# Patient Record
Sex: FEMALE | Race: WHITE | NOT HISPANIC OR LATINO | Employment: FULL TIME | URBAN - METROPOLITAN AREA
[De-identification: names, ages, dates, MRNs, and addresses within clinical notes are randomized per-mention and may not be internally consistent; named-entity substitution may affect disease eponyms.]

---

## 2017-01-31 DIAGNOSIS — Z30.41 SURVEILLANCE OF PREVIOUSLY PRESCRIBED CONTRACEPTIVE PILL: Primary | ICD-10-CM

## 2017-01-31 RX ORDER — NORGESTIMATE AND ETHINYL ESTRADIOL 0.25-0.035
1 KIT ORAL DAILY
Qty: 112 TABLET | Refills: 1 | Status: SHIPPED | OUTPATIENT
Start: 2017-01-31 | End: 2017-09-20

## 2017-03-19 ENCOUNTER — MYC MEDICAL ADVICE (OUTPATIENT)
Dept: FAMILY MEDICINE | Facility: CLINIC | Age: 37
End: 2017-03-19

## 2017-03-21 NOTE — TELEPHONE ENCOUNTER
I have not seen Danyelle since 9/2015. She will need follow up with me prior to her losing insurance. Please assist her in scheduling an appointment.  STEWART Arreguin, PA-C

## 2017-03-21 NOTE — TELEPHONE ENCOUNTER
[employer] message sent to patient. LMOM for patient as well.    Lucinda Kenyon,

## 2017-03-27 ENCOUNTER — OFFICE VISIT (OUTPATIENT)
Dept: FAMILY MEDICINE | Facility: CLINIC | Age: 37
End: 2017-03-27
Payer: COMMERCIAL

## 2017-03-27 VITALS
HEIGHT: 67 IN | DIASTOLIC BLOOD PRESSURE: 86 MMHG | WEIGHT: 236 LBS | BODY MASS INDEX: 37.04 KG/M2 | SYSTOLIC BLOOD PRESSURE: 128 MMHG | HEART RATE: 84 BPM | TEMPERATURE: 98.5 F

## 2017-03-27 DIAGNOSIS — F33.1 MAJOR DEPRESSIVE DISORDER, RECURRENT EPISODE, MODERATE (H): Primary | ICD-10-CM

## 2017-03-27 DIAGNOSIS — G47.09 OTHER INSOMNIA: ICD-10-CM

## 2017-03-27 DIAGNOSIS — R29.898 ANKLE WEAKNESS: ICD-10-CM

## 2017-03-27 PROCEDURE — 99214 OFFICE O/P EST MOD 30 MIN: CPT | Performed by: PHYSICIAN ASSISTANT

## 2017-03-27 RX ORDER — BUPROPION HYDROCHLORIDE 150 MG/1
150 TABLET ORAL EVERY MORNING
Qty: 90 TABLET | Refills: 3 | Status: SHIPPED | OUTPATIENT
Start: 2017-03-27 | End: 2018-03-21

## 2017-03-27 RX ORDER — ZOLPIDEM TARTRATE 5 MG/1
5 TABLET ORAL
Qty: 30 TABLET | Refills: 5 | Status: SHIPPED | OUTPATIENT
Start: 2017-03-27 | End: 2017-09-13

## 2017-03-27 RX ORDER — BUPROPION HYDROCHLORIDE 300 MG/1
300 TABLET ORAL EVERY MORNING
Qty: 90 TABLET | Refills: 3 | Status: SHIPPED | OUTPATIENT
Start: 2017-03-27 | End: 2018-03-21

## 2017-03-27 RX ORDER — ZOLPIDEM TARTRATE 10 MG/1
5 TABLET ORAL
Qty: 14 TABLET | Refills: 0 | Status: SHIPPED | OUTPATIENT
Start: 2017-03-27 | End: 2017-09-18

## 2017-03-27 ASSESSMENT — ANXIETY QUESTIONNAIRES
3. WORRYING TOO MUCH ABOUT DIFFERENT THINGS: NOT AT ALL
6. BECOMING EASILY ANNOYED OR IRRITABLE: SEVERAL DAYS
1. FEELING NERVOUS, ANXIOUS, OR ON EDGE: SEVERAL DAYS
7. FEELING AFRAID AS IF SOMETHING AWFUL MIGHT HAPPEN: NOT AT ALL
5. BEING SO RESTLESS THAT IT IS HARD TO SIT STILL: NOT AT ALL
GAD7 TOTAL SCORE: 3
2. NOT BEING ABLE TO STOP OR CONTROL WORRYING: NOT AT ALL

## 2017-03-27 ASSESSMENT — PATIENT HEALTH QUESTIONNAIRE - PHQ9: 5. POOR APPETITE OR OVEREATING: SEVERAL DAYS

## 2017-03-27 NOTE — PROGRESS NOTES
SUBJECTIVE:                                                    Danyelle Canales is a 36 year old female who presents to clinic today for the following health issues:    Depression and Anxiety Follow-Up    Status since last visit: No change    Other associated symptoms:None    Complicating factors:     Significant life event: Yes-  Started a new job.  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     Current substance abuse: None    Cymbalta has helped with neck and back pain. Is down to #20 per month. Has gained weight with the Cymbalta, but has worked so doesn't want to try this.  Is happy with this other than the weight gain.   Eats relatively helping, but needs to up regular exercise.   Continues on Wellbutrin, and rare use of Vistaril.  Sleep unchanged, still using Ambien.  Is now at [facility] for pain mgmt.  Is seeing [place] for therapy and a pain mgmt provider there. Had plateaued with [facility], so switched about 1 year ago.    Still planning on baby in the future.    PHQ-9 SCORE 7/21/2015 9/15/2015 9/16/2015   Total Score 4 - -   Total Score MyChart - 10 -   Total Score - - 19     NAJMA-7 SCORE 10/20/2014 11/23/2014 12/30/2014   Total Score 2 4 6        PHQ-9  English      PHQ-9   Any Language     GAD7       Amount of exercise or physical activity: 2 days/week for an average of 15-30 minutes    Problems taking medications regularly: No    Medication side effects: weight gain from the Cymbalta     Diet: regular (no restrictions)      -------------------------------------    Problem list and histories reviewed & adjusted, as indicated.  Additional history: as documented    Notes L ankle weakness. Is using a brace on occasion with beneit. Feels that this needs to be strengthened.    Reviewed and updated as needed this visit by clinical staff  Tobacco  Allergies  Med Hx  Surg Hx  Fam Hx  Soc Hx      Reviewed and  "updated as needed this visit by Provider         ROS:  Constitutional, HEENT, cardiovascular, pulmonary, gi and gu systems are negative, except as otherwise noted.    OBJECTIVE:                                                    /86  Pulse 84  Temp 98.5  F (36.9  C) (Oral)  Ht 5' 7\" (1.702 m)  Wt 236 lb (107 kg)  LMP 02/27/2017 (Approximate)  Breastfeeding? No  BMI 36.96 kg/m2  Body mass index is 36.96 kg/(m^2).  GENERAL: healthy, alert and no distress  NECK: no adenopathy, no asymmetry, masses, or scars and thyroid normal to palpation  RESP: lungs clear to auscultation - no rales, rhonchi or wheezes  CV: regular rate and rhythm, normal S1 S2, no S3 or S4, no murmur, click or rub, no peripheral edema and peripheral pulses strong  ABDOMEN: soft, nontender, no hepatosplenomegaly, no masses and bowel sounds normal  MS: no gross musculoskeletal defects noted, no edema. Bilateral ankles appear normal Brace to L ankle.  PSYCH: mentation appears normal, affect normal/bright    Diagnostic Test Results:  none      ASSESSMENT/PLAN:                                                    1. Major depressive disorder, recurrent episode, moderate (H)  Stable, well controlled  Continue current dose, med refilled  F/u in 6 mos.  - buPROPion (WELLBUTRIN XL) 150 MG 24 hr tablet; Take 1 tablet (150 mg) by mouth every morning Will take in addition to 300 mg XL tablet  Dispense: 90 tablet; Refill: 3    2. Other insomnia  Stable, well controlled.  - zolpidem (AMBIEN) 5 MG tablet; Take 1 tablet (5 mg) by mouth nightly as needed for sleep  Dispense: 30 tablet; Refill: 5  - zolpidem (AMBIEN) 10 MG tablet; Take 0.5 tablets (5 mg) by mouth nightly as needed for sleep  Dispense: 14 tablet; Refill: 0    3. Ankle weakness  Recommended conservative treatment with rest, ice, nsaids and ROM/stretching exercises. RTC if symptoms worsen or do not continue to improve as anticipated.  Pt understood and agreed with plan.    Smiley Kim, " STEWART, PA-C

## 2017-03-27 NOTE — NURSING NOTE
"Chief Complaint   Patient presents with     Chronic Disease Management       Initial /86  Pulse 84  Temp 98.5  F (36.9  C) (Oral)  Ht 5' 7\" (1.702 m)  Wt 236 lb (107 kg)  LMP 02/27/2017 (Approximate)  Breastfeeding? No  BMI 36.96 kg/m2 Estimated body mass index is 36.96 kg/(m^2) as calculated from the following:    Height as of this encounter: 5' 7\" (1.702 m).    Weight as of this encounter: 236 lb (107 kg).  Medication Reconciliation: complete   Maliha Recio CMA (AAMA)      "

## 2017-03-27 NOTE — MR AVS SNAPSHOT
After Visit Summary   3/27/2017    Danyelle Canales    MRN: [id number]           Patient Information     Date Of Birth          1980        Visit Information        Provider Department      3/27/2017 3:00 PM Smiley Kim PA-C [facility]        Today's Diagnoses     Ankle weakness    -  1    Major depressive disorder, recurrent episode, moderate (H)        Other insomnia          Care Instructions                  Ankle Sprain Rehabilitation Exercises   As soon as you can tolerate pressure on the ball of your foot, begin stretching your ankle using the towel stretch. When this stretch is too easy, try the standing calf stretch and soleus stretch.     Towel stretch: Sit on a hard surface with your injured leg stretched out in front of you. Loop a towel around the ball of your foot and pull the towel toward your body keeping your knee straight. Hold this position for 15 to 30 seconds then relax. Repeat 3 times.     Standing calf stretch: Facing a wall, put your hands against the wall at about eye level. Keep the injured leg back, the uninjured leg forward, and the heel of your injured leg on the floor. Turn your injured foot slightly inward (as if you were pigeon-toed) as you slowly lean into the wall until you feel a stretch in the back of your calf. Hold for 15 to 30 seconds. Repeat 3 times. Do this exercise several times each day.     Standing soleus stretch: Stand facing a wall with your hands at about chest level. With both knees slightly bent and the injured foot back, gently lean into the wall until you feel a stretch in your lower calf. Once again, angle the toes of your injured foot slightly inward and keep your heel down on the floor. Hold this for 15 to 30 seconds. Return to the starting position. Repeat 3 times.   You can do the next 5 exercises when your ankle swelling has stopped increasing.     Ankle range of motion: Sitting or lying down with your legs  straight and your knee toward the ceiling, move your ankle up and down, in and out, and in circles. Only move your ankle. Don't move your leg. Repeat 10 times in each direction. Push hard in all directions.     Resisted dorsiflexion: Sit with your injured leg out straight and your foot facing a doorway. Tie a loop in one end of the tubing. Put your foot through the loop so that the tubing goes around the arch of your foot. Tie a knot in the other end of the tubing and shut the knot in the door. Move backward until there is tension in the tubing. Keeping your knee straight, pull your foot toward your body, stretching the tubing. Slowly return to the starting position. Do 3 sets of 10.     Resisted plantar flexion: Sit with your leg outstretched and loop the middle section of the tubing around the ball of your foot. Hold the ends of the tubing in both hands. Gently press the ball of your foot down and point your toes, stretching the tubing. Return to the starting position. Do 3 sets of 10.     Resisted inversion: Sit with your legs out straight and cross your uninjured leg over your injured ankle. Wrap the tubing around the ball of your injured foot and then loop it around your uninjured foot so that the tubing is anchored there at one end. Hold the other end of the tubing in your hand. Turn your injured foot inward and upward. This will stretch the tubing. Return to the starting position. Do 3 sets of 10.     Resisted eversion: Sit with both legs stretched out in front of you, with your feet about a shoulder's width apart. Tie a loop in one end of the tubing. Put your injured foot through the loop so that the tubing goes around the arch of that foot and wraps around the outside of the uninjured foot. Hold onto the other end of the tubing with your hand to provide tension. Turn your injured foot up and out. Make sure you keep your uninjured foot still so that it will allow the tubing to stretch as you move your  injured foot. Return to the starting position. Do 3 sets of 10.   You may do the rest of the exercises when you can stand on your injured ankle without pain.     Heel raises: Balance yourself while standing behind a chair or counter. Raise your body up onto your toes and hold it for 5 seconds, then slowly lower yourself down. Repeat 10 times. Do 3 sets of 10.     Step-up: Stand with the foot of your injured leg on a support (like a block of wood) 3 to 5 inches high. Keep your other foot flat on the floor. Shift your weight onto the injured leg and straighten the knee as the uninjured leg comes off the floor. Lower your uninjured leg to the floor slowly. Do 3 sets of 10.     Static and dynamic balance exercises   A. Place a chair next to your non-injured leg and stand upright. (This will provide you with balance if needed.) Stand on your injured foot. Try to raise the arch of your foot while keeping your toes on the floor. Try to maintain this position and balance on your injured side for 30 seconds. This exercise can be made more difficult by doing it on a piece of foam or a pillow, or with your eyes closed.   B.  the same position as above. Keep your foot in this position and reach forward in front of you with your injured side's hand, allowing your knee to bend. Repeat this 10 times while maintaining the arch height. This exercise can be made more difficult by reaching farther in front of you. Do 2 sets.   C.  the same position as above. While maintaining your arch height, reach the injured side's hand across your body toward the chair. The farther you reach, the more challenging the exercise. Do 2 sets of 10.     Jump rope: Jump rope landing on both legs for 5 minutes, then on only the injured leg for 5 minutes.     Written by Aye Gross M.S., P.T., for [employer].   Published by [employer].   This content is reviewed periodically and is subject to change as  new health information becomes available. The information is intended to inform and educate and is not a replacement for medical evaluation, advice, diagnosis or treatment by a healthcare professional.   Sports Medicine Advisor 2003.1 Index  Sports Medicine Advisor 2003.1 Credits   Copyright   2003 [employer]. All rights reserved.   Page footer image                  [facility] [place]   Discharged by : Maliha ESCALERA CMA (AAMA)    Paper scripts provided to patient : Ambien 5 mg   If you have any questions regarding to your visit please contact your care team:   [facility] Hours Telephone Number   RODRIGUE [address] PA-C    7am-7pm Monday - Thursday   7am-5pm Fridays  [phone]   (Appointment scheduling available 24/7)   RN Line   [phone] option 2       What options do I have for visits at the clinic other than the traditional office visit?   To expand how we care for you, many of our providers are utilizing electronic visits (e-visits) and telephone visits, when medically appropriate, for interactions with their patients rather than a visit in the clinic. We also offer nurse visits for many medical concerns. Just like any other service, we will bill your insurance company for this type of visit based on time spent on the phone with your provider. Not all insurance companies cover these visits. Please check with your medical insurance if this type of visit is covered. You will be responsible for any charges that are not paid by your insurance.     E-visits via [employer]: generally incur a $35.00 fee.     Telephone visits:   Time spent on the phone: *charged based on time that is spent on the phone in increments of 10 minutes. Estimated cost:   5-10 mins $30.00   11-20 mins. $59.00   21-30 mins. $85.00   Use [employer] (secure email communication and access to your chart) to send your primary care provider a message or make an  appointment. Ask someone on your Team how to sign up for [employer].   For a Price Quote for your services, please call our Consumer Price Line at [phone].   As always, Thank you for trusting us with your health care needs!                [facility]:    Scheduling Appointments  [facility]  Call: [phone]    [place] [facility]  Call: [phone]    [facility]  Call: [phone]                    Follow-ups after your visit        Who to contact     If you have questions or need follow up information about today's clinic visit or your schedule please contact [facility] directly at [phone].  Normal or non-critical lab and imaging results will be communicated to you by [employer], letter or phone within 4 business days after the clinic has received the results. If you do not hear from us within 7 days, please contact the clinic through [employer] or phone. If you have a critical or abnormal lab result, we will notify you by phone as soon as possible.  Submit refill requests through [employer] or call your pharmacy and they will forward the refill request to us. Please allow 3 business days for your refill to be completed.          Additional Information About Your Visit        [employer] Information     [employer] gives you secure access to your electronic health record. If you see a primary care provider, you can also send messages to your care team and make appointments. If you have questions, please call your primary care clinic.  If you do not have a primary care provider, please call [phone] and they will assist you.        Care EveryWhere ID     This is your Care EveryWhere ID. This could be used by other organizations to access your [facility] medical records  [id number]        Your Vitals Were     Pulse Temperature Height Last Period Breastfeeding? BMI (Body Mass Index)    84 98.5  F (36.9  C) (Oral) 5'  "7\" (1.702 m) 02/27/2017 (Approximate) No 36.96 kg/m2       Blood Pressure from Last 3 Encounters:   03/27/17 128/86   11/23/16 (!) 139/92   10/15/16 130/76    Weight from Last 3 Encounters:   03/27/17 236 lb (107 kg)   11/23/16 231 lb (104.8 kg)   10/15/16 229 lb 3.2 oz (104 kg)              We Performed the Following     DEPRESSION ACTION PLAN (DAP) Order [[id number]]          Today's Medication Changes          These changes are accurate as of: 3/27/17  3:41 PM.  If you have any questions, ask your nurse or doctor.               These medicines have changed or have updated prescriptions.        Dose/Directions    * zolpidem 5 MG tablet   Commonly known as:  AMBIEN   This may have changed:  Another medication with the same name was added. Make sure you understand how and when to take each.   Used for:  Other insomnia   Changed by:  Smiley Kim PA-C        Dose:  5 mg   Take 1 tablet (5 mg) by mouth nightly as needed for sleep   Quantity:  30 tablet   Refills:  5       * zolpidem 10 MG tablet   Commonly known as:  AMBIEN   This may have changed:  You were already taking a medication with the same name, and this prescription was added. Make sure you understand how and when to take each.   Used for:  Other insomnia   Changed by:  Smiley Kim PA-C        Dose:  5 mg   Take 0.5 tablets (5 mg) by mouth nightly as needed for sleep   Quantity:  14 tablet   Refills:  0       * Notice:  This list has 2 medication(s) that are the same as other medications prescribed for you. Read the directions carefully, and ask your doctor or other care provider to review them with you.      Stop taking these medicines if you haven't already. Please contact your care team if you have questions.     Cervical Caps 22 MM Nancy   Stopped by:  Smiley Kim PA-C           methylPREDNISolone 4 MG tablet   Commonly known as:  MEDROL DOSEPAK   Stopped by:  Smiley Kim PA-C           ondansetron 8 MG tablet   Commonly " known as:  ZOFRAN   Stopped by:  Smiley Kim PA-C                Where to get your medicines      These medications were sent to [facility] [facility] [address] [facility] - [place], [place] - [address]  [address], [place] [address]     Phone:  [phone]     buPROPion 150 MG 24 hr tablet    buPROPion 300 MG 24 hr tablet         Some of these will need a paper prescription and others can be bought over the counter.  Ask your nurse if you have questions.     Bring a paper prescription for each of these medications     zolpidem 10 MG tablet    zolpidem 5 MG tablet                Primary Care Provider Office Phone # Fax #    Smiley Kim PA-C [phone]       [place] [id number] [facility] [address]        Thank you!     Thank you for choosing [facility]  for your care. Our goal is always to provide you with excellent care. Hearing back from our patients is one way we can continue to improve our services. Please take a few minutes to complete the written survey that you may receive in the mail after your visit with us. Thank you!             Your Updated Medication List - Protect others around you: Learn how to safely use, store and throw away your medicines at www.disposemymeds.org.          This list is accurate as of: 3/27/17  3:41 PM.  Always use your most recent med list.                   Brand Name Dispense Instructions for use    * buPROPion 150 MG 24 hr tablet    WELLBUTRIN XL    90 tablet    Take 1 tablet (150 mg) by mouth every morning Will take in addition to 300 mg XL tablet       * buPROPion 300 MG 24 hr tablet    WELLBUTRIN XL    90 tablet    Take 1 tablet (300 mg) by mouth every morning       DULoxetine 30 MG EC capsule    CYMBALTA    60 capsule    1 capsule daily x 1 week, then increase to 2 tablets daily       HYDROcodone-acetaminophen 5-325 MG per tablet    NORCO         hydrOXYzine 25 MG capsule    VISTARIL    120 capsule     Take 1-2 tablet at bedtime, may take one tablet in morning and one tablet in afternoon as well       norgestimate-ethinyl estradiol 0.25-35 MG-MCG per tablet    ORTHO-CYCLEN, SPRINTEC    112 tablet    Take 1 tablet by mouth daily Please do not substitute generic       * order for DME     1 Units    Apply 1 Units topically. TENS unit use as directed       * UNKNOWN TO PATIENT      Compound med. Prescribed by pain clinic.       * zolpidem 5 MG tablet    AMBIEN    30 tablet    Take 1 tablet (5 mg) by mouth nightly as needed for sleep       * zolpidem 10 MG tablet    AMBIEN    14 tablet    Take 0.5 tablets (5 mg) by mouth nightly as needed for sleep       * Notice:  This list has 6 medication(s) that are the same as other medications prescribed for you. Read the directions carefully, and ask your doctor or other care provider to review them with you.

## 2017-03-27 NOTE — PATIENT INSTRUCTIONS
Ankle Sprain Rehabilitation Exercises   As soon as you can tolerate pressure on the ball of your foot, begin stretching your ankle using the towel stretch. When this stretch is too easy, try the standing calf stretch and soleus stretch.     Towel stretch: Sit on a hard surface with your injured leg stretched out in front of you. Loop a towel around the ball of your foot and pull the towel toward your body keeping your knee straight. Hold this position for 15 to 30 seconds then relax. Repeat 3 times.     Standing calf stretch: Facing a wall, put your hands against the wall at about eye level. Keep the injured leg back, the uninjured leg forward, and the heel of your injured leg on the floor. Turn your injured foot slightly inward (as if you were pigeon-toed) as you slowly lean into the wall until you feel a stretch in the back of your calf. Hold for 15 to 30 seconds. Repeat 3 times. Do this exercise several times each day.     Standing soleus stretch: Stand facing a wall with your hands at about chest level. With both knees slightly bent and the injured foot back, gently lean into the wall until you feel a stretch in your lower calf. Once again, angle the toes of your injured foot slightly inward and keep your heel down on the floor. Hold this for 15 to 30 seconds. Return to the starting position. Repeat 3 times.   You can do the next 5 exercises when your ankle swelling has stopped increasing.     Ankle range of motion: Sitting or lying down with your legs straight and your knee toward the ceiling, move your ankle up and down, in and out, and in circles. Only move your ankle. Don't move your leg. Repeat 10 times in each direction. Push hard in all directions.     Resisted dorsiflexion: Sit with your injured leg out straight and your foot facing a doorway. Tie a loop in one end of the tubing. Put your foot through the loop so that the tubing goes around the arch of your foot. Tie a knot in the other end  of the tubing and shut the knot in the door. Move backward until there is tension in the tubing. Keeping your knee straight, pull your foot toward your body, stretching the tubing. Slowly return to the starting position. Do 3 sets of 10.     Resisted plantar flexion: Sit with your leg outstretched and loop the middle section of the tubing around the ball of your foot. Hold the ends of the tubing in both hands. Gently press the ball of your foot down and point your toes, stretching the tubing. Return to the starting position. Do 3 sets of 10.     Resisted inversion: Sit with your legs out straight and cross your uninjured leg over your injured ankle. Wrap the tubing around the ball of your injured foot and then loop it around your uninjured foot so that the tubing is anchored there at one end. Hold the other end of the tubing in your hand. Turn your injured foot inward and upward. This will stretch the tubing. Return to the starting position. Do 3 sets of 10.     Resisted eversion: Sit with both legs stretched out in front of you, with your feet about a shoulder's width apart. Tie a loop in one end of the tubing. Put your injured foot through the loop so that the tubing goes around the arch of that foot and wraps around the outside of the uninjured foot. Hold onto the other end of the tubing with your hand to provide tension. Turn your injured foot up and out. Make sure you keep your uninjured foot still so that it will allow the tubing to stretch as you move your injured foot. Return to the starting position. Do 3 sets of 10.   You may do the rest of the exercises when you can stand on your injured ankle without pain.     Heel raises: Balance yourself while standing behind a chair or counter. Raise your body up onto your toes and hold it for 5 seconds, then slowly lower yourself down. Repeat 10 times. Do 3 sets of 10.     Step-up: Stand with the foot of your injured leg on a support (like a block of wood) 3 to 5  inches high. Keep your other foot flat on the floor. Shift your weight onto the injured leg and straighten the knee as the uninjured leg comes off the floor. Lower your uninjured leg to the floor slowly. Do 3 sets of 10.     Static and dynamic balance exercises   A. Place a chair next to your non-injured leg and stand upright. (This will provide you with balance if needed.) Stand on your injured foot. Try to raise the arch of your foot while keeping your toes on the floor. Try to maintain this position and balance on your injured side for 30 seconds. This exercise can be made more difficult by doing it on a piece of foam or a pillow, or with your eyes closed.   B.  the same position as above. Keep your foot in this position and reach forward in front of you with your injured side's hand, allowing your knee to bend. Repeat this 10 times while maintaining the arch height. This exercise can be made more difficult by reaching farther in front of you. Do 2 sets.   C.  the same position as above. While maintaining your arch height, reach the injured side's hand across your body toward the chair. The farther you reach, the more challenging the exercise. Do 2 sets of 10.     Jump rope: Jump rope landing on both legs for 5 minutes, then on only the injured leg for 5 minutes.     Written by Aye Gross M.S., P.T., for [employer].   Published by [employer].   This content is reviewed periodically and is subject to change as new health information becomes available. The information is intended to inform and educate and is not a replacement for medical evaluation, advice, diagnosis or treatment by a healthcare professional.   Sports Medicine Advisor 2003.1 Index  Sports Medicine Advisor 2003.1 Credits   Copyright   2003 [employer]. All rights reserved.   Page footer image                  [facility]   Discharged by : Maliha ESCALERA CMA  (AAMA)    Paper scripts provided to patient : Ambien 5 mg   If you have any questions regarding to your visit please contact your care team:   [facility] Hours Telephone Number   RODRIGUE Bates Dr., PA-C    7am-7pm Monday - Thursday   7am-5pm Fridays  [phone]   (Appointment scheduling available 24/7)   RN Line   [phone] option 2       What options do I have for visits at the clinic other than the traditional office visit?   To expand how we care for you, many of our providers are utilizing electronic visits (e-visits) and telephone visits, when medically appropriate, for interactions with their patients rather than a visit in the clinic. We also offer nurse visits for many medical concerns. Just like any other service, we will bill your insurance company for this type of visit based on time spent on the phone with your provider. Not all insurance companies cover these visits. Please check with your medical insurance if this type of visit is covered. You will be responsible for any charges that are not paid by your insurance.     E-visits via [email or web address]: generally incur a $35.00 fee.     Telephone visits:   Time spent on the phone: *charged based on time that is spent on the phone in increments of 10 minutes. Estimated cost:   5-10 mins $30.00   11-20 mins. $59.00   21-30 mins. $85.00   Use [email or web address] (secure email communication and access to your chart) to send your primary care provider a message or make an appointment. Ask someone on your Team how to sign up for [employer].   For a Price Quote for your services, please call our Consumer Price Line at [phone].   As always, Thank you for trusting us with your health care needs!                [facility]:    Scheduling Appointments  [facility] [place]  Call: [phone]    [facility]  Call: [phone]    [facility]  Locations  Call: [phone]

## 2017-03-28 ASSESSMENT — ANXIETY QUESTIONNAIRES: GAD7 TOTAL SCORE: 3

## 2017-03-28 ASSESSMENT — PATIENT HEALTH QUESTIONNAIRE - PHQ9: SUM OF ALL RESPONSES TO PHQ QUESTIONS 1-9: 3

## 2017-09-13 ENCOUNTER — MYC REFILL (OUTPATIENT)
Dept: FAMILY MEDICINE | Facility: CLINIC | Age: 37
End: 2017-09-13

## 2017-09-13 DIAGNOSIS — G47.09 OTHER INSOMNIA: ICD-10-CM

## 2017-09-13 NOTE — TELEPHONE ENCOUNTER
Message from [employer]:  Original authorizing provider: RODRIGUE Garcia would like a refill of the following medications:  zolpidem (AMBIEN) 5 MG tablet [Smiley Kim PA-C]    Preferred pharmacy: [address]    Comment:

## 2017-09-13 NOTE — TELEPHONE ENCOUNTER
zolpidem (AMBIEN) 5 MG tablet         Last Written Prescription Date:  3/27/17  Last Fill Quantity: 30,   # refills: 5  Last Office Visit with [facility] or [facility] prescribing provider: 3/27/17  Future Office visit:       Routing refill request to provider for review/approval because:  Drug not on the [facility], [facility] or [facility] refill protocol or controlled substance

## 2017-09-14 DIAGNOSIS — G47.09 OTHER INSOMNIA: ICD-10-CM

## 2017-09-14 NOTE — TELEPHONE ENCOUNTER
zolpidem (AMBIEN) 5 MG tablet   5 mg, AT BEDTIME PRN 5 ordered  Edit     Summary: Take 1 tablet (5 mg) by mouth nightly as needed for sleep, Disp-30 tablet, R-5, Local Print   Dose, Route, Frequency: 5 mg, Oral, AT BEDTIME PRN  Start: 3/27/2017  Ord/Sold: 3/27/2017 (O)  Report  Taking:   Long-term:   Pharmacy: [address]  Med Dose History       Patient Sig: Take 1 tablet (5 mg) by mouth nightly as needed for sleep       Ordered on: 3/27/2017       Authorized by: RODDY SOTO       Dispense: 30 tablet          Last Office Visit with [facility], [facility] or  [facility] prescribing provider: 3-  Future Office visit:       Routing refill request to provider for review/approval because:  Drug not on the [facility], [facility] or [facility] refill protocol or controlled substance

## 2017-09-15 RX ORDER — ZOLPIDEM TARTRATE 5 MG/1
5 TABLET ORAL
Qty: 30 TABLET | Refills: 5 | Status: SHIPPED | OUTPATIENT
Start: 2017-09-15 | End: 2018-03-06

## 2017-09-15 NOTE — TELEPHONE ENCOUNTER
Script printed, please call or fax.  Pt is due for 6 month follow up depression/anxiety.  STEWART Arreguin, PA-C

## 2017-09-18 ENCOUNTER — MYC MEDICAL ADVICE (OUTPATIENT)
Dept: FAMILY MEDICINE | Facility: CLINIC | Age: 37
End: 2017-09-18

## 2017-09-18 RX ORDER — ZOLPIDEM TARTRATE 5 MG/1
TABLET ORAL
Qty: 30 TABLET
Start: 2017-09-18

## 2017-09-18 ASSESSMENT — PATIENT HEALTH QUESTIONNAIRE - PHQ9
10. IF YOU CHECKED OFF ANY PROBLEMS, HOW DIFFICULT HAVE THESE PROBLEMS MADE IT FOR YOU TO DO YOUR WORK, TAKE CARE OF THINGS AT HOME, OR GET ALONG WITH OTHER PEOPLE: SOMEWHAT DIFFICULT
SUM OF ALL RESPONSES TO PHQ QUESTIONS 1-9: 9
SUM OF ALL RESPONSES TO PHQ QUESTIONS 1-9: 9

## 2017-09-20 ENCOUNTER — OFFICE VISIT (OUTPATIENT)
Dept: FAMILY MEDICINE | Facility: CLINIC | Age: 37
End: 2017-09-20
Payer: COMMERCIAL

## 2017-09-20 VITALS
SYSTOLIC BLOOD PRESSURE: 122 MMHG | HEIGHT: 67 IN | DIASTOLIC BLOOD PRESSURE: 84 MMHG | BODY MASS INDEX: 36.41 KG/M2 | WEIGHT: 232 LBS | HEART RATE: 84 BPM | TEMPERATURE: 98.6 F

## 2017-09-20 DIAGNOSIS — F32.1 MODERATE MAJOR DEPRESSION (H): ICD-10-CM

## 2017-09-20 DIAGNOSIS — Z00.01 ENCOUNTER FOR ROUTINE ADULT MEDICAL EXAM WITH ABNORMAL FINDINGS: Primary | ICD-10-CM

## 2017-09-20 DIAGNOSIS — M54.42 LOW BACK PAIN WITH LEFT-SIDED SCIATICA, UNSPECIFIED BACK PAIN LATERALITY, UNSPECIFIED CHRONICITY: ICD-10-CM

## 2017-09-20 DIAGNOSIS — E66.9 NON MORBID OBESITY, UNSPECIFIED OBESITY TYPE: ICD-10-CM

## 2017-09-20 DIAGNOSIS — R41.840 POOR CONCENTRATION: ICD-10-CM

## 2017-09-20 DIAGNOSIS — Z23 NEED FOR PROPHYLACTIC VACCINATION AND INOCULATION AGAINST INFLUENZA: ICD-10-CM

## 2017-09-20 DIAGNOSIS — F41.1 GENERALIZED ANXIETY DISORDER: ICD-10-CM

## 2017-09-20 LAB
CHOLEST SERPL-MCNC: 214 MG/DL
GLUCOSE SERPL-MCNC: 81 MG/DL (ref 70–99)
HDLC SERPL-MCNC: 87 MG/DL
LDLC SERPL CALC-MCNC: 111 MG/DL
NONHDLC SERPL-MCNC: 127 MG/DL
TRIGL SERPL-MCNC: 81 MG/DL

## 2017-09-20 PROCEDURE — 99213 OFFICE O/P EST LOW 20 MIN: CPT | Mod: 25 | Performed by: PHYSICIAN ASSISTANT

## 2017-09-20 PROCEDURE — 82947 ASSAY GLUCOSE BLOOD QUANT: CPT | Performed by: PHYSICIAN ASSISTANT

## 2017-09-20 PROCEDURE — 90686 IIV4 VACC NO PRSV 0.5 ML IM: CPT | Performed by: PHYSICIAN ASSISTANT

## 2017-09-20 PROCEDURE — 99395 PREV VISIT EST AGE 18-39: CPT | Mod: 25 | Performed by: PHYSICIAN ASSISTANT

## 2017-09-20 PROCEDURE — 90471 IMMUNIZATION ADMIN: CPT | Performed by: PHYSICIAN ASSISTANT

## 2017-09-20 PROCEDURE — 36415 COLL VENOUS BLD VENIPUNCTURE: CPT | Performed by: PHYSICIAN ASSISTANT

## 2017-09-20 PROCEDURE — 80061 LIPID PANEL: CPT | Performed by: PHYSICIAN ASSISTANT

## 2017-09-20 RX ORDER — NAPROXEN 500 MG/1
500 TABLET ORAL 2 TIMES DAILY PRN
Qty: 30 TABLET | Refills: 1 | Status: SHIPPED | OUTPATIENT
Start: 2017-09-20 | End: 2018-08-20

## 2017-09-20 RX ORDER — METHYLPREDNISOLONE 4 MG
TABLET, DOSE PACK ORAL
Qty: 21 TABLET | Refills: 0 | Status: SHIPPED | OUTPATIENT
Start: 2017-09-20 | End: 2018-03-07

## 2017-09-20 ASSESSMENT — ANXIETY QUESTIONNAIRES
GAD7 TOTAL SCORE: 4
5. BEING SO RESTLESS THAT IT IS HARD TO SIT STILL: NOT AT ALL
3. WORRYING TOO MUCH ABOUT DIFFERENT THINGS: NOT AT ALL
1. FEELING NERVOUS, ANXIOUS, OR ON EDGE: SEVERAL DAYS
2. NOT BEING ABLE TO STOP OR CONTROL WORRYING: NOT AT ALL
6. BECOMING EASILY ANNOYED OR IRRITABLE: SEVERAL DAYS
7. FEELING AFRAID AS IF SOMETHING AWFUL MIGHT HAPPEN: SEVERAL DAYS

## 2017-09-20 ASSESSMENT — PATIENT HEALTH QUESTIONNAIRE - PHQ9: 5. POOR APPETITE OR OVEREATING: SEVERAL DAYS

## 2017-09-20 NOTE — PROGRESS NOTES
Injectable Influenza Immunization Documentation    1.  Is the person to be vaccinated sick today?   No    2. Does the person to be vaccinated have an allergy to a component   of the vaccine?   No    3. Has the person to be vaccinated ever had a serious reaction   to influenza vaccine in the past?   No    4. Has the person to be vaccinated ever had Guillain-Barré syndrome?   No    Form completed by Toyin Andersen, Certified Medical Assistant (AAMA)

## 2017-09-20 NOTE — NURSING NOTE
"Chief Complaint   Patient presents with     Physical     pt is fasting       Initial /84 (Cuff Size: Adult Large)  Pulse 84  Temp 98.6  F (37  C) (Oral)  Ht 5' 7\" (1.702 m)  Wt 232 lb (105.2 kg)  LMP 08/25/2017 (Exact Date)  BMI 36.34 kg/m2 Estimated body mass index is 36.34 kg/(m^2) as calculated from the following:    Height as of this encounter: 5' 7\" (1.702 m).    Weight as of this encounter: 232 lb (105.2 kg).  Medication Reconciliation: complete   Toyin Andersen, Certified Medical Assistant (AAMA)     "

## 2017-09-20 NOTE — PATIENT INSTRUCTIONS
Smiley or her team will be in touch with you with results.  Naproxen twice daily x 3-5 days  Heat to area 2-3 times daily x 20 minutes  Stretching 2-3 times daily  Only fill steroid if develop radiating pain down leg.    STEWART Arreguin, PATamannaC    Preventive Health Recommendations  Female Ages 26 - 39  Yearly exam:   See your health care provider every year in order to    Review health changes.     Discuss preventive care.      Review your medicines if you your doctor has prescribed any.    Until age 30: Get a Pap test every three years (more often if you have had an abnormal result).    After age 30: Talk to your doctor about whether you should have a Pap test every 3 years or have a Pap test with HPV screening every 5 years.   You do not need a Pap test if your uterus was removed (hysterectomy) and you have not had cancer.  You should be tested each year for STDs (sexually transmitted diseases), if you're at risk.   Talk to your provider about how often to have your cholesterol checked.  If you are at risk for diabetes, you should have a diabetes test (fasting glucose).  Shots: Get a flu shot each year. Get a tetanus shot every 10 years.   Nutrition:     Eat at least 5 servings of fruits and vegetables each day.    Eat whole-grain bread, whole-wheat pasta and brown rice instead of white grains and rice.    Talk to your provider about Calcium and Vitamin D.     Lifestyle    Exercise at least 150 minutes a week (30 minutes a day, 5 days of the week). This will help you control your weight and prevent disease.    Limit alcohol to one drink per day.    No smoking.     Wear sunscreen to prevent skin cancer.    See your dentist every six months for an exam and cleaning.  [facility]   Discharged by : Toyin SCHMID Certified Medical Assistant (AAMA)   Paper scripts provided to patient : 1   If you have any questions regarding to your visit please contact your care team:   [facility] Hours  Telephone Number   RODRIGUE Bates Dr., PA-C    7am-7pm Monday - Thursday   7am-5pm Fridays  [phone]   (Appointment scheduling available 24/7)   RN Line   [phone] option 2       What options do I have for visits at the clinic other than the traditional office visit?   To expand how we care for you, many of our providers are utilizing electronic visits (e-visits) and telephone visits, when medically appropriate, for interactions with their patients rather than a visit in the clinic. We also offer nurse visits for many medical concerns. Just like any other service, we will bill your insurance company for this type of visit based on time spent on the phone with your provider. Not all insurance companies cover these visits. Please check with your medical insurance if this type of visit is covered. You will be responsible for any charges that are not paid by your insurance.     E-visits via [employer]: generally incur a $35.00 fee.     Telephone visits:   Time spent on the phone: *charged based on time that is spent on the phone in increments of 10 minutes. Estimated cost:   5-10 mins $30.00   11-20 mins. $59.00   21-30 mins. $85.00   Use [employer] (secure email communication and access to your chart) to send your primary care provider a message or make an appointment. Ask someone on your Team how to sign up for [employer].   For a Price Quote for your services, please call our Consumer Price Line at [phone].   As always, Thank you for trusting us with your health care needs!                [facility]:    Scheduling Appointments  [place]  Call: [phone]    [facility]  Call: [phone]    [facility]  Call: [phone]

## 2017-09-20 NOTE — PROGRESS NOTES
SUBJECTIVE:   CC: Danyelle Canales is an 37 year old woman who presents for preventive health visit.     Physical   Annual:     Getting at least 3 servings of Calcium per day::  NO    Bi-annual eye exam::  Yes    Dental care twice a year::  Yes    Sleep apnea or symptoms of sleep apnea::  Daytime drowsiness    Diet::  Other    Frequency of exercise::  1 day/week    Duration of exercise::  15-30 minutes    Taking medications regularly::  Yes    Medication side effects::  Significant flushing and Other    Additional concerns today::  YES    Sciatica is acting up - possibly start oral steroid again.  Started up again 2 days ago. Is having sharp pains in her lwoer back and into her thighs.  Sitting and standing are difficult.  Has been elevating legs, icing, etc.  No numbness. No bowel or bladder changes.  Last flare was about 1 year ago.  Typically can keep this under control with stretching, but is not helping this time.    Left foot problems-Has dealt with intermittent swelling.    Worse after resting and sleeping.  Lateral side of foot.  Improves     Check for A.D.D - issues concentrating and staying on task.  Has noticed in the last couple of years that these symptoms are worsening. Has a hard time staying on task, is zoning out. Worried about missing something, making a mistake, etc. Gets off of track while she is talking.  No problems when she is younger.  Can not stop her mind from racing, multiple thoughts coming in at once.  Has tried meditation. Loud music is helpful for her.  Recalls feeling guilt as a child at a very young age.      Continues to see pain mgmt. Currently on Cymbalta 90 mg per day. Taking 3 30 mg tablets.  Eventually this medication will need to be taken over by PCP.    -------------------------------------    Today's PHQ-2 Score:   PHQ-2 ( 1999 [employer]) 9/18/2017   Q1: Little interest or pleasure in doing things 1   Q2: Feeling down, depressed or hopeless 2   PHQ-2 Score 3   Q1: Little  "interest or pleasure in doing things Several days   Q2: Feeling down, depressed or hopeless More than half the days   PHQ-2 Score 3       Abuse: Current or Past(Physical, Sexual or Emotional)- No  Do you feel safe in your environment - Yes    Social History   Substance Use Topics     Smoking status: Never Smoker     Smokeless tobacco: Never Used     Alcohol use Yes      Comment: 1-2 a month     The patient does not drink >3 drinks per day nor >7 drinks per week.    Reviewed orders with patient.  Reviewed health maintenance and updated orders accordingly - Ye    Mammogram not appropriate for this patient based on age.    Pertinent mammograms are reviewed under the imaging tab.  History of abnormal Pap smear: NO - age 30-65 PAP every 5 years with negative HPV co-testing recommended    Reviewed and updated as needed this visit by clinical staff  Tobacco  Allergies  Med Hx  Surg Hx  Fam Hx  Soc Hx        Reviewed and updated as needed this visit by Provider              ROS:  C: NEGATIVE for fever, chills, change in weight  I: NEGATIVE for worrisome rashes, moles or lesions  E: NEGATIVE for vision changes or irritation  ENT: NEGATIVE for ear, mouth and throat problems  R: NEGATIVE for significant cough or SOB  B: NEGATIVE for masses, tenderness or discharge  CV: NEGATIVE for chest pain, palpitations or peripheral edema  GI: NEGATIVE for nausea, abdominal pain, heartburn, or change in bowel habits  : NEGATIVE for unusual urinary or vaginal symptoms. Periods are regular.  MUSCULOSKELETAL:as above  N: NEGATIVE for weakness, dizziness or paresthesias  PSYCH: as above     OBJECTIVE:   /84 (Cuff Size: Adult Large)  Pulse 84  Temp 98.6  F (37  C) (Oral)  Ht 5' 7\" (1.702 m)  Wt 232 lb (105.2 kg)  LMP 08/25/2017 (Exact Date)  BMI 36.34 kg/m2  EXAM:  GENERAL: healthy, alert and no distress  EYES: Eyes grossly normal to inspection, PERRL and conjunctivae and sclerae normal  HENT: ear canals and TM's normal, nose " and mouth without ulcers or lesions  NECK: no adenopathy, no asymmetry, masses, or scars and thyroid normal to palpation  RESP: lungs clear to auscultation - no rales, rhonchi or wheezes  BREAST: normal without masses, tenderness or nipple discharge and no palpable axillary masses or adenopathy  CV: regular rate and rhythm, normal S1 S2, no S3 or S4, no murmur, click or rub, no peripheral edema and peripheral pulses strong  ABDOMEN: soft, nontender, no hepatosplenomegaly, no masses and bowel sounds normal  MS: mid low back tenderness. Negative SLR bilaterally.  SKIN: no suspicious lesions or rashes  NEURO: Normal strength and tone, mentation intact and speech normal  PSYCH: mentation appears normal, affect normal/bright    ASSESSMENT/PLAN:   1. Encounter for routine adult medical exam with abnormal findings  Follow up pending above results. Encouraged healthy diet and regular exercise, monthy SBE, and yearly exams.  - Lipid panel reflex to direct LDL  - Glucose    2. Low back pain with left-sided sciatica, unspecified back pain laterality, unspecified chronicity  Encouraged rest, heat, above mediations as directed, and ROM/stretching exercies.  Provided pt with recommended exercises.  Discussed ergomics at work, appropriate lifting, exercise and weight loss as preventative measures. RTC if symptoms worsen or do not continue to improve as anticipated over the next 1-2 wks.  Pt understood and agreed with plan.  Given history of radicular symptoms with herniated disc, I did provided with her printed script for medrol should these develop.  - methylPREDNISolone (MEDROL DOSEPAK) 4 MG tablet; Follow package instructions  Dispense: 21 tablet; Refill: 0  - naproxen (NAPROSYN) 500 MG tablet; Take 1 tablet (500 mg) by mouth 2 times daily as needed for moderate pain  Dispense: 30 tablet; Refill: 1    3. Generalized anxiety disorder  4. Moderate major depression (H)  Stable, well controlled.  - MENTAL HEALTH REFERRAL    5. Poor  "concentration  Pt has concern for ADHD  Referral for evaluation.  - MENTAL HEALTH REFERRAL    6. Need for prophylactic vaccination and inoculation against influenza  - FLU VAC, SPLIT VIRUS IM > 3 YO (QUADRIVALENT) [75843]  - Vaccine Administration, Initial [79249]    7. Non morbid obesity, unspecified obesity type  Recommended healthy diet and lifestyle changes.    COUNSELING:  Reviewed preventive health counseling, as reflected in patient instructions    BP Screening:   Last 3 BP Readings:    BP Readings from Last 3 Encounters:   09/20/17 122/84   03/27/17 128/86   11/23/16 (!) 139/92       The following was recommended to the patient:  Re-screen BP within a year and recommended lifestyle modifications     reports that she has never smoked. She has never used smokeless tobacco.    Estimated body mass index is 36.34 kg/(m^2) as calculated from the following:    Height as of this encounter: 5' 7\" (1.702 m).    Weight as of this encounter: 232 lb (105.2 kg).   Weight management plan: Discussed healthy diet and exercise guidelines and patient will follow up in 6 months in clinic to re-evaluate.    Counseling Resources:  ATP IV Guidelines  Pooled Cohorts Equation Calculator  Breast Cancer Risk Calculator  FRAX Risk Assessment  ICSI Preventive Guidelines  Dietary Guidelines for Americans, 2010  USDA's MyPlate  ASA Prophylaxis  Lung CA Screening    Smiley Kim PA-C  [facility]  Answers for HPI/ROS submitted by the patient on 9/18/2017   PHQ-2 Score: 3  If you checked off any problems, how difficult have these problems made it for you to do your work, take care of things at home, or get along with other people?: Somewhat difficult  PHQ9 TOTAL SCORE: 9    "

## 2017-09-21 ASSESSMENT — ANXIETY QUESTIONNAIRES: GAD7 TOTAL SCORE: 4

## 2017-12-21 DIAGNOSIS — F33.1 MAJOR DEPRESSIVE DISORDER, RECURRENT EPISODE, MODERATE (H): ICD-10-CM

## 2017-12-21 DIAGNOSIS — F41.1 GENERALIZED ANXIETY DISORDER: ICD-10-CM

## 2017-12-21 DIAGNOSIS — G89.4 CHRONIC PAIN SYNDROME: ICD-10-CM

## 2017-12-21 NOTE — TELEPHONE ENCOUNTER
mirtazapine (REMERON) 15 MG tablet      Last Written Prescription Date:  na  Last Fill Quantity: na,   # refills: na  Last Office Visit: 9/20/2017    Future Office visit:       Routing refill request to provider for review/approval because:  Drug not active on patient's medication list

## 2017-12-27 NOTE — TELEPHONE ENCOUNTER
Please contact pt. This medication is not on our medication list. Was she getting this from a different provider?  STEWART Arreguin, PA-C

## 2017-12-28 RX ORDER — MIRTAZAPINE 15 MG/1
7.5 TABLET, FILM COATED ORAL AT BEDTIME
Qty: 45 TABLET | Refills: 1 | COMMUNITY
Start: 2017-12-28 | End: 2018-10-24 | Stop reason: ALTCHOICE

## 2017-12-28 RX ORDER — MIRTAZAPINE 15 MG/1
TABLET, FILM COATED ORAL
Qty: 30 TABLET | Refills: 1 | OUTPATIENT
Start: 2017-12-28

## 2017-12-28 RX ORDER — DULOXETIN HYDROCHLORIDE 30 MG/1
30 CAPSULE, DELAYED RELEASE ORAL DAILY
Qty: 60 CAPSULE | Refills: 1 | COMMUNITY
Start: 2017-12-28 | End: 2018-08-20 | Stop reason: DRUGHIGH

## 2017-12-28 NOTE — TELEPHONE ENCOUNTER
Spoke with patient. She is taking mirtazapine 7.5 mg daily (but sometimes not every day).  This is prescribed by pain clinic.  She is not needing refill and doesn't know why pharmacy is contacting us.  If she is in need of refill she will contact pharmacy to let them know to reach out to pain clinic.  Med rec completed - no other new/different medications. She is taking cymbalta at 90 mg daily now.  Updated med list to reflect change.    Marc Thompson RN

## 2018-01-25 ENCOUNTER — TRANSFERRED RECORDS (OUTPATIENT)
Dept: HEALTH INFORMATION MANAGEMENT | Facility: CLINIC | Age: 38
End: 2018-01-25

## 2018-01-25 ENCOUNTER — TELEPHONE (OUTPATIENT)
Dept: FAMILY MEDICINE | Facility: CLINIC | Age: 38
End: 2018-01-25

## 2018-01-25 ENCOUNTER — NURSE TRIAGE (OUTPATIENT)
Dept: NURSING | Facility: CLINIC | Age: 38
End: 2018-01-25

## 2018-01-25 NOTE — TELEPHONE ENCOUNTER
Reason for call:  Patient reporting a symptom    Symptom or request: flu symptoms and rattling in chest    Additional comments: Having patient triaged with RN Greer with FNA, also advised Greer I will send message to clinic.    Phone Number patient can be reached at:    Danyelle Canales (Self) [phone] (H)         Call taken on 1/25/2018 at 12:57 PM by Zaida Yoo

## 2018-01-25 NOTE — TELEPHONE ENCOUNTER
Reason for Disposition    Wheezing is present    Protocols used: COUGH - ACUTE NON-PRODUCTIVE-ADULT-AH    She stated she can't get in right now. She is considering urgent care at some point. She was hoping a cough medication could be called in. I advised anything stronger with a narcotic, she's going to need to be seen for evaluation. She declined me connecting her with scheduling.  Charley Perkins RN-[facility]

## 2018-01-26 ENCOUNTER — TELEPHONE (OUTPATIENT)
Dept: FAMILY MEDICINE | Facility: CLINIC | Age: 38
End: 2018-01-26

## 2018-01-26 NOTE — TELEPHONE ENCOUNTER
Called and spoke with patient. She is still having the same symptoms as she was last night in urgent care. She does have a new symptom of ear pain. Other than that, she continues to have fever, cough, body aches, headaches, and some dizziness. Headache is the same as it was when seen in urgent care. She is well hydrated and voiding often. She denies neck pain, light sensitivity or new rash, dyspnea, chest pain, high fever (highest is 101 F). She is alternating tylenol and ibuprofen and is wondering if there is anything more that she can take. She was prescribed tamiflu last night in urgent care but hasn't taken it because the provider told her that it may make her vomit and she is afraid that it would make her headache worse. She is having trouble sleeping due to her illness. I huddled with covering provider, Herminio Serrato, who said that patient can attempt to take the tamiflu, and also try ice packs to her neck to help with the headache. If symptoms worsen, or do not improve she should contact us. If her ear pain does not improve, she should be seen. I advised if she is feeling like she needs to be seen again, if symptoms worsen, do not hesitate to go back to urgent care since there are no appts available today.   Justin Abreu RN    Influenza-Like Illness (ARLENE) Protocol    Danyelle Canales      Age: 37 year old     YOB: 1980    Are you currently sick or have you had close contact with someone who is currently sick?   Yes, this patient is currently sick.     Adult Clinical Evaluation    Is this patient experiencing ANY of the following?  Unconsciousness or unresponsiveness No   Difficulty breathing or swallowing No   Blue or dusky lips, skin, or nail beds No   Chest pain No   Severe confusion or delirium No   Seizure activity: ongoing or stopped No   Severe dehydration or signs of shock No   Patient sounds very sick on the phone No     Is this patient experiencing ANY of the following?  Fever >  104 or shaking chills No   Wheezing with minimal response to usual wheezing medications or new wheezing No   Repeated vomiting or diarrhea with signs of dehydration (no urination within last 12 hours) No   Flu-like symptoms that initially improved but returned with fever and a worse cough No   Stiff or painful neck No   Severe headache No     Does the patient have any of the following?  Measured fever > 100 degrees Yes   Chills or feels very warm to the touch Yes   Cough Yes   Sore throat No   Muscle/ body aches Yes   Headaches Yes   Fatigue (tiredness) Yes     Nursing Plan      Below are conditions which place adults at increased risk for the more severe complications of influenza.    Does this patient have ANY of the following conditions?  Chronic pulmonary disease such as asthma or COPD No   Heart disease (CHF, CAD, anticoag due to arrhythmia) No   Liver disease (hepatitis, liver failure, cirrhosis) No   Kidney disease (renal failure, insufficiency or dialysis) No   Metabolic disorder (e.g. diabetes) No   Neuromuscular disorder (JENNIFER LONGORIA MD, myasthenia gravis) No   Compromised ability to handle respiratory secretions No   Hematologic disorder (e.g. sickle cell disease) No   HIV / AIDS No   Chemotherapy or radiation within the last 3 months No   Received an organ or a bone marrow transplant No   Taking Prednisone in excess of 20mg daily No   Is age 65 years or older No   Is pregnant, thinks she may be pregnant or is within two weeks after delivery No   Is a resident of a chronic care facility No   Is patient  or [place] native  No     Patient does not fall into high risk category.  Offered Home Care Education.  If no improvement in 3-5 days, patient should be seen by a provider.    If further questions/concerns or if new symptoms develop, call your PCP or [place] [facility] as soon as possible.      Provided home care instructions    General home care instruction:      Avoid contact with people  in your household who are at increased risk for more severe complications of influenza (such as pregnant women or people who have a chronic health condition, for example diabetes, heart disease, asthma, or emphysema).    Stay home from work, school, childcare or other public places until your fever (37.8 degrees Celsius [100 degrees Fahrenheit]) has been gone for at least 24 hours, except to seek medical care. (Fever should be gone without the use of fever-reducing medications.) Use a surgical mask if available, or cover your mouth and nose with a tissue if possible if you need to seek medical care. Contact your work place, school, or  as they may have longer exclusion times.    You may continue to shed virus after your fever is gone. Limit your contact with high-risk individuals for 10 days after your symptoms started and be especially careful to cover your coughs/sneezes and wash your hands.    Cover your cough and wash your hands often, and especially after coughing, sneezing, blowing your nose.    Drink plenty of fluids (such as water, broth, sports drinks, electrolyte beverages for children) to prevent dehydration.    Avoid tobacco and second hand smoke.    Get plenty of rest.    Use over-the-counter pain relievers as needed per  instructions.    Do not give aspirin (acetylsalicylic acid) or products that contain aspirin (e.g. bismuth subsalicylate - Pepto Bismol) to children or teenagers 18 years or younger.    A small number of people with influenza do not have fever. If you have respiratory symptoms and are at increased risk for complications of influenza, contact your health care provider to discuss these symptoms.    For parents of infants:    If possible, only family members who are not sick should care for infants.    Wash your hands with soap and water, or an alcohol-based hand rub (if your hands are not visibly soiled) before caring for your infant.    Cover your mouth and nose with  a tissue when coughing or sneezing, and clean your hands.    Contact a health care provider to discuss your illness within 1-2 days if you are    Pregnant    Immunocompromised    Call 911 if you experience:    Difficulty breathing or shortness of breath    Pain or pressure in the chest    Confusion or less responsive than normal    Seizure activity: ongoing or stopped    Severe dehydration or signs of shock     Blue or dusky lips, skin, or nail beds    If further questions/concerns or if new symptoms develop, call your PCP or [place] [facility] as soon as possible.    When to seek medical attention    Contact your health care provider right away if you experience:    A painful sore throat accompanied by fever persists for more than 48 hours    Ear pain, sinus pain, persistent vomiting and/or diarrhea    Oral temperature greater than 104  Fahrenheit (40  Celsius)    Dehydration (e.g., mouth feeling dry, dizzy when sitting/standing, decreased urine output)    Severe or persistent vomiting; unable to keep fluids down    Improvement in flu-like symptoms (fever and cough or sore throat) but then return of fever and worse cough or sore throat    Not drinking enough fluid    Any other concerns not stated above      Additional educational resources include:    http://www.mdop5.com    http://www.cdc.gov/flu/  Justin Abreu

## 2018-01-26 NOTE — TELEPHONE ENCOUNTER
Reason for call:  Patient reporting a symptom    Symptom or request: Cough,Fever    Duration (how long have symptoms been present): 2-3 days    Have you been treated for this before? Yes    Additional comments: Patient is calling to speak with a nurse to discuss what she can do for her cold symptoms.  Patient was in urgent care and they did do an xray and no pneumonia.  Patient was told she has the flu.  Patient has not been able to sleep for more than 2 hours at a time.  She was prescribed tessalon  But it has minimal effect.    Phone Number patient can be reached at:  Home number on file [phone] (home)    Best Time:  ASAP    Can we leave a detailed message on this number:  YES    Call taken on 1/26/2018 at 2:25 PM by Lucinda Mac

## 2018-01-27 ENCOUNTER — TRANSFERRED RECORDS (OUTPATIENT)
Dept: HEALTH INFORMATION MANAGEMENT | Facility: CLINIC | Age: 38
End: 2018-01-27

## 2018-02-27 ENCOUNTER — TRANSFERRED RECORDS (OUTPATIENT)
Dept: HEALTH INFORMATION MANAGEMENT | Facility: CLINIC | Age: 38
End: 2018-02-27

## 2018-03-06 ENCOUNTER — MYC REFILL (OUTPATIENT)
Dept: FAMILY MEDICINE | Facility: CLINIC | Age: 38
End: 2018-03-06

## 2018-03-06 DIAGNOSIS — G47.09 OTHER INSOMNIA: ICD-10-CM

## 2018-03-06 RX ORDER — ZOLPIDEM TARTRATE 5 MG/1
TABLET ORAL
Qty: 30 TABLET | OUTPATIENT
Start: 2018-03-06

## 2018-03-06 RX ORDER — ZOLPIDEM TARTRATE 5 MG/1
5 TABLET ORAL
Qty: 30 TABLET | Refills: 5 | Status: SHIPPED | OUTPATIENT
Start: 2018-03-06 | End: 2018-08-20

## 2018-03-06 NOTE — TELEPHONE ENCOUNTER
Chronic use, low dose  Pt aware of risks with long term use.  Continues to talk about conceiving in near future.  Will need to dc this at this time.    She is due for depression follow up. Okay for phone visit.    Script printed, please call or fax.  Smiley Kim PA-C

## 2018-03-06 NOTE — TELEPHONE ENCOUNTER
Message from [employer]:  Original authorizing provider: RODRIGUE Garcia would like a refill of the following medications:  zolpidem (AMBIEN) 5 MG tablet [Smiley Kim PA-C]    Preferred pharmacy: [address]    Comment:

## 2018-03-06 NOTE — TELEPHONE ENCOUNTER
Ambien      Last Written Prescription Date:  9/15/17  Last Fill Quantity: 30,   # refills: 5  Last Office Visit: 9/20/17  Future Office visit:       Routing refill request to provider for review/approval because:  Drug not on the [facility], [facility] or [facility] refill protocol or controlled substance

## 2018-03-07 ENCOUNTER — OFFICE VISIT (OUTPATIENT)
Dept: FAMILY MEDICINE | Facility: CLINIC | Age: 38
End: 2018-03-07
Payer: COMMERCIAL

## 2018-03-07 VITALS
TEMPERATURE: 99 F | BODY MASS INDEX: 36.41 KG/M2 | HEART RATE: 70 BPM | WEIGHT: 232 LBS | SYSTOLIC BLOOD PRESSURE: 124 MMHG | HEIGHT: 67 IN | DIASTOLIC BLOOD PRESSURE: 72 MMHG

## 2018-03-07 DIAGNOSIS — B37.31 YEAST INFECTION OF THE VAGINA: ICD-10-CM

## 2018-03-07 DIAGNOSIS — K13.0 LESION OF LIP: ICD-10-CM

## 2018-03-07 DIAGNOSIS — N89.8 VAGINAL DISCHARGE: Primary | ICD-10-CM

## 2018-03-07 DIAGNOSIS — K13.0 CRACKED LIP: ICD-10-CM

## 2018-03-07 DIAGNOSIS — J02.0 STREPTOCOCCAL PHARYNGITIS: ICD-10-CM

## 2018-03-07 LAB
SPECIMEN SOURCE: ABNORMAL
WET PREP SPEC: ABNORMAL

## 2018-03-07 PROCEDURE — 99214 OFFICE O/P EST MOD 30 MIN: CPT | Performed by: FAMILY MEDICINE

## 2018-03-07 PROCEDURE — 87210 SMEAR WET MOUNT SALINE/INK: CPT | Performed by: FAMILY MEDICINE

## 2018-03-07 RX ORDER — MUPIROCIN CALCIUM 20 MG/G
CREAM TOPICAL 3 TIMES DAILY
Qty: 15 G | Refills: 0 | Status: SHIPPED | OUTPATIENT
Start: 2018-03-07 | End: 2019-02-05

## 2018-03-07 RX ORDER — FLUCONAZOLE 150 MG/1
150 TABLET ORAL ONCE
Qty: 1 TABLET | Refills: 1 | Status: SHIPPED | OUTPATIENT
Start: 2018-03-07 | End: 2019-02-05

## 2018-03-07 NOTE — PATIENT INSTRUCTIONS
Use cocunout oil on lips for now and monitor , if becoming pustule then use antibiotics oint   Use antifungal  Hydrate with fluids    Ilana Velasco D.O.

## 2018-03-07 NOTE — NURSING NOTE
"Chief Complaint   Patient presents with     Vaginal Problem     declined self collect wet prep     Derm Problem     facial derm issue she would like checked today       Initial /72  Pulse 70  Temp 99  F (37.2  C) (Oral)  Ht 5' 7\" (1.702 m)  Wt 232 lb (105.2 kg)  LMP 02/21/2018 (Exact Date)  BMI 36.34 kg/m2 Estimated body mass index is 36.34 kg/(m^2) as calculated from the following:    Height as of this encounter: 5' 7\" (1.702 m).    Weight as of this encounter: 232 lb (105.2 kg).  Medication Reconciliation: complete   Patricia Wray MA      "

## 2018-03-07 NOTE — MR AVS SNAPSHOT
After Visit Summary   3/7/2018    Danyelle Canales    MRN: [id number]           Patient Information     Date Of Birth          1980        Visit Information        Provider Department      3/7/2018 11:40 AM Ilana Velasco DO [facility]        Today's Diagnoses     Vaginal discharge    -  1    Streptococcal pharyngitis        Cracked lip        Yeast infection of the vagina          Care Instructions    Use cocunout oil on lips for now and monitor , if becoming pustule then use antibiotics oint   Use antifungal  Hydrate with fluids    Ilana Velasco D.O.            Follow-ups after your visit        Your next 10 appointments already scheduled     Mar 21, 2018  8:20 AM CDT   SHORT with Smiley Kim PA-C   [facility] ([facility])    [address] [place] [id number] [phone]              Who to contact     If you have questions or need follow up information about today's clinic visit or your schedule please contact [facility] directly at [phone].  Normal or non-critical lab and imaging results will be communicated to you by [employer] Passhart, letter or phone within 4 business days after the clinic has received the results. If you do not hear from us within 7 days, please contact the clinic through [employer] Passhart or phone. If you have a critical or abnormal lab result, we will notify you by phone as soon as possible.  Submit refill requests through [employer] or call your pharmacy and they will forward the refill request to us. Please allow 3 business days for your refill to be completed.          Additional Information About Your Visit        MyChart Information     [employer] gives you secure access to your electronic health record. If you see a primary care provider, you can also send messages to your care team and make appointments. If you have questions, please call your primary care clinic.  If  "you do not have a primary care provider, please call [phone] and they will assist you.        Care EveryWhere ID     This is your Care EveryWhere ID. This could be used by other organizations to access your [place] medical records  [id number]        Your Vitals Were     Pulse Temperature Height Last Period BMI (Body Mass Index)       70 99  F (37.2  C) (Oral) 5' 7\" (1.702 m) 02/21/2018 (Exact Date) 36.34 kg/m2        Blood Pressure from Last 3 Encounters:   03/07/18 124/72   09/20/17 122/84   03/27/17 128/86    Weight from Last 3 Encounters:   03/07/18 232 lb (105.2 kg)   09/20/17 232 lb (105.2 kg)   03/27/17 236 lb (107 kg)              We Performed the Following     Wet prep          Today's Medication Changes          These changes are accurate as of 3/7/18 12:19 PM.  If you have any questions, ask your nurse or doctor.               Start taking these medicines.        Dose/Directions    fluconazole 150 MG tablet   Commonly known as:  DIFLUCAN   Used for:  Yeast infection of the vagina   Started by:  Ilana Velasco DO        Dose:  150 mg   Take 1 tablet (150 mg) by mouth once for 1 dose   Quantity:  1 tablet   Refills:  1         These medicines have changed or have updated prescriptions.        Dose/Directions    order for DME   This may have changed:  Another medication with the same name was removed. Continue taking this medication, and follow the directions you see here.   Used for:  Chronic pain syndrome, Myalgia and myositis   Changed by:  Ilana Velasco DO        Dose:  1 Units   Apply 1 Units topically. TENS unit use as directed   Quantity:  1 Units   Refills:  0         Stop taking these medicines if you haven't already. Please contact your care team if you have questions.     methylPREDNISolone 4 MG tablet   Commonly known as:  MEDROL DOSEPAK   Stopped by:  Ilana Velasco DO                Where to get your medicines      These medications were sent to [facility] - [place], [place] - [address]  [address] [facility] [address]     Phone:  [phone]     fluconazole 150 MG tablet                Primary Care Provider Office Phone # Fax #    Smiley Kim PA-C [phone]       [address] [facility] [address]        Equal Access to Services     EMILY MCCRAY : Hadii aad ku hadasho Soomaali, waaxda luqadaha, qaybta kaalmada adeegyada, waxay idiin hayaan adeeg kharash lajudith ah. So [facility] [phone].    ATENCIÓN: Si habla espilsa, tiene a roberto disposición servicios gratuitos de asistencia lingüística. Llame al [phone].    We comply with applicable federal civil rights laws and [place] laws. We do not discriminate on the basis of race, color, national origin, age, disability, sex, sexual orientation, or gender identity.            Thank you!     Thank you for choosing [facility]  for your care. Our goal is always to provide you with excellent care. Hearing back from our patients is one way we can continue to improve our services. Please take a few minutes to complete the written survey that you may receive in the mail after your visit with us. Thank you!             Your Updated Medication List - Protect others around you: Learn how to safely use, store and throw away your medicines at www.disposemymeds.org.          This list is accurate as of 3/7/18 12:19 PM.  Always use your most recent med list.                   Brand Name Dispense Instructions for use Diagnosis    * buPROPion 150 MG 24 hr tablet    WELLBUTRIN XL    90 tablet    Take 1 tablet (150 mg) by mouth every morning Will take in addition to 300 mg XL tablet    Major depressive disorder, recurrent episode, moderate (H)       * buPROPion 300 MG 24 hr tablet    WELLBUTRIN XL    90 tablet    Take 1 tablet (300 mg) by mouth every morning        DULoxetine 30 MG EC capsule    CYMBALTA    60 capsule    Take 3 capsules (90 mg)  by mouth daily    Chronic pain syndrome, Major depressive disorder, recurrent episode, moderate (H), Generalized anxiety disorder       fluconazole 150 MG tablet    DIFLUCAN    1 tablet    Take 1 tablet (150 mg) by mouth once for 1 dose    Yeast infection of the vagina       HYDROcodone-acetaminophen 5-325 MG per tablet    NORCO          hydrOXYzine 25 MG capsule    VISTARIL    120 capsule    Take 1-2 tablet at bedtime, may take one tablet in morning and one tablet in afternoon as well    Anxiety, Muscle tightness       mirtazapine 15 MG tablet    REMERON    45 tablet    Take 0.5 tablets (7.5 mg) by mouth At Bedtime        naproxen 500 MG tablet    NAPROSYN    30 tablet    Take 1 tablet (500 mg) by mouth 2 times daily as needed for moderate pain    Low back pain with left-sided sciatica, unspecified back pain laterality, unspecified chronicity       order for DME     1 Units    Apply 1 Units topically. TENS unit use as directed    Chronic pain syndrome, Myalgia and myositis       zolpidem 5 MG tablet    AMBIEN    30 tablet    Take 1 tablet (5 mg) by mouth nightly as needed for sleep    Other insomnia       * Notice:  This list has 2 medication(s) that are the same as other medications prescribed for you. Read the directions carefully, and ask your doctor or other care provider to review them with you.

## 2018-03-21 ENCOUNTER — OFFICE VISIT (OUTPATIENT)
Dept: FAMILY MEDICINE | Facility: CLINIC | Age: 38
End: 2018-03-21
Payer: COMMERCIAL

## 2018-03-21 VITALS
HEIGHT: 67 IN | HEART RATE: 76 BPM | BODY MASS INDEX: 36.12 KG/M2 | SYSTOLIC BLOOD PRESSURE: 122 MMHG | TEMPERATURE: 98.9 F | DIASTOLIC BLOOD PRESSURE: 72 MMHG | WEIGHT: 230.13 LBS

## 2018-03-21 DIAGNOSIS — G89.29 OTHER CHRONIC PAIN: ICD-10-CM

## 2018-03-21 DIAGNOSIS — E66.9 NON MORBID OBESITY, UNSPECIFIED OBESITY TYPE: ICD-10-CM

## 2018-03-21 DIAGNOSIS — F33.1 MAJOR DEPRESSIVE DISORDER, RECURRENT EPISODE, MODERATE (H): Primary | ICD-10-CM

## 2018-03-21 PROCEDURE — 99214 OFFICE O/P EST MOD 30 MIN: CPT | Performed by: PHYSICIAN ASSISTANT

## 2018-03-21 RX ORDER — BUPROPION HYDROCHLORIDE 300 MG/1
300 TABLET ORAL EVERY MORNING
Qty: 90 TABLET | Refills: 3 | Status: SHIPPED | OUTPATIENT
Start: 2018-03-21 | End: 2019-05-15

## 2018-03-21 RX ORDER — BUPROPION HYDROCHLORIDE 150 MG/1
150 TABLET ORAL EVERY MORNING
Qty: 90 TABLET | Refills: 3 | Status: SHIPPED | OUTPATIENT
Start: 2018-03-21 | End: 2018-09-26

## 2018-03-21 ASSESSMENT — ANXIETY QUESTIONNAIRES
GAD7 TOTAL SCORE: 8
5. BEING SO RESTLESS THAT IT IS HARD TO SIT STILL: NOT AT ALL
1. FEELING NERVOUS, ANXIOUS, OR ON EDGE: MORE THAN HALF THE DAYS
6. BECOMING EASILY ANNOYED OR IRRITABLE: MORE THAN HALF THE DAYS
3. WORRYING TOO MUCH ABOUT DIFFERENT THINGS: SEVERAL DAYS
2. NOT BEING ABLE TO STOP OR CONTROL WORRYING: MORE THAN HALF THE DAYS
7. FEELING AFRAID AS IF SOMETHING AWFUL MIGHT HAPPEN: NOT AT ALL

## 2018-03-21 ASSESSMENT — PATIENT HEALTH QUESTIONNAIRE - PHQ9: 5. POOR APPETITE OR OVEREATING: SEVERAL DAYS

## 2018-03-21 NOTE — PATIENT INSTRUCTIONS
Continue to taper down on the Cymbalta.  Start Prozac at 20 mg per day.  Phone visit in 4 weeks, sooner if needed.    Smiley Kim PA-C

## 2018-03-21 NOTE — MR AVS SNAPSHOT
After Visit Summary   3/21/2018    Danyelle Canales    MRN: [id number]           Patient Information     Date Of Birth          1980        Visit Information        Provider Department      3/21/2018 8:20 AM Smiley Kim PA-C [facility]        Today's Diagnoses     Major depressive disorder, recurrent episode, moderate (H)          Care Instructions    Continue to taper down on the Cymbalta.  Start Prozac at 20 mg per day.  Phone visit in 4 weeks, sooner if needed.    Smiley Kim PA-C            Follow-ups after your visit        Who to contact     If you have questions or need follow up information about today's clinic visit or your schedule please contact [facility] directly at [phone].  Normal or non-critical lab and imaging results will be communicated to you by MyChart, letter or phone within 4 business days after the clinic has received the results. If you do not hear from us within 7 days, please contact the clinic through MyChart or phone. If you have a critical or abnormal lab result, we will notify you by phone as soon as possible.  Submit refill requests through [employer] or call your pharmacy and they will forward the refill request to us. Please allow 3 business days for your refill to be completed.          Additional Information About Your Visit        MyChart Information     [employer] gives you secure access to your electronic health record. If you see a primary care provider, you can also send messages to your care team and make appointments. If you have questions, please call your primary care clinic.  If you do not have a primary care provider, please call [phone] and they will assist you.        Care EveryWhere ID     This is your Care EveryWhere ID. This could be used by other organizations to access your [place] medical records  [id number]        Your Vitals Were     Pulse Temperature Height Last Period BMI (Body  "Mass Index)       76 98.9  F (37.2  C) (Oral) 5' 7\" (1.702 m) 02/21/2018 (Exact Date) 36.04 kg/m2        Blood Pressure from Last 3 Encounters:   03/21/18 122/72   03/07/18 124/72   09/20/17 122/84    Weight from Last 3 Encounters:   03/21/18 230 lb 2 oz (104.4 kg)   03/07/18 232 lb (105.2 kg)   09/20/17 232 lb (105.2 kg)              Today, you had the following     No orders found for display         Today's Medication Changes          These changes are accurate as of 3/21/18  9:15 AM.  If you have any questions, ask your nurse or doctor.               Start taking these medicines.        Dose/Directions    FLUoxetine 20 MG capsule   Commonly known as:  PROzac   Used for:  Major depressive disorder, recurrent episode, moderate (H)   Started by:  Smiley Kim PA-C        Dose:  20 mg   Take 1 capsule (20 mg) by mouth daily   Quantity:  30 capsule   Refills:  1            Where to get your medicines      These medications were sent to [place] [address] [place] [address]   [address] [facility] [address]     Phone:  [phone]     buPROPion 150 MG 24 hr tablet    buPROPion 300 MG 24 hr tablet    FLUoxetine 20 MG capsule                Primary Care Provider Office Phone # Fax #    Smiley Kim PA-C [phone]       [address] [facility] [address]        Equal Access to Services     EMILY MCCRAY AH: Hadii mojgan ku hadasho Soomaali, waaxda luqadaha, qaybta kaalmada adeegyada, waxay tyra sotelo. So [facility] [phone].    ATENCIÓN: Si habla español, tiene a roberto disposición servicios gratuitos de asistencia lingüística. Llame al [phone].    We comply with applicable federal civil rights laws and [place] laws. We do not discriminate on the basis of race, color, national origin, age, disability, sex, sexual orientation, or gender identity.            Thank you!     Thank you for choosing [facility]  for your care. " Our goal is always to provide you with excellent care. Hearing back from our patients is one way we can continue to improve our services. Please take a few minutes to complete the written survey that you may receive in the mail after your visit with us. Thank you!             Your Updated Medication List - Protect others around you: Learn how to safely use, store and throw away your medicines at www.disposemymeds.org.          This list is accurate as of 3/21/18  9:15 AM.  Always use your most recent med list.                   Brand Name Dispense Instructions for use Diagnosis    * buPROPion 150 MG 24 hr tablet    WELLBUTRIN XL    90 tablet    Take 1 tablet (150 mg) by mouth every morning Will take in addition to 300 mg XL tablet    Major depressive disorder, recurrent episode, moderate (H)       * buPROPion 300 MG 24 hr tablet    WELLBUTRIN XL    90 tablet    Take 1 tablet (300 mg) by mouth every morning    Major depressive disorder, recurrent episode, moderate (H)       DULoxetine 30 MG EC capsule    CYMBALTA    60 capsule    Take 3 capsules (90 mg) by mouth daily    Chronic pain syndrome, Major depressive disorder, recurrent episode, moderate (H), Generalized anxiety disorder       FLUoxetine 20 MG capsule    PROzac    30 capsule    Take 1 capsule (20 mg) by mouth daily    Major depressive disorder, recurrent episode, moderate (H)       HYDROcodone-acetaminophen 5-325 MG per tablet    NORCO          hydrOXYzine 25 MG capsule    VISTARIL    120 capsule    Take 1-2 tablet at bedtime, may take one tablet in morning and one tablet in afternoon as well    Anxiety, Muscle tightness       mirtazapine 15 MG tablet    REMERON    45 tablet    Take 0.5 tablets (7.5 mg) by mouth At Bedtime        naproxen 500 MG tablet    NAPROSYN    30 tablet    Take 1 tablet (500 mg) by mouth 2 times daily as needed for moderate pain    Low back pain with left-sided sciatica, unspecified back pain laterality, unspecified chronicity        order for DME     1 Units    Apply 1 Units topically. TENS unit use as directed    Chronic pain syndrome, Myalgia and myositis       zolpidem 5 MG tablet    AMBIEN    30 tablet    Take 1 tablet (5 mg) by mouth nightly as needed for sleep    Other insomnia       * Notice:  This list has 2 medication(s) that are the same as other medications prescribed for you. Read the directions carefully, and ask your doctor or other care provider to review them with you.

## 2018-03-22 ASSESSMENT — PATIENT HEALTH QUESTIONNAIRE - PHQ9: SUM OF ALL RESPONSES TO PHQ QUESTIONS 1-9: 7

## 2018-03-22 ASSESSMENT — ANXIETY QUESTIONNAIRES: GAD7 TOTAL SCORE: 8

## 2018-04-02 ENCOUNTER — MYC MEDICAL ADVICE (OUTPATIENT)
Dept: FAMILY MEDICINE | Facility: CLINIC | Age: 38
End: 2018-04-02

## 2018-04-11 ENCOUNTER — MYC MEDICAL ADVICE (OUTPATIENT)
Dept: FAMILY MEDICINE | Facility: CLINIC | Age: 38
End: 2018-04-11

## 2018-08-20 ENCOUNTER — OFFICE VISIT (OUTPATIENT)
Dept: FAMILY MEDICINE | Facility: CLINIC | Age: 38
End: 2018-08-20
Payer: COMMERCIAL

## 2018-08-20 VITALS
WEIGHT: 225 LBS | TEMPERATURE: 98.5 F | BODY MASS INDEX: 35.31 KG/M2 | HEIGHT: 67 IN | DIASTOLIC BLOOD PRESSURE: 80 MMHG | SYSTOLIC BLOOD PRESSURE: 132 MMHG | HEART RATE: 84 BPM

## 2018-08-20 DIAGNOSIS — Z11.4 SCREENING FOR HIV (HUMAN IMMUNODEFICIENCY VIRUS): ICD-10-CM

## 2018-08-20 DIAGNOSIS — F41.1 GENERALIZED ANXIETY DISORDER: ICD-10-CM

## 2018-08-20 DIAGNOSIS — Z12.4 SCREENING FOR MALIGNANT NEOPLASM OF CERVIX: ICD-10-CM

## 2018-08-20 DIAGNOSIS — E66.01 MORBID OBESITY (H): ICD-10-CM

## 2018-08-20 DIAGNOSIS — F32.1 MODERATE MAJOR DEPRESSION (H): ICD-10-CM

## 2018-08-20 DIAGNOSIS — G47.09 OTHER INSOMNIA: ICD-10-CM

## 2018-08-20 DIAGNOSIS — Z23 NEED FOR PROPHYLACTIC VACCINATION WITH TETANUS-DIPHTHERIA (TD): ICD-10-CM

## 2018-08-20 DIAGNOSIS — Z00.01 ENCOUNTER FOR ROUTINE ADULT MEDICAL EXAM WITH ABNORMAL FINDINGS: Primary | ICD-10-CM

## 2018-08-20 DIAGNOSIS — G89.4 CHRONIC PAIN SYNDROME: ICD-10-CM

## 2018-08-20 PROCEDURE — 87624 HPV HI-RISK TYP POOLED RSLT: CPT | Performed by: PHYSICIAN ASSISTANT

## 2018-08-20 PROCEDURE — 87389 HIV-1 AG W/HIV-1&-2 AB AG IA: CPT | Performed by: PHYSICIAN ASSISTANT

## 2018-08-20 PROCEDURE — 90471 IMMUNIZATION ADMIN: CPT | Performed by: PHYSICIAN ASSISTANT

## 2018-08-20 PROCEDURE — 99395 PREV VISIT EST AGE 18-39: CPT | Mod: 25 | Performed by: PHYSICIAN ASSISTANT

## 2018-08-20 PROCEDURE — 36415 COLL VENOUS BLD VENIPUNCTURE: CPT | Performed by: PHYSICIAN ASSISTANT

## 2018-08-20 PROCEDURE — 90715 TDAP VACCINE 7 YRS/> IM: CPT | Performed by: PHYSICIAN ASSISTANT

## 2018-08-20 PROCEDURE — G0145 SCR C/V CYTO,THINLAYER,RESCR: HCPCS | Performed by: PHYSICIAN ASSISTANT

## 2018-08-20 RX ORDER — ZOLPIDEM TARTRATE 5 MG/1
5 TABLET ORAL
Qty: 30 TABLET | Refills: 5 | Status: SHIPPED | OUTPATIENT
Start: 2018-08-20 | End: 2018-10-24 | Stop reason: ALTCHOICE

## 2018-08-20 ASSESSMENT — ENCOUNTER SYMPTOMS
CONSTIPATION: 0
PALPITATIONS: 0
CHILLS: 0
SORE THROAT: 0
SHORTNESS OF BREATH: 0
MYALGIAS: 1
ARTHRALGIAS: 0
HEARTBURN: 0
HEMATURIA: 0
ABDOMINAL PAIN: 0
HEADACHES: 0
EYE PAIN: 0
DIZZINESS: 0
JOINT SWELLING: 0
FREQUENCY: 0
BREAST MASS: 0
DYSURIA: 0
PARESTHESIAS: 0
HEMATOCHEZIA: 0
WEAKNESS: 0
NAUSEA: 0
DIARRHEA: 0
NERVOUS/ANXIOUS: 1
COUGH: 0

## 2018-08-20 ASSESSMENT — ANXIETY QUESTIONNAIRES
7. FEELING AFRAID AS IF SOMETHING AWFUL MIGHT HAPPEN: SEVERAL DAYS
6. BECOMING EASILY ANNOYED OR IRRITABLE: SEVERAL DAYS
GAD7 TOTAL SCORE: 5
3. WORRYING TOO MUCH ABOUT DIFFERENT THINGS: SEVERAL DAYS
5. BEING SO RESTLESS THAT IT IS HARD TO SIT STILL: NOT AT ALL
2. NOT BEING ABLE TO STOP OR CONTROL WORRYING: SEVERAL DAYS
1. FEELING NERVOUS, ANXIOUS, OR ON EDGE: SEVERAL DAYS

## 2018-08-20 ASSESSMENT — PATIENT HEALTH QUESTIONNAIRE - PHQ9: 5. POOR APPETITE OR OVEREATING: NOT AT ALL

## 2018-08-20 NOTE — PROGRESS NOTES
SUBJECTIVE:   CC: Danyelle Canales is an 38 year old woman who presents for preventive health visit.     Physical   Annual:     Getting at least 3 servings of Calcium per day:  Yes    Bi-annual eye exam:  Yes    Dental care twice a year:  Yes    Sleep apnea or symptoms of sleep apnea:  None    Diet:  Carbohydrate counting    Frequency of exercise:  1 day/week    Duration of exercise:  15-30 minutes    Taking medications regularly:  Yes    Medication side effects:  Significant flushing    Additional concerns today:  YES    Referral to behavioral clinic - would like info again or a new referral.  Is not avoiding getting pregnant but hasn't been actively trying. Continues to work with pain mgmt and sees therapist with them.  Had to return to previous dose of Cymbalta as pain returned. Overall, her mood has been stable.    Is doing the keto diet, has lost 20 lbs    -------------------------------------    Today's PHQ-2 Score:   PHQ-2 ( 1999 Pfizer) 8/20/2018   Q1: Little interest or pleasure in doing things 1   Q2: Feeling down, depressed or hopeless 1   PHQ-2 Score 2   Q1: Little interest or pleasure in doing things Several days   Q2: Feeling down, depressed or hopeless Several days   PHQ-2 Score 2       Abuse: Current or Past(Physical, Sexual or Emotional)- No  Do you feel safe in your environment - Yes    Social History   Substance Use Topics     Smoking status: Never Smoker     Smokeless tobacco: Never Used     Alcohol use Yes      Comment: 1-2 a month     Alcohol Use 8/20/2018   If you drink alcohol do you typically have greater than 3 drinks per day OR greater than 7 drinks per week? No   No flowsheet data found.    Reviewed orders with patient.  Reviewed health maintenance and updated orders accordingly - Yes    Mammogram not appropriate for this patient based on age.    Pertinent mammograms are reviewed under the imaging tab.  History of abnormal Pap smear: NO - age 30-65 PAP every 5 years with negative  "HPV co-testing recommended  PAP / HPV 9/16/2015 9/26/2012 9/9/2011   PAP NIL NIL NIL     Reviewed and updated as needed this visit by clinical staff  Tobacco  Allergies  Meds  Problems  Med Hx  Surg Hx  Fam Hx  Soc Hx          Reviewed and updated as needed this visit by Provider  Problems            Review of Systems   Constitutional: Negative for chills.   HENT: Negative for congestion, ear pain, hearing loss and sore throat.    Eyes: Negative for pain and visual disturbance.   Respiratory: Negative for cough and shortness of breath.    Cardiovascular: Negative for chest pain, palpitations and peripheral edema.   Gastrointestinal: Negative for abdominal pain, constipation, diarrhea, heartburn, hematochezia and nausea.   Breasts:  Negative for tenderness, breast mass and discharge.   Genitourinary: Negative for dysuria, frequency, genital sores, hematuria, pelvic pain, urgency, vaginal bleeding and vaginal discharge.   Musculoskeletal: Positive for myalgias. Negative for arthralgias and joint swelling.   Skin: Negative for rash.   Neurological: Negative for dizziness, weakness, headaches and paresthesias.   Psychiatric/Behavioral: Negative for mood changes. The patient is nervous/anxious.         OBJECTIVE:   /80 (Cuff Size: Adult Large)  Pulse 84  Temp 98.5  F (36.9  C) (Oral)  Ht 5' 7\" (1.702 m)  Wt 225 lb (102.1 kg)  LMP 08/06/2018 (Exact Date)  BMI 35.24 kg/m2  Physical Exam  GENERAL: healthy, alert and no distress  EYES: Eyes grossly normal to inspection, PERRL and conjunctivae and sclerae normal  HENT: ear canals and TM's normal, nose and mouth without ulcers or lesions  NECK: no adenopathy, no asymmetry, masses, or scars and thyroid normal to palpation  RESP: lungs clear to auscultation - no rales, rhonchi or wheezes  BREAST: normal without masses, tenderness or nipple discharge and no palpable axillary masses or adenopathy  CV: regular rate and rhythm, normal S1 S2, no S3 or S4, no " murmur, click or rub, no peripheral edema and peripheral pulses strong  ABDOMEN: soft, nontender, no hepatosplenomegaly, no masses and bowel sounds normal   (female): normal female external genitalia, normal urethral meatus, vaginal mucosa pink, moist, well rugated, and normal cervix/adnexa/uterus without masses or discharge  MS: no gross musculoskeletal defects noted, no edema  SKIN: no suspicious lesions or rashes  NEURO: Normal strength and tone, mentation intact and speech normal  PSYCH: mentation appears normal, affect normal/bright    Diagnostic Test Results:  none     ASSESSMENT/PLAN:   1. Encounter for routine adult medical exam with abnormal findings  Follow up pending above results. Encouraged healthy diet and regular exercise, monthy SBE, and yearly exams.    2. Screening for malignant neoplasm of cervix  - Pap imaged thin layer screen with HPV - recommended age 30 - 65 years (select HPV order below)  - HPV High Risk Types DNA Cervical    3. Screening for HIV (human immunodeficiency virus)  - HIV Screening    4. Need for prophylactic vaccination with tetanus-diphtheria (TD)  - TDAP VACCINE (ADACEL)  -      ADMIN VACCINE, FIRST    5. Other insomnia  - zolpidem (AMBIEN) 5 MG tablet; Take 1 tablet (5 mg) by mouth nightly as needed for sleep  Dispense: 30 tablet; Refill: 5    6. Morbid obesity (H)  Pt has lost 20 lbs on the keto diet. Has hit a plateau but plans to continue.    7. Generalized anxiety disorder  8. Moderate major depression (H)  - MENTAL HEALTH REFERRAL  - Adult; Psychiatry and Medication Management; Psychiatry; [facility]: [facility] Psychiatry Service [phone].  Medication management & future refills will be returned to [facility] PCP upon completion of evaluation; We ayala...    9. Chronic pain syndrome  Continue to follow up with pain mgmt .    COUNSELING:  Reviewed preventive health counseling, as reflected in patient instructions    BP Readings from Last 1 Encounters:   08/20/18 132/80  "    Estimated body mass index is 35.24 kg/(m^2) as calculated from the following:    Height as of this encounter: 5' 7\" (1.702 m).    Weight as of this encounter: 225 lb (102.1 kg).    BP Screening:   Last 3 BP Readings:    BP Readings from Last 3 Encounters:   08/20/18 132/80   03/21/18 122/72   03/07/18 124/72       The following was recommended to the patient:  Re-screen BP within a year and recommended lifestyle modifications  Weight management plan: pt plans to continue keto diet, improvement in exercise     reports that she has never smoked. She has never used smokeless tobacco.      Counseling Resources:  ATP IV Guidelines  Pooled Cohorts Equation Calculator  Breast Cancer Risk Calculator  FRAX Risk Assessment  ICSI Preventive Guidelines  Dietary Guidelines for Americans, 2010  USDA's MyPlate  ASA Prophylaxis  Lung CA Screening    Smiley Kim PA-C  [facility]  Answers for HPI/ROS submitted by the patient on 8/20/2018   PHQ-2 Score: 2  mental  "

## 2018-08-20 NOTE — NURSING NOTE
Prior to injection verified patient identity using patient's name and date of birth.  Due to injection administration, patient instructed to remain in clinic for 15 minutes  afterwards, and to report any adverse reaction to me immediately.    Screening Questionnaire for Adult Immunization    Are you sick today?   No   Do you have allergies to medications, food, a vaccine component or latex?   Yes   Have you ever had a serious reaction after receiving a vaccination?   No   Do you have a long-term health problem with heart disease, lung disease, asthma, kidney disease, metabolic disease (e.g. diabetes), anemia, or other blood disorder?   No   Do you have cancer, leukemia, HIV/AIDS, or any other immune system problem?   No   In the past 3 months, have you taken medications that affect  your immune system, such as prednisone, other steroids, or anticancer drugs; drugs for the treatment of rheumatoid arthritis, Crohn s disease, or psoriasis; or have you had radiation treatments?   No   Have you had a seizure, or a brain or other nervous system problem?   No   During the past year, have you received a transfusion of blood or blood     products, or been given immune (gamma) globulin or antiviral drug?   No   For women: Are you pregnant or is there a chance you could become        pregnant during the next month?   YES   Have you received any vaccinations in the past 4 weeks?   No     Immunization questionnaire was positive for at least one answer.  Notified Smiley Kim.        Per orders of Smiley Kim, injection of Adacel given by Toyin Andersen. Patient instructed to remain in clinic for 15 minutes afterwards, and to report any adverse reaction to me immediately.       Screening performed by Toyin Andersen on 8/20/2018 at 4:06 PM.

## 2018-08-20 NOTE — MR AVS SNAPSHOT
After Visit Summary   8/20/2018    Danyelle Canales    MRN: [id number]           Patient Information     Date Of Birth          1980        Visit Information        Provider Department      8/20/2018 3:00 PM Smiley Kim PA-C [facility]        Today's Diagnoses     Encounter for routine adult medical exam with abnormal findings    -  1    Screening for malignant neoplasm of cervix        Screening for HIV (human immunodeficiency virus)        Need for prophylactic vaccination with tetanus-diphtheria (TD)        Other insomnia        Morbid obesity (H)        Generalized anxiety disorder        Moderate major depression (H)        Chronic pain syndrome          Care Instructions    New referral to Behavioral Health, collaborative care psychiatry.  If you don't hear from them, call [phone]  Smiley or her team will be in touch with you with results.  Tdap booster today.    Keep up the great work with the weight loss!    Smiley Kim PA-C        Preventive Health Recommendations  Female Ages 26 - 39  Yearly exam:   See your health care provider every year in order to    Review health changes.     Discuss preventive care.      Review your medicines if you your doctor has prescribed any.    Until age 30: Get a Pap test every three years (more often if you have had an abnormal result).    After age 30: Talk to your doctor about whether you should have a Pap test every 3 years or have a Pap test with HPV screening every 5 years.   You do not need a Pap test if your uterus was removed (hysterectomy) and you have not had cancer.  You should be tested each year for STDs (sexually transmitted diseases), if you're at risk.   Talk to your provider about how often to have your cholesterol checked.  If you are at risk for diabetes, you should have a diabetes test (fasting glucose).  Shots: Get a flu shot each year. Get a tetanus shot every 10 years.   Nutrition:     Eat at  least 5 servings of fruits and vegetables each day.    Eat whole-grain bread, whole-wheat pasta and brown rice instead of white grains and rice.    Get adequate Calcium and Vitamin D.     Lifestyle    Exercise at least 150 minutes a week (30 minutes a day, 5 days of the week). This will help you control your weight and prevent disease.    Limit alcohol to one drink per day.    No smoking.     Wear sunscreen to prevent skin cancer.    See your dentist every six months for an exam and cleaning.    [facility]   Discharged by : Toyin SCHMID Certified Medical Assistant (AAMA)   Paper scripts provided to patient : 1   If you have any questions regarding to your visit please contact your care team:     Team Silver              Clinic Hours Telephone Number     Dr. Robert Kim PA-C   7am-7pm  Monday - Thursday   7am-5pm  Fridays  [phone]   (Appointment scheduling available 24/7)     RN Line  [phone] option 2     Urgent Care - [place] and [facility] - 11am-9pm Monday-Friday Saturday-Sunday- 9am-5pm     [place] -   5pm-9pm Monday-Friday Saturday-Sunday- 9am-5pm    [phone] - [place]    [phone] - [place]     For a Price Quote for your services, please call our Consumer Price Line at [phone].     What options do I have for visits at the clinic other than the traditional office visit?     To expand how we care for you, many of our providers are utilizing electronic visits (e-visits) and telephone visits, when medically appropriate, for interactions with their patients rather than a visit in the clinic. We also offer nurse visits for many medical concerns. Just like any other service, we will bill your insurance company for this type of visit based on time spent on the phone with your provider. Not all insurance companies cover these visits. Please check with your medical insurance if this type of visit is  covered. You will be responsible for any charges that are not paid by your insurance.   E-visits via [employer]: generally incur a $35.00 fee.     Telephone visits:   Time spent on the phone: *charged based on time that is spent on the phone in increments of 10 minutes. Estimated cost:   5-10 mins $30.00   11-20 mins. $59.00   21-30 mins. $85.00     Use Flitt[employer] (secure email communication and access to your chart) to send your primary care provider a message or make an appointment. Ask someone on your Team how to sign up for [employer].     As always, Thank you for trusting us with your health care needs!      [facility]:    Scheduling Appointments  [place]  Call: [phone]    [place] [facility]  Call: [phone]    [facility]  Call: [phone]    For Gastroenterology referrals   [facility]   Clinics and Surgery Center, 4th Floor   [address] [place] [address]   Appointments: [phone]    WHERE TO GO FOR CARE?  Clinic    Make an appointment if you:       Are sick (cold, cough, flu, sore throat, earache or in pain).       Have a small injury (sprain, small cut, burn or broken bone).       Need a physical exam, Pap smear, vaccine or prescription refill.       Have questions about your health or medicines.    To reach us:      Call [phone] ([phone]). Open 24 hours every day. (For counseling services, call [phone].)    Log into [employer] at [email or web address]. (Visit [email or web address] to create an account.) Hospital emergency room    An emergency is a serious or life- threatening problem that must be treated right away.    Call 858 or get to the hospital if you have:      Very bad or sudden:            - Chest pain or pressure         - Bleeding         - Head or belly pain         - Dizziness or trouble seeing, walking or                          Speaking      Problems  breathing      Blood in your vomit or you are coughing up blood      A major injury (knocked out, loss of a finger or limb, rape, broken bone protruding from skin)    A mental health crisis. (Or call the Mental Health Crisis line at 1-668.170.3848 or Suicide Prevention Hotline at 1-482.159.4713.)    Open 24 hours every day. You don't need an appointment.     Urgent care    Visit urgent care for sickness or small injuries when the clinic is closed. You don't need an appointment. To check hours or find an urgent care near you, visit [email or web address]. Online care    Get online care from [employer] for more than 70 common problems, like colds, allergies and infections. Open 24 hours every day at:   [email or web address]   Need help deciding?    For advice about where to be seen, you may call your clinic and ask to speak with a nurse. We're here for you 24 hours every day.         If you are deaf or hard of hearing, please let us know. We provide many free services including sign language interpreters, oral interpreters, TTYs, telephone amplifiers, note takers and written materials.               Follow-ups after your visit        Additional Services     MENTAL HEALTH REFERRAL  - Adult; Psychiatry and Medication Management; Psychiatry; [facility]: [facility] Psychiatry Service [phone].  Medication management & future refills will be returned to G PCP upon completion of evaluation; We ayala...       All scheduling is subject to the client's specific insurance plan & benefits, provider/location availability, and provider clinical specialities.  Please arrive 15 minutes early for your first appointment and bring your completed paperwork.    Please be aware that coverage of these services is subject to the terms and limitations of your health insurance plan.  Call member services at your health plan with any benefit or coverage questions.                            Who to contact     If you have questions or need follow up  "information about today's clinic visit or your schedule please contact [facility] directly at [phone].  Normal or non-critical lab and imaging results will be communicated to you by MyChart, letter or phone within 4 business days after the clinic has received the results. If you do not hear from us within 7 days, please contact the clinic through C[employer] or phone. If you have a critical or abnormal lab result, we will notify you by phone as soon as possible.  Submit refill requests through [employer] or call your pharmacy and they will forward the refill request to us. Please allow 3 business days for your refill to be completed.          Additional Information About Your Visit        C[employer] Information     [employer] gives you secure access to your electronic health record. If you see a primary care provider, you can also send messages to your care team and make appointments. If you have questions, please call your primary care clinic.  If you do not have a primary care provider, please call [phone] and they will assist you.        Care EveryWhere ID     This is your Care EveryWhere ID. This could be used by other organizations to access your [facility] medical records  [id number]        Your Vitals Were     Pulse Temperature Height Last Period BMI (Body Mass Index)       84 98.5  F (36.9  C) (Oral) 5' 7\" (1.702 m) 08/06/2018 (Exact Date) 35.24 kg/m2        Blood Pressure from Last 3 Encounters:   08/20/18 132/80   03/21/18 122/72   03/07/18 124/72    Weight from Last 3 Encounters:   08/20/18 225 lb (102.1 kg)   03/21/18 230 lb 2 oz (104.4 kg)   03/07/18 232 lb (105.2 kg)              We Performed the Following          ADMIN VACCINE, FIRST     HIV Screening     HPV High Risk Types DNA Cervical     MENTAL HEALTH REFERRAL  - Adult; Psychiatry and Medication Management; Psychiatry; [facility] [facility] Psychiatry Service [phone].  Medication management & future refills will be " returned to [facility] PCP upon completion of evaluation; We ayala...     Pap imaged thin layer screen with HPV - recommended age 30 - 65 years (select HPV order below)     TDAP VACCINE (ADACEL)          Today's Medication Changes          These changes are accurate as of 8/20/18  3:56 PM.  If you have any questions, ask your nurse or doctor.               These medicines have changed or have updated prescriptions.        Dose/Directions    DULOXETINE HCL PO   This may have changed:  Another medication with the same name was removed. Continue taking this medication, and follow the directions you see here.   Changed by:  Smiley Kim PA-C        Dose:  10 mg   Take 10 mg by mouth daily Take 1 tablet daily along with the 2- 30 mg tablets daily for a total of 70 mg daily   Refills:  0            Where to get your medicines      Some of these will need a paper prescription and others can be bought over the counter.  Ask your nurse if you have questions.     Bring a paper prescription for each of these medications     zolpidem 5 MG tablet                Primary Care Provider Office Phone # Fax #    Smiley Kim PA-C [phone]       [facility] [address]        Equal Access to Services     EMILY MCCRAY : Hadii mojgan noriega hadasho Soomaali, waaxda luqadaha, qaybta kaalmada adeegyada, darrell alva . So [facility] [phone].    ATENCIÓN: Si habla español, tiene a roberto disposición servicios gratuitos de asistencia lingüística. David al [phone].    We comply with applicable federal civil rights laws and [place] laws. We do not discriminate on the basis of race, color, national origin, age, disability, sex, sexual orientation, or gender identity.            Thank you!     Thank you for choosing [facility]  for your care. Our goal is always to provide you with excellent care. Hearing back from our patients is one way we can continue to improve  our services. Please take a few minutes to complete the written survey that you may receive in the mail after your visit with us. Thank you!             Your Updated Medication List - Protect others around you: Learn how to safely use, store and throw away your medicines at www.disposemymeds.org.          This list is accurate as of 8/20/18  3:56 PM.  Always use your most recent med list.                   Brand Name Dispense Instructions for use Diagnosis    * buPROPion 150 MG 24 hr tablet    WELLBUTRIN XL    90 tablet    Take 1 tablet (150 mg) by mouth every morning Will take in addition to 300 mg XL tablet    Major depressive disorder, recurrent episode, moderate (H)       * buPROPion 300 MG 24 hr tablet    WELLBUTRIN XL    90 tablet    Take 1 tablet (300 mg) by mouth every morning    Major depressive disorder, recurrent episode, moderate (H)       DULOXETINE HCL PO      Take 10 mg by mouth daily Take 1 tablet daily along with the 2- 30 mg tablets daily for a total of 70 mg daily        HYDROcodone-acetaminophen 5-325 MG per tablet    NORCO          hydrOXYzine 25 MG capsule    VISTARIL    120 capsule    Take 1-2 tablet at bedtime, may take one tablet in morning and one tablet in afternoon as well    Anxiety, Muscle tightness       mirtazapine 15 MG tablet    REMERON    45 tablet    Take 0.5 tablets (7.5 mg) by mouth At Bedtime        order for DME     1 Units    Apply 1 Units topically. TENS unit use as directed    Chronic pain syndrome, Myalgia and myositis       zolpidem 5 MG tablet    AMBIEN    30 tablet    Take 1 tablet (5 mg) by mouth nightly as needed for sleep    Other insomnia       * Notice:  This list has 2 medication(s) that are the same as other medications prescribed for you. Read the directions carefully, and ask your doctor or other care provider to review them with you.

## 2018-08-20 NOTE — PATIENT INSTRUCTIONS
New referral to Behavioral Health, collaborative care psychiatry.  If you don't hear from them, call [phone]  Smiley or her team will be in touch with you with results.  Tdap booster today.    Keep up the great work with the weight loss!    Smiley Kim PA-C        Preventive Health Recommendations  Female Ages 26 - 39  Yearly exam:   See your health care provider every year in order to    Review health changes.     Discuss preventive care.      Review your medicines if you your doctor has prescribed any.    Until age 30: Get a Pap test every three years (more often if you have had an abnormal result).    After age 30: Talk to your doctor about whether you should have a Pap test every 3 years or have a Pap test with HPV screening every 5 years.   You do not need a Pap test if your uterus was removed (hysterectomy) and you have not had cancer.  You should be tested each year for STDs (sexually transmitted diseases), if you're at risk.   Talk to your provider about how often to have your cholesterol checked.  If you are at risk for diabetes, you should have a diabetes test (fasting glucose).  Shots: Get a flu shot each year. Get a tetanus shot every 10 years.   Nutrition:     Eat at least 5 servings of fruits and vegetables each day.    Eat whole-grain bread, whole-wheat pasta and brown rice instead of white grains and rice.    Get adequate Calcium and Vitamin D.     Lifestyle    Exercise at least 150 minutes a week (30 minutes a day, 5 days of the week). This will help you control your weight and prevent disease.    Limit alcohol to one drink per day.    No smoking.     Wear sunscreen to prevent skin cancer.    See your dentist every six months for an exam and cleaning.    [facility]   Discharged by : Toyin SCHMID Certified Medical Assistant (AAMA)   Paper scripts provided to patient : 1   If you have any questions regarding to your visit please contact your care team:     Team Silver               Clinic Hours Telephone Number     Dr. Robert Kim PA-C   7am-7pm  Monday - Thursday   7am-5pm  Fridays  [phone]   (Appointment scheduling available 24/7)     RN Line  [phone] option 2     Urgent Care - [place] - 11am-9pm Monday-Friday Saturday-Sunday- 9am-5pm     [place] -   5pm-9pm Monday-Friday Saturday-Sunday- 9am-5pm    [phone] - [place]    [phone] - [place]     For a Price Quote for your services, please call our Consumer Price Line at [phone].     What options do I have for visits at the clinic other than the traditional office visit?     To expand how we care for you, many of our providers are utilizing electronic visits (e-visits) and telephone visits, when medically appropriate, for interactions with their patients rather than a visit in the clinic. We also offer nurse visits for many medical concerns. Just like any other service, we will bill your insurance company for this type of visit based on time spent on the phone with your provider. Not all insurance companies cover these visits. Please check with your medical insurance if this type of visit is covered. You will be responsible for any charges that are not paid by your insurance.   E-visits via [employer]: generally incur a $35.00 fee.     Telephone visits:   Time spent on the phone: *charged based on time that is spent on the phone in increments of 10 minutes. Estimated cost:   5-10 mins $30.00   11-20 mins. $59.00   21-30 mins. $85.00     Use [employer] (secure email communication and access to your chart) to send your primary care provider a message or make an appointment. Ask someone on your Team how to sign up for [employer].     As always, Thank you for trusting us with your health care needs!      [facility]:    Scheduling Appointments  [facility]  Call: [phone]    [place]  [facility]  Call: [phone]    [facility]  Call: [phone]    For Gastroenterology referrals   [facility]   Clinics and Surgery Center, 4th Floor   [address] [place] [address]   Appointments: [phone]    WHERE TO GO FOR CARE?  Clinic    Make an appointment if you:       Are sick (cold, cough, flu, sore throat, earache or in pain).       Have a small injury (sprain, small cut, burn or broken bone).       Need a physical exam, Pap smear, vaccine or prescription refill.       Have questions about your health or medicines.    To reach us:      Call [phone] ([phone]). Open 24 hours every day. (For counseling services, call [phone].)    Log into [employer] at [employer]. (Visit [employer] to create an account.) Hospital emergency room    An emergency is a serious or life- threatening problem that must be treated right away.    Call [phone] or get to the hospital if you have:      Very bad or sudden:            - Chest pain or pressure         - Bleeding         - Head or belly pain         - Dizziness or trouble seeing, walking or                          Speaking      Problems breathing      Blood in your vomit or you are coughing up blood      A major injury (knocked out, loss of a finger or limb, rape, broken bone protruding from skin)    A mental health crisis. (Or call the Mental Health Crisis line at 1-682.944.2683 or Suicide Prevention Hotline at 1-799.709.2492.)    Open 24 hours every day. You don't need an appointment.     Urgent care    Visit urgent care for sickness or small injuries when the clinic is closed. You don't need an appointment. To check hours or find an urgent care near you, visit [email or web address]. Online care    Get online care from [facility] for more than 70 common problems, like colds, allergies and infections. Open 24 hours every day at:   [email or web address]   Need help deciding?    For  advice about where to be seen, you may call your clinic and ask to speak with a nurse. We're here for you 24 hours every day.         If you are deaf or hard of hearing, please let us know. We provide many free services including sign language interpreters, oral interpreters, TTYs, telephone amplifiers, note takers and written materials.

## 2018-08-21 LAB — HIV 1+2 AB+HIV1 P24 AG SERPL QL IA: NONREACTIVE

## 2018-08-21 ASSESSMENT — PATIENT HEALTH QUESTIONNAIRE - PHQ9: SUM OF ALL RESPONSES TO PHQ QUESTIONS 1-9: 6

## 2018-08-21 ASSESSMENT — ANXIETY QUESTIONNAIRES: GAD7 TOTAL SCORE: 5

## 2018-08-22 LAB
COPATH REPORT: NORMAL
PAP: NORMAL

## 2018-08-23 LAB
FINAL DIAGNOSIS: NORMAL
HPV HR 12 DNA CVX QL NAA+PROBE: NEGATIVE
HPV16 DNA SPEC QL NAA+PROBE: NEGATIVE
HPV18 DNA SPEC QL NAA+PROBE: NEGATIVE
SPECIMEN DESCRIPTION: NORMAL
SPECIMEN SOURCE CVX/VAG CYTO: NORMAL

## 2018-09-26 ENCOUNTER — OFFICE VISIT (OUTPATIENT)
Dept: PSYCHIATRY | Facility: CLINIC | Age: 38
End: 2018-09-26
Attending: PHYSICIAN ASSISTANT
Payer: COMMERCIAL

## 2018-09-26 VITALS
HEART RATE: 85 BPM | SYSTOLIC BLOOD PRESSURE: 140 MMHG | BODY MASS INDEX: 34.37 KG/M2 | DIASTOLIC BLOOD PRESSURE: 90 MMHG | OXYGEN SATURATION: 97 % | TEMPERATURE: 99.6 F | RESPIRATION RATE: 16 BRPM | WEIGHT: 219 LBS | HEIGHT: 67 IN

## 2018-09-26 DIAGNOSIS — F99 INSOMNIA DUE TO OTHER MENTAL DISORDER: ICD-10-CM

## 2018-09-26 DIAGNOSIS — F63.3 TRICHOTILLOMANIA: ICD-10-CM

## 2018-09-26 DIAGNOSIS — F41.1 GENERALIZED ANXIETY DISORDER: ICD-10-CM

## 2018-09-26 DIAGNOSIS — F51.05 INSOMNIA DUE TO OTHER MENTAL DISORDER: ICD-10-CM

## 2018-09-26 DIAGNOSIS — F32.1 MODERATE MAJOR DEPRESSION (H): Primary | ICD-10-CM

## 2018-09-26 PROCEDURE — 90792 PSYCH DIAG EVAL W/MED SRVCS: CPT | Performed by: NURSE PRACTITIONER

## 2018-09-26 RX ORDER — ARIPIPRAZOLE 2 MG/1
2 TABLET ORAL AT BEDTIME
Qty: 30 TABLET | Refills: 1 | Status: SHIPPED | OUTPATIENT
Start: 2018-09-26 | End: 2018-10-24 | Stop reason: DRUGHIGH

## 2018-09-26 ASSESSMENT — ANXIETY QUESTIONNAIRES
7. FEELING AFRAID AS IF SOMETHING AWFUL MIGHT HAPPEN: MORE THAN HALF THE DAYS
1. FEELING NERVOUS, ANXIOUS, OR ON EDGE: NEARLY EVERY DAY
2. NOT BEING ABLE TO STOP OR CONTROL WORRYING: NEARLY EVERY DAY
IF YOU CHECKED OFF ANY PROBLEMS ON THIS QUESTIONNAIRE, HOW DIFFICULT HAVE THESE PROBLEMS MADE IT FOR YOU TO DO YOUR WORK, TAKE CARE OF THINGS AT HOME, OR GET ALONG WITH OTHER PEOPLE: SOMEWHAT DIFFICULT
6. BECOMING EASILY ANNOYED OR IRRITABLE: MORE THAN HALF THE DAYS
5. BEING SO RESTLESS THAT IT IS HARD TO SIT STILL: NOT AT ALL
3. WORRYING TOO MUCH ABOUT DIFFERENT THINGS: NEARLY EVERY DAY
GAD7 TOTAL SCORE: 15

## 2018-09-26 ASSESSMENT — PATIENT HEALTH QUESTIONNAIRE - PHQ9: 5. POOR APPETITE OR OVEREATING: MORE THAN HALF THE DAYS

## 2018-09-26 NOTE — MR AVS SNAPSHOT
After Visit Summary   9/26/2018    Danyelle Canales    MRN: [id number]           Patient Information     Date Of Birth          1980        Visit Information        Provider Department      9/26/2018 9:15 AM Aron Alfaro CNP [facility]        Today's Diagnoses     Moderate major depression (H)    -  1    Generalized anxiety disorder        Insomnia due to other mental disorder        Trichotillomania           Follow-ups after your visit        Follow-up notes from your care team     Return in about 4 weeks (around 10/24/2018) for Routine Visit.      Your next 10 appointments already scheduled     Oct 30, 2018  8:45 AM CDT   Return Visit with Aron Alfaro CNP   [facility] ([facility]    [address] [place] [address] [phone]              Who to contact     If you have questions or need follow up information about today's clinic visit or your schedule please contact [facility] directly at [phone].  Normal or non-critical lab and imaging results will be communicated to you by Bugcrow[employer]art, letter or phone within 4 business days after the clinic has received the results. If you do not hear from us within 7 days, please contact the clinic through [employer] or phone. If you have a critical or abnormal lab result, we will notify you by phone as soon as possible.  Submit refill requests through [facility] or call your pharmacy and they will forward the refill request to us. Please allow 3 business days for your refill to be completed.          Additional Information About Your Visit        MyChart Information     [facility] gives you secure access to your electronic health record. If you see a primary care provider, you can also send messages to your care team and make appointments. If you have questions, please call your primary care clinic.  If you do not have a primary care  "provider, please call [phone] and they will assist you.        Care EveryWhere ID     This is your Care EveryWhere ID. This could be used by other organizations to access your [place] medical records  [id number]        Your Vitals Were     Pulse Temperature Respirations Height Last Period Pulse Oximetry    85 99.6  F (37.6  C) (Oral) 16 5' 6.5\" (1.689 m) 09/05/2018 97%    Breastfeeding? BMI (Body Mass Index)                No 34.82 kg/m2           Blood Pressure from Last 3 Encounters:   09/26/18 140/90   08/20/18 132/80   03/21/18 122/72    Weight from Last 3 Encounters:   09/26/18 219 lb (99.3 kg)   08/20/18 225 lb (102.1 kg)   03/21/18 230 lb 2 oz (104.4 kg)              Today, you had the following     No orders found for display         Today's Medication Changes          These changes are accurate as of 9/26/18 11:06 AM.  If you have any questions, ask your nurse or doctor.               Start taking these medicines.        Dose/Directions    ARIPiprazole 2 MG tablet   Commonly known as:  ABILIFY   Used for:  Moderate major depression (H)   Started by:  Aron Alfaro, CNP        Dose:  2 mg   Take 1 tablet (2 mg) by mouth At Bedtime   Quantity:  30 tablet   Refills:  1            Where to get your medicines      Some of these will need a paper prescription and others can be bought over the counter.  Ask your nurse if you have questions.     Bring a paper prescription for each of these medications     ARIPiprazole 2 MG tablet                Primary Care Provider Office Phone # Fax #    Smiley Kim PA-C [phone]       [facility] [address]        Equal Access to Services     [facility] Center[facility] AH: Hadii mojgan Guido, waephraimda luqadaha, qaybta kaalmada rubenda, darrell sotelo. So [facility] [phone].    ATENCIÓN: Si habla español, tiene a roberto disposición servicios gratuitos de asistencia lingüística. Llame al " [phone].    We comply with applicable federal civil rights laws and [place] laws. We do not discriminate on the basis of race, color, national origin, age, disability, sex, sexual orientation, or gender identity.            Thank you!     Thank you for choosing [facility]  for your care. Our goal is always to provide you with excellent care. Hearing back from our patients is one way we can continue to improve our services. Please take a few minutes to complete the written survey that you may receive in the mail after your visit with us. Thank you!             Your Updated Medication List - Protect others around you: Learn how to safely use, store and throw away your medicines at www.disposemymeds.org.          This list is accurate as of 9/26/18 11:06 AM.  Always use your most recent med list.                   Brand Name Dispense Instructions for use Diagnosis    ARIPiprazole 2 MG tablet    ABILIFY    30 tablet    Take 1 tablet (2 mg) by mouth At Bedtime    Moderate major depression (H)       buPROPion 300 MG 24 hr tablet    WELLBUTRIN XL    90 tablet    Take 1 tablet (300 mg) by mouth every morning    Major depressive disorder, recurrent episode, moderate (H)       DULOXETINE HCL PO      Take 10 mg by mouth daily Take 1 tablet daily along with the 2- 30 mg tablets daily for a total of 70 mg daily        HYDROcodone-acetaminophen 5-325 MG per tablet    NORCO          hydrOXYzine 25 MG capsule    VISTARIL    120 capsule    Take 1-2 tablet at bedtime, may take one tablet in morning and one tablet in afternoon as well    Anxiety, Muscle tightness       mirtazapine 15 MG tablet    REMERON    45 tablet    Take 0.5 tablets (7.5 mg) by mouth At Bedtime        order for DME     1 Units    Apply 1 Units topically. TENS unit use as directed    Chronic pain syndrome, Myalgia and myositis       zolpidem 5 MG tablet    AMBIEN    30 tablet    Take 1 tablet (5 mg) by mouth nightly as needed for sleep     Other insomnia

## 2018-09-26 NOTE — PROGRESS NOTES
"                                                         Outpatient Psychiatric Evaluation- Standard  Adult    Name:  Danyelle Canales  : 1980    Source of Referral:  Primary Care Provider: Smiley Kim PA-C - last visit 2018  Current Psychotherapist: Denice Marie - last visit 18    Identifying Data:  Patient is a 38 year old,   White American female  who presents for initial visit with me.  Patient is currently employed full time. Patient attended the session alone. Consent to communicate signed for no-one. Consent for treatment signed and included in electronic medical record. Discussed limits of confidentiality today. My Practice Policy was reviewed and signed.     Chief Complaint:    Patient reports: \" I don't want to be depressed or anxious anymore. My PCP tried all she can and referred me here.\"  Patient prefers to be called: \"Danyelle\"    HPI:    Patient endorsing long-standing history of depression and anxiety. Patient states past 1 year to past 3 months have been particularly symptomatic; not sure of any aggravating factors in life contributing to this. Patient endorsing depressed mood, easy crying, low motivation, low energy; appetite intact and still able to access janusz/engagement from dog, friends, TV. Patient's sleep chronically impaired, for which she uses Zolpidem near nightly. Per anxiety, patient endorsing physical \"surges\", stomach aches, excessive preoccupation with finances and parents aging; concentration impaired at work; easily distracted; on the whole \"can't stop thinking\" and often depends on loud music in car just to cancel it out. Patient denies any major avoidance of daily activities as result of anxiety; denies trauma-linked symptoms. Patient does endorse long history of trichotillomania; has pulled eyebrows all out; draws them in. Patient denies any symptoms indicative of OCD, PTSD; no evidence of psychosis or eating disorder; no evidence of " "bipolarity.    Per patient, she's been taking Cymbalta x 2 years, as prescribed by pain management provider; in first year of use gained 40 lbs, but has leveled off; was able to lose 25 lbs through keto diet; is worried about increasing dosage beyond 80 mg as might induce further weight gain. Patient states Cymbalta moderately helpful for anxiety/depression, and quite helpful with pain management, so would not want to reduce/change. Patient also prescribed Wellbutrin XL for \"years\"; down from 450 mg every day to 300 mg daily, per rec of recent psychiatry consult; did not seem to improve anxiety; not sure what Wellbutrin doing for her, but will keep on board for now. Patient also prescribed Zolpidem 5 mg nightly; takes 90% of month; otherwise alternatively takes Remeron as sleep aid. Patient has used multiple antidepressants over years; felt they all stopped working at some point.    Patient was recently referred by PCP and psychologist to [facility] for TMS; had a couple of consultations with a psychiatric provide there and was given recommendation to switch Cymbalta to Sertraline. Patient didn't quite get rationale and felt she \"didn't get a good vibe\" from provider. Patient states she's investigated herself with her insurance about TMS; does not think she meets coverage criteria and worried about $30 copay per each TMS session; can't afford. Patient states she didn't directly inquire with [facility] about TMS coverage and insurance navigation, so will consider reapproaching.       PHQ-9 scores:   PHQ-9 SCORE 9/20/2017 3/21/2018 8/20/2018   Total Score - - -   Total Score MyChart - - -   Total Score 8 7 6      NAJMA-7 scores:    NAJMA-7 SCORE 9/20/2017 3/21/2018 8/20/2018   Total Score - - -   Total Score 4 8 5       Psychiatric History:   Hospitalizations: None  History of Commitment? No   Past Treatment: counseling, medication(s) from physician / PCP and psychiatry  Suicide Attempts: No   Current Suicide " Risk:  Suicide Assessment Completed Today.  Self-injurious Behavior: Denies  Electroconvulsive Therapy (ECT) or Transcranial Magnetic Stimulation (TMS): No   [employer]Sight Genetic Testing: No     Past medication trials include but are not limited to:   Older Antidepressants: Elavil (amitriptyline)  New Antidepressants:  Celexa (citalopram), Cymbalta (duloxetine), Effexor (venlafaxine), Lexapro (escitalopram), Paxil (paroxetine), Prozac (fluoxetine), Remeron (mirtazepine), Wellbutrin, Zyban, Aplenzin (bupropion) and Zoloft (sertraline)  Mood Stabilizers:  Lyrica (pregabalin), Neurontin (gabapentin) and Topamax (topiramate)  Sedatives/Hypnotics:  Ambien (zolpidem), Lunesta (eszopiclone) and Melatonin    Substance Use History:  Patient reports 2-3 glasses of wine per week;  Patient had been vaping CBD oil, which seems to have helped pain/anxiety, but is quite pricey.    Past Medical History:    Since 2006 (mid-20s); neck/back pain; no trauma; cymablta helped; PT in past; looking into PT again; #5 vicodin a month for flares; reduced from daily  1985 open heart surgery; asd didn't close; no current issues    Not using birth control; had OBGyn consult regarding meds; would consider high risk clinic    Past Medical History:   Diagnosis Date     ASD (atrial septal defect)     repaired surgically as a child     Depressive disorder      Depressive disorder, not elsewhere classified      Headache      Headache      History of blood transfusion 1985 1985 during open heart surgery     Hypertension     higher results at pain clinic, want to confirm ok     Insomnia, unspecified      Other forms of migraine, without mention of intractable migraine without mention of status migrainosus       Surgery:   Past Surgical History:   Procedure Laterality Date     BIOPSY       C REPR ASD OSTIUM PREMUM      Dr. Silverio     COLONOSCOPY       Allergies:     Allergies   Allergen Reactions     No Known Drug Allergies      Primary Care Provider:  Smiley Kim PA-C  Seizures or Head Injury: No  Diet: Keto  Food Allergies: No   Exercise: Sporadic or irregular exercise: goes for walks; does stretching at work  Supplements: Reviewed per Electronic Medical Record Today    Current Medications:    Current Outpatient Prescriptions:      buPROPion (WELLBUTRIN XL) 150 MG 24 hr tablet, Take 1 tablet (150 mg) by mouth every morning Will take in addition to 300 mg XL tablet, Disp: 90 tablet, Rfl: 3     buPROPion (WELLBUTRIN XL) 300 MG 24 hr tablet, Take 1 tablet (300 mg) by mouth every morning, Disp: 90 tablet, Rfl: 3     DULOXETINE HCL PO, Take 10 mg by mouth daily Take 1 tablet daily along with the 2- 30 mg tablets daily for a total of 70 mg daily, Disp: , Rfl:      HYDROcodone-acetaminophen (NORCO) 5-325 MG per tablet, , Disp: , Rfl:      hydrOXYzine (VISTARIL) 25 MG capsule, Take 1-2 tablet at bedtime, may take one tablet in morning and one tablet in afternoon as well, Disp: 120 capsule, Rfl: 1     mirtazapine (REMERON) 15 MG tablet, Take 0.5 tablets (7.5 mg) by mouth At Bedtime, Disp: 45 tablet, Rfl: 1     ORDER FOR DME, Apply 1 Units topically. TENS unit use as directed, Disp: 1 Units, Rfl: 0     zolpidem (AMBIEN) 5 MG tablet, Take 1 tablet (5 mg) by mouth nightly as needed for sleep, Disp: 30 tablet, Rfl: 5    The [place] Prescription Monitoring Program has been reviewed and there are no concerns about diversionary activity for controlled substances at this time.    Vital Signs:  Vitals: There were no vitals taken for this visit.    Labs:  Most recent laboratory results reviewed and the pertinent results include:  Component Value Flag Ref Range Units Status Collected Lab   Glucose 81  70 - 99 mg/dL Final 09/20/2017  8:34 AM 65     Component Value Flag Ref Range Units Status Collected Lab   Cholesterol 214 (H) <200 mg/dL Final 09/20/2017  8:34 AM 65   Comment:   Desirable:       <200 mg/dl   Triglycerides 81  <150 mg/dL Final 09/20/2017  8:34 AM 65    Comment:   Fasting specimen   HDL Cholesterol 87  >49 mg/dL Final 09/20/2017  8:34 AM 65   LDL Cholesterol Calculated 111 (H) <100 mg/dL Final 09/20/2017  8:34 AM 65   Comment:   Above desirable:  100-129 mg/dl   Borderline High:  130-159 mg/dL   High:             160-189 mg/dL   Very high:       >189 mg/dl      Non HDL Cholesterol 127  <130 mg/dL Final 09/20/2017  8:34 AM 65       Review of Systems:  10 systems (general, cardiovascular, respiratory, eyes, ENT, endocrine, GI, , M/S, neurological) were reviewed. Most pertinent finding(s) is/are: chronic neck/back pain. The remaining systems are all unremarkable.    Family History:   Patient reported family history includes:   Family History   Problem Relation Age of Onset     C.A.D. Mother      heart surgery to close hole in heart     Cardiovascular Mother      Hypertension Mother      Depression Mother      Anxiety Disorder Mother      Cerebrovascular Disease Father      Hypertension Father      Diabetes No family hx of      Breast Cancer No family hx of      Cancer - colorectal No family hx of        Social History:   Patient , no children;     Legal History:  No: Patient denies any legal history    Mental Status Examination:     Appearance:  awake, alert, adequately groomed and casually dressed  Attitude:  cooperative   Eye Contact:  good  Gait and Station: Normal  Psychomotor Behavior:  intact station, gait and muscle tone  Oriented to:  time, person, and place  Attention Span and Concentration:  Normal  Speech:  clear, coherent  Mood:  anxious and sad   Affect:  tearful  Associations:  no loose associations  Thought Process:  logical, linear and goal oriented  Thought Content:  Appropriate to Interview  Recent and Remote Memory:  intact Not formally assessed. No amnesia.  Fund of Knowledge: appropriate  Insight:  good  Judgment:  intact  Impulse Control:  intact    Suicide Risk Assessment:  Today Danyelle Canales denies any SI; and denies any  previous history of suicidal behavior. Therefore, based on all available evidence including the factors cited above, Danyelle Canales does not appear to be at imminent risk for self-harm, does not meet criteria for a 72-hr hold, and therefore remains appropriate for ongoing outpatient level of care.  A thorough assessment of risk factors related to suicide and self-harm have been reviewed and are noted above. The patient convincingly denies acute suicidality on several occasions. Local community safety resources reviewed and printed for patient to use if needed. There was no deceit detected, and the patient presented in a manner that was believable.     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  Med: Vicodin.   Maximum use per month: 90  Expected duration: ongoing, pt in comprehensive pain mgmt currently  Narcotic agreement on file: NO  Clinic visit recommended: Q 3 months         Morbid obesity (H) 08/20/2018     Priority: Medium     Non morbid obesity, unspecified obesity type 09/20/2017     Priority: Medium     Esophageal reflux 11/23/2014     Priority: Medium     Chronic pain 01/31/2012     Priority: Medium     ASD (atrial septal defect) 01/16/2012     Priority: Medium     Generalized anxiety disorder 10/18/2011     Priority: Medium     Diagnosis updated by automated process. Provider to review and confirm.       Moderate major depression (H) 09/12/2011     Priority: Medium     History of ovarian cyst 09/12/2011     Priority: Medium      CARDIOVASCULAR SCREENING; LDL GOAL LESS THAN 160 05/09/2010     Priority: Medium     Trichotillomania 08/03/2009     Priority: Medium     PMDD (premenstrual dysphoric disorder) 06/03/2009     Priority: Medium     Migraine headache 07/23/2008     Priority: Medium     ia SPRAIN THORACIC REGION 10/08/2007     Priority: Medium     ia SPRAIN OF NECK 10/08/2007     Priority: Medium     Encounter for other general counseling or advice on contraception 07/18/2007     Priority: Medium     Diagnosis updated by automated process. Provider to review and confirm.       allergic DERMATITIS NOS 11/12/2004     Priority: Medium     Insomnia 07/16/2004     Priority: Medium     Problem list name updated by automated process. Provider to review         Strengths and Opportunities:     Guided relaxation; journal; working out doesn't help; streching daily    A 12-item WHODAS 2.0 assessment was completed by the patient today and recorded in [employer].  No flowsheet data found.    The Patient Activation Measure (GERARDO) score was completed and recorded in [employer]. This assesses patient knowledge, skill, and confidence for self-management.   GERARDO Score (Last Two) 9/12/2011   GERARDO Raw Score 44   Activation Score 70.8   GERARDO Level 4                Impression:  Danyelle Canales is a 39 y/o female with long-standing anxiety, depression, and comorbid chronic pain; would suspect strong linkage between all; however, no particular trauma history elicited at this time. She has seen a partial response to Cymbalta, which has more so benefited her pain management. Patient wishing to maintain Cymbalta, and disruption of this in pursuit of alternative sertotinergic likely not worth risk of destabilization; she could potentially trial higher dosage (90 to 120 mg daily), but she is reluctant due to potential weight gain. Patient is a good candidate for TMS, likely meets coverage criteria (med trials, therapy), and writer has advised she reconnect with [facility] to more explicitly explore this, inclusive of discussing coverage/cost concerns with their office. In meantime, as patient wishing to make some adjustment in psychiatric medication regime, offered to trial Abilify as augmenting agent; no medical contraindications; cholesterol just somewhat above normal, but well managed with diet.    Medication side effects and alternatives reviewed. Health promotion activities recommended and reviewed today. All questions addressed. Education and counseling completed regarding risks and benefits of medications and psychotherapy options. Collaborative Care Psychiatry Service model reviewed today. Recommend therapy for additional support.     Treatment Plan:    Continue Cymbalta 80 mg daily, Wellbutrin  mg daily; has supply    May continue Ambien 5 mg nightly; alternatively may use Remeron as prescribed by PCP    Initiate Abilify 2 mg daily; initially in AM with food; if sedating, may switch to night; advised of common SEs including akathisia    Continue other treatment directions per primary care provider.     Continue all other medications as reviewed per electronic medical record today.     Safety plan reviewed. To the Emergency Department as needed or call after hours crisis line at [phone] or [phone]. [place] Crisis Text Line: Text [place] to 991906  or  Suicide LifeLine Chat: suicidepreventionlifeline.org/chat/    To schedule individual or family therapy, call [facility] at [phone].    Continue individual therapy as planned    Schedule an appointment with me in 4-6 weeks or sooner as needed.    Follow up with primary care provider as planned or for acute medical concerns.    Call the psychiatric nurse line with medication questions or concerns at [phone].    [email or web address] may be used to communicate with your provider, but this is not intended to be used for emergencies.      Community Resources:    National Suicide Prevention  Lifeline: 593.543.8957 (TTY: 228.977.6686). Call anytime for help.  (www.suicidepreventionlifeline.org)  National [place] on Mental Illness (www.telma.org): 835.814.4036 or 078-889-9382.   Mental Health Association (www.mentalhealth.org): 979.545.3625 or 508-021-2777.  [place] Crisis Text Line: Text [place] to 181418  Suicide LifeLine Chat: suicideSnooth Media.org/chat    Administrative Billing:   Time spent with patient was 60 minutes and greater than 50% of time or 40 minutes was spent in counseling and coordination of care regarding above diagnoses and treatment plan.    Patient Status:  Patient will continue to be seen for ongoing consultation and stabilization.    Signed:   Aron Alfaro, CNP  Psychiatry

## 2018-09-27 ENCOUNTER — TELEPHONE (OUTPATIENT)
Dept: PSYCHIATRY | Facility: CLINIC | Age: 38
End: 2018-09-27

## 2018-09-27 ASSESSMENT — PATIENT HEALTH QUESTIONNAIRE - PHQ9: SUM OF ALL RESPONSES TO PHQ QUESTIONS 1-9: 13

## 2018-09-27 ASSESSMENT — ANXIETY QUESTIONNAIRES: GAD7 TOTAL SCORE: 15

## 2018-10-09 ENCOUNTER — OFFICE VISIT (OUTPATIENT)
Dept: PSYCHIATRY | Facility: CLINIC | Age: 38
End: 2018-10-09
Payer: COMMERCIAL

## 2018-10-09 ENCOUNTER — TELEPHONE (OUTPATIENT)
Dept: PSYCHIATRY | Facility: CLINIC | Age: 38
End: 2018-10-09

## 2018-10-09 VITALS
TEMPERATURE: 98.7 F | OXYGEN SATURATION: 93 % | WEIGHT: 219 LBS | DIASTOLIC BLOOD PRESSURE: 80 MMHG | BODY MASS INDEX: 34.37 KG/M2 | HEIGHT: 67 IN | SYSTOLIC BLOOD PRESSURE: 120 MMHG | HEART RATE: 94 BPM | RESPIRATION RATE: 16 BRPM

## 2018-10-09 DIAGNOSIS — F41.1 GENERALIZED ANXIETY DISORDER: ICD-10-CM

## 2018-10-09 DIAGNOSIS — F32.1 MODERATE MAJOR DEPRESSION (H): Primary | ICD-10-CM

## 2018-10-09 DIAGNOSIS — F41.1 GENERALIZED ANXIETY DISORDER: Primary | ICD-10-CM

## 2018-10-09 DIAGNOSIS — F63.3 TRICHOTILLOMANIA: ICD-10-CM

## 2018-10-09 DIAGNOSIS — F51.04 PSYCHOPHYSIOLOGICAL INSOMNIA: ICD-10-CM

## 2018-10-09 PROCEDURE — 99214 OFFICE O/P EST MOD 30 MIN: CPT | Performed by: NURSE PRACTITIONER

## 2018-10-09 RX ORDER — CLONAZEPAM 0.5 MG/1
0.5 TABLET, ORALLY DISINTEGRATING ORAL 2 TIMES DAILY PRN
Qty: 45 TABLET | Refills: 0 | Status: SHIPPED | OUTPATIENT
Start: 2018-10-09 | End: 2018-10-09

## 2018-10-09 RX ORDER — CLONAZEPAM 0.5 MG/1
0.5 TABLET ORAL 2 TIMES DAILY PRN
Qty: 45 TABLET | Refills: 0 | COMMUNITY
Start: 2018-10-09 | End: 2018-12-12

## 2018-10-09 ASSESSMENT — ANXIETY QUESTIONNAIRES
1. FEELING NERVOUS, ANXIOUS, OR ON EDGE: NEARLY EVERY DAY
7. FEELING AFRAID AS IF SOMETHING AWFUL MIGHT HAPPEN: MORE THAN HALF THE DAYS
GAD7 TOTAL SCORE: 17
4. TROUBLE RELAXING: NEARLY EVERY DAY
3. WORRYING TOO MUCH ABOUT DIFFERENT THINGS: NEARLY EVERY DAY
2. NOT BEING ABLE TO STOP OR CONTROL WORRYING: NEARLY EVERY DAY
GAD7 TOTAL SCORE: 17
6. BECOMING EASILY ANNOYED OR IRRITABLE: SEVERAL DAYS
GAD7 TOTAL SCORE: 17
7. FEELING AFRAID AS IF SOMETHING AWFUL MIGHT HAPPEN: MORE THAN HALF THE DAYS
5. BEING SO RESTLESS THAT IT IS HARD TO SIT STILL: MORE THAN HALF THE DAYS

## 2018-10-09 ASSESSMENT — PATIENT HEALTH QUESTIONNAIRE - PHQ9
SUM OF ALL RESPONSES TO PHQ QUESTIONS 1-9: 8
SUM OF ALL RESPONSES TO PHQ QUESTIONS 1-9: 8
10. IF YOU CHECKED OFF ANY PROBLEMS, HOW DIFFICULT HAVE THESE PROBLEMS MADE IT FOR YOU TO DO YOUR WORK, TAKE CARE OF THINGS AT HOME, OR GET ALONG WITH OTHER PEOPLE: SOMEWHAT DIFFICULT

## 2018-10-09 NOTE — TELEPHONE ENCOUNTER
Routing to provider.  Need OK to switch from ODT to regular tabs due to patient allergies to sweeteners in the ODT.

## 2018-10-09 NOTE — TELEPHONE ENCOUNTER
[facility] calling stating the client has allergies related to sweetners.   Client was prescribed Klonopin that has a sweetner in it.  They are wondering if the rx can be switched to tabs which do not contain the sweetner.    [facility]# [phone]

## 2018-10-09 NOTE — PROGRESS NOTES
"    Outpatient Psychiatric Progress Note    Name: Danyelle Canales   : 1980                    Primary Care Provider: Smiley Kim PA-C - last visit 18  Therapist: Edyta vail  - last visit about month ago    Answers for HPI/ROS submitted by the patient on 10/9/2018   If you checked off any problems, how difficult have these problems made it for you to do your work, take care of things at home, or get along with other people?: Somewhat difficult  PHQ9 TOTAL SCORE: 8  NAJMA 7 TOTAL SCORE: 17    PHQ-9 scores:  PHQ-9 SCORE 2018 2018 10/9/2018   Total Score - - -   Total Score MyChart - - 8 (Mild depression)   Total Score 6 13 8       NAJMA-7 scores:  NAJMA-7 SCORE 2018 2018 10/9/2018   Total Score - - -   Total Score - - 17 (severe anxiety)   Total Score 5 15 17       Patient Identification:  Patient is a 38 year old year old,   White American female  who presents for return visit with me.  Patient is currently employed full time. Patient attended the session alone. Patient prefers to be called: \"Danyelle\".    Interim History:  I last saw Danyelle Canales for outpatient psychiatry Consultation on 18. During that appointment, we initated Abilify 2 mg daily. Patient scheduled this f/u 2 weeks earlier than anticipated.  .   Current medications include:   Current Outpatient Prescriptions   Medication Sig     ARIPiprazole (ABILIFY) 2 MG tablet Take 1 tablet (2 mg) by mouth At Bedtime     buPROPion (WELLBUTRIN XL) 300 MG 24 hr tablet Take 1 tablet (300 mg) by mouth every morning     DULOXETINE HCL PO Take 10 mg by mouth daily Take 1 tablet daily along with the 2- 30 mg tablets daily for a total of 70 mg daily     HYDROcodone-acetaminophen (NORCO) 5-325 MG per tablet      hydrOXYzine (VISTARIL) 25 MG capsule Take 1-2 tablet at bedtime, may take one tablet in morning and one tablet in afternoon as well (Patient not taking: Reported on 2018)     mirtazapine (REMERON) " "15 MG tablet Take 0.5 tablets (7.5 mg) by mouth At Bedtime     ORDER FOR DME Apply 1 Units topically. TENS unit use as directed     zolpidem (AMBIEN) 5 MG tablet Take 1 tablet (5 mg) by mouth nightly as needed for sleep     No current facility-administered medications for this visit.        The [place] Prescription Monitoring Program has been reviewed and there are no concerns about diversionary activity for controlled substances at this time.      I was able to review most recent Primary Care Provider, specialty provider, and therapy visit notes that I have access to.     Patient states mood is mood and motivation have improved; able to find some janusz in former activities like painting. Patient states anxiety has worsened, however; feels \"reved up\"; akathisia vs anxiety. Patient also states sleep remarkably worsened; difficulty falling and staying asleep. Patient initiated Abilify 2 mg 2 weeks ago; initially nighttime dosing; switched to AM dosing per writer's previous instructions, but same sleep issues persisted.    Patient continues in therapy, monthly visits at [facility]:  Denice Marie  Tel: [phone]  Fax: [phone]      Past Medical History:   Diagnosis Date     ASD (atrial septal defect)     repaired surgically as a child     Depressive disorder      Depressive disorder, not elsewhere classified      Headache      Headache      History of blood transfusion 1985 1985 during open heart surgery     Hypertension     higher results at pain clinic, want to confirm ok     Insomnia, unspecified      Other forms of migraine, without mention of intractable migraine without mention of status migrainosus       has a past medical history of ASD (atrial septal defect); Depressive disorder; Depressive disorder, not elsewhere classified; Headache; Headache; History of blood transfusion (1985); Hypertension; Insomnia, unspecified; and Other forms of migraine, without mention of intractable " migraine without mention of status migrainosus. She also has no past medical history of Arthritis; Cerebral infarction (H); Congestive heart failure (H); COPD (chronic obstructive pulmonary disease) (H); Diabetes (H); Thyroid disease; or Uncomplicated asthma.    Social History:  No change.    Vital Signs:   There were no vitals taken for this visit.    Labs:  Most recent laboratory results reviewed and no new labs.    Review of Systems:  10 systems (general, cardiovascular, respiratory, eyes, ENT, endocrine, GI, , M/S, neurological) were reviewed. Most pertinent finding(s) is/are: physical restlesness. The remaining systems are all unremarkable.    Mental Status Examination:  Appearance:  awake, alert and adequately groomed  Attitude:  cooperative   Eye Contact:  good  Gait and Station: Normal  Psychomotor Behavior:  intact station, gait and muscle tone  Oriented to:  time, person, and place  Attention Span and Concentration:  Normal  Speech:   clear, coherent  Mood:  anxious  Affect:  tearful  Associations:  no loose associations  Thought Process:  logical, linear and goal oriented  Thought Content:  Appropriate to Interview  Recent and Remote Memory:  intact Not formally assessed. No amnesia.  Fund of Knowledge: appropriate  Insight:  good  Judgment:  intact  Impulse Control:  intact    Suicide Risk Assessment:  Today Danyelle Canales denies suicidal ideation or self-harm impulses. Therefore, based on all available evidence including the factors cited above, Danyelle Canales does not appear to be at imminent risk for self-harm, does not meet criteria for a 72-hr hold, and therefore remains appropriate for ongoing outpatient level of care.  A thorough assessment of risk factors related to suicide and self-harm have been reviewed and are noted above. The patient convincingly denies suicidality on several occasions. Local community safety resources printed and reviewed for patient to use if needed. There was  no deceit detected, and the patient presented in a manner that was believable.     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  Med: Vicodin.   Maximum use per month: 90  Expected duration: ongoing, pt in comprehensive pain mgmt currently  Narcotic agreement on file: NO  Clinic visit recommended: Q 3 months         Morbid obesity (H) 08/20/2018     Priority: Medium     Non morbid obesity, unspecified obesity type 09/20/2017     Priority: Medium     Esophageal reflux 11/23/2014     Priority: Medium     Chronic pain 01/31/2012     Priority: Medium     ASD (atrial septal defect) 01/16/2012     Priority: Medium     Generalized anxiety disorder 10/18/2011     Priority: Medium     Diagnosis updated by automated process. Provider to review and confirm.       Moderate major depression (H) 09/12/2011     Priority: Medium     History of ovarian cyst 09/12/2011     Priority: Medium     CARDIOVASCULAR SCREENING; LDL GOAL LESS THAN 160 05/09/2010     Priority: Medium     Trichotillomania 08/03/2009     Priority: Medium     PMDD (premenstrual dysphoric disorder) 06/03/2009     Priority: Medium     Migraine headache 07/23/2008     Priority: Medium     ia SPRAIN THORACIC REGION 10/08/2007     Priority: Medium     ia SPRAIN OF NECK 10/08/2007     Priority: Medium     Encounter for other general counseling or advice on contraception 07/18/2007     Priority: Medium     Diagnosis updated by automated process. Provider to review and confirm.       allergic  DERMATITIS NOS 11/12/2004     Priority: Medium     Insomnia 07/16/2004     Priority: Medium     Problem list name updated by automated process. Provider to review         Assessment:  Danyelle Canales reports promising initial improvement to mood and return to former activities, however is experiencing elevated anxiety, restlessness and insomnia likely as induced by Abilify. She is committed to further trial of Abilify and receptive to assurances side effects have potential to rex. She will also discontinue Abilify and Remeron for time being, as finding these are not attendant to sleep presently, and will utilize short-term use of Klonopin instead.    Medication side effects and alternatives were reviewed. Health promotion activities recommended and reviewed today. All questions addressed. Education and counseling completed regarding risks and benefits of medications and psychotherapy options.    Treatment Plan:    Continue Cymbalta 80 mg daily, Wellbutrin  mg daily; has supply    Continue Abilify 2 mg in morning; will monitor for improvement in side effects    Written for Klonopin 0.5 mg, 1 tab at night and 1/2 to 1 tab during day as need for anxiety/sleep; advised of proper/safe usage, onset, duration; not to take with other CNS depressants or sleep aids; directed to suspend Ambien and Remeron    Continue other treatment directions per primary care provider.     Continue all other medications as reviewed per electronic medical record today.     Safety plan reviewed. To the Emergency Department as needed or call after hours crisis line at [phone] or [phone]. [place] Crisis Text Line: Text [place] to 054684  or  Suicide LifeLine Chat: suicidepreventionlifeline.org/chat/    To schedule individual or family therapy, call [facility] at [phone].    Continue individual therapy as planned    Schedule an appointment with me in 2 weeks or sooner as needed.    Follow up with primary  care provider as planned or for acute medical concerns.    Call the psychiatric nurse line with medication questions or concerns at [phone].    MyChart may be used to communicate with your provider, but this is not intended to be used for emergencies.    Administrative Billing:   Time spent with patient was 30 minutes and greater than 50% of time or 20 minutes was spent in counseling and coordination of care regarding above diagnoses and treatment plan.    Patient Status:  Patient will continue to be seen for ongoing consultation and stabilization.    Signed:   Aron Alfaro CNP   Psychiatry

## 2018-10-09 NOTE — MR AVS SNAPSHOT
After Visit Summary   10/9/2018    Danyelle Canales    MRN: [id number]           Patient Information     Date Of Birth          1980        Visit Information        Provider Department      10/9/2018 8:45 AM Aron Alfaro CNP [facility]        Today's Diagnoses     Moderate major depression (H)    -  1    Generalized anxiety disorder        Trichotillomania        Psychophysiological insomnia           Follow-ups after your visit        Follow-up notes from your care team     Return in about 2 weeks (around 10/23/2018).      Your next 10 appointments already scheduled     Oct 24, 2018  8:45 AM CDT   Return Visit with Aron Alfaro CNP   [facility]    [address] [place] [address] [phone]              Who to contact     If you have questions or need follow up information about today's clinic visit or your schedule please contact [facility] directly at [phone].  Normal or non-critical lab and imaging results will be communicated to you by Telikhart, letter or phone within 4 business days after the clinic has received the results. If you do not hear from us within 7 days, please contact the clinic through Brainswayt or phone. If you have a critical or abnormal lab result, we will notify you by phone as soon as possible.  Submit refill requests through Joust or call your pharmacy and they will forward the refill request to us. Please allow 3 business days for your refill to be completed.          Additional Information About Your Visit        Telikhart Information     Joust gives you secure access to your electronic health record. If you see a primary care provider, you can also send messages to your care team and make appointments. If you have questions, please call your primary care clinic.  If you do not have a primary care provider, please call  "[phone] and they will assist you.        Care EveryWhere ID     This is your Care EveryWhere ID. This could be used by other organizations to access your [place] medical records  [id number]        Your Vitals Were     Pulse Temperature Respirations Height Last Period Pulse Oximetry    94 98.7  F (37.1  C) (Oral) 16 5' 7\" (1.702 m) 10/07/2018 93%    Breastfeeding? BMI (Body Mass Index)                No 34.3 kg/m2           Blood Pressure from Last 3 Encounters:   10/09/18 120/80   09/26/18 140/90   08/20/18 132/80    Weight from Last 3 Encounters:   10/09/18 219 lb (99.3 kg)   09/26/18 219 lb (99.3 kg)   08/20/18 225 lb (102.1 kg)              Today, you had the following     No orders found for display       Primary Care Provider Office Phone # Fax #    Smiley Kim PA-C [phone]       [address] [place] [address]        Equal Access to Services     [facility]: Hadii aad ku hadasho Soomaali, waaxda luqadaha, qaybta kaalmada adeegyada, darrell alva . So [facility] [phone].    ATENCIÓN: Si habla español, tiene a roberto disposición servicios gratuitos de asistencia lingüística. Llame al [phone].    We comply with applicable federal civil rights laws and [place] laws. We do not discriminate on the basis of race, color, national origin, age, disability, sex, sexual orientation, or gender identity.            Thank you!     Thank you for choosing [facility]  for your care. Our goal is always to provide you with excellent care. Hearing back from our patients is one way we can continue to improve our services. Please take a few minutes to complete the written survey that you may receive in the mail after your visit with us. Thank you!             Your Updated Medication List - Protect others around you: Learn how to safely use, store and throw away your medicines at www.disposemymeds.org.          This list is accurate as of " 10/9/18  5:25 PM.  Always use your most recent med list.                   Brand Name Dispense Instructions for use Diagnosis    ARIPiprazole 2 MG tablet    ABILIFY    30 tablet    Take 1 tablet (2 mg) by mouth At Bedtime    Moderate major depression (H)       buPROPion 300 MG 24 hr tablet    WELLBUTRIN XL    90 tablet    Take 1 tablet (300 mg) by mouth every morning    Major depressive disorder, recurrent episode, moderate (H)       clonazePAM 0.5 MG tablet    klonoPIN    45 tablet    Take 1 tablet (0.5 mg) by mouth 2 times daily as needed for anxiety    Generalized anxiety disorder       DULOXETINE HCL PO      Take 10 mg by mouth daily Take 1 tablet daily along with the 2- 30 mg tablets daily for a total of 70 mg daily        HYDROcodone-acetaminophen 5-325 MG per tablet    NORCO          hydrOXYzine 25 MG capsule    VISTARIL    120 capsule    Take 1-2 tablet at bedtime, may take one tablet in morning and one tablet in afternoon as well    Anxiety, Muscle tightness       mirtazapine 15 MG tablet    REMERON    45 tablet    Take 0.5 tablets (7.5 mg) by mouth At Bedtime        order for DME     1 Units    Apply 1 Units topically. TENS unit use as directed    Chronic pain syndrome, Myalgia and myositis       zolpidem 5 MG tablet    AMBIEN    30 tablet    Take 1 tablet (5 mg) by mouth nightly as needed for sleep    Other insomnia

## 2018-10-10 ASSESSMENT — PATIENT HEALTH QUESTIONNAIRE - PHQ9: SUM OF ALL RESPONSES TO PHQ QUESTIONS 1-9: 8

## 2018-10-10 ASSESSMENT — ANXIETY QUESTIONNAIRES: GAD7 TOTAL SCORE: 17

## 2018-10-24 ENCOUNTER — OFFICE VISIT (OUTPATIENT)
Dept: PSYCHIATRY | Facility: CLINIC | Age: 38
End: 2018-10-24
Payer: COMMERCIAL

## 2018-10-24 DIAGNOSIS — F41.1 GENERALIZED ANXIETY DISORDER: ICD-10-CM

## 2018-10-24 DIAGNOSIS — F51.04 PSYCHOPHYSIOLOGICAL INSOMNIA: ICD-10-CM

## 2018-10-24 DIAGNOSIS — F32.1 MODERATE MAJOR DEPRESSION (H): Primary | ICD-10-CM

## 2018-10-24 PROCEDURE — 99214 OFFICE O/P EST MOD 30 MIN: CPT | Performed by: NURSE PRACTITIONER

## 2018-10-24 RX ORDER — ARIPIPRAZOLE 5 MG/1
5 TABLET ORAL AT BEDTIME
Qty: 30 TABLET | Refills: 1 | Status: SHIPPED | OUTPATIENT
Start: 2018-10-24 | End: 2018-11-13

## 2018-10-24 RX ORDER — ZOLPIDEM TARTRATE 12.5 MG/1
12.5 TABLET, FILM COATED, EXTENDED RELEASE ORAL
Qty: 30 TABLET | Refills: 1 | Status: SHIPPED | OUTPATIENT
Start: 2018-10-24 | End: 2018-10-29 | Stop reason: ALTCHOICE

## 2018-10-24 ASSESSMENT — PATIENT HEALTH QUESTIONNAIRE - PHQ9
SUM OF ALL RESPONSES TO PHQ QUESTIONS 1-9: 8
10. IF YOU CHECKED OFF ANY PROBLEMS, HOW DIFFICULT HAVE THESE PROBLEMS MADE IT FOR YOU TO DO YOUR WORK, TAKE CARE OF THINGS AT HOME, OR GET ALONG WITH OTHER PEOPLE: SOMEWHAT DIFFICULT
SUM OF ALL RESPONSES TO PHQ QUESTIONS 1-9: 8

## 2018-10-24 ASSESSMENT — ANXIETY QUESTIONNAIRES
7. FEELING AFRAID AS IF SOMETHING AWFUL MIGHT HAPPEN: MORE THAN HALF THE DAYS
2. NOT BEING ABLE TO STOP OR CONTROL WORRYING: NEARLY EVERY DAY
7. FEELING AFRAID AS IF SOMETHING AWFUL MIGHT HAPPEN: MORE THAN HALF THE DAYS
4. TROUBLE RELAXING: MORE THAN HALF THE DAYS
GAD7 TOTAL SCORE: 15
GAD7 TOTAL SCORE: 15
1. FEELING NERVOUS, ANXIOUS, OR ON EDGE: NEARLY EVERY DAY
5. BEING SO RESTLESS THAT IT IS HARD TO SIT STILL: NOT AT ALL
6. BECOMING EASILY ANNOYED OR IRRITABLE: MORE THAN HALF THE DAYS
GAD7 TOTAL SCORE: 15
3. WORRYING TOO MUCH ABOUT DIFFERENT THINGS: NEARLY EVERY DAY

## 2018-10-24 NOTE — MR AVS SNAPSHOT
After Visit Summary   10/24/2018    Danyelle Canales    MRN: [id number]           Patient Information     Date Of Birth          1980        Visit Information        Provider Department      10/24/2018 8:45 AM Aron Alfaro CNP [facility]        Today's Diagnoses     Moderate major depression (H)    -  1    Generalized anxiety disorder        Psychophysiological insomnia           Follow-ups after your visit        Follow-up notes from your care team     Return in about 4 weeks (around 11/21/2018) for Routine Visit.      Your next 10 appointments already scheduled     Nov 28, 2018 10:15 AM CST   Return Visit with Aron Alfaro CNP   [facility] ([facility]    [address] [place] [address] [phone]              Who to contact     If you have questions or need follow up information about today's clinic visit or your schedule please contact [facility] directly at [phone].  Normal or non-critical lab and imaging results will be communicated to you by Gradient Xhart, letter or phone within 4 business days after the clinic has received the results. If you do not hear from us within 7 days, please contact the clinic through Touchtalentt or phone. If you have a critical or abnormal lab result, we will notify you by phone as soon as possible.  Submit refill requests through Group Phoebe Ingenica or call your pharmacy and they will forward the refill request to us. Please allow 3 business days for your refill to be completed.          Additional Information About Your Visit        MyChart Information     Group [employer] In[employer]ica gives you secure access to your electronic health record. If you see a primary care provider, you can also send messages to your care team and make appointments. If you have questions, please call your primary care clinic.  If you do not have a primary care provider, please call [phone]  and they will assist you.        Care EveryWhere ID     This is your Care EveryWhere ID. This could be used by other organizations to access your [place] medical records  [id number]        Your Vitals Were     Last Period                   10/07/2018            Blood Pressure from Last 3 Encounters:   10/09/18 120/80   09/26/18 140/90   08/20/18 132/80    Weight from Last 3 Encounters:   10/09/18 219 lb (99.3 kg)   09/26/18 219 lb (99.3 kg)   08/20/18 225 lb (102.1 kg)              Today, you had the following     No orders found for display         Today's Medication Changes          These changes are accurate as of 10/24/18  9:31 AM.  If you have any questions, ask your nurse or doctor.               Start taking these medicines.        Dose/Directions    zolpidem 12.5 MG CR tablet   Commonly known as:  AMBIEN CR   Used for:  Psychophysiological insomnia   Replaces:  zolpidem 5 MG tablet        Dose:  12.5 mg   Take 1 tablet (12.5 mg) by mouth nightly as needed for sleep   Quantity:  30 tablet   Refills:  1         These medicines have changed or have updated prescriptions.        Dose/Directions    ARIPiprazole 5 MG tablet   Commonly known as:  ABILIFY   This may have changed:    - medication strength  - how much to take   Used for:  Moderate major depression (H)        Dose:  5 mg   Take 1 tablet (5 mg) by mouth At Bedtime   Quantity:  30 tablet   Refills:  1         Stop taking these medicines if you haven't already. Please contact your care team if you have questions.     hydrOXYzine 25 MG capsule   Commonly known as:  VISTARIL           mirtazapine 15 MG tablet   Commonly known as:  REMERON           zolpidem 5 MG tablet   Commonly known as:  AMBIEN   Replaced by:  zolpidem 12.5 MG CR tablet                Where to get your medicines      These medications were sent to [place] [address] [place] [address]   [address] [facility] [address]     Phone:  [phone]     ARIPiprazole 5 MG  tablet         Some of these will need a paper prescription and others can be bought over the counter.  Ask your nurse if you have questions.     Bring a paper prescription for each of these medications     zolpidem 12.5 MG CR tablet                Primary Care Provider Office Phone # Fax #    Smiley Maria E Kim PA-C [phone]       [address] [facility] [address]        Equal Access to Services     [facility] : Hadii aad ku hadasho Soomaali, waaxda luqadaha, qaybta kaalmada adeegyada, waxay idiin hayaan adeeg kharash lajudith ah. So [facility] [phone].    ATENCIÓN: Si jitendra coker, tiene a roberto disposición servicios gratuitos de asistencia lingüística. Llame al [phone].    We comply with applicable federal civil rights laws and [place] laws. We do not discriminate on the basis of race, color, national origin, age, disability, sex, sexual orientation, or gender identity.            Thank you!     Thank you for choosing [facility] hospital  for your care. Our goal is always to provide you with excellent care. Hearing back from our patients is one way we can continue to improve our services. Please take a few minutes to complete the written survey that you may receive in the mail after your visit with us. Thank you!             Your Updated Medication List - Protect others around you: Learn how to safely use, store and throw away your medicines at www.disposemymeds.org.          This list is accurate as of 10/24/18  9:31 AM.  Always use your most recent med list.                   Brand Name Dispense Instructions for use Diagnosis    ARIPiprazole 5 MG tablet    ABILIFY    30 tablet    Take 1 tablet (5 mg) by mouth At Bedtime    Moderate major depression (H)       buPROPion 300 MG 24 hr tablet    WELLBUTRIN XL    90 tablet    Take 1 tablet (300 mg) by mouth every morning    Major depressive disorder, recurrent episode, moderate (H)       clonazePAM 0.5 MG tablet    klonoPIN     45 tablet    Take 1 tablet (0.5 mg) by mouth 2 times daily as needed for anxiety    Generalized anxiety disorder       DULOXETINE HCL PO      Take 10 mg by mouth daily Take 1 tablet daily along with the 2- 30 mg tablets daily for a total of 70 mg daily        HYDROcodone-acetaminophen 5-325 MG per tablet    NORCO          order for DME     1 Units    Apply 1 Units topically. TENS unit use as directed    Chronic pain syndrome, Myalgia and myositis       zolpidem 12.5 MG CR tablet    AMBIEN CR    30 tablet    Take 1 tablet (12.5 mg) by mouth nightly as needed for sleep    Psychophysiological insomnia

## 2018-10-24 NOTE — PROGRESS NOTES
Outpatient Psychiatric Progress Note    Name: Danyelle Canales   : 1980                    Primary Care Provider: Smiley Kim PA-C - last visit 18  Therapist: TIM Santos - last visit: 10/18/18    PHQ-9 scores:  PHQ-9 SCORE 2018 10/9/2018 10/24/2018   Total Score - - -   Total Score MyChart - 8 (Mild depression) 8 (Mild depression)   Total Score 13 8 8       NAJMA-7 scores:  NAJMA-7 SCORE 2018 10/9/2018 10/24/2018   Total Score - - -   Total Score - 17 (severe anxiety) 15 (severe anxiety)   Total Score 15 17 15       Patient Identification:  Patient is a 38 year old year old,White American female  who presents for return visit with me.  Patient is currently employed full time. Patient attended the session alone.    Interim History:  I last saw Danyelle Canales for outpatient psychiatry Return Visit on 10/9/18. During that appointment, we reassessed 2 week initiation period of Abilify 2 mg, and prescribed Klonopin as short-term anxiolytic and sleep aid.    Today patient now on Abilify 2 mg qAM x 1 month; initiation pro-motivating effects appear to have dissipated, though so has any presumed induced restlessness/akathisia. Patient pleased she has not gained any weight on Abilify. Patient discontinued all other sleep aids in favor of Klonopin 0.5 mg at night, but has had little effect on her insomnia; still waking up very early in AM; for example, today up since 1 AM. Patient also used Klonopin 0.5 mg during day; had mild effect on daytime anxiety. Patient was certain not to use Klonopin if having to take a prescribed opiate for pain (one instance). Mood and anxiety status quo; no worse, no better.    Patient attended yoga yesterday; first time in a while; proud she was able to make it; plans to go to further classes.    Patient interested to maintain current regime of Cymbalta 80 mg daily, Wellbutrin  mg daily; will increase Abilify to 5 mg qAM. Will also  re-initiate Ambien as sleep aid, as historically most reliably helpful; had used 10 mg in past without issue; would be interested to trial equivalent CR form; states she takes early enough in evening so as not to affect her in AM on drive to work.    Patient continues to see her pain specialist MD and pain psychologist at [facility].    Current medications include:   Current Outpatient Prescriptions   Medication Sig     ARIPiprazole (ABILIFY) 2 MG tablet Take 1 tablet (2 mg) by mouth At Bedtime     buPROPion (WELLBUTRIN XL) 300 MG 24 hr tablet Take 1 tablet (300 mg) by mouth every morning     clonazePAM (KLONOPIN) 0.5 MG tablet Take 1 tablet (0.5 mg) by mouth 2 times daily as needed for anxiety     DULOXETINE HCL PO Take 10 mg by mouth daily Take 1 tablet daily along with the 2- 30 mg tablets daily for a total of 70 mg daily     HYDROcodone-acetaminophen (NORCO) 5-325 MG per tablet      hydrOXYzine (VISTARIL) 25 MG capsule Take 1-2 tablet at bedtime, may take one tablet in morning and one tablet in afternoon as well (Patient not taking: Reported on 9/26/2018)     mirtazapine (REMERON) 15 MG tablet Take 0.5 tablets (7.5 mg) by mouth At Bedtime     ORDER FOR DME Apply 1 Units topically. TENS unit use as directed     zolpidem (AMBIEN) 5 MG tablet Take 1 tablet (5 mg) by mouth nightly as needed for sleep     No current facility-administered medications for this visit.        The [place] Prescription Monitoring Program has been reviewed and there are no concerns about diversionary activity for controlled substances at this time.      I was able to review most recent Primary Care Provider, specialty provider, and therapy visit notes that I have access to.       Past Medical History:   Diagnosis Date     ASD (atrial septal defect)     repaired surgically as a child     Depressive disorder      Depressive disorder, not elsewhere classified      Headache      Headache      History of blood transfusion 1985 1985  during open heart surgery     Hypertension     higher results at pain clinic, want to confirm ok     Insomnia, unspecified      Other forms of migraine, without mention of intractable migraine without mention of status migrainosus       has a past medical history of ASD (atrial septal defect); Depressive disorder; Depressive disorder, not elsewhere classified; Headache; Headache; History of blood transfusion (1985); Hypertension; Insomnia, unspecified; and Other forms of migraine, without mention of intractable migraine without mention of status migrainosus. She also has no past medical history of Arthritis; Cerebral infarction (H); Congestive heart failure (H); COPD (chronic obstructive pulmonary disease) (H); Diabetes (H); Thyroid disease; or Uncomplicated asthma.    Social History:  No change.    Vital Signs:   [facility] 10/07/2018    Labs:  Most recent laboratory results reviewed and no new labs.    Review of Systems:  10 systems (general, cardiovascular, respiratory, eyes, ENT, endocrine, GI, , M/S, neurological) were reviewed. Most pertinent finding(s) is/are fatigue. The remaining systems are all unremarkable.    Mental Status Examination:  Appearance:  awake, alert and adequately groomed  Attitude:  cooperative   Eye Contact:  good  Gait and Station: Normal  Psychomotor Behavior:  intact station, gait and muscle tone  Oriented to:  time, person, and place  Attention Span and Concentration:  Normal  Speech:   clear, coherent  Mood:  anxious  Affect:  tearful  Associations:  no loose associations  Thought Process:  logical, linear and goal oriented  Thought Content:  Appropriate to Interview  Recent and Remote Memory:  intact Not formally assessed. No amnesia.  Fund of Knowledge: appropriate  Insight:  good  Judgment:  intact  Impulse Control:  intact    Suicide Risk Assessment:  Today Danyelle Canales denies suicidal ideation or self-harm impulses.Therefore, based on all available evidence including the  factors cited above, Danyelle Canales does not appear to be at imminent risk for self-harm, does not meet criteria for a 72-hr hold, and therefore remains appropriate for ongoing outpatient level of care.  A thorough assessment of risk factors related to suicide and self-harm have been reviewed and are noted above. The patient convincingly denies suicidality on several occasions. Local community safety resources printed and reviewed for patient to use if needed. There was no deceit detected, and the patient presented in a manner that was believable.     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  Med: Vicodin.   Maximum use per month: 90  Expected duration: ongoing, pt in comprehensive pain mgmt currently  Narcotic agreement on file: NO  Clinic visit recommended: Q 3 months         Morbid obesity (H) 08/20/2018     Priority: Medium     Non morbid obesity, unspecified obesity type 09/20/2017     Priority: Medium     Esophageal reflux 11/23/2014     Priority: Medium     Chronic pain 01/31/2012     Priority: Medium     ASD (atrial septal defect) 01/16/2012     Priority: Medium     Generalized anxiety disorder 10/18/2011     Priority: Medium     Diagnosis updated by automated process. Provider to review and confirm.       Moderate major depression (H) 09/12/2011     Priority: Medium     History of ovarian cyst 09/12/2011     Priority: Medium     CARDIOVASCULAR SCREENING; LDL GOAL LESS THAN 160 05/09/2010     Priority: Medium     Trichotillomania  08/03/2009     Priority: Medium     PMDD (premenstrual dysphoric disorder) 06/03/2009     Priority: Medium     Migraine headache 07/23/2008     Priority: Medium     iamc SPRAIN THORACIC REGION 10/08/2007     Priority: Medium     iamc SPRAIN OF NECK 10/08/2007     Priority: Medium     Encounter for other general counseling or advice on contraception 07/18/2007     Priority: Medium     Diagnosis updated by automated process. Provider to review and confirm.       allergic DERMATITIS NOS 11/12/2004     Priority: Medium     Insomnia 07/16/2004     Priority: Medium     Problem list name updated by automated process. Provider to review         Assessment:  Danyelle Canales reports any initial promising effects of Abilify towards mood seem to have been dissipated, though it appears less likely the Abilify will be causing any persistent or intolerable restlessness, as this baseline level of insomnia pre-existed its introduction. As otherwise well-tolerated, we agree to further vet Abilify with 5 mg daily dosing. Her sleep aid regime historically has been overly complicated, and we agree to shelve all sleep aids save for Ambien, which will be prescribed as the CR formulation to improve sleep maintenance, and at 12.5 mg, as benefits to health outweigh risks and she has quite responsible in dosing early in evening. Her Cymbalta and Wellbutrin will remain status quo for now. She may utilize remaining Klonopin 0.5 mg to use occasionally as needed for daytime anxiety. Should Abilify prove inadequate/intolerable, we discussed at potential trial of Latuda in future. Her efforts at self-care are commended and further encouraged, as is her continued engagement in therapy.    Medication side effects and alternatives were reviewed. Health promotion activities recommended and reviewed today. All questions addressed. Education and counseling completed regarding risks and benefits of medications and psychotherapy options.    Treatment  Plan:    Continue Cymbalta 80 mg daily and Wellbutrin  mg daily; has supply    Increase Abilify to 5 mg qAM    Has Klonopin 0.5 mg as needed for daytime anxiety; advised of proper/safe usage, onset, duration; not to take with other CNS depressants or sleep aids    Written for Ambien CR 12.5 mg nightly for sleep; advised of proper/safe usage, onset, duration; not to take with other CNS depressants or sleep aids    Continue all other medical treatment directions per primary care provider.     Continue all other medications as reviewed per electronic medical record today.     Safety plan reviewed. To the Emergency Department as needed or call after hours crisis line at [phone] or [phone]. [place] Crisis Text Line. Text [place] to 755658 or Suicide LifeLine Chat: suicidepreventionlifeline.org/chat/    Schedule an appointment with me in 5 weeks or sooner as needed. Call [facility] at [phone] to schedule.    Follow up with primary care provider as planned or for acute medical concerns.    Call the psychiatric nurse line with medication questions or concerns at [phone].    ePropertyData may be used to communicate with your provider, but this is not intended to be used for emergencies.    Administrative Billing:   Time spent with patient was 30 minutes and greater than 50% of time or 20 minutes was spent in counseling and coordination of care regarding above diagnoses and treatment plan.    Patient Status:  Patient will continue to be seen for ongoing consultation and stabilization.    Signed:   Aron Alfaro CNP   Psychiatry    Answers for HPI/ROS submitted by the patient on 10/24/2018   If you checked off any problems, how difficult have these problems made it for you to do your work, take care of things at home, or get along with other people?: Somewhat difficult  PHQ9 TOTAL SCORE: 8  NAJMA 7 TOTAL SCORE: 15

## 2018-10-25 ASSESSMENT — PATIENT HEALTH QUESTIONNAIRE - PHQ9: SUM OF ALL RESPONSES TO PHQ QUESTIONS 1-9: 8

## 2018-10-25 ASSESSMENT — ANXIETY QUESTIONNAIRES: GAD7 TOTAL SCORE: 15

## 2018-10-29 ENCOUNTER — MYC MEDICAL ADVICE (OUTPATIENT)
Dept: PSYCHIATRY | Facility: CLINIC | Age: 38
End: 2018-10-29

## 2018-10-29 DIAGNOSIS — G47.00 PERSISTENT INSOMNIA: Primary | ICD-10-CM

## 2018-10-29 RX ORDER — ESZOPICLONE 3 MG/1
3 TABLET, FILM COATED ORAL AT BEDTIME
Qty: 30 TABLET | Refills: 0 | Status: SHIPPED | OUTPATIENT
Start: 2018-10-29 | End: 2018-11-26

## 2018-10-29 NOTE — TELEPHONE ENCOUNTER
Patient finding Ambien CR 12.5 mg inconsistent in effect. Advised to trial a few more nights before declaring a failure. Should it continue not to work for her sleep, writer has prescribed Lunesta 3 mg.

## 2018-10-29 NOTE — TELEPHONE ENCOUNTER
RN spoke to provider and relayed provider's recommendations. Patient verbalized understanding and was able to repeat plan back to writer correctly.  She will call back to let us know if she did need to switch to Lunesta 3 mg.  She understands other changes will require an office visit.

## 2018-11-13 ENCOUNTER — OFFICE VISIT (OUTPATIENT)
Dept: PSYCHIATRY | Facility: CLINIC | Age: 38
End: 2018-11-13
Payer: COMMERCIAL

## 2018-11-13 VITALS
DIASTOLIC BLOOD PRESSURE: 82 MMHG | WEIGHT: 218 LBS | SYSTOLIC BLOOD PRESSURE: 118 MMHG | HEART RATE: 83 BPM | RESPIRATION RATE: 18 BRPM | BODY MASS INDEX: 34.14 KG/M2 | OXYGEN SATURATION: 97 %

## 2018-11-13 DIAGNOSIS — F32.1 MODERATE MAJOR DEPRESSION (H): Primary | ICD-10-CM

## 2018-11-13 DIAGNOSIS — F63.3 TRICHOTILLOMANIA: ICD-10-CM

## 2018-11-13 DIAGNOSIS — F33.8 SEASONAL AFFECTIVE DISORDER (H): ICD-10-CM

## 2018-11-13 DIAGNOSIS — F41.1 GENERALIZED ANXIETY DISORDER: ICD-10-CM

## 2018-11-13 DIAGNOSIS — F51.04 PSYCHOPHYSIOLOGICAL INSOMNIA: ICD-10-CM

## 2018-11-13 PROCEDURE — 99214 OFFICE O/P EST MOD 30 MIN: CPT | Performed by: NURSE PRACTITIONER

## 2018-11-13 RX ORDER — METHYLPHENIDATE HYDROCHLORIDE 5 MG/1
5 TABLET ORAL 2 TIMES DAILY
Qty: 60 TABLET | Refills: 0 | Status: SHIPPED | OUTPATIENT
Start: 2018-11-13 | End: 2018-12-12

## 2018-11-13 ASSESSMENT — ANXIETY QUESTIONNAIRES
3. WORRYING TOO MUCH ABOUT DIFFERENT THINGS: SEVERAL DAYS
GAD7 TOTAL SCORE: 8
5. BEING SO RESTLESS THAT IT IS HARD TO SIT STILL: NOT AT ALL
4. TROUBLE RELAXING: MORE THAN HALF THE DAYS
2. NOT BEING ABLE TO STOP OR CONTROL WORRYING: MORE THAN HALF THE DAYS
GAD7 TOTAL SCORE: 8
1. FEELING NERVOUS, ANXIOUS, OR ON EDGE: SEVERAL DAYS
7. FEELING AFRAID AS IF SOMETHING AWFUL MIGHT HAPPEN: SEVERAL DAYS
6. BECOMING EASILY ANNOYED OR IRRITABLE: SEVERAL DAYS
7. FEELING AFRAID AS IF SOMETHING AWFUL MIGHT HAPPEN: SEVERAL DAYS
GAD7 TOTAL SCORE: 8

## 2018-11-13 ASSESSMENT — PATIENT HEALTH QUESTIONNAIRE - PHQ9
10. IF YOU CHECKED OFF ANY PROBLEMS, HOW DIFFICULT HAVE THESE PROBLEMS MADE IT FOR YOU TO DO YOUR WORK, TAKE CARE OF THINGS AT HOME, OR GET ALONG WITH OTHER PEOPLE: SOMEWHAT DIFFICULT
SUM OF ALL RESPONSES TO PHQ QUESTIONS 1-9: 11
SUM OF ALL RESPONSES TO PHQ QUESTIONS 1-9: 11

## 2018-11-13 NOTE — MR AVS SNAPSHOT
After Visit Summary   11/13/2018    Danyelle Canales    MRN: [id number]           Patient Information     Date Of Birth          1980        Visit Information        Provider Department      11/13/2018 3:45 PM Aron Alfaro CNP [facility]        Today's Diagnoses     Moderate major depression (H)    -  1    Generalized anxiety disorder        Trichotillomania        Psychophysiological insomnia        Seasonal affective disorder (H)           Follow-ups after your visit        Your next 10 appointments already scheduled     Dec 12, 2018  4:45 PM CST   Return Visit with Aron Alfaro CNP   [facility]    [address] [place] [id number] [phone]              Who to contact     If you have questions or need follow up information about today's clinic visit or your schedule please contact [facility] directly at [phone].  Normal or non-critical lab and imaging results will be communicated to you by MyChart, letter or phone within 4 business days after the clinic has received the results. If you do not hear from us within 7 days, please contact the clinic through for; to (do) Centershart or phone. If you have a critical or abnormal lab result, we will notify you by phone as soon as possible.  Submit refill requests through Ey[employer] or call your pharmacy and they will forward the refill request to us. Please allow 3 business days for your refill to be completed.          Additional Information About Your Visit        for; to (do) Centershart Information     [employer] gives you secure access to your electronic health record. If you see a primary care provider, you can also send messages to your care team and make appointments. If you have questions, please call your primary care clinic.  If you do not have a primary care provider, please call [phone] and they will assist you.        Care  EveryWhere ID     This is your Care EveryWhere ID. This could be used by other organizations to access your [place] medical records  [id number]        Your Vitals Were     Pulse Respirations Pulse Oximetry BMI (Body Mass Index)          83 18 97% 34.14 kg/m2         Blood Pressure from Last 3 Encounters:   11/13/18 118/82   10/09/18 120/80   09/26/18 140/90    Weight from Last 3 Encounters:   11/13/18 218 lb (98.9 kg)   10/09/18 219 lb (99.3 kg)   09/26/18 219 lb (99.3 kg)              Today, you had the following     No orders found for display         Today's Medication Changes          These changes are accurate as of 11/13/18  4:44 PM.  If you have any questions, ask your nurse or doctor.               Start taking these medicines.        Dose/Directions    methylphenidate 5 MG tablet   Commonly known as:  RITALIN   Used for:  Moderate major depression (H)   Started by:  Aron Alfaro CNP        Dose:  5 mg   Take 1 tablet (5 mg) by mouth 2 times daily   Quantity:  60 tablet   Refills:  0       order for DME   Used for:  Seasonal affective disorder (H)   Started by:  Aron Alfaro CNP        Equipment being ordered: full spectrum therapy lamp, 10,000 lux   Quantity:  1 Units   Refills:  0         Stop taking these medicines if you haven't already. Please contact your care team if you have questions.     ARIPiprazole 5 MG tablet   Commonly known as:  ABILIFY   Stopped by:  Aron Alfaro CNP                Where to get your medicines      Some of these will need a paper prescription and others can be bought over the counter.  Ask your nurse if you have questions.     Bring a paper prescription for each of these medications     methylphenidate 5 MG tablet    order for DME                Primary Care Provider Office Phone # Fax #    Smiley Kim PA-C [phone] [address] [facility] [address]        Equal Access to Services     EMILY MCCRAY AH:  Hadii mojgan justiceo Sojoyceali, waaxda luqadaha, qaybta kaalmada mayda, darrell blaynein hayaajorge arriolaijeoma donis lamarahjorge deepak. So [facility] [phone].    ATENCIÓN: Si jitendra coker, tiene a roberto disposición servicios gratuitos de asistencia lingüística. Deshauname al [phone].    We comply with applicable federal civil rights laws and [place] laws. We do not discriminate on the basis of race, color, national origin, age, disability, sex, sexual orientation, or gender identity.            Thank you!     Thank you for choosing [facility]  for your care. Our goal is always to provide you with excellent care. Hearing back from our patients is one way we can continue to improve our services. Please take a few minutes to complete the written survey that you may receive in the mail after your visit with us. Thank you!             Your Updated Medication List - Protect others around you: Learn how to safely use, store and throw away your medicines at www.disposemymeds.org.          This list is accurate as of 11/13/18  4:44 PM.  Always use your most recent med list.                   Brand Name Dispense Instructions for use Diagnosis    buPROPion 300 MG 24 hr tablet    WELLBUTRIN XL    90 tablet    Take 1 tablet (300 mg) by mouth every morning    Major depressive disorder, recurrent episode, moderate (H)       clonazePAM 0.5 MG tablet    klonoPIN    45 tablet    Take 1 tablet (0.5 mg) by mouth 2 times daily as needed for anxiety    Generalized anxiety disorder       DULOXETINE HCL PO      Take 10 mg by mouth daily Take 1 tablet daily along with the 2- 30 mg tablets daily for a total of 70 mg daily        eszopiclone 3 MG tablet    LUNESTA    30 tablet    Take 1 tablet (3 mg) by mouth At Bedtime    Persistent insomnia       HYDROcodone-acetaminophen 5-325 MG per tablet    NORCO          methylphenidate 5 MG tablet    RITALIN    60 tablet    Take 1 tablet (5 mg) by mouth 2 times daily    Moderate major depression (H)        order for DME     1 Units    Apply 1 Units topically. TENS unit use as directed    Chronic pain syndrome, Myalgia and myositis       order for DME     1 Units    Equipment being ordered: full spectrum therapy lamp, 10,000 lux    Seasonal affective disorder (H)

## 2018-11-13 NOTE — PROGRESS NOTES
Outpatient Psychiatric Progress Note    Name: Danyelle Canales   : 1980                    Primary Care Provider: Smiley Kim PA-C - last visit 18  Therapist: TIM Crabtree and RANDA Santos     Answers for HPI/ROS submitted by the patient on 2018   If you checked off any problems, how difficult have these problems made it for you to do your work, take care of things at home, or get along with other people?: Somewhat difficult  PHQ9 TOTAL SCORE: 11  NAJMA 7 TOTAL SCORE: 8    PHQ-9 scores:  PHQ-9 SCORE 10/9/2018 10/24/2018 2018   Total Score - - -   Total Score MyChart 8 (Mild depression) 8 (Mild depression) 11 (Moderate depression)   Total Score 8 8 11       NAJMA-7 scores:  NAJMA-7 SCORE 10/9/2018 10/24/2018 2018   Total Score - - -   Total Score 17 (severe anxiety) 15 (severe anxiety) 8 (mild anxiety)   Total Score 17 15 8       Patient Identification:  Patient is a 38 year old year old,White American female  who presents for return visit with me.    Interim History:  I last saw Danyelle Canales for outpatient psychiatry Return Visit on 10/9/18..     During that appointment, we agreed to further vet Abilify's antidepressant effect with increased dosage of 5 mg. We also trialed Ambien CR 12.5 mg, but this did not prove consistently effective towards sleep, so she was prescribed Lunesta 3 mg in between appointments. Her Cymbalta and Wellbutrin remained status quo and she was permitted to continue to use Klonopin 0.25 to 0.5 mg as needed for daytime anxiety.    Current medications include:   Current Outpatient Prescriptions   Medication Sig     buPROPion (WELLBUTRIN XL) 300 MG 24 hr tablet Take 1 tablet (300 mg) by mouth every morning     clonazePAM (KLONOPIN) 0.5 MG tablet Take 1 tablet (0.5 mg) by mouth 2 times daily as needed for anxiety     DULOXETINE HCL PO Take 10 mg by mouth daily Take 1 tablet daily along with the 2- 30 mg tablets daily for a total of 70 mg daily      "eszopiclone (LUNESTA) 3 MG tablet Take 1 tablet (3 mg) by mouth At Bedtime     HYDROcodone-acetaminophen (NORCO) 5-325 MG per tablet      methylphenidate (RITALIN) 5 MG tablet Take 1 tablet (5 mg) by mouth 2 times daily     order for DME Equipment being ordered: full spectrum therapy lamp, 10,000 lux     ORDER FOR DME Apply 1 Units topically. TENS unit use as directed     No current facility-administered medications for this visit.        The [place] Prescription Monitoring Program has been reviewed and there are no concerns about diversionary activity for controlled substances at this time.      I was able to review most recent Primary Care Provider, specialty provider, and therapy visit notes that I have access to.     Today, patient reports no particular change in mood; still depressed, easily tearful, poor concentration; dismotivated, exhausted in evenings; anxiety only improved with daily morning Klonopin 0.25 mg; denies impairment/tolerance/dependence; still experiencing general feeling of \"something could be wrong.\"    She did not find Ambien CR consistently effective towards sleep. She has been using Lunesta 3 mg nightly past 2 weeks and is having moderate-good consistent effect towards sleep; denies side effects.    Patient increase Abilify to 5 mg in past month; no return of initial pro-motivational effects; hasn't noted any improvement to mood with 2 month use; denies return of akathisia however; maybe feeling more flushness?. Patient states interest to discontinue at this time.    Patient worried mood starting to worsen with return to standard time and beginning of winter months.    On a positive note, patient has been keeping up with once weekly yoga and weight training classes. Also dyed hair shade of pink.      Past Medical History:   Diagnosis Date     ASD (atrial septal defect)     repaired surgically as a child     Depressive disorder      Depressive disorder, not elsewhere classified      " Headache      Headache      History of blood transfusion 1985 1985 during open heart surgery     Hypertension     higher results at pain clinic, want to confirm ok     Insomnia, unspecified      Other forms of migraine, without mention of intractable migraine without mention of status migrainosus       has a past medical history of ASD (atrial septal defect); Depressive disorder; Depressive disorder, not elsewhere classified; Headache; Headache; History of blood transfusion (1985); Hypertension; Insomnia, unspecified; and Other forms of migraine, without mention of intractable migraine without mention of status migrainosus. She also has no past medical history of Arthritis; Cerebral infarction (H); Congestive heart failure (H); COPD (chronic obstructive pulmonary disease) (H); Diabetes (H); Thyroid disease; or Uncomplicated asthma.    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    Substance use updates:  occassional ETOh; not with Klonopin  Tobacco use: No      Vital Signs:   /82 (BP Location: Right arm, Patient Position: Sitting, Cuff Size: Adult Regular)  Pulse 83  Resp 18  Wt 218 lb (98.9 kg)  SpO2 97%  BMI 34.14 kg/m2    Labs:  No new labs.    Review of Systems:  10 systems (general, cardiovascular, respiratory, eyes, ENT, endocrine, GI, , M/S, neurological) were reviewed. Most pertinent finding(s) is/are: fatigue. The remaining systems are all unremarkable.    Mental Status Examination:  Appearance:  awake, alert and adequately groomed  Attitude:  cooperative   Eye Contact:  good  Gait and Station: Normal  Psychomotor Behavior:  intact station, gait and muscle tone  Oriented to:  time, person, and place  Attention Span and Concentration:  Normal  Speech:   clear, coherent  Mood:  anxious and depressed  Affect:  flat, at times tearful, with some appropriate similes  Associations:  no loose associations  Thought Process:  logical, linear and goal oriented  Thought Content:   Appropriate to Interview  Recent and Remote Memory:  intact Not formally assessed. No amnesia.  Fund of Knowledge: appropriate  Insight:  good  Judgment:  intact  Impulse Control:  intact    Suicide Risk Assessment:  Today Danyelle Canales denies any suicidal ideation or self-harm impulses. Therefore, based on all available evidence including the factors cited above, Danyelle Canales does not appear to be at imminent risk for self-harm, does not meet criteria for a 72-hr hold, and therefore remains appropriate for ongoing outpatient level of care.  A thorough assessment of risk factors related to suicide and self-harm have been reviewed and are noted above. The patient convincingly denies suicidality on several occasions. Local community safety resources printed and reviewed for patient to use if needed. There was no deceit detected, and the patient presented in a manner that was believable.     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  Med: Vicodin.   Maximum use per month: 90  Expected duration: ongoing, pt in comprehensive pain mgmt currently  Narcotic agreement on file: NO  Clinic visit recommended: Q 3 months         Morbid obesity (H) 08/20/2018     Priority: Medium     Non morbid obesity, unspecified obesity type 09/20/2017     Priority: Medium     Esophageal reflux 11/23/2014     Priority: Medium     Chronic pain 01/31/2012     Priority: Medium     ASD (atrial septal defect) 01/16/2012     Priority: Medium     Generalized anxiety  disorder 10/18/2011     Priority: Medium     Diagnosis updated by automated process. Provider to review and confirm.       Moderate major depression (H) 09/12/2011     Priority: Medium     History of ovarian cyst 09/12/2011     Priority: Medium     CARDIOVASCULAR SCREENING; LDL GOAL LESS THAN 160 05/09/2010     Priority: Medium     Trichotillomania 08/03/2009     Priority: Medium     PMDD (premenstrual dysphoric disorder) 06/03/2009     Priority: Medium     Migraine headache 07/23/2008     Priority: Medium     iamc SPRAIN THORACIC REGION 10/08/2007     Priority: Medium     iamc SPRAIN OF NECK 10/08/2007     Priority: Medium     Encounter for other general counseling or advice on contraception 07/18/2007     Priority: Medium     Diagnosis updated by automated process. Provider to review and confirm.       allergic DERMATITIS NOS 11/12/2004     Priority: Medium     Insomnia 07/16/2004     Priority: Medium     Problem list name updated by automated process. Provider to review           Assessment:  Danyelle Canales agrees that after 2 month trial of Abilify we have not seen promisingly comprehensive or sustainable therapeutic effect towards mood and anxiety. Given persistence of lethargic/avolitional quality of depression, we will augment her current regime with trial of low-dose Ritalin, though we will need to be mindful of potential to aggravate anxiety. She will also be prescribed a full-spectrum therapy lamp in advance, should seasonal affective symptoms become more prominent -- she was instructed on proper use. She may continue Cymbalta at 80 mg daily and Wellbutrin  mg daily. She may continue to utilize Klonopin 0.25 mg to 0.5 mg daily for anxiety, though we both agree it would be ideal for her not to use long-term. She is mindful to restrict Klonopin to early day dosing, and Lunesta for nighttime dosing. Her efforts at self-care are commended and further encouraged, as is her continued engagement in  therapy.    Medication side effects and alternatives were reviewed. Health promotion activities recommended and reviewed today. All questions addressed. Education and counseling completed regarding risks and benefits of medications and psychotherapy options.    Treatment Plan:    Continue Cymbalta 80 mg daily and Wellbutrin  mg daily; has supply    May taper Abilify to 2.5 mg daily for 1 week then discontinue    Has Klonopin 0.5 mg as needed for daytime anxiety; advised of proper/safe usage, onset, duration; not to take with other CNS depressants or sleep aids    Has Lunesta 3 mg nightly for sleep; advised of proper/safe usage, onset, duration; not to take with other CNS depressants or sleep aids    Start Ritalin 5 mg BID; advised of duration of effect, importance of hydration/nutrition, and common SEs    Written for 10,000 lux full spectrum lamp for seasonal affective therapy; referred to [facility] service    Continue all other medical care per primary care provider.     Continue all other medications as reviewed per electronic medical record today.     Safety plan reviewed. To the Emergency Department as needed or call after hours crisis line at [phone] or [phone]. [place] Crisis Text Line. Text [place] to 938405 or Suicide LifeLine Chat: suicidepreventionlifeline.org/chat/    Continue individual therapy as planned with established therapists.    Schedule an appointment with me in 4 weeks or sooner as needed.    Follow up with primary care provider as planned or for acute medical concerns.    Call the psychiatric nurse line with medication questions or concerns at [phone].    [facility] may be used to communicate with your provider, but this is not intended to be used for emergencies.    Administrative Billing:   Time spent with patient was 30 minutes and greater than 50% of time or 20 minutes was spent in counseling and coordination of care regarding above diagnoses and treatment plan.    Patient  Status:  Patient will continue to be seen for ongoing consultation and stabilization.    Signed:   Aron Alfaro CNP   Psychiatry

## 2018-11-14 ASSESSMENT — PATIENT HEALTH QUESTIONNAIRE - PHQ9: SUM OF ALL RESPONSES TO PHQ QUESTIONS 1-9: 11

## 2018-11-14 ASSESSMENT — ANXIETY QUESTIONNAIRES: GAD7 TOTAL SCORE: 8

## 2018-11-26 ENCOUNTER — MYC REFILL (OUTPATIENT)
Dept: PSYCHIATRY | Facility: CLINIC | Age: 38
End: 2018-11-26

## 2018-11-26 DIAGNOSIS — G47.00 PERSISTENT INSOMNIA: ICD-10-CM

## 2018-11-26 NOTE — TELEPHONE ENCOUNTER
Scheduled for 12/3/2018.  RX not on RN protocol.     Last filled:  eszopiclone (LUNESTA) 3 MG tablet 30 tablet 0 10/29/2018  --   Sig - Route: Take 1 tablet (3 mg) by mouth At Bedtime - Oral   Class: Local Print   Notes to Pharmacy: Patient discontinuing Doloresien CORNEL   Order: [id number]

## 2018-11-26 NOTE — TELEPHONE ENCOUNTER
Message from [employer]:  Original authorizing provider: Aron Alfaro, CNP    Danyelle Canales would like a refill of the following medications:  eszopiclone (LUNESTA) 3 MG tablet [Aron Alfaro, CNP]    Preferred pharmacy: [address]    Comment:

## 2018-11-27 RX ORDER — ESZOPICLONE 3 MG/1
3 TABLET, FILM COATED ORAL AT BEDTIME
Qty: 30 TABLET | Refills: 0 | Status: SHIPPED | OUTPATIENT
Start: 2018-11-27 | End: 2018-12-12 | Stop reason: DRUGHIGH

## 2018-12-12 ENCOUNTER — OFFICE VISIT (OUTPATIENT)
Dept: PSYCHIATRY | Facility: CLINIC | Age: 38
End: 2018-12-12
Payer: COMMERCIAL

## 2018-12-12 VITALS
TEMPERATURE: 98.6 F | WEIGHT: 212 LBS | HEIGHT: 67 IN | BODY MASS INDEX: 33.27 KG/M2 | SYSTOLIC BLOOD PRESSURE: 138 MMHG | DIASTOLIC BLOOD PRESSURE: 88 MMHG | OXYGEN SATURATION: 99 % | RESPIRATION RATE: 16 BRPM | HEART RATE: 94 BPM

## 2018-12-12 DIAGNOSIS — F41.1 GENERALIZED ANXIETY DISORDER: ICD-10-CM

## 2018-12-12 DIAGNOSIS — F33.1 MAJOR DEPRESSIVE DISORDER, RECURRENT EPISODE, MODERATE (H): ICD-10-CM

## 2018-12-12 DIAGNOSIS — F32.1 MODERATE MAJOR DEPRESSION (H): Primary | ICD-10-CM

## 2018-12-12 PROCEDURE — 99214 OFFICE O/P EST MOD 30 MIN: CPT | Performed by: NURSE PRACTITIONER

## 2018-12-12 RX ORDER — ESZOPICLONE 2 MG/1
2 TABLET, FILM COATED ORAL AT BEDTIME
Qty: 30 TABLET | Refills: 0 | Status: SHIPPED | OUTPATIENT
Start: 2018-12-12 | End: 2019-01-09 | Stop reason: ALTCHOICE

## 2018-12-12 RX ORDER — METHYLPHENIDATE HYDROCHLORIDE 5 MG/1
TABLET ORAL
Qty: 90 TABLET | Refills: 0 | Status: SHIPPED | OUTPATIENT
Start: 2018-12-12 | End: 2019-01-09

## 2018-12-12 RX ORDER — DULOXETIN HYDROCHLORIDE 20 MG/1
CAPSULE, DELAYED RELEASE ORAL
Qty: 120 CAPSULE | Refills: 1 | Status: SHIPPED | OUTPATIENT
Start: 2018-12-12 | End: 2019-02-13

## 2018-12-12 RX ORDER — CLONAZEPAM 0.5 MG/1
TABLET ORAL
Qty: 30 TABLET | Refills: 0 | Status: SHIPPED | OUTPATIENT
Start: 2018-12-12 | End: 2019-03-05

## 2018-12-12 ASSESSMENT — ANXIETY QUESTIONNAIRES
GAD7 TOTAL SCORE: 2
GAD7 TOTAL SCORE: 2
4. TROUBLE RELAXING: NOT AT ALL
7. FEELING AFRAID AS IF SOMETHING AWFUL MIGHT HAPPEN: NOT AT ALL
6. BECOMING EASILY ANNOYED OR IRRITABLE: NOT AT ALL
2. NOT BEING ABLE TO STOP OR CONTROL WORRYING: NOT AT ALL
GAD7 TOTAL SCORE: 2
1. FEELING NERVOUS, ANXIOUS, OR ON EDGE: SEVERAL DAYS
3. WORRYING TOO MUCH ABOUT DIFFERENT THINGS: SEVERAL DAYS
7. FEELING AFRAID AS IF SOMETHING AWFUL MIGHT HAPPEN: NOT AT ALL
5. BEING SO RESTLESS THAT IT IS HARD TO SIT STILL: NOT AT ALL

## 2018-12-12 ASSESSMENT — PATIENT HEALTH QUESTIONNAIRE - PHQ9
SUM OF ALL RESPONSES TO PHQ QUESTIONS 1-9: 9
10. IF YOU CHECKED OFF ANY PROBLEMS, HOW DIFFICULT HAVE THESE PROBLEMS MADE IT FOR YOU TO DO YOUR WORK, TAKE CARE OF THINGS AT HOME, OR GET ALONG WITH OTHER PEOPLE: NOT DIFFICULT AT ALL
SUM OF ALL RESPONSES TO PHQ QUESTIONS 1-9: 9

## 2018-12-12 ASSESSMENT — MIFFLIN-ST. JEOR: SCORE: 1679.26

## 2018-12-12 NOTE — PROGRESS NOTES
"    Outpatient Psychiatric Progress Note    Name: Danyelle Canales   : 1980                    Primary Care Provider: Smiley Kim PA-C - last visit 8/10/2018  Therapist: TIM Crabtree - last visit last week      Taking Medication as prescribed: yes    Side Effects:  None    Medication Helping Symptoms: A Little  Switching back to Ambien XR instead of Lunesta. Ritalin seems to wear off-feels depressed when it wears off.       PHQ-9 scores:  PHQ-9 SCORE 10/24/2018 2018 2018   PHQ-9 Total Score - - -   PHQ-9 Total Score MyChart 8 (Mild depression) 11 (Moderate depression) 9 (Mild depression)   PHQ-9 Total Score 8 11 9       NAJMA-7 scores:  NAJMA-7 SCORE 10/24/2018 2018 2018   Total Score - - -   Total Score 15 (severe anxiety) 8 (mild anxiety) 2 (minimal anxiety)   Total Score 15 8 2     Answers for HPI/ROS submitted by the patient on 2018   If you checked off any problems, how difficult have these problems made it for you to do your work, take care of things at home, or get along with other people?: Not difficult at all  PHQ9 TOTAL SCORE: 9  NAJMA 7 TOTAL SCORE: 2    Patient Identification:  Patient is a 38 year old year old,   White American female  who presents for return visit with me.  Patient is currently employed full time. Patient attended the session alone. Patient prefers to be called: \"Danyelle\".    Interim History:  I last saw Danyelle Canales for outpatient psychiatry Return Visit on 18.     During that appointment, we agreed to discontinue Abilify, start Ritalin off-label for depression, and switched Ambien to Lunesta. She was to continue with Cymbalta, Wellbutrin and Klonopin as needed, as well as continue therapy. I also wrote her for a SAD lamp.    Current medications include:   Current Outpatient Medications   Medication Sig     buPROPion (WELLBUTRIN XL) 300 MG 24 hr tablet Take 1 tablet (300 mg) by mouth every morning     clonazePAM " (KLONOPIN) 0.5 MG tablet Take 1 tablet (0.5 mg) by mouth 2 times daily as needed for anxiety     DULOXETINE HCL PO Take 10 mg by mouth daily Take 1 tablet daily along with the 2- 30 mg tablets daily for a total of 70 mg daily     eszopiclone (LUNESTA) 3 MG tablet Take 1 tablet (3 mg) by mouth At Bedtime     HYDROcodone-acetaminophen (NORCO) 5-325 MG per tablet      methylphenidate (RITALIN) 5 MG tablet Take 1 tablet (5 mg) by mouth 2 times daily     order for DME Equipment being ordered: full spectrum therapy lamp, 10,000 lux     ORDER FOR DME Apply 1 Units topically. TENS unit use as directed     No current facility-administered medications for this visit.        The [place] Prescription Monitoring Program has been reviewed and there are no concerns about diversionary activity for controlled substances at this time.      I was able to review most recent Primary Care Provider, specialty provider, and therapy visit notes that I have access to.     Today, patient reports some improvement in mood and anxiety; perhaps as pace of work as slowed for winter season. She's been sticking with keto diet and twice weekly gym and feeling good about this. Health OK; pain somewhat improved.    She tapered off Abilify without issue.    Started Ritalin 5 mg BID; feels less scattered and has had some mood improvement; no side effects or palpitations, but does feel she gets a mood crash 4 hours after dosing.    Is finding she's a bit too groggy with Lunesta at 3 mg.    Patient continues with Wellbutrin  mg daily. Continues Cymbalta, though writer now learns she has actually been taking 60 mg daily, not 80 mg daily, as prescribed by pain provider. She states she had flushness, sweating and weight gain at 100+ mg daily; states she'd be OK to trial 80 mg daily. Patient using Klonopin 0.5 mg 1-2 times a week.    She called [employer]; quoted $300; will look into cheaper price online; needs to wait until she has flex  "spending in 01/2018.      Past Medical History:   Diagnosis Date     ASD (atrial septal defect)     repaired surgically as a child     Depressive disorder      Depressive disorder, not elsewhere classified      Headache      Headache      History of blood transfusion 1985 1985 during open heart surgery     Hypertension     higher results at pain clinic, want to confirm ok     Insomnia, unspecified      Other forms of migraine, without mention of intractable migraine without mention of status migrainosus       has a past medical history of ASD (atrial septal defect), Depressive disorder, Depressive disorder, not elsewhere classified, Headache, Headache, History of blood transfusion (1985), Hypertension, Insomnia, unspecified, and Other forms of migraine, without mention of intractable migraine without mention of status migrainosus. She also has no past medical history of Arthritis, Cerebral infarction (H), Congestive heart failure (H), COPD (chronic obstructive pulmonary disease) (H), Diabetes (H), Thyroid disease, or Uncomplicated asthma.    Social history updates:  See above    Substance use updates:  occassional ETOh; not with Klonopin  Tobacco use: No      Vital Signs:   /88 (BP Location: Right arm, Patient Position: Chair, Cuff Size: Adult Regular)   Pulse 94   Temp 98.6  F (37  C) (Oral)   Resp 16   Ht 1.702 m (5' 7\")   Wt 96.2 kg (212 lb)   LMP 11/28/2018 (Approximate)   SpO2 99%   Breastfeeding? No   BMI 33.20 kg/m      Labs:  Most recent laboratory results reviewed and no new labs.    Review of Systems:  10 systems (general, cardiovascular, respiratory, eyes, ENT, endocrine, GI, , M/S, neurological) were reviewed. Most pertinent finding(s) is/are: fatigue. The remaining systems are all unremarkable.     Mental Status Examination:  Appearance:  awake, alert and adequately groomed  Attitude:  cooperative   Eye Contact:  good  Gait and Station: Normal  Psychomotor Behavior:  intact station, " gait and muscle tone  Oriented to:  time, person, and place  Attention Span and Concentration:  Normal  Speech:   clear, coherent  Mood:  anxious and depressed  Affect:  flat, at times tearful, with some appropriate similes  Associations:  no loose associations  Thought Process:  logical, linear and goal oriented  Thought Content:  Appropriate to Interview  Recent and Remote Memory:  intact Not formally assessed. No amnesia.  Fund of Knowledge: appropriate  Insight:  good  Judgment:  intact  Impulse Control:  intact     Suicide Risk Assessment:  Today Danyelle Canales denies any suicidal ideation or self-harm impulses. Therefore, based on all available evidence including the factors cited above, Danyelle Canales does not appear to be at imminent risk for self-harm, does not meet criteria for a 72-hr hold, and therefore remains appropriate for ongoing outpatient level of care.  A thorough assessment of risk factors related to suicide and self-harm have been reviewed and are noted above. The patient convincingly denies suicidality on several occasions. Local community safety resources printed and reviewed for patient to use if needed. There was no deceit detected, and the patient presented in a manner that was believable.      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  Med: Vicodin.   Maximum use per month: 90  Expected duration: ongoing, pt in comprehensive pain mgmt currently  Narcotic agreement on file: NO  Clinic visit recommended: Q 3  months         Morbid obesity (H) 08/20/2018     Priority: Medium     Non morbid obesity, unspecified obesity type 09/20/2017     Priority: Medium     Esophageal reflux 11/23/2014     Priority: Medium     Chronic pain 01/31/2012     Priority: Medium     ASD (atrial septal defect) 01/16/2012     Priority: Medium     Generalized anxiety disorder 10/18/2011     Priority: Medium     Diagnosis updated by automated process. Provider to review and confirm.       Moderate major depression (H) 09/12/2011     Priority: Medium     History of ovarian cyst 09/12/2011     Priority: Medium     CARDIOVASCULAR SCREENING; LDL GOAL LESS THAN 160 05/09/2010     Priority: Medium     Trichotillomania 08/03/2009     Priority: Medium     PMDD (premenstrual dysphoric disorder) 06/03/2009     Priority: Medium     Migraine headache 07/23/2008     Priority: Medium     [facility] SPRAIN THORACIC REGION 10/08/2007     Priority: Medium     ia SPRAIN OF NECK 10/08/2007     Priority: Medium     Encounter for other general counseling or advice on contraception 07/18/2007     Priority: Medium     Diagnosis updated by automated process. Provider to review and confirm.       allergic DERMATITIS NOS 11/12/2004     Priority: Medium     Insomnia 07/16/2004     Priority: Medium     Problem list name updated by automated process. Provider to review           Assessment:  Danyelle TORREY Canales reports having some promising response to addition of Ritalin; will add third dosage to smooth out duration of effect. I am surprised to learn she is taking lower dosage of Cymbalta than originally recorded. She was initially hesitant to increase Cymbalta dosage, but is receptive to do so no; will try 80 mg daily. Will maintain Wellbutrin  mg daily. Full spectrum lamp still a sound idea, but will wait until flex spending can cover next month. We will reduce Lunesta dosage to more tolerable level. She is appreciably using less Klonopin PRN.    Medication side effects  and alternatives were reviewed. Health promotion activities recommended and reviewed today. All questions addressed. Education and counseling completed regarding risks and benefits of medications and psychotherapy options.    Treatment Plan:    Increase Cymbalta to 80 mg daily    Will maintain Wellbutrin  mg daily    May use Klonopin 0.5 mg as needed for daytime anxiety; advised of proper/safe usage, onset, duration; not to take with other CNS depressants or sleep aids    Decrease to Lunesta 2 mg nightly for sleep; advised of proper/safe usage, onset, duration; not to take with other CNS depressants or sleep aids    Maintain Ritalin 5 mg TID; advised of duration of effect, importance of hydration/nutrition, and common SEs    Written for 10,000 lux full spectrum lamp for seasonal affective therapy; referred to [facility] service    Continue all other medical care per primary care provider.     Continue all other medications as reviewed per electronic medical record today.     Safety plan reviewed. To the Emergency Department as needed or call after hours crisis line at [phone] or [phone]. [place] Crisis Text Line. Text [place] to 941845 or Suicide LifeLine Chat: suicidepreventionlifeline.org/chat/    Continue individual therapy as planned with established therapists.    Schedule an appointment with me in 4 weeks or sooner as needed.    Follow up with primary care provider as planned or for acute medical concerns.    Call the psychiatric nurse line with medication questions or concerns at [phone].    Myrlhart may be used to communicate with your provider, but this is not intended to be used for emergencies.    Administrative Billing:   Time spent with patient was 30 minutes and greater than 50% of time or 20 minutes was spent in counseling and coordination of care regarding above diagnoses and treatment plan.    Patient Status:  Patient will continue to be seen for ongoing consultation and  stabilization.    Signed:   Aron Alfaro, CNP   Psychiatry

## 2018-12-13 ASSESSMENT — ANXIETY QUESTIONNAIRES: GAD7 TOTAL SCORE: 2

## 2018-12-13 ASSESSMENT — PATIENT HEALTH QUESTIONNAIRE - PHQ9: SUM OF ALL RESPONSES TO PHQ QUESTIONS 1-9: 9

## 2019-01-03 NOTE — PROGRESS NOTES
"    Outpatient Psychiatric Progress Note    Name: Danyelle Canales   : 1980                    Primary Care Provider: Smiley Kim PA-C - last visit 18  Therapist: Denice Mtz - last visit 2018    PHQ-9 scores:  PHQ-9 SCORE 2018   PHQ-9 Total Score - - -   PHQ-9 Total Score MyChart 11 (Moderate depression) 9 (Mild depression) 7 (Mild depression)   PHQ-9 Total Score 11 9 7       NAJMA-7 scores:  NAJMA-7 SCORE 2018   Total Score - - -   Total Score 8 (mild anxiety) 2 (minimal anxiety) 7 (mild anxiety)   Total Score 8 2 7         Patient Identification:  Patient is a 38 year old year old,   White American female  who presents for return visit with me.  Patient is currently employed full time. Patient attended the session alone. Patient prefers to be called: \"Danyelle\".    Interim History:  I last saw Danyelle Canales for outpatient psychiatry Return Visit on 18.     During that appointment, we increased her Cymbalta dosage to 80 mg daily, increased Ritalin from 5 mg BID to TID to provide greater duration of effect, maintained her Wellbutrin XL at 300 mg daily, and decreased Lunesta to 2 mg to mitigate AM grogginess. She was encouraged to obtain a SAD lamp when she had health plan flex funds.    Current medications include:   Current Outpatient Medications   Medication Sig     buPROPion (WELLBUTRIN XL) 300 MG 24 hr tablet Take 1 tablet (300 mg) by mouth every morning     clonazePAM (KLONOPIN) 0.5 MG tablet 1 tab daily prn anxiety     DULoxetine (CYMBALTA) 20 MG capsule 4 caps daily     eszopiclone (LUNESTA) 2 MG tablet Take 1 tablet (2 mg) by mouth At Bedtime     HYDROcodone-acetaminophen (NORCO) 5-325 MG per tablet      methylphenidate (RITALIN) 5 MG tablet 1 tab TID     ORDER FOR DME Apply 1 Units topically. TENS unit use as directed     order for DME Equipment being ordered: full spectrum therapy lamp, 10,000 lux " (Patient not taking: Reported on 12/12/2018)     No current facility-administered medications for this visit.        The [place] Prescription Monitoring Program has been reviewed and there are no concerns about diversionary activity for controlled substances at this time.      I was able to review most recent Primary Care Provider, specialty provider, and therapy visit notes that I have access to.     Today, patient reports her mood has been stable, though anxiety somewhat increased with holidays. Gets drawn into socializing/travel in way that stressed her out. She has increased Cymbalta from 60 to 80 mg daily; not sure its had any difference in therapeutic effect; no obvious weight gain, which was her concern. States Ritalin 5 mg TID an improvement; doesn't get that evening crash; doses 7 am, 11 am, and 2-3 pm; tolerating well; feels less scattered and has had some mood improvement; no side effects or palpitations. States she's finding Lunesta no longer working for sleep; [facility] to return to [facility]. Still taking Wellbutrin  mg qAM, and is using Klonopin 0.5 mg 1-2 times a week, per usual pattern.    She hasn't yet purchased SAD lamp; waiting for budget to be a bit better. She's been sticking with keto diet and thrice weekly gym and feeling good about this. Health OK; pain somewhat improved; is seeing pain management provider next month. She continues in psychotherapy.    She is wondering if this is simply the best she can feel; asking what treatment options are left. We discussed SAD lamp, Pristiq (which would require her to taper off Cymbalta), TCA (which could help with pain, but risky with cardiovascular history), and as previously discussed TMS.       Past Medical History:   Diagnosis Date     ASD (atrial septal defect)     repaired surgically as a child     Depressive disorder      Depressive disorder, not elsewhere classified      Headache      Headache      History of blood transfusion 1985     1985 during open heart surgery     Hypertension     higher results at pain clinic, want to confirm ok     Insomnia, unspecified      Other forms of migraine, without mention of intractable migraine without mention of status migrainosus       has a past medical history of ASD (atrial septal defect), Depressive disorder, Depressive disorder, not elsewhere classified, Headache, Headache, History of blood transfusion (1985), Hypertension, Insomnia, unspecified, and Other forms of migraine, without mention of intractable migraine without mention of status migrainosus. She also has no past medical history of Arthritis, Cerebral infarction (H), Congestive heart failure (H), COPD (chronic obstructive pulmonary disease) (H), Diabetes (H), Thyroid disease, or Uncomplicated asthma.    Social history updates:  See above     Substance use updates:  occassional ETOh; not with Klonopin  Tobacco use: No      Vital Signs:   /83   Pulse 89   Temp 97.8  F (36.6  C) (Oral)   Resp 16   Wt 96.3 kg (212 lb 4 oz)   SpO2 99%   BMI 33.24 kg/m      Labs:  Most recent laboratory results reviewed and no new labs.    Review of Systems:  10 systems (general, cardiovascular, respiratory, eyes, ENT, endocrine, GI, , M/S, neurological) were reviewed. Most pertinent finding(s) is/are: chronic pain. The remaining systems are all unremarkable.    Mental Status Examination:  Appearance:  awake, alert and adequately groomed  Attitude:  cooperative   Eye Contact:  good  Gait and Station: Normal  Psychomotor Behavior:  intact station, gait and muscle tone  Oriented to:  time, person, and place  Attention Span and Concentration:  Normal  Speech:   clear, coherent  Mood:  anxious and depressed  Affect:  flat, at times tearful, with some appropriate similes  Associations:  no loose associations  Thought Process:  logical, linear and goal oriented  Thought Content:  Appropriate to Interview  Recent and Remote Memory:  intact Not formally  assessed. No amnesia.  Fund of Knowledge: appropriate  Insight:  good  Judgment:  intact  Impulse Control:  intact     Suicide Risk Assessment:  Today Danyelle Canales denies any suicidal ideation or self-harm impulses. Therefore, based on all available evidence including the factors cited above, Danyelle Canales does not appear to be at imminent risk for self-harm, does not meet criteria for a 72-hr hold, and therefore remains appropriate for ongoing outpatient level of care.  A thorough assessment of risk factors related to suicide and self-harm have been reviewed and are noted above. The patient convincingly denies suicidality on several occasions. Local community safety resources printed and reviewed for patient to use if needed. There was no deceit detected, and the patient presented in a manner that was believable.      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  Med: Vicodin.   Maximum use per month: 90  Expected duration: ongoing, pt in comprehensive pain mgmt currently  Narcotic agreement on file: NO  Clinic visit recommended: Q 3 months         Morbid obesity (H) 08/20/2018     Priority: Medium     Non morbid obesity, unspecified obesity type 09/20/2017     Priority: Medium     Esophageal reflux 11/23/2014     Priority: Medium     Chronic pain 01/31/2012     Priority: Medium     ASD (atrial septal defect) 01/16/2012     Priority: Medium     Generalized anxiety disorder 10/18/2011     Priority: Medium     Diagnosis updated by  automated process. Provider to review and confirm.       Moderate major depression (H) 09/12/2011     Priority: Medium     History of ovarian cyst 09/12/2011     Priority: Medium     CARDIOVASCULAR SCREENING; LDL GOAL LESS THAN 160 05/09/2010     Priority: Medium     Trichotillomania 08/03/2009     Priority: Medium     PMDD (premenstrual dysphoric disorder) 06/03/2009     Priority: Medium     Migraine headache 07/23/2008     Priority: Medium     ia SPRAIN THORACIC REGION 10/08/2007     Priority: Medium     ia SPRAIN OF NECK 10/08/2007     Priority: Medium     Encounter for other general counseling or advice on contraception 07/18/2007     Priority: Medium     Diagnosis updated by automated process. Provider to review and confirm.       allergic DERMATITIS NOS 11/12/2004     Priority: Medium     Insomnia 07/16/2004     Priority: Medium     Problem list name updated by automated process. Provider to review           Assessment:  Danyelle Canales reports some elevated anxiety with holidays.Though symptoms and stability have relatively improved since initial visit, she still seems pretty vunerable to her mood and anxiety symptoms. We are now at our sixth visit and have tried a number of psychopharm agents and dosage adjustments. Though she has successfully reduced her Klonopin dosage to conservative frequency, she is now taking a nightly hypnotic and daily stimulant. Switching from Cymbalta to another SNRI like Pristiq has its risks for destabilization. Upon review of her chart, I do see she tried a couple of TCAs (Amitripyline, Nortriptyline, Milnacipran) with no clear benefit. She again appears to be a good candidate for TMS, and we review this again as an option -- at least an initial consult. She says she'll think about this, and wants pain management provider's input.    In meantime, we will maintain Cymbalta 80 mg daily, Wellbutrin  mg daily, Ritalin 5 mg TID. We will switch back again from Lunesta to  Ambien CR. She is advised on safe use of sleep aid, not to pair with other CNS depressants. She may use Klonopin 0.5 mg PRN, and advised to continue to use conservatively.     Medication side effects and alternatives were reviewed. Health promotion activities recommended and reviewed today. All questions addressed. Education and counseling completed regarding risks and benefits of medications and psychotherapy options.    Treatment Plan:    Continue Cymbalta to 80 mg daily    Continue Wellbutrin  mg daily    May use Klonopin 0.5 mg as needed for daytime anxiety; advised of proper/safe usage, onset, duration; not to take with other CNS depressants or sleep aids    May use Ambien CR 12.5 mg nightly for sleep; advised of proper/safe usage, onset, duration; not to take with other CNS depressants or sleep aids    Maintain Ritalin 5 mg TID; advised of duration of effect, importance of hydration/nutrition, and common SEs    Recommended 10,000 lux full spectrum lamp for seasonal affective therapy    Continue all other medical care per primary care provider.     Continue all other medications as reviewed per electronic medical record today.     Safety plan reviewed. To the Emergency Department as needed or call after hours crisis line at [phone] or [phone]. [place] Crisis Text Line. Text [place] to 784570 or Suicide LifeLine Chat: suicidepreventionlifeline.org/chat/    Continue individual therapy as planned with established therapists.    Schedule an appointment with me in 4 weeks or sooner as needed.    Follow up with primary care provider as planned or for acute medical concerns.    Call the psychiatric nurse line with medication questions or concerns at [phone].    Tapgagehart may be used to communicate with your provider, but this is not intended to be used for emergencies.      Administrative Billing:   Time spent with patient was 30 minutes and greater than 50% of time or 20 minutes was spent in  counseling and coordination of care regarding above diagnoses and treatment plan.    Patient Status:  Patient will continue to be seen for ongoing consultation and stabilization.    Signed:   Aron Alfaro CNP   Psychiatry

## 2019-01-09 ENCOUNTER — OFFICE VISIT (OUTPATIENT)
Dept: PSYCHIATRY | Facility: CLINIC | Age: 39
End: 2019-01-09
Payer: COMMERCIAL

## 2019-01-09 VITALS
HEART RATE: 89 BPM | RESPIRATION RATE: 16 BRPM | SYSTOLIC BLOOD PRESSURE: 128 MMHG | BODY MASS INDEX: 33.24 KG/M2 | WEIGHT: 212.25 LBS | OXYGEN SATURATION: 99 % | DIASTOLIC BLOOD PRESSURE: 83 MMHG | TEMPERATURE: 97.8 F

## 2019-01-09 DIAGNOSIS — F32.1 MODERATE MAJOR DEPRESSION (H): Primary | ICD-10-CM

## 2019-01-09 DIAGNOSIS — F41.1 GENERALIZED ANXIETY DISORDER: ICD-10-CM

## 2019-01-09 PROCEDURE — 99214 OFFICE O/P EST MOD 30 MIN: CPT | Performed by: NURSE PRACTITIONER

## 2019-01-09 RX ORDER — ZOLPIDEM TARTRATE 12.5 MG/1
12.5 TABLET, FILM COATED, EXTENDED RELEASE ORAL
Qty: 30 TABLET | Refills: 0 | Status: SHIPPED | OUTPATIENT
Start: 2019-01-09 | End: 2019-02-05

## 2019-01-09 RX ORDER — METHYLPHENIDATE HYDROCHLORIDE 5 MG/1
TABLET ORAL
Qty: 90 TABLET | Refills: 0 | Status: SHIPPED | OUTPATIENT
Start: 2019-02-09 | End: 2019-03-05

## 2019-01-09 RX ORDER — METHYLPHENIDATE HYDROCHLORIDE 5 MG/1
TABLET ORAL
Qty: 90 TABLET | Refills: 0 | Status: SHIPPED | OUTPATIENT
Start: 2019-01-09 | End: 2019-01-09

## 2019-01-09 ASSESSMENT — PATIENT HEALTH QUESTIONNAIRE - PHQ9
10. IF YOU CHECKED OFF ANY PROBLEMS, HOW DIFFICULT HAVE THESE PROBLEMS MADE IT FOR YOU TO DO YOUR WORK, TAKE CARE OF THINGS AT HOME, OR GET ALONG WITH OTHER PEOPLE: NOT DIFFICULT AT ALL
SUM OF ALL RESPONSES TO PHQ QUESTIONS 1-9: 7
SUM OF ALL RESPONSES TO PHQ QUESTIONS 1-9: 7

## 2019-01-09 ASSESSMENT — ANXIETY QUESTIONNAIRES
2. NOT BEING ABLE TO STOP OR CONTROL WORRYING: SEVERAL DAYS
7. FEELING AFRAID AS IF SOMETHING AWFUL MIGHT HAPPEN: SEVERAL DAYS
7. FEELING AFRAID AS IF SOMETHING AWFUL MIGHT HAPPEN: SEVERAL DAYS
5. BEING SO RESTLESS THAT IT IS HARD TO SIT STILL: NOT AT ALL
GAD7 TOTAL SCORE: 7
GAD7 TOTAL SCORE: 7
6. BECOMING EASILY ANNOYED OR IRRITABLE: SEVERAL DAYS
GAD7 TOTAL SCORE: 7
3. WORRYING TOO MUCH ABOUT DIFFERENT THINGS: SEVERAL DAYS
1. FEELING NERVOUS, ANXIOUS, OR ON EDGE: MORE THAN HALF THE DAYS
4. TROUBLE RELAXING: SEVERAL DAYS

## 2019-01-10 ASSESSMENT — PATIENT HEALTH QUESTIONNAIRE - PHQ9: SUM OF ALL RESPONSES TO PHQ QUESTIONS 1-9: 7

## 2019-01-10 ASSESSMENT — ANXIETY QUESTIONNAIRES: GAD7 TOTAL SCORE: 7

## 2019-02-05 ENCOUNTER — OFFICE VISIT (OUTPATIENT)
Dept: PSYCHIATRY | Facility: CLINIC | Age: 39
End: 2019-02-05
Payer: COMMERCIAL

## 2019-02-05 VITALS
SYSTOLIC BLOOD PRESSURE: 126 MMHG | HEIGHT: 67 IN | TEMPERATURE: 98.9 F | BODY MASS INDEX: 33.27 KG/M2 | OXYGEN SATURATION: 97 % | WEIGHT: 212 LBS | HEART RATE: 90 BPM | DIASTOLIC BLOOD PRESSURE: 84 MMHG | RESPIRATION RATE: 16 BRPM

## 2019-02-05 DIAGNOSIS — F41.1 GENERALIZED ANXIETY DISORDER: ICD-10-CM

## 2019-02-05 DIAGNOSIS — F32.1 MODERATE MAJOR DEPRESSION (H): Primary | ICD-10-CM

## 2019-02-05 PROCEDURE — 99214 OFFICE O/P EST MOD 30 MIN: CPT | Performed by: NURSE PRACTITIONER

## 2019-02-05 RX ORDER — ZOLPIDEM TARTRATE 12.5 MG/1
12.5 TABLET, FILM COATED, EXTENDED RELEASE ORAL
Qty: 30 TABLET | Refills: 0 | Status: SHIPPED | OUTPATIENT
Start: 2019-03-05 | End: 2019-04-03

## 2019-02-05 RX ORDER — ZOLPIDEM TARTRATE 12.5 MG/1
12.5 TABLET, FILM COATED, EXTENDED RELEASE ORAL
Qty: 30 TABLET | Refills: 0 | Status: SHIPPED | OUTPATIENT
Start: 2019-02-05 | End: 2019-02-05

## 2019-02-05 ASSESSMENT — ANXIETY QUESTIONNAIRES
5. BEING SO RESTLESS THAT IT IS HARD TO SIT STILL: NOT AT ALL
1. FEELING NERVOUS, ANXIOUS, OR ON EDGE: SEVERAL DAYS
GAD7 TOTAL SCORE: 6
3. WORRYING TOO MUCH ABOUT DIFFERENT THINGS: SEVERAL DAYS
2. NOT BEING ABLE TO STOP OR CONTROL WORRYING: SEVERAL DAYS
7. FEELING AFRAID AS IF SOMETHING AWFUL MIGHT HAPPEN: SEVERAL DAYS
6. BECOMING EASILY ANNOYED OR IRRITABLE: SEVERAL DAYS

## 2019-02-05 ASSESSMENT — MIFFLIN-ST. JEOR: SCORE: 1679.26

## 2019-02-05 ASSESSMENT — PATIENT HEALTH QUESTIONNAIRE - PHQ9
SUM OF ALL RESPONSES TO PHQ QUESTIONS 1-9: 11
5. POOR APPETITE OR OVEREATING: SEVERAL DAYS

## 2019-02-05 NOTE — PROGRESS NOTES
"    Outpatient Psychiatric Progress Note    Name: Danyelle Canales   : 1980                    Primary Care Provider: Smiley Kim PA-C -last visit 18  Therapist: Denice Mtz - last visit about a month ago    PHQ-9 scores:  PHQ-9 SCORE 2018   PHQ-9 Total Score - - -   PHQ-9 Total Score MyChart 9 (Mild depression) 7 (Mild depression) -   PHQ-9 Total Score 9 7 11       NAJMA-7 scores:  NAJMA-7 SCORE 2018   Total Score - - -   Total Score 2 (minimal anxiety) 7 (mild anxiety) -   Total Score 2 7 6         Patient Identification:  Patient is a 38 year old year old,   White American female  who presents for return visit with me.  Patient is currently employed full time. Patient attended the session alone. Patient prefers to be called: \"Danyelle\".    Interim History:  I last saw Danyelle Canales for outpatient psychiatry Return Visit on 19.     During that appointment, we reviewed her remaining treatment options: switchin SNRI, addition of Latuda, introduction of SAD lamp and TMS. She planned to think it over. Cymbalta, Wellbutrin and Ritalin were unchanged. She requested a switch back to Ambien from Latuda.    Current medications include:   Current Outpatient Medications   Medication Sig     buPROPion (WELLBUTRIN XL) 300 MG 24 hr tablet Take 1 tablet (300 mg) by mouth every morning     clonazePAM (KLONOPIN) 0.5 MG tablet 1 tab daily prn anxiety     DULoxetine (CYMBALTA) 20 MG capsule 4 caps daily     HYDROcodone-acetaminophen (NORCO) 5-325 MG per tablet      [START ON 2019] methylphenidate (RITALIN) 5 MG tablet 1 tab TID     ORDER FOR DME Apply 1 Units topically. TENS unit use as directed     zolpidem ER (AMBIEN CR) 12.5 MG CR tablet Take 1 tablet (12.5 mg) by mouth nightly as needed for sleep     order for DME Equipment being ordered: full spectrum therapy lamp, 10,000 lux (Patient not taking: Reported on 2019)     No current " "facility-administered medications for this visit.        The [place] Prescription Monitoring Program has been reviewed and there are no concerns about diversionary activity for controlled substances at this time.      I was able to review most recent Primary Care Provider, specialty provider, and therapy visit notes that I have access to.     Today, patient reports she's \"pretty much the same\". Spoke with pain management provider; they're OK with switch in antidepressant. She tried the butrans patch, but head intolerable headaches, so went back to [place].    She called insurance and pharmacy about Pristiq coverage/costs; was determined that generic desvenlafaxine would be $100+ a month; not viable for her at present.    She switched from Lunesta back to Ambien CR 12.5 mg; states its working moderately better.    She hasn't yet gotten a SAD lamp; not able to afford presently.    She's been sticking with keto diet and thrice weekly gym and feeling good about this. She continues in psychotherapy; seen every several weeks.        Past Medical History:   Diagnosis Date     ASD (atrial septal defect)     repaired surgically as a child     Depressive disorder      Depressive disorder, not elsewhere classified      Headache      Headache      History of blood transfusion 1985 1985 during open heart surgery     Hypertension     higher results at pain clinic, want to confirm ok     Insomnia, unspecified      Other forms of migraine, without mention of intractable migraine without mention of status migrainosus       has a past medical history of ASD (atrial septal defect), Depressive disorder, Depressive disorder, not elsewhere classified, Headache, Headache, History of blood transfusion (1985), Hypertension, Insomnia, unspecified, and Other forms of migraine, without mention of intractable migraine without mention of status migrainosus. She also has no past medical history of Arthritis, Cerebral infarction (H), Congestive " "heart failure (H), COPD (chronic obstructive pulmonary disease) (H), Diabetes (H), Thyroid disease, or Uncomplicated asthma.    Social history updates:  No significant updates    Substance use updates:  Occassional ETOh; not with Klonopin  Tobacco use: No      Vital Signs:   /84 (BP Location: Left arm, Patient Position: Chair, Cuff Size: Adult Large)   Pulse 90   Temp 98.9  F (37.2  C) (Oral)   Resp 16   Ht 1.702 m (5' 7\")   Wt 96.2 kg (212 lb)   LMP 01/22/2019 (Approximate)   SpO2 97%   Breastfeeding? No   BMI 33.20 kg/m      Labs:  Most recent laboratory results reviewed and no new labs.    Review of Systems:  10 systems (general, cardiovascular, respiratory, eyes, ENT, endocrine, GI, , M/S, neurological) were reviewed. Most pertinent finding(s) is/are: chronic pain. The remaining systems are all unremarkable.    Mental Status Examination:  Appearance:  awake, alert and adequately groomed  Attitude:  cooperative   Eye Contact:  good  Gait and Station: Normal  Psychomotor Behavior:  intact station, gait and muscle tone  Oriented to:  time, person, and place  Attention Span and Concentration:  Normal  Speech:   clear, coherent  Mood:  anxious and depressed  Affect:  full  Associations:  no loose associations  Thought Process:  logical, linear and goal oriented  Thought Content:  Appropriate to Interview  Recent and Remote Memory:  intact Not formally assessed. No amnesia.  Fund of Knowledge: appropriate  Insight:  good  Judgment:  intact  Impulse Control:  intact     Suicide Risk Assessment:  Today Danyelle Canales denies any suicidal ideation or self-harm impulses. Therefore, based on all available evidence including the factors cited above, Danyelle Canales does not appear to be at imminent risk for self-harm, does not meet criteria for a 72-hr hold, and therefore remains appropriate for ongoing outpatient level of care.  A thorough assessment of risk factors related to suicide and self-harm " have been reviewed and are noted above. The patient convincingly denies suicidality on several occasions. Local community safety resources printed and reviewed for patient to use if needed. There was no deceit detected, and the patient presented in a manner that was believable.      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  Med: Vicodin.   Maximum use per month: 90  Expected duration: ongoing, pt in comprehensive pain mgmt currently  Narcotic agreement on file: NO  Clinic visit recommended: Q 3 months         Morbid obesity (H) 08/20/2018     Priority: Medium     Non morbid obesity, unspecified obesity type 09/20/2017     Priority: Medium     Esophageal reflux 11/23/2014     Priority: Medium     Chronic pain 01/31/2012     Priority: Medium     ASD (atrial septal defect) 01/16/2012     Priority: Medium     Generalized anxiety disorder 10/18/2011     Priority: Medium     Diagnosis updated by automated process. Provider to review and confirm.       Moderate major depression (H) 09/12/2011     Priority: Medium     History of ovarian cyst 09/12/2011     Priority: Medium     CARDIOVASCULAR SCREENING; LDL GOAL LESS THAN 160 05/09/2010     Priority: Medium     Trichotillomania 08/03/2009     Priority: Medium     PMDD (premenstrual dysphoric disorder) 06/03/2009     Priority: Medium     Migraine headache 07/23/2008     Priority: Medium     [facility] 10/08/2007     Priority: Medium     [facility] SPRAIN OF N[facility]K 10/08/2007     Priority: Medium      Encounter for other general counseling or advice on contraception 07/18/2007     Priority: Medium     Diagnosis updated by automated process. Provider to review and confirm.       allergic DERMATITIS NOS 11/12/2004     Priority: Medium     Insomnia 07/16/2004     Priority: Medium     Problem list name updated by automated process. Provider to review           Assessment:  Danyelle HIRSCH Lukaszlobito reports continued residual mood and anxiety symptoms. We are now at our seventh visit and have tried a number of psychopharm agents and dosage adjustments. Though she has successfully reduced her Klonopin dosage to conservative frequency, she is now taking a nightly hypnotic and daily stimulant.    Her Cymbalta is performing not up to expectations, and previous dosages beyond 80 mg caused weight gain, so she does not wish to proceed there. We discussed a switch in her SNRI (to Pristiq or Fetzima), or addition of Latuda to present regime. Advised that all of these medications are brand, but we could enroll her in prescription assistance programs through the . The patient would like to trial Pristiq, and she will enroll for a prescription savings card to minimize her monthly co-pays.    She again appears to be a good candidate for TMS, and we review this again as an option -- she is wishing to defer.     In meantime, we will maintain Wellbutrin  mg daily and Ritalin 5 mg TID. She may continue Ambien CR 12.5 mg nightly. She may use Klonopin 0.5 mg PRN, and advised to continue to use conservatively.     She is otherwise encouraged to continue in psychotherapy.    Medication side effects and alternatives were reviewed. Health promotion activities recommended and reviewed today. All questions addressed. Education and counseling completed regarding risks and benefits of medications and psychotherapy options.    Treatment Plan:    Cross-taper of Cymbalta:  Week 1: Cymbalta 60 mg daily   Week 2: Cymbalta 40 mg daily    Week 3: Cymbalta 20 mg daily + Pristiq 25 mg (1/2 tab) daily   Week 4: Stop Cymbalta. Pristiq 50 mg daily     Continue Wellbutrin  mg daily    May use Klonopin 0.5 mg as needed for daytime anxiety; advised of proper/safe usage, onset, duration; not to take with other CNS depressants or sleep aids    May use Ambien CR 12.5 mg nightly for sleep; advised of proper/safe usage, onset, duration; not to take with other CNS depressants or sleep aids    Maintain Ritalin 5 mg TID; advised of duration of effect, importance of hydration/nutrition, and common SEs    Recommended 10,000 lux full spectrum lamp for seasonal affective therapy    Continue all other medical care per primary care provider.     Continue all other medications as reviewed per electronic medical record today.     Safety plan reviewed. To the Emergency Department as needed or call after hours crisis line at [phone] or [phone]. [place] Crisis Text Line. Text [place] to 110690 or Suicide LifeLine Chat: suicidepreventionlifeline.org/chat/    Continue individual therapy as planned with established therapists.    Schedule an appointment with me in 4 weeks or sooner as needed.    Follow up with primary care provider as planned or for acute medical concerns.    Call the psychiatric nurse line with medication questions or concerns at [phone].    Jamgluet may be used to communicate with your provider, but this is not intended to be used for emergencies.    Administrative Billing:   Time spent with patient was 30 minutes and greater than 50% of time or 20 minutes was spent in counseling and coordination of care regarding above diagnoses and treatment plan.    Patient Status:  Patient will continue to be seen for ongoing consultation and stabilization.    Signed:   Aron Alfaro CNP   Psychiatry

## 2019-02-06 ENCOUNTER — TELEPHONE (OUTPATIENT)
Dept: PSYCHIATRY | Facility: CLINIC | Age: 39
End: 2019-02-06

## 2019-02-06 DIAGNOSIS — F32.1 MODERATE MAJOR DEPRESSION (H): Primary | ICD-10-CM

## 2019-02-06 ASSESSMENT — ANXIETY QUESTIONNAIRES: GAD7 TOTAL SCORE: 6

## 2019-02-06 NOTE — TELEPHONE ENCOUNTER
Prior Authorization Specialty Medication Request    Medication/Dose: PRISTIQ 50 MG 24 hr tablet  Direction:Take 1 tablet (50 mg) by mouth daily - Oral  ICD code (if different than what is on RX):  Moderate major depression (H) [F32.1]  - Primary     Previously Tried and Failed:   Elavil, Celexa, Lexapro, Paxil, Prozac, Zoloft, Remeron, Effexor, Cymbalta  Wellbutrin, Ritalin  Topamax  Abilify    Important Lab Values:   None.    Rationale:   Patient has had side effect or inadequate therapeutic response to multiple antidepressants, including two SNRI's.    ** As a side note, and this does not necessarily need to be conveyed to her prescription benefit manager: We are seeking approval for brand-name Pristiq. The generic, desvenlafaxine, was not covered by her insurance and was prohibitively expensive. If the brand Pristiq can get approval for coverage, we can use a savings card at the pharmacy.      Insurance Name: [employer]  Insurance ID: [id number]  Insurance Phone Number: [phone]  BIN #: [id number]    Pharmacy Information (if different than what is on RX)  Name:  [facility] [address] [place] [address]  Phone:  [phone]    Notes from pharmacy:    Visit [employer] and enter TRX code [id number]    OBTAIN PA USING PA Phone 3 [phone] or TP Help Desk Phone: [phone]

## 2019-02-12 DIAGNOSIS — F32.1 MODERATE MAJOR DEPRESSION (H): ICD-10-CM

## 2019-02-12 DIAGNOSIS — F33.1 MAJOR DEPRESSIVE DISORDER, RECURRENT EPISODE, MODERATE (H): ICD-10-CM

## 2019-02-12 DIAGNOSIS — F41.1 GENERALIZED ANXIETY DISORDER: ICD-10-CM

## 2019-02-12 NOTE — TELEPHONE ENCOUNTER
PA Initiation    Medication: PRISTIQ 50 MG 24 hr tablet  Insurance Company: [employer] - Phone [phone] Fax [phone]  Pharmacy Filling the Rx: [id number] [place] [address]  Filling Pharmacy Phone: [phone]  Filling Pharmacy Fax:    Start Date: 2/12/2019    Central Prior Authorization Team   Phone: [phone]

## 2019-02-13 RX ORDER — DULOXETIN HYDROCHLORIDE 20 MG/1
CAPSULE, DELAYED RELEASE ORAL
Qty: 120 CAPSULE | Refills: 0 | Status: SHIPPED | OUTPATIENT
Start: 2019-02-13 | End: 2019-03-05

## 2019-02-13 NOTE — TELEPHONE ENCOUNTER
Patient will be on a cross taper once we can get Pristiq approved. There was a delay with the PA for Pristiq and this is still pending. Refill approved to allow enough meds for the treatment plan.

## 2019-02-13 NOTE — TELEPHONE ENCOUNTER
PRIOR AUTHORIZATION DENIED    Medication: PRISTIQ 50 MG 24 hr tablet    Denial Date: 2/13/2019    Denial Rational: Denied as insurance requires that documentation be sent showing a documented allergic reaction to the equivalent generic, desvenlafaxine succinate ER        Appeal Information: [employer] - Phone [phone] Fax [phone]  Will document denial letter once received.

## 2019-02-14 NOTE — TELEPHONE ENCOUNTER
PA was denied. Please order alternative med with complete SIG or begin appeal process.     If you would like to appeal:   Create letter of medical necessity or    Compile supporting clinical documentation in EPIC Telephone encounter (TE).    Route TE to: [facility]     PRIOR AUTHORIZATION DENIED     Medication: PRISTIQ 50 MG 24 hr tablet     Denial Date: 2/13/2019     Denial Rational: Denied as insurance requires that documentation be sent showing a documented allergic reaction to the equivalent generic, desvenlafaxine succinate ER          Appeal Information: [employer] - Phone [phone] Fax [phone]  Will document denial letter once received.

## 2019-02-15 NOTE — TELEPHONE ENCOUNTER
Writer attempted to reach patient. Voicemail left for her to return our call.     When she calls back, we blair to discuss the denied PA for Pristiq and discuss the possibility of starting Fetzima, among other info from Aron's note.

## 2019-02-15 NOTE — TELEPHONE ENCOUNTER
Writer left voicemail with information for patient regarding staying at 40 mg dosage of Cymbalta while we try to get the Fetzima approved/implement it's use (patient stated it was okay to leave detailed message when we spoke earlier today). Writer also stated if we implement the Fetzima will/can discuss Cymbalta/Fetzima tapering at that time.    Luzma Hazel RN on 2/15/2019 at 1:59 PM

## 2019-02-15 NOTE — TELEPHONE ENCOUNTER
Writer spoke to patient.     -She wants to try for the Fetzima. She will go to website to look up more information on discounts.    - She is currently taking 40 mg of Cymbalta. Writer relayed that Aron would like for her to increase to 80 mg again. However, patient was concerned she may have to go back down again if starting the Fetzima.     Routing back to provider for:  - Rx for Fetzima so PA process can be started.  - Clarification on if he would still want patient going back up to 80 mg of Cymbalta based on the fact she may be starting Fetzima soon.    Patient is open to coming in sooner if needed to consult about meds also.    Luzma Hazel RN on 2/15/2019 at 1:05 PM

## 2019-02-15 NOTE — TELEPHONE ENCOUNTER
Submitted Rx for Fetzima, #90 of 20 mg caps.    Patient can maintain Cymbalta 40 mg daily for time being, if that's where she is at now, in anticipation of switching to Fetzima. She should have a volume of 20 mg caps to cover her while awaiting PA on Fetzima, and to assist with cross-taper (let me know if she does not, but it seems TI Faye RN renewed this for her).    Cross-taper protocol:  Current: Cymbalta 40 mg daily  Week 1: Cymbalta 20 mg daily PLUS Fetzima 20 mg daily  Week 2: Stop Cymbalta. Increase Fetzima to 40 mg daily

## 2019-02-15 NOTE — TELEPHONE ENCOUNTER
"As perhaps anticipated, PA for brand Pristiq denied as has not tried generic. Unfortunately, patient is unable to afford generic.    We'll hold on with Cymbalta until we can further discuss next treatment steps with patient. Patient may have already begun Cymbalta downward taper, so would be advised to return to 80 mg, or close to it.    As per next treatment recommendations, we did previously discuss Fetzima, but I would anticipate she might run into the similar coverage issues. If she's interested, we could try our luck with the pharmacy. There is a monthly savings card for Fetzima from the : \"The FETZIMA Savings Program is valid toward out-of-pocket expenses for commercially insured patients filling a FETZIMA prescription. Most eligible patients can pay as little as $30, and we'll pay the rest up to $105 on each of 12 prescriptions at your retail pharmacy. Terms and conditions apply.\"    https://[email or web address].com/depression-resources/savings-card  "

## 2019-03-05 ENCOUNTER — OFFICE VISIT (OUTPATIENT)
Dept: PSYCHIATRY | Facility: CLINIC | Age: 39
End: 2019-03-05
Payer: COMMERCIAL

## 2019-03-05 VITALS
BODY MASS INDEX: 32.89 KG/M2 | SYSTOLIC BLOOD PRESSURE: 144 MMHG | HEART RATE: 111 BPM | TEMPERATURE: 99.5 F | DIASTOLIC BLOOD PRESSURE: 100 MMHG | WEIGHT: 210 LBS | RESPIRATION RATE: 16 BRPM | OXYGEN SATURATION: 98 %

## 2019-03-05 DIAGNOSIS — F41.1 GENERALIZED ANXIETY DISORDER: ICD-10-CM

## 2019-03-05 DIAGNOSIS — F32.1 MODERATE MAJOR DEPRESSION (H): Primary | ICD-10-CM

## 2019-03-05 PROCEDURE — 99214 OFFICE O/P EST MOD 30 MIN: CPT | Performed by: NURSE PRACTITIONER

## 2019-03-05 RX ORDER — CLONAZEPAM 0.5 MG/1
TABLET ORAL
Qty: 30 TABLET | Refills: 0 | Status: SHIPPED | OUTPATIENT
Start: 2019-03-05 | End: 2019-04-03

## 2019-03-05 RX ORDER — DESVENLAFAXINE 50 MG/1
50 TABLET, FILM COATED, EXTENDED RELEASE ORAL DAILY
Qty: 30 TABLET | Refills: 0 | Status: SHIPPED | OUTPATIENT
Start: 2019-03-05 | End: 2019-04-03

## 2019-03-05 RX ORDER — METHYLPHENIDATE HYDROCHLORIDE 5 MG/1
TABLET ORAL
Qty: 90 TABLET | Refills: 0 | Status: SHIPPED | OUTPATIENT
Start: 2019-03-05 | End: 2019-04-03 | Stop reason: ALTCHOICE

## 2019-03-05 ASSESSMENT — ANXIETY QUESTIONNAIRES
GAD7 TOTAL SCORE: 9
1. FEELING NERVOUS, ANXIOUS, OR ON EDGE: SEVERAL DAYS
6. BECOMING EASILY ANNOYED OR IRRITABLE: NEARLY EVERY DAY
7. FEELING AFRAID AS IF SOMETHING AWFUL MIGHT HAPPEN: SEVERAL DAYS
2. NOT BEING ABLE TO STOP OR CONTROL WORRYING: SEVERAL DAYS
GAD7 TOTAL SCORE: 9
GAD7 TOTAL SCORE: 9
7. FEELING AFRAID AS IF SOMETHING AWFUL MIGHT HAPPEN: SEVERAL DAYS
3. WORRYING TOO MUCH ABOUT DIFFERENT THINGS: SEVERAL DAYS
5. BEING SO RESTLESS THAT IT IS HARD TO SIT STILL: NOT AT ALL
4. TROUBLE RELAXING: MORE THAN HALF THE DAYS

## 2019-03-05 ASSESSMENT — PATIENT HEALTH QUESTIONNAIRE - PHQ9
SUM OF ALL RESPONSES TO PHQ QUESTIONS 1-9: 10
SUM OF ALL RESPONSES TO PHQ QUESTIONS 1-9: 10
10. IF YOU CHECKED OFF ANY PROBLEMS, HOW DIFFICULT HAVE THESE PROBLEMS MADE IT FOR YOU TO DO YOUR WORK, TAKE CARE OF THINGS AT HOME, OR GET ALONG WITH OTHER PEOPLE: SOMEWHAT DIFFICULT

## 2019-03-05 NOTE — PROGRESS NOTES
"    Outpatient Psychiatric Progress Note    Name: Danyelle Canales   : 1980                    Primary Care Provider: Smiley Kim PA-C - last visit 18  Therapist: Denice worthy - last end of February     PHQ-9 scores:  PHQ-9 SCORE 2019 2019 3/5/2019   PHQ-9 Total Score - - -   PHQ-9 Total Score MyChart 7 (Mild depression) - 10 (Moderate depression)   PHQ-9 Total Score 7 11 10       NAJMA-7 scores:  NAJMA-7 SCORE 2019 2019 3/5/2019   Total Score - - -   Total Score 7 (mild anxiety) - 9 (mild anxiety)   Total Score 7 6 9     Answers for HPI/ROS submitted by the patient on 3/5/2019   If you checked off any problems, how difficult have these problems made it for you to do your work, take care of things at home, or get along with other people?: Somewhat difficult  PHQ9 TOTAL SCORE: 10  NAJMA 7 TOTAL SCORE: 9      Patient Identification:  Patient is a 38 year old year old,   White American female  who presents for return visit with me.  Patient is currently employed full time. Patient attended the session alone. Patient prefers to be called: \"Danyelle\".    Interim History:  I last saw Danyelle Canales for outpatient psychiatry Return Visit on 19.     During that appointment, we found that desvenlafaxine would be $100+ a month, so we Rx'ed brand Pristiq in the hope she could try to use the prescription assistance coupon, however her insurance rejected the prior authorization because she didn't try generic yet. She had already tapered down on her Cymbalta in anticipation of switch, so after researching Fetzima prescriptions assistance ($40 a month), we agreed to switch to Fetzima. She would otherwise continue with Wellbutrin, Ritalin and Ambien.    Current medications include:   Current Outpatient Medications   Medication Sig     buPROPion (WELLBUTRIN XL) 300 MG 24 hr tablet Take 1 tablet (300 mg) by mouth every morning     clonazePAM (KLONOPIN) 0.5 MG tablet 1 tab daily " "prn anxiety     DULoxetine (CYMBALTA) 20 MG capsule 4 caps daily     HYDROcodone-acetaminophen (NORCO) 5-325 MG per tablet      levomilnacipran (FETZIMA) 20 MG 24 hr capsule Take 1 capsule (20 mg) by mouth daily     methylphenidate (RITALIN) 5 MG tablet 1 tab TID     order for DME Equipment being ordered: full spectrum therapy lamp, 10,000 lux (Patient not taking: Reported on 2/5/2019)     ORDER FOR DME Apply 1 Units topically. TENS unit use as directed     PRISTIQ 50 MG 24 hr tablet Take 1 tablet (50 mg) by mouth daily     zolpidem ER (AMBIEN CR) 12.5 MG CR tablet Take 1 tablet (12.5 mg) by mouth nightly as needed for sleep     No current facility-administered medications for this visit.        The [place] Prescription Monitoring Program has been reviewed and there are no concerns about diversionary activity for controlled substances at this time.      I was able to review most recent Primary Care Provider, specialty provider, and therapy visit notes that I have access to.     Today, patient reports tapering Cymbalta from 80 mg to 20 mg daily without issue (\"fairly easy\"). On 2/20/19 she started Fetzima 20 mg daily; after 1 week increased to 40 mg daily; states she's observed persistent \"random\" heart palpitations and feels more easily lightheaded. States her mood has significantly decompenstated in the past few days; crying all the time, more down and irritable. States she has a \"bad vibe about Fetzima\" and does not want to stick it out.    Patient asked despairingly about her options. Her pain management provider mentioned Lux. I informed her this is indicated for fibromyalgia, but is essentially and antidepressant; she might have the same lack of efficacy and side effects as with Fetzima as they are closely related. We could try Pristiq, despite the expense, as it is more closely related to Cymbalta. Otherwise Trintellix would be a novel choice, though I was expect similar issues with regard to prior " authorization and registration with prescription assistance. She wants to start a new med tomorrow, so would prefer to avoid any delays.    I again review that TMS remains an option for her.    She would like to try Pristiq despite the higher cost; just paid off her car loan, so feels she can budget for it better.    She otherwise continued Wellbutrin  mg daily, Ritalin 5 mg TID, and Ambien CR 12.5 mg nightly. She ran out of Klonopin 0.5 mg; last prescribed 12/12/18; requesting renewal.        Past Medical History:   Diagnosis Date     ASD (atrial septal defect)     repaired surgically as a child     Depressive disorder      Depressive disorder, not elsewhere classified      Headache      Headache      History of blood transfusion 1985 1985 during open heart surgery     Hypertension     higher results at pain clinic, want to confirm ok     Insomnia, unspecified      Other forms of migraine, without mention of intractable migraine without mention of status migrainosus       has a past medical history of ASD (atrial septal defect), Depressive disorder, Depressive disorder, not elsewhere classified, Headache, Headache, History of blood transfusion (1985), Hypertension, Insomnia, unspecified, and Other forms of migraine, without mention of intractable migraine without mention of status migrainosus. She also has no past medical history of Arthritis, Cerebral infarction (H), Congestive heart failure (H), COPD (chronic obstructive pulmonary disease) (H), Diabetes (H), Thyroid disease, or Uncomplicated asthma.    Social history updates:  No significant updates    Substance use updates:  Occassional ETOh; not with Klonopin  Tobacco use: No      Vital Signs:   BP (!) 144/100 (BP Location: Right arm, Patient Position: Chair, Cuff Size: Adult Large)   Pulse 111   Temp 99.5  F (37.5  C) (Oral)   Resp 16   Wt 95.3 kg (210 lb)   LMP 02/12/2019 (Approximate)   SpO2 98%   Breastfeeding? No   BMI 32.89 kg/m       Labs:  Most recent laboratory results reviewed and no new labs.    Review of Systems:  10 systems (general, cardiovascular, respiratory, eyes, ENT, endocrine, GI, , M/S, neurological) were reviewed. Most pertinent finding(s) is/are: chronic pain, elevated HR. The remaining systems are all unremarkable.    Mental Status Examination:  Appearance:  awake, alert and adequately groomed  Attitude:  cooperative   Eye Contact:  good  Gait and Station: Normal  Psychomotor Behavior:  intact station, gait and muscle tone  Oriented to:  time, person, and place  Attention Span and Concentration:  Normal  Speech:   clear, coherent  Mood:  anxious and depressed  Affect:  tearful  Associations:  no loose associations  Thought Process:  logical, linear and goal oriented  Thought Content:  Appropriate to Interview  Recent and Remote Memory:  intact Not formally assessed. No amnesia.  Fund of Knowledge: appropriate  Insight:  good  Judgment:  intact  Impulse Control:  intact    Suicide Risk Assessment:  Today Danyelle Canales denies any suicidal ideation or self-harm impulses. Therefore, based on all available evidence including the factors cited above, Danyelle Canales does not appear to be at imminent risk for self-harm, does not meet criteria for a 72-hr hold, and therefore remains appropriate for ongoing outpatient level of care.  A thorough assessment of risk factors related to suicide and self-harm have been reviewed and are noted above. The patient convincingly denies suicidality on several occasions. Local community safety resources printed and reviewed for patient to use if needed. There was no deceit detected, and the patient presented in a manner that was believable.      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  Med: Vicodin.   Maximum use per month: 90  Expected duration: ongoing, pt in comprehensive pain mgmt currently  Narcotic agreement on file: NO  Clinic visit recommended: Q 3 months         Morbid obesity (H) 08/20/2018     Priority: Medium     Non morbid obesity, unspecified obesity type 09/20/2017     Priority: Medium     Esophageal reflux 11/23/2014     Priority: Medium     Chronic pain 01/31/2012     Priority: Medium     ASD (atrial septal defect) 01/16/2012     Priority: Medium     Generalized anxiety disorder 10/18/2011     Priority: Medium     Diagnosis updated by automated process. Provider to review and confirm.       Moderate major depression (H) 09/12/2011     Priority: Medium     History of ovarian cyst 09/12/2011     Priority: Medium     CARDIOVASCULAR SCREENING; LDL GOAL LESS THAN 160 05/09/2010     Priority: Medium     Trichotillomania 08/03/2009     Priority: Medium     PMDD (premenstrual dysphoric disorder) 06/03/2009     Priority: Medium     Migraine headache 07/23/2008     Priority: Medium     ia SPRAIN THORACIC REGION 10/08/2007     Priority: Medium     ia SPRAIN OF NECK 10/08/2007     Priority: Medium     Encounter for other general counseling or advice on contraception 07/18/2007     Priority: Medium     Diagnosis updated by automated process. Provider to review and confirm.       allergic DERMATITIS NOS 11/12/2004     Priority: Medium     Insomnia 07/16/2004     Priority: Medium     Problem list name updated by automated process. Provider to review           Assessment:  Danyelle Canales reports a significant mood decompensation after tapering off Cymbalta and starting Fetzima, which was not entirely unexpected.  What was interesting is that she seemed to tolerate gradual decreases with Cymbalta without any mood turbulence. At any rate, the stabilizing effects of Fetzima may be further delayed, however the patient does not want to further vet this medication. We are back the drawing board, and she is declining to return to Cymbalta and wishes to try something yet untrialed. Of her psychopharm options, Pristiq has best rationale, as more closely related to Cymbalta. Trintellix is an option, but I do not have a clinical instinct as to how she'll respond to it. She again appears to be a good candidate for TMS, and we review this again as an option -- she is wishing to defer.    We will trial Pristiq. If it works well for her, hopefully we can viably work this into her budget.    In meantime, we will maintain Wellbutrin  mg daily and Ritalin 5 mg TID. She may continue Ambien CR 12.5 mg nightly. She may use Klonopin 0.5 mg PRN, and is advised to continue to use conservatively.      She is otherwise encouraged to continue in psychotherapy.    Medication side effects and alternatives were reviewed. Health promotion activities recommended and reviewed today. All questions addressed. Education and counseling completed regarding risks and benefits of medications and psychotherapy options.    Treatment Plan:    Stop Fetzima    Start Pristiq 50 mg daily    Continue Wellbutrin  mg daily    May use Klonopin 0.5 mg as needed for daytime anxiety; advised of proper/safe usage, onset, duration; not to take with other CNS depressants or sleep aids    May use Ambien CR 12.5 mg nightly for sleep; advised of proper/safe usage, onset, duration; not to take with other CNS depressants or sleep aids    Maintain Ritalin 5 mg TID; advised of duration of effect, importance of hydration/nutrition, and common SEs    Continue 10,000 lux full spectrum lamp for seasonal affective therapy    Continue all other medical care per primary care  provider.     Continue all other medications as reviewed per electronic medical record today.     Safety plan reviewed. To the Emergency Department as needed or call after hours crisis line at [phone] or [phone]. [place] Crisis Text Line. Text [place] to 706017 or Suicide LifeLine Chat: suicidepreventionlifeline.org/chat/    Continue individual therapy as planned with established therapists.    Schedule an appointment with me in 4 weeks or sooner as needed.    Follow up with primary care provider as planned or for acute medical concerns.    Call the psychiatric nurse line with medication questions or concerns at [phone].    [employer] may be used to communicate with your provider, but this is not intended to be used for emergencies.    Administrative Billing:   Time spent with patient was 30 minutes and greater than 50% of time or 20 minutes was spent in counseling and coordination of care regarding above diagnoses and treatment plan.    Patient Status:  Patient will continue to be seen for ongoing consultation and stabilization.    Signed:   Aron Alfaro CNP   Psychiatry

## 2019-03-06 ASSESSMENT — PATIENT HEALTH QUESTIONNAIRE - PHQ9: SUM OF ALL RESPONSES TO PHQ QUESTIONS 1-9: 10

## 2019-03-06 ASSESSMENT — ANXIETY QUESTIONNAIRES: GAD7 TOTAL SCORE: 9

## 2019-04-01 DIAGNOSIS — F32.1 MODERATE MAJOR DEPRESSION (H): ICD-10-CM

## 2019-04-01 DIAGNOSIS — F41.1 GENERALIZED ANXIETY DISORDER: ICD-10-CM

## 2019-04-01 RX ORDER — DESVENLAFAXINE 50 MG/1
50 TABLET, FILM COATED, EXTENDED RELEASE ORAL DAILY
Qty: 30 TABLET | Refills: 0 | Status: CANCELLED | OUTPATIENT
Start: 2019-04-01

## 2019-04-01 NOTE — TELEPHONE ENCOUNTER
"Requested Prescriptions   Pending Prescriptions Disp Refills     desvenlafaxine (PRISTIQ) 50 MG 24 hr tablet  Last Written Prescription Date:  3/5/2019  Last Fill Quantity: 30 tablet,  # refills: 0   Last office visit: 3/5/2019 with prescribing provider:  Angelina   Future Office Visit:   Next 5 appointments (look out 90 days)    Apr 03, 2019  8:45 AM CDT  Return Visit with Aron Alfaro, CNP  [facility] [address] [place] [address]  [phone]          30 tablet 0     Sig: Take 1 tablet (50 mg) by mouth daily    Serotonin-Norepinephrine Reuptake Inhibitors  Failed - 4/1/2019  5:08 PM       Failed - Blood pressure under 140/90 in past 12 months    BP Readings from Last 3 Encounters:   03/05/19 (!) 144/100   02/05/19 126/84   01/09/19 128/83                Failed - PHQ-9 score of less than 5 in past 6 months    PHQ-9 SCORE 1/9/2019 2/5/2019 3/5/2019   PHQ-9 Total Score - - -   PHQ-9 Total Score MyChart 7 (Mild depression) - 10 (Moderate depression)   PHQ-9 Total Score 7 11 10              Failed - Normal serum creatinine on file in past 12 months    Recent Labs   Lab Test 10/05/11  0754   CR 0.61            Passed - Medication is active on med list       Passed - Patient is age 18 or older       Passed - No active pregnancy on record       Passed - No positive pregnancy test in past 12 months       Passed - Recent (6 mo) or future (30 days) visit within the authorizing provider's specialty    Patient had office visit in the last 6 months or has a visit in the next 30 days with authorizing provider or within the authorizing provider's specialty.  See \"Patient Info\" tab in inbasket, or \"Choose Columns\" in Meds & Orders section of the refill encounter.              "

## 2019-04-03 ENCOUNTER — OFFICE VISIT (OUTPATIENT)
Dept: PSYCHIATRY | Facility: CLINIC | Age: 39
End: 2019-04-03
Payer: COMMERCIAL

## 2019-04-03 VITALS
TEMPERATURE: 99.1 F | HEART RATE: 83 BPM | BODY MASS INDEX: 32.42 KG/M2 | RESPIRATION RATE: 14 BRPM | WEIGHT: 207 LBS | DIASTOLIC BLOOD PRESSURE: 80 MMHG | SYSTOLIC BLOOD PRESSURE: 128 MMHG | OXYGEN SATURATION: 99 %

## 2019-04-03 DIAGNOSIS — F32.1 MODERATE MAJOR DEPRESSION (H): Primary | ICD-10-CM

## 2019-04-03 DIAGNOSIS — F41.1 GENERALIZED ANXIETY DISORDER: ICD-10-CM

## 2019-04-03 PROCEDURE — 99214 OFFICE O/P EST MOD 30 MIN: CPT | Performed by: NURSE PRACTITIONER

## 2019-04-03 RX ORDER — DESVENLAFAXINE 50 MG/1
50 TABLET, FILM COATED, EXTENDED RELEASE ORAL DAILY
Qty: 30 TABLET | Refills: 1 | Status: SHIPPED | OUTPATIENT
Start: 2019-04-03 | End: 2019-04-16

## 2019-04-03 RX ORDER — METHYLPHENIDATE HYDROCHLORIDE 18 MG/1
18 TABLET ORAL EVERY MORNING
Qty: 30 TABLET | Refills: 0 | Status: SHIPPED | OUTPATIENT
Start: 2019-04-03 | End: 2019-04-16

## 2019-04-03 RX ORDER — MIRTAZAPINE 15 MG/1
TABLET, FILM COATED ORAL
Refills: 3 | COMMUNITY
Start: 2019-02-25 | End: 2019-06-10

## 2019-04-03 RX ORDER — CLONAZEPAM 0.5 MG/1
TABLET ORAL
Qty: 30 TABLET | Refills: 0 | Status: SHIPPED | OUTPATIENT
Start: 2019-04-03 | End: 2019-05-15

## 2019-04-03 RX ORDER — ZOLPIDEM TARTRATE 12.5 MG/1
12.5 TABLET, FILM COATED, EXTENDED RELEASE ORAL
Qty: 30 TABLET | Refills: 0 | Status: SHIPPED | OUTPATIENT
Start: 2019-04-03 | End: 2019-04-16

## 2019-04-03 ASSESSMENT — ANXIETY QUESTIONNAIRES
2. NOT BEING ABLE TO STOP OR CONTROL WORRYING: SEVERAL DAYS
1. FEELING NERVOUS, ANXIOUS, OR ON EDGE: SEVERAL DAYS
GAD7 TOTAL SCORE: 7
6. BECOMING EASILY ANNOYED OR IRRITABLE: SEVERAL DAYS
GAD7 TOTAL SCORE: 7
7. FEELING AFRAID AS IF SOMETHING AWFUL MIGHT HAPPEN: SEVERAL DAYS
5. BEING SO RESTLESS THAT IT IS HARD TO SIT STILL: NOT AT ALL
7. FEELING AFRAID AS IF SOMETHING AWFUL MIGHT HAPPEN: SEVERAL DAYS
GAD7 TOTAL SCORE: 7
3. WORRYING TOO MUCH ABOUT DIFFERENT THINGS: SEVERAL DAYS
4. TROUBLE RELAXING: MORE THAN HALF THE DAYS

## 2019-04-03 NOTE — PROGRESS NOTES
"    Outpatient Psychiatric Progress Note    Name: Danyelle Canales   : 1980                    Primary Care Provider: Smiley Kim PA-C   Therapist: Connor ESCALERA - last visit 3/22/19      PHQ-9 scores:  PHQ-9 SCORE 2019 3/5/2019 4/3/2019   PHQ-9 Total Score - - -   PHQ-9 Total Score MyChart - 10 (Moderate depression) 8 (Mild depression)   PHQ-9 Total Score 11 10 8       NAJMA-7 scores:  NAJMA-7 SCORE 2019 3/5/2019 4/3/2019   Total Score - - -   Total Score - 9 (mild anxiety) 7 (mild anxiety)   Total Score 6 9 7         Answers for HPI/ROS submitted by the patient on 4/3/2019   If you checked off any problems, how difficult have these problems made it for you to do your work, take care of things at home, or get along with other people?: Somewhat difficult  PHQ9 TOTAL SCORE: 8  NAJMA 7 TOTAL SCORE: 7    Patient Identification:  Patient is a 39 year old year old,   White American female  who presents for return visit with me.  Patient is currently employed full time. Patient attended the session alone. Patient prefers to be called: \"Danyelle\".    Interim History:  I last saw Danyelle Canales for outpatient psychiatry Return Visit on 3/5/19.     During that appointment, we agreed to discontinue her Fetzima and trial Pristiq, despite the cost. We maintained Wellbutrin  mg daily and Ritalin 5 mg TID. She was permitted to continue Ambien CR 12.5 mg nightly and use Klonopin 0.5 mg PRN acute anxiety. She was to continue psychotherapy.      Current medications include:   Current Outpatient Medications   Medication Sig     buPROPion (WELLBUTRIN XL) 300 MG 24 hr tablet Take 1 tablet (300 mg) by mouth every morning     clonazePAM (KLONOPIN) 0.5 MG tablet 1 tab daily prn anxiety     desvenlafaxine (PRISTIQ) 50 MG 24 hr tablet Take 1 tablet (50 mg) by mouth daily     HYDROcodone-acetaminophen (NORCO) 5-325 MG per tablet      methylphenidate (RITALIN) 5 MG tablet 1 tab TID     order for DME " "Equipment being ordered: full spectrum therapy lamp, 10,000 lux     ORDER FOR DME Apply 1 Units topically. TENS unit use as directed     zolpidem ER (AMBIEN CR) 12.5 MG CR tablet Take 1 tablet (12.5 mg) by mouth nightly as needed for sleep     No current facility-administered medications for this visit.        The [place] Prescription Monitoring Program has been reviewed and there are no concerns about diversionary activity for controlled substances at this time.      I was able to review most recent Primary Care Provider, specialty provider, and therapy visit notes that I have access to.     Today, patient reports the generic Pristiq was suprisingly covered by her insurance this go-around at the pharmacy. Was just $12. Not sure of the price discrepency, as was told $100+ last time at [facility]. Anyway, has been taking Pristiq x 4 weeks, at 50 mg qAM. States first 2.5 weeks were \"hell\" with mood while waiting for Pristiq to onset. Used Klonopin 0.5 mg daily to get through those weeks. \"Then one day I felt better\". Feels that Pristiq is helping with mood/anxiety. Tolerating largely well. Had some occasional facial flushness, but denies any other side effects.    She states she needs a Klonopin renewal, but plans to ease up on use, as previously using once every 1-2 weeks.    States Ritalin 5 mg TID no longer seeming to help with attention that much. Still gets dips in between doses.    Continues with Wellbutrin  mg daily; no issues. Continues with Ambien CR 12.5 mg nightly; working consistently well; no issues.    Patient seeing therapist at [facility] monthly. Doing Pt. Starting vaping CBD again; notes this seems to help with anxiety more than pain.    Patient got easily tearful talking about her chronic low energy. Feels she's doing all she can with treatment, exercise and diet.          Past Medical History:   Diagnosis Date     ASD (atrial septal defect)     repaired surgically as a child     Depressive " disorder      Depressive disorder, not elsewhere classified      Headache      Headache      History of blood transfusion 1985 1985 during open heart surgery     Hypertension     higher results at pain clinic, want to confirm ok     Insomnia, unspecified      Other forms of migraine, without mention of intractable migraine without mention of status migrainosus       has a past medical history of ASD (atrial septal defect), Depressive disorder, Depressive disorder, not elsewhere classified, Headache, Headache, History of blood transfusion (1985), Hypertension, Insomnia, unspecified, and Other forms of migraine, without mention of intractable migraine without mention of status migrainosus. She also has no past medical history of Arthritis, Cerebral infarction (H), Congestive heart failure (H), COPD (chronic obstructive pulmonary disease) (H), Diabetes (H), Thyroid disease, or Uncomplicated asthma.    Social history updates:  No significant updates    Substance use updates:  Occassional ETOh; not with Klonopin  Tobacco use: No    Vital Signs:   /80 (BP Location: Right arm, Patient Position: Chair, Cuff Size: Adult Large)   Pulse 83   Temp 99.1  F (37.3  C) (Oral)   Resp 14   Wt 93.9 kg (207 lb)   LMP 03/20/2019 (Approximate)   SpO2 99%   Breastfeeding? No   BMI 32.42 kg/m      Labs:  Most recent laboratory results reviewed and no new labs.    Review of Systems:  10 systems (general, cardiovascular, respiratory, eyes, ENT, endocrine, GI, , M/S, neurological) were reviewed. Most pertinent finding(s) is/are: chronic pain, fatigue. The remaining systems are all unremarkable.    Mental Status Examination:  Appearance:  awake, alert and adequately groomed  Attitude:  cooperative   Eye Contact:  good  Gait and Station: Normal  Psychomotor Behavior:  intact station, gait and muscle tone  Oriented to:  time, person, and place  Attention Span and Concentration:  Normal  Speech:   clear,  coherent  Mood:  anxious and depressed  Affect:  tearful  Associations:  no loose associations  Thought Process:  logical, linear and goal oriented  Thought Content:  Appropriate to Interview  Recent and Remote Memory:  intact Not formally assessed. No amnesia.  Fund of Knowledge: appropriate  Insight:  good  Judgment:  intact  Impulse Control:  intact     Suicide Risk Assessment:  Today Danyelle Canales denies any suicidal ideation or self-harm impulses. Therefore, based on all available evidence including the factors cited above, Danyelle Canales does not appear to be at imminent risk for self-harm, does not meet criteria for a 72-hr hold, and therefore remains appropriate for ongoing outpatient level of care.  A thorough assessment of risk factors related to suicide and self-harm have been reviewed and are noted above. The patient convincingly denies suicidality on several occasions. Local community safety resources printed and reviewed for patient to use if needed. There was no deceit detected, and the patient presented in a manner that was believable.      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  Med: Vicodin.   Maximum use per month: 90  Expected duration: ongoing, pt in comprehensive pain mgmt currently  Narcotic agreement on file: NO  Clinic visit recommended: Q 3 months         Morbid obesity (H) 08/20/2018     Priority: Medium     Non morbid obesity, unspecified obesity type 09/20/2017     Priority: Medium     Esophageal reflux  11/23/2014     Priority: Medium     Chronic pain 01/31/2012     Priority: Medium     ASD (atrial septal defect) 01/16/2012     Priority: Medium     Generalized anxiety disorder 10/18/2011     Priority: Medium     Diagnosis updated by automated process. Provider to review and confirm.       Moderate major depression (H) 09/12/2011     Priority: Medium     History of ovarian cyst 09/12/2011     Priority: Medium     CARDIOVASCULAR SCREENING; LDL GOAL LESS THAN 160 05/09/2010     Priority: Medium     Trichotillomania 08/03/2009     Priority: Medium     PMDD (premenstrual dysphoric disorder) 06/03/2009     Priority: Medium     Migraine headache 07/23/2008     Priority: Medium     [facility] SPRAIN THORACIC REGION 10/08/2007     Priority: Medium     [facility] SPRAIN OF NECK 10/08/2007     Priority: Medium     Encounter for other general counseling or advice on contraception 07/18/2007     Priority: Medium     Diagnosis updated by automated process. Provider to review and confirm.       allergic DERMATITIS NOS 11/12/2004     Priority: Medium     Insomnia 07/16/2004     Priority: Medium     Problem list name updated by automated process. Provider to review           Assessment:  Danyelle Canales reports doing appreciably better mood-wise since time of tapering off Cymbalta and trialing Fetzima. Seems Pristiq has indeed been stabilizing, though time will tell if this is an optimal medication for her. We agree to continue current dosage of 50 mg daily and allow this to optimize further in coming weeks. Given her complaints about inadequacy of Ritalin and dysenergy, we agree to switch formulation to Concerta. She'll trial 18 mg daily first, but she may of her own discretion increase to 36 mg daily if warranted. In meantime, we will maintain Wellbutrin  mg daily. She may continue Ambien CR 12.5 mg nightly. She may use Klonopin 0.5 mg PRN, and is advised to continue to use conservatively.     She is otherwise encouraged to  continue in psychotherapy.    She again appears to be a good candidate for TMS. She is wishing to defer.    Medication side effects and alternatives were reviewed. Health promotion activities recommended and reviewed today. All questions addressed. Education and counseling completed regarding risks and benefits of medications and psychotherapy options.    Treatment Plan:    Continue Pristiq 50 mg daily    Continue Wellbutrin  mg daily    Stop Ritalin    Start Concerta 18 mg daily. May increase to 36 mg daily in a week if warranted.    May use Klonopin 0.5 mg as needed for daytime anxiety; advised of proper/safe usage, onset, duration; not to take with other CNS depressants or sleep aids. Advised to reduce use to former once a week.    May use Ambien CR 12.5 mg nightly for sleep; advised of proper/safe usage, onset, duration; not to take with other CNS depressants or sleep aids    Continue all other medical care per primary care provider.     Continue all other medications as reviewed per electronic medical record today.     Safety plan reviewed. To the Emergency Department as needed or call after hours crisis line at [phone] or [phone]. [place] Crisis Text Line. Text [place] to 538914 or Suicide LifeLine Chat: suicidepreventionlifeline.org/chat/    Continue individual therapy as planned with established therapists.    Schedule an appointment with me in 4 weeks or sooner as needed.    Follow up with primary care provider as planned or for acute medical concerns.    Call the psychiatric nurse line with medication questions or concerns at [phone].    USB Promoshart may be used to communicate with your provider, but this is not intended to be used for emergencies.      Administrative Billing:   Time spent with patient was 30 minutes and greater than 50% of time or 20 minutes was spent in counseling and coordination of care regarding above diagnoses and treatment plan.    Patient Status:  Patient will continue  to be seen for ongoing consultation and stabilization.    Signed:   Aron Alfaro, THADDEUS   Psychiatry

## 2019-04-04 ASSESSMENT — ANXIETY QUESTIONNAIRES: GAD7 TOTAL SCORE: 7

## 2019-04-04 ASSESSMENT — PATIENT HEALTH QUESTIONNAIRE - PHQ9: SUM OF ALL RESPONSES TO PHQ QUESTIONS 1-9: 8

## 2019-04-16 ENCOUNTER — OFFICE VISIT (OUTPATIENT)
Dept: PSYCHIATRY | Facility: CLINIC | Age: 39
End: 2019-04-16
Payer: COMMERCIAL

## 2019-04-16 VITALS
TEMPERATURE: 99 F | SYSTOLIC BLOOD PRESSURE: 132 MMHG | OXYGEN SATURATION: 96 % | HEART RATE: 85 BPM | DIASTOLIC BLOOD PRESSURE: 80 MMHG | RESPIRATION RATE: 14 BRPM | BODY MASS INDEX: 32.26 KG/M2 | WEIGHT: 206 LBS

## 2019-04-16 DIAGNOSIS — F32.1 MODERATE MAJOR DEPRESSION (H): Primary | ICD-10-CM

## 2019-04-16 DIAGNOSIS — F41.1 GENERALIZED ANXIETY DISORDER: ICD-10-CM

## 2019-04-16 PROCEDURE — 99214 OFFICE O/P EST MOD 30 MIN: CPT | Performed by: NURSE PRACTITIONER

## 2019-04-16 RX ORDER — METHYLPHENIDATE HYDROCHLORIDE 18 MG/1
18 TABLET ORAL EVERY MORNING
Qty: 30 TABLET | Refills: 0 | Status: SHIPPED | OUTPATIENT
Start: 2019-04-16 | End: 2019-05-01 | Stop reason: DRUGHIGH

## 2019-04-16 RX ORDER — LURASIDONE HYDROCHLORIDE 20 MG/1
TABLET, FILM COATED ORAL
Qty: 30 TABLET | Refills: 0 | Status: SHIPPED | OUTPATIENT
Start: 2019-04-16 | End: 2019-05-08 | Stop reason: DRUGHIGH

## 2019-04-16 RX ORDER — ZOLPIDEM TARTRATE 12.5 MG/1
12.5 TABLET, FILM COATED, EXTENDED RELEASE ORAL
Qty: 30 TABLET | Refills: 0 | Status: SHIPPED | OUTPATIENT
Start: 2019-04-16 | End: 2019-05-15

## 2019-04-16 RX ORDER — CLONAZEPAM 1 MG/1
TABLET ORAL
Qty: 30 TABLET | Refills: 0 | Status: SHIPPED | OUTPATIENT
Start: 2019-04-16 | End: 2019-06-10

## 2019-04-16 RX ORDER — CLONAZEPAM 1 MG/1
TABLET ORAL
Qty: 30 TABLET | Refills: 0 | Status: SHIPPED | OUTPATIENT
Start: 2019-04-16 | End: 2019-04-16

## 2019-04-16 RX ORDER — DESVENLAFAXINE 100 MG/1
100 TABLET, EXTENDED RELEASE ORAL DAILY
Qty: 30 TABLET | Refills: 1 | Status: SHIPPED | OUTPATIENT
Start: 2019-04-16 | End: 2019-05-15

## 2019-04-16 ASSESSMENT — ANXIETY QUESTIONNAIRES
3. WORRYING TOO MUCH ABOUT DIFFERENT THINGS: NEARLY EVERY DAY
IF YOU CHECKED OFF ANY PROBLEMS ON THIS QUESTIONNAIRE, HOW DIFFICULT HAVE THESE PROBLEMS MADE IT FOR YOU TO DO YOUR WORK, TAKE CARE OF THINGS AT HOME, OR GET ALONG WITH OTHER PEOPLE: SOMEWHAT DIFFICULT
1. FEELING NERVOUS, ANXIOUS, OR ON EDGE: NEARLY EVERY DAY
2. NOT BEING ABLE TO STOP OR CONTROL WORRYING: NEARLY EVERY DAY
7. FEELING AFRAID AS IF SOMETHING AWFUL MIGHT HAPPEN: MORE THAN HALF THE DAYS
5. BEING SO RESTLESS THAT IT IS HARD TO SIT STILL: SEVERAL DAYS

## 2019-04-16 ASSESSMENT — PATIENT HEALTH QUESTIONNAIRE - PHQ9
SUM OF ALL RESPONSES TO PHQ QUESTIONS 1-9: 13
5. POOR APPETITE OR OVEREATING: MORE THAN HALF THE DAYS
SUM OF ALL RESPONSES TO PHQ QUESTIONS 1-9: 13
10. IF YOU CHECKED OFF ANY PROBLEMS, HOW DIFFICULT HAVE THESE PROBLEMS MADE IT FOR YOU TO DO YOUR WORK, TAKE CARE OF THINGS AT HOME, OR GET ALONG WITH OTHER PEOPLE: SOMEWHAT DIFFICULT

## 2019-04-16 NOTE — PROGRESS NOTES
"    Outpatient Psychiatric Progress Note    Name: Danyelle Canales   : 1980                    Primary Care Provider: Smiley Kim PA-C   Thereapist: Connor ESCALERA - last visit 3/22/2019    PHQ-9 scores:  PHQ-9 SCORE 3/5/2019 4/3/2019 2019   PHQ-9 Total Score - - -   PHQ-9 Total Score MyChart 10 (Moderate depression) 8 (Mild depression) 13 (Moderate depression)   PHQ-9 Total Score 10 8 13       NAJMA-7 scores:  NAJMA-7 SCORE 2019 3/5/2019 4/3/2019   Total Score - - -   Total Score - 9 (mild anxiety) 7 (mild anxiety)   Total Score 6 9 7     Answers for HPI/ROS submitted by the patient on 2019   If you checked off any problems, how difficult have these problems made it for you to do your work, take care of things at home, or get along with other people?: Somewhat difficult  PHQ9 TOTAL SCORE: 13      Patient Identification:  Patient is a 39 year old year old,   White American female  who presents for return visit with me.  Patient is currently employed full time. Patient attended the session alone. Patient prefers to be called: \"Danyelle\".    Interim History:  I last saw Danyelle Canales for outpatient psychiatry Return Visit on 4/3/19.     During that appointment, we agreed to continue recently-initiated Pristiq and alter her Ritalin to perhaps more tolerable Concerta. We maintained Wellbutrin  mg daily. She was permitted to continue Ambien CR 12.5 mg nightly and use Klonopin 0.5 mg PRN acute anxiety. She was to continue psychotherapy.      Current medications include:   Current Outpatient Medications   Medication Sig     buPROPion (WELLBUTRIN XL) 300 MG 24 hr tablet Take 1 tablet (300 mg) by mouth every morning     clonazePAM (KLONOPIN) 0.5 MG tablet 1 tab daily prn anxiety     desvenlafaxine (PRISTIQ) 100 MG 24 hr tablet Take 1 tablet (100 mg) by mouth daily     desvenlafaxine (PRISTIQ) 50 MG 24 hr tablet Take 1 tablet (50 mg) by mouth daily     HYDROcodone-acetaminophen " (NORCO) 5-325 MG per tablet      methylphenidate (CONCERTA) 18 MG CR tablet Take 1 tablet (18 mg) by mouth every morning     mirtazapine (REMERON) 15 MG tablet TAKE 1 TAB BY MOUTH DAILY AT BEDTIME.     order for DME Equipment being ordered: full spectrum therapy lamp, 10,000 lux     ORDER FOR DME Apply 1 Units topically. TENS unit use as directed     zolpidem ER (AMBIEN CR) 12.5 MG CR tablet Take 1 tablet (12.5 mg) by mouth nightly as needed for sleep     No current facility-administered medications for this visit.        The [place] Prescription Monitoring Program has been reviewed and there are no concerns about diversionary activity for controlled substances at this time.      I was able to review most recent Primary Care Provider, specialty provider, and therapy visit notes that I have access to.     Today, patient reports her mood and anxiety are worse than last visit. Easily worked up, crying. Not even sure why. States workplace is even less stressful than usual. She has been too depressed to make it to gym, which is worsening mood further.    She has been taking Pristiq x 5 weeks; increased to 100 mg daily in past 1 week; is not feeling as optimistic about this agent anymore; having a little bit of facial flushness, but tolerating OK otherwise.    Changed Ritalin to Concerta 18 mg qAM; endorsing smoother duration of effect.    Continues with Wellbutrin  mg daily. States Klonopin 0.5 mg not attending to anxiety; using most days. Using Ambien CR 12.5 mg nightly for sleep; still helping.    Patient seeing therapist at [facility] monthly.          Past Medical History:   Diagnosis Date     ASD (atrial septal defect)     repaired surgically as a child     Depressive disorder      Depressive disorder, not elsewhere classified      Headache      Headache      History of blood transfusion 1985 1985 during open heart surgery     Hypertension     higher results at pain clinic, want to confirm ok      Insomnia, unspecified      Other forms of migraine, without mention of intractable migraine without mention of status migrainosus       has a past medical history of ASD (atrial septal defect), Depressive disorder, Depressive disorder, not elsewhere classified, Headache, Headache, History of blood transfusion (1985), Hypertension, Insomnia, unspecified, and Other forms of migraine, without mention of intractable migraine without mention of status migrainosus. She also has no past medical history of Arthritis, Cerebral infarction (H), Congestive heart failure (H), COPD (chronic obstructive pulmonary disease) (H), Diabetes (H), Thyroid disease, or Uncomplicated asthma.    Social history updates:  No updates    Substance use updates:  Occassional ETOh; not with Klonopin  Tobacco use: No        Vital Signs:   /80 (BP Location: Right arm, Patient Position: Chair, Cuff Size: Adult Large)   Pulse 85   Temp 99  F (37.2  C) (Oral)   Resp 14   Wt 93.4 kg (206 lb)   LMP 04/11/2019 (Approximate)   SpO2 96%   Breastfeeding? No   BMI 32.26 kg/m      Labs:  Most recent laboratory results reviewed and no new labs.    Review of Systems:  10 systems (general, cardiovascular, respiratory, eyes, ENT, endocrine, GI, , M/S, neurological) were reviewed. Most pertinent finding(s) is/are: chronic pain, fatigue. The remaining systems are all unremarkable.    Mental Status Examination:  Appearance:  awake, alert and adequately groomed  Attitude:  cooperative   Eye Contact:  good  Gait and Station: Normal  Psychomotor Behavior:  intact station, gait and muscle tone  Oriented to:  time, person, and place  Attention Span and Concentration:  Normal  Speech:   clear, coherent  Mood:  anxious and depressed  Affect:  tearful  Associations:  no loose associations  Thought Process:  logical, linear and goal oriented  Thought Content:  Appropriate to Interview  Recent and Remote Memory:  intact Not formally assessed. No amnesia.  Fund  of Knowledge: appropriate  Insight:  good  Judgment:  intact  Impulse Control:  intact     Suicide Risk Assessment:  Today Danyelle Canales denies any suicidal ideation or self-harm impulses. Therefore, based on all available evidence including the factors cited above, Danyelle Canales does not appear to be at imminent risk for self-harm, does not meet criteria for a 72-hr hold, and therefore remains appropriate for ongoing outpatient level of care.  A thorough assessment of risk factors related to suicide and self-harm have been reviewed and are noted above. The patient convincingly denies suicidality on several occasions. Local community safety resources printed and reviewed for patient to use if needed. There was no deceit detected, and the patient presented in a manner that was believable.      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  Med: Vicodin.   Maximum use per month: 90  Expected duration: ongoing, pt in comprehensive pain mgmt currently  Narcotic agreement on file: NO  Clinic visit recommended: Q 3 months         Morbid obesity (H) 08/20/2018     Priority: Medium     Non morbid obesity, unspecified obesity type 09/20/2017     Priority: Medium     Esophageal reflux 11/23/2014     Priority: Medium     Chronic pain 01/31/2012     Priority: Medium     ASD (atrial septal defect) 01/16/2012     Priority: Medium     Generalized anxiety disorder 10/18/2011     Priority: Medium     Diagnosis updated by automated process. Provider to  review and confirm.       Moderate major depression (H) 09/12/2011     Priority: Medium     History of ovarian cyst 09/12/2011     Priority: Medium     CARDIOVASCULAR SCREENING; LDL GOAL LESS THAN 160 05/09/2010     Priority: Medium     Trichotillomania 08/03/2009     Priority: Medium     PMDD (premenstrual dysphoric disorder) 06/03/2009     Priority: Medium     Migraine headache 07/23/2008     Priority: Medium     [facility] SPRAIN THORACIC REGION 10/08/2007     Priority: Medium     [facility] SPRAIN OF NECK 10/08/2007     Priority: Medium     Encounter for other general counseling or advice on contraception 07/18/2007     Priority: Medium     Diagnosis updated by automated process. Provider to review and confirm.       allergic DERMATITIS NOS 11/12/2004     Priority: Medium     Insomnia 07/16/2004     Priority: Medium     Problem list name updated by automated process. Provider to review           Assessment:  Danyelle Canales reports a regrettable backslide into familiar depression and anxiety. We'll maintain Pristiq at 100 mg daily another month further to more fully vet -- it would appear no worse or better than Cymbalta presently. We discussed returning back to consideration of atypical antipsychotic, and agree to a trial of Latuda. We'll otherwise maintain Wellbutrin  mg daily and Concerta 18 mg daily. She may continue Ambien CR 12.5 mg nightly. She may increase her Klonopin dosage to 1 mg for the next couple of weeks to attend to her heightened anxiety, but I want her decreasing this back down to 0.5 mg occassional use at some point. This is now the patient's 10th visit with me, and we have overall trialed Lamictal, Abilify, Fetzima, Pristiq, and methylphenidate. I am running out of pharmacological options for her, and continue to believe she is a good candidate.    She is otherwise encouraged to continue in psychotherapy.    Medication side effects and alternatives were reviewed. Health promotion activities  recommended and reviewed today. All questions addressed. Education and counseling completed regarding risks and benefits of medications and psychotherapy options.    Treatment Plan:    Start Latuda 20 mg per evening with dinner.    Continue Pristiq 100 mg daily    Continue Wellbutrin  mg daily    Continue Concerta 18 mg daily    Start Concerta 18 mg daily. May increase to 36 mg daily in a week if warranted.    May use Klonopin 1 mg as needed for daytime anxiety; advised of proper/safe usage, onset, duration; not to take with other CNS depressants or sleep aids. Advised to reduce use to former use of 0.5 mg in a couple of weeks    May use Ambien CR 12.5 mg nightly for sleep; advised of proper/safe usage, onset, duration; not to take with other CNS depressants or sleep aids    Continue all other medical care per primary care provider.     Continue all other medications as reviewed per electronic medical record today.     Safety plan reviewed. To the Emergency Department as needed or call after hours crisis line at [phone] or [phone]. [place] Crisis Text Line. Text [place] to 610078 or Suicide LifeLine Chat: suicidepreventionlifeline.org/chat/    Continue individual therapy as planned with established therapists.    Schedule an appointment with me in 4 weeks or sooner as needed.    Follow up with primary care provider as planned or for acute medical concerns.    Call the psychiatric nurse line with medication questions or concerns at [phone].    Dynadmichart may be used to communicate with your provider, but this is not intended to be used for emergencies.        Administrative Billing:   Time spent with patient was 30 minutes and greater than 50% of time or 20 minutes was spent in counseling and coordination of care regarding above diagnoses and treatment plan.    Patient Status:  Patient will continue to be seen for ongoing consultation and stabilization.    Signed:   Aron Alfaro CNP   Psychiatry

## 2019-04-17 ASSESSMENT — PATIENT HEALTH QUESTIONNAIRE - PHQ9: SUM OF ALL RESPONSES TO PHQ QUESTIONS 1-9: 13

## 2019-05-01 ENCOUNTER — MYC MEDICAL ADVICE (OUTPATIENT)
Dept: PSYCHIATRY | Facility: CLINIC | Age: 39
End: 2019-05-01

## 2019-05-01 DIAGNOSIS — F32.1 MODERATE MAJOR DEPRESSION (H): Primary | ICD-10-CM

## 2019-05-01 RX ORDER — METHYLPHENIDATE HYDROCHLORIDE 36 MG/1
36 TABLET ORAL EVERY MORNING
Qty: 30 TABLET | Refills: 0 | Status: SHIPPED | OUTPATIENT
Start: 2019-05-01 | End: 2019-05-15

## 2019-05-01 NOTE — TELEPHONE ENCOUNTER
Routing to provider to mail Rx to pharmacy as indicated on script:    [facility]  [address] [place] [id number]

## 2019-05-08 ENCOUNTER — MYC MEDICAL ADVICE (OUTPATIENT)
Dept: PSYCHIATRY | Facility: CLINIC | Age: 39
End: 2019-05-08

## 2019-05-08 DIAGNOSIS — F32.1 MODERATE MAJOR DEPRESSION (H): Primary | ICD-10-CM

## 2019-05-08 RX ORDER — LURASIDONE HYDROCHLORIDE 40 MG/1
40 TABLET, FILM COATED ORAL DAILY
Qty: 30 TABLET | Refills: 0 | Status: SHIPPED | OUTPATIENT
Start: 2019-05-08 | End: 2019-05-15

## 2019-05-15 ENCOUNTER — MYC MEDICAL ADVICE (OUTPATIENT)
Dept: PSYCHIATRY | Facility: CLINIC | Age: 39
End: 2019-05-15

## 2019-05-15 DIAGNOSIS — F41.1 GENERALIZED ANXIETY DISORDER: ICD-10-CM

## 2019-05-15 DIAGNOSIS — F32.1 MODERATE MAJOR DEPRESSION (H): ICD-10-CM

## 2019-05-15 DIAGNOSIS — F33.1 MAJOR DEPRESSIVE DISORDER, RECURRENT EPISODE, MODERATE (H): ICD-10-CM

## 2019-05-15 RX ORDER — ZOLPIDEM TARTRATE 12.5 MG/1
12.5 TABLET, FILM COATED, EXTENDED RELEASE ORAL
Qty: 30 TABLET | Refills: 0 | Status: SHIPPED | OUTPATIENT
Start: 2019-05-15 | End: 2019-06-25

## 2019-05-15 RX ORDER — DESVENLAFAXINE 100 MG/1
100 TABLET, EXTENDED RELEASE ORAL DAILY
Qty: 30 TABLET | Refills: 0 | Status: SHIPPED | OUTPATIENT
Start: 2019-05-15 | End: 2019-06-10

## 2019-05-15 RX ORDER — CLONAZEPAM 1 MG/1
TABLET ORAL
Qty: 30 TABLET | Refills: 0 | Status: CANCELLED | OUTPATIENT
Start: 2019-05-15

## 2019-05-15 RX ORDER — METHYLPHENIDATE HYDROCHLORIDE 36 MG/1
36 TABLET ORAL EVERY MORNING
Qty: 30 TABLET | Refills: 0 | Status: SHIPPED | OUTPATIENT
Start: 2019-05-29 | End: 2019-06-10

## 2019-05-15 RX ORDER — BUPROPION HYDROCHLORIDE 300 MG/1
300 TABLET ORAL EVERY MORNING
Qty: 30 TABLET | Refills: 0 | Status: SHIPPED | OUTPATIENT
Start: 2019-05-15 | End: 2019-06-10

## 2019-05-15 RX ORDER — CLONAZEPAM 0.5 MG/1
TABLET ORAL
Qty: 30 TABLET | Refills: 0 | Status: SHIPPED | OUTPATIENT
Start: 2019-05-15 | End: 2019-06-25

## 2019-05-15 RX ORDER — LURASIDONE HYDROCHLORIDE 40 MG/1
40 TABLET, FILM COATED ORAL DAILY
Qty: 30 TABLET | Refills: 0 | Status: SHIPPED | OUTPATIENT
Start: 2019-05-15 | End: 2019-06-10

## 2019-05-15 NOTE — TELEPHONE ENCOUNTER
Refill for: clonazepam and others    Last Appointment: 19    Next Appointment: 6/10/19    No Shows/Cancellations since last appointment:   and 5/15 cancelled by provider    Last Refill in Epic (date and amount/how many days):    Disp Refills Start End MATT   clonazePAM (KLONOPIN) 1 MG tablet 30 tablet 0 2019  No   Si tab daily prn anxiety      reviewed and summarized below:   Fill Date Written    Drug   Qty Days Prescriber   2019 Concerta Er 36 Mg Tablet   30 30 Da Mca   2019 Zolpidem Tart Er 12.5 Mg Tab  30 30 Da Mca   2019 Concerta Er 18 Mg Tablet   30 30 Da Mca   2019 Hydrocodone-Acetamin 5-325 Mg  8 4 Nunn Lee   2019 Clonazepam 1 Mg Tablet   30 30 Da Mca     Per Aron's  progress note/treatment plan:    Start Latuda 20 mg per evening with dinner (this was increased to 40 mg on )    Continue Pristiq 100 mg daily     Continue Wellbutrin  mg daily    Start Concerta 18 mg daily. May increase to 36 mg daily in a week if warranted.(this was increased to 36 mg on )    May use Klonopin 1 mg as needed for daytime anxiety; advised of proper/safe usage, onset, duration; not to take with other CNS depressants or sleep aids. Advised to reduce use to former use of 0.5 mg in a couple of weeks    May use Ambien CR 12.5 mg nightly for sleep; advised of proper/safe usage, onset, duration; not to take with other CNS depressants or sleep aids    Continue all other medical care per primary care provider.     Writer sent a message to patient asking for clarification on which medications she believes she will need refills for. She stated she will need:  Clonazepam 1mg         -next few days  Zolpidem 12.5 mg       -around   Desvenlafaxine 100 mg   -around   Bupropion 300 mg      -around   Concerta 36mg            -around   Latuda 40mg                -around     Prescriptions pended as 30 day  supplies with no refills. Routing to provider for review and approval.    She would like the Concerta Rx mailed to the pharmacy.    See [employer] messages for additional information.

## 2019-05-29 DIAGNOSIS — F33.1 MAJOR DEPRESSIVE DISORDER, RECURRENT EPISODE, MODERATE (H): ICD-10-CM

## 2019-05-29 NOTE — TELEPHONE ENCOUNTER
"Requested Prescriptions   Pending Prescriptions Disp Refills     buPROPion (WELLBUTRIN XL) 300 MG 24 hr tablet [Pharmacy Med Name: BUPROPION HCL  MG TABLET]  Last Written Prescription Date:  5/15/2019  Last Fill Quantity: 30 tabs,  # refills: 0   Last office visit: 8/20/2018 with prescribing provider:  Judy   Future Office Visit:   Next 5 appointments (look out 90 days)    Jagdish 10, 2019  2:15 PM CDT  Return Visit with Aron Alfaro CNP  [facility] ([facility] [address] [address] [address] [phone]          90 tablet 3     Sig: TAKE 1 TABLET BY MOUTH EVERY MORNING ALONG WITH 150 MG TABLET       SSRIs Protocol Failed - 5/29/2019  2:00 AM        Failed - PHQ-9 score less than 5 in past 6 months     Please review last PHQ-9 score.           Failed - Recent (6 mo) or future (30 days) visit within the authorizing provider's specialty     Patient had office visit in the last 6 months or has a visit in the next 30 days with authorizing provider or within the authorizing provider's specialty.  See \"Patient Info\" tab in inbasket, or \"Choose Columns\" in Meds & Orders section of the refill encounter.            Passed - Medication is Bupropion     If the medication is Bupropion (Wellbutrin), and the patient is taking for smoking cessation; OK to refill.          Passed - Medication is active on med list        Passed - Patient is age 18 or older        Passed - No active pregnancy on record        Passed - No positive pregnancy test in last 12 months          "

## 2019-06-03 RX ORDER — BUPROPION HYDROCHLORIDE 300 MG/1
TABLET ORAL
Qty: 90 TABLET | Refills: 3 | OUTPATIENT
Start: 2019-06-03

## 2019-06-03 NOTE — TELEPHONE ENCOUNTER
"Annual exam was advised at last visit with PCP on 8/20/18.    PHQ-9 score:    PHQ-9 SCORE 4/16/2019   PHQ-9 Total Score -   PHQ-9 Total Score MyChart 13 (Moderate depression)   PHQ-9 Total Score 13         Appears this med is managed by psych.    She is scheduled to see psych 6/10/19, last Rx sent was 5/15/19 for a month.  Can she wait to see psych?   I called patient, she was planning to address this with psych and won't run out.  She says she did not request this refill.  \"Refusal\" routed back to pharmacy with note.        Sherrell Whyte RN  [facility]                "

## 2019-06-10 ENCOUNTER — OFFICE VISIT (OUTPATIENT)
Dept: PSYCHIATRY | Facility: CLINIC | Age: 39
End: 2019-06-10
Payer: COMMERCIAL

## 2019-06-10 VITALS
DIASTOLIC BLOOD PRESSURE: 95 MMHG | RESPIRATION RATE: 10 BRPM | WEIGHT: 200 LBS | HEART RATE: 97 BPM | BODY MASS INDEX: 31.32 KG/M2 | SYSTOLIC BLOOD PRESSURE: 142 MMHG | OXYGEN SATURATION: 99 %

## 2019-06-10 DIAGNOSIS — F33.1 MAJOR DEPRESSIVE DISORDER, RECURRENT EPISODE, MODERATE (H): ICD-10-CM

## 2019-06-10 DIAGNOSIS — F41.1 GENERALIZED ANXIETY DISORDER: ICD-10-CM

## 2019-06-10 DIAGNOSIS — F32.1 MODERATE MAJOR DEPRESSION (H): Primary | ICD-10-CM

## 2019-06-10 PROCEDURE — 99214 OFFICE O/P EST MOD 30 MIN: CPT | Performed by: NURSE PRACTITIONER

## 2019-06-10 RX ORDER — LURASIDONE HYDROCHLORIDE 40 MG/1
40 TABLET, FILM COATED ORAL DAILY
Qty: 30 TABLET | Refills: 0 | Status: SHIPPED | OUTPATIENT
Start: 2019-06-10 | End: 2019-06-25

## 2019-06-10 RX ORDER — CLONAZEPAM 1 MG/1
TABLET ORAL
Qty: 30 TABLET | Refills: 0 | Status: SHIPPED | OUTPATIENT
Start: 2019-06-10 | End: 2019-06-25

## 2019-06-10 RX ORDER — DEXTROAMPHETAMINE SACCHARATE, AMPHETAMINE ASPARTATE MONOHYDRATE, DEXTROAMPHETAMINE SULFATE AND AMPHETAMINE SULFATE 2.5; 2.5; 2.5; 2.5 MG/1; MG/1; MG/1; MG/1
10 CAPSULE, EXTENDED RELEASE ORAL DAILY
Qty: 30 CAPSULE | Refills: 0 | Status: SHIPPED | OUTPATIENT
Start: 2019-06-10 | End: 2019-06-25

## 2019-06-10 RX ORDER — BUPROPION HYDROCHLORIDE 300 MG/1
300 TABLET ORAL EVERY MORNING
Qty: 30 TABLET | Refills: 0 | Status: SHIPPED | OUTPATIENT
Start: 2019-06-10 | End: 2019-06-25

## 2019-06-10 RX ORDER — DESVENLAFAXINE 100 MG/1
100 TABLET, EXTENDED RELEASE ORAL DAILY
Qty: 30 TABLET | Refills: 0 | Status: SHIPPED | OUTPATIENT
Start: 2019-06-10 | End: 2019-06-25

## 2019-06-10 ASSESSMENT — ANXIETY QUESTIONNAIRES
2. NOT BEING ABLE TO STOP OR CONTROL WORRYING: NEARLY EVERY DAY
4. TROUBLE RELAXING: NEARLY EVERY DAY
GAD7 TOTAL SCORE: 16
6. BECOMING EASILY ANNOYED OR IRRITABLE: SEVERAL DAYS
GAD7 TOTAL SCORE: 16
GAD7 TOTAL SCORE: 16
5. BEING SO RESTLESS THAT IT IS HARD TO SIT STILL: NOT AT ALL
7. FEELING AFRAID AS IF SOMETHING AWFUL MIGHT HAPPEN: NEARLY EVERY DAY
7. FEELING AFRAID AS IF SOMETHING AWFUL MIGHT HAPPEN: NEARLY EVERY DAY
3. WORRYING TOO MUCH ABOUT DIFFERENT THINGS: NEARLY EVERY DAY
1. FEELING NERVOUS, ANXIOUS, OR ON EDGE: NEARLY EVERY DAY

## 2019-06-10 ASSESSMENT — PATIENT HEALTH QUESTIONNAIRE - PHQ9
SUM OF ALL RESPONSES TO PHQ QUESTIONS 1-9: 12
10. IF YOU CHECKED OFF ANY PROBLEMS, HOW DIFFICULT HAVE THESE PROBLEMS MADE IT FOR YOU TO DO YOUR WORK, TAKE CARE OF THINGS AT HOME, OR GET ALONG WITH OTHER PEOPLE: SOMEWHAT DIFFICULT
SUM OF ALL RESPONSES TO PHQ QUESTIONS 1-9: 12

## 2019-06-10 NOTE — PROGRESS NOTES
"    Outpatient Psychiatric Progress Note    Name: Danyelle Canales   : 1980                    Primary Care Provider: Smiley Kim PA-C   Therapist: Connor ESCALERA     PHQ-9 scores:  PHQ-9 SCORE 4/3/2019 2019 6/10/2019   PHQ-9 Total Score - - -   PHQ-9 Total Score MyChart 8 (Mild depression) 13 (Moderate depression) 12 (Moderate depression)   PHQ-9 Total Score 8 13 12       NAJMA-7 scores:  NAJMA-7 SCORE 3/5/2019 4/3/2019 6/10/2019   Total Score - - -   Total Score 9 (mild anxiety) 7 (mild anxiety) 16 (severe anxiety)   Total Score 9 7 16           Patient Identification:  Patient is a 39 year old year old,   White American female  who presents for return visit with me.  Patient is currently employed full time. Patient attended the session alone. Patient prefers to be called: \"Danyelle\".    Interim History:  I last saw Danyelle Canales for outpatient psychiatry Return Visit on 19.     During that appointment, we had her start Latuda for her depression/anxiety. She was to continue Pristiq, Wellbutrin, Concerta and was permitted to use Ambien nightly and Klonopin as needed for daytime anxiety. She was to continue psychotherapy.    She later messaged us about continued flushing on Pristiq, and experiencing sedation with Latuda at increased dosage of 40 mg daily; also wasn't finding Concerta effective. I told her she could decrease Latuda back down to 20 mg daily.    Current medications include:   Current Outpatient Medications   Medication Sig     buPROPion (WELLBUTRIN XL) 300 MG 24 hr tablet Take 1 tablet (300 mg) by mouth every morning     clonazePAM (KLONOPIN) 0.5 MG tablet 1 tab daily prn anxiety     desvenlafaxine (PRISTIQ) 100 MG 24 hr tablet Take 1 tablet (100 mg) by mouth daily     HYDROcodone-acetaminophen (NORCO) 5-325 MG per tablet      lurasidone (LATUDA) 40 MG TABS tablet Take 1 tablet (40 mg) by mouth daily     methylphenidate (CONCERTA) 36 MG CR tablet Take 1 tablet (36 mg) " by mouth every morning     order for DME Equipment being ordered: full spectrum therapy lamp, 10,000 lux     ORDER FOR DME Apply 1 Units topically. TENS unit use as directed     zolpidem ER (AMBIEN CR) 12.5 MG CR tablet Take 1 tablet (12.5 mg) by mouth nightly as needed for sleep     clonazePAM (KLONOPIN) 1 MG tablet 1 tab daily prn anxiety (Patient not taking: Reported on 6/10/2019)     mirtazapine (REMERON) 15 MG tablet TAKE 1 TAB BY MOUTH DAILY AT BEDTIME.     No current facility-administered medications for this visit.        The [place] Prescription Monitoring Program has been reviewed and there are no concerns about diversionary activity for controlled substances at this time.      I was able to review most recent Primary Care Provider, specialty provider, and therapy visit notes that I have access to.     Today, patient reports her mood has felt to worsen; anxiety still elevated; her close friend in [place] has breast cancer and severity is not known until she has surgery; has been trying to be supportive to her.    States she felt more sedate and hungrier on 40 mg of Latuda; gained 5 lbs; though did feel her impression was somewhat improved. She decreased this to 20 mg, but mood plummeted, so she re-uped to 30 mg daily. She thought to dose it closer to bedtime, but doesn't reliably snack sufficient calories then on her keto diet. Did increase Concerta to 36 mg daily; didn't really find it helpful to mood/anxiety, though wasn't at up and down as Ritalin. Pristiq remains at 100 mg daily; continues to have easy facial flushing; like most all antidepressants, feels this to have been promising at first, but short lived. Wondering about another change. Continues Wellbutrin  mg daily; no issues. Is taking Klonopin most days; and with friend's cancer has been using 1 mg daily for past week. She remains committed to goal of reduced Klonopin, but for next week would appreciate 1 mg daily, and will then scale  down. She continues Ambien CR 12.5 mg nightly; still helpful with sleep.    She continues to feel that TMS not an option due to time commitment and worries about insurance not covering.    Patient seeing therapist at [facility] every 2-4 weeks.        Past Medical History:   Diagnosis Date     ASD (atrial septal defect)     repaired surgically as a child     Depressive disorder      Depressive disorder, not elsewhere classified      Headache      Headache      History of blood transfusion 1985 1985 during open heart surgery     Hypertension     higher results at pain clinic, want to confirm ok     Insomnia, unspecified      Other forms of migraine, without mention of intractable migraine without mention of status migrainosus       has a past medical history of ASD (atrial septal defect), Depressive disorder, Depressive disorder, not elsewhere classified, Headache, Headache, History of blood transfusion (1985), Hypertension, Insomnia, unspecified, and Other forms of migraine, without mention of intractable migraine without mention of status migrainosus. She also has no past medical history of Arthritis, Cerebral infarction (H), Congestive heart failure (H), COPD (chronic obstructive pulmonary disease) (H), Diabetes (H), Thyroid disease, or Uncomplicated asthma.    Social history updates:  See above    Substance use updates:  Occassional ETOh; not with Klonopin  Tobacco use: No      Vital Signs:   BP (!) 142/95 (BP Location: Right arm, Patient Position: Chair, Cuff Size: Adult Large)   Pulse 97   Resp 10   Wt 90.7 kg (200 lb)   LMP 06/03/2019   SpO2 99%   Breastfeeding? No   BMI 31.32 kg/m      Labs:  Most recent laboratory results reviewed and no new labs.     Review of Systems:  10 systems (general, cardiovascular, respiratory, eyes, ENT, endocrine, GI, , M/S, neurological) were reviewed. Most pertinent finding(s) is/are: chronic pain, fatigue, facial flushness. The remaining systems are all  unremarkable.    Mental Status Examination:  Appearance:  awake, alert and adequately groomed  Attitude:  cooperative   Eye Contact:  good  Gait and Station: Normal  Psychomotor Behavior:  intact station, gait and muscle tone  Oriented to:  time, person, and place  Attention Span and Concentration:  Normal  Speech:   clear, coherent  Mood:  anxious and depressed  Affect:  tearful  Associations:  no loose associations  Thought Process:  logical, linear and goal oriented  Thought Content:  Appropriate to Interview  Recent and Remote Memory:  intact Not formally assessed. No amnesia.  Fund of Knowledge: appropriate  Insight:  good  Judgment:  intact  Impulse Control:  intact     Suicide Risk Assessment:  Today Danyelle Canales denies any suicidal ideation or self-harm impulses. Therefore, based on all available evidence including the factors cited above, Danyelle Canales does not appear to be at imminent risk for self-harm, does not meet criteria for a 72-hr hold, and therefore remains appropriate for ongoing outpatient level of care.  A thorough assessment of risk factors related to suicide and self-harm have been reviewed and are noted above. The patient convincingly denies suicidality on several occasions. Local community safety resources printed and reviewed for patient to use if needed. There was no deceit detected, and the patient presented in a manner that was believable.      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  Med: Vicodin.    Maximum use per month: 90  Expected duration: ongoing, pt in comprehensive pain mgmt currently  Narcotic agreement on file: NO  Clinic visit recommended: Q 3 months         Morbid obesity (H) 08/20/2018     Priority: Medium     Non morbid obesity, unspecified obesity type 09/20/2017     Priority: Medium     Esophageal reflux 11/23/2014     Priority: Medium     Chronic pain 01/31/2012     Priority: Medium     ASD (atrial septal defect) 01/16/2012     Priority: Medium     Generalized anxiety disorder 10/18/2011     Priority: Medium     Diagnosis updated by automated process. Provider to review and confirm.       Moderate major depression (H) 09/12/2011     Priority: Medium     History of ovarian cyst 09/12/2011     Priority: Medium     CARDIOVASCULAR SCREENING; LDL GOAL LESS THAN 160 05/09/2010     Priority: Medium     Trichotillomania 08/03/2009     Priority: Medium     PMDD (premenstrual dysphoric disorder) 06/03/2009     Priority: Medium     Migraine headache 07/23/2008     Priority: Medium     ia SPRAIN THORACIC REGION 10/08/2007     Priority: Medium     ia SPRAIN OF NECK 10/08/2007     Priority: Medium     Encounter for other general counseling or advice on contraception 07/18/2007     Priority: Medium     Diagnosis updated by automated process. Provider to review and confirm.       allergic DERMATITIS NOS 11/12/2004     Priority: Medium     Insomnia 07/16/2004     Priority: Medium     Problem list name updated by automated process. Provider to review           Assessment:  Danyelle Canales reports continued depression and anxiety, worsned with stress of friend's cancer, and with decrease in Latuda. This is now the patient's 11th visit with me, and we have overall trialed Lamictal, Abilify, Fetzima, Pristiq, Latuda and methylphenidate. I am running out of pharmacological options for her, and continue to believe she is a good candidate for TMS. She is finally today receptive to exploring this option a bit  further. I did offer a referral to [facility]'s clinic in [place], but as she's already familiar with  [facility] and lives closer to that clinic, she says she'll place a call to learn more about their clinic operation hours.    In the meantime, I don't know that switching out her main antidepressant is a wise move at this point. I suggest she start supplementing with l-methylfolate, and perhaps this will improve the potency of all her psychopharm, perhaps to a degree even that she might be able to reduce some of her medication, speaking most optimistically. We'll maintain Pristiq at 100 mg daily and Wellbutrin  mg daily. We'll redose Latuda at 40 mg daily, and have her take this in the morning instead with her breakfast and see if this is more tolerable. We'll discontinue Concerta in favor of Adderall XR, which might provide for some more potent antidepressant and pro-energy effect to balcance out the Latuda. She may continue Ambien CR 12.5 mg nightly. She may increase her Klonopin dosage to 1 mg for the next week to attend to her heightened anxiety, but I want her decreasing this back down to 0.5 mg occassional use at some point.    She is otherwise encouraged to continue in psychotherapy.    Medication side effects and alternatives were reviewed. Health promotion activities recommended and reviewed today. All questions addressed. Education and counseling completed regarding risks and benefits of medications and psychotherapy options.    Treatment Plan:    Increase Latuda back to 40 mg; switch to AM dosing with meal    Start l-methylfolate 15 mg daily. Forwarded some suggested brands.    Continue Pristiq 100 mg daily    Continue Wellbutrin  mg daily    Discontinue Concerta. Start Adderall XR 10 mg daily    May use Klonopin 1 mg as needed for daytime anxiety; advised of proper/safe usage, onset, duration; not to take with other CNS depressants or sleep aids. Advised to reduce use to former use of  0.5 mg in a week.    May use Ambien CR 12.5 mg nightly for sleep; advised of proper/safe usage, onset, duration; not to take with other CNS depressants or sleep aids    Call [facility] to inquire about TMS hours    Continue all other medical care per primary care provider.     Continue all other medications as reviewed per electronic medical record today.     Safety plan reviewed. To the Emergency Department as needed or call after hours crisis line at [phone] or [phone]. [place] Crisis Text Line. Text [place] to 146046 or Suicide LifeLine Chat: suicidepreventionlifeline.org/chat/    Continue individual therapy as planned with established therapists.    Schedule an appointment with me in 3-4 weeks or sooner as needed.    Follow up with primary care provider as planned or for acute medical concerns.    Call the psychiatric nurse line with medication questions or concerns at [phone].    [employer] may be used to communicate with your provider, but this is not intended to be used for emergencies.      Administrative Billing:   Time spent with patient was 30 minutes and greater than 50% of time or 20 minutes was spent in counseling and coordination of care regarding above diagnoses and treatment plan.    Patient Status:  Patient will continue to be seen for ongoing consultation and stabilization.    Signed:   Aron Alfaro CNP   Psychiatry

## 2019-06-10 NOTE — PATIENT INSTRUCTIONS
-- Re-increase Latuda to 40 mg daily. Start taking in AM with meal.  -- Continue Pristiq 100 mg daily.  -- Continue Wellbutrin  mg daily.  -- Stop Concerta. Start Adderall XR 10 mg daily.  -- Start l-methyl folate 15 mg daily.  -- May continue Ambien CR 12.5 mg nightly.  -- May take Klonopin up to 1 mg daily for this coming week, then decrease back down to 0.5 mg as needed.    -- Inquire with [facility] about TMS clinic hours.

## 2019-06-11 ASSESSMENT — ANXIETY QUESTIONNAIRES: GAD7 TOTAL SCORE: 16

## 2019-06-11 ASSESSMENT — PATIENT HEALTH QUESTIONNAIRE - PHQ9: SUM OF ALL RESPONSES TO PHQ QUESTIONS 1-9: 12

## 2019-06-12 ENCOUNTER — MYC MEDICAL ADVICE (OUTPATIENT)
Dept: PSYCHIATRY | Facility: CLINIC | Age: 39
End: 2019-06-12

## 2019-06-12 DIAGNOSIS — F32.1 MODERATE MAJOR DEPRESSION (H): Primary | ICD-10-CM

## 2019-06-12 NOTE — TELEPHONE ENCOUNTER
Routing to provider.    Last ov 6/10/19   Next ov  6/25/19    From 6/10/19 treatment plan by Aron Alfaro CNP.

## 2019-06-12 NOTE — TELEPHONE ENCOUNTER
Will increase Adderall XR to 20 mg qAM. Will move Latuda back to PM, and closer to bedtime, with meal.

## 2019-06-18 RX ORDER — DEXTROAMPHETAMINE SACCHARATE, AMPHETAMINE ASPARTATE, DEXTROAMPHETAMINE SULFATE AND AMPHETAMINE SULFATE 2.5; 2.5; 2.5; 2.5 MG/1; MG/1; MG/1; MG/1
TABLET ORAL
Qty: 30 TABLET | Refills: 0 | Status: SHIPPED | OUTPATIENT
Start: 2019-06-18 | End: 2019-06-25

## 2019-06-18 NOTE — TELEPHONE ENCOUNTER
Will maintain Adderall XR 20 mg in AM, and will add IR 10 mg in afternoon. 10 mg Rx will be printed and available for  at [facility].

## 2019-06-25 ENCOUNTER — OFFICE VISIT (OUTPATIENT)
Dept: PSYCHIATRY | Facility: CLINIC | Age: 39
End: 2019-06-25
Payer: COMMERCIAL

## 2019-06-25 VITALS
WEIGHT: 195 LBS | RESPIRATION RATE: 12 BRPM | OXYGEN SATURATION: 98 % | BODY MASS INDEX: 30.54 KG/M2 | DIASTOLIC BLOOD PRESSURE: 78 MMHG | SYSTOLIC BLOOD PRESSURE: 128 MMHG | HEART RATE: 88 BPM

## 2019-06-25 DIAGNOSIS — F33.1 MAJOR DEPRESSIVE DISORDER, RECURRENT EPISODE, MODERATE (H): ICD-10-CM

## 2019-06-25 DIAGNOSIS — F41.1 GENERALIZED ANXIETY DISORDER: ICD-10-CM

## 2019-06-25 DIAGNOSIS — F32.1 MODERATE MAJOR DEPRESSION (H): Primary | ICD-10-CM

## 2019-06-25 PROCEDURE — 99214 OFFICE O/P EST MOD 30 MIN: CPT | Performed by: NURSE PRACTITIONER

## 2019-06-25 RX ORDER — ZOLPIDEM TARTRATE 12.5 MG/1
12.5 TABLET, FILM COATED, EXTENDED RELEASE ORAL
Qty: 30 TABLET | Refills: 1 | Status: SHIPPED | OUTPATIENT
Start: 2019-06-25 | End: 2019-07-17

## 2019-06-25 RX ORDER — DEXTROAMPHETAMINE SACCHARATE, AMPHETAMINE ASPARTATE MONOHYDRATE, DEXTROAMPHETAMINE SULFATE AND AMPHETAMINE SULFATE 5; 5; 5; 5 MG/1; MG/1; MG/1; MG/1
20 CAPSULE, EXTENDED RELEASE ORAL 2 TIMES DAILY
Qty: 60 CAPSULE | Refills: 0 | Status: SHIPPED | OUTPATIENT
Start: 2019-06-25 | End: 2019-07-17

## 2019-06-25 RX ORDER — DESVENLAFAXINE 100 MG/1
100 TABLET, EXTENDED RELEASE ORAL DAILY
Qty: 30 TABLET | Refills: 1 | Status: SHIPPED | OUTPATIENT
Start: 2019-06-25 | End: 2019-08-06

## 2019-06-25 RX ORDER — CLONAZEPAM 0.5 MG/1
TABLET ORAL
Qty: 30 TABLET | Refills: 0 | Status: SHIPPED | OUTPATIENT
Start: 2019-06-25 | End: 2019-07-17

## 2019-06-25 RX ORDER — BUPROPION HYDROCHLORIDE 300 MG/1
300 TABLET ORAL EVERY MORNING
Qty: 30 TABLET | Refills: 1 | Status: SHIPPED | OUTPATIENT
Start: 2019-06-25 | End: 2019-08-06

## 2019-06-25 RX ORDER — LURASIDONE HYDROCHLORIDE 40 MG/1
40 TABLET, FILM COATED ORAL DAILY
Qty: 30 TABLET | Refills: 1 | Status: SHIPPED | OUTPATIENT
Start: 2019-06-25 | End: 2019-08-06

## 2019-06-25 ASSESSMENT — ANXIETY QUESTIONNAIRES
GAD7 TOTAL SCORE: 8
7. FEELING AFRAID AS IF SOMETHING AWFUL MIGHT HAPPEN: MORE THAN HALF THE DAYS
2. NOT BEING ABLE TO STOP OR CONTROL WORRYING: SEVERAL DAYS
1. FEELING NERVOUS, ANXIOUS, OR ON EDGE: SEVERAL DAYS
7. FEELING AFRAID AS IF SOMETHING AWFUL MIGHT HAPPEN: MORE THAN HALF THE DAYS
GAD7 TOTAL SCORE: 8
6. BECOMING EASILY ANNOYED OR IRRITABLE: SEVERAL DAYS
5. BEING SO RESTLESS THAT IT IS HARD TO SIT STILL: NOT AT ALL
GAD7 TOTAL SCORE: 8
4. TROUBLE RELAXING: MORE THAN HALF THE DAYS
3. WORRYING TOO MUCH ABOUT DIFFERENT THINGS: SEVERAL DAYS

## 2019-06-25 ASSESSMENT — PATIENT HEALTH QUESTIONNAIRE - PHQ9
SUM OF ALL RESPONSES TO PHQ QUESTIONS 1-9: 9
SUM OF ALL RESPONSES TO PHQ QUESTIONS 1-9: 9

## 2019-06-25 NOTE — PROGRESS NOTES
"    Outpatient Psychiatric Progress Note    Name: Danyelle Canales   : 1980                    Primary Care Provider: Smiley Kim PA-C   Therapist: Connor ESCALERA     PHQ-9 scores:  PHQ-9 SCORE 2019 6/10/2019 2019   PHQ-9 Total Score - - -   PHQ-9 Total Score MyChart 13 (Moderate depression) 12 (Moderate depression) 9 (Mild depression)   PHQ-9 Total Score 13 12 9       NAJMA-7 scores:  NAJMA-7 SCORE 4/3/2019 6/10/2019 2019   Total Score - - -   Total Score 7 (mild anxiety) 16 (severe anxiety) 8 (mild anxiety)   Total Score 7 16 8       Patient Identification:  Patient is a 39 year old year old,   White American female  who presents for return visit with me.  Patient is currently employed full time. Patient attended the session alone. Patient prefers to be called: \"Danyelle\".    Interim History:  I last saw Danyelle Canales for outpatient psychiatry Return Visit on 6/10/19.     During that appointment, we reviewed her difficulties lowering her Latuda dosage and the issues of feeling too sedate in evening when taken with dinner. She also wasn't finding Concerta effective. Her friend was having treatment for cancer, and this was quite distressing for patient; has been using increased dose of Klonopin, 1 mg daily. We agreed to maintain Pristiq 100 mg daily, Wellbutrin  mg daily. She was going to start supplementation with L-methylfolate for good measure. Latuda to remain at 40 mg daily, and be taken closer to bedtime. We change stimulant to Adderall XR. She was to continue Ambien CR 12.5 mg at night. I allowed another week of 1 mg Klonopin, but she was to reduce this back to 0.5 mg. Lastly she was was to inquire with [employer] about their TMS operational hours.     She messaged us in the interim about Adderall XR 10 mg having little effect. We increased this to Adderall XR 20 mg in AM. She messaged back saying in lasted only a few hours. We added an Adderall IR 10 mg in " "the afternoon.      Current medications include:   Current Outpatient Medications   Medication Sig     amphetamine-dextroamphetamine (ADDERALL XR) 10 MG 24 hr capsule Take 1 capsule (10 mg) by mouth daily (Patient taking differently: Take 10 mg by mouth daily )     amphetamine-dextroamphetamine (ADDERALL) 10 MG tablet 1 tab each afternoon; may take with Adderall XR daily     buPROPion (WELLBUTRIN XL) 300 MG 24 hr tablet Take 1 tablet (300 mg) by mouth every morning     clonazePAM (KLONOPIN) 0.5 MG tablet 1 tab daily prn anxiety     desvenlafaxine (PRISTIQ) 100 MG 24 hr tablet Take 1 tablet (100 mg) by mouth daily     HYDROcodone-acetaminophen (NORCO) 5-325 MG per tablet      lurasidone (LATUDA) 40 MG TABS tablet Take 1 tablet (40 mg) by mouth daily     ORDER FOR DME Apply 1 Units topically. TENS unit use as directed     zolpidem ER (AMBIEN CR) 12.5 MG CR tablet Take 1 tablet (12.5 mg) by mouth nightly as needed for sleep     clonazePAM (KLONOPIN) 1 MG tablet 1 tab daily prn anxiety (Patient not taking: Reported on 6/25/2019)     order for DME Equipment being ordered: full spectrum therapy lamp, 10,000 lux     No current facility-administered medications for this visit.        The [place] Prescription Monitoring Program has been reviewed and there are no concerns about diversionary activity for controlled substances at this time.      I was able to review most recent Primary Care Provider, specialty provider, and therapy visit notes that I have access to.     Today, patient reports the addition of Adderall has been considerably helpful. \"Not happy, but helps keep me out of the hole.\" Has helped some with energy and attention. Denies any side effects. Found Adderall XR 20 mg good dosage, but only lasts 4 hours she feels. Afternoon IR 10 mg had no effect she says. Wondering if this can be adjusted.    Moved Latuda 40 mg to bedtime with snack; seems better than dinnertime dosing. States Latuda not sedating exactly; " "\"just bogs me down\". Doesn't want to reduce Latuda dosage, still feels it helpful towards mood stability. Continues with Pristiq 100 mg daily and Wellbutrin  mg daily. Started l-methylfolate (OptiFolate) 15 mg daily for past 1.5 weeks; nothing noticeable. Continues Ambien CR 12.5 mg nightly. Has reduced her Klonopin back down to 0.5 mg daily.    Continues stressed with friend's cancer diagnosis in [place], though was found to be stage 4 and treatable. She had to  another friend through a suicidal crisis; had to call 911.    Patient seeing therapist at [facility] every 2-4 weeks.    She hasn't contacted [facility] about TMS, but on her to-do list.            Past Medical History:   Diagnosis Date     ASD (atrial septal defect)     repaired surgically as a child     Depressive disorder      Depressive disorder, not elsewhere classified      Headache      Headache      History of blood transfusion 1985 1985 during open heart surgery     Hypertension     higher results at pain clinic, want to confirm ok     Insomnia, unspecified      Other forms of migraine, without mention of intractable migraine without mention of status migrainosus       has a past medical history of ASD (atrial septal defect), Depressive disorder, Depressive disorder, not elsewhere classified, Headache, Headache, History of blood transfusion (1985), Hypertension, Insomnia, unspecified, and Other forms of migraine, without mention of intractable migraine without mention of status migrainosus. She also has no past medical history of Arthritis, Cerebral infarction (H), Congestive heart failure (H), COPD (chronic obstructive pulmonary disease) (H), Diabetes (H), Thyroid disease, or Uncomplicated asthma.    Social history updates:  See above.    Substance use updates:  Occassional ETOh; not with Klonopin  Tobacco use: No      Vital Signs:   /78 (BP Location: Right arm, Patient Position: Chair, Cuff Size: Adult Large)   Pulse " 88   Resp 12   Wt 88.5 kg (195 lb)   LMP 06/03/2019   SpO2 98%   Breastfeeding? No   BMI 30.54 kg/m      Labs:  Most recent laboratory results reviewed and no new labs.    Review of Systems:  10 systems (general, cardiovascular, respiratory, eyes, ENT, endocrine, GI, , M/S, neurological) were reviewed. Most pertinent finding(s) is/are: chronic pain. The remaining systems are all unremarkable.    Mental Status Examination:  Appearance:  awake, alert and adequately groomed  Attitude:  cooperative   Eye Contact:  good  Gait and Station: Normal  Psychomotor Behavior:  intact station, gait and muscle tone  Oriented to:  time, person, and place  Attention Span and Concentration:  Normal  Speech:   clear, coherent  Mood:  slightly improved  Affect: not as tearful  Associations:  no loose associations  Thought Process:  logical, linear and goal oriented  Thought Content:  Appropriate to Interview  Recent and Remote Memory:  intact Not formally assessed. No amnesia.  Fund of Knowledge: appropriate  Insight:  good  Judgment:  intact  Impulse Control:  intact     Suicide Risk Assessment:  Today Danyelle Canales denies any suicidal ideation or self-harm impulses. Therefore, based on all available evidence including the factors cited above, Danyelle Canales does not appear to be at imminent risk for self-harm, does not meet criteria for a 72-hr hold, and therefore remains appropriate for ongoing outpatient level of care.  A thorough assessment of risk factors related to suicide and self-harm have been reviewed and are noted above. The patient convincingly denies suicidality on several occasions. Local community safety resources printed and reviewed for patient to use if needed. There was no deceit detected, and the patient presented in a manner that was believable.      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  Med: Vicodin.   Maximum use per month: 90  Expected duration: ongoing, pt in comprehensive pain mgmt currently  Narcotic agreement on file: NO  Clinic visit recommended: Q 3 months         Morbid obesity (H) 08/20/2018     Priority: Medium     Non morbid obesity, unspecified obesity type 09/20/2017     Priority: Medium     Esophageal reflux 11/23/2014     Priority: Medium     Chronic pain 01/31/2012     Priority: Medium     ASD (atrial septal defect) 01/16/2012     Priority: Medium     Generalized anxiety disorder 10/18/2011     Priority: Medium     Diagnosis updated by automated process. Provider to review and confirm.       Moderate major depression (H) 09/12/2011     Priority: Medium     History of ovarian cyst 09/12/2011     Priority: Medium     CARDIOVASCULAR SCREENING; LDL GOAL LESS THAN 160 05/09/2010     Priority: Medium     Trichotillomania 08/03/2009     Priority: Medium     PMDD (premenstrual dysphoric disorder) 06/03/2009     Priority: Medium     Migraine headache 07/23/2008     Priority: Medium     iamc SPRAIN THORACIC REGION 10/08/2007     Priority: Medium     ia SPRAIN OF NECK 10/08/2007     Priority: Medium     Encounter for other general counseling or advice on contraception 07/18/2007     Priority: Medium     Diagnosis updated by automated process. Provider to review and confirm.       allergic DERMATITIS NOS 11/12/2004     Priority: Medium     Insomnia 07/16/2004     Priority: Medium     Problem list name updated by automated process. Provider to review           Assessment:  Danyelle Canales reports finding some agreeable  mood stability with introduction and upward taper of Adderall XR. She is finding XR's duration to be rather abbreviated, so I might presume she's a rapid metabolizer. We'll dose XR BID, and see if this provides for improved effect duration. She is encouragingly tolerating a higher end dosage of Adderall OK. She remains a good candidate for TMS. She states she will call [facility] and inquire about their operation hours to see if TMS feasible with her work schedule. TMS is really the only sensible remaining option in her treatment. This is now the patient's 12th visit with me, and we have overall trialed Lamictal, Abilify, Fetzima, Pristiq, Latuda, methylphenidate, and just started Adderall and l-methylfolate.    Presently, we'll otherwise maintain Pristiq 100 mg daily, Wellbutrin  mg daily, and Latuda 40 mg at bedtime (with snack). She may continue Ambien CR 12.5 mg nightly. I am encouraged she has reduced her Klonopin dosage, as discussed, down to 0.5 mg. She will hopefully be able to reduce this to occasional use at some point.    She is otherwise encouraged to continue in psychotherapy.    Medication side effects and alternatives were reviewed. Health promotion activities recommended and reviewed today. All questions addressed. Education and counseling completed regarding risks and benefits of medications and psychotherapy options.        Treatment Plan:    Continue Latuda 40 mg nightly, with snack    Continue l-methylfolate 15 mg daily    Continue Pristiq 100 mg daily    Continue Wellbutrin  mg daily    Increase Adderall XR to 20 mg BID    May use Klonopin 0.5 mg as needed for daytime anxiety; advised of proper/safe usage, onset, duration; not to take with other CNS depressants or sleep aids. Try to use occasionally.    May use Ambien CR 12.5 mg nightly for sleep; advised of proper/safe usage, onset, duration; not to take with other CNS depressants or sleep aids    Call [facility] to  inquire about TMS hours    Continue all other medical care per primary care provider.     Continue all other medications as reviewed per electronic medical record today.     Safety plan reviewed. To the Emergency Department as needed or call after hours crisis line at [phone] or [phone]. [place] Crisis Text Line. Text [place] to 146290 or Suicide LifeLine Chat: suicideMedCPU.org/chat/    Continue individual therapy as planned with established therapists.    Schedule an appointment with me in 4-6 weeks or sooner as needed.    Follow up with primary care provider as planned or for acute medical concerns.    Call the psychiatric nurse line with medication questions or concerns at [phone].    [employer] may be used to communicate with your provider, but this is not intended to be used for emergencies.      Crisis Resources:    National Suicide Prevention Lifeline: 329.537.4201 (TTY: 152.672.8967). Call anytime for help.  (www.suicidepreventionlifeline.org)  National [place] on Mental Illness (www.telma.org): 310.106.6725 or 006-140-1860.   Mental Health Association (www.mentalhealth.org): 539.619.5995 or 301-474-3155.  [place] Crisis Text Line: Text [place] to 047337  Suicide LifeLine Chat: suicideMedCPU.org/chat    Administrative Billing:   Time spent with patient was 30 minutes and greater than 50% of time or 20 minutes was spent in counseling and coordination of care regarding above diagnoses and treatment plan.    Patient Status:  Patient will continue to be seen for ongoing consultation and stabilization.    Signed:   Aron Alfaro CNP   Psychiatry

## 2019-06-26 ASSESSMENT — PATIENT HEALTH QUESTIONNAIRE - PHQ9: SUM OF ALL RESPONSES TO PHQ QUESTIONS 1-9: 9

## 2019-06-26 ASSESSMENT — ANXIETY QUESTIONNAIRES: GAD7 TOTAL SCORE: 8

## 2019-07-03 ENCOUNTER — MYC MEDICAL ADVICE (OUTPATIENT)
Dept: PSYCHIATRY | Facility: CLINIC | Age: 39
End: 2019-07-03

## 2019-07-03 DIAGNOSIS — F32.1 MODERATE MAJOR DEPRESSION (H): Primary | ICD-10-CM

## 2019-07-05 RX ORDER — DEXTROAMPHETAMINE SACCHARATE, AMPHETAMINE ASPARTATE, DEXTROAMPHETAMINE SULFATE AND AMPHETAMINE SULFATE 2.5; 2.5; 2.5; 2.5 MG/1; MG/1; MG/1; MG/1
TABLET ORAL
Qty: 30 TABLET | Refills: 0 | Status: SHIPPED | OUTPATIENT
Start: 2019-07-05 | End: 2019-08-06

## 2019-07-05 NOTE — TELEPHONE ENCOUNTER
Will add Adderall IR 10 mg to afternoon dosing, following Adderall XR 20 mg BID.    Rx printed and will be picked up at [facility] .

## 2019-07-05 NOTE — TELEPHONE ENCOUNTER
Placed Rx at [facility] desk. Pt was mycharted by provider that the Rx is ready.     Karla Arriaza, CMA

## 2019-07-13 ENCOUNTER — TRANSFERRED RECORDS (OUTPATIENT)
Dept: HEALTH INFORMATION MANAGEMENT | Facility: CLINIC | Age: 39
End: 2019-07-13

## 2019-07-15 ENCOUNTER — TRANSFERRED RECORDS (OUTPATIENT)
Dept: HEALTH INFORMATION MANAGEMENT | Facility: CLINIC | Age: 39
End: 2019-07-15

## 2019-07-17 ENCOUNTER — MYC MEDICAL ADVICE (OUTPATIENT)
Dept: PSYCHIATRY | Facility: CLINIC | Age: 39
End: 2019-07-17

## 2019-07-17 DIAGNOSIS — F32.1 MODERATE MAJOR DEPRESSION (H): ICD-10-CM

## 2019-07-17 DIAGNOSIS — F41.1 GENERALIZED ANXIETY DISORDER: ICD-10-CM

## 2019-07-17 DIAGNOSIS — F33.1 MAJOR DEPRESSIVE DISORDER, RECURRENT EPISODE, MODERATE (H): ICD-10-CM

## 2019-07-17 RX ORDER — CLONAZEPAM 0.5 MG/1
TABLET ORAL
Qty: 30 TABLET | Refills: 0 | Status: SHIPPED | OUTPATIENT
Start: 2019-07-17 | End: 2019-08-06

## 2019-07-17 RX ORDER — DEXTROAMPHETAMINE SACCHARATE, AMPHETAMINE ASPARTATE MONOHYDRATE, DEXTROAMPHETAMINE SULFATE AND AMPHETAMINE SULFATE 5; 5; 5; 5 MG/1; MG/1; MG/1; MG/1
20 CAPSULE, EXTENDED RELEASE ORAL 2 TIMES DAILY
Qty: 60 CAPSULE | Refills: 0 | Status: SHIPPED | OUTPATIENT
Start: 2019-07-17 | End: 2019-08-06

## 2019-07-17 RX ORDER — ZOLPIDEM TARTRATE 12.5 MG/1
12.5 TABLET, FILM COATED, EXTENDED RELEASE ORAL
Qty: 30 TABLET | Refills: 1 | Status: SHIPPED | OUTPATIENT
Start: 2019-07-17 | End: 2019-08-06

## 2019-07-17 RX ORDER — LURASIDONE HYDROCHLORIDE 20 MG/1
20 TABLET, FILM COATED ORAL DAILY
Qty: 30 TABLET | Refills: 0 | Status: SHIPPED | OUTPATIENT
Start: 2019-07-17 | End: 2019-08-06

## 2019-07-17 NOTE — TELEPHONE ENCOUNTER
Refill for: amphetamine-dextroamphetamine (ADDERALL XR) 20 MG 24 hr capsule                 clonazePAM (KLONOPIN) 0.5 MG tablet                 zolpidem ER (AMBIEN CR) 12.5 MG CR tablet    Last Appointment: 19    Next Appointment: 19    No Shows/Cancellations since last appointment:  none    Last Refill in Epic (date and amount/how many days):    Disp Refills Start End MATT   amphetamine-dextroamphetamine (ADDERALL XR) 20 MG 24 hr capsule 60 capsule 0 2019  --   Sig - Route: Take 1 capsule (20 mg) by mouth 2 times daily - Oral      Disp Refills Start End MATT   clonazePAM (KLONOPIN) 0.5 MG tablet 30 tablet 0 2019  No   Si tab daily prn anxiety      Disp Refills Start End MATT   zolpidem ER (AMBIEN CR) 12.5 MG CR tablet 30 tablet 1 2019  No   Sig - Route: Take 1 tablet (12.5 mg) by mouth nightly as needed for sleep - Oral      reviewed and summarized below:   Fill Date Drug      Qty  Days  Prescriber  07/15/2019 Dextroamp-Amphetamin 10 Mg Tab  30  30  Da Mca  2019 Hydrocodone-Acetamin 5-325 Mg  8  4  Nunn Lee   2019  Adderall Xr 20 Mg Capsule   60  30  Da Mca  2019 Clonazepam 0.5 Mg Tablet  30  30  Da Mca  2019 Zolpidem Tart Er 12.5 Mg Tab  30  30  Da Mca     Medications pended and routed to provider.       Elise Yoder RN  19  10:26 AM

## 2019-07-17 NOTE — TELEPHONE ENCOUNTER
Ambien, Klonopin faxed to [facility] [employer] in [place] pharmacy. Adderall at [facility]  ready for .    Karla Arriaza, CMA

## 2019-07-17 NOTE — TELEPHONE ENCOUNTER
Ambien, Klonopin faxed to pharmacy. Adderall at [facility]  for .    Checked with MTM about splitting Latuda. No clear  info either for or against this. No perforation in pill; also film coated, though not ER. For peace of mind, I'll have her taper to 30 mg, 20 mg, then 10 mg, 1 week at a time, until discontinuation.    Next visit 8/6/19.

## 2019-08-05 ENCOUNTER — TRANSFERRED RECORDS (OUTPATIENT)
Dept: HEALTH INFORMATION MANAGEMENT | Facility: CLINIC | Age: 39
End: 2019-08-05

## 2019-08-06 ENCOUNTER — OFFICE VISIT (OUTPATIENT)
Dept: PSYCHIATRY | Facility: CLINIC | Age: 39
End: 2019-08-06
Payer: COMMERCIAL

## 2019-08-06 VITALS
RESPIRATION RATE: 12 BRPM | WEIGHT: 194 LBS | SYSTOLIC BLOOD PRESSURE: 124 MMHG | DIASTOLIC BLOOD PRESSURE: 86 MMHG | BODY MASS INDEX: 30.38 KG/M2 | OXYGEN SATURATION: 98 % | HEART RATE: 102 BPM

## 2019-08-06 DIAGNOSIS — F32.1 MODERATE MAJOR DEPRESSION (H): Primary | ICD-10-CM

## 2019-08-06 DIAGNOSIS — F33.1 MAJOR DEPRESSIVE DISORDER, RECURRENT EPISODE, MODERATE (H): ICD-10-CM

## 2019-08-06 DIAGNOSIS — F41.1 GENERALIZED ANXIETY DISORDER: ICD-10-CM

## 2019-08-06 PROCEDURE — 99214 OFFICE O/P EST MOD 30 MIN: CPT | Performed by: NURSE PRACTITIONER

## 2019-08-06 RX ORDER — CLONAZEPAM 0.5 MG/1
TABLET ORAL
Qty: 30 TABLET | Refills: 0 | Status: SHIPPED | OUTPATIENT
Start: 2019-08-17 | End: 2019-09-11

## 2019-08-06 RX ORDER — BUPROPION HYDROCHLORIDE 300 MG/1
300 TABLET ORAL EVERY MORNING
Qty: 30 TABLET | Refills: 1 | Status: SHIPPED | OUTPATIENT
Start: 2019-08-06 | End: 2019-09-25

## 2019-08-06 RX ORDER — DEXTROAMPHETAMINE SACCHARATE, AMPHETAMINE ASPARTATE MONOHYDRATE, DEXTROAMPHETAMINE SULFATE AND AMPHETAMINE SULFATE 5; 5; 5; 5 MG/1; MG/1; MG/1; MG/1
20 CAPSULE, EXTENDED RELEASE ORAL 2 TIMES DAILY
Qty: 60 CAPSULE | Refills: 0 | Status: SHIPPED | OUTPATIENT
Start: 2019-08-17 | End: 2019-09-11

## 2019-08-06 RX ORDER — DEXTROAMPHETAMINE SACCHARATE, AMPHETAMINE ASPARTATE, DEXTROAMPHETAMINE SULFATE AND AMPHETAMINE SULFATE 2.5; 2.5; 2.5; 2.5 MG/1; MG/1; MG/1; MG/1
TABLET ORAL
Qty: 30 TABLET | Refills: 0 | Status: SHIPPED | OUTPATIENT
Start: 2019-08-06 | End: 2019-08-06

## 2019-08-06 RX ORDER — ZOLPIDEM TARTRATE 12.5 MG/1
12.5 TABLET, FILM COATED, EXTENDED RELEASE ORAL
Qty: 30 TABLET | Refills: 1 | Status: SHIPPED | OUTPATIENT
Start: 2019-08-06 | End: 2019-09-25

## 2019-08-06 RX ORDER — DEXTROAMPHETAMINE SACCHARATE, AMPHETAMINE ASPARTATE, DEXTROAMPHETAMINE SULFATE AND AMPHETAMINE SULFATE 2.5; 2.5; 2.5; 2.5 MG/1; MG/1; MG/1; MG/1
TABLET ORAL
Qty: 30 TABLET | Refills: 0 | Status: SHIPPED | OUTPATIENT
Start: 2019-09-06 | End: 2019-09-25

## 2019-08-06 RX ORDER — LAMOTRIGINE 25 MG/1
TABLET ORAL
Qty: 42 TABLET | Refills: 1 | Status: SHIPPED | OUTPATIENT
Start: 2019-08-06 | End: 2019-08-30

## 2019-08-06 RX ORDER — DESVENLAFAXINE 100 MG/1
100 TABLET, EXTENDED RELEASE ORAL DAILY
Qty: 30 TABLET | Refills: 1 | Status: SHIPPED | OUTPATIENT
Start: 2019-08-06 | End: 2019-09-25

## 2019-08-06 ASSESSMENT — ANXIETY QUESTIONNAIRES
5. BEING SO RESTLESS THAT IT IS HARD TO SIT STILL: NOT AT ALL
2. NOT BEING ABLE TO STOP OR CONTROL WORRYING: SEVERAL DAYS
7. FEELING AFRAID AS IF SOMETHING AWFUL MIGHT HAPPEN: MORE THAN HALF THE DAYS
GAD7 TOTAL SCORE: 9
GAD7 TOTAL SCORE: 9
3. WORRYING TOO MUCH ABOUT DIFFERENT THINGS: MORE THAN HALF THE DAYS
4. TROUBLE RELAXING: SEVERAL DAYS
GAD7 TOTAL SCORE: 9
1. FEELING NERVOUS, ANXIOUS, OR ON EDGE: MORE THAN HALF THE DAYS
7. FEELING AFRAID AS IF SOMETHING AWFUL MIGHT HAPPEN: MORE THAN HALF THE DAYS
6. BECOMING EASILY ANNOYED OR IRRITABLE: SEVERAL DAYS

## 2019-08-06 ASSESSMENT — PATIENT HEALTH QUESTIONNAIRE - PHQ9
SUM OF ALL RESPONSES TO PHQ QUESTIONS 1-9: 9
10. IF YOU CHECKED OFF ANY PROBLEMS, HOW DIFFICULT HAVE THESE PROBLEMS MADE IT FOR YOU TO DO YOUR WORK, TAKE CARE OF THINGS AT HOME, OR GET ALONG WITH OTHER PEOPLE: SOMEWHAT DIFFICULT
SUM OF ALL RESPONSES TO PHQ QUESTIONS 1-9: 9

## 2019-08-06 NOTE — PATIENT INSTRUCTIONS
Start Lamictal 25 mg daily for 2 weeks, then increase to 50 mg daily      Continue l-methylfolate 15 mg daily      Continue Pristiq 100 mg daily      Continue Wellbutrin  mg daily      Continue Adderall XR 20 mg twice daily, plus Adderall IR 10 mg in the afternoon.      May use Klonopin 0.5 mg as needed for daytime anxiety. Try to use more occasionally.      May use Ambien CR 12.5 mg nightly for sleep.      [facility]

## 2019-08-06 NOTE — PROGRESS NOTES
"    Outpatient Psychiatric Progress Note    Name: Danyelle Canales   : 1980                    Primary Care Provider: Smiley Kim PA-C   Therapist: Denice ESCALERA     PHQ-9 scores:  PHQ-9 SCORE 6/10/2019 2019 2019   PHQ-9 Total Score - - -   PHQ-9 Total Score MyChart 12 (Moderate depression) 9 (Mild depression) 9 (Mild depression)   PHQ-9 Total Score 12 9 9       NAJMA-7 scores:  NAJMA-7 SCORE 6/10/2019 2019 2019   Total Score - - -   Total Score 16 (severe anxiety) 8 (mild anxiety) 9 (mild anxiety)   Total Score 16 8 9         Patient Identification:  Patient is a 39 year old year old,   White American female  who presents for return visit with me.  Patient is currently employed full time. Patient attended the session alone. Patient prefers to be called: \"Danyelle\".    Interim History:  I last saw Danyelle Canales for outpatient psychiatry Return Visit on 19.     During that appointment, we reviewed her positive response to Adderall, and agreed (through later messaging) to taper to Adderall XR BID plus Adderall IR 10 mg in the afternoon. She also just started l-methylfolate suplementation. She messaged us about tapering off Latuda; no longer convinced for adequate therapeutic response and reporting persistent sedation no matter what time it was dosed. She was going to inquire with [facility] about TMS.      Current medications include:   Current Outpatient Medications   Medication Sig     amphetamine-dextroamphetamine (ADDERALL XR) 20 MG 24 hr capsule Take 1 capsule (20 mg) by mouth 2 times daily     amphetamine-dextroamphetamine (ADDERALL) 10 MG tablet 1 tab daily in the afternoon. May take with Adderall XR BID.     buPROPion (WELLBUTRIN XL) 300 MG 24 hr tablet Take 1 tablet (300 mg) by mouth every morning     clonazePAM (KLONOPIN) 0.5 MG tablet 1 tab daily prn anxiety     desvenlafaxine (PRISTIQ) 100 MG 24 hr tablet Take 1 tablet (100 mg) by mouth daily     " HYDROcodone-acetaminophen (NORCO) 5-325 MG per tablet      order for DME Equipment being ordered: full spectrum therapy lamp, 10,000 lux     ORDER FOR DME Apply 1 Units topically. TENS unit use as directed     zolpidem ER (AMBIEN CR) 12.5 MG CR tablet Take 1 tablet (12.5 mg) by mouth nightly as needed for sleep     lurasidone (LATUDA) 20 MG TABS tablet Take 1 tablet (20 mg) by mouth daily (Patient not taking: Reported on 8/6/2019)     lurasidone (LATUDA) 40 MG TABS tablet Take 1 tablet (40 mg) by mouth daily (Patient not taking: Reported on 8/6/2019)     No current facility-administered medications for this visit.        The [place] Prescription Monitoring Program has been reviewed and there are no concerns about diversionary activity for controlled substances at this time.      I was able to review most recent Primary Care Provider, specialty provider, and therapy visit notes that I have access to.     Today, patient reports she tapered off Latuda fully 1 week ago and taper not as bad as she anticipated. Feels less fatigue now that off Latuda. States she continues to feel depressed and anxious and wanted to explore another antidepressant to replace Pristiq. Got tearful and states she was anxious to come in today feeling we were running out of treatment options.    States she did contact [facility] about TMS and told she would be charged a co-pay for each treatment; this would amount to $36 x 30+ treatments and she states she cannot afford this. Quite dejected about this.    She continues Adderall XR 20 mg in AM, XR 20 mg at 11 am, and IR 10 mg at 2 pm; doesn't feel same crash as before; helping to lift mood some; tolerating well. Continues with Pristiq 100 mg daily, Wellbutrin  mg daily, and l-methylfolate (OptiFolate) 15 mg daily. Continues Ambien CR 12.5 mg nightly. Continues Klonopin 0.5 mg daily.    [facility]      Past Medical History:   Diagnosis  Date     ASD (atrial septal defect)     repaired surgically as a child     Depressive disorder      Depressive disorder, not elsewhere classified      Headache      Headache      History of blood transfusion 1985 1985 during open heart surgery     Hypertension     higher results at pain clinic, want to confirm ok     Insomnia, unspecified      Other forms of migraine, without mention of intractable migraine without mention of status migrainosus       has a past medical history of ASD (atrial septal defect), Depressive disorder, Depressive disorder, not elsewhere classified, Headache, Headache, History of blood transfusion (1985), Hypertension, Insomnia, unspecified, and Other forms of migraine, without mention of intractable migraine without mention of status migrainosus. She also has no past medical history of Arthritis, Cerebral infarction (H), Congestive heart failure (H), COPD (chronic obstructive pulmonary disease) (H), Diabetes (H), Thyroid disease, or Uncomplicated asthma.    Social history updates:  No major updates    Substance use updates:  Occassional ETOh; not with Klonopin  Tobacco use: No      Vital Signs:   /86 (BP Location: Left arm, Patient Position: Chair, Cuff Size: Adult Large)   Pulse 102   Resp 12   Wt 88 kg (194 lb)   LMP 07/23/2019   SpO2 98%   Breastfeeding? No   BMI 30.38 kg/m      Labs:  Most recent laboratory results reviewed and no new labs.    Review of Systems:  10 systems (general, cardiovascular, respiratory, eyes, ENT, endocrine, GI, , M/S, neurological) were reviewed. Most pertinent finding(s) is/are: chronic pain. The remaining systems are all unremarkable.    Mental Status Examination:  Appearance:  awake, alert and adequately groomed  Attitude:  cooperative   Eye Contact:  good  Gait and Station: Normal  Psychomotor Behavior:  intact station, gait and muscle tone  Oriented to:  time, person, and place  Attention Span and Concentration:  Normal  Speech:   clear,  coherent  Mood:  depressed, anxious  Affect: tearful  Associations:  no loose associations  Thought Process:  logical, linear and goal oriented  Thought Content:  Appropriate to Interview  Recent and Remote Memory:  intact Not formally assessed. No amnesia.  Fund of Knowledge: appropriate  Insight:  good  Judgment:  intact  Impulse Control:  intact     Suicide Risk Assessment:  Today Danyelle Canales denies any suicidal ideation or self-harm impulses. Therefore, based on all available evidence including the factors cited above, Danyelle Canales does not appear to be at imminent risk for self-harm, does not meet criteria for a 72-hr hold, and therefore remains appropriate for ongoing outpatient level of care.  A thorough assessment of risk factors related to suicide and self-harm have been reviewed and are noted above. The patient convincingly denies suicidality on several occasions. Local community safety resources printed and reviewed for patient to use if needed. There was no deceit detected, and the patient presented in a manner that was believable.      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  Med: Vicodin.   Maximum use per month: 90  Expected duration: ongoing, pt in comprehensive pain mgmt currently  Narcotic agreement on file: NO  Clinic visit recommended: Q 3 months         Morbid obesity (H) 08/20/2018     Priority: Medium     Non morbid obesity, unspecified obesity type 09/20/2017     Priority: Medium     Esophageal reflux  11/23/2014     Priority: Medium     Chronic pain 01/31/2012     Priority: Medium     ASD (atrial septal defect) 01/16/2012     Priority: Medium     Generalized anxiety disorder 10/18/2011     Priority: Medium     Diagnosis updated by automated process. Provider to review and confirm.       Moderate major depression (H) 09/12/2011     Priority: Medium     History of ovarian cyst 09/12/2011     Priority: Medium     CARDIOVASCULAR SCREENING; LDL GOAL LESS THAN 160 05/09/2010     Priority: Medium     Trichotillomania 08/03/2009     Priority: Medium     PMDD (premenstrual dysphoric disorder) 06/03/2009     Priority: Medium     Migraine headache 07/23/2008     Priority: Medium     ia SPRAIN THORACIC REGION 10/08/2007     Priority: Medium     ia SPRAIN OF NECK 10/08/2007     Priority: Medium     Encounter for other general counseling or advice on contraception 07/18/2007     Priority: Medium     Diagnosis updated by automated process. Provider to review and confirm.       allergic DERMATITIS NOS 11/12/2004     Priority: Medium     Insomnia 07/16/2004     Priority: Medium     Problem list name updated by automated process. Provider to review           Assessment:  Danyelle TORREY Canales reports some continued level of improved mood with daily Adderall, and it seems we found a suitable arrangement of dosages. She has ultimately not found Latuda worthwhile to continue with. She wants to switch antidepressants again, but there are no clear alternatives to trial. She has already recently been on Cymbalta and Fetzima while in treatment with me, having trialed multiple SSRIs before this. She tried a few TCAs (Amitripyline, Nortriptyline, Milnacipran) with no clear benefit, and re-utilization of this is risky given cardiovascular history. Before she abandons Pristiq, I suggest with augment her entire psychopharm regime with an anticonvulsant mood stabilizer, namely Lamictal.    While trialing Lamictal, we'll otherwise maintain  Pristiq 100 mg daily, Wellbutrin  mg daily, l-methylfolate (OptiFolate) 15 mg daily. This in addition to Adderall XR 20 mg twice daily plus Adderall IR 10 mg qAfternoon. She may continue Ambien CR 12.5 mg nightly. For the time being I do no see her reducing her daily Klonopin use. She will hopefully be able to reduce this to occasional use at some point in the future.    She remains a good candidate for TMS, however the treatment costs at [facility] are prohibitive. I recommended she contact [facility]'s [facility] clinic in [place] to inquire about their TMS service.    She is otherwise encouraged to continue in psychotherapy.    Medication side effects and alternatives were reviewed. Health promotion activities recommended and reviewed today. All questions addressed. Education and counseling completed regarding risks and benefits of medications and psychotherapy options.      Treatment Plan:    Start Lamictal: 25 mg daily x 2 weeks, then increase to 50 mg daily. Monitor for rash and report promptly if occurring.    Continue l-methylfolate 15 mg daily    Continue Pristiq 100 mg daily    Continue Wellbutrin  mg daily    Continue  Adderall XR 20 mg twice daily plus Adderall IR 10 mg qAfternoon.    May use Klonopin 0.5 mg as needed for daytime anxiety; advised of proper/safe usage, onset, duration; not to take with other CNS depressants or sleep aids. Try to use occasionally.    May use Ambien CR 12.5 mg nightly for sleep; advised of proper/safe usage, onset, duration; not to take with other CNS depressants or sleep aids    Call [facility] in [place] clinic to inquire about TMS coverage/cost    Continue all other medical care per primary care provider.     Continue all other medications as reviewed per electronic medical record today.     Safety plan reviewed. To the Emergency Department as needed or call after hours crisis line at [phone] or [phone]. [place] Crisis Text Line. Text [place] to  412916 or Suicide LifeLine Chat: suicideVIPorbit Software.org/chat/    Continue individual therapy as planned with established therapists.    Schedule an appointment with me in 6 weeks or sooner as needed.    Follow up with primary care provider as planned or for acute medical concerns.    Call the psychiatric nurse line with medication questions or concerns at [phone].    MyChart may be used to communicate with your provider, but this is not intended to be used for emergencies.      Crisis Resources:    National Suicide Prevention Lifeline: 815.804.9913 (TTY: 559.846.9832). Call anytime for help.  (www.suicidepreventionlifeline.org)  National Ault on Mental Illness (www.telma.org): 786.218.5810 or 117-973-8778.   Mental Health Association (www.mentalhealth.org): 190.529.9221 or 654-261-2459.  [place] Crisis Text Line: Text [place] to 163908  Suicide LifeLine Chat: suicideVIPorbit Software.org/chat      Administrative Billing:   Time spent with patient was 30 minutes and greater than 50% of time or 20 minutes was spent in counseling and coordination of care regarding above diagnoses and treatment plan.    Patient Status:  Patient will continue to be seen for ongoing consultation and stabilization.    Signed:   Aron Alfaro CNP   Psychiatry

## 2019-08-07 ASSESSMENT — PATIENT HEALTH QUESTIONNAIRE - PHQ9: SUM OF ALL RESPONSES TO PHQ QUESTIONS 1-9: 9

## 2019-08-07 ASSESSMENT — ANXIETY QUESTIONNAIRES: GAD7 TOTAL SCORE: 9

## 2019-08-30 ENCOUNTER — MYC REFILL (OUTPATIENT)
Dept: PSYCHIATRY | Facility: CLINIC | Age: 39
End: 2019-08-30

## 2019-08-30 DIAGNOSIS — F32.1 MODERATE MAJOR DEPRESSION (H): ICD-10-CM

## 2019-08-30 RX ORDER — LAMOTRIGINE 100 MG/1
50 TABLET ORAL DAILY
Qty: 30 TABLET | Refills: 0 | Status: SHIPPED | OUTPATIENT
Start: 2019-08-30 | End: 2019-09-25

## 2019-09-09 ENCOUNTER — OFFICE VISIT (OUTPATIENT)
Dept: FAMILY MEDICINE | Facility: CLINIC | Age: 39
End: 2019-09-09
Payer: COMMERCIAL

## 2019-09-09 VITALS
TEMPERATURE: 99.2 F | WEIGHT: 190 LBS | HEART RATE: 86 BPM | OXYGEN SATURATION: 100 % | BODY MASS INDEX: 29.82 KG/M2 | DIASTOLIC BLOOD PRESSURE: 82 MMHG | HEIGHT: 67 IN | SYSTOLIC BLOOD PRESSURE: 120 MMHG

## 2019-09-09 DIAGNOSIS — Z13.6 CARDIOVASCULAR SCREENING; LDL GOAL LESS THAN 100: ICD-10-CM

## 2019-09-09 DIAGNOSIS — Z00.00 ROUTINE GENERAL MEDICAL EXAMINATION AT A HEALTH CARE FACILITY: Primary | ICD-10-CM

## 2019-09-09 DIAGNOSIS — L21.9 SEBORRHEIC DERMATITIS: ICD-10-CM

## 2019-09-09 DIAGNOSIS — R53.83 OTHER FATIGUE: ICD-10-CM

## 2019-09-09 LAB
ALBUMIN SERPL-MCNC: 3 G/DL (ref 3.4–5)
ALP SERPL-CCNC: 58 U/L (ref 40–150)
ALT SERPL W P-5'-P-CCNC: 18 U/L (ref 0–50)
ANION GAP SERPL CALCULATED.3IONS-SCNC: 6 MMOL/L (ref 3–14)
AST SERPL W P-5'-P-CCNC: 11 U/L (ref 0–45)
BASOPHILS # BLD AUTO: 0 10E9/L (ref 0–0.2)
BASOPHILS NFR BLD AUTO: 0.4 %
BILIRUB SERPL-MCNC: 0.2 MG/DL (ref 0.2–1.3)
BUN SERPL-MCNC: 10 MG/DL (ref 7–30)
CALCIUM SERPL-MCNC: 8.5 MG/DL (ref 8.5–10.1)
CHLORIDE SERPL-SCNC: 106 MMOL/L (ref 94–109)
CHOLEST SERPL-MCNC: 220 MG/DL
CO2 SERPL-SCNC: 27 MMOL/L (ref 20–32)
CREAT SERPL-MCNC: 0.62 MG/DL (ref 0.52–1.04)
DEPRECATED CALCIDIOL+CALCIFEROL SERPL-MC: 23 UG/L (ref 20–75)
DIFFERENTIAL METHOD BLD: ABNORMAL
EOSINOPHIL # BLD AUTO: 0.1 10E9/L (ref 0–0.7)
EOSINOPHIL NFR BLD AUTO: 1.8 %
ERYTHROCYTE [DISTWIDTH] IN BLOOD BY AUTOMATED COUNT: 15.8 % (ref 10–15)
FERRITIN SERPL-MCNC: 47 NG/ML (ref 12–150)
GFR SERPL CREATININE-BSD FRML MDRD: >90 ML/MIN/{1.73_M2}
GLUCOSE SERPL-MCNC: 90 MG/DL (ref 70–99)
HCT VFR BLD AUTO: 41.4 % (ref 35–47)
HDLC SERPL-MCNC: 72 MG/DL
HGB BLD-MCNC: 13 G/DL (ref 11.7–15.7)
IRON SATN MFR SERPL: 23 % (ref 15–46)
IRON SERPL-MCNC: 61 UG/DL (ref 35–180)
LDLC SERPL CALC-MCNC: 132 MG/DL
LYMPHOCYTES # BLD AUTO: 2.6 10E9/L (ref 0.8–5.3)
LYMPHOCYTES NFR BLD AUTO: 34.8 %
MCH RBC QN AUTO: 26.1 PG (ref 26.5–33)
MCHC RBC AUTO-ENTMCNC: 31.4 G/DL (ref 31.5–36.5)
MCV RBC AUTO: 83 FL (ref 78–100)
MONOCYTES # BLD AUTO: 0.5 10E9/L (ref 0–1.3)
MONOCYTES NFR BLD AUTO: 6.9 %
NEUTROPHILS # BLD AUTO: 4.1 10E9/L (ref 1.6–8.3)
NEUTROPHILS NFR BLD AUTO: 56.1 %
NONHDLC SERPL-MCNC: 148 MG/DL
PLATELET # BLD AUTO: 247 10E9/L (ref 150–450)
POTASSIUM SERPL-SCNC: 4.1 MMOL/L (ref 3.4–5.3)
PROT SERPL-MCNC: 7.3 G/DL (ref 6.8–8.8)
RBC # BLD AUTO: 4.98 10E12/L (ref 3.8–5.2)
SODIUM SERPL-SCNC: 140 MMOL/L (ref 133–144)
TIBC SERPL-MCNC: 269 UG/DL (ref 240–430)
TRIGL SERPL-MCNC: 82 MG/DL
TSH SERPL DL<=0.005 MIU/L-ACNC: 2.93 MU/L (ref 0.4–4)
VIT B12 SERPL-MCNC: 391 PG/ML (ref 193–986)
WBC # BLD AUTO: 7.4 10E9/L (ref 4–11)

## 2019-09-09 PROCEDURE — 82306 VITAMIN D 25 HYDROXY: CPT | Performed by: FAMILY MEDICINE

## 2019-09-09 PROCEDURE — 83540 ASSAY OF IRON: CPT | Performed by: FAMILY MEDICINE

## 2019-09-09 PROCEDURE — 36415 COLL VENOUS BLD VENIPUNCTURE: CPT | Performed by: FAMILY MEDICINE

## 2019-09-09 PROCEDURE — 82607 VITAMIN B-12: CPT | Performed by: FAMILY MEDICINE

## 2019-09-09 PROCEDURE — 83550 IRON BINDING TEST: CPT | Performed by: FAMILY MEDICINE

## 2019-09-09 PROCEDURE — 82728 ASSAY OF FERRITIN: CPT | Performed by: FAMILY MEDICINE

## 2019-09-09 PROCEDURE — 99395 PREV VISIT EST AGE 18-39: CPT | Performed by: FAMILY MEDICINE

## 2019-09-09 PROCEDURE — 80061 LIPID PANEL: CPT | Performed by: FAMILY MEDICINE

## 2019-09-09 PROCEDURE — 80053 COMPREHEN METABOLIC PANEL: CPT | Performed by: FAMILY MEDICINE

## 2019-09-09 PROCEDURE — 84443 ASSAY THYROID STIM HORMONE: CPT | Performed by: FAMILY MEDICINE

## 2019-09-09 PROCEDURE — 85025 COMPLETE CBC W/AUTO DIFF WBC: CPT | Performed by: FAMILY MEDICINE

## 2019-09-09 RX ORDER — KETOCONAZOLE 20 MG/ML
SHAMPOO TOPICAL
Qty: 120 ML | Refills: 1 | Status: SHIPPED | OUTPATIENT
Start: 2019-09-09 | End: 2019-11-12

## 2019-09-09 ASSESSMENT — ENCOUNTER SYMPTOMS
PARESTHESIAS: 0
CHILLS: 0
FEVER: 0
HEMATURIA: 0
COUGH: 0
JOINT SWELLING: 0
NAUSEA: 0
MYALGIAS: 1
SORE THROAT: 0
FREQUENCY: 0
BREAST MASS: 0
ABDOMINAL PAIN: 0
ARTHRALGIAS: 0
HEARTBURN: 0
DIARRHEA: 0
NERVOUS/ANXIOUS: 1
HEADACHES: 0
PALPITATIONS: 0
HEMATOCHEZIA: 0
WEAKNESS: 0
SHORTNESS OF BREATH: 0
DYSURIA: 0
CONSTIPATION: 0
EYE PAIN: 0
DIZZINESS: 0

## 2019-09-09 ASSESSMENT — MIFFLIN-ST. JEOR: SCORE: 1574.46

## 2019-09-09 ASSESSMENT — PAIN SCALES - GENERAL: PAINLEVEL: MODERATE PAIN (4)

## 2019-09-09 ASSESSMENT — PATIENT HEALTH QUESTIONNAIRE - PHQ9
10. IF YOU CHECKED OFF ANY PROBLEMS, HOW DIFFICULT HAVE THESE PROBLEMS MADE IT FOR YOU TO DO YOUR WORK, TAKE CARE OF THINGS AT HOME, OR GET ALONG WITH OTHER PEOPLE: SOMEWHAT DIFFICULT
SUM OF ALL RESPONSES TO PHQ QUESTIONS 1-9: 10
SUM OF ALL RESPONSES TO PHQ QUESTIONS 1-9: 10

## 2019-09-09 NOTE — PATIENT INSTRUCTIONS
Preventive Health Recommendations  Female Ages 26 - 39  Yearly exam:   See your health care provider every year in order to    Review health changes.     Discuss preventive care.      Review your medicines if you your doctor has prescribed any.    Until age 30: Get a Pap test every three years (more often if you have had an abnormal result).    After age 30: Talk to your doctor about whether you should have a Pap test every 3 years or have a Pap test with HPV screening every 5 years.   You do not need a Pap test if your uterus was removed (hysterectomy) and you have not had cancer.  You should be tested each year for STDs (sexually transmitted diseases), if you're at risk.   Talk to your provider about how often to have your cholesterol checked.  If you are at risk for diabetes, you should have a diabetes test (fasting glucose).  Shots: Get a flu shot each year. Get a tetanus shot every 10 years.   Nutrition:     Eat at least 5 servings of fruits and vegetables each day.    Eat whole-grain bread, whole-wheat pasta and brown rice instead of white grains and rice.    Get adequate Calcium and Vitamin D.     Lifestyle    Exercise at least 150 minutes a week (30 minutes a day, 5 days of the week). This will help you control your weight and prevent disease.    Limit alcohol to one drink per day.    No smoking.     Wear sunscreen to prevent skin cancer.    See your dentist every six months for an exam and cleaning.  [facility]     Discharged by : Marisela Armstrong CMA     If you have any questions regarding your visit please contact your care team:     Team Mimi              Clinic Hours Telephone Number     Dr. Jean Pierre Fernandez CNP   7am-7pm  Monday - Thursday   7am-5pm  Fridays  [phone]   (Appointment scheduling available 24/7)     RN Line  [phone] option 2     Urgent Care - [facility] - 11am-9pm Monday-Friday Saturday-Sunday-  9am-5pm     [place] -   5pm-9pm Monday-Friday Saturday-Sunday- 9am-5pm    [phone] - [place]    [phone] - [place]     For a Price Quote for your services, please call our Consumer Price Line at [phone].     What options do I have for visits at the clinic other than the traditional office visit?     To expand how we care for you, many of our providers are utilizing electronic visits (e-visits) and telephone visits, when medically appropriate, for interactions with their patients rather than a visit in the clinic. We also offer nurse visits for many medical concerns. Just like any other service, we will bill your insurance company for this type of visit based on time spent on the phone with your provider. Not all insurance companies cover these visits. Please check with your medical insurance if this type of visit is covered. You will be responsible for any charges that are not paid by your insurance.     E-visits via [employer]: generally incur a $45.00 fee.     Telephone visits:  Time spent on the phone: *charged based on time that is spent on the phone in increments of 10 minutes. Estimated cost:   5-10 mins $30.00   11-20 mins. $59.00   21-30 mins. $85.00       Use [employer] (secure email communication and access to your chart) to send your primary care provider a message or make an appointment. Ask someone on your Team how to sign up for [employer].     As always, Thank you for trusting us with your health care needs!      [facility]:    Scheduling Appointments  [facility]  Call: [phone]    [facility]  Call: [phone]    [facility]  Call: [phone]    For Gastroenterology referrals   [facility]   Clinics and Surgery Center, 4th Floor   [address] [place] [address]   Appointments: [phone]    WHERE TO GO FOR CARE?    Clinic    Make an appointment if you:        Are sick (cold, cough, flu, sore throat, earache or in pain).       Have a small injury (sprain, small cut, burn or broken bone).       Need a physical exam, Pap smear, vaccine or prescription refill.       Have questions about your health or medicines.    To reach us:      Call [phone] ([phone]). Open 24 hours every day. (For counseling services, call [phone].)    Log into [employer] at [place]. (Visit iList to create an account.) Hospital emergency room    An emergency is a serious or life- threatening problem that must be treated right away.    Call 426 or get to the hospital if you have:      Very bad or sudden:            - Chest pain or pressure         - Bleeding         - Head or belly pain         - Dizziness or trouble seeing, walking or                          Speaking      Problems breathing      Blood in your vomit or you are coughing up blood      A major injury (knocked out, loss of a finger or limb, rape, broken bone protruding from skin)    A mental health crisis. (Or call the Mental Health Crisis line at 1-106.603.6733 or Suicide Prevention Hotline at 1-485.370.2505.)    Open 24 hours every day. You don't need an appointment.     Urgent care    Visit urgent care for sickness or small injuries when the clinic is closed. You don't need an appointment. To check hours or find an urgent care near you, visit [email or web address]. Online care    Get online care from [facility] for more than 70 common problems, like colds, allergies and infections. Open 24 hours every day at:   [email or web address]   Need help deciding?    For advice about where to be seen, you may call your clinic and ask to speak with a nurse. We're here for you 24 hours every day.         If you are deaf or hard of hearing, please let us know. We provide many free services including sign language interpreters, oral interpreters, TTYs, telephone amplifiers, note takers and written materials.

## 2019-09-09 NOTE — PROGRESS NOTES
SUBJECTIVE:   CC: Danyelle Canales is an 39 year old woman who presents for preventive health visit.     Healthy Habits:     Getting at least 3 servings of Calcium per day:  Yes    Bi-annual eye exam:  Yes    Dental care twice a year:  Yes    Sleep apnea or symptoms of sleep apnea:  Daytime drowsiness    Diet:  Carbohydrate counting    Frequency of exercise:  2-3 days/week    Duration of exercise:  15-30 minutes    Taking medications regularly:  Yes    Medication side effects:  Significant flushing    PHQ-2 Total Score: 6    Additional concerns today:  Yes    Rash  Duration of complaint: Rash on face 4-5 weeks  Description:   Location: Face, along hairline   Character: red  Itching (Pruritis): no  Progression of Symptoms:  same  History:   Previous similar rash: no  Precipitating factors:   Exposure to similar rash: no  New exposures: None   Recent travel: no  Alleviating factors: Hydrocortisone cream    Patient notes generalized fatigue and would like lab testing for this.      Today's PHQ-2 Score:   PHQ-2 ( 1999 [employer]) 9/9/2019   Q1: Little interest or pleasure in doing things 3   Q2: Feeling down, depressed or hopeless 3   PHQ-2 Score 6   Q1: Little interest or pleasure in doing things Nearly every day   Q2: Feeling down, depressed or hopeless Nearly every day   PHQ-2 Score 6       Abuse: Current or Past(Physical, Sexual or Emotional)- No  Do you feel safe in your environment? Yes    Social History     Tobacco Use     Smoking status: Never Smoker     Smokeless tobacco: Never Used   Substance Use Topics     Alcohol use: Yes     Comment: 1-2 glasses a week     If you drink alcohol do you typically have >3 drinks per day or >7 drinks per week? No    Alcohol Use 9/9/2019   Prescreen: >3 drinks/day or >7 drinks/week? No   Prescreen: >3 drinks/day or >7 drinks/week? -   No flowsheet data found.    Reviewed orders with patient.  Reviewed health maintenance and updated orders accordingly - Yes  Patient Active  Problem List   Diagnosis     Insomnia     allergic DERMATITIS NOS     Encounter for other general counseling or advice on contraception     [facility] SPRAIN THORACIC REGION     [facility] SPRAIN OF NECK     Migraine headache     PMDD (premenstrual dysphoric disorder)     Trichotillomania     CARDIOVASCULAR SCREENING; LDL GOAL LESS THAN 160     Moderate major depression (H)     History of ovarian cyst     Generalized anxiety disorder     Chronic pain syndrome     ASD (atrial septal defect)     Chronic pain     Esophageal reflux     Non morbid obesity, unspecified obesity type     Morbid obesity (H)     Past Surgical History:   Procedure Laterality Date     BIOPSY       C REPR ASD OSTIUM PREMUM      Dr. Silverio     COLONOSCOPY         Social History     Tobacco Use     Smoking status: Never Smoker     Smokeless tobacco: Never Used   Substance Use Topics     Alcohol use: Yes     Comment: 1-2 glasses a week     Family History   Problem Relation Age of Onset     C.A.D. Mother         heart surgery to close hole in heart     Cardiovascular Mother      Hypertension Mother      Depression Mother      Anxiety Disorder Mother      Cerebrovascular Disease Father      Hypertension Father      Diabetes No family hx of      Breast Cancer No family hx of      Cancer - colorectal No family hx of            Mammogram not appropriate for this patient based on age.    Pertinent mammograms are reviewed under the imaging tab.  History of abnormal Pap smear: NO - age 30-65 PAP every 5 years with negative HPV co-testing recommended  PAP / HPV Latest Ref Rng & Units 8/20/2018 9/16/2015 9/26/2012   PAP - NIL NIL NIL   HPV 16 DNA NEG:Negative Negative - -   HPV 18 DNA NEG:Negative Negative - -   OTHER HR HPV NEG:Negative Negative - -     Reviewed and updated as needed this visit by clinical staff  Tobacco  Meds  Med Hx  Surg Hx  Fam Hx  Soc Hx        Reviewed and updated as needed this visit by Provider          Review of Systems   Constitutional:  "Negative for chills and fever.   HENT: Negative for congestion, ear pain, hearing loss and sore throat.    Eyes: Negative for pain and visual disturbance.   Respiratory: Negative for cough and shortness of breath.    Cardiovascular: Negative for chest pain, palpitations and peripheral edema.   Gastrointestinal: Negative for abdominal pain, constipation, diarrhea, heartburn, hematochezia and nausea.   Breasts:  Negative for tenderness, breast mass and discharge.   Genitourinary: Negative for dysuria, frequency, genital sores, hematuria, pelvic pain, urgency, vaginal bleeding and vaginal discharge.   Musculoskeletal: Positive for myalgias. Negative for arthralgias and joint swelling.   Skin: Positive for rash.   Neurological: Negative for dizziness, weakness, headaches and paresthesias.   Psychiatric/Behavioral: Positive for mood changes. The patient is nervous/anxious.         OBJECTIVE:   /82   Pulse 86   Temp 99.2  F (37.3  C) (Oral)   Ht 1.702 m (5' 7\")   Wt 86.2 kg (190 lb)   LMP 09/08/2019   SpO2 100%   BMI 29.76 kg/m    Physical Exam  GENERAL: healthy, alert and no distress  EYES: Eyes grossly normal to inspection, PERRL and conjunctivae and sclerae normal  HENT: ear canals and TM's normal, nose and mouth without ulcers or lesions  NECK: no adenopathy, no asymmetry, masses, or scars and thyroid normal to palpation  RESP: lungs clear to auscultation - no rales, rhonchi or wheezes  BREAST: normal without masses, tenderness or nipple discharge and no palpable axillary masses or adenopathy  CV: regular rate and rhythm, normal S1 S2, no S3 or S4, no murmur, click or rub, no peripheral edema and peripheral pulses strong  ABDOMEN: soft, nontender, no hepatosplenomegaly, no masses and bowel sounds normal  MS: no gross musculoskeletal defects noted, no edema  SKIN: Significant dandruff noted on scalp with patches of dry, thickened, flaky skin around hairline on forehead  NEURO: Normal strength and tone, " "mentation intact and speech normal  PSYCH: mentation appears normal, affect normal/bright    ASSESSMENT/PLAN:   1. Routine general medical examination at a health care facility    2. Other fatigue  - Comprehensive metabolic panel  - TSH with free T4 reflex  - CBC with platelets differential  - Iron and iron binding capacity  - Ferritin  - Vitamin B12  - Vitamin D Deficiency    3. Seborrheic dermatitis  - ketoconazole (NIZORAL) 2 % external shampoo; Apply to scalp and forehead and leave for 5 minutes before rinsing.  Repeat every other day for 7-10 days.  Dispense: 120 mL; Refill: 1    4. CARDIOVASCULAR SCREENING; LDL GOAL LESS THAN 100  - Lipid panel reflex to direct LDL Non-fasting    COUNSELING:  Reviewed preventive health counseling, as reflected in patient instructions    Estimated body mass index is 29.76 kg/m  as calculated from the following:    Height as of this encounter: 1.702 m (5' 7\").    Weight as of this encounter: 86.2 kg (190 lb).         reports that she has never smoked. She has never used smokeless tobacco.      Counseling Resources:  ATP IV Guidelines  Pooled Cohorts Equation Calculator  Breast Cancer Risk Calculator  FRAX Risk Assessment  ICSI Preventive Guidelines  Dietary Guidelines for Americans, 2010  [employer]'s MyPlate  ASA Prophylaxis  Lung CA Screening    Katlyn Joiner, DO  [facility]  Answers for HPI/ROS submitted by the patient on 9/9/2019   Annual Exam:  If you checked off any problems, how difficult have these problems made it for you to do your work, take care of things at home, or get along with other people?: Somewhat difficult  PHQ9 TOTAL SCORE: 10    "

## 2019-09-10 ASSESSMENT — PATIENT HEALTH QUESTIONNAIRE - PHQ9: SUM OF ALL RESPONSES TO PHQ QUESTIONS 1-9: 10

## 2019-09-11 ENCOUNTER — MYC REFILL (OUTPATIENT)
Dept: PSYCHIATRY | Facility: CLINIC | Age: 39
End: 2019-09-11

## 2019-09-11 DIAGNOSIS — F33.1 MAJOR DEPRESSIVE DISORDER, RECURRENT EPISODE, MODERATE (H): ICD-10-CM

## 2019-09-11 DIAGNOSIS — F41.1 GENERALIZED ANXIETY DISORDER: ICD-10-CM

## 2019-09-13 ENCOUNTER — MYC MEDICAL ADVICE (OUTPATIENT)
Dept: FAMILY MEDICINE | Facility: CLINIC | Age: 39
End: 2019-09-13

## 2019-09-13 RX ORDER — CLONAZEPAM 0.5 MG/1
TABLET ORAL
Qty: 30 TABLET | Refills: 0 | Status: SHIPPED | OUTPATIENT
Start: 2019-09-13 | End: 2019-09-25

## 2019-09-13 RX ORDER — DEXTROAMPHETAMINE SACCHARATE, AMPHETAMINE ASPARTATE MONOHYDRATE, DEXTROAMPHETAMINE SULFATE AND AMPHETAMINE SULFATE 5; 5; 5; 5 MG/1; MG/1; MG/1; MG/1
20 CAPSULE, EXTENDED RELEASE ORAL 2 TIMES DAILY
Qty: 60 CAPSULE | Refills: 0 | Status: SHIPPED | OUTPATIENT
Start: 2019-09-13 | End: 2019-09-25

## 2019-09-13 NOTE — TELEPHONE ENCOUNTER
Refill for: amphetamine-dextroamphetamine (ADDERALL XR) 20 MG 24 hr capsule                 clonazePAM (KLONOPIN) 0.5 MG tablet    Last Appointment: 19    Next Appointment: 19    No Shows/Cancellations since last appointment: none    Last Refill in Epic (date and amount/how many days):    Disp Refills Start End MATT   amphetamine-dextroamphetamine (ADDERALL XR) 20 MG 24 hr capsule 60 capsule 0 2019  No   Sig - Route: Take 1 capsule (20 mg) by mouth 2 times daily - Oral      Disp Refills Start End MATT   clonazePAM (KLONOPIN) 0.5 MG tablet 30 tablet 0 2019  No   Si tab daily prn anxiety      reviewed and summarized below:   Fill Date Drug      Qty  Days  Prescriber  2019 Hydrocodone-Acetamin 5-325 Mg  8 4  Nunn Lee   2019 Zolpidem Tart Er 12.5 Mg Tab  30 30  Da Mca  2019 Clonazepam 0.5 Mg Tablet   30 30  Da Mca  2019 Adderall Xr 20 Mg Capsule  60 30  Da Mca     Last office visit note reviewed and summarized below:  Treatment Plan:    Start Lamictal: 25 mg daily x 2 weeks, then increase to 50 mg daily. Monitor for rash and report promptly if occurring.    Continue l-methylfolate 15 mg daily    Continue Pristiq 100 mg daily    Continue Wellbutrin  mg daily    Continue  Adderall XR 20 mg twice daily plus Adderall IR 10 mg qAfternoon.    May use Klonopin 0.5 mg as needed for daytime anxiety; advised of proper/safe usage, onset, duration; not to take with other CNS depressants or sleep aids. Try to use occasionally.    May use Ambien CR 12.5 mg nightly for sleep; advised of proper/safe usage, onset, duration; not to take with other CNS depressants or sleep aids    Call [facility] in [facility] to inquire about TMS coverage/cost    Continue all other medical care per primary care provider.     Continue all other medications as reviewed per electronic medical record today.     Continue individual therapy as planned with established therapists.    Schedule an  appointment with me in 6 weeks or sooner as needed      Elise Yoder RN  09/13/19  10:33 AM

## 2019-09-13 NOTE — TELEPHONE ENCOUNTER
Placed both Rx at AV . Called patient and left message letting her know. Also Mycharting to let her know.    Karla Arriaza, CMA

## 2019-09-23 ENCOUNTER — TRANSFERRED RECORDS (OUTPATIENT)
Dept: HEALTH INFORMATION MANAGEMENT | Facility: CLINIC | Age: 39
End: 2019-09-23

## 2019-09-25 ENCOUNTER — OFFICE VISIT (OUTPATIENT)
Dept: PSYCHIATRY | Facility: CLINIC | Age: 39
End: 2019-09-25
Payer: COMMERCIAL

## 2019-09-25 VITALS
SYSTOLIC BLOOD PRESSURE: 130 MMHG | BODY MASS INDEX: 29.29 KG/M2 | WEIGHT: 187 LBS | RESPIRATION RATE: 12 BRPM | DIASTOLIC BLOOD PRESSURE: 88 MMHG | OXYGEN SATURATION: 98 % | HEART RATE: 79 BPM

## 2019-09-25 DIAGNOSIS — F41.1 GENERALIZED ANXIETY DISORDER: Primary | ICD-10-CM

## 2019-09-25 DIAGNOSIS — F32.1 MODERATE MAJOR DEPRESSION (H): ICD-10-CM

## 2019-09-25 DIAGNOSIS — F33.1 MAJOR DEPRESSIVE DISORDER, RECURRENT EPISODE, MODERATE (H): ICD-10-CM

## 2019-09-25 PROCEDURE — 99214 OFFICE O/P EST MOD 30 MIN: CPT | Performed by: NURSE PRACTITIONER

## 2019-09-25 RX ORDER — DEXTROAMPHETAMINE SACCHARATE, AMPHETAMINE ASPARTATE MONOHYDRATE, DEXTROAMPHETAMINE SULFATE AND AMPHETAMINE SULFATE 5; 5; 5; 5 MG/1; MG/1; MG/1; MG/1
20 CAPSULE, EXTENDED RELEASE ORAL 2 TIMES DAILY
Qty: 60 CAPSULE | Refills: 0 | Status: SHIPPED | OUTPATIENT
Start: 2019-10-13 | End: 2019-09-25

## 2019-09-25 RX ORDER — DEXTROAMPHETAMINE SACCHARATE, AMPHETAMINE ASPARTATE, DEXTROAMPHETAMINE SULFATE AND AMPHETAMINE SULFATE 2.5; 2.5; 2.5; 2.5 MG/1; MG/1; MG/1; MG/1
TABLET ORAL
Qty: 30 TABLET | Refills: 0 | Status: SHIPPED | OUTPATIENT
Start: 2019-10-13 | End: 2019-09-25

## 2019-09-25 RX ORDER — LAMOTRIGINE 150 MG/1
150 TABLET ORAL DAILY
Qty: 30 TABLET | Refills: 2 | Status: SHIPPED | OUTPATIENT
Start: 2019-09-25 | End: 2019-11-12

## 2019-09-25 RX ORDER — CLONAZEPAM 0.5 MG/1
TABLET ORAL
Qty: 30 TABLET | Refills: 0 | Status: SHIPPED | OUTPATIENT
Start: 2019-10-13 | End: 2019-09-25

## 2019-09-25 RX ORDER — CLONAZEPAM 0.5 MG/1
TABLET ORAL
Qty: 30 TABLET | Refills: 0 | Status: SHIPPED | OUTPATIENT
Start: 2019-12-13 | End: 2019-10-21

## 2019-09-25 RX ORDER — DESVENLAFAXINE 100 MG/1
100 TABLET, EXTENDED RELEASE ORAL DAILY
Qty: 30 TABLET | Refills: 2 | Status: SHIPPED | OUTPATIENT
Start: 2019-09-25 | End: 2019-11-12

## 2019-09-25 RX ORDER — DEXTROAMPHETAMINE SACCHARATE, AMPHETAMINE ASPARTATE MONOHYDRATE, DEXTROAMPHETAMINE SULFATE AND AMPHETAMINE SULFATE 5; 5; 5; 5 MG/1; MG/1; MG/1; MG/1
20 CAPSULE, EXTENDED RELEASE ORAL 2 TIMES DAILY
Qty: 60 CAPSULE | Refills: 0 | Status: SHIPPED | OUTPATIENT
Start: 2019-11-13 | End: 2019-09-25

## 2019-09-25 RX ORDER — ZOLPIDEM TARTRATE 12.5 MG/1
12.5 TABLET, FILM COATED, EXTENDED RELEASE ORAL
Qty: 30 TABLET | Refills: 2 | Status: SHIPPED | OUTPATIENT
Start: 2019-10-06 | End: 2019-10-21

## 2019-09-25 RX ORDER — DEXTROAMPHETAMINE SACCHARATE, AMPHETAMINE ASPARTATE, DEXTROAMPHETAMINE SULFATE AND AMPHETAMINE SULFATE 2.5; 2.5; 2.5; 2.5 MG/1; MG/1; MG/1; MG/1
TABLET ORAL
Qty: 30 TABLET | Refills: 0 | Status: SHIPPED | OUTPATIENT
Start: 2019-12-13 | End: 2019-10-21

## 2019-09-25 RX ORDER — DEXTROAMPHETAMINE SACCHARATE, AMPHETAMINE ASPARTATE, DEXTROAMPHETAMINE SULFATE AND AMPHETAMINE SULFATE 2.5; 2.5; 2.5; 2.5 MG/1; MG/1; MG/1; MG/1
TABLET ORAL
Qty: 30 TABLET | Refills: 0 | Status: SHIPPED | OUTPATIENT
Start: 2019-11-13 | End: 2019-09-25

## 2019-09-25 RX ORDER — DEXTROAMPHETAMINE SACCHARATE, AMPHETAMINE ASPARTATE MONOHYDRATE, DEXTROAMPHETAMINE SULFATE AND AMPHETAMINE SULFATE 5; 5; 5; 5 MG/1; MG/1; MG/1; MG/1
20 CAPSULE, EXTENDED RELEASE ORAL 2 TIMES DAILY
Qty: 60 CAPSULE | Refills: 0 | Status: SHIPPED | OUTPATIENT
Start: 2019-12-13 | End: 2019-10-21

## 2019-09-25 RX ORDER — CLONAZEPAM 0.5 MG/1
TABLET ORAL
Qty: 30 TABLET | Refills: 0 | Status: SHIPPED | OUTPATIENT
Start: 2019-11-13 | End: 2019-09-25

## 2019-09-25 RX ORDER — BUPROPION HYDROCHLORIDE 300 MG/1
300 TABLET ORAL EVERY MORNING
Qty: 30 TABLET | Refills: 2 | Status: SHIPPED | OUTPATIENT
Start: 2019-09-25 | End: 2019-11-12

## 2019-09-25 ASSESSMENT — ANXIETY QUESTIONNAIRES
GAD7 TOTAL SCORE: 11
7. FEELING AFRAID AS IF SOMETHING AWFUL MIGHT HAPPEN: MORE THAN HALF THE DAYS
GAD7 TOTAL SCORE: 11
4. TROUBLE RELAXING: SEVERAL DAYS
2. NOT BEING ABLE TO STOP OR CONTROL WORRYING: MORE THAN HALF THE DAYS
3. WORRYING TOO MUCH ABOUT DIFFERENT THINGS: MORE THAN HALF THE DAYS
GAD7 TOTAL SCORE: 11
7. FEELING AFRAID AS IF SOMETHING AWFUL MIGHT HAPPEN: MORE THAN HALF THE DAYS
1. FEELING NERVOUS, ANXIOUS, OR ON EDGE: MORE THAN HALF THE DAYS
5. BEING SO RESTLESS THAT IT IS HARD TO SIT STILL: SEVERAL DAYS
6. BECOMING EASILY ANNOYED OR IRRITABLE: SEVERAL DAYS

## 2019-09-25 ASSESSMENT — PATIENT HEALTH QUESTIONNAIRE - PHQ9
SUM OF ALL RESPONSES TO PHQ QUESTIONS 1-9: 6
SUM OF ALL RESPONSES TO PHQ QUESTIONS 1-9: 6
10. IF YOU CHECKED OFF ANY PROBLEMS, HOW DIFFICULT HAVE THESE PROBLEMS MADE IT FOR YOU TO DO YOUR WORK, TAKE CARE OF THINGS AT HOME, OR GET ALONG WITH OTHER PEOPLE: SOMEWHAT DIFFICULT

## 2019-09-25 NOTE — PROGRESS NOTES
"    Outpatient Psychiatric Progress Note    Name: Danyelle Canales   : 1980                    Primary Care Provider: Katlyn Joiner DO   Therapist: Denice ESCALERA     PHQ-9 scores:  PHQ-9 SCORE 2019   PHQ-9 Total Score - - -   PHQ-9 Total Score MyChart 9 (Mild depression) 10 (Moderate depression) 6 (Mild depression)   PHQ-9 Total Score 9 10 6       NAJMA-7 scores:  NAJMA-7 SCORE 2019   Total Score - - -   Total Score 8 (mild anxiety) 9 (mild anxiety) 11 (moderate anxiety)   Total Score 8 9 11       Patient Identification:  Patient is a 39 year old year old,   White American female  who presents for return visit with me.  Patient is currently employed full time. Patient attended the session alone. Patient prefers to be called: \"Danyelle\".    Interim History:  I last saw Danyelle Canales for outpatient psychiatry Return Visit on 19.     During that appointment, we agreed to keep her psychopharm regime in place, not change her antidepressant yet again, and have her trial Lamictal.     Current medications include:   Current Outpatient Medications   Medication Sig     amphetamine-dextroamphetamine (ADDERALL XR) 20 MG 24 hr capsule Take 1 capsule (20 mg) by mouth 2 times daily     amphetamine-dextroamphetamine (ADDERALL) 10 MG tablet 1 tab daily in the afternoon. May take with Adderall XR BID.     buPROPion (WELLBUTRIN XL) 300 MG 24 hr tablet Take 1 tablet (300 mg) by mouth every morning     clonazePAM (KLONOPIN) 0.5 MG tablet 1 tab daily prn anxiety     desvenlafaxine (PRISTIQ) 100 MG 24 hr tablet Take 1 tablet (100 mg) by mouth daily     HYDROcodone-acetaminophen (NORCO) 5-325 MG per tablet      ketoconazole (NIZORAL) 2 % external shampoo Apply to scalp and forehead and leave for 5 minutes before rinsing.  Repeat every other day for 7-10 days.     lamoTRIgine (LAMICTAL) 100 MG tablet Take 0.5 tablets (50 mg) by mouth daily     ORDER FOR DME Apply 1 " Units topically. TENS unit use as directed     zolpidem ER (AMBIEN CR) 12.5 MG CR tablet Take 1 tablet (12.5 mg) by mouth nightly as needed for sleep     No current facility-administered medications for this visit.        The [place] Prescription Monitoring Program has been reviewed and there are no concerns about diversionary activity for controlled substances at this time.      I was able to review most recent Primary Care Provider, specialty provider, and therapy visit notes that I have access to.     Today, patient reports she initiated Lamictal 6 weeks ago, and 2 weeks ago increased this to 100 mg daily. States she is tolerating this well. Thought she had beginning of a rash, but turned out to be a more of dermatitis, as she's had before. Otherwise well tolerated. States she thinks it is benefitting her mood, though not necessarily her anxiety. Interested to increase Lamictal.    Read somewhere that folate somehow interferes with Lamictal, so stopped L-methylfolate.    She continues Pristiq 100 mg daily and Wellbutrin  mg daily.    She continues Adderall XR 20 mg in AM, XR 20 mg at 11 am, and IR 10 mg at 2 pm. Doesn't feel it is helping with mood as much as it did when she first started and increased daily dose. Denies side effects.    Continues Ambien CR 12.5 mg nightly. Continues Klonopin 0.5 mg daily. Denies impairment or tolerance-formation of Klonopin.    Patient continues to see therapist at [facility] every 2-4 weeks.    Patient continuing with keto diet. States she injured her Achillis tendon; was in a boot and not as physically active; has been mending.    PCP left  so had to establish with new PCP, Katlyn Joiner DO.      Past Medical History:   Diagnosis Date     ASD (atrial septal defect)     repaired surgically as a child     Depressive disorder      Depressive disorder, not elsewhere classified      Headache      Headache      History of blood transfusion 1985 1985 during open heart  surgery     Hypertension     higher results at pain clinic, want to confirm ok     Insomnia, unspecified      Other forms of migraine, without mention of intractable migraine without mention of status migrainosus       has a past medical history of ASD (atrial septal defect), Depressive disorder, Depressive disorder, not elsewhere classified, Headache, Headache, History of blood transfusion (1985), Hypertension, Insomnia, unspecified, and Other forms of migraine, without mention of intractable migraine without mention of status migrainosus. She also has no past medical history of Arthritis, Cerebral infarction (H), Congestive heart failure (H), COPD (chronic obstructive pulmonary disease) (H), Diabetes (H), Thyroid disease, or Uncomplicated asthma.    Social history updates:  No major updates     Substance use updates:  Occassional ETOh; not with Klonopin  Tobacco use: No      Vital Signs:   /88 (BP Location: Left arm, Patient Position: Chair, Cuff Size: Adult Regular)   Pulse 79   Resp 12   Wt 84.8 kg (187 lb)   LMP 09/08/2019   SpO2 98%   Breastfeeding? No   BMI 29.29 kg/m      Labs:  Most recent laboratory results reviewed and no new labs.    Review of Systems:  10 systems (general, cardiovascular, respiratory, eyes, ENT, endocrine, GI, , M/S, neurological) were reviewed. Most pertinent finding(s) is/are: chronic pain. The remaining systems are all unremarkable.     Mental Status Examination:  Appearance:  awake, alert and adequately groomed  Attitude:  cooperative   Eye Contact:  good  Gait and Station: Normal  Psychomotor Behavior:  intact station, gait and muscle tone  Oriented to:  time, person, and place  Attention Span and Concentration:  Normal  Speech:   clear, coherent  Mood:  anxious  Affect: full  Associations:  no loose associations  Thought Process:  logical, linear and goal oriented  Thought Content:  Appropriate to Interview  Recent and Remote Memory:  intact Not formally assessed.  No amnesia.  Fund of Knowledge: appropriate  Insight:  good  Judgment:  intact  Impulse Control:  intact     Suicide Risk Assessment:  Today Danyelle Canales denies any suicidal ideation or self-harm impulses. Therefore, based on all available evidence including the factors cited above, Danyelle Canales does not appear to be at imminent risk for self-harm, does not meet criteria for a 72-hr hold, and therefore remains appropriate for ongoing outpatient level of care.  A thorough assessment of risk factors related to suicide and self-harm have been reviewed and are noted above. The patient convincingly denies suicidality on several occasions. Local community safety resources printed and reviewed for patient to use if needed. There was no deceit detected, and the patient presented in a manner that was believable.      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  Med: Vicodin.   Maximum use per month: 90  Expected duration: ongoing, pt in comprehensive pain mgmt currently  Narcotic agreement on file: NO  Clinic visit recommended: Q 3 months         Morbid obesity (H) 08/20/2018     Priority: Medium     Non morbid obesity, unspecified obesity type 09/20/2017     Priority: Medium     Esophageal reflux 11/23/2014     Priority: Medium     Chronic pain 01/31/2012     Priority: Medium     ASD (atrial septal defect) 01/16/2012     Priority: Medium     Generalized anxiety disorder 10/18/2011     Priority: Medium     Diagnosis updated by automated  process. Provider to review and confirm.       Moderate major depression (H) 09/12/2011     Priority: Medium     History of ovarian cyst 09/12/2011     Priority: Medium     CARDIOVASCULAR SCREENING; LDL GOAL LESS THAN 160 05/09/2010     Priority: Medium     Trichotillomania 08/03/2009     Priority: Medium     PMDD (premenstrual dysphoric disorder) 06/03/2009     Priority: Medium     Migraine headache 07/23/2008     Priority: Medium     ia SPRAIN THORACIC REGION 10/08/2007     Priority: Medium     ia SPRAIN OF NECK 10/08/2007     Priority: Medium     Encounter for other general counseling or advice on contraception 07/18/2007     Priority: Medium     Diagnosis updated by automated process. Provider to review and confirm.       allergic DERMATITIS NOS 11/12/2004     Priority: Medium     Insomnia 07/16/2004     Priority: Medium     Problem list name updated by automated process. Provider to review           Assessment:  Danyelle Canales reports some promising improvement towards her mood quality and stability with Lamictal, and this has also proven well tolerated. We'll optimize this further to 150 mg daily, though she may very well proceed to 300 mg daily in time if it is warranted. We agree to hold steady with the remainder of her psychopharm regime, and she is encouraged to restart l-methylfolate 15 mg daily (despite what she's read, there is actually an argument to made that anyone taking Lamictal should have l-methylfolate supplementation).    For the time being I do no see her reducing her daily Klonopin use. She will hopefully be able to reduce this to occasional use at some point in the future.    I've written her for 3 months of all her medication.    She remains a good candidate for TMS, however the treatment costs are prohibitive.    She is encouraged to continue in psychotherapy.     The patient was previously informed about my impending departure from [facility]. Given the complexity, chronicity and  ever-evolving nature of her psychiatric treatment, it would be be in her best benefit to see a long-term psychiatrist. While such services do not exist at [facility], I've known them to operate at other health systems in the area. As she is established at [facility] and has their insurance plan, she'll speak to her therapist about establishing there.    Medication side effects and alternatives were reviewed. Health promotion activities recommended and reviewed today. All questions addressed. Education and counseling completed regarding risks and benefits of medications and psychotherapy options.      Treatment Plan:    Increase Lamictal to 150 mg daily    Restart -methylfolate 15 mg daily    Continue Pristiq 100 mg daily    Continue Wellbutrin  mg daily    Continue  Adderall XR 20 mg twice daily plus Adderall IR 10 mg qAfternoon.    May use Klonopin 0.5 mg as needed for daytime anxiety; advised of proper/safe usage, onset, duration; not to take with other CNS depressants or sleep aids. Try to use less in time.    May use Ambien CR 12.5 mg nightly for sleep; advised of proper/safe usage, onset, duration; not to take with other CNS depressants or sleep aids    Contact [facility] about ongoing psychiatric services    Continue all other medical care per primary care provider.     Continue all other medications as reviewed per electronic medical record today.     Safety plan reviewed. To the Emergency Department as needed or call after hours crisis line at [phone] or [phone]. [place] Crisis Text Line. Text [place] to 325144 or Suicide LifeLine Chat: suicidepreventionlifeline.org/chat/    Continue individual therapy as planned with established therapists.    Follow up with primary care provider as planned or for acute medical concerns.        Crisis Resources:    National Suicide Prevention Lifeline: 592.219.2819 (TTY: 896.766.5329). Call anytime for help.  (www.suicidepreventionlifeline.org)  National  [place] on Mental Illness (www.telma.org): 607-343-2052 or 621-739-9902.   Mental Health Association (www.mentalhealth.org): 513.275.3846 or 849-002-7440.  [place] Crisis Text Line: Text [place] to 680824  Suicide LifeLine Chat: suicidepreventionQonfline.org/chat      Administrative Billing:   Time spent with patient was 30 minutes and greater than 50% of time or 20 minutes was spent in counseling and coordination of care regarding above diagnoses and treatment plan.    Patient Status:  Patient will establish with long-term psychiatric provider at [facility], and will see PCP in meantime until this is established.    Signed:   Aron Alfaro CNP   Psychiatry

## 2019-09-26 ASSESSMENT — ANXIETY QUESTIONNAIRES: GAD7 TOTAL SCORE: 11

## 2019-09-26 ASSESSMENT — PATIENT HEALTH QUESTIONNAIRE - PHQ9: SUM OF ALL RESPONSES TO PHQ QUESTIONS 1-9: 6

## 2019-09-28 ENCOUNTER — HEALTH MAINTENANCE LETTER (OUTPATIENT)
Age: 39
End: 2019-09-28

## 2019-10-14 ENCOUNTER — TRANSCRIBE ORDERS (OUTPATIENT)
Dept: MATERNAL FETAL MEDICINE | Facility: CLINIC | Age: 39
End: 2019-10-14

## 2019-10-14 ENCOUNTER — PRENATAL OFFICE VISIT (OUTPATIENT)
Dept: NURSING | Facility: CLINIC | Age: 39
End: 2019-10-14
Payer: COMMERCIAL

## 2019-10-14 VITALS
HEIGHT: 67 IN | HEART RATE: 80 BPM | DIASTOLIC BLOOD PRESSURE: 84 MMHG | SYSTOLIC BLOOD PRESSURE: 128 MMHG | BODY MASS INDEX: 29.98 KG/M2 | WEIGHT: 191 LBS

## 2019-10-14 DIAGNOSIS — O26.90 PREGNANCY RELATED CONDITION, ANTEPARTUM: Primary | ICD-10-CM

## 2019-10-14 DIAGNOSIS — O09.519 AMA (ADVANCED MATERNAL AGE) PRIMIGRAVIDA 35+: Primary | ICD-10-CM

## 2019-10-14 DIAGNOSIS — Z23 NEED FOR TDAP VACCINATION: ICD-10-CM

## 2019-10-14 PROBLEM — F32.4 MAJOR DEPRESSIVE DISORDER WITH SINGLE EPISODE, IN PARTIAL REMISSION (H): Status: ACTIVE | Noted: 2017-11-02

## 2019-10-14 PROBLEM — M35.7 BENIGN HYPERMOBILITY SYNDROME: Status: ACTIVE | Noted: 2017-10-02

## 2019-10-14 LAB
ABO + RH BLD: NORMAL
ABO + RH BLD: NORMAL
ALBUMIN UR-MCNC: NEGATIVE MG/DL
APPEARANCE UR: CLEAR
BILIRUB UR QL STRIP: NEGATIVE
BLD GP AB SCN SERPL QL: NORMAL
BLOOD BANK CMNT PATIENT-IMP: NORMAL
COLOR UR AUTO: YELLOW
ERYTHROCYTE [DISTWIDTH] IN BLOOD BY AUTOMATED COUNT: 15.5 % (ref 10–15)
GLUCOSE UR STRIP-MCNC: NEGATIVE MG/DL
HCT VFR BLD AUTO: 39.8 % (ref 35–47)
HGB BLD-MCNC: 12.6 G/DL (ref 11.7–15.7)
HGB UR QL STRIP: NEGATIVE
KETONES UR STRIP-MCNC: NEGATIVE MG/DL
LEUKOCYTE ESTERASE UR QL STRIP: NEGATIVE
MCH RBC QN AUTO: 26.8 PG (ref 26.5–33)
MCHC RBC AUTO-ENTMCNC: 31.7 G/DL (ref 31.5–36.5)
MCV RBC AUTO: 85 FL (ref 78–100)
NITRATE UR QL: NEGATIVE
PH UR STRIP: 6.5 PH (ref 5–7)
PLATELET # BLD AUTO: 302 10E9/L (ref 150–450)
RBC # BLD AUTO: 4.71 10E12/L (ref 3.8–5.2)
RUBV IGG SERPL IA-ACNC: 61 IU/ML
SOURCE: NORMAL
SP GR UR STRIP: 1.01 (ref 1–1.03)
SPECIMEN EXP DATE BLD: NORMAL
T PALLIDUM AB SER QL: NONREACTIVE
UROBILINOGEN UR STRIP-ACNC: 0.2 EU/DL (ref 0.2–1)
WBC # BLD AUTO: 7.3 10E9/L (ref 4–11)

## 2019-10-14 PROCEDURE — 86850 RBC ANTIBODY SCREEN: CPT | Performed by: OBSTETRICS & GYNECOLOGY

## 2019-10-14 PROCEDURE — 86780 TREPONEMA PALLIDUM: CPT | Performed by: OBSTETRICS & GYNECOLOGY

## 2019-10-14 PROCEDURE — 87340 HEPATITIS B SURFACE AG IA: CPT | Performed by: OBSTETRICS & GYNECOLOGY

## 2019-10-14 PROCEDURE — 86901 BLOOD TYPING SEROLOGIC RH(D): CPT | Performed by: OBSTETRICS & GYNECOLOGY

## 2019-10-14 PROCEDURE — 86900 BLOOD TYPING SEROLOGIC ABO: CPT | Performed by: OBSTETRICS & GYNECOLOGY

## 2019-10-14 PROCEDURE — 87389 HIV-1 AG W/HIV-1&-2 AB AG IA: CPT | Performed by: OBSTETRICS & GYNECOLOGY

## 2019-10-14 PROCEDURE — 81003 URINALYSIS AUTO W/O SCOPE: CPT | Performed by: OBSTETRICS & GYNECOLOGY

## 2019-10-14 PROCEDURE — 87086 URINE CULTURE/COLONY COUNT: CPT | Performed by: OBSTETRICS & GYNECOLOGY

## 2019-10-14 PROCEDURE — 86762 RUBELLA ANTIBODY: CPT | Performed by: OBSTETRICS & GYNECOLOGY

## 2019-10-14 PROCEDURE — 99207 ZZC NO CHARGE NURSE ONLY: CPT

## 2019-10-14 PROCEDURE — 85027 COMPLETE CBC AUTOMATED: CPT | Performed by: OBSTETRICS & GYNECOLOGY

## 2019-10-14 PROCEDURE — 36415 COLL VENOUS BLD VENIPUNCTURE: CPT | Performed by: OBSTETRICS & GYNECOLOGY

## 2019-10-14 SDOH — SOCIAL STABILITY: SOCIAL INSECURITY
WITHIN THE LAST YEAR, HAVE TO BEEN RAPED OR FORCED TO HAVE ANY KIND OF SEXUAL ACTIVITY BY YOUR PARTNER OR EX-PARTNER?: NO

## 2019-10-14 SDOH — HEALTH STABILITY: MENTAL HEALTH
STRESS IS WHEN SOMEONE FEELS TENSE, NERVOUS, ANXIOUS, OR CAN'T SLEEP AT NIGHT BECAUSE THEIR MIND IS TROUBLED. HOW STRESSED ARE YOU?: NOT AT ALL

## 2019-10-14 SDOH — SOCIAL STABILITY: SOCIAL INSECURITY: WITHIN THE LAST YEAR, HAVE YOU BEEN AFRAID OF YOUR PARTNER OR EX-PARTNER?: NO

## 2019-10-14 SDOH — SOCIAL STABILITY: SOCIAL INSECURITY: WITHIN THE LAST YEAR, HAVE YOU BEEN HUMILIATED OR EMOTIONALLY ABUSED IN OTHER WAYS BY YOUR PARTNER OR EX-PARTNER?: NO

## 2019-10-14 SDOH — SOCIAL STABILITY: SOCIAL INSECURITY
WITHIN THE LAST YEAR, HAVE YOU BEEN KICKED, HIT, SLAPPED, OR OTHERWISE PHYSICALLY HURT BY YOUR PARTNER OR EX-PARTNER?: NO

## 2019-10-14 ASSESSMENT — MIFFLIN-ST. JEOR: SCORE: 1579

## 2019-10-14 NOTE — PROGRESS NOTES
Important Information for Provider:     Patient presents for [facility] ob teaching and labs, first pregnancy,AMA. Ordered  first trimester screening/ NIPT. Handouts reviewed and given. Patient had questions about her anxiety/ depression medications, reviewed with Dr Zhang in which ones were safe. Patient has appointment with her primary provider 10/21/19 to review psychiatric care, psychiatrist that she was seeing left the practice. Has NOB with Dr Prescott  11/21/19 at [facility]    Prenatal OB Questionnaire  Patient supplied answers from flow sheet for:  Prenatal OB Questionnaire.  Past Medical History  Diabetes?: No  Hypertension : No  An autoimmune disease such as lupus or rheumatoid arthritis?: No  Kidney disease or urinary tract infection?: No  Epilepsy, seizures or spells?: No  Migraine headaches?: (!) Yes  A stroke or loss of function or sensation?: No  Any other neurological problems?: No  Have you ever been treated for depression?: (!) Yes  Are you having problems with crying spells or loss of self-esteem?: No  Have you ever required psychiatric care?: (!) Yes  Have you ever had hepatitis, liver disease or jaundice?: No  Have you been treated for blood clots in your veins, deep vein thromosis, inflammation in the veins, thrombosis, phlebitis, pulmonary embolism or varicosities?: No  Have you had excessive bleeding after surgery or dental work?: No  Do you bleed more than other women after a cut or scratch?: No  Do you have a history of anemia?: Yes  Have you ever had thyroid problems or taken thyroid medication?: No   Do you have any endocrine problems?: No  Have you ever been in a major accident or suffered serious trauma?: No    Past Medical History 2   Have you ever received a blood transfusion?: Yes, 1985 as a child, open heart surgery  Would you refuse a blood transfusion if a doctor judged it to be medically necessary?: No   If you answered Yes, would you rather die than receive a blood  transfusion?: No  If you answered Yes, is this for Anglican reasons?: No  Does anyone in your home smoke?: No  Do you use tobacco products?: No  Do you drink beer, wine or hard liquor?: No  Do you use any of the following: marijuana, speed, cocaine, heroin, hallucinogens or other drugs?: No   Is your blood type Rh negative?: No  Have you ever had abnormal antibodies in your blood?: No  Have you ever had asthma?: No  Have you ever had tuberculosis?: No  Do you have any allergies to drugs or over-the-counter medications?: No  Allergies: Dust Mites, Aspartame, Ethanol, Venlafaxine, Hydrochloride, Sertraline: (!) Yes, aspartame, chocolate  Have you had any breast problems?: No  Have you ever ?: No  Have you had any gynecological surgical procedures such as cervical conization, a LEEP procedure, laser treatment, cryosurgery of the cervix or a dilation and curettage, etc?: No  Have you ever had any other surgical procedures?: open heart surgery, ASD as child  Have you been hospitalized for a nonsurgical reason excluding normal delivery?: No  Have you ever had any anesthetic complications?: No  Have you ever had an abnormal pap smear?: No    Past Medical History (Continued)  Do you have a history of abnormalities of the uterus?: No  Did your mother take ZACHARY or any other hormones when she was pregnant with you?: No  Did it take you more than a year to become pregnant?:Yes  Have you ever been evaluated or treated for infertility?: No  Is there a history of medical problems in your family, which you feel may be important to this pregnancy?: No  Do you have any other problems we have not asked about which you feel may be important to this pregnancy?: questions about  Anxiety/ depression medications    Symptoms since last menstrual period  Do you have any of the following symptoms: abdominal pain, blood in stools or urine, chest pain, shortness of breath, coughing or vomiting up blood, your heart racing or skipping  beats, nausea and vomiting, pain on urination or vaginal discharge or bleed: (!) Yes  Will the patient be 35 years old or older at the time of delivery?: (!) Yes    Has the patient, baby's father or anyone in either family had:  Thalassemia (Italian, Greek, Mediterranean or  background only) and an MCV result less than 80?: No  Neural tube defect such as meningomyelocele, spina bifida or anencephaly?: No  Congenital heart defect?: ASD (patient)   Down's Syndrome?: No  Zack-Sachs disease (Restoration, Cajun, New Zealander-Kuwaiti)?: No  Sickle cell disease or trait ()?: No  Hemophilia or other inherited problems of blood?: No  Muscular dystrophy?: No  Cystic fibrosis?: No  Hermiston's chorea?: No  Mental retardation/autism?: No  If yes, was the person tested for fragile X?: No  Any other inherited genetic or chromosomal disorder?: No  Maternal metabolic disorder (e.g Insulin-dependent diabetes, PKU)?: No  A child with birth defects not listed above?: No  Recurrent pregnancy loss or stillbirth?: No   Has the patient had any medications/street drugs/alcohol since her last menstrual period?: No  Does the patient or baby's father have any other genetic risks?: No    Infection History   Do you object to being tested for HIV?: No   Do you feel that you are at high risk for coming in contact with the AIDS virus?: No  Have you ever been treated for tuberculosis?: No  Have you ever had a positive skin test for tuberculosis?: No  Do you live with someone who has tuberculosis?: No  Have you ever been exposed to tuberculosis?: No  Do you have genital herpes?: No  Does your partner have genital herpes?: No  Have you had a viral illness since your last period?: No  Have you ever had gonorrhea, chlamydia, syphilis, venereal warts, trichomoniasis, pelvic inflammatory disease or any other sexually transmitted disease?: No  Do you know if you are a genital group B streptococcus carrier?: No  Have you had chicken pox/varicella?: (!)  Yes   Have you been vaccinated against chicken Pox?: No  Have you had any other infectious diseases?: No      Allergies as of 10/14/2019:    Allergies as of 10/14/2019 - Reviewed 10/14/2019   Allergen Reaction Noted     Artificial sweetner (informational only) [artificial sweetner (informational only) ]  09/09/2019     Chocolate flavor Other (See Comments) 04/22/2016       Current medications are:  Current Outpatient Medications   Medication Sig Dispense Refill     [START ON 12/13/2019] amphetamine-dextroamphetamine (ADDERALL XR) 20 MG 24 hr capsule Take 1 capsule (20 mg) by mouth 2 times daily 60 capsule 0     [START ON 12/13/2019] amphetamine-dextroamphetamine (ADDERALL) 10 MG tablet 1 tab daily in the afternoon. May take with Adderall XR BID. 30 tablet 0     buPROPion (WELLBUTRIN XL) 300 MG 24 hr tablet Take 1 tablet (300 mg) by mouth every morning 30 tablet 2     [START ON 12/13/2019] clonazePAM (KLONOPIN) 0.5 MG tablet 1 tab daily prn anxiety 30 tablet 0     desvenlafaxine (PRISTIQ) 100 MG 24 hr tablet Take 1 tablet (100 mg) by mouth daily 30 tablet 2     HYDROcodone-acetaminophen (NORCO) 5-325 MG per tablet        ketoconazole (NIZORAL) 2 % external shampoo Apply to scalp and forehead and leave for 5 minutes before rinsing.  Repeat every other day for 7-10 days. 120 mL 1     lamoTRIgine (LAMICTAL) 150 MG tablet Take 1 tablet (150 mg) by mouth daily 30 tablet 2     ORDER FOR DME Apply 1 Units topically. TENS unit use as directed 1 Units 0     zolpidem ER (AMBIEN CR) 12.5 MG CR tablet Take 1 tablet (12.5 mg) by mouth nightly as needed for sleep 30 tablet 2         Early ultrasound screening tool:    Does patient have irregular periods?  No  Did patient use hormonal birth control in the three months prior to positive urine pregnancy test? No  Is the patient breastfeeding?  No  Is the patient 10 weeks or greater at time of education visit?  No

## 2019-10-15 LAB
BACTERIA SPEC CULT: NO GROWTH
HBV SURFACE AG SERPL QL IA: NONREACTIVE
HIV 1+2 AB+HIV1 P24 AG SERPL QL IA: NONREACTIVE
SPECIMEN SOURCE: NORMAL

## 2019-10-15 NOTE — RESULT ENCOUNTER NOTE
Jonathan Bassett,  Congratulations!  Your prenatal labs are normal.  I look forward to seeing you soon.  Dr. Prescott

## 2019-10-21 ENCOUNTER — OFFICE VISIT (OUTPATIENT)
Dept: FAMILY MEDICINE | Facility: CLINIC | Age: 39
End: 2019-10-21
Payer: COMMERCIAL

## 2019-10-21 VITALS
WEIGHT: 193.8 LBS | DIASTOLIC BLOOD PRESSURE: 86 MMHG | TEMPERATURE: 98.1 F | HEART RATE: 80 BPM | BODY MASS INDEX: 30.35 KG/M2 | SYSTOLIC BLOOD PRESSURE: 124 MMHG

## 2019-10-21 DIAGNOSIS — R10.2 PELVIC PAIN IN FEMALE: ICD-10-CM

## 2019-10-21 DIAGNOSIS — F41.1 GENERALIZED ANXIETY DISORDER: ICD-10-CM

## 2019-10-21 DIAGNOSIS — Z3A.01 LESS THAN 8 WEEKS GESTATION OF PREGNANCY: Primary | ICD-10-CM

## 2019-10-21 DIAGNOSIS — F32.1 MODERATE MAJOR DEPRESSION (H): ICD-10-CM

## 2019-10-21 PROCEDURE — 90686 IIV4 VACC NO PRSV 0.5 ML IM: CPT | Performed by: FAMILY MEDICINE

## 2019-10-21 PROCEDURE — 99214 OFFICE O/P EST MOD 30 MIN: CPT | Mod: 25 | Performed by: FAMILY MEDICINE

## 2019-10-21 PROCEDURE — 90471 IMMUNIZATION ADMIN: CPT | Performed by: FAMILY MEDICINE

## 2019-10-21 ASSESSMENT — PAIN SCALES - GENERAL: PAINLEVEL: NO PAIN (0)

## 2019-10-21 NOTE — PROGRESS NOTES
Subjective     Danyelle Canales is a 39 year old female who presents to clinic today for the following health issues:    HPI     Patient is here because she recently discovered that she is pregnant and wants to make sure her medications will not harm the pregnancy.  She is on multiple psychiatric medications which were previously managed by psychiatry.  Her psychiatrist is moving to a different system and recommended that she establish with a new long term psychiatrist given her complicated psychiatric history.  He specifically recommended a psychiatrist with [facility] since her therapist is there.  Patient would like for PCP to take over psych meds.  It appears that pt has already discussed her medications with both psychiatry and OB/Gyn and they are in agreement that she should stop taking clonazepam, ambien, and adderall and continue her other medications (pristiq, wellbutrin, lamictal).      Also having cramping in the pelvic region for a week or 2 the pain comes and goes.  She's been constipated.  Denies vaginal bleeding.        Patient Active Problem List   Diagnosis     Insomnia     allergic DERMATITIS NOS     Encounter for other general counseling or advice on contraception     ia SPRAIN THORACIC REGION     ia SPRAIN OF NECK     Migraine headache     PMDD (premenstrual dysphoric disorder)     Trichotillomania     CARDIOVASCULAR SCREENING; LDL GOAL LESS THAN 160     Moderate major depression (H)     History of ovarian cyst     Generalized anxiety disorder     Chronic pain syndrome     ASD (atrial septal defect)     Chronic pain     Esophageal reflux     Non morbid obesity, unspecified obesity type     Morbid obesity (H)     Benign hypermobility syndrome     Major depressive disorder with single episode, in partial remission (H)     Myalgia and myositis, unspecified     Myofascial pain     Need for Tdap vaccination     Past Surgical History:   Procedure Laterality Date     BIOPSY       C REPR ASD  OSTIUM PREMUM      Dr. Silverio     COLONOSCOPY         Social History     Tobacco Use     Smoking status: Never Smoker     Smokeless tobacco: Never Used   Substance Use Topics     Alcohol use: Not Currently     Comment: 1-2 glasses a week     Family History   Problem Relation Age of Onset     C.A.D. Mother         heart surgery to close hole in heart     Cardiovascular Mother      Hypertension Mother      Depression Mother      Anxiety Disorder Mother      Cerebrovascular Disease Father      Hypertension Father            Reviewed and updated as needed this visit by Provider         Review of Systems   ROS COMP: Constitutional, HEENT, cardiovascular, pulmonary, gi and gu systems are negative, except as otherwise noted.      Objective    /86 (BP Location: Right arm, Patient Position: Sitting, Cuff Size: Adult Regular)   Pulse 80   Temp 98.1  F (36.7  C) (Oral)   Wt 87.9 kg (193 lb 12.8 oz)   LMP 09/08/2019   BMI 30.35 kg/m    Body mass index is 30.35 kg/m .  Physical Exam   GENERAL: healthy, alert and no distress  ABDOMEN: soft, nontender, no hepatosplenomegaly, no masses and bowel sounds normal          Assessment & Plan     1. Less than 8 weeks gestation of pregnancy  - Patient has follow up scheduled with OB/Gyn   - Discussed risks/benefits of the current medications she is taking during pregnancy  - Advised stopping Ambien, clonazepam, and adderall  - She will try weaning off Pristiq since she feels it isn't helpful anyway  - For now, continue Lamictal and Wellbutrin     2. Pelvic pain in female  - Very mild intermittent cramping most likely from constipation  - No findings on exam that were concerning   - Reassured her, but advised ER if having any severe pain     3. Generalized anxiety disorder  4. Moderate major depression (H)  - Patient had a previous psychiatrist in the [facility] system and he has recommended continued long term psychiatric care for this patient.  I advised that she establish with a new  psychiatrist rather than having myself take over prescribing her medications   - Again, she will slowly try to wean off Pristiq and continue Lamictal and Wellbutrin for now  - I am happy to help bridge her refills of these medications if there is a wait to get in with psychiatry  - MENTAL HEALTH REFERRAL  - Adult; Psychiatry and Medication Management; Psychiatry; Other: [facility] [phone]; We will contact you to schedule the appointment or please call with any questions       Return in 1 month (on 11/21/2019) for with Dr. Prescott for prenatal care.    Katlyn Joiner,   [facility]

## 2019-10-21 NOTE — PATIENT INSTRUCTIONS
[facility]     Discharged by : Arlen Sherwood CMA  'If you have any questions regarding your visit please contact your care team:     Team Mimi              Clinic Hours Telephone Number     Dr. Jean Pierre Fernandez, CNP   7am-7pm  Monday - Thursday   7am-5pm  Fridays  [phone]   (Appointment scheduling available 24/7)     RN Line  [phone] option 2     Urgent Care - [place] - 11am-9pm Monday-Friday Saturday-Sunday- 9am-5pm     [place] -   5pm-9pm Monday-Friday Saturday-Sunday- 9am-5pm    [phone] - [place]    [phone] - [place]     For a Price Quote for your services, please call our [employer] Price Line at [phone].     What options do I have for visits at the clinic other than the traditional office visit?     To expand how we care for you, many of our providers are utilizing electronic visits (e-visits) and telephone visits, when medically appropriate, for interactions with their patients rather than a visit in the clinic. We also offer nurse visits for many medical concerns. Just like any other service, we will bill your insurance company for this type of visit based on time spent on the phone with your provider. Not all insurance companies cover these visits. Please check with your medical insurance if this type of visit is covered. You will be responsible for any charges that are not paid by your insurance.     E-visits via [employer]: generally incur a $45.00 fee.     Telephone visits:  Time spent on the phone: *charged based on time that is spent on the phone in increments of 10 minutes. Estimated cost:   5-10 mins $30.00   11-20 mins. $59.00   21-30 mins. $85.00       Use info[employer]izzt (secure email communication and access to your chart) to send your primary care provider a message or make an appointment. Ask someone on your Team how to sign up for [employer].     As always, Thank you for trusting us  with your health care needs!      [facility]:    Scheduling Appointments  [facility]  Call: [phone]    [place] [facility]  Call: [phone]    [facility]  Call: [phone]    For Gastroenterology referrals   [facility]   Clinics and Surgery Center, 4th Floor   [address] [place] [address]   Appointments: [phone]    WHERE TO GO FOR CARE?    Clinic    Make an appointment if you:       Are sick (cold, cough, flu, sore throat, earache or in pain).       Have a small injury (sprain, small cut, burn or broken bone).       Need a physical exam, Pap smear, vaccine or prescription refill.       Have questions about your health or medicines.    To reach us:      Call [phone] ([phone]). Open 24 hours every day. (For counseling services, call [phone].)    Log into [employer] at [place]. (Visit [email or web address] to create an account.) Hospital emergency room    An emergency is a serious or life- threatening problem that must be treated right away.    Call 977 or get to the hospital if you have:      Very bad or sudden:            - Chest pain or pressure         - Bleeding         - Head or belly pain         - Dizziness or trouble seeing, walking or                          Speaking      Problems breathing      Blood in your vomit or you are coughing up blood      A major injury (knocked out, loss of a finger or limb, rape, broken bone protruding from skin)    A mental health crisis. (Or call the Mental Health Crisis line at 1-500.537.2221 or Suicide Prevention Hotline at 1-132.918.1722.)    Open 24 hours every day. You don't need an appointment.     Urgent care    Visit urgent care for sickness or small injuries when the clinic is closed. You don't need an appointment. To check hours or find an urgent care near you, visit [email or web address]. Online care    Get online  care from [facility] for more than 70 common problems, like colds, allergies and infections. Open 24 hours every day at:   [email or web address]   Need help deciding?    For advice about where to be seen, you may call your clinic and ask to speak with a nurse. We're here for you 24 hours every day.         If you are deaf or hard of hearing, please let us know. We provide many free services including sign language interpreters, oral interpreters, TTYs, telephone amplifiers, note takers and written materials.

## 2019-11-12 ENCOUNTER — OFFICE VISIT (OUTPATIENT)
Dept: BEHAVIORAL HEALTH | Facility: CLINIC | Age: 39
End: 2019-11-12
Attending: FAMILY MEDICINE
Payer: COMMERCIAL

## 2019-11-12 VITALS
HEART RATE: 75 BPM | SYSTOLIC BLOOD PRESSURE: 124 MMHG | BODY MASS INDEX: 31.17 KG/M2 | TEMPERATURE: 98.4 F | WEIGHT: 199 LBS | DIASTOLIC BLOOD PRESSURE: 81 MMHG

## 2019-11-12 DIAGNOSIS — F33.41 RECURRENT MAJOR DEPRESSION IN PARTIAL REMISSION (H): Primary | ICD-10-CM

## 2019-11-12 DIAGNOSIS — F41.1 GENERALIZED ANXIETY DISORDER: ICD-10-CM

## 2019-11-12 PROCEDURE — 99215 OFFICE O/P EST HI 40 MIN: CPT | Performed by: PSYCHIATRY & NEUROLOGY

## 2019-11-12 RX ORDER — LAMOTRIGINE 150 MG/1
150 TABLET ORAL DAILY
Qty: 30 TABLET | Refills: 2 | Status: SHIPPED | OUTPATIENT
Start: 2019-11-12 | End: 2020-02-06

## 2019-11-12 RX ORDER — BUPROPION HYDROCHLORIDE 300 MG/1
300 TABLET ORAL EVERY MORNING
Qty: 30 TABLET | Refills: 2 | Status: SHIPPED | OUTPATIENT
Start: 2019-11-12 | End: 2020-02-06

## 2019-11-12 RX ORDER — DESVENLAFAXINE 100 MG/1
100 TABLET, EXTENDED RELEASE ORAL DAILY
Qty: 30 TABLET | Refills: 2 | Status: SHIPPED | OUTPATIENT
Start: 2019-11-12 | End: 2020-02-06

## 2019-11-12 ASSESSMENT — PATIENT HEALTH QUESTIONNAIRE - PHQ9
SUM OF ALL RESPONSES TO PHQ QUESTIONS 1-9: 5
5. POOR APPETITE OR OVEREATING: SEVERAL DAYS

## 2019-11-12 ASSESSMENT — ANXIETY QUESTIONNAIRES
GAD7 TOTAL SCORE: 2
6. BECOMING EASILY ANNOYED OR IRRITABLE: NOT AT ALL
5. BEING SO RESTLESS THAT IT IS HARD TO SIT STILL: NOT AT ALL
3. WORRYING TOO MUCH ABOUT DIFFERENT THINGS: NOT AT ALL
7. FEELING AFRAID AS IF SOMETHING AWFUL MIGHT HAPPEN: NOT AT ALL
1. FEELING NERVOUS, ANXIOUS, OR ON EDGE: SEVERAL DAYS
2. NOT BEING ABLE TO STOP OR CONTROL WORRYING: NOT AT ALL

## 2019-11-12 NOTE — PROGRESS NOTES
Outpatient Psychiatric Progress Note    Name: Danyelle Canales   : 1980                    Primary Care Provider: Katlyn Joiner DO   Therapist: Denice Haji, [facility] pain clinic.    PHQ-9 scores:  PHQ-9 SCORE 2019   PHQ-9 Total Score - - -   PHQ-9 Total Score MyChart 10 (Moderate depression) 6 (Mild depression) -   PHQ-9 Total Score 10 6 5       NAJMA-7 scores:  NAJMA-7 SCORE 2019   Total Score - - -   Total Score 9 (mild anxiety) 11 (moderate anxiety) -   Total Score 9 11 2       Patient Identification:  Danyelle is a 39 year old year old,   White American female  who presents for an initial visit with me, she previously saw Aron Alfaro.  Patient attended the session alone.     Interim History:  Danyelle was previously seen by Aron Alfaro in the [facility] psychiatry service.  He had been seeing her since 2018.  He left the clinic, and recommended that she be transferred to long-term psychiatry in the community, but she was scheduled with me.    She reports that she first had psychiatric symptoms in her late teens.  She was treated by her primary care provider and has had multiple trials of medications over the years.  She has seen a few therapists, and has felt that her current therapist is the most helpful that she has worked with.  She sees her at a pain clinic.    She reports that she had an increase in anxiety for no apparent reason 6 months to a year ago.  She and Aron tried several different medications and she eventually was started on lamotrigine.  She feels that she has been much more stable since then with decreased depression and decreased anxiety.  She is also on Pristiq and Wellbutrin.  She had been on Ambien, lorazepam, and Adderall in addition, but stopped them because she recently found out that she is pregnant.    Danyelle reports that this is a planned pregnancy.  She is currently at 9 weeks  "gestation.  She has been on a prenatal vitamin for about 5 weeks.  She has concerns about her current medications and whether or not she should stay on them during her pregnancy.  We reviewed information regarding lamotrigine, and although there are some concerns about increased risk of cleft palate or cleft lip, there do not appear to be concerns about more major birth defects.  She feels that the benefit she derives from taking the lamotrigine outweighs the risk to the fetus.  Pristiq has been associated with some respiratory distress and newborns when they have been exposed to it in the last trimester.  We agreed to review that information when it is closer to the time and perhaps taper off over her last trimester.    Right now, her anxiety and depression are \"pretty good.\"  She is working full-time as a .  She and her  have been  for 9 years and she reports that it is a happy marriage.  She has some debt that is concerning to her, but denies any other acute psychosocial stressors.    Danyelle reports some difficulty with initial insomnia of 30 to 45 minutes.  She wakes frequently during the night.  This is been a chronic problem for her.  Ambien has been the most effective medication for her insomnia.  She reports feeling very tired and having little energy.  This was a problem for her even prior to her pregnancy.  She sometimes feels a little down or depressed, but overall feels that her depression is pretty well controlled right now.  She denies a history of seema.    Her anxiety is fairly well controlled at present.  She sometimes feels nervous or anxious and has difficulty relaxing but reports that this does not impair her ability to function.  She reports that she sometimes has obsessive thoughts such as \"what if something happened to her dog,\" or \"what if I hurt my dog.\"  She has been doing some exposure and response prevention with her therapist in regard to her repetitive " thoughts.  She denies a history of of life-threatening trauma.    Danyelle has used amitriptyline for sleep and pain.  She has been on citalopram, Lexapro, paroxetine  fluoxetine, sertraline, milnacipran, Pristiq, Remeron, bupropion, Cymbalta, and Effexor.  Cymbalta was used for pain, and seem to help with her mood initially.  Fetzima caused her mood to be significantly worse.  She tried to taper off Pristiq recently, but found that her symptoms increased.      She has also been on Lyrica and gabapentin for pain which caused weight gain.  She tried Topamax for pain.  She is currently on lamotrigine for her mood.    She was on Adderall and methylphenidate in the past for either energy or possible ADHD.  She discontinued both of those when she found out she was pregnant.  She has tried both Abilify and Latuda and did not find them to be effective.  Ambien was helpful for sleep as above.  She tried Lunesta, but did not find it to be as effective as Ambien.  She has used melatonin but did not find it to be very effective.    She has been on clonazepam for anxiety but discontinued it because of her pregnancy.  She has tried Valium in the past for neck pain.    Vital Signs:   /81   Pulse 75   Temp 98.4  F (36.9  C) (Oral)   Wt 90.3 kg (199 lb)   LMP 09/08/2019   BMI 31.17 kg/m      Current Medications:  Current Outpatient Medications   Medication     buPROPion (WELLBUTRIN XL) 300 MG 24 hr tablet     desvenlafaxine (PRISTIQ) 100 MG 24 hr tablet     FOLIC ACID PO     lamoTRIgine (LAMICTAL) 150 MG tablet     metoclopramide (REGLAN) 5 MG tablet     Prenatal MV-Min-Fe Fum-FA-DHA (PRENATAL 1 PO)     No current facility-administered medications for this visit.         The [place] Prescription Monitoring Program has been reviewed and there are no concerns about diversionary activity for controlled substances at this time, as she has discontinue all controlled substances due to pregnancy.     Mental Status  Examination:  Danyelle is a 39-year-old woman who appears her stated age and in some emotional distress.  She is neatly groomed in business clothing.  Speech is clear and normal in rate and tone.  Eye contact is good.  Motor behavior is appropriate.  Affect is labile, she was tearful several times during her appointment.  Thoughts are logical and spontaneous with no loose associations or flight of ideas.  She reports some obsessive thoughts.  No psychosis.  No suicidal or homicidal ideation.    She is alert and oriented x3.  Memory appears to be intact for immediate, recent, and remote events.  Intelligence is estimated to be average.  Abstractive ability appears to be intact.  Attention and concentration were good during this interview.  Personal insight is fair.  Personal and impersonal judgment appear to be intact.    Assessment and Plan:    ICD-10-CM    1. Generalized anxiety disorder F41.1 lamoTRIgine (LAMICTAL) 150 MG tablet   2. Moderate major depression (H) F32.1 desvenlafaxine (PRISTIQ) 100 MG 24 hr tablet   3. Major depressive disorder, recurrent episode, moderate (H) F33.1 buPROPion (WELLBUTRIN XL) 300 MG 24 hr tablet       Medical comorbidities include:   Patient Active Problem List   Diagnosis     Insomnia     allergic DERMATITIS NOS     Encounter for other general counseling or advice on contraception     [facility]     [facility]     Migraine headache     PMDD (premenstrual dysphoric disorder)     Trichotillomania     CARDIOVASCULAR SCREENING; LDL GOAL LESS THAN 160     Moderate major depression (H)     History of ovarian cyst     Generalized anxiety disorder     Chronic pain syndrome     ASD (atrial septal defect)     Chronic pain     Esophageal reflux     Non morbid obesity, unspecified obesity type     Morbid obesity (H)     Benign hypermobility syndrome     Major depressive disorder with single episode, in partial remission (H)     Myalgia and myositis, unspecified      Myofascial pain     Need for Tdap vaccination       Treatment Plan:  Patient Instructions   Continue current doses of Wellbutrin, Pristiq and lamotrigine.    Continue all other medications per your primary care provider.    Schedule an appointment with me in 4 weeks or sooner as needed.      Call [facility] at [phone] to schedule or schedule at the .     [place] Resources:      Go to the Emergency Department as needed or call after hours crisis line at [phone].      To schedule individual or family therapy, call [facility] at [phone].     Follow up with primary care provider as planned or sooner for acute medical concerns.    Call the psychiatric nurse line with medication questions or concerns at [phone].    Shopdecahart may be used to communicate with your provider, but this is not intended to be used for emergencies.    Community Resources:      National Suicide Prevention Lifeline: 504.373.6121 (TTY: 533.735.6193). Call anytime for help.  (www.suicidepreventionlifeline.org)    National [place] on Mental Illness (www.telma.org): 967.780.9963 or 550-523-0865.     Mental Health Association (www.mentalhealth.org): 259.910.6747 or 487-415-3192.    [place] Crisis Text Line: Text [place] to 673281    Suicide LifeLine Chat: suicidepreGhosteryline.org/chat     Administrative Billing:   I spent approximately 70 minutes with this patient, greater than 50% in counseling and coordination of care.    Patient Status:  Patient will continue to be seen for ongoing consultation and stabilization.

## 2019-11-12 NOTE — PATIENT INSTRUCTIONS
Continue current doses of Wellbutrin, Pristiq and lamotrigine.    Continue all other medications per your primary care provider.    Schedule an appointment with me in 4 weeks or sooner as needed.      Call [facility] at [phone] to schedule or schedule at the .     [facility] Resources:      Go to the Emergency Department as needed or call after hours crisis line at [phone].      To schedule individual or family therapy, call [facility] at [phone].     Follow up with primary care provider as planned or sooner for acute medical concerns.    Call the psychiatric nurse line with medication questions or concerns at [phone].    Enpirion may be used to communicate with your provider, but this is not intended to be used for emergencies.    Community Resources:      National Suicide Prevention Lifeline: 587.250.4659 (TTY: 899.328.1696). Call anytime for help.  (www.suicidepreventionlifeline.org)    National [place] on Mental Illness (www.telma.org): 839.278.6002 or 003-973-6651.     Mental Health Association (www.mentalhealth.org): 341.179.1632 or 417-450-0930.    [place] Crisis Text Line: Text [place] to 654404    Suicide LifeLine Chat: [email or web address] Relieflifeline.org/chat

## 2019-11-13 ASSESSMENT — ANXIETY QUESTIONNAIRES: GAD7 TOTAL SCORE: 2

## 2019-11-20 NOTE — PROGRESS NOTES
"Chief Complaint   Patient presents with     Prenatal Care     10+4.       Initial /86 (BP Location: Left arm, Patient Position: Sitting, Cuff Size: Adult Large)   Pulse 90   Temp 98.2  F (36.8  C) (Oral)   Ht 1.702 m (5' 7\")   Wt 90.9 kg (200 lb 6.4 oz)   LMP 2019   SpO2 100%   BMI 31.39 kg/m   Estimated body mass index is 31.39 kg/m  as calculated from the following:    Height as of this encounter: 1.702 m (5' 7\").    Weight as of this encounter: 90.9 kg (200 lb 6.4 oz).  BP completed using cuff size: large    Questioned patient about current smoking habits.  Pt. has never smoked.          The following HM Due: NONE      The following patient reported/Care Every where data was sent to:  P ABSTRACT QUALITY INITIATIVES [29439]  n/A      n/a and patient has appointment for today                "

## 2019-11-21 ENCOUNTER — PRENATAL OFFICE VISIT (OUTPATIENT)
Dept: OBGYN | Facility: CLINIC | Age: 39
End: 2019-11-21
Payer: COMMERCIAL

## 2019-11-21 VITALS
SYSTOLIC BLOOD PRESSURE: 126 MMHG | WEIGHT: 200.4 LBS | HEART RATE: 90 BPM | OXYGEN SATURATION: 100 % | DIASTOLIC BLOOD PRESSURE: 86 MMHG | HEIGHT: 67 IN | BODY MASS INDEX: 31.45 KG/M2 | TEMPERATURE: 98.2 F

## 2019-11-21 DIAGNOSIS — F33.41 RECURRENT MAJOR DEPRESSION IN PARTIAL REMISSION (H): ICD-10-CM

## 2019-11-21 DIAGNOSIS — Z87.74 H/O ATRIAL SEPTAL DEFECT: ICD-10-CM

## 2019-11-21 DIAGNOSIS — O09.511 AMA (ADVANCED MATERNAL AGE) PRIMIGRAVIDA 35+, FIRST TRIMESTER: Primary | ICD-10-CM

## 2019-11-21 PROBLEM — E66.01 MORBID OBESITY (H): Status: RESOLVED | Noted: 2018-08-20 | Resolved: 2019-11-21

## 2019-11-21 PROCEDURE — 99207 ZZC FIRST OB VISIT: CPT | Performed by: OBSTETRICS & GYNECOLOGY

## 2019-11-21 ASSESSMENT — MIFFLIN-ST. JEOR: SCORE: 1621.64

## 2019-11-22 NOTE — PROGRESS NOTES
SUBJECTIVE: Danyelle Canales is a 39 year old   here for initial OB visit.  Here with  Chan. Happy to be pregnant. Feeling well overall.   Some nausea, occ vomiting.   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 Chan is a .   Complicated depression, doing well now.     PHQ-9 SCORE 2019   PHQ-9 Total Score - - -   PHQ-9 Total Score MyChart 10 (Moderate depression) 6 (Mild depression) -   PHQ-9 Total Score 10 6 5     NAJMA-7 SCORE 2019   Total Score - - -   Total Score 9 (mild anxiety) 11 (moderate anxiety) -   Total Score 9 11 2           Past Medical History:   Diagnosis Date     ASD (atrial septal defect)     repaired surgically as a child     Depressive disorder      Headache      History of blood transfusion 1985 during open heart surgery     Hypertension     higher results at pain clinic, want to confirm ok     Insomnia, unspecified      Other forms of migraine, without mention of intractable migraine without mention of status migrainosus        Past Surgical History:   Procedure Laterality Date     BIOPSY       C REPR ASD OSTIUM PREMUM      Dr. Silverio     COLONOSCOPY         Family History   Problem Relation Age of Onset     C.A.D. Mother         heart surgery to close hole in heart     Cardiovascular Mother      Hypertension Mother      Depression Mother      Anxiety Disorder Mother      Cerebrovascular Disease Father      Hypertension Father        Social History     Socioeconomic History     Marital status:      Spouse name: Chan Canales     Number of children: 0     Years of education: 12     Highest education level: Not on file   Occupational History     Occupation: customer service     Employer: [employer]   Social Needs     Financial resource strain: Not on file     Food insecurity:     Worry: Not on file     Inability: Not on file     Transportation needs:     Medical: Not on file      Non-medical: Not on file   Tobacco Use     Smoking status: Never Smoker     Smokeless tobacco: Never Used   Substance and Sexual Activity     Alcohol use: Not Currently     Comment: 1-2 glasses a week     Drug use: No     Sexual activity: Yes     Partners: Male     Birth control/protection: None   Lifestyle     Physical activity:     Days per week: Not on file     Minutes per session: Not on file     Stress: Not at all   Relationships     Social connections:     Talks on phone: Not on file     Gets together: Not on file     Attends Anglican service: Not on file     Active member of club or organization: Not on file     Attends meetings of clubs or organizations: Not on file     Relationship status: Not on file     Intimate partner violence:     Fear of current or ex partner: No     Emotionally abused: No     Physically abused: No     Forced sexual activity: No   Other Topics Concern     Parent/sibling w/ CABG, MI or angioplasty before 65F 55M? Yes     Comment: stroke   Social History Narrative     Not on file         Current Outpatient Medications:      buPROPion (WELLBUTRIN XL) 300 MG 24 hr tablet, Take 1 tablet (300 mg) by mouth every morning, Disp: 30 tablet, Rfl: 2     desvenlafaxine (PRISTIQ) 100 MG 24 hr tablet, Take 1 tablet (100 mg) by mouth daily, Disp: 30 tablet, Rfl: 2     FOLIC ACID PO, Take 1 mg by mouth daily, Disp: , Rfl:      lamoTRIgine (LAMICTAL) 150 MG tablet, Take 1 tablet (150 mg) by mouth daily, Disp: 30 tablet, Rfl: 2     Prenatal MV-Min-Fe Fum-FA-DHA (PRENATAL 1 PO), , Disp: , Rfl:      metoclopramide (REGLAN) 5 MG tablet, Take 1 tablet (5 mg) by mouth 4 times daily (before meals and nightly) (Patient not taking: Reported on 11/21/2019), Disp: 30 tablet, Rfl: 1    Allergies   Allergen Reactions     Artificial Sweetner (Informational Only) [Artificial Sweetner (Informational Only) ]      Chocolate Flavor Other (See Comments)         Past Medical History of Father of Baby: No significant  "medical history    Review of Systems:   Constitutional, HEENT, cardiovascular, pulmonary, gi and gu systems are negative, except as otherwise noted.     History Since Last Menstrual Period: Nausea    EXAM:   Vitals:    19 1046   BP: 126/86   BP Location: Left arm   Patient Position: Sitting   Cuff Size: Adult Large   Pulse: 90   Temp: 98.2  F (36.8  C)   TempSrc: Oral   SpO2: 100%   Weight: 90.9 kg (200 lb 6.4 oz)   Height: 1.702 m (5' 7\")     Body mass index is 31.39 kg/m .  GENERAL APPEARANCE: healthy, alert and no distress  EYES: EOMI,  PERRL  HENT: Nose and mouth without ulcers or lesions  NECK: no adenopathy, no asymmetry, masses, or scars and thyroid normal to palpation  RESP: lungs clear to auscultation - no rales, rhonchi or wheezes  BREAST: normal without masses, tenderness or nipple discharge and no palpable axillary masses or adenopathy  CV: regular rates and rhythm, normal S1 S2, no S3 or S4 and no murmur, click or rub -  ABDOMEN:  soft, nontender, no HSM or masses and bowel sounds normal  : normal cervix, adnexae, and uterus without masses or discharge  MS: extremities normal- no gross deformities noted, no evidence of inflammation in joints, FROM in all extremities.  SKIN: no suspicious lesions or rashes  NEURO: Normal strength and tone, sensory exam grossly normal, mentation intact and speech normal  PSYCH: mentation appears normal and affect normal/anxious  LYMPHATICS: No axillary, cervical, inguinal, or supraclavicular nodes    Pelvix exam:  Perineum: Intact;   Vulva: Normal;  Vagina: Normal mucosa, no discharge,   Cervix: Parous, closed, mobile, no discharge;  Uterus:  11 weeks, Normal shape, position and consistency;   Adnexa: Normal;  Anus: Normal without lesion or mass;   Bony Pelvis: Adequate.     Ultrasound: Informal for heart tones. Viable babcock IUP c/w dates.     ASSESSMENT/ PLAN:  Danyelle Canales is a 39 year old   at 10 weeks 4 days by sure LMP c/w ultrasound today. " EDC 6/14/20.   Follow up in 5 weeks.  Normal exercise.  Normal sexual activity.  Prenatal vitamins.  Anticipated weight gain:  BMI 25-29.9: 15-25 pounds  Flu shot done previously  TDaP 27-36 weeks  Oriented to practice, PNC  Discussed aneuploidy screening, has NIPT and NT scheduled in 2-3 weeks. Discussed comprehensive ultrasound at 19 weeks, fetal echo for h/o ASD in herself.   Discussed anxiety and depression. Agree with staying on her current medications, she is doing well but has required a lot of medication management in the past to get to this point. Do not recommend going off Pristiq in the third trimester as that point in time is particularly vulnerable for mood disorders. Also discussed importance of stability going into the postpartum period. Has a psychiatrist and therapist. Discussed small, short risk of withdrawal in the baby which will be monitored while inpatient.

## 2019-12-06 ENCOUNTER — PRE VISIT (OUTPATIENT)
Dept: MATERNAL FETAL MEDICINE | Facility: CLINIC | Age: 39
End: 2019-12-06

## 2019-12-10 ENCOUNTER — OFFICE VISIT (OUTPATIENT)
Dept: MATERNAL FETAL MEDICINE | Facility: CLINIC | Age: 39
End: 2019-12-10
Attending: OBSTETRICS & GYNECOLOGY
Payer: COMMERCIAL

## 2019-12-10 ENCOUNTER — HOSPITAL ENCOUNTER (OUTPATIENT)
Dept: ULTRASOUND IMAGING | Facility: CLINIC | Age: 39
Discharge: HOME | End: 2019-12-10
Attending: OBSTETRICS & GYNECOLOGY | Admitting: OBSTETRICS & GYNECOLOGY
Payer: COMMERCIAL

## 2019-12-10 ENCOUNTER — AMBULATORY - HEALTHEAST (OUTPATIENT)
Dept: MATERNAL FETAL MEDICINE | Facility: HOSPITAL | Age: 39
End: 2019-12-10

## 2019-12-10 DIAGNOSIS — O26.90 PREGNANCY RELATED CONDITION, ANTEPARTUM: ICD-10-CM

## 2019-12-10 DIAGNOSIS — O09.512 PRIMIGRAVIDA OF ADVANCED MATERNAL AGE IN SECOND TRIMESTER: Primary | ICD-10-CM

## 2019-12-10 DIAGNOSIS — O09.511 SUPERVISION OF ELDERLY PRIMIGRAVIDA IN FIRST TRIMESTER: Primary | ICD-10-CM

## 2019-12-10 DIAGNOSIS — O09.511 SUPERVISION OF ELDERLY PRIMIGRAVIDA IN FIRST TRIMESTER: ICD-10-CM

## 2019-12-10 DIAGNOSIS — O26.90 PREGNANCY, ANTEPARTUM, COMPLICATIONS: ICD-10-CM

## 2019-12-10 DIAGNOSIS — Z82.79 FAMILY HISTORY OF CONGENITAL CARDIAC SEPTAL DEFECT: ICD-10-CM

## 2019-12-10 PROCEDURE — 96040 ZZH GENETIC COUNSELING, EACH 30 MINUTES: CPT | Mod: ZF | Performed by: GENETIC COUNSELOR, MS

## 2019-12-10 PROCEDURE — 40000791 ZZHCL STATISTIC VERIFI PRENATAL TRISOMY 21,18,13: Performed by: OBSTETRICS & GYNECOLOGY

## 2019-12-10 PROCEDURE — 36415 COLL VENOUS BLD VENIPUNCTURE: CPT | Performed by: OBSTETRICS & GYNECOLOGY

## 2019-12-10 PROCEDURE — 76813 OB US NUCHAL MEAS 1 GEST: CPT

## 2019-12-10 NOTE — PROGRESS NOTES
[facility]  Genetic Counseling Consult    Patient: Danyelle Canales YOB: 1980   Date of Service: 12/10/19      Danyelle Canales was seen at [facility] [place] for genetic consultation to discuss the options for screening and testing for fetal chromosome abnormalities.  The indication for genetic counseling is advanced maternal age and personal and family history of congenital heart defect. She was accompanied by her , Chan.        Impression/Plan:   1.  Danyelle had an ultrasound and blood draw for NIPT (Innatal test through [facility]).  Results are expected within 7-10 days, and will be available in [place].  We will contact her to discuss the results, and a copy will be forwarded to the office of the referring OB provider. Danyelle Canales provided verbal permission for results to be left on her voicemail at [phone]. She requested the results do include the sex.     2.  Maternal serum AFP (single marker screen) is recommended after 15 weeks to screen for open neural tube defects. A quad screen should not be performed.    3.  An 18-20 week comprehensive ultrasound is standard of care for all women 35 or older at delivery. Danyelle is scheduled to have this ultrasound on 2020 at our [place] location. She will also have her fetal echocardiogram there on 02/10/2020.     Pregnancy History:   /Parity:     Age at Delivery: 40 year old  [place]: 2020, by Last Menstrual Period  Gestational Age: 13w2d    No significant complications or exposures were reported in the current pregnancy.    Danyelle is taking zofran, wellbutrin, pristiq, lamictal, and prenatal vitamins and folic acid. We did not discuss these medications in detail. Danyelle reports these medications are working well to manage her history of depression.     Medical History:   Danyelle s reported medical history is not expected to  impact pregnancy management or risks to fetal development.       Family History:   A three-generation pedigree was obtained, and is scanned under the  Media  tab.   The following significant findings were reported by Danyelle:    The father of the pregnancy, Chan, 41, is healthy      Danyelle's father has a history of strokes and is on blood thinners. The remaining paternal family history is largely unknown due to little contact but Danyelle is not aware of a significant history of blood clots     Chan's sister was unable to have children and had many miscarriages before adopting two children. The remaining family history is negative for infertility or recurrent miscarriages    Family history of congenital heart defect     Danyelle was born with an atrial septal defect (ASD). He mother and maternal aunt were also born with an ASD    Danyelle had surgery at age 5 and is doing well. Her last cardiology check-up was 5-10 years ago   Congenital heart defects can be isolated or associated with multiple birth defects (syndromic).There are many genetic syndromes, single gene disorders or chromosomal abnormalities, that can be associated with congenital heart defects. Isolated congenital heart defects are usually a multi-factorial condition caused by the combination of genetic and environmental factors and familial clustering is not uncommon. Since there is a genetic component to multi-factorial conditions, the recurrence risk is higher for first degree relatives (siblings) than in second degree relatives and decreases with distance in relationship.    We discussed that congenital heart defects are common, occurring in 0.5 to 1.0% live births. The specific recurrence risk for first degree relatives differs according to the type of congenital heart defect and if there is an associated genetic syndrome present. In general, studies show that the overall risk contributed by a positive family history of a congenital heart  defect in 1st degree relatives for the same defect is  8.15% whereas the recurrence risk for a different heart defect is 2.68%. There are also recurrence risks specific to the heart defect. Often risks for second degree relatives are not available. Furthermore, for third degree relatives the risk is likely equal to general population risks.     Atrial septal defect (ASD) is a hole in the septum between the two atriums. This defect allows oxygen-rich blood to combine with oxygen-poor blood in the right atrium. The risk for ASD in a baby is dependent on the affected relative: 1.5% (father affected), 4-6% (mother affected), and 2.5% (sibling affected).     This pregnancy is a first degree relative to the family member with an ASD; therefore, the recurrence risk is 4-6%.     A fetal echocardiogram is often recommended for pregnancies of a second or first degree relation to a family member with a congenital heart defect. Fetal echocardiograms can also be recommended, in the absence of family history, due to maternal diabetes, an IVF pregnancy, or suspected heart defect on comprehensive ultrasound, among other reasons. Danyelle has scheduled her fetal echocardiogram on 02/10/2020 at our [place] location.       Otherwise, the reported family history is negative for multiple miscarriages, stillbirths, birth defects, mental retardation, known genetic conditions, and consanguinity.       Carrier Screening:   The patient reports that she and the father of the pregnancy have  ancestry:      Cystic fibrosis is an autosomal recessive genetic condition that occurs with increased frequency in individuals of  ancestry and carrier screening for this condition is available.  In addition,  screening in the [facility] includes cystic fibrosis.    The patient reports that the father of the pregnancy has  ancestry:     Expanded carrier screening for mutations in a large panel of genes  associated with autosomal recessive conditions including cystic fibrosis, spinal muscular atrophy, and others, is now available.      The patient has declined the carrier screening options reviewed today.       Risk Assessment for Chromosome Conditions:   We explained that the risk for fetal chromosome abnormalities increases with maternal age. We discussed specific features of common chromosome abnormalities, including Down syndrome, trisomy 13, trisomy 18, and sex chromosome trisomies.      At age 40 at midtrimester, the risk to have a baby with Down syndrome is 1 in 98.     At age 40 at midtrimester, the risk to have a baby with any chromosome abnormality is 1 in 51.       Testing Options:   We discussed the following options:   First trimester screening    First trimester ultrasound with nuchal translucency and nasal bone assessments, maternal plasma hCG, PATSY-A, and AFP measurement    Screens for fetal trisomy 21, trisomy 13, and trisomy 18    Cannot screen for open neural tube defects; maternal serum AFP after 15 weeks is recommended     Non-invasive Prenatal Testing (NIPT)    Maternal plasma cell-free DNA testing; first trimester ultrasound with nuchal translucency and nasal bone assessment is recommended, when appropriate    Screens for fetal trisomy 21, trisomy 13, trisomy 18, and sex chromosome aneuploidy    Cannot screen for open neural tube defects; maternal serum AFP after 15 weeks is recommended     Chorionic villus sampling (CVS)    Invasive procedure typically performed in the first trimester by which placental villi are obtained for the purpose of chromosome analysis and/or other prenatal genetic analysis    Diagnostic results; >99% sensitivity for fetal chromosome abnormalities    Cannot test for open neural tube defects; maternal serum AFP after 15 weeks is recommended     Genetic Amniocentesis    Invasive procedure typically performed in the second trimester by which amniotic fluid is obtained  for the purpose of chromosome analysis and/or other prenatal genetic analysis    Diagnostic results; >99% sensitivity for fetal chromosome abnormalities    AFAFP measurement tests for open neural tube defects     Comprehensive (Level II) ultrasound: Detailed ultrasound performed between 18-22 weeks gestation to screen for major birth defects and markers for aneuploidy.    We reviewed the benefits and limitations of this testing.  Screening tests provide a risk assessment specific to the pregnancy for certain fetal chromosome abnormalities, but cannot definitively diagnose or exclude a fetal chromosome abnormality.  Follow-up genetic counseling and consideration of diagnostic testing is recommended with any abnormal screening result.     Diagnostic tests carry inherent risks- including risk of miscarriage- that require careful consideration.  These tests can detect fetal chromosome abnormalities with greater than 99% certainty.  Results can be compromised by maternal cell contamination or mosaicism, and are limited by the resolution of cytogenetic G-banding technology.  There is no screening nor diagnostic test that can detect all forms of birth defects or mental disability.     It was a pleasure to be involved with Danyelle givens care. Face-to-face time of the meeting was 45 minutes.    Rashmi Vang MS, [facility]  Licensed Genetic Counselor   [facility]  Maternal Fetal Medicine  [email or web address]  Office: [phone]  Pager [phone]  [facility]: [phone]   Fax: [phone]

## 2019-12-10 NOTE — PROGRESS NOTES
"Please see \"Imaging\" tab under \"Chart Review\" for details of today's US.    Cheri Chavira, DO    "

## 2019-12-12 ENCOUNTER — OFFICE VISIT (OUTPATIENT)
Dept: BEHAVIORAL HEALTH | Facility: CLINIC | Age: 39
End: 2019-12-12
Payer: COMMERCIAL

## 2019-12-12 VITALS
WEIGHT: 199 LBS | OXYGEN SATURATION: 96 % | BODY MASS INDEX: 31.17 KG/M2 | HEART RATE: 79 BPM | DIASTOLIC BLOOD PRESSURE: 78 MMHG | TEMPERATURE: 99.2 F | SYSTOLIC BLOOD PRESSURE: 119 MMHG

## 2019-12-12 DIAGNOSIS — F33.41 RECURRENT MAJOR DEPRESSION IN PARTIAL REMISSION (H): Primary | ICD-10-CM

## 2019-12-12 DIAGNOSIS — F41.1 GENERALIZED ANXIETY DISORDER: ICD-10-CM

## 2019-12-12 PROCEDURE — 99213 OFFICE O/P EST LOW 20 MIN: CPT | Performed by: PSYCHIATRY & NEUROLOGY

## 2019-12-12 ASSESSMENT — ANXIETY QUESTIONNAIRES
3. WORRYING TOO MUCH ABOUT DIFFERENT THINGS: NOT AT ALL
1. FEELING NERVOUS, ANXIOUS, OR ON EDGE: SEVERAL DAYS
GAD7 TOTAL SCORE: 1
5. BEING SO RESTLESS THAT IT IS HARD TO SIT STILL: NOT AT ALL
6. BECOMING EASILY ANNOYED OR IRRITABLE: NOT AT ALL
7. FEELING AFRAID AS IF SOMETHING AWFUL MIGHT HAPPEN: NOT AT ALL
IF YOU CHECKED OFF ANY PROBLEMS ON THIS QUESTIONNAIRE, HOW DIFFICULT HAVE THESE PROBLEMS MADE IT FOR YOU TO DO YOUR WORK, TAKE CARE OF THINGS AT HOME, OR GET ALONG WITH OTHER PEOPLE: NOT DIFFICULT AT ALL
2. NOT BEING ABLE TO STOP OR CONTROL WORRYING: NOT AT ALL

## 2019-12-12 ASSESSMENT — PATIENT HEALTH QUESTIONNAIRE - PHQ9
SUM OF ALL RESPONSES TO PHQ QUESTIONS 1-9: 1
5. POOR APPETITE OR OVEREATING: NOT AT ALL

## 2019-12-12 ASSESSMENT — PAIN SCALES - GENERAL: PAINLEVEL: NO PAIN (0)

## 2019-12-12 NOTE — PATIENT INSTRUCTIONS
Continue bupropion, desvenlafaxine, and lamotrigine as current prescribed.    Continue all other medications per your primary care provider.    Schedule an appointment with me in 8 weeks or sooner as needed.  Call [facility] at [phone] to schedule or schedule at the .     [facility] Resources:      Go to the Emergency Department as needed or call after hours crisis line at [phone].      To schedule individual or family therapy, call [facility] at [phone].     Follow up with primary care provider as planned or sooner for acute medical concerns.    Call the psychiatric nurse line with medication questions or concerns at [phone].    Mashed Pixel may be used to communicate with your provider, but this is not intended to be used for emergencies.    Community Resources:      National Suicide Prevention Lifeline: 410.638.2131 (TTY: 549.479.4255). Call anytime for help.  (www.suicidepreventionlifeline.org)    National [place] on Mental Illness (www.telma.org): 178.998.6682 or 949-834-9993.     Mental Health Association (www.mentalhealth.org): 608.484.1936 or 481-047-4784.    [place] Crisis Text Line: Text [place] to 943759    Suicide LifeLine Chat: suicidepreventionlifeline.org/chat

## 2019-12-12 NOTE — PROGRESS NOTES
Outpatient Psychiatric Progress Note    Name: Danyelle Canales   : 1980                    Primary Care Provider: Katlyn Joiner DO   Therapist: Denice Haji [facility]     PHQ-9 scores:  PHQ-9 SCORE 2019   PHQ-9 Total Score - - -   PHQ-9 Total Score MyChart 6 (Mild depression) - -   PHQ-9 Total Score 6 5 1       NAJMA-7 scores:  NAJMA-7 SCORE 2019   Total Score - - -   Total Score 11 (moderate anxiety) - -   Total Score 11 2 1       Patient Identification:  Danyelle is a 39 year old year old,   White American female  who presents for return visit with me.  Patient attended the session alone.     Interim History:  Danyelle reports that she has been doing well.  Her pregnancy is going well, she is currently at about 13 weeks gestation.  She had an ultrasound and had genetic testing, which is pending.    Her mood is good, she rates it as 8 or 9 out of 10 with 10 being the best.  Her anxiety is also good, she rates it as 7 or 8 with 10 being the best.  Her sleep has been disrupted chronically.  Ambien helped in the past, but she stopped it when she learned she was pregnant.  She denies excessive daytime sleepiness.  We agreed to not add any more medications to her regimen to address her sleep.    She denies any adverse side effects from her current medications.  Her OB/GYN has expressed that she is not concerned about her continued on her current medications throughout the pregnancy.    Vital Signs:   /78 (BP Location: Right arm, Patient Position: Chair, Cuff Size: Adult Large)   Pulse 79   Temp 99.2  F (37.3  C) (Oral)   Wt 90.3 kg (199 lb)   LMP 2019   SpO2 96%   Breastfeeding No   BMI 31.17 kg/m      Current Medications:  Current Outpatient Medications   Medication     buPROPion (WELLBUTRIN XL) 300 MG 24 hr tablet     desvenlafaxine (PRISTIQ) 100 MG 24 hr tablet     FOLIC ACID PO     lamoTRIgine (LAMICTAL)  150 MG tablet     ondansetron (ZOFRAN) 8 MG tablet     Prenatal MV-Min-Fe Fum-FA-DHA (PRENATAL 1 PO)     No current facility-administered medications for this visit.       Mental Status Examination:  39-year-old woman in no acute distress.  She is neatly groomed in business casual clothing.  Speech is clear and normal in rate and tone.  Eye contact is good.  Motor behavior is appropriate.  Affect is slightly blunted.  Mood is good.  Thoughts are logical and spontaneous with no loose associations or flight of ideas.  Thought content shows no psychosis.  No suicidal or homicidal thoughts.  She is alert and oriented x3.    Assessment and Plan:    ICD-10-CM    1. Recurrent major depression in partial remission (H) F33.41    2. Generalized anxiety disorder F41.1        Medical comorbidities include:   Patient Active Problem List   Diagnosis     Insomnia     allergic DERMATITIS NOS     Encounter for other general counseling or advice on contraception     [facility]     [facility]     Migraine headache     PMDD (premenstrual dysphoric disorder)     Trichotillomania     CARDIOVASCULAR SCREENING; LDL GOAL LESS THAN 160     Moderate major depression (H)     History of ovarian cyst     Generalized anxiety disorder     Chronic pain syndrome     ASD (atrial septal defect)     Chronic pain     Esophageal reflux     Non morbid obesity, unspecified obesity type     Benign hypermobility syndrome     Major depressive disorder with single episode, in partial remission (H)     Myalgia and myositis, unspecified     Myofascial pain     Need for Tdap vaccination     Recurrent major depression in partial remission (H)       Treatment Plan:  Patient Instructions   Continue bupropion, desvenlafaxine, and lamotrigine as current prescribed.    Continue all other medications per your primary care provider.    Schedule an appointment with me in 8 weeks or sooner as needed.  Call [facility] at  [phone] to schedule or schedule at the .     [facility] Resources:      Go to the Emergency Department as needed or call after hours crisis line at [phone].      To schedule individual or family therapy, call [facility] at [phone].     Follow up with primary care provider as planned or sooner for acute medical concerns.    Call the psychiatric nurse line with medication questions or concerns at [phone].    First[employer] Researchhart may be used to communicate with your provider, but this is not intended to be used for emergencies.    Community Resources:      National Suicide Prevention Lifeline: 400.719.4246 (TTY: 311.466.1160). Call anytime for help.  (www.suicidepreventionlifeline.org)    National [place] on Mental Illness (www.telma.org): 718.493.7852 or 088-495-3667.     Mental Health Association (www.mentalhealth.org): 237.671.3469 or 596-350-1749.    [place] Crisis Text Line: Text [place] to 068533    Suicide LifeLine Chat: suicideprePlum Districtline.org/chat    Administrative Billing:   I spent approximately 20 minutes with this patient, greater than 50% in counseling regarding the above diagnoses and medications.    Patient Status:  Patient will continue to be seen for ongoing consultation and stabilization.

## 2019-12-13 ASSESSMENT — ANXIETY QUESTIONNAIRES: GAD7 TOTAL SCORE: 1

## 2019-12-16 ENCOUNTER — TELEPHONE (OUTPATIENT)
Dept: MATERNAL FETAL MEDICINE | Facility: CLINIC | Age: 39
End: 2019-12-16

## 2019-12-16 NOTE — TELEPHONE ENCOUNTER
Left message with normal NIPT results with Danyelle. Results indicate NO ANEUPLOIDY DETECTED for chromosomes 21, 18, 13, or sex chromosomes (XY). Sex was disclosed per patient's wishes.     This puts her current pregnancy at low risk for Down syndrome, trisomy 18, trisomy 13 and sex chromosome abnormalities. This test is reported to have the following sensitivities: Down syndrome- 99%, trisomy 18- 98%, and trisomy 13- 98%. Although these results are reassuring, this does not replace a standard chromosome analysis from a chorionic villus sampling or amniocentesis.     While this result is reassuring, it does not rule out all possible genetic conditions. There is not currently one single genetic test available that can assess for all possible genetic conditions.    An 18-20 week comprehensive level II ultrasound is standard of care for all women 35 or older at delivery. Danyelle is scheduled to have this ultrasound on 01/13/2020 at our [place] location. She will also have her fetal echocardiogram there on 02/10/2020.     MSAFP is the appropriate second trimester screening test for open neural tube defects; the maternal quad screen is not recommended.    Her results are available in her Epic chart for her primary OB to review.    Rashmi Vang MS, [facility]  Licensed Genetic Counselor   [facility]  Maternal Fetal Medicine  [email or web address]  Office: [phone]  Pager [phone]  [facility]: [phone]   Fax: [phone]

## 2019-12-17 LAB — LAB SCANNED RESULT: NORMAL

## 2019-12-24 ENCOUNTER — PRENATAL OFFICE VISIT (OUTPATIENT)
Dept: OBGYN | Facility: CLINIC | Age: 39
End: 2019-12-24
Payer: COMMERCIAL

## 2019-12-24 VITALS
TEMPERATURE: 98.6 F | BODY MASS INDEX: 31.32 KG/M2 | DIASTOLIC BLOOD PRESSURE: 78 MMHG | SYSTOLIC BLOOD PRESSURE: 122 MMHG | WEIGHT: 200 LBS | HEART RATE: 86 BPM

## 2019-12-24 DIAGNOSIS — O09.511 AMA (ADVANCED MATERNAL AGE) PRIMIGRAVIDA 35+, FIRST TRIMESTER: Primary | ICD-10-CM

## 2019-12-24 PROCEDURE — 82105 ALPHA-FETOPROTEIN SERUM: CPT | Mod: 90 | Performed by: OBSTETRICS & GYNECOLOGY

## 2019-12-24 PROCEDURE — 36415 COLL VENOUS BLD VENIPUNCTURE: CPT | Performed by: OBSTETRICS & GYNECOLOGY

## 2019-12-24 PROCEDURE — 99000 SPECIMEN HANDLING OFFICE-LAB: CPT | Performed by: OBSTETRICS & GYNECOLOGY

## 2019-12-24 PROCEDURE — 99207 ZZC PRENATAL VISIT: CPT | Performed by: OBSTETRICS & GYNECOLOGY

## 2019-12-24 NOTE — PROGRESS NOTES
Doing well.   NIPT normal. Having a boy. AFP today.  Has fetal survey and fetal echo scheduled.   Still having vomiting in the mornings. Will try eating right away. Zofran doesn't seem like it's helping.   Constipated, doing miralax daily.   RTC 4 weeks.

## 2019-12-27 LAB
# FETUSES US: NORMAL
# FETUSES: NORMAL
AFP ADJ MOM AMN: 0.96
AFP SERPL-MCNC: 23 NG/ML
AGE - REPORTED: 39.2 YR
CURRENT SMOKER: NO
CURRENT SMOKER: NO
DIABETES STATUS PATIENT: NO
FAMILY MEMBER DISEASES HX: NO
FAMILY MEMBER DISEASES HX: NO
GA METHOD: NORMAL
GA METHOD: NORMAL
GA: NORMAL WK
IDDM PATIENT QL: NO
INTEGRATED SCN PATIENT-IMP: NORMAL
LMP START DATE: NORMAL
MONOCHORIONIC TWINS: NO
SERVICE CMNT-IMP: NO
SPECIMEN DRAWN SERPL: NORMAL
VALPROIC/CARBAMAZEPINE STATUS: NO
WEIGHT UNITS: NORMAL

## 2020-01-09 ENCOUNTER — AMBULATORY - HEALTHEAST (OUTPATIENT)
Dept: MATERNAL FETAL MEDICINE | Facility: HOSPITAL | Age: 40
End: 2020-01-09

## 2020-01-13 ENCOUNTER — OFFICE VISIT - HEALTHEAST (OUTPATIENT)
Dept: MATERNAL FETAL MEDICINE | Facility: HOSPITAL | Age: 40
End: 2020-01-13

## 2020-01-13 ENCOUNTER — RECORDS - HEALTHEAST (OUTPATIENT)
Dept: ULTRASOUND IMAGING | Facility: HOSPITAL | Age: 40
End: 2020-01-13

## 2020-01-13 ENCOUNTER — RECORDS - HEALTHEAST (OUTPATIENT)
Dept: ADMINISTRATIVE | Facility: OTHER | Age: 40
End: 2020-01-13

## 2020-01-13 DIAGNOSIS — O09.522 MULTIGRAVIDA OF ADVANCED MATERNAL AGE IN SECOND TRIMESTER: ICD-10-CM

## 2020-01-13 DIAGNOSIS — Z82.79 FAMILY HISTORY OF CONGENITAL ANOMALY OF CARDIOVASCULAR SYSTEM: ICD-10-CM

## 2020-01-13 DIAGNOSIS — O26.90 PREGNANCY RELATED CONDITIONS, UNSPECIFIED, UNSPECIFIED TRIMESTER: ICD-10-CM

## 2020-01-22 ENCOUNTER — TELEPHONE (OUTPATIENT)
Dept: OBGYN | Facility: CLINIC | Age: 40
End: 2020-01-22

## 2020-01-22 ENCOUNTER — PRENATAL OFFICE VISIT (OUTPATIENT)
Dept: OBGYN | Facility: CLINIC | Age: 40
End: 2020-01-22
Payer: COMMERCIAL

## 2020-01-22 VITALS
OXYGEN SATURATION: 97 % | DIASTOLIC BLOOD PRESSURE: 84 MMHG | HEART RATE: 94 BPM | TEMPERATURE: 98.2 F | HEIGHT: 67 IN | WEIGHT: 207 LBS | SYSTOLIC BLOOD PRESSURE: 129 MMHG | BODY MASS INDEX: 32.49 KG/M2

## 2020-01-22 DIAGNOSIS — F33.41 RECURRENT MAJOR DEPRESSION IN PARTIAL REMISSION (H): ICD-10-CM

## 2020-01-22 DIAGNOSIS — O09.511 AMA (ADVANCED MATERNAL AGE) PRIMIGRAVIDA 35+, FIRST TRIMESTER: Primary | ICD-10-CM

## 2020-01-22 PROCEDURE — 99207 ZZC PRENATAL VISIT: CPT | Performed by: OBSTETRICS & GYNECOLOGY

## 2020-01-22 ASSESSMENT — MIFFLIN-ST. JEOR: SCORE: 1651.58

## 2020-01-22 NOTE — TELEPHONE ENCOUNTER
Forms received and filled out, signed by provider. Sent to patient per request. Sent to HIM.  Tram Blum,

## 2020-01-22 NOTE — PROGRESS NOTES
Doing well.   Nausea improving, only vomiting every 4 days now.   Constipation improved, except when taking zofran. Taking miralax.   Fetal survey normal, has echo scheduled for maternal ASD.  Migraines every 1-2 weeks. Still really tired.   RTC 5 weeks. GCT then.

## 2020-02-06 ENCOUNTER — OFFICE VISIT (OUTPATIENT)
Dept: PSYCHOLOGY | Facility: CLINIC | Age: 40
End: 2020-02-06
Payer: COMMERCIAL

## 2020-02-06 VITALS
DIASTOLIC BLOOD PRESSURE: 83 MMHG | WEIGHT: 209.5 LBS | HEART RATE: 86 BPM | BODY MASS INDEX: 32.81 KG/M2 | SYSTOLIC BLOOD PRESSURE: 132 MMHG | TEMPERATURE: 98.3 F | OXYGEN SATURATION: 98 %

## 2020-02-06 DIAGNOSIS — F33.41 RECURRENT MAJOR DEPRESSION IN PARTIAL REMISSION (H): ICD-10-CM

## 2020-02-06 DIAGNOSIS — F41.1 GENERALIZED ANXIETY DISORDER: Primary | ICD-10-CM

## 2020-02-06 PROBLEM — F32.4 MAJOR DEPRESSIVE DISORDER WITH SINGLE EPISODE, IN PARTIAL REMISSION (H): Status: RESOLVED | Noted: 2017-11-02 | Resolved: 2020-02-06

## 2020-02-06 PROCEDURE — 99214 OFFICE O/P EST MOD 30 MIN: CPT | Performed by: PSYCHIATRY & NEUROLOGY

## 2020-02-06 RX ORDER — BUPROPION HYDROCHLORIDE 300 MG/1
300 TABLET ORAL EVERY MORNING
Qty: 90 TABLET | Refills: 1 | Status: SHIPPED | OUTPATIENT
Start: 2020-02-06 | End: 2020-07-27

## 2020-02-06 RX ORDER — LAMOTRIGINE 150 MG/1
150 TABLET ORAL DAILY
Qty: 90 TABLET | Refills: 1 | Status: SHIPPED | OUTPATIENT
Start: 2020-02-06 | End: 2020-06-25

## 2020-02-06 RX ORDER — DESVENLAFAXINE 100 MG/1
100 TABLET, EXTENDED RELEASE ORAL DAILY
Qty: 90 TABLET | Refills: 1 | Status: SHIPPED | OUTPATIENT
Start: 2020-02-06 | End: 2020-07-27

## 2020-02-06 ASSESSMENT — ANXIETY QUESTIONNAIRES
7. FEELING AFRAID AS IF SOMETHING AWFUL MIGHT HAPPEN: NOT AT ALL
2. NOT BEING ABLE TO STOP OR CONTROL WORRYING: NOT AT ALL
IF YOU CHECKED OFF ANY PROBLEMS ON THIS QUESTIONNAIRE, HOW DIFFICULT HAVE THESE PROBLEMS MADE IT FOR YOU TO DO YOUR WORK, TAKE CARE OF THINGS AT HOME, OR GET ALONG WITH OTHER PEOPLE: NOT DIFFICULT AT ALL
5. BEING SO RESTLESS THAT IT IS HARD TO SIT STILL: NOT AT ALL
3. WORRYING TOO MUCH ABOUT DIFFERENT THINGS: NOT AT ALL
1. FEELING NERVOUS, ANXIOUS, OR ON EDGE: SEVERAL DAYS
GAD7 TOTAL SCORE: 1
6. BECOMING EASILY ANNOYED OR IRRITABLE: NOT AT ALL

## 2020-02-06 ASSESSMENT — PATIENT HEALTH QUESTIONNAIRE - PHQ9
SUM OF ALL RESPONSES TO PHQ QUESTIONS 1-9: 3
5. POOR APPETITE OR OVEREATING: NOT AT ALL

## 2020-02-06 NOTE — PATIENT INSTRUCTIONS
Continue lamotrigine, Pristiq and Wellbutrin as prescribed.    Continue all other medications per your primary care provider.    Try to move a little bit.    Schedule an appointment with me in 3 months or sooner as needed.  Call [facility] at [phone] to schedule or schedule at the .     [facility] Resources:      Go to the Emergency Department as needed or call after hours crisis line at [phone].      To schedule individual or family therapy, call [facility] at [phone].     Follow up with primary care provider as planned or sooner for acute medical concerns.    Call the psychiatric nurse line with medication questions or concerns at [phone].    BlogRadio may be used to communicate with your provider, but this is not intended to be used for emergencies.    Community Resources:      National Suicide Prevention Lifeline: 711.439.5581 (TTY: 402.375.1735). Call anytime for help.  (www.suicidepreventionlifeline.org)    National [place] on Mental Illness (www.telma.org): 173.197.5246 or 398-303-2010.     Mental Health Association (www.mentalhealth.org): 356.315.7070 or 517-565-3516.    [place] Crisis Text Line: Text [place] to 935061    Suicide LifeLine Chat: suicidepreWaterstone Pharmaceuticalslifeline.org/chat

## 2020-02-06 NOTE — PROGRESS NOTES
"    Outpatient Psychiatric Progress Note    Name: Danyelle Canales   : 1980                    Primary Care Provider: Katlyn Joiner DO   Therapist: Denice Haji [facility] pain clinic     PHQ-9 scores:  PHQ-9 SCORE 2019   PHQ-9 Total Score - - -   PHQ-9 Total Score MyChart - - -   PHQ-9 Total Score 5 1 3       NAJMA-7 scores:  NAJMA-7 SCORE 2019   Total Score - - -   Total Score - - -   Total Score 2 1 1       Patient Identification:  Danyelle is a 39 year old year old,   White American female  who presents for return visit with me.  Patient attended the session alone.     Interim History:  Danyelle reports that she is doing fairly well.  She does have some anxiety, but feels that it is \"normal pregnancy anxiety.\"  Her mood has been fairly good, although she reports that she mostly goes to work and then goes home and sits on the couch.  She does not have a lot of energy to do much.  Her 's been taking care of household chores.  She occasionally walks the dog.      She also reports some difficulty with sleep which is chronic for her.  I encouraged her to try to increase her physical activity slightly during the evening which may help with the quality of her sleep.  She has been trying to limit her fluids in the evening so she does not have to get up to use the bathroom.    She denies any adverse side effects from her current medications.  She has discussed her current medications with her primary care provider and her gynecologist, both agree that she can continue with without concern.    She would prefer to come back in approximately 3 months.  We agreed to check in soon after the baby is born.  She is currently at 21 weeks gestation.    Vital Signs:   /83 (BP Location: Right arm, Patient Position: Chair, Cuff Size: Adult Regular)   Pulse 86   Temp 98.3  F (36.8  C) (Oral)   Wt 95 kg (209 lb 8 oz)   LMP 2019   SpO2 98% "   BMI 32.81 kg/m      Current Medications:  Current Outpatient Medications   Medication     buPROPion (WELLBUTRIN XL) 300 MG 24 hr tablet     desvenlafaxine (PRISTIQ) 100 MG 24 hr tablet     FOLIC ACID PO     lamoTRIgine (LAMICTAL) 150 MG tablet     ondansetron (ZOFRAN) 8 MG tablet     Prenatal MV-Min-Fe Fum-FA-DHA (PRENATAL 1 PO)     No current facility-administered medications for this visit.       Mental Status Examination:  Danyelle is a 39-year-old woman who appears her stated age and in no acute distress.  She is neatly groomed in business clothing.  Speech is clear and normal in rate and tone.  Eye contact is good.  Motor behavior is appropriate.  Affect is blunted.  Mood is good.  Thoughts are logical and spontaneous with no loose associations or flight of ideas.  Thought content shows no psychosis.  She denies suicidal thoughts.  She is alert and oriented x3.    Assessment and Plan:    ICD-10-CM    1. Generalized anxiety disorder F41.1 lamoTRIgine (LAMICTAL) 150 MG tablet     desvenlafaxine (PRISTIQ) 100 MG 24 hr tablet     buPROPion (WELLBUTRIN XL) 300 MG 24 hr tablet   2. Recurrent major depression in partial remission (H) F33.41        Medical comorbidities include:   Patient Active Problem List   Diagnosis     Insomnia     allergic DERMATITIS NOS     Encounter for other general counseling or advice on contraception     [facility]     [facility]     Migraine headache     PMDD (premenstrual dysphoric disorder)     Trichotillomania     CARDIOVASCULAR SCREENING; LDL GOAL LESS THAN 160     History of ovarian cyst     Generalized anxiety disorder     Chronic pain syndrome     ASD (atrial septal defect)     Chronic pain     Esophageal reflux     Non morbid obesity, unspecified obesity type     Benign hypermobility syndrome     Myalgia and myositis, unspecified     Myofascial pain     Need for Tdap vaccination     Recurrent major depression in partial remission (H)       Treatment  Plan:  Patient Instructions   Continue lamotrigine, Pristiq and Wellbutrin as prescribed.    Continue all other medications per your primary care provider.    Try to move a little bit.    Schedule an appointment with me in 3 months or sooner as needed.  Call [facility] at [phone] to schedule or schedule at the .     [facility]:      Go to the Emergency Department as needed or call after hours crisis line at [phone].      To schedule individual or family therapy, call [facility] at [phone].     Follow up with primary care provider as planned or sooner for acute medical concerns.    Call the psychiatric nurse line with medication questions or concerns at [phone].    Scholrly may be used to communicate with your provider, but this is not intended to be used for emergencies.    Community Resources:      National Suicide Prevention Lifeline: 924.758.8471 (TTY: 358.326.9603). Call anytime for help.  (www.suicidepreventionlifeline.org)    National [place] on Mental Illness (www.telma.org): 316.588.3680 or 286-608-7472.     Mental Health Association (www.mentalhealth.org): 118.501.9330 or 832-947-3790.    [place] Crisis Text Line: Text [place] to 268549    Suicide LifeLine Chat: suicidepreventionlifeline.org/chat     Administrative Billing:   I spent approximately 25 minutes with Danyelle, greater than 50% in counseling regarding medications, side effects, and treatment options.    Patient Status:  Patient will continue to be seen for ongoing consultation and stabilization.

## 2020-02-07 ASSESSMENT — ANXIETY QUESTIONNAIRES: GAD7 TOTAL SCORE: 1

## 2020-02-10 ENCOUNTER — OFFICE VISIT - HEALTHEAST (OUTPATIENT)
Dept: MATERNAL FETAL MEDICINE | Facility: HOSPITAL | Age: 40
End: 2020-02-10

## 2020-02-10 ENCOUNTER — RECORDS - HEALTHEAST (OUTPATIENT)
Dept: ADMINISTRATIVE | Facility: OTHER | Age: 40
End: 2020-02-10

## 2020-02-10 ENCOUNTER — RECORDS - HEALTHEAST (OUTPATIENT)
Dept: ULTRASOUND IMAGING | Facility: HOSPITAL | Age: 40
End: 2020-02-10

## 2020-02-10 DIAGNOSIS — Z82.79 FAMILY HISTORY OF CONGENITAL ANOMALY OF CARDIOVASCULAR SYSTEM: ICD-10-CM

## 2020-02-10 DIAGNOSIS — O26.90 PREGNANCY RELATED CONDITIONS, UNSPECIFIED, UNSPECIFIED TRIMESTER: ICD-10-CM

## 2020-03-05 ENCOUNTER — MYC MEDICAL ADVICE (OUTPATIENT)
Dept: OBGYN | Facility: CLINIC | Age: 40
End: 2020-03-05

## 2020-03-05 ENCOUNTER — PRENATAL OFFICE VISIT (OUTPATIENT)
Dept: OBGYN | Facility: CLINIC | Age: 40
End: 2020-03-05
Payer: COMMERCIAL

## 2020-03-05 VITALS
HEART RATE: 93 BPM | DIASTOLIC BLOOD PRESSURE: 84 MMHG | WEIGHT: 213.2 LBS | OXYGEN SATURATION: 96 % | SYSTOLIC BLOOD PRESSURE: 130 MMHG | TEMPERATURE: 98.7 F | BODY MASS INDEX: 33.46 KG/M2 | HEIGHT: 67 IN

## 2020-03-05 DIAGNOSIS — R30.0 DYSURIA: ICD-10-CM

## 2020-03-05 DIAGNOSIS — O09.512 AMA (ADVANCED MATERNAL AGE) PRIMIGRAVIDA 35+, SECOND TRIMESTER: Primary | ICD-10-CM

## 2020-03-05 DIAGNOSIS — O26.892 PREGNANCY RELATED HIP PAIN IN SECOND TRIMESTER, ANTEPARTUM: ICD-10-CM

## 2020-03-05 DIAGNOSIS — M25.559 PREGNANCY RELATED HIP PAIN IN SECOND TRIMESTER, ANTEPARTUM: ICD-10-CM

## 2020-03-05 LAB
ALBUMIN UR-MCNC: NEGATIVE MG/DL
APPEARANCE UR: CLEAR
BILIRUB UR QL STRIP: NEGATIVE
COLOR UR AUTO: YELLOW
GLUCOSE UR STRIP-MCNC: NEGATIVE MG/DL
HGB BLD-MCNC: 11.3 G/DL (ref 11.7–15.7)
HGB UR QL STRIP: NEGATIVE
KETONES UR STRIP-MCNC: NEGATIVE MG/DL
LEUKOCYTE ESTERASE UR QL STRIP: NEGATIVE
NITRATE UR QL: NEGATIVE
PH UR STRIP: 6 PH (ref 5–7)
SOURCE: NORMAL
SP GR UR STRIP: 1.02 (ref 1–1.03)
UROBILINOGEN UR STRIP-ACNC: 0.2 EU/DL (ref 0.2–1)

## 2020-03-05 PROCEDURE — 99207 ZZC PRENATAL VISIT: CPT | Performed by: OBSTETRICS & GYNECOLOGY

## 2020-03-05 PROCEDURE — 81003 URINALYSIS AUTO W/O SCOPE: CPT | Performed by: OBSTETRICS & GYNECOLOGY

## 2020-03-05 PROCEDURE — 82950 GLUCOSE TEST: CPT | Performed by: OBSTETRICS & GYNECOLOGY

## 2020-03-05 PROCEDURE — 87086 URINE CULTURE/COLONY COUNT: CPT | Performed by: OBSTETRICS & GYNECOLOGY

## 2020-03-05 PROCEDURE — 36415 COLL VENOUS BLD VENIPUNCTURE: CPT | Performed by: OBSTETRICS & GYNECOLOGY

## 2020-03-05 PROCEDURE — 00000218 ZZHCL STATISTIC OBHBG - HEMOGLOBIN: Performed by: OBSTETRICS & GYNECOLOGY

## 2020-03-05 ASSESSMENT — MIFFLIN-ST. JEOR: SCORE: 1679.7

## 2020-03-05 ASSESSMENT — ANXIETY QUESTIONNAIRES
3. WORRYING TOO MUCH ABOUT DIFFERENT THINGS: NOT AT ALL
GAD7 TOTAL SCORE: 1
6. BECOMING EASILY ANNOYED OR IRRITABLE: SEVERAL DAYS
2. NOT BEING ABLE TO STOP OR CONTROL WORRYING: NOT AT ALL
1. FEELING NERVOUS, ANXIOUS, OR ON EDGE: NOT AT ALL
5. BEING SO RESTLESS THAT IT IS HARD TO SIT STILL: NOT AT ALL
7. FEELING AFRAID AS IF SOMETHING AWFUL MIGHT HAPPEN: NOT AT ALL

## 2020-03-05 ASSESSMENT — PATIENT HEALTH QUESTIONNAIRE - PHQ9
5. POOR APPETITE OR OVEREATING: NOT AT ALL
SUM OF ALL RESPONSES TO PHQ QUESTIONS 1-9: 3

## 2020-03-05 NOTE — PROGRESS NOTES
Childbirth classes? YES, planning on it. Discussed tour  Plan on breastfeeding? Yes: will talk to insurance about a pump  Birthcontrol? IUD  Sex on ultrasound? boy  Circumsion? Yes, likely. Discussed outpatient.  Peds doc? Maybe Dr. Joiner at [place], maybe [facility]. Discussed options.    Doing well overall.   Having some hip pain and round ligament pain. Would like to try maternity support belt. Fitted for one today.   GCT, hgb today.   Had normal fetal echo.   RTC 4 weeks.

## 2020-03-05 NOTE — LETTER
[facility]  [address] [place] [id number]  Phone: [phone]    March 5, 2020        Danyelle Canales  [facility] [place] [address]          To whom it may concern:    RE: Danyelle Canales, [facility]  Chan Canales    Patient was seen and treated today at our clinic.    Please contact me for questions or concerns.      Sincerely,        Brionna Prescott MD

## 2020-03-06 LAB
BACTERIA SPEC CULT: NORMAL
GLUCOSE 1H P 50 G GLC PO SERPL-MCNC: 120 MG/DL (ref 60–129)
SPECIMEN SOURCE: NORMAL

## 2020-03-06 ASSESSMENT — ANXIETY QUESTIONNAIRES: GAD7 TOTAL SCORE: 1

## 2020-04-09 ENCOUNTER — PRENATAL OFFICE VISIT (OUTPATIENT)
Dept: OBGYN | Facility: CLINIC | Age: 40
End: 2020-04-09
Payer: COMMERCIAL

## 2020-04-09 DIAGNOSIS — O09.90 SUPERVISION OF HIGH RISK PREGNANCY, ANTEPARTUM: Primary | ICD-10-CM

## 2020-04-09 PROCEDURE — 99207 ZZC PRENATAL VISIT: CPT | Performed by: OBSTETRICS & GYNECOLOGY

## 2020-04-09 NOTE — PROGRESS NOTES
Phone visit for modified prenatal care for COVID.  Discussed COVID pandemic. Recommend social distancing, shelter in place as much as possible, call clinic with concerns.   Tired, overall feels well.  Some aches and pains, has restarted Miralax. Answered questions about hospital.  Did on-line birthing classes. Will send Covid AVS.  Office visit next, RTC 2 weeks.  AM    Start 1505  End 1522

## 2020-04-09 NOTE — PATIENT INSTRUCTIONS
The best way to prevent an infection is to avoid being exposed to the virus. We recommend that you wash your hands frequently and avoid touching your eyes, nose or mouth.  Practice social distancing by staying home as much as possible, avoiding gatherings and minimize trips for essentials. You should especially avoid any contact with people who are sick. It is possible that people who have the virus have little or no symptoms which is why social distancing is so important to avoid spreading the virus. Clean frequently touched surfaces daily. If you do start to have symptoms such as cough, fever or mild shortness of breath, you should stay home for at least 14 days.  If your symptoms are worrisome or severe or you are scheduled during this time to have a clinic visit you should call us at [phone].    Due to anticipated shortage of blood products we recommend all pregnant women take an iron supplement (ferrous gluconate or ferrous sulfate) in addition to a prenatal vitamin.     The hospital has strict visitor restrictions at this time for your safety. Please be aware that visitor restrictions are subject to change. You are allowed to have one healthy adult visitor during your hospital stay, it must be the same person the entire time and they must stay with you at the hospital the entire time (they are not allowed to come and go).     For information about Covid-19 and pregnancy we look to the center for disease control, the CDC. You can look at this information online at:   https://www.cdc.gov/coronavirus/2019-ncov/hcp/pregnant-women-faq.html

## 2020-04-20 ENCOUNTER — PRENATAL OFFICE VISIT (OUTPATIENT)
Dept: OBGYN | Facility: CLINIC | Age: 40
End: 2020-04-20
Payer: COMMERCIAL

## 2020-04-20 VITALS
WEIGHT: 216.3 LBS | HEART RATE: 98 BPM | DIASTOLIC BLOOD PRESSURE: 82 MMHG | SYSTOLIC BLOOD PRESSURE: 132 MMHG | TEMPERATURE: 98.5 F | BODY MASS INDEX: 33.88 KG/M2

## 2020-04-20 DIAGNOSIS — R10.2 PELVIC PAIN IN FEMALE: ICD-10-CM

## 2020-04-20 DIAGNOSIS — F41.9 ANXIETY: ICD-10-CM

## 2020-04-20 DIAGNOSIS — R03.0 ELEVATED BLOOD PRESSURE READING WITHOUT DIAGNOSIS OF HYPERTENSION: ICD-10-CM

## 2020-04-20 DIAGNOSIS — Z23 NEED FOR TDAP VACCINATION: ICD-10-CM

## 2020-04-20 DIAGNOSIS — O09.90 SUPERVISION OF HIGH RISK PREGNANCY, ANTEPARTUM: Primary | ICD-10-CM

## 2020-04-20 LAB
ALBUMIN UR-MCNC: NEGATIVE MG/DL
ALT SERPL W P-5'-P-CCNC: 18 U/L (ref 0–50)
APPEARANCE UR: CLEAR
AST SERPL W P-5'-P-CCNC: 18 U/L (ref 0–45)
BILIRUB UR QL STRIP: NEGATIVE
COLOR UR AUTO: YELLOW
CREAT SERPL-MCNC: 0.53 MG/DL (ref 0.52–1.04)
CREAT UR-MCNC: 110 MG/DL
ERYTHROCYTE [DISTWIDTH] IN BLOOD BY AUTOMATED COUNT: 14.1 % (ref 10–15)
GFR SERPL CREATININE-BSD FRML MDRD: >90 ML/MIN/{1.73_M2}
GLUCOSE UR STRIP-MCNC: NEGATIVE MG/DL
HCT VFR BLD AUTO: 36.8 % (ref 35–47)
HGB BLD-MCNC: 11.6 G/DL (ref 11.7–15.7)
HGB UR QL STRIP: NEGATIVE
KETONES UR STRIP-MCNC: NEGATIVE MG/DL
LEUKOCYTE ESTERASE UR QL STRIP: NEGATIVE
MCH RBC QN AUTO: 26.6 PG (ref 26.5–33)
MCHC RBC AUTO-ENTMCNC: 31.5 G/DL (ref 31.5–36.5)
MCV RBC AUTO: 84 FL (ref 78–100)
NITRATE UR QL: NEGATIVE
PH UR STRIP: 6.5 PH (ref 5–7)
PLATELET # BLD AUTO: 300 10E9/L (ref 150–450)
PROT UR-MCNC: 0.23 G/L
PROT/CREAT 24H UR: 0.2 G/G CR (ref 0–0.2)
RBC # BLD AUTO: 4.36 10E12/L (ref 3.8–5.2)
SOURCE: NORMAL
SP GR UR STRIP: 1.02 (ref 1–1.03)
UROBILINOGEN UR STRIP-ACNC: 0.2 EU/DL (ref 0.2–1)
WBC # BLD AUTO: 8.5 10E9/L (ref 4–11)

## 2020-04-20 PROCEDURE — 36415 COLL VENOUS BLD VENIPUNCTURE: CPT | Performed by: OBSTETRICS & GYNECOLOGY

## 2020-04-20 PROCEDURE — 84156 ASSAY OF PROTEIN URINE: CPT | Performed by: OBSTETRICS & GYNECOLOGY

## 2020-04-20 PROCEDURE — 90715 TDAP VACCINE 7 YRS/> IM: CPT | Performed by: OBSTETRICS & GYNECOLOGY

## 2020-04-20 PROCEDURE — 84460 ALANINE AMINO (ALT) (SGPT): CPT | Performed by: OBSTETRICS & GYNECOLOGY

## 2020-04-20 PROCEDURE — 99213 OFFICE O/P EST LOW 20 MIN: CPT | Mod: 25 | Performed by: OBSTETRICS & GYNECOLOGY

## 2020-04-20 PROCEDURE — 84450 TRANSFERASE (AST) (SGOT): CPT | Performed by: OBSTETRICS & GYNECOLOGY

## 2020-04-20 PROCEDURE — 90471 IMMUNIZATION ADMIN: CPT | Performed by: OBSTETRICS & GYNECOLOGY

## 2020-04-20 PROCEDURE — 85027 COMPLETE CBC AUTOMATED: CPT | Performed by: OBSTETRICS & GYNECOLOGY

## 2020-04-20 PROCEDURE — 81003 URINALYSIS AUTO W/O SCOPE: CPT | Performed by: OBSTETRICS & GYNECOLOGY

## 2020-04-20 PROCEDURE — 82565 ASSAY OF CREATININE: CPT | Performed by: OBSTETRICS & GYNECOLOGY

## 2020-04-20 PROCEDURE — 87086 URINE CULTURE/COLONY COUNT: CPT | Performed by: OBSTETRICS & GYNECOLOGY

## 2020-04-20 NOTE — PATIENT INSTRUCTIONS
Patient Education     Adapting to Pregnancy: Third Trimester    Although common during pregnancy, some discomforts may seem worse in the final weeks. Simple lifestyle changes can help. Take care of yourself. And ask your partner to help out with small tasks.  Limiting leg problems  Ways to combat leg issues:    Wear support hose all day.    Avoid snug shoes and clothes that bind, like tight pants and socks with elastic tops.    Sit with your feet and legs raised often.  Caring for your breasts  Tips to follow include:    Wash with plain water. Avoid using harsh soaps or rubbing alcohol. They may cause dryness.    Wear a nursing bra for extra support. It can also hide any leaks from your nipples.  Controlling hemorrhoids  Ways to avoid hemorrhoids include:    Eat foods that are high in fiber. Also, exercise and drink enough fluids. This will reduce constipation and hemorrhoids.    Sleep and nap on your side. This limits pressure on the veins of your rectum.    Try not to stand or sit for long periods.  Controlling back pain  As your body changes during pregnancy, your back must work in new ways. Back pain is due to many causes. Physical changes in your body can strain your back and its supporting muscles. Also, hormones (chemicals that carry messages throughout the body) increase during pregnancy. This can affect how your muscles and joints work together. All of these changes can lead to pain. Pain may be felt in the upper or lower back. Pain is also common in the pelvis. Some pregnant women have sciatica. This is pain caused by pressure on the sciatic nerve running down the back of the leg. Ask your healthcare provider for specific tips and exercises to help control your back pain.  Tips to help you rest  Good rest and sleep will help you feel better. Here are some ideas:    Ask your partner to massage your shoulders, neck, or back.    Limit the errands you do each day.    Lie down in the afternoon or after work  for a few minutes.    Take a warm bath before you go to sleep.    Drink warm milk or teas without caffeine.    Avoid coffee, black tea, and cola.  Stopping heartburn    Avoid spicy, greasy, fried, or acidic foods.    Eat small amounts more often. Eat slowly.   Wait 2 hours after eating before lying down.    Sleep with your upper body raised 6 inches.   Managing mood swings  Ways to manage mood swings include:    Know that mood changes are normal.    Exercise often, but get plenty of rest.    Address any concerns and limit stress. Talking to your partner, other women, or your healthcare provider may help.  Dealing with urinary frequency  Tips to deal with having to urinate often include:    Drink plenty of water all day. If you drink a lot in the evening, though, you may have to get up more in the night.    Limit coffee, black tea, and cola.  Date Last Reviewed: 2/1/2018 2000-2019 [employer]. [address] [place] [address]. All rights reserved. This information is not intended as a substitute for professional medical care. Always follow your healthcare professional's instructions.           Patient Education     Comfort Tips During Pregnancy    Talk with your healthcare provider before using pain-relieving medicine at any time during your pregnancy.  First trimester tips  Nausea    Get up slowly. Eat a few unsalted crackers before you get out of bed.    Avoid smells that bother you.    Eat small bland low fat, light high-protein meals at frequent intervals.    Sip on water, weak tea, or clear soft drinks, like ginger ale. Eat ice chips.  Fatigue    Take catnaps when you can.    Get regular exercise.    Accept help from others.    Practice good sleep habits, like going to bed and getting up at the same time each day. Use your bed only for sleep and sex.  Mood swings    Talk about your feelings with others, including other mothers.    Limit sugar, chocolate, and caffeine.    Eat a healthy  diet. Don t skip meals.    Get regular exercise.  Headaches    Get fresh air and exercise.    Relax and get enough rest.    Check with your healthcare provider before taking any pain medicines.  Second trimester tips  Here are some suggestions to help you cope:    To limit ankle swelling, sit with your feet raised or wear support hose.    If you have pain in your groin and stomach (round ligament pain), avoid sudden twisting movements.    For leg cramps, flexing your foot often brings immediate relief. You also may try massaging your calf in long, downward strokes, or stretching your legs before going to bed. Get enough exercise and wear shoes with flexible soles.  Third trimester tips  Reducing heartburn    Eat small, light meals throughout the day rather than 3 large ones.    Sleep with your upper body raised 6 inches. Don t lie down until 2 hours after you eat.    Don't eat greasy, fried, or spicy foods.    Avoid citrus fruits and juices.  Treating constipation    Eat foods high in fiber (whole-grain foods, fresh fruit and vegetables).    Drink plenty of water.    Get regular exercise.    Discuss other medicines (like docusate and psyllium) with your healthcare provider.  Taking care of your breasts    Avoid using harsh soaps or alcohol, which can cause excessive dryness.    Wear nursing bras. They provide more support than regular bras and can be used after pregnancy if you breastfeed.  Getting a good night s sleep    Take a warm shower before bed.    Sleep on a firm mattress.    Lie on your side with 1 leg crossed over the other.    Use pillows to support arms, legs, and belly.  Date Last Reviewed: 10/1/2017    0193-3311 [employer]. [address] [place] [address], [place] [address]. All rights reserved. This information is not intended as a substitute for professional medical care. Always follow your healthcare professional's instructions.           Patient Education     Pregnancy: Your Third Trimester  Changes  As the baby grows, your body changes too. You may also see signs that your body is getting ready for labor. Be patient. Within a few more weeks, your baby will be born.  How you are changing  Your body is preparing for the birth of your baby. Some of the most common changes are listed below. If you have any questions or concerns, ask your healthcare provider:    You ll gain more weight from fluids, extra blood, and fat deposits.    Your breasts will grow as your body gets ready to feed the baby. They may be more tender. You may also notice a slight yellow or white discharge from the nipples.    Discharge from your vagina may increase. This is normal.    You might see some skin color changes on your forehead, cheeks, or nose. Most of these will go away after you deliver.  How your baby is growing       Month 7  Your baby can open and close his or her eyes and weighs around 4 pounds. If born prematurely (too early), your baby would likely survive with special care. Month 8  Your baby is building up body fat and weighs around 6 pounds. Month 9  Your baby weighs nearly 7 pounds and is about 19 to 21 inches long. In other words, any day now...   Date Last Reviewed: 2/1/2018 2000-2019 [employer]. [address] [place] [address], [place] [address]. All rights reserved. This information is not intended as a substitute for professional medical care. Always follow your healthcare professional's instructions.           Patient Education     Understanding Preeclampsia  Preeclampsia is high blood pressure (hypertension) that happens during pregnancy. It often shows up around the 20th week of pregnancy. It often goes back to normal by the 12th week after you give birth. It can lead to serious health risks for you and your baby. During your pregnancy, your healthcare provider will watch your blood pressure.    Symptoms  A common symptom of preeclampsia is high blood pressure. Other symptoms may include:    Rapid  weight gain    Protein in your urine    Headache    Belly (abdominal) pain on your right side    Vision problems. These include flashes or spots.    Swelling (edema) in your face or hands. This also often happens near the end of normal pregnancies, even without preeclampsia.  Tests you may have  Your healthcare provider will want to check your blood pressure throughout your pregnancy. If your blood pressure is high, you may have the following tests:    Urine tests to look for protein    Blood tests to confirm preeclampsia    Fetal monitoring to make sure that your baby is healthy  Treating preeclampsia  You may need to take a daily low dose of aspirin if you are at risk for preeclampsia. Preeclampsia almost always ends soon after you give birth. Until then, your healthcare provider can help manage your condition. If your symptoms are mild, you may need activity limits at home, including bed rest and no heavy lifting. If your symptoms are severe, you will stay in the hospital. Hospital treatment includes:    Activity limits to help control blood pressure. This means no heavy lifting and 8 hours per day lying down with the feet up.    Magnesium IV (intravenous) drip during labor to prevent seizures    Induced labor or surgical delivery by  section. Delivery is considered the cure for preeclampsia.  When to call your healthcare provider  Call your healthcare provider if swelling, weight gain, or other symptoms come on quickly or are severe. Some cases of preeclampsia are more severe than others. Your symptoms also may change or get worse as you get closer to your due date.  Who s at risk?  No one knows what causes preeclampsia. Preeclampsia can happen in any pregnant woman. But it is more common in first-time pregnancies. Things that increase the risk include:    Previous pregnancies. You are at risk if you had preeclampsia, intrauterine growth retardation (IUGR),  birth, placental abruption, or fetal  death in a past pregnancy.    Health history of mother. You are at risk if you have diabetes, high blood pressure, obesity, kidney disease, autoimmune disease such as lupus, or a family history of preeclampsia.    Current pregnancy. You are at risk if this is your first pregnancy, or if you have multiple fetuses, are younger than age 18 or older than 40, or used in vitro fertilization.    Race. You are at risk if you are black.  Dangers of preeclampsia  If not treated, preeclampsia can cause problems for you and your baby. The placenta is the organ that nourishes your baby. It may tear away from the uterine wall. This can put the baby at risk for health problems (fetal distress) and premature birth. Preeclampsia can also cause these health problems:    Kidney failure or other organ damage    Seizures    Stroke  Once you give birth  In most cases, preeclampsia goes away on its own soon after you give birth. This is often by the 12th week after you give deliver. Within days of delivery, your blood pressure, swelling, and other symptoms should get better. For some women, problems from preeclampsia can continue after delivery.   Postpartum preeclampsia that develops within the first 48 hours after delivery is rare. Another type of postpartum preeclampsia that develops more than 48 hours after delivery is called late-onset preeclampsia. It is also rare. Contact your healthcare provider right away if you have symptoms of preeclampsia after you deliver.  Date Last Reviewed: 6/1/2016 2000-2019 [employer]. [address] [place] [address], [place] [address]. All rights reserved. This information is not intended as a substitute for professional medical care. Always follow your healthcare professional's instructions.

## 2020-04-20 NOTE — PROGRESS NOTES
[facility]    CC: routine prenatal visit.    S:Danyelle Canales is a 40 year old  at 32w1d by LMP c/w 10w3d us who presents today for routine prenatal visit.  Patient reports she continues to feel fatigued but it has improved compared to her first trimester.  She feels her mood is ok, but she does feel irritable.  She is talking to a therapist every 2 weeks, which she finds helpful.  Patient denies any contractions, vaginal bleeding, leakage of fluid.  She does report some spots in her vision when bending over quickly, but no other times. She states she is feeling normal fetal movement.  She feels some pressure when urinating and wonders if she has a UTI.  She denies any headache, chest pain, chest pressure, shortness of breath, nausea, vomiting, diarrhea.      Pregnancy notable for:  -anxiety  -AMA  -history of maternal ASD    O:   Patient Vitals for the past 24 hrs:   BP Temp Temp src Pulse Weight   20 1146 132/82 -- -- -- --   20 1115 (!) 137/93 98.5  F (36.9  C) Oral 98 98.1 kg (216 lb 4.8 oz)   ]  Exam:  General- awake, alert, answering questions appropriately, appears comfortable  CV- regular rate  Lung- breathing comfortably on room air  Ext- bilateral lower extremities with no edema    Doptones- 140 bpm  Fundal height-34 cm    A&P: Danyelle Canales is a 40 year old  at 32w1d by LMP c/w 10w3d us who presents today for routine prenatal visit.     (O09.90) Supervision of high risk pregnancy, antepartum  (primary encounter diagnosis)  Comment: Discussed signs and symptoms of PTL, PPROM  Plan: Creatinine urine calculation only        In person visit in 2 weeks     (F41.9) Anxiety  Comment: Stable  Plan: continue q2wk therapy appointments     (Z23) Need for Tdap vaccination  Comment: due for Tdap  Plan: TDAP VACCINE, VACCINE ADMINISTRATION, INITIAL    (R03.0) Elevated blood pressure reading without diagnosis of hypertension  Comment: Patient with mild range blood  pressure on first check.  No history of CHTN or elevated BP in pregnancy.  Reviewed signs and symptoms high blood pressure issues in pregnancy including gestation HTN and pre-eclampsia.  Plan pre-e labs today.    Plan: CBC with platelets, AST, ALT, Creatinine,         Protein  random urine with Creat Ratio          (R10.2) Pelvic pain in female  Comment: Patient with sensation of pelvic pressure with urination. Plan to rule out UTI  Plan: UA without Microscopic, Urine Culture Aerobic         Bacterial    Genesis Zhang MD

## 2020-04-21 LAB
BACTERIA SPEC CULT: NORMAL
SPECIMEN SOURCE: NORMAL

## 2020-04-27 ENCOUNTER — HOSPITAL ENCOUNTER (OUTPATIENT)
Facility: CLINIC | Age: 40
End: 2020-04-27
Payer: COMMERCIAL

## 2020-04-30 ENCOUNTER — VIRTUAL VISIT (OUTPATIENT)
Dept: PSYCHOLOGY | Facility: CLINIC | Age: 40
End: 2020-04-30
Payer: COMMERCIAL

## 2020-04-30 DIAGNOSIS — F41.1 GENERALIZED ANXIETY DISORDER: ICD-10-CM

## 2020-04-30 DIAGNOSIS — F33.41 RECURRENT MAJOR DEPRESSION IN PARTIAL REMISSION (H): Primary | ICD-10-CM

## 2020-04-30 PROCEDURE — 99214 OFFICE O/P EST MOD 30 MIN: CPT | Mod: TEL | Performed by: PSYCHIATRY & NEUROLOGY

## 2020-04-30 ASSESSMENT — ANXIETY QUESTIONNAIRES
7. FEELING AFRAID AS IF SOMETHING AWFUL MIGHT HAPPEN: NOT AT ALL
IF YOU CHECKED OFF ANY PROBLEMS ON THIS QUESTIONNAIRE, HOW DIFFICULT HAVE THESE PROBLEMS MADE IT FOR YOU TO DO YOUR WORK, TAKE CARE OF THINGS AT HOME, OR GET ALONG WITH OTHER PEOPLE: NOT DIFFICULT AT ALL
2. NOT BEING ABLE TO STOP OR CONTROL WORRYING: SEVERAL DAYS
3. WORRYING TOO MUCH ABOUT DIFFERENT THINGS: SEVERAL DAYS
GAD7 TOTAL SCORE: 4
1. FEELING NERVOUS, ANXIOUS, OR ON EDGE: SEVERAL DAYS
5. BEING SO RESTLESS THAT IT IS HARD TO SIT STILL: NOT AT ALL
6. BECOMING EASILY ANNOYED OR IRRITABLE: SEVERAL DAYS

## 2020-04-30 ASSESSMENT — PATIENT HEALTH QUESTIONNAIRE - PHQ9
SUM OF ALL RESPONSES TO PHQ QUESTIONS 1-9: 1
5. POOR APPETITE OR OVEREATING: NOT AT ALL

## 2020-04-30 NOTE — PROGRESS NOTES
"Danyelle Canales is a 40 year old female who is being evaluated via a billable telephone visit.      The patient has been notified of following:     \"This telephone visit will be conducted via a call between you and your physician/provider. We have found that certain health care needs can be provided without the need for a physical exam.  This service lets us provide the care you need with a short phone conversation.  If a prescription is necessary we can send it directly to your pharmacy.  If lab work is needed we can place an order for that and you can then stop by our lab to have the test done at a later time.    Telephone visits are billed at different rates depending on your insurance coverage. During this emergency period, for some insurers they may be billed the same as an in-person visit.  Please reach out to your insurance provider with any questions.    If during the course of the call the physician/provider feels a telephone visit is not appropriate, you will not be charged for this service.\"    Patient has given verbal consent for Telephone visit?  Yes    How would you like to obtain your AVS? Rebekaht          Outpatient Psychiatric Progress Note    Name: Danyelle Canales   : 1980                    Primary Care Provider: Katlyn Joiner DO   Therapist: Denice Haji, [facility]    PHQ-9 scores:  PHQ-9 SCORE 2020 3/5/2020 2020   PHQ-9 Total Score - - -   PHQ-9 Total Score Mer - - -   PHQ-9 Total Score 3 3 1       NAJMA-7 scores:  NAJMA-7 SCORE 2020 3/5/2020 2020   Total Score - - -   Total Score - - -   Total Score 1 1 4       Patient Identification:  Danyelle is a 40 year old year old,   White American female  who presents for return visit with me.  Patient attended the session by phone..     Interim History:  Danyelle reports that she is in her eighth month of pregnancy, the baby is due on , they are expecting a boy.  She has been working " from home and being very cautious about the coronavirus.  She has been going out with the dog for walks.    She denies any adverse side effects from her current medications.  Her mood has been good.  She has some anxiety due to the current epidemic.    We reviewed concerns regarding postpartum depression or psychosis.  She plans to remain on all of her medications throughout the pregnancy and following delivery.  Her OB/GYN has no concerns regarding the medications at this point.  We agreed to check in shortly after the baby is born.    Vital Signs:   No vital signs taken for this visit.    Current Medications:  Current Outpatient Medications   Medication     buPROPion (WELLBUTRIN XL) 300 MG 24 hr tablet     desvenlafaxine (PRISTIQ) 100 MG 24 hr tablet     ferrous fumarate 65 mg, [place]. FE,-Vitamin C 125 mg (VITRON C)  MG TABS tablet     FOLIC ACID PO     lamoTRIgine (LAMICTAL) 150 MG tablet     order for DME     order for DME     Prenatal MV-Min-Fe Fum-FA-DHA (PRENATAL 1 PO)     ondansetron (ZOFRAN) 8 MG tablet     No current facility-administered medications for this visit.         Mental Status Examination:  40-year-old woman in no acute distress.  Speech is clear and normal in rate and tone.  Mood is good, she is mildly anxious.  Thoughts are logical and spontaneous with no loose associations or flight of ideas.  Thought content shows no psychosis.  She denies any suicidal ideation.  She is alert and oriented x3.    Assessment and Plan:    ICD-10-CM    1. Recurrent major depression in partial remission (H)  F33.41    2. Generalized anxiety disorder  F41.1        Medical comorbidities include:   Patient Active Problem List   Diagnosis     Insomnia     allergic DERMATITIS NOS     Encounter for other general counseling or advice on contraception     [facility]     [facility]     Migraine headache     PMDD (premenstrual dysphoric disorder)     Trichotillomania     CARDIOVASCULAR  SCREENING; LDL GOAL LESS THAN 160     History of ovarian cyst     Generalized anxiety disorder     Chronic pain syndrome     ASD (atrial septal defect)     Chronic pain     Esophageal reflux     Non morbid obesity, unspecified obesity type     Benign hypermobility syndrome     Myalgia and myositis, unspecified     Myofascial pain     Need for Tdap vaccination     Recurrent major depression in partial remission (H)       Treatment Plan:  Patient Instructions   Continue bupropion  mg daily.  Continue Pristiq 100 mg daily.  Continue lamotrigine 150 mg daily.    Continue all other medications per your primary care provider.    Schedule an appointment with me in 8 weeks or sooner as needed.  You may call [facility] at [phone] to schedule, or schedule at the .    [facility] Resources:      Go to the Emergency Department as needed or call after hours crisis line at [phone].      To schedule individual or family therapy, call [facility] at [phone].     Follow up with primary care provider as planned or sooner for acute medical concerns.    Call the psychiatric nurse line with medication questions or concerns at [phone].    [employer] may be used to communicate with your provider, but this is not intended to be used for emergencies.    Community Resources:      National Suicide Prevention Lifeline: 691.343.7071 (TTY: 165.793.8514). Call anytime for help.  (www.suicidepreventionlifeline.org)    National [place] on Mental Illness (www.telma.org): 821.358.1062 or 740-123-2554.     Mental Health Association (www.mentalhealth.org): 287.453.6045 or 672-323-7177.    [place] Crisis Text Line: Text [place] to 291437    Suicide LifeLine Chat: suicideProHatch.org/chat         Administrative Billing:   Phone call duration: 21 minutes, greater than 50% spent in counseling regarding diagnosis, treatment, and education regarding postpartum depression and  psychosis.    Patient Status:  Patient will continue to be seen for ongoing consultation and stabilization.

## 2020-05-01 ASSESSMENT — ANXIETY QUESTIONNAIRES: GAD7 TOTAL SCORE: 4

## 2020-05-05 ENCOUNTER — PRENATAL OFFICE VISIT (OUTPATIENT)
Dept: OBGYN | Facility: CLINIC | Age: 40
End: 2020-05-05
Payer: COMMERCIAL

## 2020-05-05 VITALS
TEMPERATURE: 99.4 F | DIASTOLIC BLOOD PRESSURE: 86 MMHG | HEIGHT: 67 IN | BODY MASS INDEX: 34.06 KG/M2 | WEIGHT: 217 LBS | SYSTOLIC BLOOD PRESSURE: 118 MMHG | HEART RATE: 56 BPM

## 2020-05-05 DIAGNOSIS — O09.90 SUPERVISION OF HIGH RISK PREGNANCY, ANTEPARTUM: Primary | ICD-10-CM

## 2020-05-05 DIAGNOSIS — O26.843 UTERINE SIZE DATE DISCREPANCY PREGNANCY, THIRD TRIMESTER: ICD-10-CM

## 2020-05-05 PROCEDURE — 99207 ZZC PRENATAL VISIT: CPT | Performed by: OBSTETRICS & GYNECOLOGY

## 2020-05-05 ASSESSMENT — MIFFLIN-ST. JEOR: SCORE: 1691.94

## 2020-05-05 NOTE — PROGRESS NOTES
Doing well.   Good fetal movement.   Lots of pressure, more musculoskeletal discomfort.   Mood is good, just irritable.   Taking iron every 3rd day.   Discussed current COVID practices: universal masking, COVID testing for L&D inpatients, visitor policy, temps for visitors.   RTC 2 weeks. US for growth since measuring large.

## 2020-05-20 ENCOUNTER — ANCILLARY PROCEDURE (OUTPATIENT)
Dept: ULTRASOUND IMAGING | Facility: CLINIC | Age: 40
End: 2020-05-20
Attending: OBSTETRICS & GYNECOLOGY
Payer: COMMERCIAL

## 2020-05-20 ENCOUNTER — PRENATAL OFFICE VISIT (OUTPATIENT)
Dept: OBGYN | Facility: CLINIC | Age: 40
End: 2020-05-20
Attending: OBSTETRICS & GYNECOLOGY
Payer: COMMERCIAL

## 2020-05-20 VITALS
SYSTOLIC BLOOD PRESSURE: 138 MMHG | HEART RATE: 93 BPM | WEIGHT: 219.7 LBS | TEMPERATURE: 98.2 F | DIASTOLIC BLOOD PRESSURE: 90 MMHG | BODY MASS INDEX: 34.41 KG/M2

## 2020-05-20 DIAGNOSIS — R03.0 ELEVATED BP WITHOUT DIAGNOSIS OF HYPERTENSION: ICD-10-CM

## 2020-05-20 DIAGNOSIS — O09.90 SUPERVISION OF HIGH RISK PREGNANCY, ANTEPARTUM: Primary | ICD-10-CM

## 2020-05-20 DIAGNOSIS — O26.843 UTERINE SIZE DATE DISCREPANCY PREGNANCY, THIRD TRIMESTER: ICD-10-CM

## 2020-05-20 LAB
ERYTHROCYTE [DISTWIDTH] IN BLOOD BY AUTOMATED COUNT: 14.9 % (ref 10–15)
HCT VFR BLD AUTO: 37.9 % (ref 35–47)
HGB BLD-MCNC: 12.1 G/DL (ref 11.7–15.7)
MCH RBC QN AUTO: 26.5 PG (ref 26.5–33)
MCHC RBC AUTO-ENTMCNC: 31.9 G/DL (ref 31.5–36.5)
MCV RBC AUTO: 83 FL (ref 78–100)
PLATELET # BLD AUTO: 278 10E9/L (ref 150–450)
RBC # BLD AUTO: 4.57 10E12/L (ref 3.8–5.2)
WBC # BLD AUTO: 9.6 10E9/L (ref 4–11)

## 2020-05-20 PROCEDURE — 82565 ASSAY OF CREATININE: CPT | Performed by: OBSTETRICS & GYNECOLOGY

## 2020-05-20 PROCEDURE — 76816 OB US FOLLOW-UP PER FETUS: CPT | Performed by: OBSTETRICS & GYNECOLOGY

## 2020-05-20 PROCEDURE — 99207 ZZC PRENATAL VISIT: CPT | Performed by: OBSTETRICS & GYNECOLOGY

## 2020-05-20 PROCEDURE — 84460 ALANINE AMINO (ALT) (SGPT): CPT | Performed by: OBSTETRICS & GYNECOLOGY

## 2020-05-20 PROCEDURE — 36415 COLL VENOUS BLD VENIPUNCTURE: CPT | Performed by: OBSTETRICS & GYNECOLOGY

## 2020-05-20 PROCEDURE — 84156 ASSAY OF PROTEIN URINE: CPT | Performed by: OBSTETRICS & GYNECOLOGY

## 2020-05-20 PROCEDURE — 87653 STREP B DNA AMP PROBE: CPT | Performed by: OBSTETRICS & GYNECOLOGY

## 2020-05-20 PROCEDURE — 84450 TRANSFERASE (AST) (SGOT): CPT | Performed by: OBSTETRICS & GYNECOLOGY

## 2020-05-20 PROCEDURE — 85027 COMPLETE CBC AUTOMATED: CPT | Performed by: OBSTETRICS & GYNECOLOGY

## 2020-05-20 ASSESSMENT — PATIENT HEALTH QUESTIONNAIRE - PHQ9: SUM OF ALL RESPONSES TO PHQ QUESTIONS 1-9: 2

## 2020-05-20 NOTE — PATIENT INSTRUCTIONS
We recognize that this is a time of great change as you look forward to the birth of your baby. It's also a time of change for us, your healthcare providers.   We look forward to being part of your journey, even if it may be different than you expected. To best protect you and your family, we've made changes at our hospitals. As we learn more about this coronavirus (COVID-19), the points below may change.     Here is what you can expect:     We're limiting visitors to Labor & Delivery and Postpartum to just 1 adult visitor. This 1 adult may be your spouse, partner, family member, friend or . It's your choice who will be your 1 visitor. Your visitor can't have symptoms of COVID-19 (fever, cough, trouble breathing). We will take your visitor's temperature when you arrive and throughout your stay.     Your 1 visitor must stay with you in your room for your whole stay. If they leave the hospital, they can't come back in.     We ask that your visitor bring a mask from home to wear. If your visitor doesn't have a mask, the hospital will provide a mask.     You will have several food options. You and your visitor may order food through hospital room service. You may bring food with you from home when you begin your stay with us. Finally, you can have food delivered to the designated door at the hospital. You can check with hospital staff to find out where that is.     You and your visitor are not able to go outside to smoke.     Any time you come to the hospital, please have your belongings and your car seat in the car. If the hospital admits you for delivery, you can bring it all in for your stay. Please limit your luggage or bags and car seat to what you can carry in during 1 trip.     If you have a long stay in the hospital before your birth (antepartum hospitalization), we don't allow any visitors during that time. We encourage patients to work with their care teams on other options to connect and get support.      Our knowledge of COVID-19 is constantly growing. We'll keep you updated as things change. Thank you for having your baby at [facility]. We're honored to care for you and your family during this special event.

## 2020-05-21 LAB
ALT SERPL W P-5'-P-CCNC: 15 U/L (ref 0–50)
AST SERPL W P-5'-P-CCNC: 18 U/L (ref 0–45)
CREAT SERPL-MCNC: 0.57 MG/DL (ref 0.52–1.04)
GFR SERPL CREATININE-BSD FRML MDRD: >90 ML/MIN/{1.73_M2}
PROT UR-MCNC: 0.25 G/L
PROT/CREAT 24H UR: 0.2 G/G CR (ref 0–0.2)

## 2020-05-21 NOTE — PROGRESS NOTES
GBS today and BSUS confirms cephalic. cx deferred. BP elevated today so will check preeclampsia labs. Preeclampsia signs and sx discussed--will call if any develop.  Ultrasound prior to appointment with overall growth 84% and abdominal circumference 98%.  Discussed risk of shoulder dystocia and no operative deliveries.  Given birthplace letter and discussed weekly visits. BE

## 2020-05-22 LAB
GP B STREP DNA SPEC QL NAA+PROBE: NEGATIVE
SPECIMEN SOURCE: NORMAL

## 2020-05-29 ENCOUNTER — PRENATAL OFFICE VISIT (OUTPATIENT)
Dept: OBGYN | Facility: CLINIC | Age: 40
End: 2020-05-29
Payer: COMMERCIAL

## 2020-05-29 DIAGNOSIS — O09.90 SUPERVISION OF HIGH RISK PREGNANCY, ANTEPARTUM: Primary | ICD-10-CM

## 2020-05-29 PROCEDURE — 99207 ZZC PRENATAL VISIT: CPT | Performed by: OBSTETRICS & GYNECOLOGY

## 2020-05-29 RX ORDER — CARBOPROST TROMETHAMINE 250 UG/ML
250 INJECTION, SOLUTION INTRAMUSCULAR
Status: CANCELLED | OUTPATIENT
Start: 2020-05-29

## 2020-05-29 RX ORDER — ONDANSETRON 2 MG/ML
4 INJECTION INTRAMUSCULAR; INTRAVENOUS EVERY 6 HOURS PRN
Status: CANCELLED | OUTPATIENT
Start: 2020-05-29

## 2020-05-29 RX ORDER — AMOXICILLIN 250 MG
1 CAPSULE ORAL DAILY
Qty: 100 TABLET | Refills: 0 | Status: SHIPPED | OUTPATIENT
Start: 2020-05-29 | End: 2020-07-16

## 2020-05-29 RX ORDER — SODIUM CHLORIDE, SODIUM LACTATE, POTASSIUM CHLORIDE, CALCIUM CHLORIDE 600; 310; 30; 20 MG/100ML; MG/100ML; MG/100ML; MG/100ML
INJECTION, SOLUTION INTRAVENOUS CONTINUOUS
Status: CANCELLED | OUTPATIENT
Start: 2020-05-29

## 2020-05-29 RX ORDER — METHYLERGONOVINE MALEATE 0.2 MG/ML
200 INJECTION INTRAVENOUS
Status: CANCELLED | OUTPATIENT
Start: 2020-05-29

## 2020-05-29 RX ORDER — OXYCODONE AND ACETAMINOPHEN 5; 325 MG/1; MG/1
1 TABLET ORAL
Status: CANCELLED | OUTPATIENT
Start: 2020-05-29

## 2020-05-29 RX ORDER — IBUPROFEN 200 MG
800 TABLET ORAL
Status: CANCELLED | OUTPATIENT
Start: 2020-05-29

## 2020-05-29 RX ORDER — ACETAMINOPHEN 325 MG/1
650 TABLET ORAL EVERY 6 HOURS PRN
Qty: 100 TABLET | Refills: 0 | Status: SHIPPED | OUTPATIENT
Start: 2020-05-29 | End: 2020-07-16

## 2020-05-29 RX ORDER — IBUPROFEN 600 MG/1
600 TABLET, FILM COATED ORAL EVERY 6 HOURS PRN
Qty: 60 TABLET | Refills: 0 | Status: SHIPPED | OUTPATIENT
Start: 2020-05-29 | End: 2020-07-16

## 2020-05-29 RX ORDER — OXYTOCIN 10 [USP'U]/ML
10 INJECTION, SOLUTION INTRAMUSCULAR; INTRAVENOUS
Status: CANCELLED | OUTPATIENT
Start: 2020-05-29

## 2020-05-29 RX ORDER — FENTANYL CITRATE 50 UG/ML
50-100 INJECTION, SOLUTION INTRAMUSCULAR; INTRAVENOUS
Status: CANCELLED | OUTPATIENT
Start: 2020-05-29

## 2020-05-29 RX ORDER — OXYTOCIN/0.9 % SODIUM CHLORIDE 30/500 ML
100-340 PLASTIC BAG, INJECTION (ML) INTRAVENOUS CONTINUOUS PRN
Status: CANCELLED | OUTPATIENT
Start: 2020-05-29

## 2020-05-29 RX ORDER — ACETAMINOPHEN 325 MG/1
650 TABLET ORAL EVERY 4 HOURS PRN
Status: CANCELLED | OUTPATIENT
Start: 2020-05-29

## 2020-05-29 RX ORDER — NALOXONE HYDROCHLORIDE 0.4 MG/ML
.1-.4 INJECTION, SOLUTION INTRAMUSCULAR; INTRAVENOUS; SUBCUTANEOUS
Status: CANCELLED | OUTPATIENT
Start: 2020-05-29

## 2020-05-29 NOTE — PROGRESS NOTES
Phone visit for modified prenatal care for COVID.    More pressure in pelvis. Still with some pain on left side upper and has been taking bowel regimen, going daily but still with pain. Has tried gas X and no effect. Also with pain left upper back at same level, may be nerve pain. Reassured.  good FM. Discussed option for IOL at 39 wks at length, check cx next visit and can discuss more then. RTC weekly until delivery.  BE    GBS: Negative  Hemoglobin   Date Value Ref Range Status   05/20/2020 12.1 11.7 - 15.7 g/dL Final   ]    Breast pump rx: already done  Labor orders: signed and held  Birth plan: plans epidural  Length of stay: discussed  Disability paperwork: completed  Resident involvement: discussed and agrees.  Discharge meds done : YES

## 2020-06-03 ENCOUNTER — ANESTHESIA EVENT (OUTPATIENT)
Dept: OBGYN | Facility: CLINIC | Age: 40
End: 2020-06-03

## 2020-06-03 ENCOUNTER — HOSPITAL ENCOUNTER (INPATIENT)
Facility: CLINIC | Age: 40
LOS: 3 days | Discharge: HOME HEALTH | End: 2020-06-06
Attending: OBSTETRICS & GYNECOLOGY | Admitting: OBSTETRICS & GYNECOLOGY
Payer: COMMERCIAL

## 2020-06-03 ENCOUNTER — PRENATAL OFFICE VISIT (OUTPATIENT)
Dept: OBGYN | Facility: CLINIC | Age: 40
End: 2020-06-03
Payer: COMMERCIAL

## 2020-06-03 ENCOUNTER — ANESTHESIA (OUTPATIENT)
Dept: OBGYN | Facility: CLINIC | Age: 40
End: 2020-06-03

## 2020-06-03 VITALS
HEART RATE: 87 BPM | TEMPERATURE: 98.6 F | SYSTOLIC BLOOD PRESSURE: 140 MMHG | DIASTOLIC BLOOD PRESSURE: 102 MMHG | BODY MASS INDEX: 34.39 KG/M2 | WEIGHT: 219.6 LBS

## 2020-06-03 DIAGNOSIS — O13.3 GESTATIONAL HYPERTENSION, THIRD TRIMESTER: Primary | ICD-10-CM

## 2020-06-03 PROBLEM — Z34.90 TERM PREGNANCY: Status: ACTIVE | Noted: 2020-06-03

## 2020-06-03 LAB
ABO + RH BLD: NORMAL
ABO + RH BLD: NORMAL
ALT SERPL W P-5'-P-CCNC: 18 U/L (ref 0–50)
AST SERPL W P-5'-P-CCNC: 17 U/L (ref 0–45)
BLD GP AB SCN SERPL QL: NORMAL
BLOOD BANK CMNT PATIENT-IMP: NORMAL
CREAT SERPL-MCNC: 0.68 MG/DL (ref 0.52–1.04)
CREAT UR-MCNC: 186 MG/DL
ERYTHROCYTE [DISTWIDTH] IN BLOOD BY AUTOMATED COUNT: 15.2 % (ref 10–15)
GFR SERPL CREATININE-BSD FRML MDRD: >90 ML/MIN/{1.73_M2}
HCT VFR BLD AUTO: 39.1 % (ref 35–47)
HGB BLD-MCNC: 12.5 G/DL (ref 11.7–15.7)
MCH RBC QN AUTO: 26.3 PG (ref 26.5–33)
MCHC RBC AUTO-ENTMCNC: 32 G/DL (ref 31.5–36.5)
MCV RBC AUTO: 82 FL (ref 78–100)
PLATELET # BLD AUTO: 268 10E9/L (ref 150–450)
PROT UR-MCNC: 0.45 G/L
PROT/CREAT 24H UR: 0.24 G/G CR (ref 0–0.2)
RBC # BLD AUTO: 4.75 10E12/L (ref 3.8–5.2)
SARS-COV-2 PCR COMMENT: NORMAL
SARS-COV-2 RNA SPEC QL NAA+PROBE: NEGATIVE
SARS-COV-2 RNA SPEC QL NAA+PROBE: NORMAL
SPECIMEN EXP DATE BLD: NORMAL
SPECIMEN SOURCE: NORMAL
SPECIMEN SOURCE: NORMAL
WBC # BLD AUTO: 9.2 10E9/L (ref 4–11)

## 2020-06-03 PROCEDURE — 86900 BLOOD TYPING SEROLOGIC ABO: CPT | Performed by: OBSTETRICS & GYNECOLOGY

## 2020-06-03 PROCEDURE — 36415 COLL VENOUS BLD VENIPUNCTURE: CPT | Performed by: OBSTETRICS & GYNECOLOGY

## 2020-06-03 PROCEDURE — 10907ZC DRAINAGE OF AMNIOTIC FLUID, THERAPEUTIC FROM PRODUCTS OF CONCEPTION, VIA NATURAL OR ARTIFICIAL OPENING: ICD-10-PCS | Performed by: OBSTETRICS & GYNECOLOGY

## 2020-06-03 PROCEDURE — U0003 INFECTIOUS AGENT DETECTION BY NUCLEIC ACID (DNA OR RNA); SEVERE ACUTE RESPIRATORY SYNDROME CORONAVIRUS 2 (SARS-COV-2) (CORONAVIRUS DISEASE [COVID-19]), AMPLIFIED PROBE TECHNIQUE, MAKING USE OF HIGH THROUGHPUT TECHNOLOGIES AS DESCRIBED BY CMS-2020-01-R: HCPCS | Performed by: STUDENT IN AN ORGANIZED HEALTH CARE EDUCATION/TRAINING PROGRAM

## 2020-06-03 PROCEDURE — 85027 COMPLETE CBC AUTOMATED: CPT | Performed by: OBSTETRICS & GYNECOLOGY

## 2020-06-03 PROCEDURE — 84450 TRANSFERASE (AST) (SGOT): CPT | Performed by: OBSTETRICS & GYNECOLOGY

## 2020-06-03 PROCEDURE — 84460 ALANINE AMINO (ALT) (SGPT): CPT | Performed by: OBSTETRICS & GYNECOLOGY

## 2020-06-03 PROCEDURE — 00HU33Z INSERTION OF INFUSION DEVICE INTO SPINAL CANAL, PERCUTANEOUS APPROACH: ICD-10-PCS | Performed by: OBSTETRICS & GYNECOLOGY

## 2020-06-03 PROCEDURE — 25000128 H RX IP 250 OP 636

## 2020-06-03 PROCEDURE — 25800030 ZZH RX IP 258 OP 636: Performed by: STUDENT IN AN ORGANIZED HEALTH CARE EDUCATION/TRAINING PROGRAM

## 2020-06-03 PROCEDURE — 59025 FETAL NON-STRESS TEST: CPT | Mod: 26 | Performed by: OBSTETRICS & GYNECOLOGY

## 2020-06-03 PROCEDURE — 86850 RBC ANTIBODY SCREEN: CPT | Performed by: OBSTETRICS & GYNECOLOGY

## 2020-06-03 PROCEDURE — 84156 ASSAY OF PROTEIN URINE: CPT | Performed by: OBSTETRICS & GYNECOLOGY

## 2020-06-03 PROCEDURE — 59200 INSERT CERVICAL DILATOR: CPT | Performed by: OBSTETRICS & GYNECOLOGY

## 2020-06-03 PROCEDURE — 3E0P7VZ INTRODUCTION OF HORMONE INTO FEMALE REPRODUCTIVE, VIA NATURAL OR ARTIFICIAL OPENING: ICD-10-PCS | Performed by: OBSTETRICS & GYNECOLOGY

## 2020-06-03 PROCEDURE — 86901 BLOOD TYPING SEROLOGIC RH(D): CPT | Performed by: OBSTETRICS & GYNECOLOGY

## 2020-06-03 PROCEDURE — 3E0R3BZ INTRODUCTION OF ANESTHETIC AGENT INTO SPINAL CANAL, PERCUTANEOUS APPROACH: ICD-10-PCS | Performed by: OBSTETRICS & GYNECOLOGY

## 2020-06-03 PROCEDURE — 12000001 ZZH R&B MED SURG/OB UMMC

## 2020-06-03 PROCEDURE — 99207 ZZC PRENATAL VISIT: CPT | Performed by: OBSTETRICS & GYNECOLOGY

## 2020-06-03 PROCEDURE — 82565 ASSAY OF CREATININE: CPT | Performed by: OBSTETRICS & GYNECOLOGY

## 2020-06-03 PROCEDURE — 25000132 ZZH RX MED GY IP 250 OP 250 PS 637: Performed by: STUDENT IN AN ORGANIZED HEALTH CARE EDUCATION/TRAINING PROGRAM

## 2020-06-03 RX ORDER — LABETALOL 20 MG/4 ML (5 MG/ML) INTRAVENOUS SYRINGE
40 EVERY 10 MIN PRN
Status: DISCONTINUED | OUTPATIENT
Start: 2020-06-03 | End: 2020-06-04

## 2020-06-03 RX ORDER — EPHEDRINE SULFATE 50 MG/ML
5 INJECTION, SOLUTION INTRAMUSCULAR; INTRAVENOUS; SUBCUTANEOUS
Status: DISCONTINUED | OUTPATIENT
Start: 2020-06-03 | End: 2020-06-06 | Stop reason: HOSPADM

## 2020-06-03 RX ORDER — OXYTOCIN/0.9 % SODIUM CHLORIDE 30/500 ML
100-340 PLASTIC BAG, INJECTION (ML) INTRAVENOUS CONTINUOUS PRN
Status: COMPLETED | OUTPATIENT
Start: 2020-06-03 | End: 2020-06-04

## 2020-06-03 RX ORDER — SODIUM CHLORIDE, SODIUM LACTATE, POTASSIUM CHLORIDE, CALCIUM CHLORIDE 600; 310; 30; 20 MG/100ML; MG/100ML; MG/100ML; MG/100ML
INJECTION, SOLUTION INTRAVENOUS CONTINUOUS
Status: DISCONTINUED | OUTPATIENT
Start: 2020-06-03 | End: 2020-06-04

## 2020-06-03 RX ORDER — EPHEDRINE SULFATE 50 MG/ML
INJECTION, SOLUTION INTRAMUSCULAR; INTRAVENOUS; SUBCUTANEOUS
Status: DISCONTINUED
Start: 2020-06-03 | End: 2020-06-04 | Stop reason: HOSPADM

## 2020-06-03 RX ORDER — OXYTOCIN 10 [USP'U]/ML
10 INJECTION, SOLUTION INTRAMUSCULAR; INTRAVENOUS
Status: DISCONTINUED | OUTPATIENT
Start: 2020-06-03 | End: 2020-06-04

## 2020-06-03 RX ORDER — DOCUSATE SODIUM 100 MG/1
100 CAPSULE, LIQUID FILLED ORAL DAILY
COMMUNITY
End: 2020-07-16

## 2020-06-03 RX ORDER — LABETALOL 20 MG/4 ML (5 MG/ML) INTRAVENOUS SYRINGE
20 ONCE
Status: COMPLETED | OUTPATIENT
Start: 2020-06-03 | End: 2020-06-03

## 2020-06-03 RX ORDER — CARBOPROST TROMETHAMINE 250 UG/ML
250 INJECTION, SOLUTION INTRAMUSCULAR
Status: DISCONTINUED | OUTPATIENT
Start: 2020-06-03 | End: 2020-06-04

## 2020-06-03 RX ORDER — LABETALOL 20 MG/4 ML (5 MG/ML) INTRAVENOUS SYRINGE
Status: COMPLETED
Start: 2020-06-03 | End: 2020-06-03

## 2020-06-03 RX ORDER — NALBUPHINE HYDROCHLORIDE 20 MG/ML
2.5-5 INJECTION, SOLUTION INTRAMUSCULAR; INTRAVENOUS; SUBCUTANEOUS EVERY 6 HOURS PRN
Status: DISCONTINUED | OUTPATIENT
Start: 2020-06-03 | End: 2020-06-06 | Stop reason: HOSPADM

## 2020-06-03 RX ORDER — MISOPROSTOL 100 UG/1
25 TABLET ORAL EVERY 4 HOURS PRN
Status: DISCONTINUED | OUTPATIENT
Start: 2020-06-03 | End: 2020-06-04

## 2020-06-03 RX ORDER — LABETALOL 20 MG/4 ML (5 MG/ML) INTRAVENOUS SYRINGE
80 EVERY 10 MIN PRN
Status: DISCONTINUED | OUTPATIENT
Start: 2020-06-03 | End: 2020-06-04

## 2020-06-03 RX ORDER — NALOXONE HYDROCHLORIDE 0.4 MG/ML
.1-.4 INJECTION, SOLUTION INTRAMUSCULAR; INTRAVENOUS; SUBCUTANEOUS
Status: DISCONTINUED | OUTPATIENT
Start: 2020-06-03 | End: 2020-06-04

## 2020-06-03 RX ORDER — METHYLERGONOVINE MALEATE 0.2 MG/ML
200 INJECTION INTRAVENOUS
Status: DISCONTINUED | OUTPATIENT
Start: 2020-06-03 | End: 2020-06-04

## 2020-06-03 RX ORDER — LABETALOL 20 MG/4 ML (5 MG/ML) INTRAVENOUS SYRINGE
20 EVERY 10 MIN PRN
Status: DISCONTINUED | OUTPATIENT
Start: 2020-06-03 | End: 2020-06-04

## 2020-06-03 RX ORDER — SODIUM CHLORIDE, SODIUM LACTATE, POTASSIUM CHLORIDE, CALCIUM CHLORIDE 600; 310; 30; 20 MG/100ML; MG/100ML; MG/100ML; MG/100ML
INJECTION, SOLUTION INTRAVENOUS
Status: DISCONTINUED
Start: 2020-06-03 | End: 2020-06-04 | Stop reason: HOSPADM

## 2020-06-03 RX ORDER — OXYCODONE AND ACETAMINOPHEN 5; 325 MG/1; MG/1
1 TABLET ORAL
Status: DISCONTINUED | OUTPATIENT
Start: 2020-06-03 | End: 2020-06-04

## 2020-06-03 RX ORDER — ONDANSETRON 2 MG/ML
4 INJECTION INTRAMUSCULAR; INTRAVENOUS EVERY 6 HOURS PRN
Status: DISCONTINUED | OUTPATIENT
Start: 2020-06-03 | End: 2020-06-04

## 2020-06-03 RX ORDER — FENTANYL CITRATE 50 UG/ML
50-100 INJECTION, SOLUTION INTRAMUSCULAR; INTRAVENOUS
Status: DISCONTINUED | OUTPATIENT
Start: 2020-06-03 | End: 2020-06-04

## 2020-06-03 RX ORDER — FENTANYL/BUPIVACAINE/NS/PF 2-1250MCG
PLASTIC BAG, INJECTION (ML) INJECTION
Status: DISCONTINUED
Start: 2020-06-03 | End: 2020-06-04 | Stop reason: HOSPADM

## 2020-06-03 RX ORDER — IBUPROFEN 800 MG/1
800 TABLET, FILM COATED ORAL
Status: COMPLETED | OUTPATIENT
Start: 2020-06-03 | End: 2020-06-04

## 2020-06-03 RX ORDER — ACETAMINOPHEN 325 MG/1
650 TABLET ORAL EVERY 4 HOURS PRN
Status: DISCONTINUED | OUTPATIENT
Start: 2020-06-03 | End: 2020-06-04

## 2020-06-03 RX ORDER — POLYETHYLENE GLYCOL 3350 17 G/17G
1 POWDER, FOR SOLUTION ORAL DAILY
COMMUNITY
End: 2020-09-18

## 2020-06-03 RX ADMIN — SODIUM CHLORIDE, POTASSIUM CHLORIDE, SODIUM LACTATE AND CALCIUM CHLORIDE: 600; 310; 30; 20 INJECTION, SOLUTION INTRAVENOUS at 23:11

## 2020-06-03 RX ADMIN — SODIUM CHLORIDE, POTASSIUM CHLORIDE, SODIUM LACTATE AND CALCIUM CHLORIDE 1000 ML: 600; 310; 30; 20 INJECTION, SOLUTION INTRAVENOUS at 22:02

## 2020-06-03 RX ADMIN — ACETAMINOPHEN 650 MG: 325 TABLET, FILM COATED ORAL at 18:42

## 2020-06-03 RX ADMIN — Medication: at 23:04

## 2020-06-03 RX ADMIN — LABETALOL 20 MG/4 ML (5 MG/ML) INTRAVENOUS SYRINGE 20 MG: at 15:59

## 2020-06-03 RX ADMIN — LABETALOL 20 MG/4 ML (5 MG/ML) INTRAVENOUS SYRINGE 20 MG: at 20:33

## 2020-06-03 RX ADMIN — Medication 25 MCG: at 19:49

## 2020-06-03 RX ADMIN — Medication 25 MCG: at 14:30

## 2020-06-03 ASSESSMENT — MIFFLIN-ST. JEOR: SCORE: 1703.73

## 2020-06-03 NOTE — H&P
[facility], [place]    History and Physical  Obstetrics and Gynecology     Date of Admission:  6/3/2020    Assessment & Plan   Danyelle Canales is a 40 year old female who presents with gestational HTN  ASSESSMENT:   IUP @ 38w3d for induction of labor.  Indication gestational HTN. (150/103, 140/102 in clinic today)  NST reactive.  Category  I    PLAN:   Admit - see IP orders  Labor induction with cytotec for 1 doses     DAYANNA SIMON    History of Present Illness   Danyelle Canales is a 40 year old female  38w3d  Estimated Date of Delivery: 2020 is calculated from Patient's last menstrual period was 2019. is [facility] the [facility]  for induction of labor.  Indication gHTN.    PRENATAL COURSE  Prenatal course was   complicated by    Patient Active Problem List    Diagnosis Date Noted     Chronic pain syndrome 2011     Priority: High     Patient is followed by RODDY SOTO for ongoing prescription of narcotic pain medicine.  Med: Vicodin.   Maximum use per month: 90  Expected duration: ongoing, pt in comprehensive pain mgmt currently  Narcotic agreement on file: NO  Clinic visit recommended: Q 3 months         Term pregnancy 2020     Priority: Medium     Recurrent major depression in partial remission (H) 2019     Priority: Medium     Myalgia and myositis, unspecified 10/14/2019     Priority: Medium     Overview:   Created by Conversion       Need for Tdap vaccination 10/14/2019     Priority: Medium     Benign hypermobility syndrome 10/02/2017     Priority: Medium     Non morbid obesity, unspecified obesity type 2017     Priority: Medium     Myofascial pain 2016     Priority: Medium     Esophageal reflux 2014     Priority: Medium     Chronic pain 2012     Priority: Medium     ASD (atrial septal defect) 2012     Priority: Medium     Generalized anxiety disorder 10/18/2011     Priority: Medium      Diagnosis updated by automated process. Provider to review and confirm.       History of ovarian cyst 09/12/2011     Priority: Medium     CARDIOVASCULAR SCREENING; LDL GOAL LESS THAN 160 05/09/2010     Priority: Medium     Trichotillomania 08/03/2009     Priority: Medium     PMDD (premenstrual dysphoric disorder) 06/03/2009     Priority: Medium     Migraine headache 07/23/2008     Priority: Medium     [facility] 10/08/2007     Priority: Medium     [facility] SPRAIN OF NECK 10/08/2007     Priority: Medium     Encounter for other general counseling or advice on contraception 07/18/2007     Priority: Medium     Diagnosis updated by automated process. Provider to review and confirm.       allergic DERMATITIS NOS 11/12/2004     Priority: Medium     Insomnia 07/16/2004     Priority: Medium     Problem list name updated by automated process. Provider to review           Recent Labs   Lab Test 06/03/20  1115   ABO O   RH Pos   AS Neg     Rhogam not indicated   Recent Labs   Lab Test 05/20/20  1632 10/14/19  0959   HEPBANG  --  Nonreactive   HIAGAB  --  Nonreactive   GBS Negative  --    RUQIGG  --  61         Prior to Admission Medications   Prior to Admission Medications   Prescriptions Last Dose Informant Patient Reported? Taking?   FOLIC ACID PO   Yes No   Sig: Take 1 mg by mouth daily   Ferrous Sulfate (IRON PO)   Yes No   Prenatal MV-Min-Fe Fum-FA-DHA (PRENATAL 1 PO)   Yes No   acetaminophen (TYLENOL) 325 MG tablet   No No   Sig: Take 2 tablets (650 mg) by mouth every 6 hours as needed for mild pain Start after Delivery.   buPROPion (WELLBUTRIN XL) 300 MG 24 hr tablet   No No   Sig: Take 1 tablet (300 mg) by mouth every morning   desvenlafaxine (PRISTIQ) 100 MG 24 hr tablet   No No   Sig: Take 1 tablet (100 mg) by mouth daily   ibuprofen (ADVIL/MOTRIN) 600 MG tablet   No No   Sig: Take 1 tablet (600 mg) by mouth every 6 hours as needed for moderate pain Start after delivery   lamoTRIgine (LAMICTAL) 150 MG  tablet   No No   Sig: Take 1 tablet (150 mg) by mouth daily   ondansetron (ZOFRAN) 8 MG tablet   No No   Sig: Take 1 tablet (8 mg) by mouth every 8 hours as needed for nausea   Patient not taking: Reported on 3/5/2020   senna-docusate (SENOKOT-S/PERICOLACE) 8.6-50 MG tablet   No No   Sig: Take 1 tablet by mouth daily Start after delivery.      Facility-Administered Medications: None     Allergies   Allergies   Allergen Reactions     Artificial Sweetner (Informational Only) [Artificial Sweetner (Informational Only) ]      Chocolate Flavor Other (See Comments)         Immunization History   Immunization History   Administered Date(s) Administered     HepB 08/14/1988, 07/13/1998, 01/25/1999     Influenza (IIV3) PF 12/01/2010     Influenza Vaccine IM > 6 months Valent IIV4 10/22/2013, 10/08/2014, 09/20/2017, 10/21/2019     TD (ADULT, 7+) 07/13/1998     TDAP Vaccine (Adacel) 07/23/2008, 08/20/2018, 04/20/2020       Physical Exam   Temp: 98.6  F (37  C) Temp src: Oral BP: (!) 141/92 Pulse: 88            Vital Signs with Ranges  Temp:  [98.6  F (37  C)] 98.6  F (37  C)  Pulse:  [87-88] 88  BP: (140-150)/() 141/92     Lab Results   Component Value Date    WBC 9.2 06/03/2020    HGB 12.5 06/03/2020    HCT 39.1 06/03/2020     06/03/2020    CHOL 220 (H) 09/09/2019    TRIG 82 09/09/2019    HDL 72 09/09/2019    ALT 18 06/03/2020    AST 17 06/03/2020     09/09/2019    BUN 10 09/09/2019    CO2 27 09/09/2019    TSH 2.93 09/09/2019       Abdomen: gravid, single vertex fetus, non-tender, EFW 8 lbs   Cervical Exam: closed/ 30/ Posterior/ firm/ -3     Misoprostol 25 mcg placed in posterior fornix without difficulty    Fetal Heart Tones: 150 baseline, moderate variablility, + accels, no decels and Category I  TOCO:   external monitor and irregular    Constitutional: healthy, alert, active and no distress   Extremities: NT, no edema  Neurologic: Awake, alert, oriented x3  Neuropsychiatric: General: normal, calm and  normal eye contact    DAYANNA SIMON MD

## 2020-06-03 NOTE — PROGRESS NOTES
[facility] [place]    CC: routine prenatal visit.    S: Patient reports she has been feeling fine.  She has noticed that her left leg is more swollen than her right leg over the past day.  Patient attributes the difference in swelling to wearing a brace on the right leg overnight. Patient denies any contractions, vaginal bleeding, leakage of fluid.  She states she is feeling normal fetal movement.    She does report a few headaches over the last week that have resolved with tylenol. She denies any changes in vision, chest pain, chest pressure, shortness of breath.    Pregnancy notable for:  -anxiety  -depression  -gestational hypertension    O:   Patient Vitals for the past 24 hrs:   BP Temp Temp src Pulse Weight   20 1111 (!) 140/102 -- -- -- --   20 1051 (!) 150/103 98.6  F (37  C) Oral 87 99.6 kg (219 lb 9.6 oz)   ]  Exam:  General- awake, alert, answering questions appropriately, appears comfortable  CV- regular rate  Lung- breathing comfortably on room air  Ext- right lower extremity with trace edema, left lower extremity with 1+ pitting edema.      Doptones- 154 bpm  Fundal height-term    A&P: Danyelle Canales is a 40 year old  at 38w3d by LMP c/w 10w3d us who presents today for routine prenatal visit.     (O13.3) Gestational hypertension, third trimester  (primary encounter diagnosis)  Comment: Patient with >2 mild range blood pressures >4 hours apart.  Discussed diagnosis of gestational hypertension and recommendation for induction of labor.  Repeat blood pressure mild range, but slightly improved.  Patient does not have any other s/sx of pre-eclampsia.  Will draw admit and pre-e labs now since patient is going home to get her things and come back to the hospital.    Plan: ABO/Rh type and screen, ALT, AST, CBC with         platelets, Creatinine, Protein  random urine         with Creat Ratio    Routine PNC:  Patient states that she has tylenol and stool softener at home.       Genesis Zhang MD

## 2020-06-03 NOTE — PATIENT INSTRUCTIONS
Please go to lab and have your blood drawn.    After you go home and gather your things for the hospital please go to labor and delivery for induction.    Patient Education     Labor Induction  What You Need to Know  What is labor induction?  In most cases, it is best to go into labor naturally. When labor does not start on its own, we may use medicine or other methods to start (induce) labor.   This is called an induction of labor. It helps the uterus to contract. It also helps to thin, soften and open the cervix. (The cervix is the opening of the uterus.)  When is induction used?  There are two types of induction:    Medical induction is done to protect the health of the mom or baby. We may start labor if:  ? There are medical concerns for you or your baby.  ? You haven't had your baby by 41 weeks.    Elective induction is done for non-medical reasons. This may be done if:  ? You live far from the hospital.  ? You have been having contractions for many days.  ? You have given birth quickly in the past.  We will not perform an elective induction before 39 weeks. Studies show that babies born before 39 weeks may struggle with breathing, feeding, sleeping and staying warm. They are more likely to have health problems. They may need to stay in the hospital longer.  If we start your labor for medical reasons, the benefits will outweigh these risks.   We will talk to you about your risks, benefits and alternatives (other options) before we start your labor.  What might happen if my labor is induced?  Some of this depends on how your labor is started and how your body responds. Your labor may be more complicated. You and your baby may need more medical treatments. In general:    You may not go into labor right away. If so, we may send you home with follow-up instructions.    You may not be able to move around during labor. You will have two belts with monitors attached to your belly. These allow the nurse to watch your  contractions and your baby's heart rate.    Your labor may be longer and more painful. It might take more than one day.    A long labor may increase the risk of infection in mother and baby.    Your labor may not progress as planned. This could lead to a  birth.  How is induction done?  We may start your labor by doing one or more of the following:    We may place medicine in your vagina, near your cervix. This can help to open and prepare the cervix for labor. It is only used when there are medical reasons to start labor.    After your cervix is ready:  ? We may give you medicine through an IV (a small tube placed in your vein). This medicine is called pitocin. It helps the muscles of your uterus to contract.  ? We may make a small opening in your bag of water (the sac around your baby). This is called an amniotomy. It may help your uterus contract and your cervix open.  Can I plan the date of my delivery?  After 39 weeks, you may ask about planning the date of your delivery. This is only an option if your body--and your baby--are ready.   We will check your cervix and your baby's heart rate.     If you are ready to be induced, we will give you a date and time to come to the hospital. If many mothers are in labor that day, we may need to start your labor at another time.    If you are not ready, we will not start your labor. It will be safer for your baby to come on its own.  What else do I need to know?  Before you have an induction, make sure you understand the reasons, risks and benefits. Ask your doctor or nurse-midwife:    Why do I need to be induced?    What are the risks to my baby?    How will you start my labor?    How will you know if my baby is ready to be born?    How will you know if my body is ready to give birth?  Where can I get more information?  To learn more about induction, you may visit these websites:  The American College of Nurse-Midwives: www.mymidwife.org  The American College of  Obstetricians and Gynecologists: www.acog.org  Childbirth Connection: www.childbirthconnection.org  March of [employer]imes: www.marchofdimes.com  For informational purposes only. Not to replace the advice of your health care provider.   Copyright   2008 [employer]. All rights reserved. Clinically reviewed by the  System Operations Leadership team. Simmr 673992 - REV .  For informational purposes only. Not to replace the advice of your health care provider.  Copyright   2018 [employer]. All rights reserved.

## 2020-06-03 NOTE — PLAN OF CARE
Pt to BP for IOL r/t elevated BP. Denies visual changes, epigastric pain, heartburn, headache. Endorses edema. Reflexes brisk LLE, normal RLE. [place] to room, call light, menu and ordering for pt and guest, monitoring, intake paperwork, use of white board, COVID test. MD in to discuss IOL with vaginal miso and pt amenable. MD placed first miso at 1430. Questions encouraged, call light within reach,  at bedside.

## 2020-06-03 NOTE — ANESTHESIA PREPROCEDURE EVALUATION
"Anesthesia Pre-Procedure Evaluation    Patient: Danyelle Canales   MRN:     [id number] Gender:   female   Age:    40 year old :      1980        Preoperative Diagnosis: * No surgery found *        LABS:  CBC:   Lab Results   Component Value Date    WBC 9.2 2020    WBC 9.6 2020    HGB 12.5 2020    HGB 12.1 2020    HCT 39.1 2020    HCT 37.9 2020     2020     2020     BMP:   Lab Results   Component Value Date     2019     10/05/2011    POTASSIUM 4.1 2019    POTASSIUM 3.8 10/05/2011    CHLORIDE 106 2019    CHLORIDE 109 10/05/2011    CO2 27 2019    CO2 21 10/05/2011    BUN 10 2019    BUN 10 10/05/2011    CR 0.68 2020    CR 0.57 2020    GLC 90 2019    GLC 81 2017     COAGS: No results found for: PTT, INR, FIBR  POC:   Lab Results   Component Value Date    HCG Negative 10/04/2011     OTHER:   Lab Results   Component Value Date    NIDIA 8.5 2019    MAG 2.0 10/05/2011    ALBUMIN 3.0 (L) 2019    PROTTOTAL 7.3 2019    ALT 18 2020    AST 17 2020    ALKPHOS 58 2019    BILITOTAL 0.2 2019    TSH 2.93 2019    T4 1.12 2004    SED 29 (H) 2010        Preop Vitals    BP Readings from Last 3 Encounters:   20 (!) 141/92   20 (!) 140/102   20 (!) 138/90    Pulse Readings from Last 3 Encounters:   20 88   20 87   20 93      Resp Readings from Last 3 Encounters:   19 12   19 12   19 12    SpO2 Readings from Last 3 Encounters:   20 96%   20 98%   20 97%      Temp Readings from Last 1 Encounters:   20 37  C (98.6  F) (Oral)    Ht Readings from Last 1 Encounters:   20 1.702 m (5' 7\")      Wt Readings from Last 1 Encounters:   20 99.6 kg (219 lb 9.6 oz)    Estimated body mass index is 34.39 kg/m  as calculated from the following:    Height as of this encounter: " "1.702 m (5' 7\").    Weight as of this encounter: 99.6 kg (219 lb 9.6 oz).     LDA:  Peripheral IV 10/04/11 Right (Active)   Number of days: 3165       Peripheral IV 10/04/11 Left;Medial;Proximal Upper forearm (Active)   Number of days: 3165        Past Medical History:   Diagnosis Date     ASD (atrial septal defect)     repaired surgically as a child     Depressive disorder      Headache      History of blood transfusion 1985 during open heart surgery     Hypertension     higher results at pain clinic, want to confirm ok     Insomnia, unspecified      Other forms of migraine, without mention of intractable migraine without mention of status migrainosus       Past Surgical History:   Procedure Laterality Date     BIOPSY       C REPR ASD OSTIUM PREMUM      Dr. Silverio     COLONOSCOPY        Allergies   Allergen Reactions     Artificial Sweetner (Informational Only) [Artificial Sweetner (Informational Only) ]      Chocolate Flavor Other (See Comments)        Anesthesia Evaluation     .             ROS/MED HX    ENT/Pulmonary:       Neurologic:  - neg neurologic ROS     Cardiovascular:     (+) hypertension (gestational)----. : . . . :. .       METS/Exercise Tolerance:     Hematologic:  - neg hematologic  ROS       Musculoskeletal:  - neg musculoskeletal ROS       GI/Hepatic:     (+) GERD       Renal/Genitourinary:  - ROS Renal section negative       Endo:     (+) Obesity, .      Psychiatric:     (+) psychiatric history depression      Infectious Disease:         Malignancy:         Other: Comment: 40 year old female  38w3d    - Desires placement of epidural catheter                    JTRINIDADG [facility] AN PHYSICAL EXAM    Assessment:   ASA SCORE: 3 emergent           PONV Management: Adult Risk Factors: Female                   Brandin Menchaca MD  "

## 2020-06-04 LAB
ALT SERPL W P-5'-P-CCNC: 15 U/L (ref 0–50)
AST SERPL W P-5'-P-CCNC: 23 U/L (ref 0–45)
CREAT SERPL-MCNC: 0.74 MG/DL (ref 0.52–1.04)
ERYTHROCYTE [DISTWIDTH] IN BLOOD BY AUTOMATED COUNT: 14.3 % (ref 10–15)
GFR SERPL CREATININE-BSD FRML MDRD: >90 ML/MIN/{1.73_M2}
HCT VFR BLD AUTO: 36.1 % (ref 35–47)
HGB BLD-MCNC: 11.2 G/DL (ref 11.7–15.7)
MCH RBC QN AUTO: 26 PG (ref 26.5–33)
MCHC RBC AUTO-ENTMCNC: 31 G/DL (ref 31.5–36.5)
MCV RBC AUTO: 84 FL (ref 78–100)
PLATELET # BLD AUTO: 237 10E9/L (ref 150–450)
RBC # BLD AUTO: 4.31 10E12/L (ref 3.8–5.2)
WBC # BLD AUTO: 16.5 10E9/L (ref 4–11)

## 2020-06-04 PROCEDURE — 12000001 ZZH R&B MED SURG/OB UMMC

## 2020-06-04 PROCEDURE — 25000125 ZZHC RX 250: Performed by: STUDENT IN AN ORGANIZED HEALTH CARE EDUCATION/TRAINING PROGRAM

## 2020-06-04 PROCEDURE — 25800030 ZZH RX IP 258 OP 636: Performed by: OBSTETRICS & GYNECOLOGY

## 2020-06-04 PROCEDURE — 59400 OBSTETRICAL CARE: CPT | Performed by: OBSTETRICS & GYNECOLOGY

## 2020-06-04 PROCEDURE — 82565 ASSAY OF CREATININE: CPT | Performed by: OBSTETRICS & GYNECOLOGY

## 2020-06-04 PROCEDURE — 84460 ALANINE AMINO (ALT) (SGPT): CPT | Performed by: OBSTETRICS & GYNECOLOGY

## 2020-06-04 PROCEDURE — 25000132 ZZH RX MED GY IP 250 OP 250 PS 637: Performed by: OBSTETRICS & GYNECOLOGY

## 2020-06-04 PROCEDURE — 25000128 H RX IP 250 OP 636: Performed by: OBSTETRICS & GYNECOLOGY

## 2020-06-04 PROCEDURE — 0KQM0ZZ REPAIR PERINEUM MUSCLE, OPEN APPROACH: ICD-10-PCS | Performed by: OBSTETRICS & GYNECOLOGY

## 2020-06-04 PROCEDURE — 25000132 ZZH RX MED GY IP 250 OP 250 PS 637: Performed by: STUDENT IN AN ORGANIZED HEALTH CARE EDUCATION/TRAINING PROGRAM

## 2020-06-04 PROCEDURE — 72200001 ZZH LABOR CARE VAGINAL DELIVERY SINGLE

## 2020-06-04 PROCEDURE — 36415 COLL VENOUS BLD VENIPUNCTURE: CPT | Performed by: OBSTETRICS & GYNECOLOGY

## 2020-06-04 PROCEDURE — 85027 COMPLETE CBC AUTOMATED: CPT | Performed by: OBSTETRICS & GYNECOLOGY

## 2020-06-04 PROCEDURE — 84450 TRANSFERASE (AST) (SGOT): CPT | Performed by: OBSTETRICS & GYNECOLOGY

## 2020-06-04 PROCEDURE — 25800030 ZZH RX IP 258 OP 636: Performed by: STUDENT IN AN ORGANIZED HEALTH CARE EDUCATION/TRAINING PROGRAM

## 2020-06-04 PROCEDURE — 25000128 H RX IP 250 OP 636

## 2020-06-04 RX ORDER — CARBOPROST TROMETHAMINE 250 UG/ML
250 INJECTION, SOLUTION INTRAMUSCULAR
Status: DISCONTINUED | OUTPATIENT
Start: 2020-06-04 | End: 2020-06-06 | Stop reason: HOSPADM

## 2020-06-04 RX ORDER — DESVENLAFAXINE 50 MG/1
100 TABLET, FILM COATED, EXTENDED RELEASE ORAL DAILY
Status: DISCONTINUED | OUTPATIENT
Start: 2020-06-04 | End: 2020-06-06 | Stop reason: HOSPADM

## 2020-06-04 RX ORDER — LORAZEPAM 2 MG/ML
2 INJECTION INTRAMUSCULAR
Status: DISCONTINUED | OUTPATIENT
Start: 2020-06-04 | End: 2020-06-06 | Stop reason: HOSPADM

## 2020-06-04 RX ORDER — CALCIUM GLUCONATE 94 MG/ML
1 INJECTION, SOLUTION INTRAVENOUS
Status: DISCONTINUED | OUTPATIENT
Start: 2020-06-04 | End: 2020-06-06 | Stop reason: HOSPADM

## 2020-06-04 RX ORDER — MAGNESIUM SULFATE HEPTAHYDRATE 40 MG/ML
4 INJECTION, SOLUTION INTRAVENOUS ONCE
Status: COMPLETED | OUTPATIENT
Start: 2020-06-04 | End: 2020-06-04

## 2020-06-04 RX ORDER — NIFEDIPINE 10 MG/1
10 CAPSULE ORAL
Status: DISCONTINUED | OUTPATIENT
Start: 2020-06-04 | End: 2020-06-06 | Stop reason: HOSPADM

## 2020-06-04 RX ORDER — LAMOTRIGINE 150 MG/1
150 TABLET ORAL DAILY
Status: DISCONTINUED | OUTPATIENT
Start: 2020-06-04 | End: 2020-06-06 | Stop reason: HOSPADM

## 2020-06-04 RX ORDER — MAGNESIUM SULFATE HEPTAHYDRATE 40 MG/ML
2 INJECTION, SOLUTION INTRAVENOUS
Status: DISCONTINUED | OUTPATIENT
Start: 2020-06-04 | End: 2020-06-06 | Stop reason: HOSPADM

## 2020-06-04 RX ORDER — OXYTOCIN 10 [USP'U]/ML
10 INJECTION, SOLUTION INTRAMUSCULAR; INTRAVENOUS
Status: DISCONTINUED | OUTPATIENT
Start: 2020-06-04 | End: 2020-06-06 | Stop reason: HOSPADM

## 2020-06-04 RX ORDER — LIDOCAINE 40 MG/G
CREAM TOPICAL
Status: DISCONTINUED | OUTPATIENT
Start: 2020-06-04 | End: 2020-06-04

## 2020-06-04 RX ORDER — OXYTOCIN/0.9 % SODIUM CHLORIDE 30/500 ML
100 PLASTIC BAG, INJECTION (ML) INTRAVENOUS CONTINUOUS
Status: DISCONTINUED | OUTPATIENT
Start: 2020-06-04 | End: 2020-06-06 | Stop reason: HOSPADM

## 2020-06-04 RX ORDER — MODIFIED LANOLIN
OINTMENT (GRAM) TOPICAL
Status: DISCONTINUED | OUTPATIENT
Start: 2020-06-04 | End: 2020-06-06 | Stop reason: HOSPADM

## 2020-06-04 RX ORDER — BUPROPION HYDROCHLORIDE 300 MG/1
300 TABLET ORAL EVERY MORNING
Status: DISCONTINUED | OUTPATIENT
Start: 2020-06-04 | End: 2020-06-06 | Stop reason: HOSPADM

## 2020-06-04 RX ORDER — MAGNESIUM SULFATE HEPTAHYDRATE 500 MG/ML
4 INJECTION, SOLUTION INTRAMUSCULAR; INTRAVENOUS
Status: DISCONTINUED | OUTPATIENT
Start: 2020-06-04 | End: 2020-06-06 | Stop reason: HOSPADM

## 2020-06-04 RX ORDER — OXYTOCIN/0.9 % SODIUM CHLORIDE 30/500 ML
340 PLASTIC BAG, INJECTION (ML) INTRAVENOUS CONTINUOUS PRN
Status: DISCONTINUED | OUTPATIENT
Start: 2020-06-04 | End: 2020-06-06 | Stop reason: HOSPADM

## 2020-06-04 RX ORDER — AMOXICILLIN 250 MG
1 CAPSULE ORAL 2 TIMES DAILY
Status: DISCONTINUED | OUTPATIENT
Start: 2020-06-04 | End: 2020-06-06 | Stop reason: HOSPADM

## 2020-06-04 RX ORDER — OXYTOCIN/0.9 % SODIUM CHLORIDE 30/500 ML
1-24 PLASTIC BAG, INJECTION (ML) INTRAVENOUS CONTINUOUS
Status: DISCONTINUED | OUTPATIENT
Start: 2020-06-04 | End: 2020-06-04

## 2020-06-04 RX ORDER — MISOPROSTOL 200 UG/1
400 TABLET ORAL
Status: DISCONTINUED | OUTPATIENT
Start: 2020-06-04 | End: 2020-06-06 | Stop reason: HOSPADM

## 2020-06-04 RX ORDER — ACETAMINOPHEN 325 MG/1
650 TABLET ORAL EVERY 4 HOURS PRN
Status: DISCONTINUED | OUTPATIENT
Start: 2020-06-04 | End: 2020-06-06 | Stop reason: HOSPADM

## 2020-06-04 RX ORDER — IBUPROFEN 800 MG/1
800 TABLET, FILM COATED ORAL EVERY 6 HOURS PRN
Status: DISCONTINUED | OUTPATIENT
Start: 2020-06-04 | End: 2020-06-06 | Stop reason: HOSPADM

## 2020-06-04 RX ORDER — HYDROCORTISONE 2.5 %
CREAM (GRAM) TOPICAL 3 TIMES DAILY PRN
Status: DISCONTINUED | OUTPATIENT
Start: 2020-06-04 | End: 2020-06-06 | Stop reason: HOSPADM

## 2020-06-04 RX ORDER — AMOXICILLIN 250 MG
2 CAPSULE ORAL 2 TIMES DAILY
Status: DISCONTINUED | OUTPATIENT
Start: 2020-06-04 | End: 2020-06-06 | Stop reason: HOSPADM

## 2020-06-04 RX ORDER — MAGNESIUM SULFATE IN WATER 40 MG/ML
2 INJECTION, SOLUTION INTRAVENOUS CONTINUOUS
Status: DISPENSED | OUTPATIENT
Start: 2020-06-04 | End: 2020-06-05

## 2020-06-04 RX ORDER — SODIUM CHLORIDE, SODIUM LACTATE, POTASSIUM CHLORIDE, CALCIUM CHLORIDE 600; 310; 30; 20 MG/100ML; MG/100ML; MG/100ML; MG/100ML
10-125 INJECTION, SOLUTION INTRAVENOUS CONTINUOUS
Status: DISCONTINUED | OUTPATIENT
Start: 2020-06-04 | End: 2020-06-06 | Stop reason: HOSPADM

## 2020-06-04 RX ORDER — NALOXONE HYDROCHLORIDE 0.4 MG/ML
.1-.4 INJECTION, SOLUTION INTRAMUSCULAR; INTRAVENOUS; SUBCUTANEOUS
Status: DISCONTINUED | OUTPATIENT
Start: 2020-06-04 | End: 2020-06-06 | Stop reason: HOSPADM

## 2020-06-04 RX ORDER — BISACODYL 10 MG
10 SUPPOSITORY, RECTAL RECTAL DAILY PRN
Status: DISCONTINUED | OUTPATIENT
Start: 2020-06-06 | End: 2020-06-06 | Stop reason: HOSPADM

## 2020-06-04 RX ORDER — LABETALOL 20 MG/4 ML (5 MG/ML) INTRAVENOUS SYRINGE
40 EVERY 10 MIN PRN
Status: DISCONTINUED | OUTPATIENT
Start: 2020-06-04 | End: 2020-06-06 | Stop reason: HOSPADM

## 2020-06-04 RX ORDER — LABETALOL 20 MG/4 ML (5 MG/ML) INTRAVENOUS SYRINGE
20 EVERY 10 MIN PRN
Status: DISCONTINUED | OUTPATIENT
Start: 2020-06-04 | End: 2020-06-06 | Stop reason: HOSPADM

## 2020-06-04 RX ORDER — LABETALOL 20 MG/4 ML (5 MG/ML) INTRAVENOUS SYRINGE
80 EVERY 10 MIN PRN
Status: DISCONTINUED | OUTPATIENT
Start: 2020-06-04 | End: 2020-06-06 | Stop reason: HOSPADM

## 2020-06-04 RX ORDER — MAGNESIUM SULFATE HEPTAHYDRATE 40 MG/ML
4 INJECTION, SOLUTION INTRAVENOUS
Status: DISCONTINUED | OUTPATIENT
Start: 2020-06-04 | End: 2020-06-06 | Stop reason: HOSPADM

## 2020-06-04 RX ORDER — LIDOCAINE 40 MG/G
CREAM TOPICAL
Status: DISCONTINUED | OUTPATIENT
Start: 2020-06-04 | End: 2020-06-06 | Stop reason: HOSPADM

## 2020-06-04 RX ADMIN — MAGNESIUM SULFATE HEPTAHYDRATE 2 G/HR: 40 INJECTION, SOLUTION INTRAVENOUS at 20:04

## 2020-06-04 RX ADMIN — LIDOCAINE HYDROCHLORIDE 20 ML: 10 INJECTION, SOLUTION EPIDURAL; INFILTRATION; INTRACAUDAL; PERINEURAL at 09:03

## 2020-06-04 RX ADMIN — MAGNESIUM SULFATE HEPTAHYDRATE 2 G/HR: 40 INJECTION, SOLUTION INTRAVENOUS at 09:53

## 2020-06-04 RX ADMIN — SODIUM CHLORIDE, POTASSIUM CHLORIDE, SODIUM LACTATE AND CALCIUM CHLORIDE: 600; 310; 30; 20 INJECTION, SOLUTION INTRAVENOUS at 01:45

## 2020-06-04 RX ADMIN — DOCUSATE SODIUM AND SENNOSIDES 1 TABLET: 8.6; 5 TABLET, FILM COATED ORAL at 11:30

## 2020-06-04 RX ADMIN — BUPROPION HYDROCHLORIDE 300 MG: 150 TABLET, EXTENDED RELEASE ORAL at 11:30

## 2020-06-04 RX ADMIN — ACETAMINOPHEN 650 MG: 325 TABLET, FILM COATED ORAL at 13:44

## 2020-06-04 RX ADMIN — IBUPROFEN 800 MG: 800 TABLET ORAL at 13:00

## 2020-06-04 RX ADMIN — DOCUSATE SODIUM AND SENNOSIDES 1 TABLET: 8.6; 5 TABLET, FILM COATED ORAL at 21:20

## 2020-06-04 RX ADMIN — LABETALOL 20 MG/4 ML (5 MG/ML) INTRAVENOUS SYRINGE 20 MG: at 09:18

## 2020-06-04 RX ADMIN — IBUPROFEN 800 MG: 800 TABLET, FILM COATED ORAL at 08:06

## 2020-06-04 RX ADMIN — MAGNESIUM SULFATE IN WATER 4 G: 40 INJECTION, SOLUTION INTRAVENOUS at 09:25

## 2020-06-04 RX ADMIN — Medication: at 03:26

## 2020-06-04 RX ADMIN — DESVENLAFAXINE SUCCINATE 100 MG: 50 TABLET, EXTENDED RELEASE ORAL at 12:29

## 2020-06-04 RX ADMIN — LAMOTRIGINE 150 MG: 150 TABLET ORAL at 11:30

## 2020-06-04 RX ADMIN — ACETAMINOPHEN 650 MG: 325 TABLET, FILM COATED ORAL at 18:52

## 2020-06-04 RX ADMIN — IBUPROFEN 800 MG: 800 TABLET ORAL at 18:52

## 2020-06-04 RX ADMIN — SODIUM CHLORIDE, POTASSIUM CHLORIDE, SODIUM LACTATE AND CALCIUM CHLORIDE 75 ML/HR: 600; 310; 30; 20 INJECTION, SOLUTION INTRAVENOUS at 11:02

## 2020-06-04 RX ADMIN — Medication 400 MCG: at 07:32

## 2020-06-04 RX ADMIN — ACETAMINOPHEN 650 MG: 325 TABLET, FILM COATED ORAL at 09:23

## 2020-06-04 RX ADMIN — SODIUM CHLORIDE, POTASSIUM CHLORIDE, SODIUM LACTATE AND CALCIUM CHLORIDE: 600; 310; 30; 20 INJECTION, SOLUTION INTRAVENOUS at 05:19

## 2020-06-04 RX ADMIN — Medication 340 ML/HR: at 07:28

## 2020-06-04 NOTE — PLAN OF CARE
Feeling well, up at side of bed without dizziness, steps in place without difficulty an into BR. Void without difficulty then pt reports dark spots in vision, feeling faint. Assist of staff called and pt sat on toilet for several minutes until symptoms resolving with fanning, juice, ginger ale. Into WC and back to bed and pt reports feeling improved. /71, change from earlier elevated.  at bedside, call light within reach. Dr Brown updated.

## 2020-06-04 NOTE — PROVIDER NOTIFICATION
06/04/20 0540   Provider Notification   Provider Name/Title dr moody   Method of Notification Electronic Page   Notification Reason Status Update   pt complete 0530

## 2020-06-04 NOTE — PLAN OF CARE
Patient arrived to [facility] unit via wheelchair at 1415 ,with belongings, accompanied by spouse/ significant other, with infant in arms. Received report from  LEE Le  and checked bands. Unit and room orientation completd. Call light within arms reach; no concerns present at this time. Continue with plan of care.

## 2020-06-04 NOTE — PLAN OF CARE
Patient here for induction of labor related to gestation hypertension.  Blood pressures elevated, IV labetalol 20 mg given twice this shift.  [place] balloon placed at 1950 along with second dose of vaginal misoprostol.  Patient now aware of her contractions; contractions palpate mild.  FHT cat I tracing. Will continue to monitor.

## 2020-06-04 NOTE — PROGRESS NOTES
Pt now complete and pushing for 30 minutes. Making good progress and at 2+ station.    EFM:   130 baseline, moderate variablility, + accels, early decelerations, Category I    toco q1-2 minutes     Discussed again no operative deliveries due to large AC. anticipate .    Buffy Renae MD

## 2020-06-04 NOTE — PROGRESS NOTES
Obstetrics & Gynecology Service  Magnesium Check Note    S: Patient sleeping. Per RN prior to nap she denied headache, vision changes/spots in vision, chest pain, shortness of breath, RUQ or epigastric pain.    O:  Patient Vitals for the past 4 hrs:   BP Pulse SpO2   20 1425 (!) 157/96 82 99 %   20 1335 134/81 -- 99 %   20 1246 136/82 -- --     Gen: sleeping  Pulm:  Breathing comfortably  Abd:  Soft, non-tender, FF at U  Ext:  Patellar reflexes 3+ b/l, no clonus b/l, 2+ LE edema b/l    I/O:  Date 20 0700 - 20 0659   Shift 4074-8439 8252-7355 9503-1914 24 Hour Total   INTAKE   P.O. 1000 800  1800   I.V. 1046.66   1046.66   Shift Total(mL/kg) 2046.66(20.55) 800(8.03)  2846.66(28.58)   OUTPUT   Urine 500 800  1300   Blood 708   708   Shift Total(mL/kg) 1208(12.13) 800(8.03)  2008(20.16)   Weight (kg) 99.61 99.61 99.61 99.61     Results for YIFAN CANALES (MRN [id number]) as of 2020 16:40   6/3/2020 11:15 6/3/2020 11:16 2020 09:27   Creatinine 0.68  0.74   GFR Estimate >90  >90   GFR Estimate If Black >90  >90   ALT 18  15   AST 17  23   Creatinine Urine  186    Protein Random Urine  0.45    Protein Total Urine g/gr Creatinine  0.24 (H)    WBC 9.2  16.5 (H)   Hemoglobin 12.5  11.2 (L)   Hematocrit 39.1  36.1   Platelet Count 268  237       A/P:  Yifan Canales is a 40 year old  on PPD#0 s/p , with gestational hypertension with severe range BPs.    Preeclampsia with severe features   - BP: s/p 1 dose of IV labetalol 20mg postpartum, subsequent BPs normal to mildly elevated   - UOP: good UOP   - Symptoms: none   - HELLP labs normal   - Mag sulfate for seizure prophylaxis: 2g/hr for 24h postpartum  Postpartum:  - Routine postpartum care    Toyin Brown MD

## 2020-06-04 NOTE — PROGRESS NOTES
Late entry due to pt care    Subjective:   Contractions: not feeling now with epidural, comfortable  Leakage of fluids: no        Objective:  Patient Vitals for the past 8 hrs:   BP Temp Temp src Resp SpO2   06/04/20 0045 123/65 98  F (36.7  C) Oral 18 97 %   06/04/20 0020 117/71 -- -- 18 97 %   06/03/20 2350 136/82 98.2  F (36.8  C) Oral -- 98 %   06/03/20 2345 133/89 -- -- -- 98 %   06/03/20 2340 128/73 -- -- -- --   06/03/20 2333 124/73 -- -- -- 97 %   06/03/20 2329 126/75 -- -- -- --   06/03/20 2325 133/77 -- -- -- 98 %   06/03/20 2319 135/77 -- -- -- 98 %   06/03/20 2313 (!) 134/93 -- -- -- 99 %   06/03/20 2308 (!) 154/86 -- -- -- 98 %   06/03/20 2303 136/81 -- -- -- 98 %   06/03/20 2301 135/76 -- -- -- 98 %   06/03/20 2259 (!) 147/87 -- -- -- 98 %   06/03/20 2257 137/80 -- -- -- 98 %   06/03/20 2255 (!) 146/83 -- -- -- --   06/03/20 2253 (!) 156/99 -- -- -- --   06/03/20 2251 129/82 -- -- -- 100 %   06/03/20 2249 128/82 -- -- -- 100 %   06/03/20 2237 135/88 -- -- -- 98 %   06/03/20 2142 (!) 137/93 -- -- -- --   06/03/20 2123 (!) 156/94 -- -- 18 --   06/03/20 2113 (!) 145/95 -- -- 18 --   06/03/20 2102 (!) 157/99 -- -- 18 --   06/03/20 2051 (!) 142/86 -- -- 18 --   06/03/20 2040 (!) 152/82 -- -- 18 --   06/03/20 2034 (!) 167/104 -- -- 18 --   06/03/20 2009 (!) 170/95 98.6  F (37  C) Oral 18 --   06/03/20 1839 136/83 -- -- 16 --   06/03/20 1800 (!) 134/95 -- -- 16 --   06/03/20 1731 (!) 153/96 -- -- 18 --     Cervix: 5/50/-2 posterior and soft  Membranes: AROM, clear fluid 2350    EFM:   130 baseline, moderate variablility, + accels, occ early decelerations, Category I  Tocometer: IUPC and frequency q 2-5 minutes    Assessment:/Plan:   Labor: induced with cytotec for 2 doses and castro bulb  Will start pitocin if needed. Plans to sleep with epidural and recheck cervix for progress in 3-4 hours.     DAYANNA SIMON MD

## 2020-06-04 NOTE — L&D DELIVERY NOTE
OB Vaginal Delivery Note    Danyelle Canales MRN# [id number]   Age: 40 year old YOB: 1980       GA: 38w4d  GP:   Labor Complications: None   EBL:   mL  Delivery QBL: 692 mL  Delivery Type: Vaginal, Spontaneous   ROM to Delivery Time: (Delivered) Hours: 7 Minutes: 30   Weight: 3.98 kg (8 lb 12.4 oz)    1 Minute 5 Minute 10 Minute   Apgar Totals: 8   9        TOYIN ALLEN;DINAA GAYLE     Delivery Details:  Danyelle Canales, a 40 year old  at 38w4d who presented for induction of labor for gestational hypertension. She received 2 doses of vaginal misoprostol. Labor augmented with AROM. Labor epidural. GBS negative.  Progressed to complete. Pushed for 1h 20 min to deliver a male viable infant with apgars of 8  and 9 .  Delivery was via vaginal, spontaneous  to a sterile field under epidural  anesthesia. Infant delivered in vertex  left  occiput  anterior  position. Anterior and posterior shoulders delivered without difficulty. Delayed cord clamping performed. The cord was clamped, cut twice and 3 vessels  were noted. Cord blood was obtained in routine fashion with the following disposition: lab .  Infant weight 3.98 kg (8 lb 12.4 oz).     Cord complications: none   Placenta delivered at 2020  7:35 AM . Placental disposition was Hospital disposal . Fundal massage performed and fundus found to be firm. Misoprostol 400mcg buccal given in addition to IV pitocin.  QBL 692mL.     Episiotomy: none    Perineum, vagina, cervix were inspected, and the following lacerations were noted:   Perineal lacerations: 2nd                Any lacerations were repaired in the usual fashion using 3-0 vicryl.    Excellent hemostasis was noted. Needle count correct. Infant and patient in delivery room in good and stable condition.     I was present for entire delivery and repair.    Toyin Allen MD        Labor Event Times    Labor onset date:  6/3/20 Onset time:  10:00 PM   Dilation  complete date:  20 Complete time:   5:30 AM   Start pushing date/time:  2020 0600      Labor Length    1st Stage (hrs):  7 (min):  30   2nd Stage (hrs):  1 (min):  50   3rd Stage (hrs):  0 (min):  15      Labor Events     labor?:  No   steroids:  None  Labor Type:  Induction/Cervical ripening  Predominate monitoring during 1st stage:  continuous electronic fetal monitoring     Antibiotics received during labor?:  No     Rupture date/time: 6/3/20 2350   Rupture type:  Artificial Rupture of Membranes  Fluid color:  Clear, Pink  Fluid odor:  Normal     Induction:  Misoprostol  Induction date/time: 6/3/20 1430   Cervical ripening date/time:     Indications for induction:  Hypertension     1:1 continuous labor support provided by?:  none Labor partogram used?:  no      Delivery/Placenta Date and Time    Delivery Date:  20 Delivery Time:   7:20 AM   Placenta Date/Time:  2020  7:35 AM  Oxytocin given at the time of delivery:  after delivery of placenta     Vaginal Counts     Initial count performed by 2 team members:   Two Team Members   SUSY Arredondo RN       [employer]   Initial counts 2 0 5    Added to count 0 1 5    Final counts 2 1 10    Placed during labor Accounted for at the end of labor   NA NA   Yes Yes   NA NA    Final count performed by 2 team members:   Two Team Members   SUSY Arredondo RN      Final count correct?:  Yes     Apgars    Living status:  Living   1 Minute 5 Minute 10 Minute 15 Minute 20 Minute   Skin color: 1  1       Heart rate: 2  2       Reflex irritability: 2  2       Muscle tone: 2  2       Respiratory effort: 1  2       Total: 8  9       Apgars assigned by:  LYNN ESTRADA RN     Cord    Vessels:  3 Vessels Complications:  None   Cord Blood Disposition:  Lab Gases Sent?:  No       Resuscitation    Methods:  None  [place] Care at Delivery:  Stimulate to cry, to mother for skin to skin  Output in  "Delivery Room:  [facility]      Measurements    Weight:  8 lb 12.4 oz Length:  1' 8.5\"   Head circumference:  34.9 cm       Skin to Skin and Feeding Plan    Skin to skin initiation date/time: 1841    Skin to skin with:  Mother  Skin to skin end date/time:        Labor Events and Shoulder Dystocia    Fetal Tracing Prior to Delivery:  Category 2     Delivery (Maternal) (Provider to Complete) (106703)    Episiotomy:  None  Perineal lacerations:  2nd Repaired?:  Yes      Blood Loss  Mother: Danyelle Canales #[id number]   Start of Mother's Information    IO Blood Loss  20 2200 - 20 2108    Delivery QBL (mL) Hospital Encounter 692 mL    Postpartum QBL (mL) Hospital Encounter 16 mL    Total  708 mL         End of Mother's Information  Mother: Danyelle Canales #[id number]         Delivery - Provider to Complete ([id number])    Delivering clinician:  Toyin Brown MD  Attempted Delivery Types (Choose all that apply):  Spontaneous Vaginal Delivery  Delivery Type (Choose the 1 that will go to the Birth History):  Vaginal, Spontaneous   Other personnel:   Provider Role   Toyin Brown MD Obstetrician   Mimi Arredondo RN Registered Nurse         Placenta    Delayed Cord Clamping:  Done  Date/Time:  2020  7:35 AM  Removal:  Spontaneous  Comments:  3vc with eccentric insertion, intact  Disposition:  Hospital disposal     Anesthesia    Method:  Epidural          Presentation and Position    Presentation:  Vertex  Position:  Left Occiput Anterior           Toyin Brown MD  "

## 2020-06-04 NOTE — PROGRESS NOTES
"Subjective:   Contractions: feeling them now  Leakage of fluids: no        Objective:  Patient Vitals for the past 8 hrs:   BP Temp Temp src Pulse Resp Height Weight   06/03/20 2123 (!) 156/94 -- -- -- 18 -- --   06/03/20 2113 (!) 145/95 -- -- -- 18 -- --   06/03/20 2102 (!) 157/99 -- -- -- 18 -- --   06/03/20 2051 (!) 142/86 -- -- -- 18 -- --   06/03/20 2040 (!) 152/82 -- -- -- 18 -- --   06/03/20 2034 (!) 167/104 -- -- -- 18 -- --   06/03/20 2009 (!) 170/95 98.6  F (37  C) Oral -- 18 -- --   06/03/20 1839 136/83 -- -- -- 16 -- --   06/03/20 1800 (!) 134/95 -- -- -- 16 -- --   06/03/20 1731 (!) 153/96 -- -- -- 18 -- --   06/03/20 1659 (!) 155/91 -- -- -- 18 -- --   06/03/20 1648 (!) 133/90 -- -- -- 16 -- --   06/03/20 1639 (!) 142/84 -- -- -- 18 -- --   06/03/20 1628 (!) 154/97 -- -- -- 18 -- --   06/03/20 1618 (!) 154/93 -- -- -- 18 -- --   06/03/20 1607 (!) 140/88 -- -- -- 18 -- --   06/03/20 1550 (!) 162/107 -- -- -- 20 -- --   06/03/20 1530 (!) 162/103 98.2  F (36.8  C) Oral -- 18 -- --   06/03/20 1450 -- -- -- -- -- 1.702 m (5' 7\") 99.6 kg (219 lb 9.6 oz)   06/03/20 1409 (!) 141/92 98.6  F (37  C) Oral 88 -- -- --     Cervix: castro came out, cervix not checked  Membranes: intact    EFM:   130 baseline, moderate variablility, + accels, no decels, Category I  Tocometer: external monitor and frequency q 2-3 minutes    Assessment:/Plan:   Will get epidural placed and then plan AROM and IUPC, pitocin as needed.   Had to treat BP again with labetalol now 2 doses total.    DAYANNA SIMON MD    "

## 2020-06-04 NOTE — PLAN OF CARE
Labor Pain Intervention  DATA:  Patient reports increased labor pain during ctx's; castro catheter out   Requests epidural for pain relief.   INTERVENTIONS: Consent signed, bolus started. Questions answered. Epidural administered.   ASSESSMENT:  Tolerated well.  Relief moderate; pt states still feeling pain on her right side. Pt positioning to the right and PCA pump pressed per pt. MD notified about not complete pain relief.    PLAN:  Continue intrapartum cares and assessments.  Anticipate . Report given to Olinda HOWARD.

## 2020-06-04 NOTE — PROGRESS NOTES
Notified by RN of sustained severe range BPs.  Patient denies symptoms of pre-eclampsia.  Vitals:    20 0830 20 0845 20 0859 20 0909   BP: (!) 155/103 (!) 157/101 (!) 162/107 (!) 171/106   Pulse:       Resp:       Temp:       TempSrc:       SpO2: 98% 98% 98% 98%   Weight:       Height:         40 year old  on PPD 0 s/p  after IOL for gestational hypertension.  Sustained severe range BPs. Also had severe range BPs in labor prior to epidural that were treated with IV labetalol.  - will repeat HELLP labs  - treat severe range BPs with IV labetalol  - magnesium sulfate for seizure prophylaxis x 24h  - monitor closely    Toyin Brown MD

## 2020-06-04 NOTE — PROGRESS NOTES
"Subjective:   Contractions: not feeling  Leakage of fluids: no        Objective:  Patient Vitals for the past 8 hrs:   BP Temp Temp src Pulse Resp Height Weight   06/03/20 1839 136/83 -- -- -- 16 -- --   06/03/20 1800 (!) 134/95 -- -- -- 16 -- --   06/03/20 1731 (!) 153/96 -- -- -- 18 -- --   06/03/20 1659 (!) 155/91 -- -- -- 18 -- --   06/03/20 1648 (!) 133/90 -- -- -- 16 -- --   06/03/20 1639 (!) 142/84 -- -- -- 18 -- --   06/03/20 1628 (!) 154/97 -- -- -- 18 -- --   06/03/20 1618 (!) 154/93 -- -- -- 18 -- --   06/03/20 1607 (!) 140/88 -- -- -- 18 -- --   06/03/20 1550 (!) 162/107 -- -- -- 20 -- --   06/03/20 1530 (!) 162/103 98.2  F (36.8  C) Oral -- 18 -- --   06/03/20 1450 -- -- -- -- -- 1.702 m (5' 7\") 99.6 kg (219 lb 9.6 oz)   06/03/20 1409 (!) 141/92 98.6  F (37  C) Oral 88 -- -- --     Cervix: 1/30/-2  Membranes: intact    Castro placed with speculum and some difficulty, inflated to 70 ml. Misoprostol 25 mcg into posterior fornix without difficulty    EFM:   145 baseline, moderate variablility, + accels, no decels, Category I  Tocometer: external monitor and frequency q 5 minutes    Assessment:/Plan:   Labor: induced with cytotec for 2 doses and castro bulb  Have needed to treat BP x1 with labetalol. Will continue to monitor.  Check back again in a few hours.    DAYANNA SIMON MD    "

## 2020-06-04 NOTE — PLAN OF CARE
Fundus firm at U/U with scan bleeding. BP's mild range. Edema 2+ in feet bilaterally. Brisk reflexes, no clonus present. Pt denies HA, RUQ pain or visual disturbances. Mg infusing at 2 g/hr with LR infusing at 75 ml/hr. Pain controlled with Tylenol, Ibuprofen and hot packs. Pt ambulating in room with stand by assist, voiding spontaneously. Breastfeeding infant with full assist latching and positioning. Education provided on hand expression and encouraged before each feed. Continue plan of care.

## 2020-06-04 NOTE — PLAN OF CARE
Elevated BP after delivery, 20 mg labetalol given, discussed and began admin of magnesium sulfate and pt anne well thus far. Reflexes brisk, denies HA, visual changes, epigastric pain. Cont PP cares.

## 2020-06-05 LAB
ALT SERPL W P-5'-P-CCNC: 13 U/L (ref 0–50)
AST SERPL W P-5'-P-CCNC: 22 U/L (ref 0–45)
CREAT SERPL-MCNC: 0.68 MG/DL (ref 0.52–1.04)
ERYTHROCYTE [DISTWIDTH] IN BLOOD BY AUTOMATED COUNT: 14.5 % (ref 10–15)
GFR SERPL CREATININE-BSD FRML MDRD: >90 ML/MIN/{1.73_M2}
HCT VFR BLD AUTO: 28.6 % (ref 35–47)
HGB BLD-MCNC: 9 G/DL (ref 11.7–15.7)
MCH RBC QN AUTO: 26.4 PG (ref 26.5–33)
MCHC RBC AUTO-ENTMCNC: 31.5 G/DL (ref 31.5–36.5)
MCV RBC AUTO: 84 FL (ref 78–100)
PLATELET # BLD AUTO: 231 10E9/L (ref 150–450)
RBC # BLD AUTO: 3.41 10E12/L (ref 3.8–5.2)
WBC # BLD AUTO: 14.3 10E9/L (ref 4–11)

## 2020-06-05 PROCEDURE — 12000001 ZZH R&B MED SURG/OB UMMC

## 2020-06-05 PROCEDURE — 25000132 ZZH RX MED GY IP 250 OP 250 PS 637: Performed by: OBSTETRICS & GYNECOLOGY

## 2020-06-05 PROCEDURE — 85027 COMPLETE CBC AUTOMATED: CPT | Performed by: OBSTETRICS & GYNECOLOGY

## 2020-06-05 PROCEDURE — 84460 ALANINE AMINO (ALT) (SGPT): CPT | Performed by: OBSTETRICS & GYNECOLOGY

## 2020-06-05 PROCEDURE — 25800030 ZZH RX IP 258 OP 636: Performed by: OBSTETRICS & GYNECOLOGY

## 2020-06-05 PROCEDURE — 36415 COLL VENOUS BLD VENIPUNCTURE: CPT | Performed by: OBSTETRICS & GYNECOLOGY

## 2020-06-05 PROCEDURE — 25000128 H RX IP 250 OP 636: Performed by: OBSTETRICS & GYNECOLOGY

## 2020-06-05 PROCEDURE — 82565 ASSAY OF CREATININE: CPT | Performed by: OBSTETRICS & GYNECOLOGY

## 2020-06-05 PROCEDURE — 84450 TRANSFERASE (AST) (SGOT): CPT | Performed by: OBSTETRICS & GYNECOLOGY

## 2020-06-05 RX ORDER — BENZOCAINE/MENTHOL 6 MG-10 MG
LOZENGE MUCOUS MEMBRANE 3 TIMES DAILY PRN
Status: DISCONTINUED | OUTPATIENT
Start: 2020-06-05 | End: 2020-06-06 | Stop reason: HOSPADM

## 2020-06-05 RX ADMIN — DESVENLAFAXINE SUCCINATE 100 MG: 50 TABLET, EXTENDED RELEASE ORAL at 08:45

## 2020-06-05 RX ADMIN — HYDROCORTISONE: 1 CREAM TOPICAL at 08:44

## 2020-06-05 RX ADMIN — ACETAMINOPHEN 650 MG: 325 TABLET, FILM COATED ORAL at 01:26

## 2020-06-05 RX ADMIN — IBUPROFEN 800 MG: 800 TABLET ORAL at 08:45

## 2020-06-05 RX ADMIN — ACETAMINOPHEN 650 MG: 325 TABLET, FILM COATED ORAL at 10:13

## 2020-06-05 RX ADMIN — IBUPROFEN 800 MG: 800 TABLET ORAL at 01:26

## 2020-06-05 RX ADMIN — IBUPROFEN 800 MG: 800 TABLET ORAL at 23:01

## 2020-06-05 RX ADMIN — ACETAMINOPHEN 650 MG: 325 TABLET, FILM COATED ORAL at 05:24

## 2020-06-05 RX ADMIN — HYDROCORTISONE 1 APPLICATOR: 1 CREAM TOPICAL at 14:45

## 2020-06-05 RX ADMIN — DOCUSATE SODIUM AND SENNOSIDES 2 TABLET: 8.6; 5 TABLET, FILM COATED ORAL at 08:45

## 2020-06-05 RX ADMIN — BUPROPION HYDROCHLORIDE 300 MG: 150 TABLET, EXTENDED RELEASE ORAL at 08:46

## 2020-06-05 RX ADMIN — ACETAMINOPHEN 650 MG: 325 TABLET, FILM COATED ORAL at 19:59

## 2020-06-05 RX ADMIN — IBUPROFEN 800 MG: 800 TABLET ORAL at 14:43

## 2020-06-05 RX ADMIN — LAMOTRIGINE 150 MG: 150 TABLET ORAL at 08:46

## 2020-06-05 RX ADMIN — MAGNESIUM SULFATE HEPTAHYDRATE 2 G/HR: 40 INJECTION, SOLUTION INTRAVENOUS at 06:05

## 2020-06-05 RX ADMIN — SODIUM CHLORIDE, POTASSIUM CHLORIDE, SODIUM LACTATE AND CALCIUM CHLORIDE 75 ML/HR: 600; 310; 30; 20 INJECTION, SOLUTION INTRAVENOUS at 01:04

## 2020-06-05 RX ADMIN — DOCUSATE SODIUM AND SENNOSIDES 2 TABLET: 8.6; 5 TABLET, FILM COATED ORAL at 19:59

## 2020-06-05 RX ADMIN — ACETAMINOPHEN 650 MG: 325 TABLET, FILM COATED ORAL at 14:43

## 2020-06-05 ASSESSMENT — MIFFLIN-ST. JEOR: SCORE: 1676.51

## 2020-06-05 NOTE — PLAN OF CARE
Pt has been stable tonight. Pt is on 2 grams of Magnesium Sulfate that will be stopped 24 hours post delivery. Pt has been tolerating Magnesium very well. Pt has no complaints of right sided upper quadrant pain ,headache,or vision changes. Pt has had good output and has lost 6 lbs from last weighing. Pt is breast feeding her baby with assistance.  Pt has rash in her left armpit and Dr Brown ordered hydrocortisone cream. Continue with plan of care and assist as needed.

## 2020-06-05 NOTE — PROVIDER NOTIFICATION
Contacted Dr Brown for something for pts rash on left armpit. She said she is ordering hydrocortisone cream.      06/05/20 0633   Provider Notification   Provider Name/Title Dr Brown   Method of Notification Electronic Page   Request Evaluate-Remote   Notification Reason Medication Request  (requested something for rash on pts armpit. )

## 2020-06-05 NOTE — PLAN OF CARE
Data: Vital signs within normal limits. Postpartum checks within normal limits - see flow record. Patient eating and drinking normally. Patient able to empty bladder independently and is up ambulating. No apparent signs of infection. Pt reports body aches. Patient performing self cares and is able to care for infant.  Action: Patient medicated during the shift for pain and cramping. See MAR. Patient reassessed within 1 hour after each medication and pain was slightly improved. Patient took epsom salt bath for comfort and heating pack applied to abdomen.  Response: Positive attachment behaviors observed with infant. Support person,  Chan, present.

## 2020-06-05 NOTE — PROGRESS NOTES
Obstetrics & Gynecology Service  Magnesium Check Note    S: working on breastfeeding. No further episodes of dizziness. Tolerating general diet without n/v. Voiding a lot. Denies headache, vision changes/spots in vision, chest pain, shortness of breath, RUQ or epigastric pain.    O:  Vitals:    20 1246 20 1335 20 1425 20 1830   BP: 136/82 134/81 (!) 157/96 (!) 144/89   Pulse:   82    Resp:       Temp:    97.9  F (36.6  C)   TempSrc:    Oral   SpO2:  99% 99% 98%   Weight:       Height:         Gen: Alert, oriented, no distress  CV:  Normal pulses  Pulm:  Normal respiratory effort  Abd:  Soft, fundus firm at U, no RUQ tenderness  Ext:  Patellar reflexes 3+ b/l, no clonus b/l, 2+ LE edema b/l    I/O:  Date 20 0700 - 20 0659   Shift 4873-5953 7427-1375 2986-6316 24 Hour Total   INTAKE   P.O. 1000 1600  2600   I.V. 1046.66   1046.66   Shift Total(mL/kg) 2046.66(20.55) 1600(16.06)  3646.66(36.61)   OUTPUT   Urine 500 2975  3475   Blood 708   708   Shift Total(mL/kg) 1208(12.13) 2975(29.87)  4183(41.99)   Weight (kg) 99.61 99.61 99.61 99.61       A/P:  Danyelle Canales is a 40 year old  on PPD# 0 s/p , with gest htn complicated by preeclampsia with severe features by BP criteria.    Preeclampsia with severe features   - BP: s/p 1 dose of IV labetalol postpartum. BPs normal to mild range. If persistently elevated will start oral med.    - UOP: great UOP   - Symptoms: none   - HELLP labs: wnl, plan to repeat in AM   - Mag sulfate for seizure prophylaxis: 2g/hr x 24h postpartum  Postpartum:  - Routine postpartum care  Anticipate discharge to home on PPD 2 pending BPs    Toyin Brown MD

## 2020-06-05 NOTE — PLAN OF CARE
Data: BP 140s/80s. Denies pre-e symptoms, +2 edema in feet. Brisk reflexes, 1 beat clonus. Postpartum checks within normal limits - see flow record. Patient eating and drinking normally. Patient able to empty bladder independently and is up ambulating. No apparent signs of infection.  Perineum  healing well. Patient performing self cares and is able to care for infant.  Action: Patient medicated during the shift for pain and cramping. See MAR. Patient reassessed within 1 hour after each medication and pain was improved - patient stated she was comfortable. Patient education done about postpartum cares and checks, magnesium infusion and stop time, breastfeeding and hand expression. See flow record.  Response: Positive attachment behaviors observed with infant. Support persons  present.   Plan: Anticipate discharge on Saturday.

## 2020-06-06 VITALS
SYSTOLIC BLOOD PRESSURE: 133 MMHG | RESPIRATION RATE: 18 BRPM | HEIGHT: 67 IN | DIASTOLIC BLOOD PRESSURE: 88 MMHG | TEMPERATURE: 98.8 F | OXYGEN SATURATION: 100 % | WEIGHT: 213.5 LBS | HEART RATE: 75 BPM | BODY MASS INDEX: 33.51 KG/M2

## 2020-06-06 PROCEDURE — 25000132 ZZH RX MED GY IP 250 OP 250 PS 637: Performed by: OBSTETRICS & GYNECOLOGY

## 2020-06-06 RX ADMIN — DOCUSATE SODIUM AND SENNOSIDES 2 TABLET: 8.6; 5 TABLET, FILM COATED ORAL at 09:11

## 2020-06-06 RX ADMIN — ACETAMINOPHEN 650 MG: 325 TABLET, FILM COATED ORAL at 00:05

## 2020-06-06 RX ADMIN — IBUPROFEN 800 MG: 800 TABLET ORAL at 13:28

## 2020-06-06 RX ADMIN — ACETAMINOPHEN 650 MG: 325 TABLET, FILM COATED ORAL at 09:11

## 2020-06-06 RX ADMIN — LAMOTRIGINE 150 MG: 150 TABLET ORAL at 09:11

## 2020-06-06 RX ADMIN — BUPROPION HYDROCHLORIDE 300 MG: 150 TABLET, EXTENDED RELEASE ORAL at 10:24

## 2020-06-06 RX ADMIN — ACETAMINOPHEN 650 MG: 325 TABLET, FILM COATED ORAL at 13:28

## 2020-06-06 RX ADMIN — ACETAMINOPHEN 650 MG: 325 TABLET, FILM COATED ORAL at 04:02

## 2020-06-06 RX ADMIN — IBUPROFEN 800 MG: 800 TABLET ORAL at 05:54

## 2020-06-06 RX ADMIN — DESVENLAFAXINE SUCCINATE 100 MG: 50 TABLET, EXTENDED RELEASE ORAL at 10:24

## 2020-06-06 ASSESSMENT — MIFFLIN-ST. JEOR: SCORE: 1676.06

## 2020-06-06 NOTE — PLAN OF CARE
Data: Vital signs within normal limits. Postpartum checks within normal limits - see flow record. Patient eating and drinking normally. Patient able to empty bladder independently and is up ambulating. Patient performing self cares and is able to care for infant.  Action: Patient medicated during the shift for perineum soreness.   Response: Positive attachment behaviors observed with infant.   Will continue to monitor and provide support until discharge.

## 2020-06-06 NOTE — PROGRESS NOTES
[facility] Obstetrics Post-Partum Progress Note     Postpartum day # 1    SUBJECTIVE:   Feeling sore all over.  Taking ibuprofen and tylenol.  Tried a relaxing bath, but still pretty sore in legs, pelvis, hips.  complains of swollen ankles.   Working on breastfeeding. Voiding ok.   Bleeding is appropriate.           Physical Exam:      20 0547 20 0854 20 1300   BP:  126/73 130/88   Resp:  16 16   Temp:  97.6  F (36.4  C) 98.6  F (37  C)   TempSrc:  Oral Oral   SpO2:      Weight: 96.9 kg (213 lb 9.6 oz)     Height:         Gen:  NAD,   normal respiratory and cardiovascular effort   ABD:  Soft, NT   Uterine fundus is firm, non-tender and just below the level of the umbilicus  bilateral LE's: 2+ edema, nontender    Hemoglobin   Date Value Ref Range Status   2020 9.0 (L) 11.7 - 15.7 g/dL Final   2020 11.2 (L) 11.7 - 15.7 g/dL Final              Assessment and Plan:    PPD #1 from   complicated by preE with SF now s/p magnesium x 24 hours.     Preeclampsia with severe features:               - BP:  BPs normal to mild range.               - HELLP labs: normal today  Reviewed normal expectations for post partum day #1 and patient reassured that her swelling and discomfort is WNL.   She will continue to take tylenol and ibuprofen on a schedule and use non medication methods prn as well.   Likely discharge tomorrow if BP's remain normal to mild.            Maria L Frederick MD

## 2020-06-06 NOTE — PLAN OF CARE
VSS and postpartum assessments WDL.  Up ad jerrica with steady gait.  Independent with cares.  Bonding well with infant, Brock.  Breastfeeding on cue with some assistance, but baby is very sleepy once latched. Tried hand expressing, but only small drops were produced. Might be helped by a lactation visit.  Pain managed with tylenol and ibuprofen, though patient states that she is still in pain. MD saw her earlier today and is aware (see MD note).  PIV in right.   Chan is present and very supportive.  Will continue with postpartum cares and education per plan of care.

## 2020-06-06 NOTE — PLAN OF CARE
Vital signs within normal limits. Declines s/s of pre-e.  Postpartum checks within normal limits - see flow record. Patient eating and drinking normally. Patient able to empty bladder independently and is up ambulating. No apparent signs of infection. Patient performing self cares and is able to care for infant. Patient medicated during the shift for pain and cramping. See MAR. Patient reassessed within 1 hour after each medication and pain was improved - patient stated she was comfortable. Positive attachment behaviors observed with infant. Support personChan is present.

## 2020-06-06 NOTE — PROGRESS NOTES
[facility], [place]    Post-Partum Progress Note    Assessment & Plan   Assessment:  40 year old  on post-partum day #2 s/p normal spontaneous vaginal delivery. Pregnancy complicated by pre-eclampsia with severe features status post magnesium sulfate x24h, 1 dose IV labetalol immediately postpartum. Asymptomatic and BPs now mild range and normotensive without any medications. Rh pos, rubella immune. Acute blood loss anemia, expected, asymptomatic. H/o depression, on pristiq, wellbutrin and lamictal.     Plan:  Discharge to home later today.  BP check within 1 week.  Phone visit for mood check in 1-2 weeks.  Restart iron supplement after having bowel movements.  Docusate and miralax prn for constipation.    Reviewed discharge instructions including activity restrictions, pelvic rest and follow up plan. Reviewed signs of excessive bleeding, infection, postpartum pre-eclampsia, mastitis, DVT and postpartum depression - instructed patient to call if concerned about any of these or other concerns. All questions answered.       Toyin Brown MD      Interval History   Doing well.  Pain is well-controlled.  No fevers.  No history of foul-smelling vaginal discharge.  Good appetite.  Denies chest pain, shortness of breath, nausea or vomiting.  Vaginal bleeding is similar to a heavy menstrual flow.  Ambulatory.  Breastfeeding well.    Medications     labetalol (NORMODYNE/TRANDATE) algorithm-medication instruction       lactated ringers 75 mL/hr (20 0104)     - MEDICATION INSTRUCTIONS -       - MEDICATION INSTRUCTIONS -       NO Rho (D) immune globulin (RhoGam) needed - mother Rh POSITIVE       - MEDICATION INSTRUCTIONS -       - MEDICATION INSTRUCTIONS -       oxytocin in 0.9% NaCl Stopped (20 1114)     oxytocin in 0.9% NaCl         buPROPion  300 mg Oral QAM     desvenlafaxine  100 mg Oral Daily     - MEDICATION INSTRUCTIONS -   Does not apply See Admin Instructions     Or     - MEDICATION INSTRUCTIONS -   Does not apply See Admin Instructions    Or     - MEDICATION INSTRUCTIONS -   Does not apply See Admin Instructions    Or     - MEDICATION INSTRUCTIONS -   Does not apply See Admin Instructions     lamoTRIgine  150 mg Oral Daily     senna-docusate  1 tablet Oral BID    Or     senna-docusate  2 tablet Oral BID     sodium chloride (PF)  3 mL Intracatheter Q8H       Physical Exam   Temp: 98.8  F (37.1  C) Temp src: Oral BP: 133/88 Pulse: 75 Heart Rate: 82 Resp: 18 SpO2: 100 %      Vitals:    06/03/20 1450 06/05/20 0547 06/06/20 0556   Weight: 99.6 kg (219 lb 9.6 oz) 96.9 kg (213 lb 9.6 oz) 96.8 kg (213 lb 8 oz)     Vital Signs with Ranges  Temp:  [98.2  F (36.8  C)-98.9  F (37.2  C)] 98.8  F (37.1  C)  Pulse:  [75-81] 75  Heart Rate:  [71-82] 82  Resp:  [16-18] 18  BP: (130-147)/(79-99) 133/88  SpO2:  [100 %] 100 %  I/O last 3 completed shifts:  In: 3190 [P.O.:3190]  Out: 2980 [Urine:2980]    Uterine fundus is firm, non-tender and at the level of the umbilicus  Extremities Non-tender, 2+ edema bilaterally    Data   Recent Labs   Lab Test 06/03/20  1115   ABO O   RH Pos   AS Neg     Recent Labs   Lab Test 06/05/20  0658 06/04/20  0927   HGB 9.0* 11.2*     Recent Labs   Lab Test 10/14/19  0959   RUQIGG 61     Results for KURTERINMARÍA YIFAN M (MRN [id number]) as of 6/6/2020 09:45   6/4/2020 09:27 6/5/2020 06:58   Creatinine 0.74 0.68   GFR Estimate >90 >90   GFR Estimate If Black >90 >90   ALT 15 13   AST 23 22

## 2020-06-08 ENCOUNTER — TELEPHONE (OUTPATIENT)
Dept: OBGYN | Facility: CLINIC | Age: 40
End: 2020-06-08

## 2020-06-08 NOTE — TELEPHONE ENCOUNTER
Pt was just up to take a shower and she had a large blood clot come out.  No additional bleeding after.  She hasn't been bleeding very much.  Discussed that she may have a large clot, especially after laying down for a while or after breastfeeding.  She will monitor for further bleeding and will let us know if it is continuing.  Citlaly Walden RN

## 2020-06-10 ENCOUNTER — OFFICE VISIT (OUTPATIENT)
Dept: OBGYN | Facility: CLINIC | Age: 40
End: 2020-06-10
Payer: COMMERCIAL

## 2020-06-10 VITALS
DIASTOLIC BLOOD PRESSURE: 104 MMHG | TEMPERATURE: 99.8 F | HEIGHT: 67 IN | SYSTOLIC BLOOD PRESSURE: 160 MMHG | BODY MASS INDEX: 30.73 KG/M2 | HEART RATE: 77 BPM | OXYGEN SATURATION: 100 % | WEIGHT: 195.8 LBS

## 2020-06-10 DIAGNOSIS — B37.2 INTERTRIGINOUS CANDIDIASIS: ICD-10-CM

## 2020-06-10 DIAGNOSIS — F33.41 RECURRENT MAJOR DEPRESSION IN PARTIAL REMISSION (H): ICD-10-CM

## 2020-06-10 PROCEDURE — 99213 OFFICE O/P EST LOW 20 MIN: CPT | Mod: 24 | Performed by: OBSTETRICS & GYNECOLOGY

## 2020-06-10 RX ORDER — LABETALOL 200 MG/1
200 TABLET, FILM COATED ORAL 2 TIMES DAILY
Qty: 28 TABLET | Refills: 0 | Status: SHIPPED | OUTPATIENT
Start: 2020-06-10 | End: 2020-07-27 | Stop reason: SINTOL

## 2020-06-10 ASSESSMENT — ANXIETY QUESTIONNAIRES
1. FEELING NERVOUS, ANXIOUS, OR ON EDGE: SEVERAL DAYS
5. BEING SO RESTLESS THAT IT IS HARD TO SIT STILL: NOT AT ALL
2. NOT BEING ABLE TO STOP OR CONTROL WORRYING: SEVERAL DAYS
7. FEELING AFRAID AS IF SOMETHING AWFUL MIGHT HAPPEN: MORE THAN HALF THE DAYS
GAD7 TOTAL SCORE: 8
6. BECOMING EASILY ANNOYED OR IRRITABLE: SEVERAL DAYS
IF YOU CHECKED OFF ANY PROBLEMS ON THIS QUESTIONNAIRE, HOW DIFFICULT HAVE THESE PROBLEMS MADE IT FOR YOU TO DO YOUR WORK, TAKE CARE OF THINGS AT HOME, OR GET ALONG WITH OTHER PEOPLE: NOT DIFFICULT AT ALL
3. WORRYING TOO MUCH ABOUT DIFFERENT THINGS: MORE THAN HALF THE DAYS

## 2020-06-10 ASSESSMENT — PATIENT HEALTH QUESTIONNAIRE - PHQ9
SUM OF ALL RESPONSES TO PHQ QUESTIONS 1-9: 3
5. POOR APPETITE OR OVEREATING: SEVERAL DAYS

## 2020-06-10 ASSESSMENT — MIFFLIN-ST. JEOR: SCORE: 1595.77

## 2020-06-11 ENCOUNTER — COMMUNICATION - HEALTHEAST (OUTPATIENT)
Dept: HEALTH INFORMATION MANAGEMENT | Facility: CLINIC | Age: 40
End: 2020-06-11

## 2020-06-11 ENCOUNTER — AMBULATORY - HEALTHEAST (OUTPATIENT)
Dept: PEDIATRICS | Facility: CLINIC | Age: 40
End: 2020-06-11

## 2020-06-11 ASSESSMENT — ANXIETY QUESTIONNAIRES: GAD7 TOTAL SCORE: 8

## 2020-06-11 NOTE — PROGRESS NOTES
S:  Danyelle presents for mood check postpartum.  Delivered on 2020 after induction for gestational hypertension. Uncomplicated . Received mag postpartum x 24 hours. Did not require medication for BPs in the hospital.    NAJMA-7 SCORE 3/5/2020 2020 6/10/2020   Total Score - - -   Total Score - - -   Total Score 1 4 8       PHQ 2020 2020 6/10/2020   PHQ-9 Total Score 1 2 3   Q9: Thoughts of better off dead/self-harm past 2 weeks Not at all Not at all Not at all       Doing mostly well.   Baby has lost weight so now is supplementing. Thinks her milk is about to come in.   Hasn't gotten much sleep since baby is so fussy. Last night was much better after supplementing.   Thinks mood is ok, will be better with some relief of baby crying.     Does have swelling in her feet.   No HA, RUQ pain, vision changes.     C/o itchy rash in left armpit.     Past Medical History:   Diagnosis Date     ASD (atrial septal defect)     repaired surgically as a child     Depressive disorder      Headache      History of blood transfusion 1985 during open heart surgery     Hypertension     higher results at pain clinic, want to confirm ok     Insomnia, unspecified      Other forms of migraine, without mention of intractable migraine without mention of status migrainosus      Past Surgical History:   Procedure Laterality Date     BIOPSY       C REPR ASD OSTIUM PREMUM      Dr. Silverio     COLONOSCOPY       Current Outpatient Medications   Medication     labetalol (NORMODYNE) 200 MG tablet     acetaminophen (TYLENOL) 325 MG tablet     buPROPion (WELLBUTRIN XL) 300 MG 24 hr tablet     desvenlafaxine (PRISTIQ) 100 MG 24 hr tablet     docusate sodium (COLACE) 100 MG capsule     Ferrous Sulfate (IRON PO)     FOLIC ACID PO     ibuprofen (ADVIL/MOTRIN) 600 MG tablet     lamoTRIgine (LAMICTAL) 150 MG tablet     ondansetron (ZOFRAN) 8 MG tablet     polyethylene glycol (MIRALAX) 17 g packet     Prenatal MV-Min-Fe Fum-FA-DHA  "(PRENATAL 1 PO)     senna-docusate (SENOKOT-S/PERICOLACE) 8.6-50 MG tablet     No current facility-administered medications for this visit.         Allergies   Allergen Reactions     Artificial Sweetner (Informational Only) [Artificial Sweetner (Informational Only) ]      Chocolate Flavor Other (See Comments)         O:  Vitals:    06/10/20 1512 06/10/20 1526 06/10/20 1603 06/10/20 1604   BP: (!) 148/104 (!) 150/102 (!) 150/102 (!) 160/104   Pulse: 77      Temp: 99.8  F (37.7  C)      TempSrc: Oral      SpO2: 100%      Weight: 88.8 kg (195 lb 12.8 oz)      Height: 1.702 m (5' 7\")        Gen: well appearing  Psych: intermittently tearful but mentation intact.   Skin: raised erythematous rash with satellite lesions in left axilla.  Ext: 2+ edema bilaterally    A/P:  40 year old  with   (O14.94) Pre-eclampsia affecting childbirth  (primary encounter diagnosis)  Comment:   Plan: labetalol (NORMODYNE) 200 MG tablet        BPs borderline for requiring medication  Will start on labetalol. She will get home BP cuff.     (B37.2) Intertriginous candidiasis  Comment:   Plan: Recommend clotrimazole or similar OTC.    (F33.41) Recurrent major depression in partial remission (H)  Comment:   Plan:   Doing overall well, slightly worsened with stress of little sleep and crying baby but improving.  Continue current management.   Reviewed precautions.        "

## 2020-06-13 ENCOUNTER — NURSE TRIAGE (OUTPATIENT)
Dept: NURSING | Facility: CLINIC | Age: 40
End: 2020-06-13

## 2020-06-13 ENCOUNTER — TELEPHONE (OUTPATIENT)
Dept: OBGYN | Facility: CLINIC | Age: 40
End: 2020-06-13

## 2020-06-13 DIAGNOSIS — B37.31 CANDIDIASIS OF VAGINA: Primary | ICD-10-CM

## 2020-06-13 RX ORDER — FLUCONAZOLE 150 MG/1
150 TABLET ORAL ONCE
Qty: 1 TABLET | Refills: 0 | Status: SHIPPED | OUTPATIENT
Start: 2020-06-13 | End: 2020-07-16

## 2020-06-13 NOTE — TELEPHONE ENCOUNTER
Patient called with symptoms of yeast infection following vaginal delivery on 6/04/2020.  Complains of internal vaginal itching.  Discharge hard to tell due to lochia.  No foul odor.    Will treat empirically for yeast infection with diflucan x 1.  Discussed that if symptoms dont improve, would need exam and wet prep in the office.    Toyin De Jesus MD

## 2020-06-13 NOTE — TELEPHONE ENCOUNTER
Danyelle is calling and states that she just gave birth on the 4th of June.  Today Danyelle is having vaginal itching and feels that it may be a yeast infection currently has yeast infection on left arm.  During delivery she did tear and has some dissolveable sutures inside.  Vaginal itching feels like it is inside.  Today Danyelle is requesting to speak with on call MD as she has questions about yeast infection and her sutures.   [facility] paged on call MD De Jesus  to phone Danyelle back at [phone].  FNA advised patient to phone back FNA in 20 minutes if no response from on call MD and patient agreed.      Additional Information    Negative: Patient sounds very sick or weak to the triager    Negative: [1] SEVERE pain AND [2] not improved 2 hours after pain medicine    Negative: [1] Genital area looks infected (e.g., draining sore, spreading redness) AND [2] fever    Negative: [1] Something is hanging out of the vagina AND [2] can't easily be pushed back inside    MODERATE-SEVERE itching (i.e., interferes with school, work, or sleep)    Protocols used: VAGINAL SYMPTOMS-A-AH

## 2020-06-15 ENCOUNTER — TELEPHONE (OUTPATIENT)
Dept: BEHAVIORAL HEALTH | Facility: CLINIC | Age: 40
End: 2020-06-15

## 2020-06-15 DIAGNOSIS — F41.1 GENERALIZED ANXIETY DISORDER: Primary | ICD-10-CM

## 2020-06-15 NOTE — TELEPHONE ENCOUNTER
Reason for call:  Symptom   Symptom or request: increased crying and anxiety    Duration (how long have symptoms been present): since baby was born 6.4.2020  Have you been treated for this before yes  Yes    Additional comments: patient delivered son early on 6.4.2020 stated she was told by Dr Lopez to call if she needed anything before then. She is feeling very anxious and crying a lot, her appt is set for 6.25 and the only 30 min spot open tomorrow 1015 will not work for patient     [facility] [address] [place] [address] [phone] (Phone)  [phone] (Fax)               Phone number to reach patient:  Home number on file [phone] (home)    Best Time:  asap    Can we leave a detailed message on this number?  YES    Travel screening: Not Applicable

## 2020-06-16 RX ORDER — LORAZEPAM 0.5 MG/1
0.5 TABLET ORAL 2 TIMES DAILY PRN
Qty: 60 TABLET | Refills: 0 | Status: SHIPPED | OUTPATIENT
Start: 2020-06-16 | End: 2020-07-27

## 2020-06-16 NOTE — TELEPHONE ENCOUNTER
I spoke with Danyelle by phone.  We agreed to short-term use of lorazepam 0.5 mg up to twice a day as needed.  Risks and benefits were reviewed including risk to her nursing infant.  Prescription was sent to requested pharmacy.

## 2020-06-16 NOTE — TELEPHONE ENCOUNTER
"Danyelle Canales called back. Safety assessment done.     Patient denies SI/HI. Denies thoughts of harming her baby, dog, self, and . She denied intrusive or disturbing thoughts.     She is doing phone visits weekly with her therapist and those have been helpful.    She stated she's been crying off and on without reason, anxiety starts in the evening for \"no real reason.\"      Patient stated her sleep has been minimal as expected with a new born. She wonders if anticipating a bad night of sleep is causing her anxiety.    Patient is taking all her medications as prescribed. She is open to dose changes on Wellbutrin and Pristiq if able or on starting a PRN med for anxiety. She is hesitant to increase Lamictal.     Patient education done on safety for herself, her baby, and the rest of her family.     Safety plan reviewed. To the Emergency Department as needed or call after hours crisis line at [phone] or [phone]. [place] Crisis Text Line. Text [place] to 577060 or Suicide LifeLine Chat: suicidepreventionlifeline.org/chat/    Routing to provider.               "

## 2020-06-16 NOTE — TELEPHONE ENCOUNTER
I attempted to call Danyelle Canales for a safety check prior connecting with Dr. Lopez.    I reached patient's voicemail. Message left to call clinic back today and ask for a nurse.    Please attempt to connect to nursing if available.     Last visit 2/6/20  Next 6/25/20  *Was offered appt tomorrow but she couldn't accept it.

## 2020-06-17 ENCOUNTER — TELEPHONE (OUTPATIENT)
Dept: OBGYN | Facility: CLINIC | Age: 40
End: 2020-06-17

## 2020-06-17 NOTE — TELEPHONE ENCOUNTER
Recommend she stop labetalol and increase hydration and check BP in a few hours. Let us know if it happens again.  Thanks,  Toyin Brown MD

## 2020-06-17 NOTE — TELEPHONE ENCOUNTER
TC to patient. Discussed message below. Pt stated understanding. She will call back with further questions or concerns or if BP still low with symptoms.   Alesha Shipman RN-BSN

## 2020-06-17 NOTE — TELEPHONE ENCOUNTER
Patient delivered on 6/4, c/o blood pressure. Felt light headed this morning so took BP and it was 98/62. Just got up to walk to the other room and started to black out (eyes got dark). No dizziness. Taking lobetalol 200mg twice daily. Pt wondering if she needs to stop the medication or be seen. Routing to on-call provider. Please advise.   Alesha Shipman, RN-BSN

## 2020-06-25 ENCOUNTER — TELEPHONE (OUTPATIENT)
Dept: OBGYN | Facility: CLINIC | Age: 40
End: 2020-06-25

## 2020-06-25 ENCOUNTER — VIRTUAL VISIT (OUTPATIENT)
Dept: PSYCHOLOGY | Facility: CLINIC | Age: 40
End: 2020-06-25
Payer: COMMERCIAL

## 2020-06-25 DIAGNOSIS — F33.41 RECURRENT MAJOR DEPRESSION IN PARTIAL REMISSION (H): Primary | ICD-10-CM

## 2020-06-25 DIAGNOSIS — F41.1 GENERALIZED ANXIETY DISORDER: ICD-10-CM

## 2020-06-25 PROCEDURE — 99214 OFFICE O/P EST MOD 30 MIN: CPT | Mod: TEL | Performed by: PSYCHIATRY & NEUROLOGY

## 2020-06-25 RX ORDER — LAMOTRIGINE 200 MG/1
200 TABLET ORAL DAILY
Qty: 180 TABLET | Refills: 1 | Status: SHIPPED | OUTPATIENT
Start: 2020-06-25 | End: 2020-06-29

## 2020-06-25 ASSESSMENT — PATIENT HEALTH QUESTIONNAIRE - PHQ9
5. POOR APPETITE OR OVEREATING: NOT AT ALL
SUM OF ALL RESPONSES TO PHQ QUESTIONS 1-9: 2

## 2020-06-25 ASSESSMENT — ANXIETY QUESTIONNAIRES
1. FEELING NERVOUS, ANXIOUS, OR ON EDGE: MORE THAN HALF THE DAYS
IF YOU CHECKED OFF ANY PROBLEMS ON THIS QUESTIONNAIRE, HOW DIFFICULT HAVE THESE PROBLEMS MADE IT FOR YOU TO DO YOUR WORK, TAKE CARE OF THINGS AT HOME, OR GET ALONG WITH OTHER PEOPLE: SOMEWHAT DIFFICULT
2. NOT BEING ABLE TO STOP OR CONTROL WORRYING: MORE THAN HALF THE DAYS
7. FEELING AFRAID AS IF SOMETHING AWFUL MIGHT HAPPEN: SEVERAL DAYS
3. WORRYING TOO MUCH ABOUT DIFFERENT THINGS: MORE THAN HALF THE DAYS
6. BECOMING EASILY ANNOYED OR IRRITABLE: NOT AT ALL
5. BEING SO RESTLESS THAT IT IS HARD TO SIT STILL: NOT AT ALL
GAD7 TOTAL SCORE: 7

## 2020-06-25 NOTE — TELEPHONE ENCOUNTER
Katherin with [employer] called this morning in regards to this patients short term disability claim. She is needing to verify some information about the patient's pregnancy. She would like to be contacted by one of the patient's nurses, and she can be reached on a secure line at [phone].

## 2020-06-25 NOTE — PROGRESS NOTES
"Danyelle Canales is a 40 year old female who is being evaluated via a billable telephone visit.      The patient has been notified of following:     \"This telephone visit will be conducted via a call between you and your physician/provider. We have found that certain health care needs can be provided without the need for a physical exam.  This service lets us provide the care you need with a short phone conversation.  If a prescription is necessary we can send it directly to your pharmacy.  If lab work is needed we can place an order for that and you can then stop by our lab to have the test done at a later time.    Telephone visits are billed at different rates depending on your insurance coverage. During this emergency period, for some insurers they may be billed the same as an in-person visit.  Please reach out to your insurance provider with any questions.    If during the course of the call the physician/provider feels a telephone visit is not appropriate, you will not be charged for this service.\"    Patient has given verbal consent for Telephone visit?  Yes    What phone number would you like to be contacted at? [phone]    How would you like to obtain your AVS? [facility]          Outpatient Psychiatric Progress Note    Name: Danyelle Canales   : 1980                    Primary Care Provider: Katlyn Joiner DO   Therapist: Denice Haji [facility]     PHQ-9 scores:  PHQ-9 SCORE 2020 6/10/2020 2020   PHQ-9 Total Score - - -   PHQ-9 Total Score [facility] - - -   PHQ-9 Total Score 2 3 2       NAJMA-7 scores:  NAJMA-7 SCORE 2020 6/10/2020 2020   Total Score - - -   Total Score - - -   Total Score 4 8 7       Patient Identification:  Danyelle is a 40 year old year old,   White American female  who presents for return visit with me.  Patient attended the session by phone.     Interim History:  Danyelle reports that she is \"not too bad.\"  Spoke by phone about " 10 days ago because she was having a lot of anxiety and crying spells.  She says over the last 10 days, she had 1 day that she did not cry at all, and that was just a few days ago.  She tends to have more anxiety in the evening.  Lorazepam somewhat helpful, but she is limiting her use of it, and then limits her breast-feeding so the baby is not affected.    We discussed options for improving mood and anxiety.  She is currently taking 300 mg Wellbutrin, and I am hesitant to increase it, as it usually is not very effective for anxiety.  She is at the maximum dose of Pristiq.  We eventually agreed to increase her lamotrigine from 150 mg a day to 200 mg a day.  She is quite anxious that she might develop Hernandez-Kulwant syndrome, although the risk is quite low.  We also did some research into breast-feeding while on lamotrigine.  The risk to the baby seems to be quite low.    Vital Signs:   There were no vitals taken for this visit.    Current Medications:  Current Outpatient Medications   Medication     acetaminophen (TYLENOL) 325 MG tablet     buPROPion (WELLBUTRIN XL) 300 MG 24 hr tablet     desvenlafaxine (PRISTIQ) 100 MG 24 hr tablet     docusate sodium (COLACE) 100 MG capsule     Ferrous Sulfate (IRON PO)     ibuprofen (ADVIL/MOTRIN) 600 MG tablet     lamoTRIgine (LAMICTAL) 200 MG tablet     LORazepam (ATIVAN) 0.5 MG tablet     Prenatal MV-Min-Fe Fum-FA-DHA (PRENATAL 1 PO)     FOLIC ACID PO     labetalol (NORMODYNE) 200 MG tablet     ondansetron (ZOFRAN) 8 MG tablet     polyethylene glycol (MIRALAX) 17 g packet     senna-docusate (SENOKOT-S/PERICOLACE) 8.6-50 MG tablet     No current facility-administered medications for this visit.       Mental Status Examination:  40-year-old male acute distress.  Speech is clear and normal in rate and tone.  Mood is somewhat anxious.  Thoughts are logical and spontaneous with no looseness of association or flight of ideas.  Thought content shows no psychosis.  She denies  suicidal or homicidal ideation.  She is alert and oriented x3.    Assessment and Plan:    ICD-10-CM    1. Recurrent major depression in partial remission (H)  F33.41    2. Generalized anxiety disorder  F41.1 lamoTRIgine (LAMICTAL) 200 MG tablet       Medical comorbidities include:   Patient Active Problem List   Diagnosis     Insomnia     allergic DERMATITIS NOS     Encounter for other general counseling or advice on contraception     ia [place] [facility]     ia SPRAIN OF NECK     Migraine headache     PMDD (premenstrual dysphoric disorder)     Trichotillomania     CARDIOVASCULAR SCREENING; LDL GOAL LESS THAN 160     History of ovarian cyst     Generalized anxiety disorder     Chronic pain syndrome     ASD (atrial septal defect)     Chronic pain     Esophageal reflux     Non morbid obesity, unspecified obesity type     Benign hypermobility syndrome     Myalgia and myositis, unspecified     Myofascial pain     Need for Tdap vaccination     Recurrent major depression in partial remission (H)     Term pregnancy      (spontaneous vaginal delivery)       Treatment Plan:  Patient Instructions   Increase lamotrigine to 200 mg daily.  If you do not tolerate the increase, you may decrease back to 150 mg on her own.    Continue Wellbutrin  mg daily.    Continue desvenlafaxine 100 mg daily.  Continue lorazepam 0.5 mg up to 2 times a day as needed for anxiety.    Continue all other medications per your primary care provider.    Schedule an appointment with me in 4 weeks or sooner as needed.  You may call [facility] at [phone] to schedule, or schedule at the .    [place] Resources:      Go to the Emergency Department as needed or call after hours crisis line at [phone].      To schedule individual or family therapy, call [facility] at [phone].     Follow up with primary care provider as planned or sooner for acute medical concerns.    Call the  psychiatric nurse line with medication questions or concerns at [phone].    MyChart may be used to communicate with your provider, but this is not intended to be used for emergencies.    Community Resources:      National Suicide Prevention Lifeline: 946.705.1616 (TTY: 214.335.8041). Call anytime for help.  (www.suicidepreventionlifeline.org)    National [place] on Mental Illness (www.telma.org): 502.743.3096 or 739-803-1616.     Mental Health Association (www.mentalhealth.org): 269.365.8585 or 073-308-2990.    [place] Crisis Text Line: Text [place] to 919276    Suicide LifeLine Chat: suicideGeneva Healthcareline.org/chat      Administrative Billing:   Phone call duration: 25 minutes greater than 50% spent in counseling regarding medications, risks, benefits, and alternatives.    Patient Status:  Patient will continue to be seen for ongoing consultation and stabilization.

## 2020-06-25 NOTE — TELEPHONE ENCOUNTER
Katherin called back. Pt information given in regards to date of delivery, type of delivery and date of admission.   Alesha Shipman, RN-BSN

## 2020-06-25 NOTE — PATIENT INSTRUCTIONS
Increase lamotrigine to 200 mg daily.  If you do not tolerate the increase, you may decrease back to 150 mg on her own.    Continue Wellbutrin  mg daily.    Continue desvenlafaxine 100 mg daily.  Continue lorazepam 0.5 mg up to 2 times a day as needed for anxiety.    Continue all other medications per your primary care provider.    Schedule an appointment with me in 4 weeks or sooner as needed.  You may call [facility] at [phone] to schedule, or schedule at the .    [place] Resources:      Go to the Emergency Department as needed or call after hours crisis line at [phone].      To schedule individual or family therapy, call [facility] at [phone].     Follow up with primary care provider as planned or sooner for acute medical concerns.    Call the psychiatric nurse line with medication questions or concerns at [phone].    Jobyourlifehart may be used to communicate with your provider, but this is not intended to be used for emergencies.    Community Resources:      National Suicide Prevention Lifeline: 333.274.7829 (TTY: 617.580.2087). Call anytime for help.  (www.suicidepreventionlifeline.org)    National [place] on Mental Illness (www.telma.org): 462.344.1190 or 339-903-1829.     Mental Health Association (www.mentalhealth.org): 668.212.3829 or 918-296-8209.    [place] Crisis Text Line: Text [place] to 092765    Suicide LifeLine Chat: suicideprePartTeclifeline.org/chat

## 2020-06-26 ASSESSMENT — ANXIETY QUESTIONNAIRES: GAD7 TOTAL SCORE: 7

## 2020-07-16 ENCOUNTER — PRENATAL OFFICE VISIT (OUTPATIENT)
Dept: OBGYN | Facility: CLINIC | Age: 40
End: 2020-07-16
Payer: COMMERCIAL

## 2020-07-16 VITALS
WEIGHT: 190 LBS | BODY MASS INDEX: 29.82 KG/M2 | DIASTOLIC BLOOD PRESSURE: 91 MMHG | SYSTOLIC BLOOD PRESSURE: 135 MMHG | TEMPERATURE: 98.9 F | HEART RATE: 75 BPM | HEIGHT: 67 IN

## 2020-07-16 DIAGNOSIS — R42 DIZZINESS: Primary | ICD-10-CM

## 2020-07-16 LAB
ANION GAP SERPL CALCULATED.3IONS-SCNC: 11 MMOL/L (ref 3–14)
BUN SERPL-MCNC: 9 MG/DL (ref 7–30)
CALCIUM SERPL-MCNC: 8.6 MG/DL (ref 8.5–10.1)
CHLORIDE SERPL-SCNC: 104 MMOL/L (ref 94–109)
CO2 SERPL-SCNC: 24 MMOL/L (ref 20–32)
CREAT SERPL-MCNC: 0.9 MG/DL (ref 0.52–1.04)
ERYTHROCYTE [DISTWIDTH] IN BLOOD BY AUTOMATED COUNT: 15.1 % (ref 10–15)
GFR SERPL CREATININE-BSD FRML MDRD: 80 ML/MIN/{1.73_M2}
GLUCOSE SERPL-MCNC: 73 MG/DL (ref 70–99)
HCT VFR BLD AUTO: 39.1 % (ref 35–47)
HGB BLD-MCNC: 11.8 G/DL (ref 11.7–15.7)
MCH RBC QN AUTO: 25.4 PG (ref 26.5–33)
MCHC RBC AUTO-ENTMCNC: 30.2 G/DL (ref 31.5–36.5)
MCV RBC AUTO: 84 FL (ref 78–100)
PLATELET # BLD AUTO: 311 10E9/L (ref 150–450)
POTASSIUM SERPL-SCNC: 4.1 MMOL/L (ref 3.4–5.3)
RBC # BLD AUTO: 4.64 10E12/L (ref 3.8–5.2)
SODIUM SERPL-SCNC: 139 MMOL/L (ref 133–144)
TSH SERPL DL<=0.005 MIU/L-ACNC: 3.58 MU/L (ref 0.4–4)
WBC # BLD AUTO: 8.1 10E9/L (ref 4–11)

## 2020-07-16 PROCEDURE — 84443 ASSAY THYROID STIM HORMONE: CPT | Performed by: OBSTETRICS & GYNECOLOGY

## 2020-07-16 PROCEDURE — 85027 COMPLETE CBC AUTOMATED: CPT | Performed by: OBSTETRICS & GYNECOLOGY

## 2020-07-16 PROCEDURE — 99213 OFFICE O/P EST LOW 20 MIN: CPT | Mod: 24 | Performed by: OBSTETRICS & GYNECOLOGY

## 2020-07-16 PROCEDURE — 36415 COLL VENOUS BLD VENIPUNCTURE: CPT | Performed by: OBSTETRICS & GYNECOLOGY

## 2020-07-16 PROCEDURE — 99207 ZZC POST PARTUM EXAM: CPT | Performed by: OBSTETRICS & GYNECOLOGY

## 2020-07-16 PROCEDURE — 80048 BASIC METABOLIC PNL TOTAL CA: CPT | Performed by: OBSTETRICS & GYNECOLOGY

## 2020-07-16 ASSESSMENT — ANXIETY QUESTIONNAIRES
1. FEELING NERVOUS, ANXIOUS, OR ON EDGE: NEARLY EVERY DAY
6. BECOMING EASILY ANNOYED OR IRRITABLE: NOT AT ALL
3. WORRYING TOO MUCH ABOUT DIFFERENT THINGS: SEVERAL DAYS
5. BEING SO RESTLESS THAT IT IS HARD TO SIT STILL: NOT AT ALL
GAD7 TOTAL SCORE: 10
2. NOT BEING ABLE TO STOP OR CONTROL WORRYING: NEARLY EVERY DAY
7. FEELING AFRAID AS IF SOMETHING AWFUL MIGHT HAPPEN: NEARLY EVERY DAY
IF YOU CHECKED OFF ANY PROBLEMS ON THIS QUESTIONNAIRE, HOW DIFFICULT HAVE THESE PROBLEMS MADE IT FOR YOU TO DO YOUR WORK, TAKE CARE OF THINGS AT HOME, OR GET ALONG WITH OTHER PEOPLE: SOMEWHAT DIFFICULT

## 2020-07-16 ASSESSMENT — PATIENT HEALTH QUESTIONNAIRE - PHQ9
5. POOR APPETITE OR OVEREATING: NOT AT ALL
SUM OF ALL RESPONSES TO PHQ QUESTIONS 1-9: 4

## 2020-07-16 ASSESSMENT — MIFFLIN-ST. JEOR: SCORE: 1569.46

## 2020-07-16 NOTE — PROGRESS NOTES
"Chief Complaint   Patient presents with     Post Partum Exam       Initial BP (!) 134/92 (BP Location: Right arm, Patient Position: Sitting, Cuff Size: Adult Regular)   Pulse 75   Temp 98.9  F (37.2  C) (Oral)   Ht 1.702 m (5' 7\")   Wt 86.2 kg (190 lb)   Breastfeeding Yes   BMI 29.76 kg/m   Estimated body mass index is 29.76 kg/m  as calculated from the following:    Height as of this encounter: 1.702 m (5' 7\").    Weight as of this encounter: 86.2 kg (190 lb).  BP completed using cuff size: regular    Questioned patient about current smoking habits.  Pt. has never smoked.          The following HM Due: NONE      The following patient reported/Care Every where data was sent to:  P ABSTRACT QUALITY INITIATIVES [82754]  n/a      n/a and patient has appointment for today              "

## 2020-07-17 ASSESSMENT — ANXIETY QUESTIONNAIRES: GAD7 TOTAL SCORE: 10

## 2020-07-17 NOTE — PROGRESS NOTES
"Danyelle is here for a 6-week postpartum checkup.    She had a  of a viable boy, weight 8 pounds 12 oz., complicated by pre-eclampsia with severe features (blood pressure). She is now off blood pressure medications. She has some dizziness sometimes when her BP is low. Checks regularly at home.   Since delivery, she has been breast feeding.  She has no signs of infection, bleeding or other complications.  She is not pregnant.  We discussed contraception and she has chosen Mirena IUD.    Mood is ok.  Today's Depression Rating was   PHQ-9 SCORE 2020   PHQ-9 Total Score -   PHQ-9 Total Score MyChart -   PHQ-9 Total Score 4     NAJMA-7 SCORE 6/10/2020 2020 2020   Total Score - - -   Total Score - - -   Total Score 8 7 10     She has some increase in anxiety. Her psychiatrist increased her lamictal 2+ weeks ago. She hasn't really noted a difference. She has been taking lorazepam 0.5 gm daily and then dumps her next two pumping sessions. She is formula feeding and supplementing breast milk as she is able.       EXAM:  Vitals:    20 1342 20 1352   BP: (!) 134/92 (!) 135/91   BP Location: Right arm Right arm   Patient Position: Sitting Sitting   Cuff Size: Adult Regular Adult Regular   Pulse: 75    Temp: 98.9  F (37.2  C)    TempSrc: Oral    Weight: 86.2 kg (190 lb)    Height: 1.702 m (5' 7\")      HEENT: grossly normal.  NECK: no lymphadenopathy or thyroidomegaly.  LUNGS: CTA X 2, no rales or crackles.  BREASTS: symmetric, no masses or discharge  BACK: No spinal or CVA tenderness.  HEART: RRR without murmurs clicks or gallops.  ABDOMEN: soft, non tender, good bowel sounds, without masses rebound, guarding or tenderness.      PELVIC:    External genitalia: normal without lesion, repair still healing. No granulation tissue, just still appears slightly fragile.                             Vagina: normal mucosa and rugae, no discharge.  Cervix: multiparous, well healed, without lesion.  Uterus: non " pregnant in size, firm , mobile, no lesions.  Adnexa: non tender, without masses    EXTREMITIES: Warm to touch, good pulses, no ankle edema or calf tenderness.  NEUROLOGIC: grossly normal.    ASSESSMENT:   Normal 6-week postpartum exam after .    PLAN:  Pap smear Due  and Mirena IUD for contraception.  Will return in 2 weeks for Mirena, will re-inspect laceration at that time.   Discussed anxiety, should reach out to her psychiatrist. Discussed that she does not need to dump expressed milk. RID should be well less that 5% at that per Rashmi, and then she is diluting with formula. She may just start to dump one session.  CBC, BMP, TSH today for intermittent dizziness. Reviewed BPs. None lower than 108/60. Unlikely to be dizzy at that BP but may still be adjusting after BPs were higher in pregnancy.

## 2020-07-27 ENCOUNTER — VIRTUAL VISIT (OUTPATIENT)
Dept: PSYCHOLOGY | Facility: CLINIC | Age: 40
End: 2020-07-27
Payer: COMMERCIAL

## 2020-07-27 DIAGNOSIS — F41.1 GENERALIZED ANXIETY DISORDER: ICD-10-CM

## 2020-07-27 DIAGNOSIS — G47.00 PERSISTENT INSOMNIA: ICD-10-CM

## 2020-07-27 DIAGNOSIS — F33.41 RECURRENT MAJOR DEPRESSION IN PARTIAL REMISSION (H): Primary | ICD-10-CM

## 2020-07-27 PROCEDURE — 99214 OFFICE O/P EST MOD 30 MIN: CPT | Mod: TEL | Performed by: PSYCHIATRY & NEUROLOGY

## 2020-07-27 RX ORDER — DESVENLAFAXINE 100 MG/1
100 TABLET, EXTENDED RELEASE ORAL DAILY
Qty: 90 TABLET | Refills: 1 | Status: SHIPPED | OUTPATIENT
Start: 2020-07-27 | End: 2020-12-16

## 2020-07-27 RX ORDER — LORAZEPAM 0.5 MG/1
TABLET ORAL
Qty: 60 TABLET | Refills: 1 | Status: SHIPPED | OUTPATIENT
Start: 2020-07-27 | End: 2020-10-13

## 2020-07-27 RX ORDER — BUPROPION HYDROCHLORIDE 300 MG/1
300 TABLET ORAL EVERY MORNING
Qty: 90 TABLET | Refills: 1 | Status: SHIPPED | OUTPATIENT
Start: 2020-07-27 | End: 2020-12-16

## 2020-07-27 ASSESSMENT — ANXIETY QUESTIONNAIRES
3. WORRYING TOO MUCH ABOUT DIFFERENT THINGS: MORE THAN HALF THE DAYS
GAD7 TOTAL SCORE: 10
5. BEING SO RESTLESS THAT IT IS HARD TO SIT STILL: NOT AT ALL
6. BECOMING EASILY ANNOYED OR IRRITABLE: SEVERAL DAYS
7. FEELING AFRAID AS IF SOMETHING AWFUL MIGHT HAPPEN: MORE THAN HALF THE DAYS
1. FEELING NERVOUS, ANXIOUS, OR ON EDGE: NEARLY EVERY DAY
2. NOT BEING ABLE TO STOP OR CONTROL WORRYING: MORE THAN HALF THE DAYS
IF YOU CHECKED OFF ANY PROBLEMS ON THIS QUESTIONNAIRE, HOW DIFFICULT HAVE THESE PROBLEMS MADE IT FOR YOU TO DO YOUR WORK, TAKE CARE OF THINGS AT HOME, OR GET ALONG WITH OTHER PEOPLE: SOMEWHAT DIFFICULT

## 2020-07-27 ASSESSMENT — PATIENT HEALTH QUESTIONNAIRE - PHQ9
SUM OF ALL RESPONSES TO PHQ QUESTIONS 1-9: 3
5. POOR APPETITE OR OVEREATING: NOT AT ALL

## 2020-07-27 NOTE — PATIENT INSTRUCTIONS
Change Lorazepam to 0.5 mg at bedtime.  You may continue to take 0.5 mg daily as needed for anxiety.    Continue Wellbutrin  mg daily.    Continue desvenlafaxine 100 mg daily.    Continue lamotrigine 200 mg daily.  If you feel your anxiety is not well controlled with the change in lorazepam, please contact me, and we will increase her lamotrigine to 250 mg daily.    Continue all other medications per your primary care provider.    Schedule an appointment with me in 4 weeks or sooner as needed.  You may call [facility] at [phone] to schedule, or schedule at the .    [facility] Resources:      Go to the Emergency Department as needed or call after hours crisis line at [phone].      To schedule individual or family therapy, call [facility] at [phone].     Follow up with primary care provider as planned or sooner for acute medical concerns.    Call the psychiatric nurse line with medication questions or concerns at [phone].    SHIFT may be used to communicate with your provider, but this is not intended to be used for emergencies.    Community Resources:      National Suicide Prevention Lifeline: 225.450.8799 (TTY: 140.263.6109). Call anytime for help.  (www.suicidepreventionlifeline.org)    National [place] on Mental Illness (www.telma.org): 470.805.5160 or 422-083-1886.     Mental Health Association (www.mentalhealth.org): 299.874.3158 or 910-490-0274.    [place] Crisis Text Line: Text [place] to 251633    Suicide LifeLine Chat: suicidepreventionlifeline.org/chat

## 2020-07-27 NOTE — PROGRESS NOTES
"Danyelle Canales is a 40 year old female who is being evaluated via a billable telephone visit.      The patient has been notified of following:     \"This telephone visit will be conducted via a call between you and your physician/provider. We have found that certain health care needs can be provided without the need for a physical exam.  This service lets us provide the care you need with a short phone conversation.  If a prescription is necessary we can send it directly to your pharmacy.  If lab work is needed we can place an order for that and you can then stop by our lab to have the test done at a later time.    Telephone visits are billed at different rates depending on your insurance coverage. During this emergency period, for some insurers they may be billed the same as an in-person visit.  Please reach out to your insurance provider with any questions.    If during the course of the call the physician/provider feels a telephone visit is not appropriate, you will not be charged for this service.\"    Patient has given verbal consent for Telephone visit?  Yes    What phone number would you like to be contacted at? [phone]    How would you like to obtain your AVS? Marcushart        Outpatient Psychiatric Progress Note    Name: Danyelle Canales   : 1980                    Primary Care Provider: Katlyn Joiner DO   Therapist: [facility]      PHQ-9 scores:  PHQ-9 SCORE 2020   PHQ-9 Total Score - - -   PHQ-9 Total Score MyCfitot - - -   PHQ-9 Total Score 2 4 3       NAJMA-7 scores:  NAJMA-7 SCORE 2020   Total Score - - -   Total Score - - -   Total Score 7 10 10       Patient Identification:  Danyelle is a 40 year old year,   White American female  who presents for return visit with me.  Patient attended the session alone.     Interim History:  Danyelle reports that she is going back to work next Monday after " completing her maternity leave.  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  Her baby's paternal grandparents will be providing .    She reports that her mood is a little better, she is only having occasional crying spells rather than daily crying spells.  She denies any suicidal thoughts.  She reports feeling bad about herself more than half of the days, and reports feeling down, depressed, or hopeless several days out of the last 2 weeks.    She has been using lorazepam occasionally, but not every day.  She usually uses only 1 dose per day, although on 2 days over the last month she had to take 2 doses.  After some discussion, we agreed to change her dosing to 0.5 mg at bedtime and continue another 0.5 mg daily as needed. If her anxiety does not lessen with lorazepam, we may want to increase her lamotrigine to 250 mg daily.  She will contact me if she feels she needs the change.    She has been having some lightheadedness, especially if she changes position.  She was anemic after delivery, and was taking iron supplements.  She has discontinued the iron supplements, but her hemoglobin is still at the low end of normal.  She will continue iron supplements a couple times a week.    Vital Signs:   No vital signs were taken for this visit    Labs:  TSH   Date Value Ref Range Status   07/16/2020 3.58 0.40 - 4.00 mU/L Final     Lab Results   Component Value Date    WBC 8.1 07/16/2020     Lab Results   Component Value Date    RBC 4.64 07/16/2020     Lab Results   Component Value Date    HGB 11.8 07/16/2020     Lab Results   Component Value Date    HCT 39.1 07/16/2020     No components found for: MCT  Lab Results   Component Value Date    MCV 84 07/16/2020     Lab Results   Component Value Date    MCH 25.4 07/16/2020     Lab Results   Component Value Date    MCHC 30.2 07/16/2020     Lab Results   Component Value Date    RDW 15.1 07/16/2020     Lab Results   Component Value  Date     2020         Current Medications:  Current Outpatient Medications   Medication     buPROPion (WELLBUTRIN XL) 300 MG 24 hr tablet     desvenlafaxine (PRISTIQ) 100 MG 24 hr tablet     lamoTRIgine (LAMICTAL) 200 MG tablet     LORazepam (ATIVAN) 0.5 MG tablet     polyethylene glycol (MIRALAX) 17 g packet     Prenatal MV-Min-Fe Fum-FA-DHA (PRENATAL 1 PO)     Ferrous Sulfate (IRON PO)     No current facility-administered medications for this visit.         Mental Status Examination:  Danyelle is a 40-year-old woman in no acute distress.  Speech is clear and normal in rate and tone.  Mood is anxious.  Thoughts are logical and spontaneous with no loose associations or flight of ideas.  Thought content shows no psychosis.  She denies suicidal thoughts.  She is alert and oriented x3.    Assessment and Plan:    ICD-10-CM    1. Recurrent major depression in partial remission (H)  F33.41    2. Generalized anxiety disorder  F41.1 buPROPion (WELLBUTRIN XL) 300 MG 24 hr tablet     desvenlafaxine (PRISTIQ) 100 MG 24 hr tablet     LORazepam (ATIVAN) 0.5 MG tablet   3. Persistent insomnia  G47.00        Medical comorbidities include:   Patient Active Problem List   Diagnosis     Insomnia     allergic DERMATITIS NOS     Encounter for other general counseling or advice on contraception     [facility]     [facility]     Migraine headache     PMDD (premenstrual dysphoric disorder)     Trichotillomania     CARDIOVASCULAR SCREENING; LDL GOAL LESS THAN 160     History of ovarian cyst     Generalized anxiety disorder     Chronic pain syndrome     ASD (atrial septal defect)     Chronic pain     Esophageal reflux     Non morbid obesity, unspecified obesity type     Benign hypermobility syndrome     Myalgia and myositis, unspecified     Myofascial pain     Need for Tdap vaccination     Recurrent major depression in partial remission (H)     Term pregnancy      (spontaneous vaginal delivery)        Treatment Plan:  Patient Instructions   Change Lorazepam to 0.5 mg at bedtime.  You may continue to take 0.5 mg daily as needed for anxiety.    Continue Wellbutrin  mg daily.    Continue desvenlafaxine 100 mg daily.    Continue lamotrigine 200 mg daily.  If you feel your anxiety is not well controlled with the change in lorazepam, please contact me, and we will increase her lamotrigine to 250 mg daily.    Continue all other medications per your primary care provider.    Schedule an appointment with me in 4 weeks or sooner as needed.  You may call [facility] at [phone] to schedule, or schedule at the .    [facility] Resources:      Go to the Emergency Department as needed or call after hours crisis line at [phone].      To schedule individual or family therapy, call [facility] at [phone].     Follow up with primary care provider as planned or sooner for acute medical concerns.    Call the psychiatric nurse line with medication questions or concerns at [phone].    TripShake may be used to communicate with your provider, but this is not intended to be used for emergencies.    Community Resources:      National Suicide Prevention Lifeline: 440.746.2360 (TTY: 398.758.8165). Call anytime for help.  (www.suicidepreventionlifeline.org)    National [place] on Mental Illness (www.telma.org): 566.131.3620 or 146-352-2266.     Mental Health Association (www.mentalhealth.org): 222.979.9172 or 483-335-6874.    [place] Crisis Text Line: Text [place] to 544781    Suicide LifeLine Chat: suicide500px.org/chat         Administrative Billing:   Phone call duration: 31 minutes, greater than 50% spent in counseling regarding treatment of anxiety and medication adjustments.    Patient Status:  Patient will continue to be seen for ongoing consultation and stabilization.

## 2020-07-28 ASSESSMENT — ANXIETY QUESTIONNAIRES: GAD7 TOTAL SCORE: 10

## 2020-08-11 DIAGNOSIS — F41.1 GENERALIZED ANXIETY DISORDER: ICD-10-CM

## 2020-08-11 RX ORDER — LAMOTRIGINE 200 MG/1
200 TABLET ORAL DAILY
Qty: 180 TABLET | Refills: 1 | OUTPATIENT
Start: 2020-08-11

## 2020-08-11 NOTE — TELEPHONE ENCOUNTER
Declined refill request. Medication was filled on 6/29 with a 6 month supply and one refill.     lamoTRIgine (LAMICTAL) 200 MG tablet  180 tablet  1  6/29/2020   No    Sig - Route: Take 1 tablet (200 mg) by mouth daily - Oral    Sent to pharmacy as: lamoTRIgine 200 MG Oral Tablet (LaMICtal)    Class: E-Prescribe    Order: [id number]    E-Prescribing Status: Receipt confirmed by pharmacy (6/29/2020 11:22 AM CDT)

## 2020-08-11 NOTE — TELEPHONE ENCOUNTER
Last Rx written on 6/29/20 for #180 with 1 refill.  Patient has follow up on 8/24.   Can discuss refills at that time.  Patient should have enough medication if taken as prescribed.    Elise Yoder RN    Nurse Liaison  [facility]

## 2020-08-13 ENCOUNTER — TELEPHONE (OUTPATIENT)
Dept: BEHAVIORAL HEALTH | Facility: CLINIC | Age: 40
End: 2020-08-13

## 2020-08-13 NOTE — TELEPHONE ENCOUNTER
[facility] faxed a Prescription Request for:    lamoTRIgine (LAMICTAL) 150 MG tablet     DISCONTINUED     Last Written Prescription Date:  2/6/2020  Last Fill Quantity: 90 tablet,   # refills: 1  Last Office Visit: 12/12/2019 w/ LATOYA Lopez    Future Office visit:    Next 5 appointments (look out 90 days)    Aug 20, 2020  3:00 PM CDT  [facility]  [facility] ([address]  [place] [address]  [phone]           Routing refill request to provider for review/approval because:  Drug not active on patient's medication list

## 2020-08-13 NOTE — TELEPHONE ENCOUNTER
Refill request declined. Patient taking an alternate dose and was refilled on 6/29/20 with refills available.     lamoTRIgine (LAMICTAL) 200 MG tablet  180 tablet  1  6/29/2020   No    Sig - Route: Take 1 tablet (200 mg) by mouth daily - Oral    Sent to pharmacy as: lamoTRIgine 200 MG Oral Tablet (LaMICtal)    Class: E-Prescribe    Order: [id number]    E-Prescribing Status: Receipt confirmed by pharmacy (6/29/2020 11:22 AM CDT)

## 2020-08-24 ENCOUNTER — VIRTUAL VISIT (OUTPATIENT)
Dept: PSYCHOLOGY | Facility: CLINIC | Age: 40
End: 2020-08-24
Payer: COMMERCIAL

## 2020-08-24 DIAGNOSIS — F33.41 RECURRENT MAJOR DEPRESSION IN PARTIAL REMISSION (H): Primary | ICD-10-CM

## 2020-08-24 DIAGNOSIS — F41.1 GENERALIZED ANXIETY DISORDER: ICD-10-CM

## 2020-08-24 PROCEDURE — 99214 OFFICE O/P EST MOD 30 MIN: CPT | Mod: TEL | Performed by: PSYCHIATRY & NEUROLOGY

## 2020-08-24 ASSESSMENT — ANXIETY QUESTIONNAIRES
IF YOU CHECKED OFF ANY PROBLEMS ON THIS QUESTIONNAIRE, HOW DIFFICULT HAVE THESE PROBLEMS MADE IT FOR YOU TO DO YOUR WORK, TAKE CARE OF THINGS AT HOME, OR GET ALONG WITH OTHER PEOPLE: NOT DIFFICULT AT ALL
1. FEELING NERVOUS, ANXIOUS, OR ON EDGE: SEVERAL DAYS
GAD7 TOTAL SCORE: 4
7. FEELING AFRAID AS IF SOMETHING AWFUL MIGHT HAPPEN: SEVERAL DAYS
6. BECOMING EASILY ANNOYED OR IRRITABLE: NOT AT ALL
5. BEING SO RESTLESS THAT IT IS HARD TO SIT STILL: NOT AT ALL
3. WORRYING TOO MUCH ABOUT DIFFERENT THINGS: SEVERAL DAYS
2. NOT BEING ABLE TO STOP OR CONTROL WORRYING: SEVERAL DAYS

## 2020-08-24 ASSESSMENT — PATIENT HEALTH QUESTIONNAIRE - PHQ9
SUM OF ALL RESPONSES TO PHQ QUESTIONS 1-9: 2
5. POOR APPETITE OR OVEREATING: NOT AT ALL

## 2020-08-24 NOTE — PROGRESS NOTES
"Danyelle Canales is a 40 year old female who is being evaluated via a billable telephone visit.      The patient has been notified of following:     \"This telephone visit will be conducted via a call between you and your physician/provider. We have found that certain health care needs can be provided without the need for a physical exam.  This service lets us provide the care you need with a short phone conversation.  If a prescription is necessary we can send it directly to your pharmacy.  If lab work is needed we can place an order for that and you can then stop by our lab to have the test done at a later time.    Telephone visits are billed at different rates depending on your insurance coverage. During this emergency period, for some insurers they may be billed the same as an in-person visit.  Please reach out to your insurance provider with any questions.    If during the course of the call the physician/provider feels a telephone visit is not appropriate, you will not be charged for this service.\"    Patient has given verbal consent for Telephone visit?  Yes    What phone number would you like to be contacted at? [phone]    How would you like to obtain your AVS? [facility]        Outpatient Psychiatric Progress Note    Name: Danyelle Canales   : 1980                    Primary Care Provider: Katlyn Joiner DO   Therapist: [facility]       PHQ-9 scores:  PHQ-9 SCORE 2020   PHQ-9 Total Score - - -   PHQ-9 Total Score [facility] - - -   PHQ-9 Total Score 4 3 2       NAJMA-7 scores:  NAJMA-7 SCORE 2020   Total Score - - -   Total Score - - -   Total Score 10 10 4       Patient Identification:  Danyelle is a 40 year old year old,   White American female  who presents for return visit with me.  Patient attended the session alone.     Interim History:  Danyelle reports that her anxiety has improved since she started " "taking lorazepam twice daily.  She still has some chronic anxiety, but she is no longer having crying spells.    Her infant is now almost 3 months old and has been doing well.  He apparently had some reflux, and is on medications for it which has helped him to be happier.  She says that he is becoming more responsive, which \"makes it all worth it.\"    She has been working from home and takes the baby to his grandmother's for .  It has been helpful to have a break from caring for him 24/7.    She rates her mood today as 8 out of 10 with 10 being the best.  She does report some increased anxiety, especially when she is pumping.  She admits that she feels somewhat inadequate because she has not been able to fully breast-feed him.  She continues to see a therapist, and will address that issue with her.    Vital Signs:   Breastfeeding Yes     Current Medications:  Current Outpatient Medications   Medication     buPROPion (WELLBUTRIN XL) 300 MG 24 hr tablet     desvenlafaxine (PRISTIQ) 100 MG 24 hr tablet     lamoTRIgine (LAMICTAL) 200 MG tablet     LORazepam (ATIVAN) 0.5 MG tablet     Prenatal MV-Min-Fe Fum-FA-DHA (PRENATAL 1 PO)     Ferrous Sulfate (IRON PO)     polyethylene glycol (MIRALAX) 17 g packet     No current facility-administered medications for this visit.       Mental Status Examination:  Danyelle is a 40-year-old woman in no acute distress.  Speech is clear and normal in rate and tone.  Mood is mildly anxious.  Thoughts are logical and spontaneous with no looseness of associations or flight of ideas.  Thought content shows no psychosis.  She denies any suicidal ideation.  She is alert and oriented x3.    Assessment and Plan:    ICD-10-CM    1. Recurrent major depression in partial remission (H)  F33.41    2. Generalized anxiety disorder  F41.1        Medical comorbidities include:   Patient Active Problem List   Diagnosis     Insomnia     allergic DERMATITIS NOS     Encounter for other general " counseling or advice on contraception     [facility]     [facility]     Migraine headache     PMDD (premenstrual dysphoric disorder)     Trichotillomania     CARDIOVASCULAR SCREENING; LDL GOAL LESS THAN 160     History of ovarian cyst     Generalized anxiety disorder     Chronic pain syndrome     ASD (atrial septal defect)     Chronic pain     Esophageal reflux     Non morbid obesity, unspecified obesity type     Benign hypermobility syndrome     Myalgia and myositis, unspecified     Myofascial pain     Need for Tdap vaccination     Recurrent major depression in partial remission (H)     Term pregnancy      (spontaneous vaginal delivery)       Treatment Plan:  Patient Instructions   Continue lorazepam to 0.5 mg at bedtime and 0.5 mg daily as needed for anxiety.     Continue Wellbutrin  mg daily.     Continue desvenlafaxine 100 mg daily.     Continue lamotrigine 200 mg daily.     Continue all other medications per your primary care provider.    Schedule an appointment with me in 6 weeks or sooner as needed.  You may call [facility] at [phone] to schedule, or schedule at the .    [facility] Resources:      Go to the Emergency Department as needed or call after hours crisis line at [phone].      To schedule individual or family therapy, call [facility] at [phone].     Follow up with primary care provider as planned or sooner for acute medical concerns.    Call the psychiatric nurse line with medication questions or concerns at [phone].    Flomiohart may be used to communicate with your provider, but this is not intended to be used for emergencies.    Community Resources:      National Suicide Prevention Lifeline: 195.920.5684 (TTY: 323.221.1067). Call anytime for help.  (www.suicidepreventionlifeline.org)    National [place] on Mental Illness (www.telma.org): 133.468.4924 or 154-273-6727.     Mental Health Association  (www.mentalhealth.org): 121.170.6650 or 098-940-5361.    [place] Crisis Text Line: Text [place] to 088840    Suicide LifeLine Chat: suicidepreEdifilmline.org/chat     Administrative Billing:   Phone call duration: 18 minutes, greater than 50% spent in counseling regarding residual symptoms, medications, and treatment planning    Patient Status:  Patient will continue to be seen for ongoing consultation and stabilization.

## 2020-08-24 NOTE — PATIENT INSTRUCTIONS
Continue lorazepam to 0.5 mg at bedtime and 0.5 mg daily as needed for anxiety.     Continue Wellbutrin  mg daily.     Continue desvenlafaxine 100 mg daily.     Continue lamotrigine 200 mg daily.     Continue all other medications per your primary care provider.    Schedule an appointment with me in 6 weeks or sooner as needed.  You may call [facility] at [phone] to schedule, or schedule at the .    [facility] Resources:      Go to the Emergency Department as needed or call after hours crisis line at [phone].      To schedule individual or family therapy, call [facility] at [phone].     Follow up with primary care provider as planned or sooner for acute medical concerns.    Call the psychiatric nurse line with medication questions or concerns at [phone].    NICOhart may be used to communicate with your provider, but this is not intended to be used for emergencies.    Community Resources:      National Suicide Prevention Lifeline: 444.658.6043 (TTY: 385.466.9725). Call anytime for help.  (www.suicidepreventionlifeline.org)    National [place] on Mental Illness (www.telma.org): 223.798.1802 or 030-248-7200.     Mental Health Association (www.mentalhealth.org): 126.222.6102 or 778-392-9971.    [place] Crisis Text Line: Text [place] to 233690    Suicide LifeLine Chat: suicidepre7fgameline.org/chat

## 2020-08-25 ASSESSMENT — ANXIETY QUESTIONNAIRES: GAD7 TOTAL SCORE: 4

## 2020-09-10 ENCOUNTER — OFFICE VISIT (OUTPATIENT)
Dept: OBGYN | Facility: CLINIC | Age: 40
End: 2020-09-10
Payer: COMMERCIAL

## 2020-09-10 VITALS
BODY MASS INDEX: 31.64 KG/M2 | TEMPERATURE: 98.6 F | DIASTOLIC BLOOD PRESSURE: 94 MMHG | HEART RATE: 73 BPM | WEIGHT: 202 LBS | SYSTOLIC BLOOD PRESSURE: 150 MMHG

## 2020-09-10 DIAGNOSIS — Z30.430 ENCOUNTER FOR IUD INSERTION: Primary | ICD-10-CM

## 2020-09-10 LAB — HCG UR QL: NEGATIVE

## 2020-09-10 PROCEDURE — 81025 URINE PREGNANCY TEST: CPT | Performed by: OBSTETRICS & GYNECOLOGY

## 2020-09-10 PROCEDURE — 58300 INSERT INTRAUTERINE DEVICE: CPT | Performed by: OBSTETRICS & GYNECOLOGY

## 2020-09-10 RX ORDER — HYDROCODONE BITARTRATE AND ACETAMINOPHEN 5; 325 MG/1; MG/1
1 TABLET ORAL
COMMUNITY
Start: 2020-09-09 | End: 2021-01-26

## 2020-09-10 RX ORDER — CYCLOBENZAPRINE HCL 10 MG
10 TABLET ORAL
COMMUNITY
Start: 2020-09-09 | End: 2021-01-26

## 2020-09-10 RX ORDER — METHYLPREDNISOLONE 4 MG
TABLET, DOSE PACK ORAL
COMMUNITY
Start: 2020-09-09 | End: 2020-09-15

## 2020-09-10 NOTE — NURSING NOTE
"Chief Complaint   Patient presents with     Post Partum Exam     IUD     NDC:40837-738-95 LOT#:[id number] EXP:2022       Initial BP (!) 150/94   Pulse 73   Temp 98.6  F (37  C) (Oral)   Wt 91.6 kg (202 lb)   BMI 31.64 kg/m   Estimated body mass index is 31.64 kg/m  as calculated from the following:    Height as of 20: 1.702 m (5' 7\").    Weight as of this encounter: 91.6 kg (202 lb).  BP completed using cuff size: regular    Questioned patient about current smoking habits.  Pt. has never smoked.          The following HM Due: NONE      The following patient reported/Care Every where data was sent to:  P ABSTRACT QUALITY INITIATIVES [95684]        n/a              "

## 2020-09-11 NOTE — PROGRESS NOTES
IUD INSERTION PROCEDURE    Danyelle Canales is a 40 year old female  who presents for Mirena IUD insertion.  Indication for IUD insertion is contraception.  No LMP recorded. (Menstrual status: Breast Feeding). .  The patient is currently using none for contraception.  She is in a monogamous sexual relationship.     Lab Results   Component Value Date    PAP NIL 2018       Results for orders placed or performed in visit on 09/10/20   HCG Qual, Urine ([id number])     Status: None   Result Value Ref Range    HCG Qual Urine Negative NEG^Negative       A complete discussion of the risks and benefits of IUD use and the details of the insertion procedure was held with the patient.    All questions were answered.  A consent form was signed.    Prior to the beginning of the procedure the team paused to verify the patient's identity, as well as the procedure to be performed and the site.  All equipment required was ready and available. The patient was positioned appropriately.     IUD Lot # [id number] LOT#:[id number] EXP:2022    The patient was placed in low lithotomy.  A bimanual exam was performed and the uterus noted to be anteverted.  A speculum was placed and the cervix swabbed with Betadine.  A tenaculum was placed on the anterior cervical lip. A paracervical block was not performed.  The fundus sounded to 8 cm. The Mirena IUD was placed to the uterine fundus without difficulty.  The strings were cut to 3 cms.  The tenaculum was removed and hemostasis was ensured.  The speculum was removed.  The patient tolerated the procedure well.    PLAN:   The patient was asked to contact the clinic for any fever/chills/severe pelvic or abdominal pain or heavy bleeding. She was instructed in how to palpate her IUD strings. She plans string check in 4 weeks.   Prior granulation tissue is resolved.     FOLLOW-UP:  She was asked to follow up prn, and for her routine annual screening.

## 2020-09-14 ENCOUNTER — HOSPITAL ENCOUNTER (OUTPATIENT)
Dept: PHYSICAL MEDICINE AND REHAB | Facility: CLINIC | Age: 40
Discharge: HOME | End: 2020-09-14
Attending: EMERGENCY MEDICINE

## 2020-09-14 DIAGNOSIS — M79.18 MYOFASCIAL PAIN: ICD-10-CM

## 2020-09-14 DIAGNOSIS — M54.2 CHRONIC NECK PAIN: ICD-10-CM

## 2020-09-14 DIAGNOSIS — M54.12 RADICULITIS OF RIGHT CERVICAL REGION: ICD-10-CM

## 2020-09-14 DIAGNOSIS — G89.29 CHRONIC NECK PAIN: ICD-10-CM

## 2020-09-14 DIAGNOSIS — R29.2 HYPERREFLEXIA: ICD-10-CM

## 2020-09-14 ASSESSMENT — MIFFLIN-ST. JEOR: SCORE: 1600.75

## 2020-09-18 ENCOUNTER — OFFICE VISIT (OUTPATIENT)
Dept: FAMILY MEDICINE | Facility: CLINIC | Age: 40
End: 2020-09-18
Payer: COMMERCIAL

## 2020-09-18 VITALS
HEIGHT: 67 IN | SYSTOLIC BLOOD PRESSURE: 132 MMHG | DIASTOLIC BLOOD PRESSURE: 82 MMHG | WEIGHT: 202 LBS | BODY MASS INDEX: 31.71 KG/M2

## 2020-09-18 DIAGNOSIS — Z00.00 ROUTINE GENERAL MEDICAL EXAMINATION AT A HEALTH CARE FACILITY: Primary | ICD-10-CM

## 2020-09-18 DIAGNOSIS — F33.41 RECURRENT MAJOR DEPRESSION IN PARTIAL REMISSION (H): ICD-10-CM

## 2020-09-18 DIAGNOSIS — M65.4 DE QUERVAIN'S DISEASE (TENOSYNOVITIS): ICD-10-CM

## 2020-09-18 DIAGNOSIS — F63.3 TRICHOTILLOMANIA: ICD-10-CM

## 2020-09-18 PROCEDURE — 90471 IMMUNIZATION ADMIN: CPT | Performed by: FAMILY MEDICINE

## 2020-09-18 PROCEDURE — 90686 IIV4 VACC NO PRSV 0.5 ML IM: CPT | Performed by: FAMILY MEDICINE

## 2020-09-18 PROCEDURE — 99395 PREV VISIT EST AGE 18-39: CPT | Mod: 25 | Performed by: FAMILY MEDICINE

## 2020-09-18 ASSESSMENT — ENCOUNTER SYMPTOMS
FREQUENCY: 0
MYALGIAS: 1
WEAKNESS: 0
ABDOMINAL PAIN: 0
DIARRHEA: 0
NERVOUS/ANXIOUS: 1
JOINT SWELLING: 0
DIZZINESS: 1
DYSURIA: 0
HEADACHES: 0
CHILLS: 0
FEVER: 0
BREAST MASS: 0
NAUSEA: 0
SORE THROAT: 0
HEARTBURN: 0
COUGH: 0
ARTHRALGIAS: 0
CONSTIPATION: 0
PALPITATIONS: 0
HEMATOCHEZIA: 0
PARESTHESIAS: 0
EYE PAIN: 0
HEMATURIA: 0
SHORTNESS OF BREATH: 0

## 2020-09-18 ASSESSMENT — MIFFLIN-ST. JEOR: SCORE: 1623.9

## 2020-09-18 NOTE — PROGRESS NOTES
SUBJECTIVE:   CC: Danyelle Canales is an 40 year old woman who presents for preventive health visit.       Patient has been advised of split billing requirements and indicates understanding: Yes     Healthy Habits:     Getting at least 3 servings of Calcium per day:  Yes    Bi-annual eye exam:  Yes    Dental care twice a year:  Yes    Sleep apnea or symptoms of sleep apnea:  None    Diet:  Carbohydrate counting    Frequency of exercise:  1 day/week    Duration of exercise:  15-30 minutes    Taking medications regularly:  Yes    Medication side effects:  None    PHQ-2 Total Score: 2    Additional concerns today:  Yes    form for insurance     Right handed  De quervain on left wrist-  Notice this 1-2 months after her new born(male 3 months). Thumb will get stuck and pops. Wearing thumb wrist brace and exercises . Helping              Today's PHQ-2 Score:   PHQ-2 ( 1999 Pfizer) 9/18/2020   Q1: Little interest or pleasure in doing things 0   Q2: Feeling down, depressed or hopeless 2   PHQ-2 Score 2   Q1: Little interest or pleasure in doing things Not at all   Q2: Feeling down, depressed or hopeless More than half the days   PHQ-2 Score 2       Abuse: Current or Past (Physical, Sexual or Emotional) - No  Do you feel safe in your environment? Yes        Social History     Tobacco Use     Smoking status: Never Smoker     Smokeless tobacco: Never Used   Substance Use Topics     Alcohol use: Not Currently     Comment: breast feeding      If you drink alcohol do you typically have >3 drinks per day or >7 drinks per week? No    Alcohol Use 9/18/2020   Prescreen: >3 drinks/day or >7 drinks/week? Not Applicable   Prescreen: >3 drinks/day or >7 drinks/week? -   No flowsheet data found.    Reviewed orders with patient.  Reviewed health maintenance and updated orders accordingly - Yes  BP Readings from Last 3 Encounters:   09/18/20 132/82   09/10/20 (!) 150/94   07/16/20 (!) 135/91    Wt Readings from Last 3 Encounters:  "  09/18/20 91.6 kg (202 lb)   09/10/20 91.6 kg (202 lb)   07/16/20 86.2 kg (190 lb)                    Mammogram not appropriate for this patient based on age.    Pertinent mammograms are reviewed under the imaging tab.  History of abnormal Pap smear: NO - age 30-65 PAP every 5 years with negative HPV co-testing recommended  PAP / HPV Latest Ref Rng & Units 8/20/2018 9/16/2015 9/26/2012   PAP - NIL NIL NIL   HPV 16 DNA NEG:Negative Negative - -   HPV 18 DNA NEG:Negative Negative - -   OTHER HR HPV NEG:Negative Negative - -     Reviewed and updated as needed this visit by clinical staff  Tobacco  Allergies  Meds  Med Hx  Surg Hx  Fam Hx  Soc Hx        Reviewed and updated as needed this visit by Provider            Review of Systems   Constitutional: Negative for chills and fever.   HENT: Negative for congestion, ear pain, hearing loss and sore throat.    Eyes: Negative for pain and visual disturbance.   Respiratory: Negative for cough and shortness of breath.    Cardiovascular: Negative for chest pain, palpitations and peripheral edema.   Gastrointestinal: Negative for abdominal pain, constipation, diarrhea, heartburn, hematochezia and nausea.   Breasts:  Negative for tenderness, breast mass and discharge.   Genitourinary: Negative for dysuria, frequency, genital sores, hematuria, pelvic pain, urgency, vaginal bleeding and vaginal discharge.   Musculoskeletal: Positive for myalgias. Negative for arthralgias and joint swelling.   Skin: Negative for rash.   Neurological: Positive for dizziness. Negative for weakness, headaches and paresthesias.   Psychiatric/Behavioral: Negative for mood changes. The patient is nervous/anxious.           OBJECTIVE:   /82 (Cuff Size: Adult Large)   Ht 1.702 m (5' 7\")   Wt 91.6 kg (202 lb)   BMI 31.64 kg/m    Physical Exam  GENERAL: healthy, alert and no distress  EYES: Eyes grossly normal to inspection, PERRL and conjunctivae and sclerae normal  HENT: ear canals and TM's " "normal, nose and mouth without ulcers or lesions  NECK: no adenopathy, no asymmetry, masses, or scars and thyroid normal to palpation  RESP: lungs clear to auscultation - no rales, rhonchi or wheezes  BREAST: normal without masses, tenderness or nipple discharge and no palpable axillary masses or adenopathy  CV: regular rate and rhythm, normal S1 S2, no S3 or S4, no murmur, click or rub, no peripheral edema and peripheral pulses strong  ABDOMEN: soft, nontender, no hepatosplenomegaly, no masses and bowel sounds normal  MS: LUE exam shows normal strength and muscle mass, ROM of all joints is normal and positive Finkelstein's test  SKIN: Many large nevi and seborrheic keratoses  NEURO: Normal strength and tone, mentation intact and speech normal  PSYCH: mentation appears normal, affect normal/bright    Diagnostic Test Results:  Labs reviewed in Epic    ASSESSMENT/PLAN:   Danyelle was seen today for physical.    Diagnoses and all orders for this visit:    Routine general medical examination at a health care facility    De Quervain's disease (tenosynovitis)    Recurrent major depression in partial remission (H)    Trichotillomania    Other orders  -     INFLUENZA VACCINE IM > 6 MONTHS VALENT IIV4 [49560]    The patient was given information about de Quervain's tendinitis.  I told her to talk to her psychiatrist about treating her trichotillomania with N-acetylcysteine.    Patient has been advised of split billing requirements and indicates understanding: Yes  COUNSELING:  Reviewed preventive health counseling, as reflected in patient instructions    Estimated body mass index is 31.64 kg/m  as calculated from the following:    Height as of this encounter: 1.702 m (5' 7\").    Weight as of this encounter: 91.6 kg (202 lb).        She reports that she has never smoked. She has never used smokeless tobacco.      Counseling Resources:  ATP IV Guidelines  Pooled Cohorts Equation Calculator  Breast Cancer Risk " Calculator  BRCA-Related Cancer Risk Assessment: FHS-7 Tool  FRAX Risk Assessment  ICSI Preventive Guidelines  Dietary Guidelines for Americans, 2010  USDA's MyPlate  ASA Prophylaxis  Lung CA Screening    Maryann Eisenberg DO  [facility]

## 2020-09-18 NOTE — PATIENT INSTRUCTIONS
Preventive Health Recommendations  Female Ages 26 - 39  Yearly exam:   See your health care provider every year in order to    Review health changes.     Discuss preventive care.      Review your medicines if you your doctor has prescribed any.    Until age 30: Get a Pap test every three years (more often if you have had an abnormal result).    After age 30: Talk to your doctor about whether you should have a Pap test every 3 years or have a Pap test with HPV screening every 5 years.   You do not need a Pap test if your uterus was removed (hysterectomy) and you have not had cancer.  You should be tested each year for STDs (sexually transmitted diseases), if you're at risk.   Talk to your provider about how often to have your cholesterol checked.  If you are at risk for diabetes, you should have a diabetes test (fasting glucose).  Shots: Get a flu shot each year. Get a tetanus shot every 10 years.   Nutrition:     Eat at least 5 servings of fruits and vegetables each day.    Eat whole-grain bread, whole-wheat pasta and brown rice instead of white grains and rice.    Get adequate Calcium and Vitamin D.     Lifestyle    Exercise at least 150 minutes a week (30 minutes a day, 5 days of the week). This will help you control your weight and prevent disease.    Limit alcohol to one drink per day.    No smoking.     Wear sunscreen to prevent skin cancer.    See your dentist every six months for an exam and cleaning.            Search Results  Web results    N-acetylcysteine for Trichotillomania, Skin Picking, and Nail ...  www.bfrb.org   kdxax-[id number]   [id number]...    N-acetylcysteine (NAC) as a pharmacological treatment for Body-Focused Repetitive Behaviors (BFRBs) such as trichotillomania (hair pulling), skin picking ...    People also ask    Does NAC help trichotillomania?      How long does NAC take to work for trichotillomania?      How does NAC affect glutamate?      Does NAC cause stomach  upset?        Feedback  Web results    N-acetylcysteine in the Treatment of Trichotillomania - [facility]  www.ncbi.nlm.nih.gov   pmc   articles   [id number]    N-acetylcysteine in the Treatment of Trichotillomania. Jessica Joshi, Isaura Boston, [...], and Niles Castelan. Additional article ...  by MICHAEL Joshi - ?2012 - ?Cited by 53 - ?Related articles  ?Abstract   ?INTRODUCTION   ?CASE REPORT   ?DISCUSSION      N-acetylcysteine, a glutamate modulator, in the treatment of ...  pubmed.ncbi.nlm.nih.gov   ...    Data on the pharmacologic treatment of trichotillomania are limited to conflicting studies of serotonergic medications. N-acetylcysteine, an amino acid, seems to ...  by SUSANA Costa - ?2009 - ?Cited by 410 - ?Related articles      N-Acetylcysteine for Pediatric Trichotillom  Patient Education     Treating De Quervain Tenosynovitis     The surgery releases the protective sheath, creating more space for the tendons. This allows them to move more freely and relieves inflammation.     The goal of your treatment is to ease your pain and allow you to use your thumb again. Treatment will depend on how bad the pain is.  Nonsurgical Treatment  Just taking a break from the activities that caused your pain may be enough. Your healthcare provider may also have you take nonsteroidal, anti-inflammatory medicine (NSAIDs), such as ibuprofen. Or you may wear a splint for a few weeks to rest the thumb and wrist. To lesson swelling, your healthcare provider may inject an anti-inflammatory medicine, such as cortisone, around the tendons. You may have more pain at first. But in a few days your thumb should feel better.  Surgery  If other kinds of treatment don t ease your pain, or if the pain is bad, you may need surgery. The sheath that surrounds the tendons is released so the tendons can move more easily. This helps reduce the inflammation. It also allows you to straighten your thumb without pain. Surgery  is done with local or regional anesthetic on an outpatient basis. So you can go home the same day. You will likely have a splint or dressing on your wrist for a few days while the tissue heals. You may need physical therapy to help strengthen muscles. Your healthcare provider will talk with you about the risks and possible complications of surgery.  Date Last Reviewed: 1/1/2018 2000-2019 [employer]. [address] [place] [address], [place] [address]. All rights reserved. This information is not intended as a substitute for professional medical care. Always follow your healthcare professional's instructions.

## 2020-09-25 ENCOUNTER — HOSPITAL ENCOUNTER (OUTPATIENT)
Dept: MRI IMAGING | Facility: HOSPITAL | Age: 40
Discharge: HOME | End: 2020-09-25

## 2020-09-25 DIAGNOSIS — M54.12 RADICULITIS OF RIGHT CERVICAL REGION: ICD-10-CM

## 2020-09-25 DIAGNOSIS — G89.29 CHRONIC NECK PAIN: ICD-10-CM

## 2020-09-25 DIAGNOSIS — R29.2 HYPERREFLEXIA: ICD-10-CM

## 2020-09-25 DIAGNOSIS — M54.2 CHRONIC NECK PAIN: ICD-10-CM

## 2020-09-28 ENCOUNTER — COMMUNICATION - HEALTHEAST (OUTPATIENT)
Dept: PHYSICAL MEDICINE AND REHAB | Facility: CLINIC | Age: 40
End: 2020-09-28

## 2020-09-29 ENCOUNTER — HOSPITAL ENCOUNTER (OUTPATIENT)
Dept: PHYSICAL MEDICINE AND REHAB | Facility: CLINIC | Age: 40
Discharge: HOME | End: 2020-09-29
Attending: NURSE PRACTITIONER

## 2020-09-29 DIAGNOSIS — M54.2 CHRONIC NECK PAIN: ICD-10-CM

## 2020-09-29 DIAGNOSIS — G89.29 CHRONIC NECK PAIN: ICD-10-CM

## 2020-09-29 DIAGNOSIS — M50.20 PROTRUSION OF CERVICAL INTERVERTEBRAL DISC: ICD-10-CM

## 2020-09-29 DIAGNOSIS — M54.2 CERVICALGIA: ICD-10-CM

## 2020-09-29 DIAGNOSIS — M54.12 RADICULITIS OF RIGHT CERVICAL REGION: ICD-10-CM

## 2020-09-29 DIAGNOSIS — M79.18 MYOFASCIAL PAIN: ICD-10-CM

## 2020-09-29 DIAGNOSIS — R29.2 HYPERREFLEXIA: ICD-10-CM

## 2020-09-29 DIAGNOSIS — M48.02 CERVICAL STENOSIS OF SPINAL CANAL: ICD-10-CM

## 2020-09-29 DIAGNOSIS — M54.12 CERVICAL RADICULOPATHY: ICD-10-CM

## 2020-09-29 DIAGNOSIS — M43.6 TORTICOLLIS: ICD-10-CM

## 2020-09-29 DIAGNOSIS — M50.30 DDD (DEGENERATIVE DISC DISEASE), CERVICAL: ICD-10-CM

## 2020-10-12 ENCOUNTER — HOSPITAL ENCOUNTER (OUTPATIENT)
Dept: PHYSICAL MEDICINE AND REHAB | Facility: CLINIC | Age: 40
Discharge: HOME | End: 2020-10-12
Attending: PAIN MEDICINE

## 2020-10-12 DIAGNOSIS — M54.12 RADICULITIS OF RIGHT CERVICAL REGION: ICD-10-CM

## 2020-10-12 DIAGNOSIS — G89.29 CHRONIC NECK PAIN: ICD-10-CM

## 2020-10-12 DIAGNOSIS — M54.2 CERVICALGIA: ICD-10-CM

## 2020-10-12 DIAGNOSIS — M54.2 CHRONIC NECK PAIN: ICD-10-CM

## 2020-10-12 DIAGNOSIS — M50.20 PROTRUSION OF CERVICAL INTERVERTEBRAL DISC: ICD-10-CM

## 2020-10-12 DIAGNOSIS — M48.02 CERVICAL STENOSIS OF SPINAL CANAL: ICD-10-CM

## 2020-10-13 ENCOUNTER — VIRTUAL VISIT (OUTPATIENT)
Dept: PSYCHOLOGY | Facility: CLINIC | Age: 40
End: 2020-10-13
Payer: COMMERCIAL

## 2020-10-13 DIAGNOSIS — F41.1 GENERALIZED ANXIETY DISORDER: Primary | ICD-10-CM

## 2020-10-13 DIAGNOSIS — F33.41 RECURRENT MAJOR DEPRESSION IN PARTIAL REMISSION (H): ICD-10-CM

## 2020-10-13 PROCEDURE — 99214 OFFICE O/P EST MOD 30 MIN: CPT | Mod: TEL | Performed by: PSYCHIATRY & NEUROLOGY

## 2020-10-13 RX ORDER — PROPRANOLOL HYDROCHLORIDE 10 MG/1
10 TABLET ORAL 2 TIMES DAILY
Qty: 60 TABLET | Refills: 1 | Status: SHIPPED | OUTPATIENT
Start: 2020-10-13 | End: 2020-12-16

## 2020-10-13 RX ORDER — LORAZEPAM 0.5 MG/1
TABLET ORAL
Qty: 60 TABLET | Refills: 2 | Status: SHIPPED | OUTPATIENT
Start: 2020-10-13 | End: 2020-12-16

## 2020-10-13 ASSESSMENT — ANXIETY QUESTIONNAIRES
1. FEELING NERVOUS, ANXIOUS, OR ON EDGE: MORE THAN HALF THE DAYS
GAD7 TOTAL SCORE: 9
7. FEELING AFRAID AS IF SOMETHING AWFUL MIGHT HAPPEN: SEVERAL DAYS
IF YOU CHECKED OFF ANY PROBLEMS ON THIS QUESTIONNAIRE, HOW DIFFICULT HAVE THESE PROBLEMS MADE IT FOR YOU TO DO YOUR WORK, TAKE CARE OF THINGS AT HOME, OR GET ALONG WITH OTHER PEOPLE: SOMEWHAT DIFFICULT
3. WORRYING TOO MUCH ABOUT DIFFERENT THINGS: NEARLY EVERY DAY
5. BEING SO RESTLESS THAT IT IS HARD TO SIT STILL: NOT AT ALL
2. NOT BEING ABLE TO STOP OR CONTROL WORRYING: NEARLY EVERY DAY
6. BECOMING EASILY ANNOYED OR IRRITABLE: NOT AT ALL

## 2020-10-13 ASSESSMENT — PATIENT HEALTH QUESTIONNAIRE - PHQ9
SUM OF ALL RESPONSES TO PHQ QUESTIONS 1-9: 1
5. POOR APPETITE OR OVEREATING: NOT AT ALL

## 2020-10-13 NOTE — PROGRESS NOTES
"Danyelle Canales is a 40 year old female who is being evaluated via a billable telephone visit.      The patient has been notified of following:     \"This telephone visit will be conducted via a call between you and your physician/provider. We have found that certain health care needs can be provided without the need for a physical exam.  This service lets us provide the care you need with a short phone conversation.  If a prescription is necessary we can send it directly to your pharmacy.  If lab work is needed we can place an order for that and you can then stop by our lab to have the test done at a later time.    Telephone visits are billed at different rates depending on your insurance coverage. During this emergency period, for some insurers they may be billed the same as an in-person visit.  Please reach out to your insurance provider with any questions.    If during the course of the call the physician/provider feels a telephone visit is not appropriate, you will not be charged for this service.\"    Patient has given verbal consent for Telephone visit?  Yes    What phone number would you like to be contacted at? [phone]    How would you like to obtain your AVS? Rebekaht        Outpatient Psychiatric Progress Note    Name: Danyelle Canales   : 1980                    Primary Care Provider: Katlyn Joiner DO   Therapist: Denice Haji [facility]        PHQ-9 scores:  PHQ-9 SCORE 2020 2020 10/13/2020   PHQ-9 Total Score - - -   PHQ-9 Total Score [facility] - - -   PHQ-9 Total Score 3 2 1       NAJMA-7 scores:  NAJMA-7 SCORE 2020 2020 10/13/2020   Total Score - - -   Total Score - - -   Total Score 10 4 9       Patient Identification:  Danyelel is a 40 year old year old,   White American female  who presents for return visit with me.  Patient attended the session alone.     Interim History:  Danyelle reports that she continues to be \"fairly anxious.\"  She " "is worrying less about random things now and more worried about specific things.  For instance, her son is now starting to roll over, so she is worried that he will suffocate at night while he is sleeping.  She has seen me in a crib with only a fitted sheet on it and he is sleeping in a sleep sac.  She also reports that she may be getting a new position at work, which is causing her some anxiety, although she is also excited about the possibility of change.  Her father, who she views as being quite anxious, told her that she \"worries too much.\"  She was actually tearful when telling me about that.    She was recently seen in the emergency room with torticollis.  This seems to be due to a pinched nerve in her neck.  She has known degenerative disc disease.    After discussion of risks, benefits, and alternatives, we agreed to a trial of propranolol 10 mg up to twice a day to help with her anxiety.  She will start with 10 mg at bedtime.  She was prescribed labetalol following her son's birth, but did not tolerate it because she developed low blood pressure.  She was on 200 mg of labetalol which is antihypertensive dose.  The 10 mg of propranolol is really more appropriate for anxiety.    Vital Signs:   Breastfeeding Yes    No vital signs taken for this encounter.    Current Medications:  Current Outpatient Medications   Medication     buPROPion (WELLBUTRIN XL) 300 MG 24 hr tablet     desvenlafaxine (PRISTIQ) 100 MG 24 hr tablet     lamoTRIgine (LAMICTAL) 200 MG tablet     LORazepam (ATIVAN) 0.5 MG tablet     Prenatal MV-Min-Fe Fum-FA-DHA (PRENATAL 1 PO)     propranolol (INDERAL) 10 MG tablet     cyclobenzaprine (FLEXERIL) 10 MG tablet     HYDROcodone-acetaminophen (NORCO) 5-325 MG tablet     levonorgestrel (MIRENA) 20 MCG/24HR IUD     No current facility-administered medications for this visit.       Mental Status Examination:  Danyelle is a 40-year-old woman in mild emotional distress.  Speech is clear and normal in " rate and tone.  Mood is anxious.  Thoughts are logical and spontaneous with no loose associations or flight of ideas.  Thought content shows no psychosis.  No suicidal ideation.  She is alert and oriented x3.    Assessment and Plan:    ICD-10-CM    1. Generalized anxiety disorder  F41.1 propranolol (INDERAL) 10 MG tablet     LORazepam (ATIVAN) 0.5 MG tablet   2. Recurrent major depression in partial remission (H)  F33.41        Medical comorbidities include:   Patient Active Problem List   Diagnosis     Insomnia     allergic DERMATITIS NOS     Encounter for other general counseling or advice on contraception     [facility]     [facility]     Migraine headache     PMDD (premenstrual dysphoric disorder)     Trichotillomania     CARDIOVASCULAR SCREENING; LDL GOAL LESS THAN 160     History of ovarian cyst     Generalized anxiety disorder     Chronic pain syndrome     ASD (atrial septal defect)     Chronic pain     Esophageal reflux     Non morbid obesity, unspecified obesity type     Benign hypermobility syndrome     Myalgia and myositis, unspecified     Myofascial pain     Need for Tdap vaccination     Recurrent major depression in partial remission (H)     Term pregnancy      (spontaneous vaginal delivery)       Treatment Plan:  Patient Instructions   Start propranolol 10 mg at bedtime.  If tolerated, you may increase it to 10 mg twice a day.    Continue all other medications per your primary care provider.    Schedule an appointment with me in 4 weeks or sooner as needed.  You may call [facility] at [phone] to schedule, or schedule at the .    [facility] Resources:      Go to the Emergency Department as needed or call after hours crisis line at [phone].      To schedule individual or family therapy, call [facility] at [phone].     Follow up with primary care provider as planned or sooner for acute medical concerns.    Call the  psychiatric nurse line with medication questions or concerns at [phone].    MyChart may be used to communicate with your provider, but this is not intended to be used for emergencies.    Community Resources:      National Suicide Prevention Lifeline: 292.585.4239 (TTY: 557.347.5356). Call anytime for help.  (www.suicidepreventionlifeline.org)    National [place] on Mental Illness (www.telma.org): 295.941.7544 or 612-247-6987.     Mental Health Association (www.mentalhealth.org): 535.106.1596 or 412-004-5167.    [place] Crisis Text Line: Text [place] to 965140    Suicide LifeLine Chat: suicideNumerousline.org/chat       Administrative Billing:   Phone call duration: 27 minutes  Total time: 37 minutes    Patient Status:  Patient will continue to be seen for ongoing consultation and stabilization.

## 2020-10-13 NOTE — PATIENT INSTRUCTIONS
Start propranolol 10 mg at bedtime.  If tolerated, you may increase it to 10 mg twice a day.    Continue all other medications per your primary care provider.    Schedule an appointment with me in 4 weeks or sooner as needed.  You may call [facility] at [phone] to schedule, or schedule at the .    [facility] Resources:      Go to the Emergency Department as needed or call after hours crisis line at [phone].      To schedule individual or family therapy, call [facility] at [phone].     Follow up with primary care provider as planned or sooner for acute medical concerns.    Call the psychiatric nurse line with medication questions or concerns at [phone].    Social Touch may be used to communicate with your provider, but this is not intended to be used for emergencies.    Community Resources:      National Suicide Prevention Lifeline: 674.610.7035 (TTY: 905.592.2314). Call anytime for help.  (www.suicidepreventionlifeline.org)    National [place] on Mental Illness (www.telma.org): 756.586.9064 or 254-098-2475.     Mental Health Association (www.mentalhealth.org): 297.821.5712 or 001-731-9373.    [place] Crisis Text Line: Text [place] to 749043    Suicide LifeLine Chat: suicidepreventionlifeline.org/chat

## 2020-10-14 ASSESSMENT — ANXIETY QUESTIONNAIRES: GAD7 TOTAL SCORE: 9

## 2020-10-16 ENCOUNTER — COMMUNICATION - HEALTHEAST (OUTPATIENT)
Dept: ADMINISTRATIVE | Facility: CLINIC | Age: 40
End: 2020-10-16

## 2020-10-23 ENCOUNTER — COMMUNICATION - HEALTHEAST (OUTPATIENT)
Dept: PHYSICAL MEDICINE AND REHAB | Facility: CLINIC | Age: 40
End: 2020-10-23

## 2020-10-23 DIAGNOSIS — M50.20 PROTRUSION OF CERVICAL INTERVERTEBRAL DISC: ICD-10-CM

## 2020-10-23 DIAGNOSIS — M43.6 TORTICOLLIS: ICD-10-CM

## 2020-10-23 DIAGNOSIS — G89.29 CHRONIC NECK PAIN: ICD-10-CM

## 2020-10-23 DIAGNOSIS — M54.2 CHRONIC NECK PAIN: ICD-10-CM

## 2020-10-23 DIAGNOSIS — R29.2 HYPERREFLEXIA: ICD-10-CM

## 2020-10-23 DIAGNOSIS — M79.18 MYOFASCIAL PAIN: ICD-10-CM

## 2020-10-23 DIAGNOSIS — M54.12 RADICULITIS OF RIGHT CERVICAL REGION: ICD-10-CM

## 2020-10-23 DIAGNOSIS — M48.02 CERVICAL STENOSIS OF SPINAL CANAL: ICD-10-CM

## 2020-10-23 DIAGNOSIS — M50.30 DDD (DEGENERATIVE DISC DISEASE), CERVICAL: ICD-10-CM

## 2020-10-28 ENCOUNTER — HOSPITAL ENCOUNTER (OUTPATIENT)
Dept: PHYSICAL MEDICINE AND REHAB | Facility: CLINIC | Age: 40
Discharge: HOME | End: 2020-10-28
Attending: NURSE PRACTITIONER

## 2020-10-28 DIAGNOSIS — M43.6 TORTICOLLIS: ICD-10-CM

## 2020-10-28 DIAGNOSIS — M54.2 CHRONIC NECK PAIN: ICD-10-CM

## 2020-10-28 DIAGNOSIS — M48.02 CERVICAL STENOSIS OF SPINAL CANAL: ICD-10-CM

## 2020-10-28 DIAGNOSIS — G89.29 CHRONIC NECK PAIN: ICD-10-CM

## 2020-10-28 DIAGNOSIS — M79.18 MYOFASCIAL PAIN: ICD-10-CM

## 2020-10-28 DIAGNOSIS — M54.12 RADICULITIS OF RIGHT CERVICAL REGION: ICD-10-CM

## 2020-10-28 DIAGNOSIS — R29.2 HYPERREFLEXIA: ICD-10-CM

## 2020-10-28 DIAGNOSIS — M50.20 PROTRUSION OF CERVICAL INTERVERTEBRAL DISC: ICD-10-CM

## 2020-11-03 ENCOUNTER — OFFICE VISIT - HEALTHEAST (OUTPATIENT)
Dept: PHYSICAL THERAPY | Facility: REHABILITATION | Age: 40
End: 2020-11-03

## 2020-11-03 DIAGNOSIS — M54.12 CERVICAL RADICULITIS: ICD-10-CM

## 2020-11-03 DIAGNOSIS — M54.2 CERVICALGIA: ICD-10-CM

## 2020-11-18 ENCOUNTER — OFFICE VISIT - HEALTHEAST (OUTPATIENT)
Dept: PHYSICAL THERAPY | Facility: REHABILITATION | Age: 40
End: 2020-11-18

## 2020-11-18 DIAGNOSIS — M54.12 CERVICAL RADICULITIS: ICD-10-CM

## 2020-11-18 DIAGNOSIS — M54.2 CERVICALGIA: ICD-10-CM

## 2020-11-19 ENCOUNTER — TELEPHONE (OUTPATIENT)
Dept: OBGYN | Facility: CLINIC | Age: 40
End: 2020-11-19

## 2020-11-19 NOTE — TELEPHONE ENCOUNTER
TC to patient. No answer. Patient advised if bleeding is heavy today she will need to reschedule. If it is light/spotting she can keep appointment. LMTC if she needs to reschedule. Alesha Guthrie RN

## 2020-11-19 NOTE — TELEPHONE ENCOUNTER
Patient called and stated that she has an appointment with Dr. Prescott scheduled for later this afternoon for an IUD placement check but she just started her period last night and was wondering if she should reschedule. I informed her that it would most likely be the provider's preference to get rescheduled for after her period ends, or at least at the very end of it.     Offered patient AO's next available at [place] (preferred clinic) which was 12/10/2020. Patient didn't want to schedule something that far out so I informed patient I could sent a message to RN team to see if they know of a sooner appt time in AO's [place] schedule to book. No RNs in clinic at time of call.

## 2020-12-01 ENCOUNTER — OFFICE VISIT (OUTPATIENT)
Dept: OBGYN | Facility: CLINIC | Age: 40
End: 2020-12-01
Payer: COMMERCIAL

## 2020-12-01 ENCOUNTER — MYC MEDICAL ADVICE (OUTPATIENT)
Dept: OBGYN | Facility: CLINIC | Age: 40
End: 2020-12-01

## 2020-12-01 VITALS
OXYGEN SATURATION: 96 % | HEART RATE: 70 BPM | SYSTOLIC BLOOD PRESSURE: 136 MMHG | DIASTOLIC BLOOD PRESSURE: 92 MMHG | WEIGHT: 215 LBS | BODY MASS INDEX: 33.67 KG/M2

## 2020-12-01 DIAGNOSIS — Z30.432 ENCOUNTER FOR IUD REMOVAL: Primary | ICD-10-CM

## 2020-12-01 DIAGNOSIS — Z30.41 ORAL CONTRACEPTIVE PILL SURVEILLANCE: Primary | ICD-10-CM

## 2020-12-01 DIAGNOSIS — Z30.41 ORAL CONTRACEPTIVE PILL SURVEILLANCE: ICD-10-CM

## 2020-12-01 PROCEDURE — 99213 OFFICE O/P EST LOW 20 MIN: CPT | Mod: 25 | Performed by: OBSTETRICS & GYNECOLOGY

## 2020-12-01 PROCEDURE — 58301 REMOVE INTRAUTERINE DEVICE: CPT | Performed by: OBSTETRICS & GYNECOLOGY

## 2020-12-01 RX ORDER — NORETHINDRONE ACETATE AND ETHINYL ESTRADIOL .02; 1 MG/1; MG/1
1 TABLET ORAL DAILY
Qty: 84 TABLET | Refills: 3 | Status: SHIPPED | OUTPATIENT
Start: 2020-12-01 | End: 2021-11-16

## 2020-12-01 NOTE — TELEPHONE ENCOUNTER
Can't see appointment notes yet.  Do you want to start her OCP this Sunday or do you have a different plan in mind?  Citlaly Walden RN

## 2020-12-02 NOTE — TELEPHONE ENCOUNTER
Pt wondering if this OCP is okay to take while still pumping and giving breast milk to son.   Alesha Shipman, RN-BSN

## 2020-12-02 NOTE — PROGRESS NOTES
S:  Danyelle presents for IUD check.  Mirena placed 2020  Doing ok.   Feels like she's gained 15 pounds with no other explanation.   Daily bleeding and spotting.   Has done well on OCPs in the past.     O:  Vitals:    20 1547   BP: (!) 136/92   Pulse: 70   SpO2: 96%   Weight: 97.5 kg (215 lb)         Gen: well appearing  Abd: soft, NT  : normal external genitalia. IUD strings at os. Moderate blood in vault.     Procedure IUD REMOVAL:    Bimanual exam was performed.  The IUD strings were palpable.  Speculum introduced with patient in the dorsal lithotomy position.  The IUD string was visualized and grasped.  The IUD was removed easily.  Pt tolerated well.          A/P:  40 year old   with Mirena IUD  - Strings appropriate length  - Prolonged spotting. Discussed weight gain, not likely related to IUD.  - RTC prn or annual in one year.    (Z30.432) Encounter for IUD removal  (primary encounter diagnosis)  Comment:   Plan: REMOVE INTRAUTERINE DEVICE            (Z30.41) Oral contraceptive pill surveillance  Comment:   Plan: norethindrone-ethinyl estradiol (MICROGESTIN         ) 1-20 MG-MCG tablet

## 2020-12-03 RX ORDER — ACETAMINOPHEN AND CODEINE PHOSPHATE 120; 12 MG/5ML; MG/5ML
0.35 SOLUTION ORAL DAILY
Qty: 84 TABLET | Refills: 3 | Status: SHIPPED | OUTPATIENT
Start: 2020-12-03 | End: 2021-01-26

## 2020-12-09 ENCOUNTER — OFFICE VISIT (OUTPATIENT)
Dept: FAMILY MEDICINE | Facility: CLINIC | Age: 40
End: 2020-12-09
Payer: COMMERCIAL

## 2020-12-09 VITALS
BODY MASS INDEX: 33.92 KG/M2 | SYSTOLIC BLOOD PRESSURE: 126 MMHG | DIASTOLIC BLOOD PRESSURE: 72 MMHG | TEMPERATURE: 98.1 F | WEIGHT: 216.6 LBS | HEART RATE: 80 BPM

## 2020-12-09 DIAGNOSIS — K43.9 VENTRAL HERNIA WITHOUT OBSTRUCTION OR GANGRENE: Primary | ICD-10-CM

## 2020-12-09 DIAGNOSIS — M65.4 DE QUERVAIN'S DISEASE (TENOSYNOVITIS): ICD-10-CM

## 2020-12-09 PROCEDURE — 99214 OFFICE O/P EST MOD 30 MIN: CPT | Performed by: FAMILY MEDICINE

## 2020-12-09 NOTE — PROGRESS NOTES
Subjective     Danyelle Canales is a 40 year old female who presents to clinic today for the following health issues:    HPI         Possible hernia in pubic area. She noticed it about 1-2 weeks ago. She asked her OB about it. Delivered her baby 6 weeks ago.  Does not hurt her at all.  She notes that the mass is bigger when straining or standing.    Thumb and wrist. Pain and thumb gets stuck in position and there is a bump there.  Often painful.  Worse when lifting or gripping objects.  Has been wearing a thumb spica splint for weeks.  Finds it difficult to wear when she's caring for her 6 month old baby.  The splint scratched the baby a couple times.          Review of Systems   Constitutional, HEENT, cardiovascular, pulmonary, gi and gu systems are negative, except as otherwise noted.      Objective    /72 (BP Location: Right arm, Patient Position: Chair, Cuff Size: Adult Large)   Pulse 80   Temp 98.1  F (36.7  C) (Oral)   Wt 98.2 kg (216 lb 9.6 oz)   BMI 33.92 kg/m    Body mass index is 33.92 kg/m .  Physical Exam   GENERAL: healthy, alert and no distress  ABDOMEN: Approx 2cm ventral wall defect in the right pelvis with a palpable hernia during valsalva.  Nontender.  Able to be easily reduced.  MS: Left hand with a slightly tender nodule along the APL.  +Finklestein testing           Assessment & Plan     Ventral hernia without obstruction or gangrene  - Small hernia that is currently not painful and easily reducible  - Will refer to surgery to discuss treatment options.  She may choose to forego surgery for now since the hernia is small and nonpainful   - Ordered an abdominal binder for her to wear to help prevent worsening   - GENERAL SURG ADULT REFERRAL; Future  - Miscellaneous Order for DME - ONLY FOR DME    De Quervain's disease (tenosynovitis)  - Has been wearing a thumb spica splint for weeks with no releif  - She was given a smaller thumb splint to wear while caring for her baby, but should  continue to wear the larger wrist brace otherwise  - Refer to [facility] to consider steroid injection   - Orthopedic & Spine  Referral; Future  - Miscellaneous Order for DME - ONLY FOR DME        DO TORRE[facility]

## 2020-12-09 NOTE — PATIENT INSTRUCTIONS
Patient Education     Hernia (Adult)    A hernia can happen when there is a weakness or defect in the wall of the abdomen or groin. Intestines or nearby tissues may move from their usual location and push through the weakness in the wall. This can cause a hernia (bulge) you may see or feel.  Causes and risk factors   A hernia may be present at birth. Or it may be caused by the wear and tear of daily living. Certain factors can make a hernia more likely. These can include:    Heavy lifting    Straining, whether from lifting, movement, or constipation    Chronic cough    Injury to the abdominal wall    Excess weight    Pregnancy    Prior surgery    Older age    Family history of hernia  Symptoms  Symptoms of a hernia may come on suddenly. Or they may appear slowly over time. Some common symptoms include:    Bulge in the groin area, around the navel, or in the scrotum (the bulge may get bigger when you stand and go away when you lie down)    Pain or pressure around the bulge    Pain during activities such as lifting, coughing, or sneezing    A feeling of weakness or pressure in the groin    Pain or swelling in the scrotum  Types of hernias  There are different types of hernia. The type you have depends on its location:    Inguinal. This type is in the groin or scrotum. It is more common in men. But, women can get this hernia, too.    Femoral. This type is in the groin, upper thigh (where the leg bends), or labia. It is more common in women.    Ventral. This type is in the abdominal wall.    Umbilical. This type occurs around the navel (belly button).    Incisional. This type occurs at the site of a previous surgery.  The condition of the hernia can help determine how urgently it needs to be treated.    Reducible. It goes back in by itself, or it can be pushed back in.    Irreducible. It can t be pushed back in.    Incarcerated/strangulated. The intestine is trapped (incarcerated). If this happens, you won t be able  to push the bulge back in. If the incarcerated hernia isn t treated, it may become strangulated. This means the area loses blood supply and the tissue may die. This requires emergency surgery. You need treatment right away.  In most cases, a hernia will not heal on its own.You may need surgery to repair the defect in the abdominal wall or groin. You ll be told more about surgery, if needed.  If your symptoms are not severe, treatment may sometimes be delayed. In such cases, you will need regular follow-up visits with the provider. You ll be asked to keep track of your symptoms and to watch for signs of more serious problems. You may also be given guidelines similar to the home care instructions below.  Home care  To help keep a hernia from getting worse, you may be advised to:    Avoid heavy lifting and straining as directed.    Take steps to prevent constipation, such as eating more fiber and drinking more water. This may help reduce straining that can occur when having a bowel movement. Reducing straining may help keep your symptoms from getting worse.    Maintain a healthy weight or lose excess weight. This can help reduce strain on abdominal muscles and tissues.    Stop smoking. This can help prevent coughing that may also strain abdominal muscles and tissues.  Follow-up care  Follow up with your healthcare provider, or as directed. If imaging tests were done, they will be reviewed a doctor. You will be told the results and any new findings that may affect your care.  When to seek medical advice  Call your healthcare provider right away if any of these occur:    Hernia hardens, swells, or grows larger    Hernia can no longer be pushed back in    Pain moves to the lower right abdomen (just below the waistline), or spreads to the back  Call 911  Call 911 if any of these occur:    Severe pain, redness, or tenderness in the area near the hernia    Pain worsens quickly and doesn t get better    Inability to have a  bowel movement or pass gas    Fever of 100.4 F (38 C) or higher, or as directed by your healthcare provider  Jose last reviewed this educational content on 3/1/2018    9516-5585 [employer]. [address] [place] [address], [place] [address]. All rights reserved. This information is not intended as a substitute for professional medical care. Always follow your healthcare professional's instructions.           Patient Education     De Quervain Tenosynovitis    De Quervain tenosynovitis is inflammation of tendons and synovium on the thumb side of the wrist. Tendons are fibers that attach muscle to bone. Synovium is a slick membrane that helps tendons move. Movements done over and over can irritate and inflame these tissues. This can cause pain when you touch or grasp objects, turn or twist your wrist, or make a fist. You may also have pain and swelling near the base of the thumb or numbness along the back of your thumb and index finger. You may also feel the thumb catch or snap when you move it.  Treatment will depend on how bad the symptoms are. It can often be treated with medicines, injections, splinting, and home care. If your case is severe, you may be referred to a specialist to talk about surgery.  Home care  Your healthcare provider may prescribe medicines to relieve pain and reduce inflammation. A steroid medicine may be injected near the tendons. This reduces swelling and pain. The healthcare provider may also suggest taking over-the-counter medicines such as ibuprofen or naproxen. These help reduce inflammation. Take all medicines only as directed.  The following are general care guidelines:    Avoid repetitive movements of your wrist and thumb.    Note any activity that causes pain or swelling. If possible, avoid or limit that activity.    Put a cold pack on your thumb. To make an ice pack, put ice cubes in a plastic bag that seals at the top. Wrap the bag in a clean, thin towel or cloth. Hold  this to your thumb for up to 20 minutes at a time. Don't put ice directly on the skin.    Your healthcare provider may put a splint on the thumb to hold it still. Use the splint as you have been instructed. In some cases, you may need to use a splint 24 hours a day for 4 to 6 weeks. This will allow the wrist and thumb to heal.  Follow-up care  Follow up with your healthcare provider, or as advised. You may need more treatment if your injury is severe or if your symptoms don't get better. This additional treatment may include local injections, physical therapy, and surgery.  When to seek medical advice  Call your healthcare provider right away if any of these occur:    Increase in pain or swelling    You have fever, chills, redness, warmth, or drainage    Symptoms get worse after taking medicine    Pain spreads farther down the thumb or into the forearm    Pain continues to get in the way of daily life  Jose last reviewed this educational content on 6/1/2018 2000-2020 [employer]. [address] [place] [address], [place] [address]. All rights reserved. This information is not intended as a substitute for professional medical care. Always follow your healthcare professional's instructions.

## 2020-12-15 ENCOUNTER — OFFICE VISIT (OUTPATIENT)
Dept: SURGERY | Facility: CLINIC | Age: 40
End: 2020-12-15
Payer: COMMERCIAL

## 2020-12-15 ENCOUNTER — TELEPHONE (OUTPATIENT)
Dept: SURGERY | Facility: CLINIC | Age: 40
End: 2020-12-15

## 2020-12-15 VITALS
HEART RATE: 106 BPM | WEIGHT: 217 LBS | DIASTOLIC BLOOD PRESSURE: 87 MMHG | HEIGHT: 67 IN | BODY MASS INDEX: 34.06 KG/M2 | SYSTOLIC BLOOD PRESSURE: 143 MMHG

## 2020-12-15 DIAGNOSIS — K40.90 RIGHT INGUINAL HERNIA: Primary | ICD-10-CM

## 2020-12-15 PROCEDURE — 99204 OFFICE O/P NEW MOD 45 MIN: CPT | Performed by: SURGERY

## 2020-12-15 ASSESSMENT — MIFFLIN-ST. JEOR: SCORE: 1691.94

## 2020-12-15 NOTE — LETTER
"    12/15/2020         RE: Danyelle Canales  [address] [place] [address]        Dear Colleague,    Thank you for referring your patient, Danyelle Canales, to the [facility]. Please see a copy of my visit note below.    Dear Katlyn Garces  I was asked to see this patient by Katlyn Joiner for please see below.  I have seen Danyelle Canales and as you know his chief complaint is right groin lump.  Noticed after delivery June 2020.   Sometimes feels a pressure.   Denies nausea, vomitting, diahrrea, constipation   Non smoker  No bladder outlet obstruction symptoms   But does feel like she is not all the way empty and has to go again.     HPI:  Patient is a 40 year old female  with complaints see above  The patient noticed the symptoms about 6 months ago.    Patient has not family history of hernia problems  nothing makes the episode better.      Review Of Systems  Respiratory: No shortness of breath, dyspnea on exertion, cough, or hemoptysis  Cardiovascular: had open heart surgery at 5 years old for open asd repair.   Gastrointestinal: negative  Endocrine: negative  :  negative    10 Point review of systems all others are negative.   BP (!) 143/87   Pulse 106   Ht 1.702 m (5' 7\")   Wt 98.4 kg (217 lb)   BMI 33.99 kg/m      Past Medical History:   Diagnosis Date     ASD (atrial septal defect)     repaired surgically as a child     Depressive disorder      Headache      History of blood transfusion 1985 1985 during open heart surgery     Hypertension     higher results at pain clinic, want to confirm ok     Insomnia, unspecified      Other forms of migraine, without mention of intractable migraine without mention of status migrainosus        Past Surgical History:   Procedure Laterality Date     BIOPSY       C REPR ASD OSTIUM PREMUM      Dr. Silverio     COLONOSCOPY         Social History     Socioeconomic History     Marital status:      " Spouse name: Chan Canales     Number of children: 0     Years of education: 12     Highest education level: Not on file   Occupational History     Occupation: customer service     Employer: [employer]   Social Needs     Financial resource strain: Not on file     Food insecurity     Worry: Not on file     Inability: Not on file     Transportation needs     Medical: Not on file     Non-medical: Not on file   Tobacco Use     Smoking status: Never Smoker     Smokeless tobacco: Never Used   Substance and Sexual Activity     Alcohol use: Not Currently     Comment: breast feeding      Drug use: No     Sexual activity: Yes     Partners: Male     Birth control/protection: None   Lifestyle     Physical activity     Days per week: Not on file     Minutes per session: Not on file     Stress: Not at all   Relationships     Social connections     Talks on phone: Not on file     Gets together: Not on file     Attends Orthodox service: Not on file     Active member of club or organization: Not on file     Attends meetings of clubs or organizations: Not on file     Relationship status: Not on file     Intimate partner violence     Fear of current or ex partner: No     Emotionally abused: No     Physically abused: No     Forced sexual activity: No   Other Topics Concern     Parent/sibling w/ CABG, MI or angioplasty before 65F 55M? Yes     Comment: stroke   Social History Narrative     Not on file       Current Outpatient Medications   Medication Sig Dispense Refill     buPROPion (WELLBUTRIN XL) 300 MG 24 hr tablet Take 1 tablet (300 mg) by mouth every morning 90 tablet 1     cyclobenzaprine (FLEXERIL) 10 MG tablet Take 10 mg by mouth       desvenlafaxine (PRISTIQ) 100 MG 24 hr tablet Take 1 tablet (100 mg) by mouth daily 90 tablet 1     HYDROcodone-acetaminophen (NORCO) 5-325 MG tablet Take 1 tablet by mouth       lamoTRIgine (LAMICTAL) 200 MG tablet Take 1 tablet (200 mg) by mouth daily 180 tablet 1      "levonorgestrel (MIRENA) 20 MCG/24HR IUD 1 each (20 mcg) by Intrauterine route once for 1 dose 1 each 0     LORazepam (ATIVAN) 0.5 MG tablet Take 0.5 mg at bedtime and 0.5 mg daily as needed for anxiety. 60 tablet 2     norethindrone (MICRONOR) 0.35 MG tablet Take 1 tablet (0.35 mg) by mouth daily 84 tablet 3     norethindrone-ethinyl estradiol (MICROGESTIN 1/20) 1-20 MG-MCG tablet Take 1 tablet by mouth daily 84 tablet 3     Prenatal MV-Min-Fe Fum-FA-DHA (PRENATAL 1 PO)        propranolol (INDERAL) 10 MG tablet Take 1 tablet (10 mg) by mouth 2 times daily (Patient not taking: Reported on 12/9/2020) 60 tablet 1       Above was reviewed  Physical exam: BP (!) 143/87   Pulse 106   Ht 1.702 m (5' 7\")   Wt 98.4 kg (217 lb)   BMI 33.99 kg/m     Patient able to get up on table without difficulty.   Patient is alert and orientated.   Head eyes, nose and mouth within normal limits.  No supraclavicular or cervical adenopathy palpated.  Thyroid within normal limits.  No carotid bruits auscultated.  Lungs are clear to auscultation  Heart is regular rate and rhythm with no murmur or thrills noted.  No costal vertebral angle tenderness noted.  Abdomen is abdomen is soft without significant tenderness, masses, organomegaly or guarding  bowel sounds are positive and no caput medusa noted.  Has a right inguinal hernia no left inguinal hernia no obvious femoral  hernias noted.  Skin was warm and pink  Normal Affect    Lower extremity edema is not present.  Assessment: right inguinal hernia    Plan to do discussed open and laparoscopic robotic and feel laparoscopic robotic may be best as females have a higher risk of femoral hernias. .    Risks of surgery include damage to nerves, bleeding, infection, damage to  Vessels, recurrence.  Although mesh is a better long term repair if it gets infected it must be removed.   If the patient has any bacterial infection the week before and is seen by their doctor and started on antibiotics, " I can probably still do the surgery if they are vastly improved by the time of surgery, but if the infection starts closer to the surgery date it will be better to cancel and reschedule to a later date.  A cough will also be hard on the repair and uncomfortable post operative.  If the patient is a smoker I did discuss increase risk of recurrence and more pain with the cough.  If the patient is willing to quit smoking would encourage to do so and start at least a week before surgery.  However, if patient is not going to quit then must understand that his repair is more likely to fail.    Risks of surgery discussed including, but not limited to bleeding, infection, recurrence, damage to nerves and what is in the hernia sac.  Risks of anesthesia also discussed.    Discussed massaging hernia back in and using ice if becomes more painful.  If not able to reduce then go to emergency room.  Also discussed hernia belt to use until able to get in for surgery.    Time spent with the patient with greater that 50% of the time in discussion was 40 minutes.  In discussing the options.      Weston Mcnamara MD        Again, thank you for allowing me to participate in the care of your patient.        Sincerely,        Weston Mcnamara MD

## 2020-12-15 NOTE — PROGRESS NOTES
"Dear Katlyn Garces  I was asked to see this patient by Katlyn Joiner for please see below.  I have seen Danyelle Canales and as you know his chief complaint is right groin lump.  Noticed after delivery June 2020.   Sometimes feels a pressure.   Denies nausea, vomitting, diahrrea, constipation   Non smoker  No bladder outlet obstruction symptoms   But does feel like she is not all the way empty and has to go again.     HPI:  Patient is a 40 year old female  with complaints see above  The patient noticed the symptoms about 6 months ago.    Patient has not family history of hernia problems  nothing makes the episode better.      Review Of Systems  Respiratory: No shortness of breath, dyspnea on exertion, cough, or hemoptysis  Cardiovascular: had open heart surgery at 5 years old for open asd repair.   Gastrointestinal: negative  Endocrine: negative  :  negative    10 Point review of systems all others are negative.   BP (!) 143/87   Pulse 106   Ht 1.702 m (5' 7\")   Wt 98.4 kg (217 lb)   BMI 33.99 kg/m      Past Medical History:   Diagnosis Date     ASD (atrial septal defect)     repaired surgically as a child     Depressive disorder      Headache      History of blood transfusion 1985 1985 during open heart surgery     Hypertension     higher results at pain clinic, want to confirm ok     Insomnia, unspecified      Other forms of migraine, without mention of intractable migraine without mention of status migrainosus        Past Surgical History:   Procedure Laterality Date     BIOPSY       C REPR ASD OSTIUM PREMUM      Dr. Silverio     COLONOSCOPY         Social History     Socioeconomic History     Marital status:      Spouse name: Chan Canales     Number of children: 0     Years of education: 12     Highest education level: Not on file   Occupational History     Occupation: customer service     Employer: [employer]   Social Needs     Financial resource strain: Not on " file     Food insecurity     Worry: Not on file     Inability: Not on file     Transportation needs     Medical: Not on file     Non-medical: Not on file   Tobacco Use     Smoking status: Never Smoker     Smokeless tobacco: Never Used   Substance and Sexual Activity     Alcohol use: Not Currently     Comment: breast feeding      Drug use: No     Sexual activity: Yes     Partners: Male     Birth control/protection: None   Lifestyle     Physical activity     Days per week: Not on file     Minutes per session: Not on file     Stress: Not at all   Relationships     Social connections     Talks on phone: Not on file     Gets together: Not on file     Attends Buddhism service: Not on file     Active member of club or organization: Not on file     Attends meetings of clubs or organizations: Not on file     Relationship status: Not on file     Intimate partner violence     Fear of current or ex partner: No     Emotionally abused: No     Physically abused: No     Forced sexual activity: No   Other Topics Concern     Parent/sibling w/ CABG, MI or angioplasty before 65F 55M? Yes     Comment: stroke   Social History Narrative     Not on file       Current Outpatient Medications   Medication Sig Dispense Refill     buPROPion (WELLBUTRIN XL) 300 MG 24 hr tablet Take 1 tablet (300 mg) by mouth every morning 90 tablet 1     cyclobenzaprine (FLEXERIL) 10 MG tablet Take 10 mg by mouth       desvenlafaxine (PRISTIQ) 100 MG 24 hr tablet Take 1 tablet (100 mg) by mouth daily 90 tablet 1     HYDROcodone-acetaminophen (NORCO) 5-325 MG tablet Take 1 tablet by mouth       lamoTRIgine (LAMICTAL) 200 MG tablet Take 1 tablet (200 mg) by mouth daily 180 tablet 1     levonorgestrel (MIRENA) 20 MCG/24HR IUD 1 each (20 mcg) by Intrauterine route once for 1 dose 1 each 0     LORazepam (ATIVAN) 0.5 MG tablet Take 0.5 mg at bedtime and 0.5 mg daily as needed for anxiety. 60 tablet 2     norethindrone (MICRONOR) 0.35 MG tablet Take 1 tablet (0.35 mg)  "by mouth daily 84 tablet 3     norethindrone-ethinyl estradiol (MICROGESTIN 1/20) 1-20 MG-MCG tablet Take 1 tablet by mouth daily 84 tablet 3     Prenatal MV-Min-Fe Fum-FA-DHA (PRENATAL 1 PO)        propranolol (INDERAL) 10 MG tablet Take 1 tablet (10 mg) by mouth 2 times daily (Patient not taking: Reported on 12/9/2020) 60 tablet 1       Above was reviewed  Physical exam: BP (!) 143/87   Pulse 106   Ht 1.702 m (5' 7\")   Wt 98.4 kg (217 lb)   BMI 33.99 kg/m     Patient able to get up on table without difficulty.   Patient is alert and orientated.   Head eyes, nose and mouth within normal limits.  No supraclavicular or cervical adenopathy palpated.  Thyroid within normal limits.  No carotid bruits auscultated.  Lungs are clear to auscultation  Heart is regular rate and rhythm with no murmur or thrills noted.  No costal vertebral angle tenderness noted.  Abdomen is abdomen is soft without significant tenderness, masses, organomegaly or guarding  bowel sounds are positive and no caput medusa noted.  Has a right inguinal hernia no left inguinal hernia no obvious femoral  hernias noted.  Skin was warm and pink  Normal Affect    Lower extremity edema is not present.  Assessment: right inguinal hernia    Plan to do discussed open and laparoscopic robotic and feel laparoscopic robotic may be best as females have a higher risk of femoral hernias. .    Risks of surgery include damage to nerves, bleeding, infection, damage to  Vessels, recurrence.  Although mesh is a better long term repair if it gets infected it must be removed.   If the patient has any bacterial infection the week before and is seen by their doctor and started on antibiotics, I can probably still do the surgery if they are vastly improved by the time of surgery, but if the infection starts closer to the surgery date it will be better to cancel and reschedule to a later date.  A cough will also be hard on the repair and uncomfortable post operative.  If " the patient is a smoker I did discuss increase risk of recurrence and more pain with the cough.  If the patient is willing to quit smoking would encourage to do so and start at least a week before surgery.  However, if patient is not going to quit then must understand that his repair is more likely to fail.    Risks of surgery discussed including, but not limited to bleeding, infection, recurrence, damage to nerves and what is in the hernia sac.  Risks of anesthesia also discussed.    Discussed massaging hernia back in and using ice if becomes more painful.  If not able to reduce then go to emergency room.  Also discussed hernia belt to use until able to get in for surgery.    Time spent with the patient with greater that 50% of the time in discussion was 40 minutes.  In discussing the options.      Weston Mcnamara MD

## 2020-12-15 NOTE — PATIENT INSTRUCTIONS
Assessment: right inguinal hernia    Plan to do discussed open and laparoscopic robotic and feel laparoscopic robotic may be best as females have a higher risk of femoral hernias. .    Risks of surgery include damage to nerves, bleeding, infection, damage to  Vessels, recurrence.  Although mesh is a better long term repair if it gets infected it must be removed.   If the patient has any bacterial infection the week before and is seen by their doctor and started on antibiotics, I can probably still do the surgery if they are vastly improved by the time of surgery, but if the infection starts closer to the surgery date it will be better to cancel and reschedule to a later date.  A cough will also be hard on the repair and uncomfortable post operative.  If the patient is a smoker I did discuss increase risk of recurrence and more pain with the cough.  If the patient is willing to quit smoking would encourage to do so and start at least a week before surgery.  However, if patient is not going to quit then must understand that his repair is more likely to fail.    Risks of surgery discussed including, but not limited to bleeding, infection, recurrence, damage to nerves and what is in the hernia sac.  Risks of anesthesia also discussed.    Discussed massaging hernia back in and using ice if becomes more painful.  If not able to reduce then go to emergency room.  Also discussed hernia belt to use until able to get in for surgery.    HERNIORRHAPHY DISCHARGE INSTRUCTIONS  DR. DIVINA JACK    1. You may resume your regular diet when you feel you are ready to. DO NOT drink alcoholic beverages for 24 hours or while you are taking prescription medication.    2. Limit your activities for the first 48 hours. Gradually, increase them as tolerated. You may use stairs. I encourage you to walk as tolerated. No lifting greater that 20 pounds for 3 weeks.    3. You will have some discomfort at the incision sites. This is expected.  This should improve over the next 2-3 days. Ice and pain medication will help with this pain. Use prescribed pain medication as instructed.    4. Bruising and mild swelling is normal after surgery. For males it is common to have bruising going into the penis and scrotum. The area below and around the incision(s) will be hard and elevated. This is normal. I call it the healing ridge. This will resolve slowly over the next several months. If you feel the pain is increasing and cannot explain it by increasing activity please call us at [phone].    5. The dressing will often have some blood on it. You may shower 24 hours after surgery. Clean gently over incision site. If clear plastic covering or steri-strip comes off and there is still some bleeding or drainage then cover with gauze or band-aid. If no bleeding, there is no reason to cover site. The abdominal binder may be removed after 24 hours after surgery. You may continue to wear it however for comfort. I suggest  you wear an old t-shirt under the abdominal binder for a more comfortable wear.    6. Avoid Aspirin for the first 72 hours after the procedure. This medication may increase the tendency to bleed.    7. Use the following medications (in addition to your normal meds) as shown:  a. Percocet 5 mg 1-2 every 6 hours as needed for severe pain. This contains 325 mg of Tylenol (acetaminophen) per tablet.  Please do not take more than 4 grams of Tylenol (acetaminophen) per day. For example, you may take 1 Percocet and 1 Tylenol, or 2 Percocet and no Tylenol, or 2 Tylenol and no Percocet every 6 hours.  b. Tylenol (acetaminophen) 500 mg every 6 hours as needed for mild pain. Do not take more than 1000 mg every 6 hours. (see above).  c. Motrin (ibuprofen) 200-800 mg every 6 hours as needed for mild to moderate pain. Take with food.     8. Notify Dr. [facility]nam[facility]'s clinic at [phone] if:    Your discomfort is not relieved by your pain medication.    You have  signs of infection such as temperature above 100.5 degrees orally, chills, or increasing daily discomfort.    Incision site is becoming more red and/or there is purulent drainage.    You have questions or concerns.    9. Please call [phone] to schedule a follow up appointment in about 2 weeks.    10. When taking narcotics (pain medication more than Tylenol [acetaminophen] and Motrin [ibuprofen]) it is important to keep your stools soft to avoid constipation and pain with straining. This is best done by drinking fluids (non-alcoholic and non-caffeinated) and taking a stool softener (i.e. Metamucil or milk of magnesia). You may be able to use non-narcotics for pain relief especially by the 3rd post- operative day. Tylenol (acetaminophen) 500 mg every 6 hours and/or Motrin (ibuprofen) 200-800 mg every 6 hours. Please do not take more than 4 grams of Tylenol (acetaminophen) per day. Remember your Percocet does have Tylenol (acetaminophen) already in it. Please take Motrin (ibuprofen) with food to help protect the stomach. If you have a history of stomach ulcers or stomach problems, do not take Motrin (ibuprofen).     11. Do not drive or operate heavy machinery for 24 hours after surgery or when taking narcotics. You may resume driving when feel that you can safely avoid an accident and are not taking narcotics. This is usually 5 to 7 days after surgery. You should not be alone for 24 hours after surgery.    12. Have milk of magnesia available at home so that when you take the pain medications you take 1-2 tablepoons a day, to help reduce problems with constipation.

## 2020-12-16 ENCOUNTER — VIRTUAL VISIT (OUTPATIENT)
Dept: PSYCHOLOGY | Facility: CLINIC | Age: 40
End: 2020-12-16
Payer: COMMERCIAL

## 2020-12-16 DIAGNOSIS — F33.41 RECURRENT MAJOR DEPRESSION IN PARTIAL REMISSION (H): Primary | ICD-10-CM

## 2020-12-16 DIAGNOSIS — G47.00 PERSISTENT INSOMNIA: ICD-10-CM

## 2020-12-16 DIAGNOSIS — F41.1 GENERALIZED ANXIETY DISORDER: ICD-10-CM

## 2020-12-16 PROCEDURE — 99214 OFFICE O/P EST MOD 30 MIN: CPT | Mod: TEL | Performed by: PSYCHIATRY & NEUROLOGY

## 2020-12-16 RX ORDER — LAMOTRIGINE 200 MG/1
200 TABLET ORAL DAILY
Qty: 180 TABLET | Refills: 1 | Status: SHIPPED | OUTPATIENT
Start: 2020-12-16 | End: 2021-05-17

## 2020-12-16 RX ORDER — DESVENLAFAXINE 100 MG/1
100 TABLET, EXTENDED RELEASE ORAL DAILY
Qty: 90 TABLET | Refills: 1 | Status: SHIPPED | OUTPATIENT
Start: 2020-12-16 | End: 2021-05-17

## 2020-12-16 RX ORDER — BUPROPION HYDROCHLORIDE 300 MG/1
300 TABLET ORAL EVERY MORNING
Qty: 90 TABLET | Refills: 1 | Status: SHIPPED | OUTPATIENT
Start: 2020-12-16 | End: 2021-05-17

## 2020-12-16 RX ORDER — LORAZEPAM 0.5 MG/1
TABLET ORAL
Qty: 60 TABLET | Refills: 2 | Status: SHIPPED | OUTPATIENT
Start: 2020-12-16 | End: 2021-05-17

## 2020-12-16 ASSESSMENT — ANXIETY QUESTIONNAIRES
7. FEELING AFRAID AS IF SOMETHING AWFUL MIGHT HAPPEN: SEVERAL DAYS
IF YOU CHECKED OFF ANY PROBLEMS ON THIS QUESTIONNAIRE, HOW DIFFICULT HAVE THESE PROBLEMS MADE IT FOR YOU TO DO YOUR WORK, TAKE CARE OF THINGS AT HOME, OR GET ALONG WITH OTHER PEOPLE: NOT DIFFICULT AT ALL
5. BEING SO RESTLESS THAT IT IS HARD TO SIT STILL: NOT AT ALL
GAD7 TOTAL SCORE: 7
2. NOT BEING ABLE TO STOP OR CONTROL WORRYING: MORE THAN HALF THE DAYS
3. WORRYING TOO MUCH ABOUT DIFFERENT THINGS: MORE THAN HALF THE DAYS
6. BECOMING EASILY ANNOYED OR IRRITABLE: NOT AT ALL
1. FEELING NERVOUS, ANXIOUS, OR ON EDGE: MORE THAN HALF THE DAYS

## 2020-12-16 ASSESSMENT — PATIENT HEALTH QUESTIONNAIRE - PHQ9
SUM OF ALL RESPONSES TO PHQ QUESTIONS 1-9: 1
5. POOR APPETITE OR OVEREATING: NOT AT ALL

## 2020-12-16 NOTE — TELEPHONE ENCOUNTER
Type of surgery: Robotic right inguinal hernia repair with mesh possible left side  CPT  40027   Right inguinal hernia K40.90    Location of surgery: [facility]  Date and time of surgery: 02/03/2021  Surgeon: Anders  Pre-Op Appt Date: 01/25/2020  Post-Op Appt Date: 02/16/2020   Packet sent out: Yes  Pre-cert/Authorization completed:  No prior auth required per [employer] tool.  Date: 12/16/20    Lucie Anderson  Prior Authorization Dept  [phone]

## 2020-12-16 NOTE — PATIENT INSTRUCTIONS
Continue lorazepam 0.5 mg at bedtime.  You may continue to take 0.5 mg daily as needed for anxiety.     Continue Wellbutrin  mg daily.     Continue desvenlafaxine 100 mg daily.     Continue lamotrigine 200 mg daily.     Continue all other medications per your primary care provider.    Schedule an appointment with me in 3 months or sooner as needed.  You may call [facility] at [phone] to schedule, or schedule at the .    [facility] Resources:      Go to the Emergency Department as needed or call after hours crisis line at [phone].      To schedule individual or family therapy, call [facility] at [phone].     Follow up with primary care provider as planned or sooner for acute medical concerns.    Call the psychiatric nurse line with medication questions or concerns at [phone].    LPATHhart may be used to communicate with your provider, but this is not intended to be used for emergencies.    Community Resources:      National Suicide Prevention Lifeline: 505.896.6506 (TTY: 735.488.2140). Call anytime for help.  (www.suicidepreventionlifeline.org)    National [place] on Mental Illness (www.telma.org): 274.374.7666 or 011-977-2827.     Mental Health Association (www.mentalhealth.org): 129.207.6891 or 805-295-4925.    [place] Crisis Text Line: Text [place] to 248459    Suicide LifeLine Chat: suicidepreYeHiveline.org/chat

## 2020-12-16 NOTE — PROGRESS NOTES
"Danyelle Canales is a 40 year old female who is being evaluated via a billable telephone visit.      The patient has been notified of following:     \"This telephone visit will be conducted via a call between you and your physician/provider. We have found that certain health care needs can be provided without the need for a physical exam.  This service lets us provide the care you need with a short phone conversation.  If a prescription is necessary we can send it directly to your pharmacy.  If lab work is needed we can place an order for that and you can then stop by our lab to have the test done at a later time.    Telephone visits are billed at different rates depending on your insurance coverage. During this emergency period, for some insurers they may be billed the same as an in-person visit.  Please reach out to your insurance provider with any questions.    If during the course of the call the physician/provider feels a telephone visit is not appropriate, you will not be charged for this service.\"    Patient has given verbal consent for Telephone visit?  Yes    What phone number would you like to be contacted at? [phone]    How would you like to obtain your AVS? MyChart        Outpatient Psychiatric Progress Note    Name: Danyelle Canales   : 1980                    Primary Care Provider: Katlyn Joiner DO   Therapist: Denice Haji [facility]         PHQ-9 scores:  PHQ-9 SCORE 2020 10/13/2020 2020   PHQ-9 Total Score - - -   PHQ-9 Total Score MyChart - - -   PHQ-9 Total Score 2 1 1       NAJMA-7 scores:  NAJMA-7 SCORE 2020 10/13/2020 2020   Total Score - - -   Total Score - - -   Total Score 4 9 7       Patient Identification:  Danyelle is a 40 year old year old,   White American female  who presents for return visit with me.  Patient attended the session alone.     Interim History:  Danyelle reports that she is doing well overall, although she " "has had some anxiety because of Covid and not being together with her family for the holidays.    She tried propranolol a couple of times, but did not feel that it really helped with her anxiety, so she has discontinued it.  In spite of that, she says that she is not having the constant dread that she had in the past, but now worries about more specific things.  She has been using lorazepam mainly at bedtime, and only occasionally during the day.  She does not take it every night.  She denies adverse side effects from her current medications, and does not feel the need to be adjusted at this point.    She has been told that she probably needs to have surgery for a hernia.    She is enjoying her baby, and disappointed that some of his \"firsts\" will be affected by Covid.    Vital Signs:   No vital signs taken for this appointment.  Breastfeeding Yes       Current Medications:  Current Outpatient Medications   Medication     buPROPion (WELLBUTRIN XL) 300 MG 24 hr tablet     desvenlafaxine (PRISTIQ) 100 MG 24 hr tablet     lamoTRIgine (LAMICTAL) 200 MG tablet     LORazepam (ATIVAN) 0.5 MG tablet     norethindrone-ethinyl estradiol (MICROGESTIN 1/20) 1-20 MG-MCG tablet     Prenatal MV-Min-Fe Fum-FA-DHA (PRENATAL 1 PO)     cyclobenzaprine (FLEXERIL) 10 MG tablet     HYDROcodone-acetaminophen (NORCO) 5-325 MG tablet     levonorgestrel (MIRENA) 20 MCG/24HR IUD     norethindrone (MICRONOR) 0.35 MG tablet     No current facility-administered medications for this visit.         Mental Status Examination:  Danyelle is a 40-year-old woman in no acute distress.  Speech is clear and normal in rate and tone.  Mood is slightly anxious.  Thoughts are logical and spontaneous with no loose associations or flight of ideas.  Thought content shows no psychosis.  No suicidal thoughts.  She is alert and oriented x3.    Assessment and Plan:    ICD-10-CM    1. Recurrent major depression in partial remission (H)  F33.41    2. Generalized " anxiety disorder  F41.1 buPROPion (WELLBUTRIN XL) 300 MG 24 hr tablet     desvenlafaxine (PRISTIQ) 100 MG 24 hr tablet     lamoTRIgine (LAMICTAL) 200 MG tablet     LORazepam (ATIVAN) 0.5 MG tablet   3. Persistent insomnia  G47.00        Medical comorbidities include:   Patient Active Problem List   Diagnosis     Persistent insomnia     allergic DERMATITIS NOS     Encounter for other general counseling or advice on contraception     [facility]     [facility]     Migraine headache     PMDD (premenstrual dysphoric disorder)     Trichotillomania     CARDIOVASCULAR SCREENING; LDL GOAL LESS THAN 160     History of ovarian cyst     Generalized anxiety disorder     Chronic pain syndrome     ASD (atrial septal defect)     Chronic pain     Esophageal reflux     Non morbid obesity, unspecified obesity type     Benign hypermobility syndrome     Myalgia and myositis, unspecified     Myofascial pain     Need for Tdap vaccination     Recurrent major depression in partial remission (H)     Term pregnancy      (spontaneous vaginal delivery)       Treatment Plan:  Patient Instructions   Continue lorazepam 0.5 mg at bedtime.  You may continue to take 0.5 mg daily as needed for anxiety.     Continue Wellbutrin  mg daily.     Continue desvenlafaxine 100 mg daily.     Continue lamotrigine 200 mg daily.     Continue all other medications per your primary care provider.    Schedule an appointment with me in 3 months or sooner as needed.  You may call [facility] at [phone] to schedule, or schedule at the .    [facility] Resources:      Go to the Emergency Department as needed or call after hours crisis line at [phone].      To schedule individual or family therapy, call [facility] at [phone].     Follow up with primary care provider as planned or sooner for acute medical concerns.    Call the psychiatric nurse line with medication questions or  concerns at [phone].    MyChart may be used to communicate with your provider, but this is not intended to be used for emergencies.    Community Resources:      National Suicide Prevention Lifeline: 850.532.8737 (TTY: 320.597.5974). Call anytime for help.  (www.suicidepreventionlifeline.org)    National [place] on Mental Illness (www.telma.org): 516.249.8554 or 026-897-6003.     Mental Health Association (www.mentalhealth.org): 608.178.1139 or 964-401-5145.    [place] Crisis Text Line: Text [place] to 440950    Suicide LifeLine Chat: suicideProject Managerline.org/chat         Administrative Billing:   Phone call duration: 23 minutes greater than 50% spent in counseling regarding medications and treatment planning.    Patient Status:  This is a continuous care patient and medications will be prescribed by the psychiatric provider until further indicated.

## 2020-12-17 ENCOUNTER — OFFICE VISIT (OUTPATIENT)
Dept: ORTHOPEDICS | Facility: CLINIC | Age: 40
End: 2020-12-17
Payer: COMMERCIAL

## 2020-12-17 VITALS — WEIGHT: 217 LBS | HEIGHT: 67 IN | BODY MASS INDEX: 34.06 KG/M2

## 2020-12-17 DIAGNOSIS — M65.4 DE QUERVAIN'S DISEASE (TENOSYNOVITIS): ICD-10-CM

## 2020-12-17 PROCEDURE — 99212 OFFICE O/P EST SF 10 MIN: CPT | Mod: 25 | Performed by: FAMILY MEDICINE

## 2020-12-17 PROCEDURE — 20550 NJX 1 TENDON SHEATH/LIGAMENT: CPT | Mod: LT | Performed by: FAMILY MEDICINE

## 2020-12-17 RX ORDER — METHYLPREDNISOLONE ACETATE 40 MG/ML
20 INJECTION, SUSPENSION INTRA-ARTICULAR; INTRALESIONAL; INTRAMUSCULAR; SOFT TISSUE
Status: DISCONTINUED | OUTPATIENT
Start: 2020-12-17 | End: 2021-07-06

## 2020-12-17 RX ADMIN — METHYLPREDNISOLONE ACETATE 20 MG: 40 INJECTION, SUSPENSION INTRA-ARTICULAR; INTRALESIONAL; INTRAMUSCULAR; SOFT TISSUE at 16:01

## 2020-12-17 ASSESSMENT — ANXIETY QUESTIONNAIRES: GAD7 TOTAL SCORE: 7

## 2020-12-17 ASSESSMENT — MIFFLIN-ST. JEOR: SCORE: 1691.94

## 2020-12-17 NOTE — PROGRESS NOTES
"      [facility]  12/17/2020    Danyelle Canales's chief complaint for this visit includes:  Chief Complaint   Patient presents with     Consult     left thumb and wrist pain, started a few months ago,      PCP: Katlyn Joiner    Referring Provider:  DO TORREY Vidales [address] [address]    Ht 1.702 m (5' 7\")   Wt 98.4 kg (217 lb)   BMI 33.99 kg/m    Data Unavailable       Reason for visit:     What part of your body is injured / painful?  left thumb     What caused the injury /pain? No inciting event     How long ago did your injury occur or pain begin? several months ago    What are your most bothersome symptoms? Pain    How would you characterize your symptom?  aching    What makes your symptoms better? Rest    What makes your symptoms worse? Movement    Have you been previously seen for this problem? No    Medical History:    Any recent changes to your medical history? No    Any new medication prescribed since last visit? No    Have you had surgery on this body part before? No    Social History:    Occupation: Customer service     Handedness: Right    Review of Systems:    Do you have fever, chills, weight loss? No    Do you have any vision problems? No    Do you have any chest pain or edema? No    Do you have any shortness of breath or wheezing?  No    Do you have stomach problems? No    Do you have any urinary track issues? No    Do you have any numbness or focal weakness? No    Do you have diabetes? No    Do you have problems with bleeding or clotting? No    Do you have an rashes or other skin lesions? No       CHIEF COMPLAINT:  Consult (left thumb and wrist pain, started a few months ago, )       HISTORY OF PRESENT ILLNESS  Ms. Canales is a pleasant 40 year old female who presents to clinic today with pain in her left thumb.  Danyelle has been experiencing pain in her left thumb for the past few months.  She had a " child 6 months ago, she does notice that her pain is worse whenever she is lifting him or lifting objects with a tight  with her left hand.  This is her nondominant hand.  She has tried a brace, oral medications, and relative rest.      Additional history: as documented    MEDICAL HISTORY  Patient Active Problem List   Diagnosis     Persistent insomnia     allergic DERMATITIS NOS     Encounter for other general counseling or advice on contraception     [place] [facility]     [place] [facility] [facility] [place]     Migraine headache     PMDD (premenstrual dysphoric disorder)     Trichotillomania     CARDIOVASCULAR SCREENING; LDL GOAL LESS THAN 160     History of ovarian cyst     Generalized anxiety disorder     Chronic pain syndrome     ASD (atrial septal defect)     Chronic pain     Esophageal reflux     Non morbid obesity, unspecified obesity type     Benign hypermobility syndrome     Myalgia and myositis, unspecified     Myofascial pain     Need for Tdap vaccination     Recurrent major depression in partial remission (H)     Term pregnancy      (spontaneous vaginal delivery)       Current Outpatient Medications   Medication Sig Dispense Refill     buPROPion (WELLBUTRIN XL) 300 MG 24 hr tablet Take 1 tablet (300 mg) by mouth every morning 90 tablet 1     cyclobenzaprine (FLEXERIL) 10 MG tablet Take 10 mg by mouth       desvenlafaxine (PRISTIQ) 100 MG 24 hr tablet Take 1 tablet (100 mg) by mouth daily 90 tablet 1     HYDROcodone-acetaminophen (NORCO) 5-325 MG tablet Take 1 tablet by mouth       lamoTRIgine (LAMICTAL) 200 MG tablet Take 1 tablet (200 mg) by mouth daily 180 tablet 1     levonorgestrel (MIRENA) 20 MCG/24HR IUD 1 each (20 mcg) by Intrauterine route once for 1 dose 1 each 0     LORazepam (ATIVAN) 0.5 MG tablet Take 0.5 mg at bedtime and 0.5 mg daily as needed for anxiety. 60 tablet 2     norethindrone (MICRONOR) 0.35 MG tablet Take 1 tablet (0.35 mg) by mouth daily (Patient not taking: Reported on  "12/16/2020) 84 tablet 3     norethindrone-ethinyl estradiol (MICROGESTIN 1/20) 1-20 MG-MCG tablet Take 1 tablet by mouth daily 84 tablet 3     Prenatal MV-Min-Fe Fum-FA-DHA (PRENATAL 1 PO)          Allergies   Allergen Reactions     Artificial Sweetner (Informational Only) [Artificial Sweetner (Informational Only) ]      Chocolate Flavor Other (See Comments)       Family History   Problem Relation Age of Onset     C.A.D. Mother         heart surgery to close hole in heart     Cardiovascular Mother      Hypertension Mother      Depression Mother      Anxiety Disorder Mother      Heart Failure Mother      Cerebrovascular Disease Father      Hypertension Father        Additional medical/Social/Surgical histories reviewed in [facility] and updated as appropriate.        PHYSICAL EXAM  Vitals:    12/17/20 1514   Weight: 98.4 kg (217 lb)   Height: 1.702 m (5' 7\")     General  - normal appearance, in no obvious distress  HEENT  - conjunctivae not injected, moist mucous membranes  CV  - normal radial pulse  Pulm  - normal respiratory pattern, non-labored  Musculoskeletal - left wrist  - inspection: swelling of radial wrist at 1st dorsal compartment  - palpation: mild tenderness over the radiocarpal joint, no bony or soft tissue tenderness, no tenderness at the anatomical snuffbox  - ROM:  90 deg flexion   70 deg extension   25 deg abduction   65 deg adduction  - strength: 5/5  strength, 5/5 wrist abduction, 5/5 flexion, extension, pronation, supination, adduction  - special tests:  (+) Finkelstein  Neuro  - no numbness, no motor deficit, grossly normal coordination, normal muscle tone  Skin  - no ecchymosis, erythema, warmth, or induration, no obvious rash  Psych  - interactive, appropriate, normal mood and affect               ASSESSMENT & PLAN  Ms. Canales is a 40 year old female who presents to clinic today with left thumb and wrist pain consistent with de Quervain's stenosing tenosynovitis.    We discussed " "pathophysiology and treatment options including watchful waiting, topical treatments, the role of bracing, and corticosteroid injections.  Through shared decision making we did decide to inject her first dorsal compartment today (see procedure note).    Danyelle should continue to wear her brace tonight and throughout the next few days.  If this injection does not help her I would consider advanced imaging.    It was a pleasure seeing Danyelle today.    Ezra Orozco DO, [facility]  Primary Care Sports Medicine      This note was constructed using Dragon dictation software, please excuse any minor errors in spelling, grammar, or syntax.    Hand / Upper Extremity Injection/Arthrocentesis: L extensor compartment 1    Date/Time: 12/17/2020 4:01 PM  Performed by: Ezra Orozco DO  Authorized by: Ezra Orozco DO     Indications:  Pain  Needle Size:  22 G  Guidance: ultrasound    Condition: de Quervain's      Site:  L extensor compartment 1  Medications:  20 mg methylPREDNISolone 40 MG/ML  Outcome:  Tolerated well, no immediate complications  Procedure discussed: discussed risks, benefits, and alternatives    Consent Given by:  Patient  Timeout: timeout called immediately prior to procedure    Prep: patient was prepped and draped in usual sterile fashion       Wrist Injection, Dorsal First Compartment - Ultrasound Guided    The patient was informed of the risks and the benefits of the procedure and a written consent was signed.    The patient s left wrist was prepped with chlorhexidine in sterile fashion.   20 mg of methylprednisolone suspension was drawn up into a 3 mL syringe with 1.0 mL of 1% lidocaine.  Injection was performed using sterile technique.  Under ultrasound guidance a 1.5\" 22-gauge needle was used to enter the left wrist at the dorsal first compartment.  Needle placement was visualized and documented with ultrasound.  Ultrasound visualization required to ensure injection material enters the tendon " sheath and not the tendon itself.  Injection performed long axis to the probe.  Injection solution visualized within the tendon sheath.  Images were permanently stored for the patient's record.  There were no complications. The patient tolerated the procedure well. There was negligible bleeding.

## 2020-12-17 NOTE — LETTER
"    12/17/2020         RE: Danyelle Canales  [address] [place] [address]        Dear Colleague,    Thank you for referring your patient, Danyelle Canales, to the [facility] [place]. Please see a copy of my visit note below.          [facility]  12/17/2020    Danyelle Canales's chief complaint for this visit includes:  Chief Complaint   Patient presents with     Consult     left thumb and wrist pain, started a few months ago,      PCP: Katlyn Joiner    Referring Provider:  Katlyn Joiner DO  [address] [address]    Ht 1.702 m (5' 7\")   Wt 98.4 kg (217 lb)   BMI 33.99 kg/m    Data Unavailable       Reason for visit:     What part of your body is injured / painful?  left thumb     What caused the injury /pain? No inciting event     How long ago did your injury occur or pain begin? several months ago    What are your most bothersome symptoms? Pain    How would you characterize your symptom?  aching    What makes your symptoms better? Rest    What makes your symptoms worse? Movement    Have you been previously seen for this problem? No    Medical History:    Any recent changes to your medical history? No    Any new medication prescribed since last visit? No    Have you had surgery on this body part before? No    Social History:    Occupation: Customer service     Handedness: Right    Review of Systems:    Do you have fever, chills, weight loss? No    Do you have any vision problems? No    Do you have any chest pain or edema? No    Do you have any shortness of breath or wheezing?  No    Do you have stomach problems? No    Do you have any urinary track issues? No    Do you have any numbness or focal weakness? No    Do you have diabetes? No    Do you have problems with bleeding or clotting? No    Do you have an rashes or other skin lesions? No       CHIEF " COMPLAINT:  Consult (left thumb and wrist pain, started a few months ago, )       HISTORY OF PRESENT ILLNESS  Ms. Canales is a pleasant 40 year old female who presents to clinic today with pain in her left thumb.  Danyelle has been experiencing pain in her left thumb for the past few months.  She had a child 6 months ago, she does notice that her pain is worse whenever she is lifting him or lifting objects with a tight  with her left hand.  This is her nondominant hand.  She has tried a brace, oral medications, and relative rest.      Additional history: as documented    MEDICAL HISTORY  Patient Active Problem List   Diagnosis     Persistent insomnia     allergic DERMATITIS NOS     Encounter for other general counseling or advice on contraception     [facility] SPRAIN THORACIC REGION     [facility] SPRAIN OF NECK     Migraine headache     PMDD (premenstrual dysphoric disorder)     Trichotillomania     CARDIOVASCULAR SCREENING; LDL GOAL LESS THAN 160     History of ovarian cyst     Generalized anxiety disorder     Chronic pain syndrome     ASD (atrial septal defect)     Chronic pain     Esophageal reflux     Non morbid obesity, unspecified obesity type     Benign hypermobility syndrome     Myalgia and myositis, unspecified     Myofascial pain     Need for Tdap vaccination     Recurrent major depression in partial remission (H)     Term pregnancy      (spontaneous vaginal delivery)       Current Outpatient Medications   Medication Sig Dispense Refill     buPROPion (WELLBUTRIN XL) 300 MG 24 hr tablet Take 1 tablet (300 mg) by mouth every morning 90 tablet 1     cyclobenzaprine (FLEXERIL) 10 MG tablet Take 10 mg by mouth       desvenlafaxine (PRISTIQ) 100 MG 24 hr tablet Take 1 tablet (100 mg) by mouth daily 90 tablet 1     HYDROcodone-acetaminophen (NORCO) 5-325 MG tablet Take 1 tablet by mouth       lamoTRIgine (LAMICTAL) 200 MG tablet Take 1 tablet (200 mg) by mouth daily 180 tablet 1     levonorgestrel (MIRENA) 20  "MCG/24HR IUD 1 each (20 mcg) by Intrauterine route once for 1 dose 1 each 0     LORazepam (ATIVAN) 0.5 MG tablet Take 0.5 mg at bedtime and 0.5 mg daily as needed for anxiety. 60 tablet 2     norethindrone (MICRONOR) 0.35 MG tablet Take 1 tablet (0.35 mg) by mouth daily (Patient not taking: Reported on 12/16/2020) 84 tablet 3     norethindrone-ethinyl estradiol (MICROGESTIN 1/20) 1-20 MG-MCG tablet Take 1 tablet by mouth daily 84 tablet 3     Prenatal MV-Min-Fe Fum-FA-DHA (PRENATAL 1 PO)          Allergies   Allergen Reactions     Artificial Sweetner (Informational Only) [Artificial Sweetner (Informational Only) ]      Chocolate Flavor Other (See Comments)       Family History   Problem Relation Age of Onset     C.A.D. Mother         heart surgery to close hole in heart     Cardiovascular Mother      Hypertension Mother      Depression Mother      Anxiety Disorder Mother      Heart Failure Mother      Cerebrovascular Disease Father      Hypertension Father        Additional medical/Social/Surgical histories reviewed in [facility] and updated as appropriate.        PHYSICAL EXAM  Vitals:    12/17/20 1514   Weight: 98.4 kg (217 lb)   Height: 1.702 m (5' 7\")     General  - normal appearance, in no obvious distress  HEENT  - conjunctivae not injected, moist mucous membranes  CV  - normal radial pulse  Pulm  - normal respiratory pattern, non-labored  Musculoskeletal - left wrist  - inspection: swelling of radial wrist at 1st dorsal compartment  - palpation: mild tenderness over the radiocarpal joint, no bony or soft tissue tenderness, no tenderness at the anatomical snuffbox  - ROM:  90 deg flexion   70 deg extension   25 deg abduction   65 deg adduction  - strength: 5/5  strength, 5/5 wrist abduction, 5/5 flexion, extension, pronation, supination, adduction  - special tests:  (+) Finkelstein  Neuro  - no numbness, no motor deficit, grossly normal coordination, normal muscle tone  Skin  - no ecchymosis, erythema, warmth, " or induration, no obvious rash  Psych  - interactive, appropriate, normal mood and affect               ASSESSMENT & PLAN  Ms. Cnaales is a 40 year old female who presents to clinic today with left thumb and wrist pain consistent with de Quervain's stenosing tenosynovitis.    We discussed pathophysiology and treatment options including watchful waiting, topical treatments, the role of bracing, and corticosteroid injections.  Through shared decision making we did decide to inject her first dorsal compartment today (see procedure note).    Danyelle should continue to wear her brace tonight and throughout the next few days.  If this injection does not help her I would consider advanced imaging.    It was a pleasure seeing Danyelle today.    Ezra Orozco DO, [facility]  Primary Care Sports Medicine      This note was constructed using Dragon dictation software, please excuse any minor errors in spelling, grammar, or syntax.    Hand / Upper Extremity Injection/Arthrocentesis: L extensor compartment 1    Date/Time: 12/17/2020 4:01 PM  Performed by: Ezra Orozco DO  Authorized by: Ezra Orozco DO     Indications:  Pain  Needle Size:  22 G  Guidance: ultrasound    Condition: de Quervain's      Site:  L extensor compartment 1  Medications:  20 mg methylPREDNISolone 40 MG/ML  Outcome:  Tolerated well, no immediate complications  Procedure discussed: discussed risks, benefits, and alternatives    Consent Given by:  Patient  Timeout: timeout called immediately prior to procedure    Prep: patient was prepped and draped in usual sterile fashion       Wrist Injection, Dorsal First Compartment - Ultrasound Guided    The patient was informed of the risks and the benefits of the procedure and a written consent was signed.    The patient s left wrist was prepped with chlorhexidine in sterile fashion.   20 mg of methylprednisolone suspension was drawn up into a 3 mL syringe with 1.0 mL of 1% lidocaine.  Injection was performed  "using sterile technique.  Under ultrasound guidance a 1.5\" 22-gauge needle was used to enter the left wrist at the dorsal first compartment.  Needle placement was visualized and documented with ultrasound.  Ultrasound visualization required to ensure injection material enters the tendon sheath and not the tendon itself.  Injection performed long axis to the probe.  Injection solution visualized within the tendon sheath.  Images were permanently stored for the patient's record.  There were no complications. The patient tolerated the procedure well. There was negligible bleeding.                   Again, thank you for allowing me to participate in the care of your patient.        Sincerely,        Ezra Orozco, DO    "

## 2021-01-26 ENCOUNTER — OFFICE VISIT (OUTPATIENT)
Dept: FAMILY MEDICINE | Facility: CLINIC | Age: 41
End: 2021-01-26
Payer: COMMERCIAL

## 2021-01-26 VITALS
DIASTOLIC BLOOD PRESSURE: 80 MMHG | TEMPERATURE: 98.6 F | HEART RATE: 76 BPM | BODY MASS INDEX: 34.58 KG/M2 | SYSTOLIC BLOOD PRESSURE: 122 MMHG | WEIGHT: 220.8 LBS

## 2021-01-26 DIAGNOSIS — K40.90 RIGHT INGUINAL HERNIA: ICD-10-CM

## 2021-01-26 DIAGNOSIS — F33.41 RECURRENT MAJOR DEPRESSION IN PARTIAL REMISSION (H): ICD-10-CM

## 2021-01-26 DIAGNOSIS — G89.4 CHRONIC PAIN SYNDROME: ICD-10-CM

## 2021-01-26 DIAGNOSIS — Z01.818 PREOP GENERAL PHYSICAL EXAM: Primary | ICD-10-CM

## 2021-01-26 DIAGNOSIS — F41.1 GENERALIZED ANXIETY DISORDER: ICD-10-CM

## 2021-01-26 PROBLEM — Z34.90 TERM PREGNANCY: Status: RESOLVED | Noted: 2020-06-03 | Resolved: 2021-01-26

## 2021-01-26 PROBLEM — Z23 NEED FOR TDAP VACCINATION: Status: RESOLVED | Noted: 2019-10-14 | Resolved: 2021-01-26

## 2021-01-26 PROCEDURE — 99214 OFFICE O/P EST MOD 30 MIN: CPT | Performed by: FAMILY MEDICINE

## 2021-01-26 NOTE — PATIENT INSTRUCTIONS

## 2021-01-26 NOTE — PROGRESS NOTES
[address] [address]  Phone: [phone]  Primary Provider: Katlyn Joiner  Pre-op Performing Provider: KATLYN JOINER    PREOPERATIVE EVALUATION:  Today's date: 1/26/2021    Danyelle Canales is a 40 year old female who presents for a preoperative evaluation.    Surgical Information:  Surgery/Procedure: ROBOTIC XI ASSISTED LAPAROSCOPIC HERNIA REPAIR RIGHT INGUINAL WITH MESH POSSIBLE LEFT  Surgery Location: [facility]  Surgeon: Weston Mcnamara  Surgery Date: 2/3/21  Time of Surgery: Pt does not remember, sometimes in the morning  Where patient plans to recover: At home with family  Fax number for surgical facility: [phone]    Type of Anesthesia Anticipated: to be determined    Subjective     HPI related to upcoming procedure: Right inguinal hernia requiring repair      Preop Questions 1/26/2021   1. Have you ever had a heart attack or stroke? No   2. Have you ever had surgery on your heart or blood vessels, such as a stent placement, a coronary artery bypass, or surgery on an artery in your head, neck, heart, or legs? YES- ASD repair in 1985   3. Do you have chest pain with activity? No   4. Do you have a history of  heart failure? No   5. Do you currently have a cold, bronchitis or symptoms of other infection? No   6. Do you have a cough, shortness of breath, or wheezing? No   7. Do you or anyone in your family have previous history of blood clots? YES-Dad had a stroke (on Coumadin)   8. Do you or does anyone in your family have a serious bleeding problem such as prolonged bleeding following surgeries or cuts? No   9. Have you ever had problems with anemia or been told to take iron pills? YES-Has been years since there was an issue   10. Have you had any abnormal blood loss such as black, tarry or bloody stools, or abnormal vaginal bleeding? No   11. Have you ever had a blood transfusion? YES-in 1985 after ASD repair    11a. Have you ever had a transfusion reaction? No   12. Are you willing to have a blood transfusion if it is medically needed before, during, or after your surgery? Yes   13. Have you or any of your relatives ever had problems with anesthesia? YES   14. Do you have sleep apnea, excessive snoring or daytime drowsiness? No   15. Do you have any artifical heart valves or other implanted medical devices like a pacemaker, defibrillator, or continuous glucose monitor? No   16. Do you have artificial joints? No   17. Are you allergic to latex? No   18. Is there any chance that you may be pregnant? No     Health Care Directive:  Patient does not have a Health Care Directive or Living Will    Preoperative Review of :   reviewed - no record of controlled substances prescribed.      Status of Chronic Conditions:  DEPRESSION - Patient has a long history of Depression of moderate severity requiring medication for control with recent symptoms being stable..Current symptoms of depression include none.       Review of Systems  CONSTITUTIONAL: NEGATIVE for fever, chills, change in weight  INTEGUMENTARY/SKIN: NEGATIVE for worrisome rashes, moles or lesions  EYES: NEGATIVE for vision changes or irritation  ENT/MOUTH: NEGATIVE for ear, mouth and throat problems  RESP: NEGATIVE for significant cough or SOB  BREAST: NEGATIVE for masses, tenderness or discharge  CV: NEGATIVE for chest pain, palpitations or peripheral edema  GI: NEGATIVE for nausea, abdominal pain, heartburn, or change in bowel habits  : NEGATIVE for frequency, dysuria, or hematuria  MUSCULOSKELETAL: NEGATIVE for significant arthralgias or myalgia  NEURO: NEGATIVE for weakness, dizziness or paresthesias  ENDOCRINE: NEGATIVE for temperature intolerance, skin/hair changes  HEME: NEGATIVE for bleeding problems  PSYCHIATRIC: NEGATIVE for changes in mood or affect    Patient Active Problem List    Diagnosis Date Noted     Chronic pain syndrome 11/30/2011     Priority:  High     Patient is followed by RODDY SOTO for ongoing prescription of narcotic pain medicine.  Med: Vicodin.   Maximum use per month: 90  Expected duration: ongoing, pt in comprehensive pain mgmt currently  Narcotic agreement on file: NO  Clinic visit recommended: Q 3 months         Right inguinal hernia 01/26/2021     Priority: Medium     Recurrent major depression in partial remission (H) 11/12/2019     Priority: Medium     Myalgia and myositis, unspecified 10/14/2019     Priority: Medium     Overview:   Created by Conversion       Benign hypermobility syndrome 10/02/2017     Priority: Medium     Non morbid obesity, unspecified obesity type 09/20/2017     Priority: Medium     Myofascial pain 04/24/2016     Priority: Medium     Esophageal reflux 11/23/2014     Priority: Medium     Chronic pain 01/31/2012     Priority: Medium     ASD (atrial septal defect) 01/16/2012     Priority: Medium     Generalized anxiety disorder 10/18/2011     Priority: Medium     Diagnosis updated by automated process. Provider to review and confirm.       History of ovarian cyst 09/12/2011     Priority: Medium     CARDIOVASCULAR SCREENING; LDL GOAL LESS THAN 160 05/09/2010     Priority: Medium     Trichotillomania 08/03/2009     Priority: Medium     PMDD (premenstrual dysphoric disorder) 06/03/2009     Priority: Medium     Migraine headache 07/23/2008     Priority: Medium     ia SPRAIN THORACIC REGION 10/08/2007     Priority: Medium     ia SPRAIN OF NECK 10/08/2007     Priority: Medium     allergic DERMATITIS NOS 11/12/2004     Priority: Medium     Persistent insomnia 07/16/2004     Priority: Medium     Problem list name updated by automated process. Provider to review        Past Medical History:   Diagnosis Date     ASD (atrial septal defect)     repaired surgically as a child     Depressive disorder      Headache      History of blood transfusion 1985 1985 during open heart surgery     Hypertension     higher results at  pain clinic, want to confirm ok     Insomnia, unspecified      Other forms of migraine, without mention of intractable migraine without mention of status migrainosus      Past Surgical History:   Procedure Laterality Date     BIOPSY       C REPR ASD OSTIUM PREMUM      Dr. Silverio     COLONOSCOPY       Current Outpatient Medications   Medication Sig Dispense Refill     buPROPion (WELLBUTRIN XL) 300 MG 24 hr tablet Take 1 tablet (300 mg) by mouth every morning 90 tablet 1     desvenlafaxine (PRISTIQ) 100 MG 24 hr tablet Take 1 tablet (100 mg) by mouth daily 90 tablet 1     lamoTRIgine (LAMICTAL) 200 MG tablet Take 1 tablet (200 mg) by mouth daily 180 tablet 1     LORazepam (ATIVAN) 0.5 MG tablet Take 0.5 mg at bedtime and 0.5 mg daily as needed for anxiety. 60 tablet 2     norethindrone-ethinyl estradiol (MICROGESTIN 1/20) 1-20 MG-MCG tablet Take 1 tablet by mouth daily 84 tablet 3       Allergies   Allergen Reactions     Artificial Sweetner (Informational Only) [Artificial Sweetner (Informational Only) ]      Chocolate Flavor Other (See Comments)        Social History     Tobacco Use     Smoking status: Never Smoker     Smokeless tobacco: Never Used   Substance Use Topics     Alcohol use: Not Currently     Comment: breast feeding      Family History   Problem Relation Age of Onset     C.A.D. Mother         heart surgery to close hole in heart     Cardiovascular Mother      Hypertension Mother      Depression Mother      Anxiety Disorder Mother      Heart Failure Mother      Cerebrovascular Disease Father      Hypertension Father      History   Drug Use No         Objective     /80 (BP Location: Right arm, Patient Position: Chair, Cuff Size: Adult Regular)   Pulse 76   Temp 98.6  F (37  C) (Oral)   Wt 100.2 kg (220 lb 12.8 oz)   BMI 34.58 kg/m      Physical Exam    GENERAL APPEARANCE: healthy, alert and no distress     EYES: EOMI, PERRL     HENT: ear canals and TM's normal and nose and mouth without ulcers or  lesions     NECK: no adenopathy, no asymmetry, masses, or scars and thyroid normal to palpation     RESP: lungs clear to auscultation - no rales, rhonchi or wheezes     CV: regular rates and rhythm, normal S1 S2, no S3 or S4 and no murmur, click or rub     ABDOMEN:  soft, nontender, no HSM or masses and bowel sounds normal     MS: extremities normal- no gross deformities noted, no evidence of inflammation in joints, FROM in all extremities.     SKIN: no suspicious lesions or rashes     NEURO: Normal strength and tone, sensory exam grossly normal, mentation intact and speech normal     PSYCH: mentation appears normal. and affect normal/bright     LYMPHATICS: No cervical adenopathy    Recent Labs   Lab Test 07/16/20  1422 06/05/20  0658 09/09/19  0852 09/09/19  0852   HGB 11.8 9.0*   < > 13.0    231   < > 247     --   --  140   POTASSIUM 4.1  --   --  4.1   CR 0.90 0.68   < > 0.62    < > = values in this interval not displayed.        Diagnostics:  No labs were ordered during this visit.   No EKG required, no history of coronary heart disease, significant arrhythmia, peripheral arterial disease or other structural heart disease.    Revised Cardiac Risk Index (RCRI):  The patient has the following serious cardiovascular risks for perioperative complications:   - No serious cardiac risks = 0 points     RCRI Interpretation: 0 points: Class I (very low risk - 0.4% complication rate)       Assessment & Plan   The proposed surgical procedure is considered INTERMEDIATE risk.    Preop general physical exam  Right inguinal hernia  Chronic pain syndrome  Generalized anxiety disorder  Recurrent major depression in partial remission (H)    Chronic conditions are stable and should not cause any additional perioperative risk     Risks and Recommendations:  The patient has the following additional risks and recommendations for perioperative complications:   - No identified additional risk factors other than previously  addressed    Medication Instructions:  Patient is to take all scheduled medications on the day of surgery EXCEPT for modifications listed below:   - Benzodiazepines: Continue without modification.   - SSRIs, SNRIs, TCAs, Antipsychotics: Continue without modification.     RECOMMENDATION:  APPROVAL GIVEN to proceed with proposed procedure, without further diagnostic evaluation.    Signed Electronically by: Katlyn Joiner DO    Copy of this evaluation report is provided to requesting physician.    Preop [facility] Preop Guidelines    Revised Cardiac Risk Index

## 2021-01-26 NOTE — PROGRESS NOTES
[address] [address]  Phone: [phone]  Primary Provider: Katlyn Joiner  {[facility] Performing Provider (Optional):147038}    PREOPERATIVE EVALUATION:  Today's date: 2021    Danyelle Canales is a 40 year old female who presents for a preoperative evaluation.    Surgical Information:  Surgery/Procedure: Robotic XI assisted laparoscopy hernia repair right inguinal with mesh possible left  Surgery Location: [facility]  Surgeon: Weston Mcnamara  Surgery Date: 2/3/21  Time of Surgery: Pt does not remember but in the morning  Where patient plans to recover: At home with family  Fax number for surgical facility: [phone]    Type of Anesthesia Anticipated: to be determined    Subjective     HPI related to upcoming procedure:     {Click here to pull in Questionnaire Data after Qnr completed :875132}  Health Care Directive:  Patient does not have a Health Care Directive or Living Will: {ADVANCE_DIRECTIVE_STATUS:533019}    Preoperative Review of :  {Mnpmpreport:440721}  {Review MNPMP for all patients per ICSI MNPMP Profile:065268}    {Chronic problem details (Optional) :203512}    Review of Systems  {ROS Preop Choices:080397}    Patient Active Problem List    Diagnosis Date Noted     Chronic pain syndrome 2011     Priority: High     Patient is followed by RODDY SOTO for ongoing prescription of narcotic pain medicine.  Med: Vicodin.   Maximum use per month: 90  Expected duration: ongoing, pt in comprehensive pain mgmt currently  Narcotic agreement on file: NO  Clinic visit recommended: Q 3 months          (spontaneous vaginal delivery) 2020     Priority: Medium     Term pregnancy 2020     Priority: Medium     Recurrent major depression in partial remission (H) 2019     Priority: Medium     Myalgia and myositis, unspecified 10/14/2019     Priority: Medium     Overview:   Created by Conversion        Need for Tdap vaccination 10/14/2019     Priority: Medium     Benign hypermobility syndrome 10/02/2017     Priority: Medium     Non morbid obesity, unspecified obesity type 09/20/2017     Priority: Medium     Myofascial pain 04/24/2016     Priority: Medium     Esophageal reflux 11/23/2014     Priority: Medium     Chronic pain 01/31/2012     Priority: Medium     ASD (atrial septal defect) 01/16/2012     Priority: Medium     Generalized anxiety disorder 10/18/2011     Priority: Medium     Diagnosis updated by automated process. Provider to review and confirm.       History of ovarian cyst 09/12/2011     Priority: Medium     CARDIOVASCULAR SCREENING; LDL GOAL LESS THAN 160 05/09/2010     Priority: Medium     Trichotillomania 08/03/2009     Priority: Medium     PMDD (premenstrual dysphoric disorder) 06/03/2009     Priority: Medium     Migraine headache 07/23/2008     Priority: Medium     iamc SPRAIN THORACIC REGION 10/08/2007     Priority: Medium     iamc SPRAIN OF NECK 10/08/2007     Priority: Medium     Encounter for other general counseling or advice on contraception 07/18/2007     Priority: Medium     Diagnosis updated by automated process. Provider to review and confirm.       allergic DERMATITIS NOS 11/12/2004     Priority: Medium     Persistent insomnia 07/16/2004     Priority: Medium     Problem list name updated by automated process. Provider to review        Past Medical History:   Diagnosis Date     ASD (atrial septal defect)     repaired surgically as a child     Depressive disorder      Headache      History of blood transfusion 1985 1985 during open heart surgery     Hypertension     higher results at pain clinic, want to confirm ok     Insomnia, unspecified      Other forms of migraine, without mention of intractable migraine without mention of status migrainosus      Past Surgical History:   Procedure Laterality Date     BIOPSY       C REPR ASD OSTIUM PREMUM      Dr. Silverio     COLONOSCOPY        Current Outpatient Medications   Medication Sig Dispense Refill     buPROPion (WELLBUTRIN XL) 300 MG 24 hr tablet Take 1 tablet (300 mg) by mouth every morning 90 tablet 1     desvenlafaxine (PRISTIQ) 100 MG 24 hr tablet Take 1 tablet (100 mg) by mouth daily 90 tablet 1     lamoTRIgine (LAMICTAL) 200 MG tablet Take 1 tablet (200 mg) by mouth daily 180 tablet 1     LORazepam (ATIVAN) 0.5 MG tablet Take 0.5 mg at bedtime and 0.5 mg daily as needed for anxiety. 60 tablet 2     norethindrone-ethinyl estradiol (MICROGESTIN 1/20) 1-20 MG-MCG tablet Take 1 tablet by mouth daily 84 tablet 3     cyclobenzaprine (FLEXERIL) 10 MG tablet Take 10 mg by mouth       HYDROcodone-acetaminophen (NORCO) 5-325 MG tablet Take 1 tablet by mouth       levonorgestrel (MIRENA) 20 MCG/24HR IUD 1 each (20 mcg) by Intrauterine route once for 1 dose 1 each 0     norethindrone (MICRONOR) 0.35 MG tablet Take 1 tablet (0.35 mg) by mouth daily (Patient not taking: Reported on 1/26/2021) 84 tablet 3     Prenatal MV-Min-Fe Fum-FA-DHA (PRENATAL 1 PO)          Allergies   Allergen Reactions     Artificial Sweetner (Informational Only) [Artificial Sweetner (Informational Only) ]      Chocolate Flavor Other (See Comments)        Social History     Tobacco Use     Smoking status: Never Smoker     Smokeless tobacco: Never Used   Substance Use Topics     Alcohol use: Not Currently     Comment: breast feeding      {FAMILY HISTORY (Optional):374979756}  History   Drug Use No         Objective     /80 (BP Location: Right arm, Patient Position: Chair, Cuff Size: Adult Regular)   Pulse 76   Temp 98.6  F (37  C) (Oral)   Wt 100.2 kg (220 lb 12.8 oz)   BMI 34.58 kg/m      Physical Exam  {EXAM Preop Choices:308988}    Recent Labs   Lab Test 07/16/20  1422 06/05/20  0658 09/09/19  0852 09/09/19  0852   HGB 11.8 9.0*   < > 13.0    231   < > 247     --   --  140   POTASSIUM 4.1  --   --  4.1   CR 0.90 0.68   < > 0.62    < > = values in this  "interval not displayed.        Diagnostics:  {LABS:217512}   {EK}    Revised Cardiac Risk Index (RCRI):  The patient has the following serious cardiovascular risks for perioperative complications:  {PREOP REVISED CARDIAC RISK INDEX (RCRI) :069865::\" - No serious cardiac risks = 0 points\"}     RCRI Interpretation: {REVISED CARDIAC RISK INTERPRETATION :602000}    {Provider  Link to PREOP SmartSet  Includes common orders; guidelines for anemia, warfarin, additional testing; patient instructions  :341807}     Assessment & Plan   The proposed surgical procedure is considered {HIGH=major cardiovascular or procedures requiring prolonged anesthesia >4 hours or large fluid shifts;    INTERMEDIATE=abdominal, most orthopedic and intrathoracic surgery; LOW= endoscopy, cataract and breast surgery:671408} risk.    {Provider Charting Preference for Preop :515073}  {Click here to pull in possible risk data after PreOp Qnr has been answered for this visit :503670}    {Risks and Recommendations (Optional):884087}    {Medication HOLD Times for PREOP (Optional):362593}    RECOMMENDATION:  {IMPORTANT - Approval:542055::\"APPROVAL GIVEN to proceed with proposed procedure, without further diagnostic evaluation.\"}    Signed Electronically by: Katlyn Joiner DO    Copy of this evaluation report is provided to requesting physician.    [facility]    Revised Cardiac Risk Index  "

## 2021-01-29 DIAGNOSIS — K40.90 RIGHT INGUINAL HERNIA: ICD-10-CM

## 2021-01-29 LAB
SARS-COV-2 RNA RESP QL NAA+PROBE: NORMAL
SPECIMEN SOURCE: NORMAL

## 2021-01-29 PROCEDURE — U0003 INFECTIOUS AGENT DETECTION BY NUCLEIC ACID (DNA OR RNA); SEVERE ACUTE RESPIRATORY SYNDROME CORONAVIRUS 2 (SARS-COV-2) (CORONAVIRUS DISEASE [COVID-19]), AMPLIFIED PROBE TECHNIQUE, MAKING USE OF HIGH THROUGHPUT TECHNOLOGIES AS DESCRIBED BY CMS-2020-01-R: HCPCS | Performed by: SURGERY

## 2021-01-29 PROCEDURE — U0005 INFEC AGEN DETEC AMPLI PROBE: HCPCS | Performed by: SURGERY

## 2021-01-30 LAB
LABORATORY COMMENT REPORT: NORMAL
SARS-COV-2 RNA RESP QL NAA+PROBE: NEGATIVE
SPECIMEN SOURCE: NORMAL

## 2021-02-03 ENCOUNTER — TRANSFERRED RECORDS (OUTPATIENT)
Dept: HEALTH INFORMATION MANAGEMENT | Facility: CLINIC | Age: 41
End: 2021-02-03

## 2021-02-05 ENCOUNTER — TELEPHONE (OUTPATIENT)
Dept: SURGERY | Facility: CLINIC | Age: 41
End: 2021-02-05

## 2021-02-05 ENCOUNTER — MYC MEDICAL ADVICE (OUTPATIENT)
Dept: SURGERY | Facility: CLINIC | Age: 41
End: 2021-02-05

## 2021-02-05 DIAGNOSIS — Z87.19 S/P REPAIR OF VENTRAL HERNIA: Primary | ICD-10-CM

## 2021-02-05 DIAGNOSIS — Z98.890 S/P REPAIR OF VENTRAL HERNIA: Primary | ICD-10-CM

## 2021-02-05 RX ORDER — OXYCODONE HYDROCHLORIDE 5 MG/1
5 TABLET ORAL EVERY 6 HOURS PRN
Qty: 15 TABLET | Refills: 0 | Status: SHIPPED | OUTPATIENT
Start: 2021-02-05 | End: 2021-02-08

## 2021-02-05 NOTE — TELEPHONE ENCOUNTER
Patient if needs more pain needs to go to the emergency room. Do not call on call surgeon to give more.   I do not plan on giving more.  Unless there is a good reason.   Weston Mcnamara MD

## 2021-02-05 NOTE — TELEPHONE ENCOUNTER
Reason for call:  Patient reporting a symptom    Symptom or request: Patient had surgery on February 3rd.      Duration (how long have symptoms been present): How often should she wear the brace, how long to keep the bandaids on.  She only has 2 percocet left.  Tried using advil and tylenol and that is not working well.    Patient stated she is urinating, however, it takes a while and isn't her normal stream.    Phone Number patient can be reached at:  Cell number on file:    Telephone Information:   Mobile [phone]       Best Time:  Anytime    Can we leave a detailed message on this number:  YES    Call taken on 2/5/2021 at 9:02 AM by Wendi Mendoza

## 2021-02-05 NOTE — TELEPHONE ENCOUNTER
Called pt and spoke to her, continue the brace as long as she wants for comfort, leave Tegaderm till post op, urinating will return slowly- monitor, got to ER if unable to void, continue OTC pain meds, and will request a refill of controlled meds sent to [facility].    Mimi SCHMID RN Specialty Triage 2/5/2021 10:22 AM

## 2021-02-08 ENCOUNTER — MYC MEDICAL ADVICE (OUTPATIENT)
Dept: SURGERY | Facility: CLINIC | Age: 41
End: 2021-02-08

## 2021-02-09 ENCOUNTER — MYC MEDICAL ADVICE (OUTPATIENT)
Dept: SURGERY | Facility: CLINIC | Age: 41
End: 2021-02-09

## 2021-02-09 DIAGNOSIS — Z98.890 S/P RIGHT INGUINAL HERNIORRHAPHY: Primary | ICD-10-CM

## 2021-02-09 DIAGNOSIS — Z87.19 S/P RIGHT INGUINAL HERNIORRHAPHY: Primary | ICD-10-CM

## 2021-02-09 RX ORDER — OXYCODONE HYDROCHLORIDE 5 MG/1
5 TABLET ORAL EVERY 6 HOURS PRN
Qty: 12 TABLET | Refills: 0 | Status: SHIPPED | OUTPATIENT
Start: 2021-02-09 | End: 2021-02-12

## 2021-02-09 NOTE — TELEPHONE ENCOUNTER
Talked with the patient and the she is having pain in the  anterior thighs both sides but had a sharp pain after crossing her legs on the right side where we did the laparoscopic robotic hernia repair.   Oxycodone seems to help along with heat. Not sure why on both sides.    Offered muscle relaxant but does not like the way it feels.   No numbness in the groin area.   Will give oxycodone.  Good appetite and no chills or fever.   Weston Mcnamara MD

## 2021-02-09 NOTE — TELEPHONE ENCOUNTER
Left message to see if this is the same side we dd the hernia repair on.   Will try to call back again.   Weston Mcnamara MD

## 2021-02-16 ENCOUNTER — OFFICE VISIT (OUTPATIENT)
Dept: SURGERY | Facility: CLINIC | Age: 41
End: 2021-02-16
Payer: COMMERCIAL

## 2021-02-16 VITALS
BODY MASS INDEX: 34.53 KG/M2 | SYSTOLIC BLOOD PRESSURE: 136 MMHG | HEART RATE: 88 BPM | DIASTOLIC BLOOD PRESSURE: 92 MMHG | HEIGHT: 67 IN | WEIGHT: 220 LBS

## 2021-02-16 DIAGNOSIS — Z09 S/P RIGHT INGUINAL HERNIA REPAIR, FOLLOW-UP EXAM: Primary | ICD-10-CM

## 2021-02-16 PROCEDURE — 99024 POSTOP FOLLOW-UP VISIT: CPT | Performed by: SURGERY

## 2021-02-16 ASSESSMENT — MIFFLIN-ST. JEOR: SCORE: 1705.54

## 2021-02-16 NOTE — PROGRESS NOTES
Danyelle is a 40 year old female who is status post right inguinal hernia repair with robot with mesh with no chills and no fever.      Patient's  Pain is  improving.  Appetite is  improving.    Wound(s)  Is/are   clean dry and intact with no evidence of infection.  So on the right side feels like an ache in the groin area and on the right side of the abdomen gets like a stitch form having run to much.  This happens a few times a day. The groin ache is there most of the time. But severity is getting better.     Questions were answered and discussion of no lifting more than 20 pounds for  3 weeks after surgery.  Patient went to the grocery store and had to carry her child for a few hours and had pain that night.   Her  has been leaving work to bring the baby to day care so the patient can work from home.   When she over did it that one day she could feel it.   Best to not over do it for another 8 days.  Then should be able to resume her normal activities.     Plan: follow up as needed.  Use antibiotic ointment on wound at night.     Time spent with the patient with greater that 50% of the time in discussion was 20 minutes.  Weston Mathews MD, MD   Physician   General Surgery   OR Surgeon       Signed   Date of Service:  2/3/2021 11:19 AM            Procedure: ROBOTIC XI ASSISTED LAPAROSCOPIC HERNIA REPAIR RIGHT INGUINAL WITH MESH Case Time: 2/3/2021 11:19 AM Surgeon: Weston Mcnamara MD                 []Hide copied text    []Hover for details     POST-OPERATIVE NOTE       Surgeon(s):  Weston Mcnamara MD     Assistant(s):  Circulating nurse: Lata Russ RN; Radha Woodall RN  Scrub tech: Brionna Gale Brooke     Preoperative Diagnosis:  right inguinal hernia     Postoperative Diagnosis:  Same large direct no left inguinal hernia noted     Procedure(s):   right inguinal hernia repair with robot with mesh     Complications:  None      EBL: 5  mL     Anesthesia:  General     Operative Findings:  As above     Specimen(s):  * No specimens in log * none     Grafts and/or Implants:  Implant Name Type Inv. Item Serial No.  Lot No. LRB No. Used Action   MESH PROGRIP SURG [id number] SLF - [id number] Mesh/patch MESH PROGRIP SURG [id number] SLF   Jose Luis [id number] N/A 1 Implanted         Condition on Discharge from Operating Room: Satisfactory        REPORT OF OPERATION/ PROCEDURE  Please see below operative note.     Date of Service: 02/03/21       SURGEON: Weston Mcnamara MD        PREOPERATIVE DIAGNOSIS:  Right Inguinal Hernia  s     POSTOPERATIVE DIAGNOSIS: same with large indirect     PROCEDURES PERFORMED: Robotic (xi) assisted multi port  RIH  (transabdominal preperitoneal) repair with mesh (10 by 15 cm progrip mesh ).     ESTIMATED BLOOD LOSS: less than 10  mL    ANESTHESIA: General endotracheal anesthesia.  With 0.25 %  marcaine with epinephrine placed in the wounds prior to and at the end of surgery.      FINDINGS:  No hernia identified on asymptomatic side.     GRAFTS/IMPLANTS: 10 by 15 cm progrip mesh  .     COMPLICATIONS: None.     SPECIMENS: None.     OPERATIVE INDICATIONS: Please see my office visit note.      OPERATIVE DETAILS: The patient was brought to the operating room and prepared in a routine fashion. Anesthesia was induced without complications. The patient was placed in a supine position.  A timeout was performed prior to surgery and documented by the nursing team.      Under the benefits of general anesthesia, a 5 mm trocar was placed in the left upper quadrant under direct visualization with a camera running thru it.   The abdomen was entered while watching with the camera and entered safely. A pneumoperitoneum was established using carbon dioxide gas to a maximum pressure of 15 mmHg. The  laparoscope was used to visualize the abdomen which showed no injuries. An 8 mm robotic port was placed in the right flank and the 8  mm port for  the camera was placed above the umbilicus and then the right 5 mm port was replaced with another 8 mm port in the left flank, both placed just above the level of the suprumcilical trocar. The robotic laparoscope was utilized from the supraumbilical port site. The robotic arms were docked. The robotic instruments were then inserted under direct vision.   I did look at the left  side and palpated the area after I was ready to go to the console and did not feel or see a hernia at this site.   Upon investigation, a  right inguinal hernia was identified. The dissection was started by taking down the peritoneum starting at the medial umbilical ligament going transversely towards the right anterior superior iliac spine. The peritoneum was then systematically taken down with blunt and sharp dissection using the robotic hot adrien.      Luis Alfredo's ligament and the pubic symphysis were identified and cleared for easy visualization. The hernia and the round ligament were then identified and the peritoneum pushed down and away from the hernia site and the suronding tissue. A large piece of fat was in the indirect hernia site. This was removed.. The hernia sac was identified and dissected away from the surronding structues and the iliac artery and vein.  I did get into a little bleeding from a branch from the femoral vein and this was cauterized, and it stopped.  We looked at this area several times.  I divided the round ligament with cautery so that the mesh would lay in there nicely.   Attention was turned to placement of the mesh. A  10 by 15 cm  progrip mesh was placed in the preperitoneal space and was stuck to the surrounding tissue and covered the defect and the other potential hernia sites. The peritoneum was then closed using a running 3-0 absorbable Stratafix suture.     I did not see a femoral hernia but did remove most of the fat in this area.       Hemostasis was confirmed. The robotic instruments were then removed  under direct visualization. The robotic arms were undocked. The robotic ports were removed under direct visualization and pneumoperitoneum was released. The  port site fascial  Area was looked at with the camera and there was no increase in size of the defect form the original insertaion. So no need to close the fascia with suture.      The fatty layers were brought together at all sites that needed it with a 3-0 vicryl  suture and then all skin incisions were closed with 4-0 Monocryl subcuticular suture. The trocar sites were reinforced with tincture of benzoin and steri strips, and then a Tegaderm.      Instrument counts, needle counts and sponge counts were all correct x 2. The patient tolerated the procedure well. I was present for all critical components of the operation.      The patient did receive IV antibiotics prior to surgical incision.       Post-operative lifting restriction: No lifting more than 20 pounds for 3 weeks.     Weston Mcnamara MD

## 2021-02-16 NOTE — LETTER
[facility]  [address]  [place] [address]  Phone: [phone]    February 16, 2021        Danyelle Kleintanklobito  [facility] [place] [address]          To whom it may concern:    RE: Danyelle Canales  Chan.   Her  has been leaving work to bring the baby to day care so the patient can work from home.   When she over did it that one day she could feel it.   Best to not over do it for another 8 days.  Then should be able to resume her normal activities.   So Chan will need to be able to leave work to bring the child to day care.          Please contact me for questions or concerns.      Sincerely,        Weston Mcnamara MD

## 2021-02-16 NOTE — LETTER
2/16/2021         RE: Danyelle Canales  [address] [place] [address]        Dear Colleague,    Thank you for referring your patient, Danyelle Canales, to the [facility]. Please see a copy of my visit note below.    Danyelle is a 40 year old female who is status post right inguinal hernia repair with robot with mesh with no chills and no fever.      Patient's  Pain is  improving.  Appetite is  improving.    Wound(s)  Is/are   clean dry and intact with no evidence of infection.  So on the right side feels like an ache in the groin area and on the right side of the abdomen gets like a stitch form having run to much.  This happens a few times a day. The groin ache is there most of the time. But severity is getting better.     Questions were answered and discussion of no lifting more than 20 pounds for  3 weeks after surgery.  Patient went to the grocery store and had to carry her child for a few hours and had pain that night.   Her  has been leaving work to bring the baby to day care so the patient can work from home.   When she over did it that one day she could feel it.   Best to not over do it for another 8 days.  Then should be able to resume her normal activities.     Plan: follow up as needed.  Use antibiotic ointment on wound at night.     Time spent with the patient with greater that 50% of the time in discussion was 20 minutes.  Weston Mathews MD, MD   Physician   General Surgery   OR Surgeon       Signed   Date of Service:  2/3/2021 11:19 AM            Procedure: ROBOTIC XI ASSISTED LAPAROSCOPIC HERNIA REPAIR RIGHT INGUINAL WITH MESH Case Time: 2/3/2021 11:19 AM Surgeon: Weston Mcnamara MD                 []Hide copied text    []Hover for details     POST-OPERATIVE NOTE       Surgeon(s):  Weston Mcnamara MD     Assistant(s):  Tyrel nurse: Lata Russ RN; Radha Woodall RN  Scrub tech: Brionna Gale  HARSHAD; Jazmin Gimenez     Preoperative Diagnosis:  right inguinal hernia     Postoperative Diagnosis:  Same large direct no left inguinal hernia noted     Procedure(s):   right inguinal hernia repair with robot with mesh     Complications:  None      EBL: 5 mL     Anesthesia:  General     Operative Findings:  As above     Specimen(s):  * No specimens in log * none     Grafts and/or Implants:  Implant Name Type Inv. Item Serial No.  Lot No. LRB No. Used Action   MESH PROGRIP SURG [id number] SLF - [id number] Mesh/patch MESH PROGRIP SURG [id number] SLF   [employer] [id number] N/A 1 Implanted         Condition on Discharge from Operating Room: Satisfactory        REPORT OF OPERATION/ PROCEDURE  Please see below operative note.     Date of Service: 02/03/21       SURGEON: Weston Mcnamara MD        PREOPERATIVE DIAGNOSIS:  Right Inguinal Hernia  s     POSTOPERATIVE DIAGNOSIS: same with large indirect     PROCEDURES PERFORMED: Robotic (xi) assisted multi port  RIH  (transabdominal preperitoneal) repair with mesh (10 by 15 cm progrip mesh ).     ESTIMATED BLOOD LOSS: less than 10  mL    ANESTHESIA: General endotracheal anesthesia.  With 0.25 %  marcaine with epinephrine placed in the wounds prior to and at the end of surgery.      FINDINGS:  No hernia identified on asymptomatic side.     GRAFTS/IMPLANTS: 10 by 15 cm progrip mesh  .     COMPLICATIONS: None.     SPECIMENS: None.     OPERATIVE INDICATIONS: Please see my office visit note.      OPERATIVE DETAILS: The patient was brought to the operating room and prepared in a routine fashion. Anesthesia was induced without complications. The patient was placed in a supine position.  A timeout was performed prior to surgery and documented by the nursing team.      Under the benefits of general anesthesia, a 5 mm trocar was placed in the left upper quadrant under direct visualization with a camera running thru it.   The abdomen was entered while watching with the camera and entered  safely. A pneumoperitoneum was established using carbon dioxide gas to a maximum pressure of 15 mmHg. The  laparoscope was used to visualize the abdomen which showed no injuries. An 8 mm robotic port was placed in the right flank and the 8  mm port for the camera was placed above the umbilicus and then the right 5 mm port was replaced with another 8 mm port in the left flank, both placed just above the level of the suprumcilical trocar. The robotic laparoscope was utilized from the supraumbilical port site. The robotic arms were docked. The robotic instruments were then inserted under direct vision.   I did look at the left  side and palpated the area after I was ready to go to the console and did not feel or see a hernia at this site.   Upon investigation, a  right inguinal hernia was identified. The dissection was started by taking down the peritoneum starting at the medial umbilical ligament going transversely towards the right anterior superior iliac spine. The peritoneum was then systematically taken down with blunt and sharp dissection using the robotic hot adrien.      Luis Alfredo's ligament and the pubic symphysis were identified and cleared for easy visualization. The hernia and the round ligament were then identified and the peritoneum pushed down and away from the hernia site and the suronding tissue. A large piece of fat was in the indirect hernia site. This was removed.. The hernia sac was identified and dissected away from the surronding structues and the iliac artery and vein.  I did get into a little bleeding from a branch from the femoral vein and this was cauterized, and it stopped.  We looked at this area several times.  I divided the round ligament with cautery so that the mesh would lay in there nicely.   Attention was turned to placement of the mesh. A  10 by 15 cm  progrip mesh was placed in the preperitoneal space and was stuck to the surrounding tissue and covered the defect and the other  potential hernia sites. The peritoneum was then closed using a running 3-0 absorbable Stratafix suture.     I did not see a femoral hernia but did remove most of the fat in this area.       Hemostasis was confirmed. The robotic instruments were then removed under direct visualization. The robotic arms were undocked. The robotic ports were removed under direct visualization and pneumoperitoneum was released. The  port site fascial  Area was looked at with the camera and there was no increase in size of the defect form the original insertaion. So no need to close the fascia with suture.      The fatty layers were brought together at all sites that needed it with a 3-0 vicryl  suture and then all skin incisions were closed with 4-0 Monocryl subcuticular suture. The trocar sites were reinforced with tincture of benzoin and steri strips, and then a Tegaderm.      Instrument counts, needle counts and sponge counts were all correct x 2. The patient tolerated the procedure well. I was present for all critical components of the operation.      The patient did receive IV antibiotics prior to surgical incision.       Post-operative lifting restriction: No lifting more than 20 pounds for 3 weeks.     Weston Mcnamara MD                         Again, thank you for allowing me to participate in the care of your patient.        Sincerely,        Weston Mcnamara MD

## 2021-02-16 NOTE — PATIENT INSTRUCTIONS
Danyelle is a 40 year old female who is status post right inguinal hernia repair with robot with mesh with no chills and no fever.      Patient's  Pain is  improving.  Appetite is  improving.    Wound(s)  Is/are   clean dry and intact with no evidence of infection.  So on the right side feels like an ache in the groin area and on the right side of the abdomen gets like a stitch form having run to much.  This happens a few times a day. The groin ache is there most of the time. But severity is getting better.     Questions were answered and discussion of no lifting more than 20 pounds for  3 weeks after surgery.  Patient went to the grocery store and had to carry her child for a few hours and had pain that night.   Her  has been leaving work to bring the baby to day care so the patient can work from home.   When she over did it that one day she could feel it.   Best to not over do it for another 8 days.  Then should be able to resume her normal activities.     Plan: follow up as needed.  Use antibiotic ointment on wound at night.

## 2021-04-19 ENCOUNTER — IMMUNIZATION (OUTPATIENT)
Dept: NURSING | Facility: CLINIC | Age: 41
End: 2021-04-19
Payer: COMMERCIAL

## 2021-04-19 PROCEDURE — 91300 PR COVID VAC PFIZER DIL RECON 30 MCG/0.3 ML IM: CPT

## 2021-04-19 PROCEDURE — 0001A PR COVID VAC PFIZER DIL RECON 30 MCG/0.3 ML IM: CPT

## 2021-05-10 ENCOUNTER — IMMUNIZATION (OUTPATIENT)
Dept: NURSING | Facility: CLINIC | Age: 41
End: 2021-05-10
Attending: INTERNAL MEDICINE
Payer: COMMERCIAL

## 2021-05-10 PROCEDURE — 91300 PR COVID VAC PFIZER DIL RECON 30 MCG/0.3 ML IM: CPT

## 2021-05-10 PROCEDURE — 0002A PR COVID VAC PFIZER DIL RECON 30 MCG/0.3 ML IM: CPT

## 2021-05-17 ENCOUNTER — VIRTUAL VISIT (OUTPATIENT)
Dept: PSYCHIATRY | Facility: CLINIC | Age: 41
End: 2021-05-17
Payer: COMMERCIAL

## 2021-05-17 DIAGNOSIS — F33.40 RECURRENT MAJOR DEPRESSION IN REMISSION (H): Primary | ICD-10-CM

## 2021-05-17 DIAGNOSIS — F41.1 GENERALIZED ANXIETY DISORDER: ICD-10-CM

## 2021-05-17 PROCEDURE — 99215 OFFICE O/P EST HI 40 MIN: CPT | Mod: TEL | Performed by: PSYCHIATRY & NEUROLOGY

## 2021-05-17 RX ORDER — LORAZEPAM 0.5 MG/1
TABLET ORAL
Qty: 60 TABLET | Refills: 2 | Status: SHIPPED | OUTPATIENT
Start: 2021-05-17 | End: 2021-11-16

## 2021-05-17 RX ORDER — LAMOTRIGINE 200 MG/1
200 TABLET ORAL DAILY
Qty: 90 TABLET | Refills: 1 | Status: SHIPPED | OUTPATIENT
Start: 2021-05-17 | End: 2021-06-08

## 2021-05-17 RX ORDER — DESVENLAFAXINE 100 MG/1
100 TABLET, EXTENDED RELEASE ORAL DAILY
Qty: 90 TABLET | Refills: 1 | Status: SHIPPED | OUTPATIENT
Start: 2021-05-17 | End: 2021-11-16

## 2021-05-17 RX ORDER — BUPROPION HYDROCHLORIDE 300 MG/1
300 TABLET ORAL EVERY MORNING
Qty: 90 TABLET | Refills: 1 | Status: SHIPPED | OUTPATIENT
Start: 2021-05-17 | End: 2021-11-16

## 2021-05-17 ASSESSMENT — PATIENT HEALTH QUESTIONNAIRE - PHQ9
SUM OF ALL RESPONSES TO PHQ QUESTIONS 1-9: 3
5. POOR APPETITE OR OVEREATING: NOT AT ALL

## 2021-05-17 ASSESSMENT — ANXIETY QUESTIONNAIRES
5. BEING SO RESTLESS THAT IT IS HARD TO SIT STILL: NOT AT ALL
6. BECOMING EASILY ANNOYED OR IRRITABLE: NOT AT ALL
GAD7 TOTAL SCORE: 3
2. NOT BEING ABLE TO STOP OR CONTROL WORRYING: SEVERAL DAYS
3. WORRYING TOO MUCH ABOUT DIFFERENT THINGS: SEVERAL DAYS
IF YOU CHECKED OFF ANY PROBLEMS ON THIS QUESTIONNAIRE, HOW DIFFICULT HAVE THESE PROBLEMS MADE IT FOR YOU TO DO YOUR WORK, TAKE CARE OF THINGS AT HOME, OR GET ALONG WITH OTHER PEOPLE: NOT DIFFICULT AT ALL
1. FEELING NERVOUS, ANXIOUS, OR ON EDGE: SEVERAL DAYS
7. FEELING AFRAID AS IF SOMETHING AWFUL MIGHT HAPPEN: NOT AT ALL

## 2021-05-17 NOTE — PROGRESS NOTES
"Danyelle is a 41 year old who is being evaluated via a billable telephone visit.      What phone number would you like to be contacted at? [phone]  How would you like to obtain your AVS? Mer        Outpatient Psychiatric Progress Note    Name: Danyelle Canales   : 1980                    Primary Care Provider: Katlyn Orozco DO   Therapist: Denice Haji, [facility]          PHQ-9 scores:  PHQ-9 SCORE 10/13/2020 2020 2021   PHQ-9 Total Score - - -   PHQ-9 Total Score MyChart - - -   PHQ-9 Total Score 1 1 3       NAJMA-7 scores:  NAJMA-7 SCORE 10/13/2020 2020 2021   Total Score - - -   Total Score - - -   Total Score 9 7 3       Patient Identification:  Danyelle is a 41 year old year old female  who presents for return visit with me.  Patient attended the session alone.     Interim History:  Danyelle has not been seen since December.  She reports that she has been doing well.  Her anxiety has been a little elevated over the last couple of weeks because she is in the process of transitioning back to working in the office rather than working at home.  She expects to be back in the office full-time by  or .  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  She says that when she does not have a routine that she feels \"lost and discombobulated.\"    She recently had a hernia repair, but has recovered well.  Her son, Vinod, is almost 1 year old.  Overall things are going well.    Her mood has been good.  She denies any depressive symptoms.  If her anxiety is not alleviated by a new routine, she can certainly call and schedule a sooner appointment.  She has lorazepam and uses it appropriately as needed.    She wondered about using lorazepam before dental appointment.  We discussed risks and benefits.  She will try taking a milligram at home one evening to see how it affects her, so that she has the option to use a whole " milligram of lorazepam prior to her dental appointment if needed.    Vital Signs:   There were no vitals taken for this visit.    Current Medications:  Current Outpatient Medications   Medication     buPROPion (WELLBUTRIN XL) 300 MG 24 hr tablet     desvenlafaxine (PRISTIQ) 100 MG 24 hr tablet     lamoTRIgine (LAMICTAL) 200 MG tablet     LORazepam (ATIVAN) 0.5 MG tablet     norethindrone-ethinyl estradiol (MICROGESTIN 1/20) 1-20 MG-MCG tablet     Current Facility-Administered Medications   Medication     methylPREDNISolone (DEPO-MEDROL) injection 20 mg      Mental Status Examination:  Danyelle is a 41-year-old woman in no acute distress.  Speech is clear and normal in rate and tone.  Mood is slightly anxious.  Thoughts are logical and spontaneous with no loose associations or flight of ideas.  Thought content shows no psychosis.  No suicidal thoughts.  She is alert and oriented x3.    Assessment and Plan:    ICD-10-CM    1. Recurrent major depression in remission (H)  F33.40    2. Generalized anxiety disorder  F41.1 buPROPion (WELLBUTRIN XL) 300 MG 24 hr tablet     desvenlafaxine (PRISTIQ) 100 MG 24 hr tablet     lamoTRIgine (LAMICTAL) 200 MG tablet     LORazepam (ATIVAN) 0.5 MG tablet       Medical comorbidities include:   Patient Active Problem List   Diagnosis     Persistent insomnia     allergic DERMATITIS NOS     [facility]     [facility]     Migraine headache     PMDD (premenstrual dysphoric disorder)     Trichotillomania     CARDIOVASCULAR SCREENING; LDL GOAL LESS THAN 160     History of ovarian cyst     Generalized anxiety disorder     Chronic pain syndrome     ASD (atrial septal defect)     Chronic pain     Esophageal reflux     Non morbid obesity, unspecified obesity type     Benign hypermobility syndrome     Myalgia and myositis, unspecified     Myofascial pain     Recurrent major depression in partial remission (H)     Right inguinal hernia     S/P right inguinal hernia repair,  follow-up exam       Treatment Plan:  Patient Instructions   Continue lorazepam 0.5 mg at bedtime.  You may continue to take 0.5 mg daily as needed for anxiety.     Continue Wellbutrin  mg daily.     Continue desvenlafaxine 100 mg daily.     Continue lamotrigine 200 mg daily.      Continue all other medications per your primary care provider.    Schedule an appointment with me in 6 months or sooner as needed.  You may call [facility] at [phone] to schedule.    [place] Resources:      Go to the Emergency Department as needed or call after hours crisis line at [phone].      To schedule individual or family therapy, call [facility] at [phone].     Follow up with primary care provider as planned or sooner for acute medical concerns.    Call the psychiatric nurse line with medication questions or concerns at [phone].    [employer] may be used to communicate with your provider, but this is not intended to be used for emergencies.    Community Resources:      National Suicide Prevention Lifeline: 819.539.5902 (TTY: 277.979.8788). Call anytime for help.  (www.suicidepreventionlifeline.org)    National [place] on Mental Illness (www.telma.org): 446.118.5039 or 104-758-6577.     Mental Health Association (www.mentalhealth.org): 898.442.5430 or 743-419-4355.    [place] Crisis Text Line: Text [place] to 274343    Suicide LifeLine Chat: suicidehdtMEDIAline.org/chat       Administrative Billing:   Telephone call duration: 31 minutes  Total time spent, including chart review and documentation: 42 minutes    Patient Status:  This is a continuous care patient and medications will be prescribed by the psychiatric provider until further indicated.

## 2021-05-17 NOTE — PATIENT INSTRUCTIONS
Continue lorazepam 0.5 mg at bedtime.  You may continue to take 0.5 mg daily as needed for anxiety.     Continue Wellbutrin  mg daily.     Continue desvenlafaxine 100 mg daily.     Continue lamotrigine 200 mg daily.      Continue all other medications per your primary care provider.    Schedule an appointment with me in 6 months or sooner as needed.  You may call [facility] at [phone] to schedule.    [facility] Resources:      Go to the Emergency Department as needed or call after hours crisis line at [phone].      To schedule individual or family therapy, call [facility] at [phone].     Follow up with primary care provider as planned or sooner for acute medical concerns.    Call the psychiatric nurse line with medication questions or concerns at [phone].    [employer] may be used to communicate with your provider, but this is not intended to be used for emergencies.    Community Resources:      National Suicide Prevention Lifeline: 463.250.2896 (TTY: 146.843.4594). Call anytime for help.  (www.suicidepreventionlifeline.org)    National [place] on Mental Illness (www.telma.org): 445.902.7295 or 792-415-8077.     Mental Health Association (www.mentalhealth.org): 111.311.6455 or 514-685-8588.    [place] Crisis Text Line: Text [place] to 126060    Suicide LifeLine Chat: suicidepre64 Pixelsline.org/chat

## 2021-05-18 ASSESSMENT — ANXIETY QUESTIONNAIRES: GAD7 TOTAL SCORE: 3

## 2021-06-04 ENCOUNTER — TELEPHONE (OUTPATIENT)
Dept: PSYCHOLOGY | Facility: CLINIC | Age: 41
End: 2021-06-04

## 2021-06-04 ENCOUNTER — RECORDS - HEALTHEAST (OUTPATIENT)
Dept: ADMINISTRATIVE | Facility: CLINIC | Age: 41
End: 2021-06-04

## 2021-06-04 NOTE — TELEPHONE ENCOUNTER
Pt stated she's running out of med, the refill send to the pharmacy is missing information. Please call pharmacy asap.

## 2021-06-04 NOTE — TELEPHONE ENCOUNTER
Pharmacist call regarding pt prescription. Stated the image is not showing on the scan needs a verbal order. [phone]

## 2021-06-05 VITALS — BODY MASS INDEX: 31.08 KG/M2 | WEIGHT: 198 LBS | HEIGHT: 67 IN

## 2021-06-05 NOTE — PROGRESS NOTES
"Please see \"Imaging\" tab under Chart Review for full report.  This ultrasound was performed in the [facility], and may be located under Care Everywhere.    Dinora Hillman MD  Maternal Fetal Medicine    "

## 2021-06-06 NOTE — PROGRESS NOTES
"Please see \"Imaging\" tab under \"Chart Review\" for details of today's visit.    Millicent Spence        "

## 2021-06-08 DIAGNOSIS — F41.1 GENERALIZED ANXIETY DISORDER: ICD-10-CM

## 2021-06-08 RX ORDER — LAMOTRIGINE 200 MG/1
200 TABLET ORAL DAILY
Qty: 90 TABLET | Refills: 1 | Status: SHIPPED | OUTPATIENT
Start: 2021-06-08 | End: 2021-11-23

## 2021-06-08 NOTE — TELEPHONE ENCOUNTER
Patient calling to follow up on prescription. Routing to Dr Lopez. Pharmacy has provided emergency refill and patient now has 1 dose left. She will be out of medication tomorrow.     Patricia Ayala RN

## 2021-06-08 NOTE — PROGRESS NOTES
[facility] Lactation Visit   Assessment:   1.  difficulty in feeding at breast     Vinod was uncoordinated at the breast today and would latch briefly but never maintained a period of more than a few nutritive sucks in a row, even with frequent stimulation and mom massaging and compressing her breasts. His latch was somewhat shallow. On a gloved finger he did have a coordinated suck and was able to extend his tongue well past his gum line; no signs of tongue tie or other physical impediment to nursing. A 24 mm nipple shield was used in an attempt to improve his latch/coordination at the breast, but this was not especially helpful. After approximately 25 minutes total of working on nursing, Vinod transferred 0.2 oz which is well below what I would expect for a 7 day infant. He was offered a bottle of formula after nursing and was much more coordinated with the bottle, easily taking 40 mls - he appeared satisfied after taking this supplement.   I was unable to fully assess mom's milk supply today as Vinod had poor transfer and she has not been pumping; she did pump in the office for about 5 minutes while I showed her how to use a breast pump, and expressed 10 mls from one side.   Vinod has had improved weight gain over the past 4 days but is still -11% from his birth weight at 7 days of age and has continued to have green/transitional stools. Discussed with mom that as he improves with feedings he may perk up at the breast as well. I recommended a transition to bottle feeding from syringe feedings today to both help Vinod practice coordinated sucking as well as to develop a more sustainable feeding plan as Vindo is still having significant struggles with nursing.   Vinod did have good tone on exam.   I recommended follow up next week for weight check as well as with in-person lactation consultant for ongoing management.   Plan:   Continue to breastfeed on demand as often as works for your  "life - if you need to take a break from nursing from some feedings and pump and bottle feed instead, that is OK. For now, wake Brock every 2 - 3 hours during the day to feed if he does not wake up on his own, and then every 3 hours at night (for now - once he is consistently gaining and gets back to birth weight you will be able to let him sleep longer stretches at night).   Offer both sides every time, and alternate which breast you start on. Latch baby deeply by making a \"breast sandwich,\" and aim your nipple for the roof of the mouth. If baby's lips are rolled inward, flip the top lip out with your finger, and then apply gentle downward pressure to the chin to help the lips flange out like \"fish lips.\" If you have pain that lasts beyond the initial latch-on, always restart. When sucking/swallowing frequency starts to slow down, do breast compressions/massage and tickle baby's feet to keep him alert with feeding. A diaper change between sides can be helpful to keep him alert.   Supplementation plan: Supplement with your breast milk (use this first) or formula (use this if you run out of breast milk) - give him as much as he cues for. See the chart below for typical intake by age.   When you bottle feed him, use a slow flow nipple (level 0 or 1) and pace feed this to him.   Paced bottle feeding   There are many brands of bottles available, and most are just fine. A slow-flow nipple is best so that the flow of milk is more similar to breastfeeding, and I recommend paced bottle feeding. This is where you hold your baby semi-upright or side-lying (like a football hold), and holding the bottle almost horizontally so that the baby has to actively suck the milk out rather than letting it passively drip into their mouth.   Recommended to pump: Aim for good breast stimulation either by nursing or pumping at least 8 times per day. I would recommend the 24 mm size flanges. Pump for about 15 - 20 minutes or until your milk " stops dripping.   Continue to monitor output, expect at least 6 wet diapers per day. Let us know if he does not have a stool in the next 48 hours.   Recommend Vitamin D 400 IU daily.   Follow up on 6/16 with Dr. Dorman, then again next week with lactation (Stephanie Smith CNM) is person next week on Tuesdays and Thursdays - schedule circumcision when convenient.   Average Infant Milk Intake by Age   Age Average milk volume per feeding (mL) Average milk volume per feeding (oz) Average 24 hour milk intake (mL) Average 24 hour milk intake (oz)   Day 1 Few drops - 5mL < tsp Up to 30 mL Up to 1 oz   Day 2 5 - 15 mL <0.5 oz - 1 TB 30 - 120 mL 1 - 4 oz   Day 3 15 - 30 mL  0.5 - 1 oz 120 - 240 mL 4 - 8 oz   Day 4 30 - 45 mL  1 - 1.5 oz 240 - 360 mL 8 - 12 oz   Day 5-7 45 - 60 mL 1.5 - 2 oz 360 - 600 mL 12 - 18 oz   Week 2-3 60 - 90 mL 2 - 3 oz 450 - 750 mL 15 - 25 oz   Months 1-6 90 - 150 mL 3 - 5 oz 750 - 1035 mL 25 - 35 oz   SUBJECTIVE:   Vinod is here today with mom, Danyelle, for lactation support. He is a 7 days male born at Gestational Age: 38w4d now 7 days.   He is improving with feedings but continuing to have significant challenges. He has gained 4 oz since last visit two days ago. He is now -11% from birth weight which is an improvement from last weight check two days ago.   Birth weight: 8 # 12 oz   Discharge weight: 8 # 7 oz   Baby is nursing every 2 hours for about 20 - 30 minutes per session. He takes another 10 - 15 minutes to take a supplement   Mother reports occasionally hearing audible swallows.   Baby feeds about 10 - 12 times in 24 hours.   Baby is supplemented with formula, about 15 mls oz after every feeding (approximately 10-12 times per day).   Mom has not yet been pumping   Number of wet diapers in 24 hours: 6   Number of stools in 24 hours: 1  Color and consistency of stools: orange/green, brown   Mom noticed her breasts grew larger and areolas darkened during pregnancy and she noticed primary  engorgement when her milk came in on day 7.   Breastfeeding Goals: Hoping to provide breast milk at the beginning of his life - mom going back to work in 6 weeks and thinking she would wean at that time or possibly pump at work.   Previous Breastfeeding Experience: First baby   Breast-surgery: None   Maternal medications: Wellbutrin, lamictal, mom can't remember what other medications at this time   No results found for this or any previous visit.   No current outpatient medications on file.   History reviewed. No pertinent past medical history.   History reviewed. No pertinent surgical history.         Family History   Problem Relation Age of Onset     Anxiety disorder Mother      Depression Mother    Primary care provider: Provider, No Primary Care   OBJECTIVE:   Mother:   Nipples are everted, the areola is compressible, the breast is soft and full.   Sore nipples: Mild soreness with latching, some healing bruises at the tips of nipples from previous shallow latches   Maternal depression screening: Currently followed by PCP for mental health - encouraged mom to continue with this   EPDS: Referral to maternal PCP not made today; already has a plan in place   Infant:   Age today: 7 days       Vitals:    06/11/20 0858   Pulse: 136   Temp: (!) 97.2  F (36.2  C)   Weight:       Wt Readings from Last 3 Encounters:   06/11/20 7 lb 13 oz (3.544 kg) (45 %, Z= -0.12)*   06/09/20 7 lb 9 oz (3.43 kg) (42 %, Z= -0.20)*     * Growth percentiles are based on WHO (Boys, 0-2 years) data.   Birthweight: 8 lb 12 oz (3.969 kg).   Today's weight:   Vitals                      . This is -11% from birth weight.   Test weights:   LEFT side: 0 oz   RIGHT side: 0.2 oz   TOTAL transfer: 0.2 oz   Feeding assessment:   Digital suck assessment: Infant draws consultant's finger into mouth, palate intact, tongue over gums, normal frenulum.   Baby can briefly hold suction with tongue while at the breast - he tends toward a shallow latch. He  latched, gave a few non-nutritive sucks, then fell asleep.   Alignment: Baby's head was aligned with its trunk. Baby did face mother. Baby was in cross cradle position today.   Areolar Grasp: Baby was able to open mouth wide but did not maintain wide latch for more than a suck or two. Baby's lips were not pursed. Baby's lips did flange outward. Tongue was visible just barely over bottom lip. Baby had an incomplete seal - shallow and frequently un-latched.   Areolar Compression: Baby made rhythmic motion. There were no clicking or smacking sounds. There was no severe nipple discomfort. Nipples appeared round after feeding.   Audible swallowing: Baby did not make quiet sounds of swallowing at the breast - there was not an increase in frequency after milk ejection reflex. Unable to assess maternal milk supply today; though mom describes breasts as feeling more full, and breasts were actively dripping/ filling the nipple shield with milk.   PHYSICAL EXAM   Gen: Alert, no acute distress.   Head: Anterior fontanelle flat and soft.   Mouth:Lips pink. Oral mucosa moist. Tongue midline (good lateralization, movement, and lift; able to extend pass lower gumline). Palate intact. Coordinated suck.   Lungs: Clear to auscultation bilaterally.   Cardiac: Regular regular rate and rhythm, S1S2, no murmurs.   Abdomen: Soft, nontender, bowel sounds present, no hepatosplenomegaly or mass palpable. Umbilicus dry with no erythema or drainage.   : Tom stage 1 male genitalia   Skin: Intact, dry, appropriate coloring for ethnicity, mild jaundice to face.   Neuro: Appropriate muscle tone.   The visit lasted a total of 90 minutes that I spent face to face with the patient. Of that time, 90 minutes were spent on lactation. Over 50% of the time was spent counseling and educating the patient about feeding difficulties and lactation concerns.   Completed by:   ZUNILDA Rosado, IBCLC, [facility], Pediatrics.   6/11/2020  4:06 PM

## 2021-06-11 NOTE — PROGRESS NOTES
Assessment:     Diagnoses and all orders for this visit:    Cervicalgia  -     OPS Interlaminar Epidural Steroid Injection Cervical; Future; Expected date: 09/29/2020    Radiculitis of right cervical region  -     OPS Interlaminar Epidural Steroid Injection Cervical; Future; Expected date: 09/29/2020    Hyperreflexia    Protrusion of cervical intervertebral disc  -     OPS Interlaminar Epidural Steroid Injection Cervical; Future; Expected date: 09/29/2020    Cervical stenosis of spinal canal  -     OPS Interlaminar Epidural Steroid Injection Cervical; Future; Expected date: 09/29/2020    Chronic neck pain  -     OPS Interlaminar Epidural Steroid Injection Cervical; Future; Expected date: 09/29/2020    Myofascial pain    DDD (degenerative disc disease), cervical    Torticollis  -     HYDROcodone-acetaminophen 5-325 mg per tablet; Take 1 tablet by mouth 2 (two) times a day as needed for pain (Severe breakthrough pain).  Dispense: 10 tablet; Refill: 0    Cervical radiculopathy  -     HYDROcodone-acetaminophen 5-325 mg per tablet; Take 1 tablet by mouth 2 (two) times a day as needed for pain (Severe breakthrough pain).  Dispense: 10 tablet; Refill: 0       Danyelle Canales is a 40 y.o. y.o. female with past medical history significant for cervicalgia, neck sprain, torticollis, myofascial pain syndrome who presents today for follow-up regarding:     -Chronic generalized neck pain that has been worse right greater than left cervical spine that radiates in the parascapular region.  Cervical spine MRI does show multilevel degenerative disc changes with moderate spinal stenosis at C5-6 due to disc protrusion.    -Patient did have positive Zoë reflex however strength is equal on exam and she is hoping to avoid surgery at this time.  No myelopathic or red flag symptoms.    Plan:     A shared decision making plan was used.  The patient's values and choices were respected. Prior medical records from 9/14/2020 to  current were reviewed today. The following represents what was discussed and decided upon by the provider and the patient.     -DIAGNOSTIC TESTS: Images were personally reviewed and interpreted.   --Cervical spine MRI 9/25/2020 with degenerative disc disease multilevel.  C5-6 right disc protrusion with moderate spinal stenosis.   --Lumbar spine x-ray 2012 with partial sacralization of L5 on the left, otherwise negative.  --Cervical and thoracic MRI imaging 2010, unavailable    -INTERVENTIONS: Ordered C7-T1 interlaminar epidural steroid injection see if we get further relief of her generalized neck pain right greater than left.  -Discussed with patient that if she fails to get improvement with  interventions, physical therapy and medications she would be a surgical candidate but she is hoping to avoid surgery at this time which is also reasonable as she is strong in her upper extremities.  She does have positive Zoë reflex however.    -MEDICATIONS: Advised patient continue with naproxen 500 mg 1 tablet twice daily for pain inflammation as needed for mild to moderate pain.  -Refilled hydrocodone 5/325 mg 1 tablet twice daily as needed for severe breakthrough pain, 10 tablets given for 5 days worth.  Advised patient that she must continue to pump and dump breastmilk 24 hours post taking hydrocodone pain medication.  [place]  checked. Discussed the risks (eg, addiction, overdose, worsening pain) verses benefit of opioid use with patient today. Explained that this medication will not be a long term solution to ongoing pain. Discussed using lowest effective dose and the importance of other measures for pain management including PT, other non-opioid medications, behavioral treatments, and other procedure options.   Discussed side effects of medications and proper use. Patient verbalized understanding.    -PHYSICAL THERAPY: Encourage patient to schedule with physical therapy at this time for cervical core strengthening  and nerve glides as well as myofascial release.  -Patient reports that she is trialed a MedX type program in the past which aggravated her pain therefore she is NOT a great candidate for this at this time either.  Discussed the importance of core strengthening, ROM, stretching exercises with the patient and how each of these entities is important in decreasing pain.  Explained to the patient that the purpose of physical therapy is to teach the patient a home exercise program.  These exercises need to be performed every day in order to decrease pain and prevent future occurrences of pain.        -PATIENT EDUCATION: 20 follow-up minutes of total visit time was spent face to face with the patient today, 60 % of the visit was spent on counseling, education, and coordinating care.   -5 minutes spent outside of visit time, non-face-to-face time, reviewing chart.  -Today we also discussed the issues related to the current COVID-19 pandemic, the pros and cons of the current treatment plan, the CDC guidelines such as social distancing, washing the hands, and covering the cough.    -FOLLOW UP: For injection with Dr. Phillips 2 weeks postinjection  Advised to contact clinic if symptoms worsen or change.    Subjective:     Danyelle Canales is a 40 y.o. female who presents today for follow-up regarding chronic generalized neck pain that is been ongoing however progressive in the last 1 month.  Currently her pain is a constant 6/10, 9 at its worst, a 3 at its best and does radiate more on the right into the right scapular region and trapezius region with burning type of sensation.  Previously she is having some symptoms down her right arm however that has improved and now is mostly neck pain at this time.  Patient denies upper extremity weakness but she does have some intermittent numbness and tingling bilateral hands, denies recent trips or falls or balance changes, denies bowel or bladder loss control, denies saddle  anesthesia.  Patient is right-hand dominant.    No myelopathic symptoms.     Patient currently breast-feeding.  She does report that anytime she takes hydrocodone she is  pump and dump breast milk for 24 hours.     -Treatment to Date: No prior spinal surgery.  Physical therapy in the past and she does continue some of the stretches.     Cervical spinal injections 2013 and 2012, trigger point and medial branch block.  She does not recall an epidural but it does look like there was potentially an epidural on file.     -Medications:  Medrol Dosepak prescribed 9/9/2020 with some benefit.  Ibuprofen with minimal benefit  Flexeril with some benefit but she does not take this regularly since she is unsure if it is safe with breast-feeding.  Hydrocodone as needed initially for severe breakthrough pain, does not currently take and she is aware that she has to pump and dump for 24 hours with breast-feeding post Vicodin medication.    Patient Active Problem List   Diagnosis     Cervicalgia     Neck Sprain     Backache     Midback Pain     Myofascial Pain Syndrome       Current Outpatient Medications on File Prior to Encounter   Medication Sig Dispense Refill     cyclobenzaprine (FLEXERIL) 10 MG tablet Take 1 tablet (10 mg total) by mouth 2 (two) times a day as needed for muscle spasms. 20 tablet 0     naproxen (NAPROSYN) 500 MG tablet Take 1 tablet (500 mg total) by mouth 2 (two) times a day as needed. Do not take with ibuprofen.  Take with Food 30 tablet 2     [DISCONTINUED] HYDROcodone-acetaminophen 5-325 mg per tablet Take 1 tablet by mouth every 4 (four) hours as needed for pain. 10 tablet 0     buPROPion (WELLBUTRIN XL) 300 MG 24 hr tablet Take 300 mg by mouth.       desvenlafaxine succinate (PRISTIQ) 100 MG 24 hr tablet Take 100 mg by mouth.       lamoTRIgine (LAMICTAL) 200 MG tablet Take 200 mg by mouth.       LORazepam (ATIVAN) 0.5 MG tablet Take 0.5 mg at bedtime and o.5 mg daily as needed for anxiety.       No  current facility-administered medications on file prior to encounter.        Allergies   Allergen Reactions     Artificial Sweetner [Saccharin]      Dissolvable medications     Chocolate Flavor Headache and Other (See Comments)       No past medical history on file.     Review of Systems  ROS: Positive for upper extremity numbness and tingling and headache.  Specifically negative for bowel/bladder dysfunction, balance changes, dizziness, foot drop, fevers, chills, appetite changes, nausea/vomiting, unexplained weight loss. Otherwise 13 systems reviewed are negative. Please see the patient's intake questionnaire from today for details.    Reviewed Social, Family, Past Medical and Past Surgical history with patient, no significant changes noted since prior visit.     Objective:     /80 (Patient Site: Right Arm, Patient Position: Sitting)     PHYSICAL EXAMINATION:   --CONSTITUTIONAL: Vital signs as above. No acute distress. The patient is well nourished and well groomed.  --PSYCHIATRIC:  The patient is awake, alert, oriented to person, place, time and answering questions appropriately with clear speech. Appropriate mood and affect   --HEENT: Sclera are non-injected. Extraocular muscles are intact.   --SKIN: Skin over the face, bilateral upper extremities, and posterior torso is clean, dry, intact without rashes.  --RESPIRATORY: Normal rhythm and effort. No abnormal accessory muscle breathing patterns noted.   --GROSS MOTOR: Easily arises from a seated position.   --CERVICAL SPINE: Inspection reveals no evidence of deformity.     Imaging of the cervical spine: Cervical spine imaging was reviewed today. The images were shown to the patient and the findings were explained using a spine model.      Mr Cervical Spine Without Contrast  Result Date: 9/27/2020  INDICATION: Neck pain, abnormal neuro exam. Cervical radiculopathy. Right cervical radiculopathy with positive right Zoë's reflex/hyperreflexia. COMPARISON:  None. TECHNIQUE: Without IV contrast. FINDINGS: Normal vertebral body heights, alignment and marrow signal. No abnormal cord signal. No extraspinal abnormality. Craniovertebral junction and C1-C2: Normal.   C2-C3: Normal disc height. No herniation. Normal facets. No spinal canal or neural foraminal stenosis.   C3-C4: Normal disc height. No herniation. Normal facets. No spinal canal or neural foraminal stenosis.   C4-C5: Moderate loss of disc height. Small broad-based posterior disc/osteophyte complex without evidence of soft disc. No facet hypertrophy. Mild central spinal stenosis. No foraminal narrowing.   C5-C6: Disc desiccation and moderate loss of height. Mild to moderate circumferential disc bulge, larger on the right than the left, and there is a small superimposed right paracentral disc protrusion. No facet hypertrophy. Moderate central spinal stenosis. Minimal right ventral cord flattening. No significant foraminal narrowing.   C6-C7: Normal disc height. No herniation. Normal facets. No spinal canal or neural foraminal stenosis.   C7-T1: Normal disc height. No herniation. Normal facets. No spinal canal or neural foraminal stenosis.   1.  Degenerative disc disease at C4-C5 and C5-C6. At C5-C6, there is a disc bulge and a superimposed right paracentral disc protrusion. The findings lead to moderate central spinal stenosis and minimal right ventral cord flattening at C5-C6.   2.  Otherwise, negative cervical spine MRI. No abnormal signal in the cervical cord. No evidence of nerve root displacement or compression.       LUMBAR SPINE 2-3 VIEW  Jan 30, 2012 1:50:00 PM  HISTORY: Pain.  FINDINGS: Partial sacralization of L5 on the left side. Exam otherwise  negative.

## 2021-06-11 NOTE — TELEPHONE ENCOUNTER
----- Message from Cathy Osorio CNP sent at 9/28/2020  8:13 AM CDT -----  Please call patient and notify her that her cervical spine MRI does show degenerative changes C4-5 and C5-6.  There is a right disc protrusion at C5-6 with right greater than left moderate central nerve compression.  Recommend physical therapy as ordered and we can trial a C7-T1 interlaminar NICOLE at any time to calm down her symptoms if they are still severe.

## 2021-06-11 NOTE — PATIENT INSTRUCTIONS - HE

## 2021-06-11 NOTE — PATIENT INSTRUCTIONS - HE
[facility]   [place] - [phone]  [address]  [place] [address]          ~Please call Nurse Navigation line [phone] with any questions or concerns about your treatment plan, if symptoms worsen and you would like to be seen urgently, or if you have problems controlling bladder and bowel function.    [facility] Injection Requirements      A  is required for all fluoroscopically-guided injections.    Injection appointments may be cancelled if there are signs/symptoms of an active infection or if the patient is being actively treated with antibiotics for a diagnosed infection.    Patients may have their steroid injection cancelled if they have had another steroid injection within 2 weeks.    Diabetic patients will have their blood glucose levels checked the day of their injection and the appointment will be rescheduled if the blood glucose level is 300 or higher.    Patients with allergies to cortisone, local anesthetics, iodine, or contrast dye should contact the [facility] Center to further discuss these considerations.    Patients scheduled for medial branch block diagnostic injections should refrain from taking pain medication the day of the procedure.  The medial branch block injection appointment will be rescheduled if the patient's pain rating is not 5/10 or greater at the time of the procedure.    Patients taking warfarin/Coumadin will have their INR checked the day of the procedure and the procedure may be rescheduled if the INR is greater than 3.0.    Please contact the [facility] (#[phone]) if you are taking any prescription blood-thinning medications (warfarin, Plavix, Lovenox, Eliquis, Brilinta, Effient, etc.) as special dosing adjustments may need to be made depending on the type of injection you are scheduled to receive.    It is recommended that you delay having your steroid  injection if you have received a flu shot or shingles vaccine within 2 weeks.

## 2021-06-11 NOTE — TELEPHONE ENCOUNTER
Pt returned call. Results and recommendations given. Pt stated understanding. Answered pt's questions about injection. Pt would prefer to have a follow-up with Cathy to review MRI and discuss options. Transferred pt to scheduling to make appt.

## 2021-06-11 NOTE — PROGRESS NOTES
ASSESSMENT: Danyelle Canales is a 40 y.o. female presents for consultation at the request of PCP Katlyn Joiner DO, with past medical history significant for cervicalgia, neck sprain, torticollis, myofascial pain syndrome, who presents today for new patient evaluation of :     -Chronic neck pain that is been tolerable in the last many years with acute right cervical radiculitis nonspecific pattern x7 days with positive Zoë reflex on exam, strength equal bilaterally however.      No myelopathic or red flag symptoms.      NDI Score: 24    WHO 5: 12       Diagnoses and all orders for this visit:    Radiculitis of right cervical region  -     Ambulatory referral to PT/OT  -     MR Cervical Spine Without Contrast; Future; Expected date: 09/14/2020  -     naproxen (NAPROSYN) 500 MG tablet; Take 1 tablet (500 mg total) by mouth 2 (two) times a day as needed. Do not take with ibuprofen.  Take with Food  Dispense: 30 tablet; Refill: 2  -     HYDROcodone-acetaminophen 5-325 mg per tablet; Take 1 tablet by mouth 2 (two) times a day as needed for pain (Severe breakthrough pain).  Dispense: 8 tablet; Refill: 0    Hyperreflexia  -     Ambulatory referral to PT/OT  -     MR Cervical Spine Without Contrast; Future; Expected date: 09/14/2020    Myofascial pain  -     Ambulatory referral to PT/OT    Chronic neck pain  -     Ambulatory referral to PT/OT  -     MR Cervical Spine Without Contrast; Future; Expected date: 09/14/2020       PLAN:  Reviewed spine anatomy and disease process. Discussed diagnosis and treatment options with the patient today. A shared decision making model was used. The patient's values and choices were respected. The following represents what was discussed and decided upon by the provider and the patient.     -DIAGNOSTIC TESTS:  Images were personally reviewed and interpreted and explained to patient today using spine model.   --Ordered cervical spine MRI to evaluate radiculitis as well as positive  Zoë reflex.  --Lumbar spine x-ray 2012 with partial sacralization of L5 on the left, otherwise negative.  --Cervical and thoracic MRI imaging 2010, unavailable    -PHYSICAL THERAPY: Referral for physical therapy to establish cervical core strengthening and nerve glides and myofascial release.  Discussed the importance of core strengthening, ROM, stretching exercises with the patient and how each of these entities is important in decreasing pain.  Explained to the patient that the purpose of physical therapy is to teach the patient a home exercise program.  These exercises need to be performed every day in order to decrease pain and prevent future occurrences of pain.  Likened it to brushing one's teeth.      -MEDICATIONS: Prescribed hydrocodone 5/325 mg 1 tablet twice daily as needed for severe breakthrough pain, number 8 tablets given for 4 days worth.  Patient is aware that she cannot breast-feed while taking his medication and needs to hold off/pump and dump for 24 hours, After taking the medication.  -Also prescribed naproxen 5 mg 1 tablet twice daily for pain and inflammation instead of ibuprofen.  Discussed multiple medication options today with patient. Discussed risks, side effects, and proper use of medications. Patient verbalized understanding.    -INTERVENTIONS: Could consider cervical NICOLE pending MRI review.  Patient will be interested in this option as well if her pain worsens.  Discussed risks and benefits of injections with patient today.    -PATIENT EDUCATION: 45 minutes of total visit time was spent face to face with the patient today, greater than 50% of total time spent with patient was spent on counseling, education, and coordinating care.   -10 minutes spent outside of visit time, non-face-to-face time, reviewing chart.  -Today we also discussed the issues related to the current COVID-19 pandemic, the pros and cons of the current treatment plan, the CDC guidelines such as social distancing,  washing the hands, and covering the cough.  -The patient was masked and proper PPE was worn by the provider for the duration of this visit including a surgical mask, gloves, and protective eyewear.    -FOLLOW-UP:   Follow-up after obtaining MRI to review results.  She is okay with a phone call as well.    Advised patient to call the [facility] if symptoms worsen or you have problems controlling bladder and bowel function.   ______________________________________________________________________    SUBJECTIVE:   Danyelle Canales  is a 40 y.o. female who presents today for new patient evaluation of neck pain that is been chronic in nature but typically very tolerable until the last 1 week when she had significant acute right neck pain radiating into the right arm.  She reports that the neck pain was most intense but she has a dull aching in the post scapular region as well as down the bicep and forearm on the right that stops at mid forearm.  She does report that previously when her pain started it was an 8/10 and very severe and debilitating, currently is a 3/10 and much more tolerable in the last couple of days post Medrol Dosepak.  Patient reports slight numbness and tingling in the right arm as well but denies any upper extremity weakness, denies recent trips or falls or balance changes, denies left arm symptoms currently.  She is right-hand dominant.    Patient currently breast-feeding.    -Treatment to Date: No prior spinal surgery.  Physical therapy in the past and she does continue some of the stretches.    Cervical spinal injections 2013 and 2012, trigger point and medial branch block.  She does not recall an epidural but it does look like there was potentially an epidural on file.    -Medications:  Medrol Dosepak prescribed 9/9/2020 with some benefit.  Ibuprofen with minimal benefit  Flexeril with some benefit but she does not take this regularly since she is unsure if it is safe with  breast-feeding.  Hydrocodone as needed initially for severe breakthrough pain, does not currently take and she is aware that she has to pump and dump for 24 hours with breast-feeding post Vicodin medication.    Current Outpatient Medications on File Prior to Encounter   Medication Sig Dispense Refill     buPROPion (WELLBUTRIN XL) 300 MG 24 hr tablet Take 300 mg by mouth.       cyclobenzaprine (FLEXERIL) 10 MG tablet Take 1 tablet (10 mg total) by mouth 2 (two) times a day as needed for muscle spasms. 20 tablet 0     desvenlafaxine succinate (PRISTIQ) 100 MG 24 hr tablet Take 100 mg by mouth.       HYDROcodone-acetaminophen 5-325 mg per tablet Take 1 tablet by mouth every 4 (four) hours as needed for pain. 10 tablet 0     lamoTRIgine (LAMICTAL) 200 MG tablet Take 200 mg by mouth.       LORazepam (ATIVAN) 0.5 MG tablet Take 0.5 mg at bedtime and o.5 mg daily as needed for anxiety.       methylPREDNISolone (MEDROL DOSEPACK) 4 mg tablet Follow package directions 1 tablet 0     No current facility-administered medications on file prior to encounter.        No Known Allergies    No past medical history on file.     Patient Active Problem List   Diagnosis     Cervicalgia     Neck Sprain     Backache     Midback Pain     Myofascial Pain Syndrome       No past surgical history on file.    No family history on file.    Reviewed past medical, surgical, and family history with patient found on new patient intake packet located in EMR [employer] tab.     SOCIAL HX: Patient has a history of chronic neck and back pain.  Patient is  and works in customer service currently.  Does not currently exercise.  Patient denies tobacco use, denies alcohol use, denies history of being heavy drinker, denies recreational drug use.    ROS: Positive for joint pain, muscle pain, dizziness, fainting in the past, anxiety/depression.  Specifically negative for bowel/bladder dysfunction, balance changes, headache, dizziness, foot drop, fevers,  "chills, appetite changes, nausea/vomiting, unexplained weight loss. Otherwise 13 systems reviewed are negative. Please see the patient's intake questionnaire from today for details.    OBJECTIVE:  Ht 5' 7\" (1.702 m)   Wt 198 lb (89.8 kg)   BMI 31.01 kg/m      PHYSICAL EXAMINATION:  --CONSTITUTIONAL: Vital signs as above. No acute distress. The patient is well nourished and well groomed.  --PSYCHIATRIC: The patient is awake, alert, oriented to person, place, time and answering questions appropriately with clear speech. Appropriate mood and affect   --HEENT: Sclera are non-injected. Extraocular muscles are intact. Thyroid moves easily upon swallowing.  Moist oral mucosa.  --SKIN: Skin over the face, bilateral upper extremities, and posterior torso is clean, dry, intact without rashes.  --LYMPH NODES: No palpable or tender anterior/posterior cervical, submandibular, or supraclavicular lymph nodes.    --RESPIRATORY: Normal rhythm and effort. No abnormal accessory muscle breathing patterns noted.   --GROSS MOTOR: Easily arises from a seated position. Toe walking and heel walking are normal.    --CERVICAL SPINE: Inspection reveals no evidence of deformity. Range of motion is not limited in cervical flexion, extension, limitations with right lateral rotation. No tenderness to palpation cervical spine, tautness to the right greater than left trapezius muscle region.  Spurling maneuver mildly positive on the right.  --SHOULDERS: Full range of motion bilaterally: abduction, flexion, cross chest movement internal/external rotation. Negative empty can.  --UPPER EXTREMITY MOTOR TESTING:  Wrist flexion left 5/5, right 5/5  Wrist extension left 5/5, right 5/5  Pronators left 5/5, right 5/5  Biceps left 5/5, right 5/5   Triceps left 5/5, right 5/5   Shoulder abduction left 5/5, right 5/5   left 5/5, right 5/5  --NEUROLOGIC: CN III-XII are grossly intact. 3/4 symmetric biceps, brachioradialis, triceps reflexes bilaterally. " Sensation to upper extremities is intact.  Positive right Zhang's, negative on the left.  --VASCULAR: 2/4 radial pulses bilaterally. Warm upper limbs bilaterally. Capillary refill in the upper extremities is less than 1 second.    RESULTS: Prior medical records from [facility] 2/10/2020 to current and care everywhere were reviewed today.     Imaging:        LUMBAR SPINE 2-3 VIEW  Jan 30, 2012 1:50:00 PM  HISTORY: Pain.  FINDINGS: Partial sacralization of L5 on the left side. Exam otherwise  negative.

## 2021-06-12 NOTE — PROGRESS NOTES
[facility]   Cervical Thoracic Initial Evaluation    Patient Name: Danyelle Canales  Date of evaluation: 11/3/2020  Referral Diagnosis: Radiculitis of right cervical region  Referring provider: Cathy Osorio C*     Visit Diagnosis:     ICD-10-CM    1. Cervicalgia  M54.2    2. Cervical radiculitis  M54.12        No past medical history on file.    Assessment:        Danyelle Canales is a 40 y.o. female who presents to therapy today with chief complaints of chronic neck pain. Symptoms began over 10 years ago. Recent increase in bruning neck pain radiating down the right arm about two months ago. She reports getting an injection that resoled most arm symptoms.  Functional impairments include lifting, looking down for work and at phone, carrying baby. Patient also reports headaches.  Physical therapy evaluation found weakness in deep neck flexors and positive nerve tension testing for right median nerve. Patient demonstrates signs and sx consistent with chronic neck pain. PT POC and goals have been discussed with patient and She  is agreeable to these. Patient appears motivated for physical therapy and is appropriate for skilled therapy services.    The POC is dynamic and will be modified on an ongoing basis.  Barriers to achieving goals as noted in the assessment section may affect outcome.  Prognosis to achieve goals is  good   Pt. is appropriate for skilled PT intervention as outlined in the Plan of Care (POC).  Pt. is a good candidate for skilled PT services to improve pain levels and function.  Plan of care and goals were established in collaboration with patient.       Goals:  Pt. will demonstrate/verbalize independence in self-management of condition in : 6 weeks  Pt. will be independent with home exercise program in : 6 weeks    Pt will: imrpove NDI by 5 points or more to show significant improvement in neck pain impact on daily activities, within 8 weeks.  Pt will: report  being able to lift her child and things around the house without increase in neck pain, within 8 weeks.      Patient's expectations/goals are realistic.    Barriers to Learning or Achieving Goals:  Chronicity of the problem.       Plan / Patient Instructions:        Plan of Care:   Authorization / Certification Number of Visits: 10  Communication with: Referral Source  Patient Related Instruction: Nature of Condition;Treatment plan and rationale;Self Care instruction;Basis of treatment;Body mechanics;Posture;Next steps;Precautions;Expected outcome  Times per Week: 1-2  Number of Weeks: 6-10  Number of Visits: 6-10  Discharge Planning: when goals are met or patient is independent in management  Therapeutic Exercise: ROM;Stretching;Strengthening  Neuromuscular Reeducation: kinesio tape;posture;TNE;core  Manual Therapy: soft tissue mobilization;myofascial release;joint mobilization;muscle energy  Modalities: TENS      POC and pathology of condition were reviewed with patient.  Pt. is in agreement with the Plan of Care  A Home Exercise Program (HEP) was initiated today.  Pt. was instructed in exercises by PT and patient was given a handout with detailed instructions.    Plan for next visit: talk about lifting recommendations for baby and with car seat, check baton score      Subjective:        Patient presents with complaints of chronic neck pain. She has a past medical history significant for cervicalgia, neck sprain, torticollis, myofascial pain sydrome. This pain has been going on for 10+ years/    A couple months ago she had an increase in neck pain on the right side. She woke up and could not move neck and she went to the ER. This pain was in the neck and down the right arm. Got an injection a couple weeks ago that seems to have helped. No longer getting pain down arms as much, burning present around the neck and right side.     Patient reports no injury associated with neck pain.   Danyelle Canales is a 40 y.o.  y.o. female with past medical history significant for cervicalgia, neck sprain, torticollis, myofascial pain syndrome who presents today for follow-up regarding:      From MD:  -Chronic cervicalgia with right cervical radiculitis with overall 30% relief with C7-T1 IL NICOLE however complete resolution of right cervical radiculitis.  Patient does have degenerative disc changes at C4-5 and C5-6 with moderate spinal stenosis at C5-6 due to disc protrusion, as well as reversal of cervical curvature.   -Cervical myofascial pain. She is describing burning type pain which likely is a combination of discogenic pain as well as myofascial pain.       Social information:   Living Situation:  and son, no concerns    Occupation: desk job     Work Status:Working full time  From home    Exercise: not currently     Had a baby in . Anxiety.     Pain Ratin  Pain rating at best: 2 better day  Pain rating at worst: 7  Pain description: burning     Functional limitations are described as occurring with:   Looking up down/turn head, carry laundry/groceries, carrying baby causes pain and looking down when feeding (baby is about 14 lbs) and car seat weighs more too. Looking down makes it worse.       Patient reports benefit from:  pain medication, stretching at times    Hypermobility history per patient report    Have done PT on and off-- not much benefit in the past       Objective:      Note: Items left blank indicates the item was not performed or not indicated at the time of the evaluation.    Patient Outcome Measures :    Neck Disability Score in %: 26     Scores range from 0-100%, where a score of 0% represents minimal pain and maximal function. The minmal clinically important difference is a score reduction of 10%.    Cervical Thoracic Examination  1. Cervicalgia     2. Cervical radiculitis       Involved side: Right and Bilateral  Posture Observation:      General sitting posture is  fair.  General standing posture is  fair.  Cervical:  Mild forward head  Shoulder/Thoracic complex: Mild bilateral scapular protraction     Cervical ROM:    Date: 11/03/20     *Indicate scale AROM AROM AROM   Cervical Flexion 25 pain in back     Cervical Extension 40      Right Left Right Left Right Left   Cervical Sidebending 25 pain in middle of neck 25 feels compressed        Cervical Rotation 50 50       Cervical Protraction      Cervical Retraction      Thoracic Flexion      Thoracic Extension      Thoracic Sidebending         Thoracic Rotation           Strength     Date: 11/03/20     Cervical Myotomes/5 Right Left Right Left Right Left   Cervical Flexion (C1-2) 5 5       Cervical Sidebending (C3) 5 5       Shoulder Elevation (C4) 5 5       Shoulder Abduction (C5) 5 5       Elbow Flexion (C6) 5 5       Elbow Extension (C7) 5 5       Wrist Flexion (C7) 5 5       Wrist Extension (C6) 5 5       Thumb abduction (C8) 5 5       Finger Abduction (T1) 5 5         Sensation  WNL, no abnormalities with light touch sensation assessment    Flexibility:   Cervical: WNL   Thoracic: WNL   Shoulder: hypermobility    Palpation:   Tenderness: to PA mobilization upper cervical spine, and subbocipital palpation as well as first rib mobility on right side   Tightness: patient reports feeling tightness, no significant tightness found through palpation    Passive Mobility-Joint Integrity: slight hypomobility in upper cervical spine compared to lower    Cervical Special Tests     Cervical Special Tests Right Left UE Nerve Mobility Right Left   Cervical compression - - Median nerve + -   Cervical distraction - - Ulnar nerve - -   Spurling's test - - Radial nerve - -   Shoulder abduction sign - - Thoracic outlet     Deep neck flexor endurance test weakness  Derek - -   Upper cervical rotation - - Adson's     Sharper-Kaveh - - Cervical rotation lateral flexion     Alar ligament test   Other:     Other:   Other:       Exercises:  Exercise #1: nerve mobility- median nerve  glides x20  Comment #1: chin tuck and lift deep neck flexor strengthening x10  Exercise #2: scap retraction x10        Treatment Today:  TREATMENT MINUTES COMMENTS   Evaluation 15 Low Complexity Eval  Patient educated on pathology  Discussed POC   Self-care/ Home management 5 Recommendations for at home management with use of ice/heat to manage pain     Manual therapy 10 PA mobilizations grade II-III cervical spine, 1st rib mobilization on right, myofascial and STM to levator and upper trap   Neuromuscular Re-education 8 Education on nerve glide/flossing exercise purpose. Performance in session and provided to add to HEP   Therapeutic Activity     Therapeutic Exercises 12 Demo/performance of HEP, education on purpose of exercise. Handouts with written instruction provided.   Exercises:  Exercise #1: nerve mobility- median nerve glides x20  Comment #1: chin tuck and lift deep neck flexor strengthening x10  Exercise #2: scap retraction x10       Gait training     Modality__________________                Total 50    Blank areas are intentional and mean the treatment did not include these items.       PT Evaluation Code: (Please list factors)  Patient History/Comorbidities: see PMH.  Examination: see above, 4+ elements  Clinical Presentation: stable  Clinical Decision Making: low    Patient History/  Comorbidities Examination  (body structures and functions, activity limitations, and/or participation restrictions) Clinical Presentation Clinical Decision Making (Complexity)   No documented Comorbidities or personal factors 1-2 Elements Stable and/or uncomplicated Low   1-2 documented comorbidities or personal factor 3 Elements Evolving clinical presentation with changing characteristics Moderate   3-4 documented comorbidities or personal factors 4 or more Unstable and unpredictable High        Morenita Gibson, PT, DPT  11/3/2020  12:22 PM

## 2021-06-12 NOTE — PATIENT INSTRUCTIONS - HE
~Please call our [facility] Nurse Navigation line [phone] with any questions or concerns about your treatment plan, if symptoms worsen and you would like to be seen urgently, or if you have problems controlling bladder and bowel function.      You have been referred for Physical Therapy to [facility].   Discussed the importance of core strengthening, ROM, stretching exercises and how each of these entities is important in decreasing pain and improving long term spine health.  The purpose of physical therapy is to teach you an individualized home exercise program.  These exercises need to be performed every day in order to decrease pain and prevent future occurrences of pain.

## 2021-06-12 NOTE — TELEPHONE ENCOUNTER
Pls call this Mom re effect of Propranolol and safety for breast feeding. Pt was prescribed 10 mg tab, up to 2 x day or PRN. Provider told her it is a low dose. For anxiety.  Pt is pumping and formual feeding her baby.

## 2021-06-12 NOTE — TELEPHONE ENCOUNTER
PSP:  Cathy Osorio CNP  Last clinic visit:  9/29/2020  Reason for call: PT referral  Clinical information:  Pt calling reporting she was not able to schedule physical therapy. Upon chart review, PT referral is greater than 30 days old.   Advice given to patient: New referral will be prepped for provider review/approval. Informed pt of this. She stated understanding.   Provider to address: Please approve referral if appropriate.

## 2021-06-12 NOTE — PROGRESS NOTES
"Danyelle Canales is a 40 y.o. female who is being evaluated via a billable video visit.      The patient has been notified of following:     \"This video visit will be conducted via a call between you and your physician/provider. We have found that certain health care needs can be provided without the need for an in-person physical exam.  This service lets us provide the care you need with a video conversation.  If a prescription is necessary we can send it directly to your pharmacy.  If lab work is needed we can place an order for that and you can then stop by our lab to have the test done at a later time.    Video visits are billed at different rates depending on your insurance coverage. Please reach out to your insurance provider with any questions.    If during the course of the call the physician/provider feels a video visit is not appropriate, you will not be charged for this service.\"    Patient has given verbal consent to a Video visit? Yes  How would you like to obtain your AVS? AVS Preference: MyChart.    Video Start Time: 8:20 AM    Video-Visit Details    Type of service:  Video Visit    Video End Time (time video stopped): 8:41 AM  Originating Location (pt. Location): Home    Distant Location (provider location):  [facility]     Platform used for Video Visit: Avel Osorio CNP    Assessment:     Diagnoses and all orders for this visit:    Chronic neck pain    Radiculitis of right cervical region    Torticollis    Myofascial pain    Hyperreflexia    Protrusion of cervical intervertebral disc    Cervical stenosis of spinal canal       Danyelle Canales is a 40 y.o. y.o. female with past medical history significant for cervicalgia, neck sprain, torticollis, myofascial pain syndrome who presents today for follow-up regarding:     -Chronic cervicalgia with right cervical radiculitis with overall 30% relief with C7-T1 IL NICOLE however complete resolution of " right cervical radiculitis.  Patient does have degenerative disc changes at C4-5 and C5-6 with moderate spinal stenosis at C5-6 due to disc protrusion, as well as reversal of cervical curvature.      -Cervical myofascial pain. She is describing burning type pain which likely is a combination of discogenic pain as well as myofascial pain.    Plan:     A shared decision making plan was used.  The patient's values and choices were respected. Prior medical records from 9/29/2020 were reviewed today. The following represents what was discussed and decided upon by the provider and the patient.     -DIAGNOSTIC TESTS: Images were personally reviewed and interpreted.    --Cervical spine MRI 9/25/2020 with degenerative disc disease multilevel.  C5-6 right disc protrusion with moderate spinal stenosis.           --Lumbar spine x-ray 2012 with partial sacralization of L5 on the left, otherwise negative.  --Cervical and thoracic MRI imaging 2010, unavailable    -INTERVENTIONS: No further injection recommendations at this time.  Could consider facet injections down the road versus trigger point injections however recommend revisiting physical therapy first which she is scheduled for.  -We did discuss that if she fails conservative treatments with injections medications and physical therapy I would recommend a surgical evaluation at that time.  She is still hoping to avoid surgery at this time but is aware that it would be an option at any point to discuss further with [facility] neurosurgeons.    -MEDICATIONS: Advised patient continue with ibuprofen 800 mg 3 times daily as needed for pain and inflammation.  She is currently breast-feeding therefore other NSAID medications are not advised due to no data on there is safety with breast-feeding, did discuss further with Dr. Green and he is in agreements with this.  -At this point I do not recommend continuing hydrocodone medication for chronic pain.  Did discuss with patient  if she feels that this is needed I would advise her to discuss further with her PCP or we could consider pain clinic referral.  Patient deferred this referral today.  Discussed side effects of medications and proper use. Patient verbalized understanding.    -PHYSICAL THERAPY: Patient is scheduled to start physical therapy [facility] on 11/3/2020 for traditional PT and myofascial release.  She has not done well with the MedX program with aggravation of symptoms, with minimal benefit acupuncture in the past   Discussed the importance of core strengthening, ROM, stretching exercises with the patient and how each of these entities is important in decreasing pain.  Explained to the patient that the purpose of physical therapy is to teach the patient a home exercise program.  These exercises need to be performed every day in order to decrease pain and prevent future occurrences of pain.        -PATIENT EDUCATION: 21 minutes of total visit time was spent face to face with the patient today, 60 % of the visit was spent on counseling, education, and coordinating care.   -5 minutes spent outside of visit time, non-face-to-face time, reviewing chart.  -Today we also discussed the issues related to the current COVID-19 pandemic, the pros and cons of the current treatment plan, the CDC guidelines such as social distancing, washing the hands, and covering the cough.    -FOLLOW UP: Follow-up as needed 3 months, sooner if pain is worsening or new symptoms arise.   Advised to contact clinic if symptoms worsen or change.    Subjective:     Danyelle Canales is a 40 y.o. female who presents today for follow-up regarding ongoing chronic generalized neck pain with right radiating pain to the scapular region in which she received about 30% overall benefit from C7-T1 interlaminar epidural steroid injection.  She does report that the radiating right pain has resolved however she still having fairly significant burning sensations  at the C6-C7 region that is currently a 5/10, a 2 at its best.    No myelopathic symptoms.     Patient currently breast-feeding.  She does report that anytime she takes hydrocodone she is  pump and dump breast milk for 24 hours.     -Treatment to Date: No prior spinal surgery.  Physical therapy in the past and she does continue some of the stretches.  PNBC in the past with aggravation with MedX machines.  Acupuncture in the past with minimal benefit.     Cervical spinal injections 2013 and 2012, trigger point and medial branch block.  She does not recall an epidural but it does look like there was potentially an epidural on file.  C7-T1 interlaminar NICOLE 10/12/2020 with 30% overall relief, resolution of right arm pain however.  Preprocedure pain 3/10, post 2/10.     -Medications:  Medrol Dosepak prescribed 9/9/2020 with some benefit.  Ibuprofen with minimal benefit    Prior medications:  Flexeril with some benefit but side effects.  Baclofen without benefit  Gabapentin without benefit  Hydrocodone as needed initially for severe breakthrough pain    Patient Active Problem List   Diagnosis     Cervicalgia     Neck Sprain     Backache     Midback Pain     Myofascial Pain Syndrome       Current Outpatient Medications on File Prior to Encounter   Medication Sig Dispense Refill     naproxen (NAPROSYN) 500 MG tablet Take 1 tablet (500 mg total) by mouth 2 (two) times a day as needed. Do not take with ibuprofen.  Take with Food 30 tablet 2     buPROPion (WELLBUTRIN XL) 300 MG 24 hr tablet Take 300 mg by mouth.       cyclobenzaprine (FLEXERIL) 10 MG tablet Take 1 tablet (10 mg total) by mouth 2 (two) times a day as needed for muscle spasms. 20 tablet 0     desvenlafaxine succinate (PRISTIQ) 100 MG 24 hr tablet Take 100 mg by mouth.       lamoTRIgine (LAMICTAL) 200 MG tablet Take 200 mg by mouth.       LORazepam (ATIVAN) 0.5 MG tablet Take 0.5 mg at bedtime and o.5 mg daily as needed for anxiety.       No current  facility-administered medications on file prior to encounter.        Allergies   Allergen Reactions     Artificial Sweetner [Saccharin]      Dissolvable medications     Chocolate Flavor Headache and Other (See Comments)       History reviewed. No pertinent past medical history.     Review of Systems  ROS: Specifically negative for bowel/bladder dysfunction, balance changes, headache, dizziness, foot drop, fevers, chills, appetite changes, nausea/vomiting, unexplained weight loss. Otherwise 13 systems reviewed are negative. Please see the patient's intake questionnaire from today for details.    Reviewed Social, Family, Past Medical and Past Surgical history with patient, no significant changes noted since prior visit.     Objective:     VIRTUAL PHYSICAL EXAM:   --CONSTITUTIONAL: Well developed, well nourished, healthy appearing individual.  --PSYCHIATRIC: Appropriate mood and affect. No difficulty interacting due to temper, social withdrawal, or memory issues.  --RESPIRATORY: Normal rhythm and effort. No abnormal accessory muscle breathing patterns noted.   --HEENT:  Sclera are non-injected.  Extraocular muscles are intact.    --SKIN:  Skin over the face is clean, dry, intact without rashes.  --CERVICAL SPINE: Inspection reveals no evidence of deformity.     Imaging of the cervical spine: Cervical spine imaging was reviewed today. The images were shown to the patient and the findings were explained using a spine model.      Mr Cervical Spine Without Contrast  Result Date: 9/27/2020  INDICATION: Neck pain, abnormal neuro exam. Cervical radiculopathy. Right cervical radiculopathy with positive right Zoë's reflex/hyperreflexia. COMPARISON: None. TECHNIQUE: Without IV contrast. FINDINGS: Normal vertebral body heights, alignment and marrow signal. No abnormal cord signal. No extraspinal abnormality. Craniovertebral junction and C1-C2: Normal.   C2-C3: Normal disc height. No herniation. Normal facets. No spinal canal  or neural foraminal stenosis.   C3-C4: Normal disc height. No herniation. Normal facets. No spinal canal or neural foraminal stenosis.   C4-C5: Moderate loss of disc height. Small broad-based posterior disc/osteophyte complex without evidence of soft disc. No facet hypertrophy. Mild central spinal stenosis. No foraminal narrowing.   C5-C6: Disc desiccation and moderate loss of height. Mild to moderate circumferential disc bulge, larger on the right than the left, and there is a small superimposed right paracentral disc protrusion. No facet hypertrophy. Moderate central spinal stenosis. Minimal right ventral cord flattening. No significant foraminal narrowing.   C6-C7: Normal disc height. No herniation. Normal facets. No spinal canal or neural foraminal stenosis.   C7-T1: Normal disc height. No herniation. Normal facets. No spinal canal or neural foraminal stenosis.   1.  Degenerative disc disease at C4-C5 and C5-C6. At C5-C6, there is a disc bulge and a superimposed right paracentral disc protrusion. The findings lead to moderate central spinal stenosis and minimal right ventral cord flattening at C5-C6.   2.  Otherwise, negative cervical spine MRI. No abnormal signal in the cervical cord. No evidence of nerve root displacement or compression.         LUMBAR SPINE 2-3 VIEW  Jan 30, 2012  HISTORY: Pain  FINDINGS: Partial sacralization of L5 on the left side. Exam otherwise  negative.

## 2021-06-12 NOTE — TELEPHONE ENCOUNTER
Placed a call to Danyelle regarding propanolol and interaction with lactation. Reviewed [facility] medication resource. Propanolol is safe with breast-feeding. Discussed to monitor infant for drowsiness, lethargy, and weight gain. Mom is taking 10mg up to 2 times a day as needed, which is a low dose. If mother is worried, discussed that mom can wait 3 hours after taking propanolol before her next pumping session.    Mother verbalizes understanding.    Zakiya Acosta, APRN, CPNP, IBCLC  [facility]  [facility]  10/16/2020, 12:51 PM

## 2021-06-12 NOTE — PATIENT INSTRUCTIONS - HE
Please follow up two weeks post procedure with Cathy to evaluate your plan of care.       DISCHARGE INSTRUCTIONS    During office hours (8:00 a.m.- 4:00 p.m.) questions or concerns may be answered  by calling Spine Center Navigation Nurses at  [phone].  Messages received after hours will be returned the following business day.      In the case of an emergency, please dial 911 or seek assistance at the nearest Emergency Room/Urgent Care facility.     All Patients:    ? You may experience an increase in your symptoms for the first 2 days (It may take anywhere between 2 days- 2 weeks for the steroid to have maximum effect).    ? You may use ice on the injection site, as frequently as 20 minutes each hour if needed.    ? You may take your pain medicine.    ? You may continue taking your regular medication after your injection. If you have had a Medial Branch Block you may resume pain medication once your pain diary is completed.    ? You may shower. No swimming, tub bath or hot tub for 48 hours.  You may remove your bandaid/bandage as soon as you are home.    ? You may resume light activities, as tolerated.    ? Resume your usual diet as tolerated.    ? It is strongly advised that you do not drive for 1-3 hours post injection.    ? If you have had oral sedation:  Do not drive for 8 hours post injection.      ? If you have had IV sedation:  Do not drive for 24 hours post injection.  Do not operate hazardous machinery or make important personal/business decisions for 24 hours.      POSSIBLE STEROID SIDE EFFECTS (If steroid/cortisone was used for your procedure)    -If you experience these symptoms, it should only last for a short period      Swelling of the legs                Skin redness (flushing)       Mouth (oral) irritation     Blood sugar (glucose) levels              Sweats                      Mood changes    Headache    Sleeplessness         POSSIBLE PROCEDURE SIDE EFFECTS  -Call the Spine Center if you  are concerned    Increased Pain             Increased numbness/tingling        Nausea/Vomiting            Bruising/bleeding at site        Redness or swelling                                                Difficulty walking        Weakness             Fever greater than 100.5    *In the event of a severe headache after an epidural steroid injection that is relieved by lying down, please call the [facility] to speak with a clinical staff member*

## 2021-06-13 NOTE — PROGRESS NOTES
[facility] Daily Progress     Patient Name: Danyelle Canales  Date: 11/18/2020  Visit #: 2  PTA visit #:    Referral Diagnosis: Radiculitis of right cervical region  Referring provider: Cathy Osorio C*  Visit Diagnosis:     ICD-10-CM    1. Cervicalgia  M54.2    2. Cervical radiculitis  M54.12        Initial Eval: Danyelle Canales is a 40 y.o. female who presents to therapy today with chief complaints of chronic neck pain. Symptoms began over 10 years ago. Recent increase in bruning neck pain radiating down the right arm about two months ago. She reports getting an injection that resoled most arm symptoms.  Functional impairments include lifting, looking down for work and at phone, carrying baby. Patient also reports headaches.  Physical therapy evaluation found weakness in deep neck flexors and positive nerve tension testing for right median nerve. Patient demonstrates signs and sx consistent with chronic neck pain. PT POC and goals have been discussed with patient and She  is agreeable to these. Patient appears motivated for physical therapy and is appropriate for skilled therapy services.  No data recorded    Assessment:   Patient seen for follow up appointment for chronic neck pain. Symptoms continue to present as burning in the back of the neck. 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 Better tolerates sitting or exercises she can do throughout the day. Adjusted HEP and reviewed on this date. Patient responds well to manual therapy with decrease in headache and pain.   HEP/POC compliance is  good .  Patient is benefitting from skilled physical therapy and is making steady progress toward functional goals.  Patient is appropriate to continue with skilled physical therapy intervention, as indicated by initial plan of care.    Goal Status:  Pt. will demonstrate/verbalize independence in self-management of condition in : 6  weeks  Pt. will be independent with home exercise program in : 6 weeks    Pt will: imrpove NDI by 5 points or more to show significant improvement in neck pain impact on daily activities, within 8 weeks.  Pt will: report being able to lift her child and things around the house without increase in neck pain, within 8 weeks.      Plan / Patient Education:     Continue with initial plan of care.  Progress with home program as tolerated.  Plan for next visit: talk about lifting recommendations for baby and with car seat, check baton score   Subjective:     Pain Rating: 3/10    Patient reports that some days have been worse than others, no big change. Maybe a little better than before. Looking down seems to make it worse.     Have been doing the two exercises, nerve glide and scap retraction. Not doing chin exercise as much.     Lifting 5 month year old is going okay.   Just in back of neck down spine.     Objective:     Full ROM, some reports of tightness/stiffness but no limits in ROM.    Exercise #1: nerve mobility- median nerve glides x20  Comment #1: chin tuck and lift deep neck flexor strengthening x10- option in sitting  Exercise #2: scap retraction x10- progressed to row  Comment #2: isometrics neck side bending x10 milind      Treatment Today     TREATMENT MINUTES COMMENTS   Evaluation     Self-care/ Home management 9 Discussion about lifting recommendations for at home with baby. Education on biomechanics and ideas to decrease stress on neck while feeding, lifting and carrying the car seat.    Manual therapy 9 PA mobilizations grade II-III cervical spine, 1st rib mobilization on right, myofascial and STM to levator and upper trap   Neuromuscular Re-education     Therapeutic Activity     Therapeutic Exercises 12 See flow sheet above, reviewed and progressed. Gave option for chin tuck in sitting. Added isometrics on this date and progressed scap retraction to add band. Performed all in session.   Gait training      Modality__________________                Total 30    Blank areas are intentional and mean the treatment did not include these items.       Morenita Gibson, PT, DPT  11/18/2020

## 2021-06-18 NOTE — PATIENT INSTRUCTIONS - HE
Patient Instructions by Morenita Gibson PT at 11/18/2020  8:30 AM     Author: Morenita Gibson PT Service: -- Author Type: Physical Therapist    Filed: 11/18/2020  8:58 AM Encounter Date: 11/18/2020 Status: Signed    : Morenita Gibson PT (Physical Therapist)        ISOMETRIC SIDE BEND    Place your fingers on the side of your head and gently tilt your head to the side and into your fingers.    Hold 5 seconds, x10 both sides, 1-2 times per day        ELASTIC BAND ROWS     Holding elastic band with both hands, draw back the band as you bend your elbows. Keep your elbows near the side of your body.    x10-15 repetitions, 2 sets, once per day          RETRACTION / CHIN TUCK    Slowly draw your head back so that your ears line up with your shoulders.    Option to do it in sitting!!

## 2021-06-20 NOTE — LETTER
Letter by Cristal Marti at      Author: Cristal Marti Service: -- Author Type: --    Filed:  Encounter Date: 2020 Status: (Other)         2020       Danyelle TORREY Canales  [address] [place] [address]    Dear Danyelle Canales:    We are pleased to provide you with secure, online access to medical information for you and your family. Per your request, we have expanded your account to allow access to the records of the following family members:              Vinod HIRSCH Parker (privilege ends on 6/3/2032.)     How Do I Login?  1. In your Internet browser, go to [email or web address].  2. Click on Sign Up Now   3. Enter your [employer] Activation Code exactly as it appears below. This code will  60 days after it is generated. You will not need to use this code after you have completed the sign-up process. If you do not sign up before the expiration date, you must request a new code.     [employer] Activation Code: [email or web address]  Expires: 8/10/2020  5:35 AM    4. Enter in your date of birth and zip code.  5. Create a [employer] username. Think of one that is secure and easy to remember.  Your username must be between 6 and 20 characters.  6. Create a [employer] password. You can change your password at any time. Your password must be between 8 and 45 characters, contain at least two letters and one number, and contain both upper and lower case letters.  7. Choose a security question, enter your answer, and click Next. This can be used to access [employer] if you forget your password.   8. Enter a valid e-mail address to receive e-mail notifications when new information is available in [employer].  9. Click Sign In.        How Do I Access a Family Member's Account?  10. Select the account you want to access by clicking the [place] with the appropriate patient's name at the top of your screen.   11. You will see a disclaimer page letting you know that you will be  viewing a family member's record. Review the disclaimer and then click Accept Proxy Access Disclaimer to proceed.  12. Once you switch to viewing a family member's record, you can navigate to [employer] pages the same way you would for yourself. You can return to your own account by clicking the [place] at the top of the screen with your name on it.    13. To customize colors and names of the linked accounts, you can select Personalize from the Profile dropdown menu at the top of the screen, then click the Edit button to make changes.      Additional Information  If you have questions, you can e-mail [email or web address] or call [phone] to talk to our [facility] [employer] staff. Remember, [employer] is NOT to be used for urgent needs. For medical emergencies, dial 911.

## 2021-07-03 NOTE — ADDENDUM NOTE
Addendum Note by Cathy Osorio CNP at 10/23/2020  1:52 PM     Author: Cathy Osorio CNP Service: -- Author Type: Nurse Practitioner    Filed: 10/23/2020  1:52 PM Encounter Date: 10/23/2020 Status: Signed    : Cathy Osorio CNP (Nurse Practitioner)    Addended by: CATHY OSORIO on: 10/23/2020 01:52 PM        Modules accepted: Orders

## 2021-07-06 ENCOUNTER — OFFICE VISIT (OUTPATIENT)
Dept: FAMILY MEDICINE | Facility: CLINIC | Age: 41
End: 2021-07-06
Payer: COMMERCIAL

## 2021-07-06 VITALS
BODY MASS INDEX: 35.41 KG/M2 | TEMPERATURE: 99.6 F | OXYGEN SATURATION: 100 % | HEART RATE: 74 BPM | WEIGHT: 225.6 LBS | RESPIRATION RATE: 16 BRPM | HEIGHT: 67 IN | DIASTOLIC BLOOD PRESSURE: 88 MMHG | SYSTOLIC BLOOD PRESSURE: 130 MMHG

## 2021-07-06 DIAGNOSIS — G89.29 CHRONIC NECK PAIN: Primary | ICD-10-CM

## 2021-07-06 DIAGNOSIS — M54.2 CHRONIC NECK PAIN: Primary | ICD-10-CM

## 2021-07-06 DIAGNOSIS — E66.01 MORBID OBESITY (H): ICD-10-CM

## 2021-07-06 PROCEDURE — 99214 OFFICE O/P EST MOD 30 MIN: CPT | Performed by: NURSE PRACTITIONER

## 2021-07-06 RX ORDER — DEXAMETHASONE 4 MG/1
4 TABLET ORAL
Qty: 21 TABLET | Refills: 0 | Status: SHIPPED | OUTPATIENT
Start: 2021-07-06 | End: 2021-07-19

## 2021-07-06 ASSESSMENT — MIFFLIN-ST. JEOR: SCORE: 1725.94

## 2021-07-06 ASSESSMENT — PAIN SCALES - GENERAL: PAINLEVEL: SEVERE PAIN (7)

## 2021-07-06 NOTE — PROGRESS NOTES
"    Assessment & Plan     Chronic neck pain  New patient to this provider today with acute on chronic pain over the past 1.5 weeks.  No improvement with Prednisone.  - Continue tiZANidine (ZANAFLEX) 4 MG tablet; Take 2-4 mg by mouth At Bedtime  - CHIROPRACTIC REFERRAL to [facility], [address] [place] [address], Phone: [phone]  - [facility] PT AND HAND REFERRAL; Future  - Prednisone was not effective so will Trial of dexamethasone (DECADRON) 4 MG tablet; Take 1 tablet (4 mg) by mouth 3 times daily (with meals) for 7 days    Morbid obesity (H)  Known issue that I take into account for their medical decisions, no current exacerbations or new concerns.                BMI:   Estimated body mass index is 35.33 kg/m  as calculated from the following:    Height as of this encounter: 1.702 m (5' 7\").    Weight as of this encounter: 102.3 kg (225 lb 9.6 oz).         Return in about 3 days (around 7/9/2021) for follow up with Chiro[facility]ator and PT.    Marsha Fernandez NP  [facility]    Alex Bassett is a 41 year old who presents for the following health issues     HPI     Neck Pain  Onset/Duration: since 6/26/21 (1.5 weeks ago) and pain has been constant  Description:   Location: neck - worse on right   Radiation: none  Intensity: moderate, severe  Progression of Symptoms:  same and constant  Accompanying Signs & Symptoms:  Burning, tingling, prickly sensation in arm(s): no  Numbness in arm(s): YES  Weakness in arm(s):  no  Fever: no  Headache: no  Nausea and/or vomiting: no  History:   Trauma: no  Previous neck pain: YES  Previous surgery or injections: no  Previous Imaging (MRI,X ray): YES- MRI last fall 2020- had cortisone injection  Precipitating or alleviating factors: None  Does movement impact the pain:  YES  Therapies tried and outcome: heat, ice, massage, stretching, acetaminophen, Ibuprofen and tennis ball message   Says the dosage of the Ibuprofen and " "Tylenol is the maximum she can take.    Tizanidine 2-4 mg at bedtime but this causes drowsiness so not able to take during the day.    Trying to get into Physical Therapy through outside clinic but can't get in there this week.  Has a 13 month old at home.  Has tried ice, heat, old Physical Therapy stretches.  Does not recall doing anything specific that would have triggered this flare.    Not constant numbness in arm.    Works as a , desk work type job.    She was seen in the ED Urgency Room in [place] 6/26/21- was given 5 days of Prednisone 40 mg that she took and it was not effective.  She then did a telemedicine appointment 7/1/21 through [facility]- was given the Tizanidine medication.    She has plans to start Physical Therapy through an outside facility but sounds like that will take some time to get into.  Is willing to get a Physical Therapy referral through us to see if she can get in sooner.    Had an injection into cervical injection steroid in the past    Review of Systems   Constitutional, HEENT, cardiovascular, pulmonary, musculoskeletal and neurologic systems are negative, except as otherwise noted.      Objective    /88 (BP Location: Right arm)   Pulse 74   Temp 99.6  F (37.6  C) (Oral)   Resp 16   Ht 1.702 m (5' 7\")   Wt 102.3 kg (225 lb 9.6 oz)   SpO2 100%   BMI 35.33 kg/m    Body mass index is 35.33 kg/m .  Physical Exam   GENERAL: healthy, alert, no distress and obese  NECK: tenderness to neck and paracervical region.  Full ROM but patient is cautiously moving  MS: tenderness to trapezius/upper back.  No tenderness shoulders.  PSYCH: mentation appears normal, affect normal/bright    EXAM: MR CERVICAL SPINE WITHOUT CONTRAST  LOCATION: [facility]  DATE/TIME: 09/25/2020, 2:24 PM     INDICATION: Neck pain, abnormal neuro exam. Cervical radiculopathy. Right cervical radiculopathy with positive right Zoë's reflex/hyperreflexia.  COMPARISON: " None.  TECHNIQUE: Without IV contrast.     FINDINGS:   Normal vertebral body heights, alignment and marrow signal. No abnormal cord signal. No extraspinal abnormality.     Craniovertebral junction and C1-C2: Normal.     C2-C3: Normal disc height. No herniation. Normal facets. No spinal canal or neural foraminal stenosis.      C3-C4: Normal disc height. No herniation. Normal facets. No spinal canal or neural foraminal stenosis.      C4-C5: Moderate loss of disc height. Small broad-based posterior disc/osteophyte complex without evidence of soft disc. No facet hypertrophy. Mild central spinal stenosis. No foraminal narrowing.      C5-C6: Disc desiccation and moderate loss of height. Mild to moderate circumferential disc bulge, larger on the right than the left, and there is a small superimposed right paracentral disc protrusion. No facet hypertrophy. Moderate central spinal   stenosis. Minimal right ventral cord flattening. No significant foraminal narrowing.     C6-C7: Normal disc height. No herniation. Normal facets. No spinal canal or neural foraminal stenosis.      C7-T1: Normal disc height. No herniation. Normal facets. No spinal canal or neural foraminal stenosis.     IMPRESSION:  1.  Degenerative disc disease at C4-C5 and C5-C6. At C5-C6, there is a disc bulge and a superimposed right paracentral disc protrusion. The findings lead to moderate central spinal stenosis and minimal right ventral cord flattening at C5-C6.  2.  Otherwise, negative cervical spine MRI. No abnormal signal in the cervical cord. No evidence of nerve root displacement or compression.

## 2021-07-12 ENCOUNTER — HOSPITAL ENCOUNTER (EMERGENCY)
Facility: HOSPITAL | Age: 41
Discharge: HOME | End: 2021-07-12
Attending: EMERGENCY MEDICINE | Admitting: EMERGENCY MEDICINE
Payer: COMMERCIAL

## 2021-07-12 VITALS
BODY MASS INDEX: 34.53 KG/M2 | DIASTOLIC BLOOD PRESSURE: 97 MMHG | HEART RATE: 76 BPM | TEMPERATURE: 98 F | WEIGHT: 220 LBS | HEIGHT: 67 IN | SYSTOLIC BLOOD PRESSURE: 168 MMHG | RESPIRATION RATE: 16 BRPM

## 2021-07-12 DIAGNOSIS — M54.2 NECK PAIN: Primary | ICD-10-CM

## 2021-07-12 PROCEDURE — 99283 EMERGENCY DEPT VISIT LOW MDM: CPT

## 2021-07-12 PROCEDURE — 250N000013 HC RX MED GY IP 250 OP 250 PS 637: Performed by: EMERGENCY MEDICINE

## 2021-07-12 RX ORDER — OXYCODONE HYDROCHLORIDE 5 MG/1
5 TABLET ORAL EVERY 6 HOURS PRN
Qty: 10 TABLET | Refills: 0 | Status: SHIPPED | OUTPATIENT
Start: 2021-07-12 | End: 2021-07-21 | Stop reason: ALTCHOICE

## 2021-07-12 RX ORDER — OXYCODONE HYDROCHLORIDE 5 MG/1
5 TABLET ORAL ONCE
Status: COMPLETED | OUTPATIENT
Start: 2021-07-12 | End: 2021-07-12

## 2021-07-12 RX ADMIN — OXYCODONE HYDROCHLORIDE 5 MG: 5 TABLET ORAL at 08:42

## 2021-07-12 RX ADMIN — IBUPROFEN 800 MG: 200 TABLET, FILM COATED ORAL at 08:41

## 2021-07-12 ASSESSMENT — ENCOUNTER SYMPTOMS
DIFFICULTY URINATING: 0
NAUSEA: 0
FREQUENCY: 0
CONFUSION: 0
NUMBNESS: 0
DYSURIA: 0
NECK PAIN: 1
CONSTIPATION: 0
DIZZINESS: 0
FEVER: 0
VOMITING: 0
BLOOD IN STOOL: 0
SHORTNESS OF BREATH: 0
HEADACHES: 0
ARTHRALGIAS: 0
ABDOMINAL PAIN: 0
COLOR CHANGE: 0
DIARRHEA: 0
HEMATURIA: 0
NECK STIFFNESS: 0

## 2021-07-12 ASSESSMENT — MIFFLIN-ST. JEOR: SCORE: 1700.54

## 2021-07-12 NOTE — DISCHARGE INSTRUCTIONS
As needed for paincontrol at home, take:  - over-the-counter ibuprofen 800mg by mouth every 8 hours (max dose 2400mg in 24 hours)  - over-the-counter acetaminophen 1g by mouth every 6 hours (max dose 4g in24 hours)  - prescribed oxycodone for breakthrough pain  - previously-prescribed dexamethasone  - previously-prescribed tizanidine for muscle spasm  - over-the-counter lidocaine patches to painful area (up to 12 hours on, then 12 hours off)  - ice pack to painful area up to 20 minutes every 1 hour    Continue the PT neck exercises you were given in the past.    Follow up in spine clinic in 2 days as previously-scheduled for a recheck.    Follow up with your Primary Care provider in 3 days for a recheck.    Return to the Emergency Department for any inability to move your fingers, severe worsening, or any other concerns.

## 2021-07-12 NOTE — ED TRIAGE NOTES
"Pt here with neck pain that started about 2 weeks ago.  She felt a \"pop\" while turning in bed.  She has had steroids and muscle relaxers without relief.  She knows of herniated discs in C4-C6. Has had injections before in the area.    "

## 2021-07-12 NOTE — ED PROVIDER NOTES
"  Emergency Department Encounter     Evaluation Date & Time:   2021  8:02 AM    CHIEF COMPLAINT:  Neck Pain      Triage Note:Pt here with neck pain that started about 2 weeks ago.  She felt a \"pop\" while turning in bed.  She has had steroids and muscle relaxers without relief.  She knows of herniated discs in C4-C6. Has had injections before in the area.          Impression and Plan     ED COURSE & MEDICAL DECISION MAKIN:15 AM I met with the patient, obtained history, performed an initial exam, and discussed options and plan for diagnostics and treatment here in the ED. I discussed plans for discharge. Patient was comfortable with plan.    41yoF with history of herniated cervical discs presenting with 2 weeks of worsening neck pain on right side; no fall or trauma; no neuro complaints. No rash, fevers, sweats, chills, headache. Pain worse with movement. Has seen PCP and taking steroids, tizanidine, ibuprofen, Tylenol with only mild relief; Lidoderm not help; has spine clinic visit in 2 days (reports improvement in the past with injections there). BP 160s/90s on presentation with otherwise normal vitals. Exam with right trapezius muscle spasm/tenderness with no overlying skin changes, no meningismus, 2+ carotids bilaterally with no bruits, completely normal neuro exam to [facility], otherwise unremarkable exam. No concern for cord impingement; suspect muscle spasm driving presentation. No concern for sepsis, meningitis, infectious process. No indication for MRI with no weakness; doubt neurosurgical emergency. Will add oxycodone to regimen; patient will continue previously-scheduled follow up with spine clinic. Imaging would not  at this time. Patient comfortable with discharge home at this time. Return precautions and need for PCP follow up discussed and understood. Patient understood and agreed with the plan; no further questions at the time of discharge.    At the conclusion of the encounter I " "discussed the results of all the tests and the disposition. The questions were answered. The patient or family acknowledged understanding and was agreeable with the care plan.    FINAL IMPRESSION:    ICD-10-CM    1. Neck pain  M54.2            MEDICATIONS GIVEN IN THE EMERGENCY DEPARTMENT:  Medications   ibuprofen (ADVIL/MOTRIN) tablet 800 mg (800 mg Oral Given 7/12/21 0841)   oxyCODONE (ROXICODONE) tablet 5 mg (5 mg Oral Given 7/12/21 0842)       NEW PRESCRIPTIONS STARTED AT TODAY'S ED VISIT:  Discharge Medication List as of 7/12/2021  8:59 AM      START taking these medications    Details   oxyCODONE (ROXICODONE) 5 MG tablet Take 1 tablet (5 mg) by mouth every 6 hours as needed for breakthrough pain, Disp-10 tablet, R-0, Local Print             HPI     HPI     Danyelle Canales is a 41 year old female with a pertinent history of chronic pain syndrome, migraines,  who presents to this ED via walk in for evaluation of neck pain.    Patient has been experiencing neck pain that has been ongoing for the past two weeks. Her neck pain is worse on th right side and radiates into her right upper arm. Two weeks ago she heard a \"pop\" when she was turning in bed, followed by a sudden onset of neck pain. She went to a walk in clinic and was prescribed prednisone, which has not provided her with significant relief. She reports that she has tried Tizanidine, however, this makes her drousy and does not provide her with significant relief. Patient has a baby and she is concerned for taking medications that makes her feel \"groggy.\" Patient is scheduled to see physical therapy on Wednesday (~2 days from today). She has been using lidocaine patches with no relief. Patient also has been taking dexamethasone which has also not provided her with significant relief. She stopped taking Advil, secondary to starting dexamethasone. She still currently takes Tylenol four times daily. Denies any fever, chills, shortness of breath, chest " pain, abdominal pain, changes in bowel or bladder habits,changes in vision, changes in sensation or weakness or any other symptoms at this time.     REVIEW OF SYSTEMS:  Review of Systems   Constitutional: Negative for fever.   HENT: Negative for congestion.    Eyes: Negative for visual disturbance.   Respiratory: Negative for shortness of breath.    Cardiovascular: Negative for chest pain.   Gastrointestinal: Negative for abdominal pain, blood in stool, constipation, diarrhea, nausea and vomiting.   Genitourinary: Negative for difficulty urinating, dysuria, frequency and hematuria.   Musculoskeletal: Positive for neck pain (worse on right side, radiating into right arm). Negative for arthralgias and neck stiffness.   Skin: Negative for color change.   Neurological: Negative for dizziness, numbness and headaches.        Negative for tingling. Negative for focal weakness.   Psychiatric/Behavioral: Negative for confusion.   All other systems reviewed and are negative.        Medical History     Past Medical History:   Diagnosis Date     ASD (atrial septal defect)      Depressive disorder      Headache      History of blood transfusion 1985     Hypertension      Insomnia, unspecified      Other forms of migraine, without mention of intractable migraine without mention of status migrainosus        Past Surgical History:   Procedure Laterality Date     BIOPSY       C REPR ASD OSTIUM PREMUM      Dr. Silverio     COLONOSCOPY       IR CERVICAL EPIDURAL STEROID INJECTION  9/7/2012     IR CERVICAL EPIDURAL STEROID INJECTION  10/2/2012       Family History   Problem Relation Age of Onset     C.A.D. Mother         heart surgery to close hole in heart     Cardiovascular Mother      Hypertension Mother      Depression Mother      Anxiety Disorder Mother      Heart Failure Mother      Cerebrovascular Disease Father      Hypertension Father        Social History     Tobacco Use     Smoking status: Never Smoker     Smokeless tobacco:  "Never Used   Substance Use Topics     Alcohol use: Not Currently     Comment: breast feeding      Drug use: No       oxyCODONE (ROXICODONE) 5 MG tablet  buPROPion (WELLBUTRIN XL) 300 MG 24 hr tablet  desvenlafaxine (PRISTIQ) 100 MG 24 hr tablet  dexamethasone (DECADRON) 4 MG tablet  lamoTRIgine (LAMICTAL) 200 MG tablet  LORazepam (ATIVAN) 0.5 MG tablet  norethindrone-ethinyl estradiol (MICROGESTIN 1/20) 1-20 MG-MCG tablet  tiZANidine (ZANAFLEX) 4 MG tablet        Physical Exam     First Vitals:  Patient Vitals for the past 24 hrs:   BP Temp Temp src Pulse Resp Height Weight   07/12/21 0759 (!) 168/97 98  F (36.7  C) Temporal 76 16 1.702 m (5' 7\") 99.8 kg (220 lb)       PHYSICAL EXAM:   Physical Exam  General:  alert, interactive, no distress  Eyes:  conjunctivae clear, conjugate gaze   HENT:  atraumatic, nose with no rhinorrhea, oropharynx clear  Neck:  no meningismus. No midline spinal tenderness. Right paraspinal cervical muscle pain with spasms. No carotid bruits bilaterally.   Cardiovascular:  HR in the 70s during exam, no murmurs, brisk cap refill  Chest:  no chest wall tenderness  Chest/Pulmonary:  no stridor, normal phonation, normal work of breathing, clear lungs bilaterally  Abdomen: soft, nondistended, nontender  Back/Spine:  no CVA or midline tenderness.  Musculoskeletal:  no pretibial edema, no calf tenderness. Gross ROM intact to joints of extremities with no obvious deformities.  Skin:  warm, dry, no rash. No overlying skin changes.   Neuro:  awake, alert, answers questions appropriately, follows commands, moves all limbs. CN II-XII intact. 5/5 strength of upper extremities with sensation intact. Coordination normal to BUE.  Psych:  calm, normal affect          Lori Danielle MD  Emergency Medicine  [facility] EMERGENCY DEPARTMENT       Hong Danielle MD  07/12/21 0952    "

## 2021-07-13 NOTE — PROGRESS NOTES
Assessment:     Diagnoses and all orders for this visit:  Chronic neck pain  -     tiZANidine (ZANAFLEX) 2 MG tablet; Take 1-2 tablets (2-4 mg) by mouth At Bedtime  -     PAIN Transforaminal NICOLE Inj Cervical Right; Future  -     Physical Therapy Referral; Future  Radiculitis of right cervical region  -     PAIN Transforaminal NICOLE Inj Cervical Right; Future  -     HYDROcodone-acetaminophen (NORCO) 5-325 MG tablet; Take 1 tablet by mouth every 8 hours as needed for pain  -     Physical Therapy Referral; Future  Cervical stenosis of spinal canal  -     Physical Therapy Referral; Future  DDD (degenerative disc disease), cervical  -     Physical Therapy Referral; Future     Danyelle Canales is a 41 year old y.o. female with past medical history significant for cervicalgia, neck sprain, torticollis, myofascial pain syndrome who presents today for follow-up regarding:     -Recurrent right cervical radiculitis C6 dermatomal pattern.    Plan:     A shared decision making plan was used.  The patient's values and choices were respected. Prior medical records from 10/28/2020 to current were reviewed today. The following represents what was discussed and decided upon by the provider and the patient.     -DIAGNOSTIC TESTS: Images were personally reviewed and interpreted.    --Cervical spine MRI 9/25/2020 with degenerative disc disease multilevel.  C5-6 right disc protrusion with moderate spinal stenosis.           --Lumbar spine x-ray 2012 with partial sacralization of L5 on the left, otherwise negative.  --Cervical and thoracic MRI imaging 2010, unavailable    -INTERVENTIONS: Ordered a urgent right C5-6 transforaminal epidural steroid injection to see if we get further relief of her right cervical radiculitis as she does have a disc protrusion at this level with right greater than left foraminal stenosis and right only cervical radicular symptoms.    -Injection ordered as urgent given her pain is severe and debilitating  even with narcotics at this time.  -If no benefit with this injection we could consider a C7-T1 interlaminar NICOLE as well    -MEDICATIONS: Prescribed tizanidine  -Prescribed hydrocodone 5/325 mg 1 tablet every 8 hours as needed for severe breakthrough pain  -Advised patient continue with ibuprofen as needed at this time  Discussed side effects of medications and proper use. Patient verbalized understanding.    -PHYSICAL THERAPY: [facility] for starting with traditional physical therapy however can transition to [facility] program as tolerable.  Discussed the importance of core strengthening, ROM, stretching exercises with the patient and how each of these entities is important in decreasing pain.  Explained to the patient that the purpose of physical therapy is to teach the patient a home exercise program.  These exercises need to be performed every day in order to decrease pain and prevent future occurrences of pain.        -PATIENT EDUCATION: Total time of 32 minutes spent with the patient, reviewing the chart, placing orders, and documenting.   -Today we also discussed the issues related to the current COVID-19 pandemic, the pros and cons of the current treatment plan, the CDC guidelines such as social distancing, washing the hands, and covering the cough.    -FOLLOW UP: Follow-up for injection with Dr. Alford  Advised to contact clinic if symptoms worsen or change.    Subjective:     Danyelle Canales is a 41 year old female who presents today for follow-up regarding ongoing generalized neck pain with significant recurrent right radiating pain into the right Po scapular region deltoid and forearm and occasionally into the thumb on the left hand with associated numbness and tingling, symptoms of been severe for the last 2 weeks with no benefit with narcotics and I did bring her to the ED on 7/12/2021 because of the severity of the pain.  Currently her pain  is a constant 6/10, 7 at its worst, 5 at its best.    Patient denies current left-sided symptoms, denies recent trips or falls or balance changes, denies bowel or bladder loss control.    No myelopathic symptoms.     -Treatment to Date: No prior spinal surgery.  Physical therapy in the past and she does continue some of the stretches.  [facility] in the past with aggravation with MedX machines.  Acupuncture in the past with minimal benefit.  Physical therapy [facility] sessions 11/18/2020 cervicalgia/radiculitis.     Cervical spinal injections 2013 and 2012, trigger point and medial branch block.  She does not recall an epidural but it does look like there was potentially an epidural on file.  C7-T1 interlaminar NICOLE 10/12/2020 with 30% overall relief, resolution of right arm pain however.  Preprocedure pain 3/10, post 2/10.     -Medications:  Medrol Dosepak x2 recently with no benefit  Ibuprofen with minimal benefit  Tizanidine with some relief     Prior medications:  Flexeril with some benefit but side effects.  Baclofen without benefit  Gabapentin without benefit  Hydrocodone as needed initially for severe breakthrough pain  Oxycodone with minimal benefit, felt hydrocodone was more beneficial    Patient Active Problem List   Diagnosis     Persistent insomnia     allergic DERMATITIS NOS     [facility]     [facility] OF N[facility]K     Migraine headache     PMDD (premenstrual dysphoric disorder)     Trichotillomania     CARDIOVASCULAR SCREENING; LDL GOAL LESS THAN 160     History of ovarian cyst     Generalized anxiety disorder     Chronic pain syndrome     ASD (atrial septal defect)     Chronic pain     Esophageal reflux     Non morbid obesity, unspecified obesity type     Benign hypermobility syndrome     Myalgia and myositis, unspecified     Myofascial pain     Recurrent major depression in partial remission (H)     Right inguinal hernia     S/P right inguinal hernia repair, follow-up exam     Morbid obesity  "(H)       Current Outpatient Medications   Medication     buPROPion (WELLBUTRIN XL) 300 MG 24 hr tablet     desvenlafaxine (PRISTIQ) 100 MG 24 hr tablet     HYDROcodone-acetaminophen (NORCO) 5-325 MG tablet     lamoTRIgine (LAMICTAL) 200 MG tablet     LORazepam (ATIVAN) 0.5 MG tablet     oxyCODONE (ROXICODONE) 5 MG tablet     tiZANidine (ZANAFLEX) 2 MG tablet     dexamethasone (DECADRON) 4 MG tablet     norethindrone-ethinyl estradiol (MICROGESTIN 1/20) 1-20 MG-MCG tablet     No current facility-administered medications for this visit.       Allergies   Allergen Reactions     Artificial Sweetner (Informational Only) [Artificial Sweetner (Informational Only) ]      Chocolate Flavor Other (See Comments)       Past Medical History:   Diagnosis Date     ASD (atrial septal defect)     repaired surgically as a child     Depressive disorder      Headache      History of blood transfusion 1985 1985 during open heart surgery     Hypertension     higher results at pain clinic, want to confirm ok     Insomnia, unspecified      Other forms of migraine, without mention of intractable migraine without mention of status migrainosus         Review of Systems  ROS: Specifically negative for bowel/bladder dysfunction, balance changes, headache, dizziness, foot drop, fevers, chills, appetite changes, nausea/vomiting, unexplained weight loss. Otherwise 13 systems reviewed are negative. Please see the patient's intake questionnaire from today for details.    Reviewed Social, Family, Past Medical and Past Surgical history with patient, no significant changes noted since prior visit.     Objective:     BP (!) 165/95 (BP Location: Left arm, Patient Position: Sitting, Cuff Size: Adult Large)   Pulse 70   Ht 5' 7\" (1.702 m)   Wt 221 lb (100.2 kg)   BMI 34.61 kg/m      PHYSICAL EXAMINATION:   --CONSTITUTIONAL: Vital signs as above. No acute distress. The patient is well nourished and well groomed.  --PSYCHIATRIC:  The patient is awake, " alert, oriented to person, place, time and answering questions appropriately with clear speech. Appropriate mood and affect   --HEENT: Sclera are non-injected. Extraocular muscles are intact.   --SKIN: Skin over the face, bilateral upper extremities, and posterior torso is clean, dry, intact without rashes.  --RESPIRATORY: Normal rhythm and effort. No abnormal accessory muscle breathing patterns noted.   --GROSS MOTOR: Easily arises from a seated position.   --CERVICAL SPINE: Inspection reveals no evidence of deformity. Range of motion is not limited in cervical flexion, extension, lateral rotation. No tenderness to palpation cervical spine.    --SHOULDERS: Full range of motion bilaterally: abduction, flexion, cross chest movement internal/external rotation. Negative empty can.  --UPPER EXTREMITY MOTOR TESTING:  Wrist flexion left 5/5, right 5/5  Wrist extension left 5/5, right 5/5  Biceps left 5/5, right 5/5   Triceps left 5/5, right 5/5   Shoulder abduction left 5/5, right 5/5   left 5/5, right 5/5  --NEUROLOGIC: CN III-XII are grossly intact. 2/4 symmetric biceps, brachioradialis, triceps reflexes bilaterally. Sensation to upper extremities is intact. Negative Zhang's bilaterally.   --VASCULAR: Warm upper limbs bilaterally.     Imaging of the cervical spine: Cervical spine imaging was reviewed today. The images were shown to the patient and the findings were explained using a spine model.      Mr Cervical Spine Without Contrast  Result Date: 9/27/2020  INDICATION: Neck pain, abnormal neuro exam. Cervical radiculopathy. Right cervical radiculopathy with positive right Zoë's reflex/hyperreflexia. COMPARISON: None. TECHNIQUE: Without IV contrast. FINDINGS: Normal vertebral body heights, alignment and marrow signal. No abnormal cord signal. No extraspinal abnormality. Craniovertebral junction and C1-C2: Normal.   C2-C3: Normal disc height. No herniation. Normal facets. No spinal canal or neural foraminal  stenosis.   C3-C4: Normal disc height. No herniation. Normal facets. No spinal canal or neural foraminal stenosis.   C4-C5: Moderate loss of disc height. Small broad-based posterior disc/osteophyte complex without evidence of soft disc. No facet hypertrophy. Mild central spinal stenosis. No foraminal narrowing.   C5-C6: Disc desiccation and moderate loss of height. Mild to moderate circumferential disc bulge, larger on the right than the left, and there is a small superimposed right paracentral disc protrusion. No facet hypertrophy. Moderate central spinal stenosis. Minimal right ventral cord flattening. No significant foraminal narrowing.   C6-C7: Normal disc height. No herniation. Normal facets. No spinal canal or neural foraminal stenosis.   C7-T1: Normal disc height. No herniation. Normal facets. No spinal canal or neural foraminal stenosis.   1.  Degenerative disc disease at C4-C5 and C5-C6. At C5-C6, there is a disc bulge and a superimposed right paracentral disc protrusion. The findings lead to moderate central spinal stenosis and minimal right ventral cord flattening at C5-C6.   2.  Otherwise, negative cervical spine MRI. No abnormal signal in the cervical cord. No evidence of nerve root displacement or compression.         LUMBAR SPINE 2-3 VIEW  Jan 30, 2012  HISTORY: Pain  FINDINGS: Partial sacralization of L5 on the left side. Exam otherwise  negative.

## 2021-07-14 ENCOUNTER — OFFICE VISIT (OUTPATIENT)
Dept: PHYSICAL MEDICINE AND REHAB | Facility: CLINIC | Age: 41
End: 2021-07-14
Payer: COMMERCIAL

## 2021-07-14 VITALS
SYSTOLIC BLOOD PRESSURE: 165 MMHG | DIASTOLIC BLOOD PRESSURE: 95 MMHG | HEART RATE: 70 BPM | HEIGHT: 67 IN | BODY MASS INDEX: 34.69 KG/M2 | WEIGHT: 221 LBS

## 2021-07-14 DIAGNOSIS — M50.30 DDD (DEGENERATIVE DISC DISEASE), CERVICAL: ICD-10-CM

## 2021-07-14 DIAGNOSIS — M48.02 CERVICAL STENOSIS OF SPINAL CANAL: ICD-10-CM

## 2021-07-14 DIAGNOSIS — G89.29 CHRONIC NECK PAIN: Primary | ICD-10-CM

## 2021-07-14 DIAGNOSIS — M54.2 CHRONIC NECK PAIN: Primary | ICD-10-CM

## 2021-07-14 DIAGNOSIS — M54.12 RADICULITIS OF RIGHT CERVICAL REGION: ICD-10-CM

## 2021-07-14 PROCEDURE — 99214 OFFICE O/P EST MOD 30 MIN: CPT | Performed by: NURSE PRACTITIONER

## 2021-07-14 RX ORDER — HYDROCODONE BITARTRATE AND ACETAMINOPHEN 5; 325 MG/1; MG/1
1 TABLET ORAL EVERY 8 HOURS PRN
Qty: 15 TABLET | Refills: 0 | Status: SHIPPED | OUTPATIENT
Start: 2021-07-14 | End: 2021-07-19

## 2021-07-14 RX ORDER — TIZANIDINE 2 MG/1
2-4 TABLET ORAL AT BEDTIME
Qty: 42 TABLET | Refills: 1 | Status: SHIPPED | OUTPATIENT
Start: 2021-07-14 | End: 2022-06-17

## 2021-07-14 ASSESSMENT — MIFFLIN-ST. JEOR: SCORE: 1705.08

## 2021-07-14 ASSESSMENT — PAIN SCALES - GENERAL: PAINLEVEL: SEVERE PAIN (6)

## 2021-07-14 NOTE — PATIENT INSTRUCTIONS
~Please call our [facility] Nurse Navigation line [phone] with any questions or concerns about your treatment plan, if symptoms worsen and you would like to be seen urgently, or if you have problems controlling bladder and bowel function.      You have been referred for Physical Therapy to [facility]. They will call you to schedule an appointment.  Scheduling phone number is [phone].  Discussed the importance of core strengthening, ROM, stretching exercises and how each of these entities is important in decreasing pain and improving long term spine health.  The purpose of physical therapy is to teach you an individualized home exercise program.  These exercises need to be performed every day in order to decrease pain and prevent future occurrences of pain.          You have been prescribed a controlled substance medication hydrocodone/Vicodin today for pain control.   This medication must be taken as prescribed only as it can be very dangerous to your health if taken more than prescribed.  We discussed the risks (eg, addiction, overdose, worsening pain, death) verses the potential benefit of opioid medication use. Explained that this medication will not be a long term solution to ongoing pain. Discussed using lowest effective dose and the importance of other measures for pain management including physical therapy, other non-opioid medications, behavioral treatments, acupuncture and massage, and other procedure options.     For any further refills on opioid pain medication you must come in to the clinic in person to discuss further in which you may or may not receive refills.      An injection has been ordered today to potentially help with your pain symptoms. These injections do not fix what is going on in your back, therefore they typically do not take away the pain completely, however they can many times help improve symptoms. Injections should always be completed along with  other modalities such as physical therapy for the best long term outcomes. If injections alone are done, then pain will likely return.     [facility] Injection Requirements      A  is required for all fluoroscopically-guided injections.    Injection appointments may be cancelled if there are signs/symptoms of an active infection or if the patient is being actively treated with antibiotics for a diagnosed infection.    Patients may have their steroid injection cancelled if they have had another steroid injection within 2 weeks.    Diabetic patients will have their blood glucose levels checked the day of their injection and the appointment will be rescheduled if the blood glucose level is 300 or higher.    Patients with allergies to cortisone, local anesthetics, iodine, or contrast dye should contact the [facility] to further discuss these considerations.    Patients scheduled for medial branch block diagnostic injections should refrain from taking pain medication the day of the procedure.  The medial branch block injection appointment will be rescheduled if the patient's pain rating is not 5/10 or greater at the time of the procedure.    Patients taking warfarin/Coumadin will have their INR checked the day of the procedure and the procedure may be rescheduled if the INR is greater than 3.0.    Please contact the [facility] (#[phone]) if you are taking any prescription blood-thinning medications (warfarin, Plavix, Lovenox, Eliquis, Brilinta, Effient, etc.) as special dosing adjustments may need to be made depending on the type of injection you are scheduled to receive.    It is recommended that you delay having your steroid injection if you have received a flu shot or shingles vaccine within 2 weeks.

## 2021-07-14 NOTE — LETTER
7/14/2021         RE: Danyelle Canales  [address] [place] [address]        Dear Colleague,    Thank you for referring your patient, Danyelle Canales, to the [facility] [place]. Please see a copy of my visit note below.      Assessment:     Diagnoses and all orders for this visit:  Chronic neck pain  -     tiZANidine (ZANAFLEX) 2 MG tablet; Take 1-2 tablets (2-4 mg) by mouth At Bedtime  -     PAIN Transforaminal NICOLE Inj Cervical Right; Future  -     Physical Therapy Referral; Future  Radiculitis of right cervical region  -     PAIN Transforaminal NICOLE Inj Cervical Right; Future  -     HYDROcodone-acetaminophen (NORCO) 5-325 MG tablet; Take 1 tablet by mouth every 8 hours as needed for pain  -     Physical Therapy Referral; Future  Cervical stenosis of spinal canal  -     Physical Therapy Referral; Future  DDD (degenerative disc disease), cervical  -     Physical Therapy Referral; Future     Danyelle Canales is a 41 year old y.o. female with past medical history significant for cervicalgia, neck sprain, torticollis, myofascial pain syndrome who presents today for follow-up regarding:     -Recurrent right cervical radiculitis C6 dermatomal pattern.    Plan:     A shared decision making plan was used.  The patient's values and choices were respected. Prior medical records from 10/28/2020 to current were reviewed today. The following represents what was discussed and decided upon by the provider and the patient.     -DIAGNOSTIC TESTS: Images were personally reviewed and interpreted.    --Cervical spine MRI 9/25/2020 with degenerative disc disease multilevel.  C5-6 right disc protrusion with moderate spinal stenosis.           --Lumbar spine x-ray 2012 with partial sacralization of L5 on the left, otherwise negative.  --Cervical and thoracic MRI imaging 2010, unavailable    -INTERVENTIONS: Ordered a urgent right C5-6 transforaminal epidural steroid injection to see  if we get further relief of her right cervical radiculitis as she does have a disc protrusion at this level with right greater than left foraminal stenosis and right only cervical radicular symptoms.    -Injection ordered as urgent given her pain is severe and debilitating even with narcotics at this time.  -If no benefit with this injection we could consider a C7-T1 interlaminar NICOLE as well    -MEDICATIONS: Prescribed tizanidine  -Prescribed hydrocodone 5/325 mg 1 tablet every 8 hours as needed for severe breakthrough pain  -Advised patient continue with ibuprofen as needed at this time  Discussed side effects of medications and proper use. Patient verbalized understanding.    -PHYSICAL THERAPY: Referral to physical therapy placed to [facility] for starting with traditional physical therapy however can transition to MedX program as tolerable.  Discussed the importance of core strengthening, ROM, stretching exercises with the patient and how each of these entities is important in decreasing pain.  Explained to the patient that the purpose of physical therapy is to teach the patient a home exercise program.  These exercises need to be performed every day in order to decrease pain and prevent future occurrences of pain.        -PATIENT EDUCATION: Total time of 32 minutes spent with the patient, reviewing the chart, placing orders, and documenting.   -Today we also discussed the issues related to the current COVID-19 pandemic, the pros and cons of the current treatment plan, the CDC guidelines such as social distancing, washing the hands, and covering the cough.    -FOLLOW UP: Follow-up for injection with Dr. Alford  Advised to contact clinic if symptoms worsen or change.    Subjective:     Danyelle Canales is a 41 year old female who presents today for follow-up regarding ongoing generalized neck pain with significant recurrent right radiating pain into the right Po scapular region deltoid  and forearm and occasionally into the thumb on the left hand with associated numbness and tingling, symptoms of been severe for the last 2 weeks with no benefit with narcotics and I did bring her to the ED on 7/12/2021 because of the severity of the pain.  Currently her pain is a constant 6/10, 7 at its worst, 5 at its best.    Patient denies current left-sided symptoms, denies recent trips or falls or balance changes, denies bowel or bladder loss control.    No myelopathic symptoms.     -Treatment to Date: No prior spinal surgery.  Physical therapy in the past and she does continue some of the stretches.  PNBC in the past with aggravation with MedX machines.  Acupuncture in the past with minimal benefit.  Physical therapy [facility] x2 sessions 11/18/2020 cervicalgia/radiculitis.     Cervical spinal injections 2013 and 2012, trigger point and medial branch block.  She does not recall an epidural but it does look like there was potentially an epidural on file.  C7-T1 interlaminar NICOLE 10/12/2020 with 30% overall relief, resolution of right arm pain however.  Preprocedure pain 3/10, post 2/10.     -Medications:  Medrol Dosepak x2 recently with no benefit  Ibuprofen with minimal benefit  Tizanidine with some relief     Prior medications:  Flexeril with some benefit but side effects.  Baclofen without benefit  Gabapentin without benefit  Hydrocodone as needed initially for severe breakthrough pain  Oxycodone with minimal benefit, felt hydrocodone was more beneficial    Patient Active Problem List   Diagnosis     Persistent insomnia     allergic DERMATITIS NOS     [facility]     [facility]     Migraine headache     PMDD (premenstrual dysphoric disorder)     Trichotillomania     CARDIOVASCULAR SCREENING; LDL GOAL LESS THAN 160     History of ovarian cyst     Generalized anxiety disorder     Chronic pain syndrome     ASD (atrial septal defect)     Chronic pain     Esophageal reflux     Non morbid  obesity, unspecified obesity type     Benign hypermobility syndrome     Myalgia and myositis, unspecified     Myofascial pain     Recurrent major depression in partial remission (H)     Right inguinal hernia     S/P right inguinal hernia repair, follow-up exam     Morbid obesity (H)       Current Outpatient Medications   Medication     buPROPion (WELLBUTRIN XL) 300 MG 24 hr tablet     desvenlafaxine (PRISTIQ) 100 MG 24 hr tablet     HYDROcodone-acetaminophen (NORCO) 5-325 MG tablet     lamoTRIgine (LAMICTAL) 200 MG tablet     LORazepam (ATIVAN) 0.5 MG tablet     oxyCODONE (ROXICODONE) 5 MG tablet     tiZANidine (ZANAFLEX) 2 MG tablet     dexamethasone (DECADRON) 4 MG tablet     norethindrone-ethinyl estradiol (MICROGESTIN 1/20) 1-20 MG-MCG tablet     No current facility-administered medications for this visit.       Allergies   Allergen Reactions     Artificial Sweetner (Informational Only) [Artificial Sweetner (Informational Only) ]      Chocolate Flavor Other (See Comments)       Past Medical History:   Diagnosis Date     ASD (atrial septal defect)     repaired surgically as a child     Depressive disorder      Headache      History of blood transfusion 1985 1985 during open heart surgery     Hypertension     higher results at pain clinic, want to confirm ok     Insomnia, unspecified      Other forms of migraine, without mention of intractable migraine without mention of status migrainosus         Review of Systems  ROS: Specifically negative for bowel/bladder dysfunction, balance changes, headache, dizziness, foot drop, fevers, chills, appetite changes, nausea/vomiting, unexplained weight loss. Otherwise 13 systems reviewed are negative. Please see the patient's intake questionnaire from today for details.    Reviewed Social, Family, Past Medical and Past Surgical history with patient, no significant changes noted since prior visit.     Objective:     BP (!) 165/95 (BP Location: Left arm, Patient Position:  "Sitting, Cuff Size: Adult Large)   Pulse 70   Ht 5' 7\" (1.702 m)   Wt 221 lb (100.2 kg)   BMI 34.61 kg/m      PHYSICAL EXAMINATION:   --CONSTITUTIONAL: Vital signs as above. No acute distress. The patient is well nourished and well groomed.  --PSYCHIATRIC:  The patient is awake, alert, oriented to person, place, time and answering questions appropriately with clear speech. Appropriate mood and affect   --HEENT: Sclera are non-injected. Extraocular muscles are intact.   --SKIN: Skin over the face, bilateral upper extremities, and posterior torso is clean, dry, intact without rashes.  --RESPIRATORY: Normal rhythm and effort. No abnormal accessory muscle breathing patterns noted.   --GROSS MOTOR: Easily arises from a seated position.   --CERVICAL SPINE: Inspection reveals no evidence of deformity. Range of motion is not limited in cervical flexion, extension, lateral rotation. No tenderness to palpation cervical spine.    --SHOULDERS: Full range of motion bilaterally: abduction, flexion, cross chest movement internal/external rotation. Negative empty can.  --UPPER EXTREMITY MOTOR TESTING:  Wrist flexion left 5/5, right 5/5  Wrist extension left 5/5, right 5/5  Biceps left 5/5, right 5/5   Triceps left 5/5, right 5/5   Shoulder abduction left 5/5, right 5/5   left 5/5, right 5/5  --NEUROLOGIC: CN III-XII are grossly intact. 2/4 symmetric biceps, brachioradialis, triceps reflexes bilaterally. Sensation to upper extremities is intact. Negative Zhang's bilaterally.   --VASCULAR: Warm upper limbs bilaterally.     Imaging of the cervical spine: Cervical spine imaging was reviewed today. The images were shown to the patient and the findings were explained using a spine model.      Mr Cervical Spine Without Contrast  Result Date: 9/27/2020  INDICATION: Neck pain, abnormal neuro exam. Cervical radiculopathy. Right cervical radiculopathy with positive right Zoë's reflex/hyperreflexia. COMPARISON: None. TECHNIQUE: " Without IV contrast. FINDINGS: Normal vertebral body heights, alignment and marrow signal. No abnormal cord signal. No extraspinal abnormality. Craniovertebral junction and C1-C2: Normal.   C2-C3: Normal disc height. No herniation. Normal facets. No spinal canal or neural foraminal stenosis.   C3-C4: Normal disc height. No herniation. Normal facets. No spinal canal or neural foraminal stenosis.   C4-C5: Moderate loss of disc height. Small broad-based posterior disc/osteophyte complex without evidence of soft disc. No facet hypertrophy. Mild central spinal stenosis. No foraminal narrowing.   C5-C6: Disc desiccation and moderate loss of height. Mild to moderate circumferential disc bulge, larger on the right than the left, and there is a small superimposed right paracentral disc protrusion. No facet hypertrophy. Moderate central spinal stenosis. Minimal right ventral cord flattening. No significant foraminal narrowing.   C6-C7: Normal disc height. No herniation. Normal facets. No spinal canal or neural foraminal stenosis.   C7-T1: Normal disc height. No herniation. Normal facets. No spinal canal or neural foraminal stenosis.   1.  Degenerative disc disease at C4-C5 and C5-C6. At C5-C6, there is a disc bulge and a superimposed right paracentral disc protrusion. The findings lead to moderate central spinal stenosis and minimal right ventral cord flattening at C5-C6.   2.  Otherwise, negative cervical spine MRI. No abnormal signal in the cervical cord. No evidence of nerve root displacement or compression.         LUMBAR SPINE 2-3 VIEW  Jan 30, 2012  HISTORY: Pain  FINDINGS: Partial sacralization of L5 on the left side. Exam otherwise  negative.                  Again, thank you for allowing me to participate in the care of your patient.        Sincerely,        Cathy Osorio, CNP

## 2021-07-15 ENCOUNTER — MYC MEDICAL ADVICE (OUTPATIENT)
Dept: PHYSICAL MEDICINE AND REHAB | Facility: CLINIC | Age: 41
End: 2021-07-15

## 2021-07-15 DIAGNOSIS — G89.29 CHRONIC NECK PAIN: Primary | ICD-10-CM

## 2021-07-15 DIAGNOSIS — M54.2 CHRONIC NECK PAIN: Primary | ICD-10-CM

## 2021-07-15 DIAGNOSIS — M54.12 RADICULITIS OF RIGHT CERVICAL REGION: ICD-10-CM

## 2021-07-15 RX ORDER — NABUMETONE 500 MG/1
500-1000 TABLET, FILM COATED ORAL 2 TIMES DAILY PRN
Qty: 30 TABLET | Refills: 3 | Status: SHIPPED | OUTPATIENT
Start: 2021-07-15 | End: 2021-10-29

## 2021-07-19 ENCOUNTER — OFFICE VISIT (OUTPATIENT)
Dept: FAMILY MEDICINE | Facility: CLINIC | Age: 41
End: 2021-07-19
Payer: COMMERCIAL

## 2021-07-19 ENCOUNTER — MYC MEDICAL ADVICE (OUTPATIENT)
Dept: PHYSICAL MEDICINE AND REHAB | Facility: CLINIC | Age: 41
End: 2021-07-19

## 2021-07-19 VITALS
TEMPERATURE: 99.1 F | BODY MASS INDEX: 35.16 KG/M2 | RESPIRATION RATE: 20 BRPM | DIASTOLIC BLOOD PRESSURE: 70 MMHG | HEART RATE: 85 BPM | SYSTOLIC BLOOD PRESSURE: 130 MMHG | HEIGHT: 67 IN | WEIGHT: 224 LBS | OXYGEN SATURATION: 97 %

## 2021-07-19 DIAGNOSIS — M65.4 DE QUERVAIN'S DISEASE (TENOSYNOVITIS): Primary | ICD-10-CM

## 2021-07-19 DIAGNOSIS — M25.372 INSTABILITY OF LEFT ANKLE JOINT: ICD-10-CM

## 2021-07-19 PROCEDURE — 99214 OFFICE O/P EST MOD 30 MIN: CPT | Performed by: FAMILY MEDICINE

## 2021-07-19 ASSESSMENT — PAIN SCALES - GENERAL: PAINLEVEL: MILD PAIN (2)

## 2021-07-19 ASSESSMENT — MIFFLIN-ST. JEOR: SCORE: 1718.69

## 2021-07-19 NOTE — PROGRESS NOTES
Assessment & Plan     De Quervain's disease (tenosynovitis)  Let the patient start with hand therapy.  She can wear brace for de Quervain's tendinitis that she already has as needed, ice also.  If she has not improved with hand therapy will refer her back to Dr. Orozco for repeat injection    - Occupation Therapy Referral; Future    Instability of left ankle joint  The patient was given home physical therapy for this the ABCs with her big toe        20 minutes spent on the date of the encounter doing chart review, patient visit and documentation       Return in about 4 weeks (around 8/16/2021) for If not improving see sports medicine for injection.    Maryann Eisenberg DO  [facility]    Alex Bassett is a 41 year old who presents for the following health issues     HPI     Musculoskeletal problem/pain  Onset/Duration: over a week  Description  Location: wrist - left  Joint Swelling: YES  Redness: no  Pain: YES  Warmth: no  Intensity:  mild  Progression of Symptoms:  improving  Accompanying signs and symptoms:   Fevers: no  Numbness/tingling/weakness: no  History  Trauma to the area: no  Recent illness:  no  Previous similar problem: no  Previous evaluation:  no  Precipitating or alleviating factors:  Aggravating factors include: using that hand/arm-- feels grinding  Therapies tried and outcome: ice and wrist with thumb splint- taking pain meds due to her chronic pain.    She had de quervains tendonitis 12/2020.  That pain was closer to the wrist.  It improved right away.      She has a toddler.      She has a history or multiple ankles in the past.  She did a walk yesterday about 20 minutes.  She denies trauma yesterday.      Review of Systems   Constitutional, HEENT, cardiovascular, pulmonary, gi and gu systems are negative, except as otherwise noted.      Objective    /70 (BP Location: Right arm, Patient Position: Sitting, Cuff Size: Adult Large)   Pulse 85   Temp 99.1  F  "(37.3  C) (Oral)   Resp 20   Ht 1.702 m (5' 7\")   Wt 101.6 kg (224 lb)   LMP 07/05/2021   SpO2 97%   BMI 35.08 kg/m    Body mass index is 35.08 kg/m .  Physical Exam   GENERAL: healthy, alert and no distress  MS: Normal wrist exam bilaterally.  Tenderness over the de Quervain's tendon pain with Finkelstein test, swelling over de Quervain's tendon also  SKIN: no suspicious lesions or rashes  NEURO: sensory exam grossly normal and mentation intact  PSYCH: mentation appears normal, affect normal/bright and anxious          "

## 2021-07-21 ENCOUNTER — ANCILLARY PROCEDURE (OUTPATIENT)
Dept: PHYSICAL MEDICINE AND REHAB | Facility: CLINIC | Age: 41
End: 2021-07-21
Attending: NURSE PRACTITIONER
Payer: COMMERCIAL

## 2021-07-21 VITALS
HEART RATE: 75 BPM | RESPIRATION RATE: 16 BRPM | SYSTOLIC BLOOD PRESSURE: 122 MMHG | TEMPERATURE: 99 F | DIASTOLIC BLOOD PRESSURE: 84 MMHG | OXYGEN SATURATION: 98 %

## 2021-07-21 DIAGNOSIS — M54.12 RADICULITIS OF RIGHT CERVICAL REGION: ICD-10-CM

## 2021-07-21 DIAGNOSIS — M54.2 CHRONIC NECK PAIN: ICD-10-CM

## 2021-07-21 DIAGNOSIS — G89.29 CHRONIC NECK PAIN: ICD-10-CM

## 2021-07-21 PROCEDURE — 64479 NJX AA&/STRD TFRM EPI C/T 1: CPT | Mod: RT | Performed by: PHYSICAL MEDICINE & REHABILITATION

## 2021-07-21 RX ORDER — HYDROCODONE BITARTRATE AND ACETAMINOPHEN 5; 325 MG/1; MG/1
1 TABLET ORAL EVERY 8 HOURS PRN
COMMUNITY
End: 2021-09-14

## 2021-07-21 RX ORDER — DEXAMETHASONE SODIUM PHOSPHATE 10 MG/ML
INJECTION, SOLUTION INTRAMUSCULAR; INTRAVENOUS
Status: COMPLETED | OUTPATIENT
Start: 2021-07-21 | End: 2021-07-21

## 2021-07-21 RX ORDER — LIDOCAINE HYDROCHLORIDE 10 MG/ML
INJECTION, SOLUTION EPIDURAL; INFILTRATION; INTRACAUDAL; PERINEURAL
Status: COMPLETED | OUTPATIENT
Start: 2021-07-21 | End: 2021-07-21

## 2021-07-21 RX ADMIN — LIDOCAINE HYDROCHLORIDE 2 ML: 10 INJECTION, SOLUTION EPIDURAL; INFILTRATION; INTRACAUDAL; PERINEURAL at 11:32

## 2021-07-21 RX ADMIN — DEXAMETHASONE SODIUM PHOSPHATE 10 MG: 10 INJECTION, SOLUTION INTRAMUSCULAR; INTRAVENOUS at 11:32

## 2021-07-21 ASSESSMENT — PAIN SCALES - GENERAL
PAINLEVEL: MILD PAIN (2)
PAINLEVEL: SEVERE PAIN (6)

## 2021-07-21 NOTE — PATIENT INSTRUCTIONS
Follow-up visit with Cathy Osorio CNP in 2 weeks to discuss injection outcome and determine care plan going forward.       DISCHARGE INSTRUCTIONS    During office hours (8:00 a.m.- 4:00 p.m.) questions or concerns may be answered  by calling [facility] Navigation Nurses at  [phone].  Messages received after hours will be returned the following business day.      In the case of an emergency, please dial 911 or seek assistance at the nearest Emergency Room/Urgent Care facility.     All Patients:    ? You may experience an increase in your symptoms for the first 2 days (It may take anywhere between 2 days- 2 weeks for the steroid to have maximum effect).    ? You may use ice on the injection site, as frequently as 20 minutes each hour if needed.    ? You may take your pain medicine.    ? You may continue taking your regular medication after your injection. If you have had a Medial Branch Block you may resume pain medication once your pain diary is completed.    ? You may shower. No swimming, tub bath or hot tub for 48 hours.  You may remove your bandaid/bandage as soon as you are home.    ? You may resume light activities, as tolerated.    ? Resume your usual diet as tolerated.    ? It is strongly advised that you do not drive for 1-3 hours post injection.    ? If you have had oral sedation:  Do not drive for 8 hours post injection.      ? If you have had IV sedation:  Do not drive for 24 hours post injection.  Do not operate hazardous machinery or make important personal/business decisions for 24 hours.      POSSIBLE STEROID SIDE EFFECTS (If steroid/cortisone was used for your procedure)    -If you experience these symptoms, it should only last for a short period      Swelling of the legs                Skin redness (flushing)       Mouth (oral) irritation     Blood sugar (glucose) levels              Sweats                      Mood changes    Headache    Sleeplessness    Weakened immune system for up to  14 days, which could increase the risk of mary grace the COVID-19 virus and/or experiencing more severe symptoms of the disease, if exposed.    Decreased effectiveness of the flu vaccine if given within 2 weeks of the steroid.         POSSIBLE PROCEDURE SIDE EFFECTS  -Call the Spine Center if you are concerned    Increased Pain             Increased numbness/tingling        Nausea/Vomiting            Bruising/bleeding at site        Redness or swelling                                                Difficulty walking        Weakness             Fever greater than 100.5    *In the event of a severe headache after an epidural steroid injection that is relieved by lying down, please call the [facility] to speak with a clinical staff member*

## 2021-07-27 ENCOUNTER — HOSPITAL ENCOUNTER (OUTPATIENT)
Dept: PHYSICAL THERAPY | Facility: CLINIC | Age: 41
End: 2021-07-27
Attending: NURSE PRACTITIONER
Payer: COMMERCIAL

## 2021-07-27 DIAGNOSIS — M48.02 CERVICAL STENOSIS OF SPINAL CANAL: ICD-10-CM

## 2021-07-27 DIAGNOSIS — M54.2 CERVICALGIA: Primary | ICD-10-CM

## 2021-07-27 DIAGNOSIS — G89.29 CHRONIC NECK PAIN: ICD-10-CM

## 2021-07-27 DIAGNOSIS — M50.30 DDD (DEGENERATIVE DISC DISEASE), CERVICAL: ICD-10-CM

## 2021-07-27 DIAGNOSIS — M54.12 CERVICAL RADICULITIS: ICD-10-CM

## 2021-07-27 DIAGNOSIS — M54.12 RADICULITIS OF RIGHT CERVICAL REGION: ICD-10-CM

## 2021-07-27 DIAGNOSIS — M54.2 CHRONIC NECK PAIN: ICD-10-CM

## 2021-07-27 PROCEDURE — 97012 MECHANICAL TRACTION THERAPY: CPT | Mod: GP | Performed by: PHYSICAL THERAPIST

## 2021-07-27 PROCEDURE — 97161 PT EVAL LOW COMPLEX 20 MIN: CPT | Mod: GP | Performed by: PHYSICAL THERAPIST

## 2021-07-27 PROCEDURE — 97110 THERAPEUTIC EXERCISES: CPT | Mod: GP | Performed by: PHYSICAL THERAPIST

## 2021-07-27 NOTE — PROGRESS NOTES
Date 7/27/21   Exercise    Currently doing chin tuck, scapular retraction and cervical AROM discussed   Stretches:  UT, scalene, and LS Hold 30 seconds X 1-3 reps

## 2021-07-30 ENCOUNTER — HOSPITAL ENCOUNTER (OUTPATIENT)
Dept: PHYSICAL THERAPY | Facility: CLINIC | Age: 41
End: 2021-07-30
Payer: COMMERCIAL

## 2021-07-30 DIAGNOSIS — M54.2 CERVICALGIA: ICD-10-CM

## 2021-07-30 DIAGNOSIS — G89.29 CHRONIC NECK PAIN: Primary | ICD-10-CM

## 2021-07-30 DIAGNOSIS — M48.02 CERVICAL STENOSIS OF SPINAL CANAL: ICD-10-CM

## 2021-07-30 DIAGNOSIS — M54.2 CHRONIC NECK PAIN: Primary | ICD-10-CM

## 2021-07-30 DIAGNOSIS — M54.12 RADICULITIS OF RIGHT CERVICAL REGION: ICD-10-CM

## 2021-07-30 DIAGNOSIS — M50.30 DDD (DEGENERATIVE DISC DISEASE), CERVICAL: ICD-10-CM

## 2021-07-30 DIAGNOSIS — M54.12 CERVICAL RADICULITIS: ICD-10-CM

## 2021-07-30 PROCEDURE — 97012 MECHANICAL TRACTION THERAPY: CPT | Mod: GP | Performed by: PHYSICAL THERAPIST

## 2021-07-30 PROCEDURE — 97110 THERAPEUTIC EXERCISES: CPT | Mod: GP | Performed by: PHYSICAL THERAPIST

## 2021-07-30 NOTE — PROGRESS NOTES
Date 7/27/21   Exercise    Currently doing chin tuck, scapular retraction and cervical AROM discussed   Stretches:  UT, scalene, and LS Hold 30 seconds X 1-3 reps   Rows purple X 10    Radial N sliders  X 10                                      Jean Pierre Borrero, PT

## 2021-08-06 ENCOUNTER — HOSPITAL ENCOUNTER (OUTPATIENT)
Dept: PHYSICAL THERAPY | Facility: CLINIC | Age: 41
End: 2021-08-06
Payer: COMMERCIAL

## 2021-08-06 DIAGNOSIS — M50.30 DDD (DEGENERATIVE DISC DISEASE), CERVICAL: ICD-10-CM

## 2021-08-06 DIAGNOSIS — M54.2 CHRONIC NECK PAIN: Primary | ICD-10-CM

## 2021-08-06 DIAGNOSIS — M54.12 RADICULITIS OF RIGHT CERVICAL REGION: ICD-10-CM

## 2021-08-06 DIAGNOSIS — G89.29 CHRONIC NECK PAIN: Primary | ICD-10-CM

## 2021-08-06 DIAGNOSIS — M54.12 CERVICAL RADICULITIS: ICD-10-CM

## 2021-08-06 DIAGNOSIS — M48.02 CERVICAL STENOSIS OF SPINAL CANAL: ICD-10-CM

## 2021-08-06 DIAGNOSIS — M54.2 CERVICALGIA: ICD-10-CM

## 2021-08-06 PROCEDURE — 97110 THERAPEUTIC EXERCISES: CPT | Mod: GP | Performed by: PHYSICAL THERAPIST

## 2021-08-06 PROCEDURE — 97012 MECHANICAL TRACTION THERAPY: CPT | Mod: GP | Performed by: PHYSICAL THERAPIST

## 2021-08-06 NOTE — PROGRESS NOTES
Date 8/6/2021   Cervical Parameters    Top Dead Center (TDC) 51   Counterbalance (CB) 2.5   Seat Height 412   Week/Visit    Enter Week/Visit # 1/1   Weight (lbs) 93# Max  60# Ex   Reps (#) 20   Time 108   ROM (degrees) 24-63   Pain same   Flex:Ext ratio 1.79:1     Date 8/6/2021 7/27/21   Exercise     Currently doing chin tuck, scapular retraction and cervical AROM  discussed   Stretches:  UT, scalene, and LS  Hold 30 seconds X 1-3 reps   Rows purple  X 10    Radial N sliders   X 10    UBE  X 3 min X 3                                        Assessment:  Patient seen for 2nd f/u cervcial radiculitis. The patient experiences some benefit from cervical traction. Initiation of cervical Medx today tolerated well. She does demonstrate expected, but below average strength and limited ROM. She did well with DE. Between Medx and cervical traction, patient reported less pain in UE at end of session. The patient is approrpiate to continue with skilled PT per POC.    Jean Pierre Borrero, PT

## 2021-08-22 ENCOUNTER — APPOINTMENT (OUTPATIENT)
Dept: RADIOLOGY | Facility: CLINIC | Age: 41
End: 2021-08-22
Attending: EMERGENCY MEDICINE
Payer: COMMERCIAL

## 2021-08-22 ENCOUNTER — APPOINTMENT (OUTPATIENT)
Dept: ULTRASOUND IMAGING | Facility: CLINIC | Age: 41
End: 2021-08-22
Attending: EMERGENCY MEDICINE
Payer: COMMERCIAL

## 2021-08-22 ENCOUNTER — APPOINTMENT (OUTPATIENT)
Dept: CT IMAGING | Facility: CLINIC | Age: 41
End: 2021-08-22
Attending: EMERGENCY MEDICINE
Payer: COMMERCIAL

## 2021-08-22 ENCOUNTER — HOSPITAL ENCOUNTER (EMERGENCY)
Facility: CLINIC | Age: 41
Discharge: HOME | End: 2021-08-22
Attending: EMERGENCY MEDICINE | Admitting: EMERGENCY MEDICINE
Payer: COMMERCIAL

## 2021-08-22 VITALS
WEIGHT: 223 LBS | HEART RATE: 82 BPM | DIASTOLIC BLOOD PRESSURE: 99 MMHG | RESPIRATION RATE: 18 BRPM | OXYGEN SATURATION: 96 % | TEMPERATURE: 98.9 F | BODY MASS INDEX: 34.93 KG/M2 | SYSTOLIC BLOOD PRESSURE: 152 MMHG

## 2021-08-22 DIAGNOSIS — R10.2 PELVIC PAIN IN FEMALE: ICD-10-CM

## 2021-08-22 DIAGNOSIS — K42.9 UMBILICAL HERNIA WITHOUT OBSTRUCTION AND WITHOUT GANGRENE: ICD-10-CM

## 2021-08-22 LAB
ALBUMIN UR-MCNC: NEGATIVE MG/DL
ANION GAP SERPL CALCULATED.3IONS-SCNC: 13 MMOL/L (ref 5–18)
APPEARANCE UR: CLEAR
BASOPHILS # BLD AUTO: 0.1 10E3/UL (ref 0–0.2)
BASOPHILS NFR BLD AUTO: 1 %
BILIRUB UR QL STRIP: NEGATIVE
BUN SERPL-MCNC: 7 MG/DL (ref 8–22)
CALCIUM SERPL-MCNC: 8.5 MG/DL (ref 8.5–10.5)
CHLORIDE BLD-SCNC: 106 MMOL/L (ref 98–107)
CO2 SERPL-SCNC: 21 MMOL/L (ref 22–31)
COLOR UR AUTO: ABNORMAL
CREAT SERPL-MCNC: 0.7 MG/DL (ref 0.6–1.1)
EOSINOPHIL # BLD AUTO: 0.1 10E3/UL (ref 0–0.7)
EOSINOPHIL NFR BLD AUTO: 1 %
ERYTHROCYTE [DISTWIDTH] IN BLOOD BY AUTOMATED COUNT: 14.6 % (ref 10–15)
GFR SERPL CREATININE-BSD FRML MDRD: >90 ML/MIN/1.73M2
GLUCOSE BLD-MCNC: 81 MG/DL (ref 70–125)
GLUCOSE UR STRIP-MCNC: NEGATIVE MG/DL
HCG SERPL QL: NEGATIVE
HCT VFR BLD AUTO: 39.9 % (ref 35–47)
HGB BLD-MCNC: 12.3 G/DL (ref 11.7–15.7)
HGB UR QL STRIP: NEGATIVE
IMM GRANULOCYTES # BLD: 0.1 10E3/UL
IMM GRANULOCYTES NFR BLD: 0 %
KETONES UR STRIP-MCNC: 20 MG/DL
LEUKOCYTE ESTERASE UR QL STRIP: NEGATIVE
LYMPHOCYTES # BLD AUTO: 2.5 10E3/UL (ref 0.8–5.3)
LYMPHOCYTES NFR BLD AUTO: 22 %
MCH RBC QN AUTO: 26.5 PG (ref 26.5–33)
MCHC RBC AUTO-ENTMCNC: 30.8 G/DL (ref 31.5–36.5)
MCV RBC AUTO: 86 FL (ref 78–100)
MONOCYTES # BLD AUTO: 0.6 10E3/UL (ref 0–1.3)
MONOCYTES NFR BLD AUTO: 5 %
MUCOUS THREADS #/AREA URNS LPF: PRESENT /LPF
NEUTROPHILS # BLD AUTO: 8.1 10E3/UL (ref 1.6–8.3)
NEUTROPHILS NFR BLD AUTO: 71 %
NITRATE UR QL: NEGATIVE
NRBC # BLD AUTO: 0 10E3/UL
NRBC BLD AUTO-RTO: 0 /100
PH UR STRIP: 5.5 [PH] (ref 5–7)
PLATELET # BLD AUTO: 242 10E3/UL (ref 150–450)
POTASSIUM BLD-SCNC: 3.6 MMOL/L (ref 3.5–5)
RBC # BLD AUTO: 4.65 10E6/UL (ref 3.8–5.2)
RBC URINE: 1 /HPF
SARS-COV-2 RNA RESP QL NAA+PROBE: NEGATIVE
SODIUM SERPL-SCNC: 140 MMOL/L (ref 136–145)
SP GR UR STRIP: 1.02 (ref 1–1.03)
SQUAMOUS EPITHELIAL: 2 /HPF
UROBILINOGEN UR STRIP-MCNC: <2 MG/DL
WBC # BLD AUTO: 11.4 10E3/UL (ref 4–11)
WBC URINE: 1 /HPF

## 2021-08-22 PROCEDURE — 250N000013 HC RX MED GY IP 250 OP 250 PS 637: Performed by: EMERGENCY MEDICINE

## 2021-08-22 PROCEDURE — C9803 HOPD COVID-19 SPEC COLLECT: HCPCS

## 2021-08-22 PROCEDURE — 81001 URINALYSIS AUTO W/SCOPE: CPT | Performed by: EMERGENCY MEDICINE

## 2021-08-22 PROCEDURE — 76830 TRANSVAGINAL US NON-OB: CPT

## 2021-08-22 PROCEDURE — 71046 X-RAY EXAM CHEST 2 VIEWS: CPT

## 2021-08-22 PROCEDURE — 96375 TX/PRO/DX INJ NEW DRUG ADDON: CPT

## 2021-08-22 PROCEDURE — 99285 EMERGENCY DEPT VISIT HI MDM: CPT | Mod: 25

## 2021-08-22 PROCEDURE — 87635 SARS-COV-2 COVID-19 AMP PRB: CPT | Performed by: EMERGENCY MEDICINE

## 2021-08-22 PROCEDURE — 36415 COLL VENOUS BLD VENIPUNCTURE: CPT | Performed by: EMERGENCY MEDICINE

## 2021-08-22 PROCEDURE — 96361 HYDRATE IV INFUSION ADD-ON: CPT

## 2021-08-22 PROCEDURE — 82374 ASSAY BLOOD CARBON DIOXIDE: CPT | Performed by: EMERGENCY MEDICINE

## 2021-08-22 PROCEDURE — 85025 COMPLETE CBC W/AUTO DIFF WBC: CPT | Performed by: EMERGENCY MEDICINE

## 2021-08-22 PROCEDURE — 74176 CT ABD & PELVIS W/O CONTRAST: CPT

## 2021-08-22 PROCEDURE — 84703 CHORIONIC GONADOTROPIN ASSAY: CPT | Performed by: EMERGENCY MEDICINE

## 2021-08-22 PROCEDURE — 258N000003 HC RX IP 258 OP 636: Performed by: EMERGENCY MEDICINE

## 2021-08-22 PROCEDURE — 96374 THER/PROPH/DIAG INJ IV PUSH: CPT

## 2021-08-22 PROCEDURE — 250N000011 HC RX IP 250 OP 636: Performed by: EMERGENCY MEDICINE

## 2021-08-22 RX ORDER — OXYCODONE AND ACETAMINOPHEN 5; 325 MG/1; MG/1
1 TABLET ORAL ONCE
Status: COMPLETED | OUTPATIENT
Start: 2021-08-22 | End: 2021-08-22

## 2021-08-22 RX ORDER — MORPHINE SULFATE 4 MG/ML
4 INJECTION, SOLUTION INTRAMUSCULAR; INTRAVENOUS EVERY 30 MIN PRN
Status: DISCONTINUED | OUTPATIENT
Start: 2021-08-22 | End: 2021-08-22 | Stop reason: HOSPADM

## 2021-08-22 RX ORDER — ONDANSETRON 2 MG/ML
4 INJECTION INTRAMUSCULAR; INTRAVENOUS ONCE
Status: COMPLETED | OUTPATIENT
Start: 2021-08-22 | End: 2021-08-22

## 2021-08-22 RX ORDER — OXYCODONE AND ACETAMINOPHEN 5; 325 MG/1; MG/1
1 TABLET ORAL EVERY 6 HOURS PRN
Qty: 12 TABLET | Refills: 0 | Status: SHIPPED | OUTPATIENT
Start: 2021-08-22 | End: 2021-08-27

## 2021-08-22 RX ADMIN — OXYCODONE HYDROCHLORIDE AND ACETAMINOPHEN 1 TABLET: 5; 325 TABLET ORAL at 13:58

## 2021-08-22 RX ADMIN — MORPHINE SULFATE 4 MG: 4 INJECTION, SOLUTION INTRAMUSCULAR; INTRAVENOUS at 14:48

## 2021-08-22 RX ADMIN — ONDANSETRON 4 MG: 2 INJECTION INTRAMUSCULAR; INTRAVENOUS at 14:47

## 2021-08-22 RX ADMIN — SODIUM CHLORIDE 1000 ML: 9 INJECTION, SOLUTION INTRAVENOUS at 14:47

## 2021-08-22 ASSESSMENT — ENCOUNTER SYMPTOMS
ROS GI COMMENTS: POSITIVE FOR GROIN PAIN.
DIARRHEA: 0
FEVER: 1
CONSTIPATION: 0
VOMITING: 0
NAUSEA: 0
ABDOMINAL PAIN: 0

## 2021-08-22 NOTE — DISCHARGE INSTRUCTIONS
Ice or heat off-and-on to sore areas can help with pain.  Over-the-counter Tylenol or ibuprofen every 6 hours can help with pain.  1 Percocet every 6 hours instead only if needed for stronger pain.  Do not drive with this.  Have someone drive you home today.  Follow-up with your doctor and/or GYN doctor this week.
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
## 2021-08-23 ENCOUNTER — OFFICE VISIT (OUTPATIENT)
Dept: FAMILY MEDICINE | Facility: CLINIC | Age: 41
End: 2021-08-23
Payer: COMMERCIAL

## 2021-08-23 VITALS
HEIGHT: 67 IN | RESPIRATION RATE: 16 BRPM | TEMPERATURE: 98.4 F | HEART RATE: 77 BPM | DIASTOLIC BLOOD PRESSURE: 88 MMHG | WEIGHT: 223.4 LBS | BODY MASS INDEX: 35.06 KG/M2 | SYSTOLIC BLOOD PRESSURE: 136 MMHG | OXYGEN SATURATION: 100 %

## 2021-08-23 DIAGNOSIS — Z12.31 VISIT FOR SCREENING MAMMOGRAM: ICD-10-CM

## 2021-08-23 DIAGNOSIS — R10.32 LEFT GROIN PAIN: ICD-10-CM

## 2021-08-23 DIAGNOSIS — M79.605 PAIN OF LEFT LOWER EXTREMITY: ICD-10-CM

## 2021-08-23 DIAGNOSIS — M25.552 HIP PAIN, LEFT: ICD-10-CM

## 2021-08-23 DIAGNOSIS — N64.3 GALACTORRHEA IN FEMALE: ICD-10-CM

## 2021-08-23 DIAGNOSIS — Z00.00 ROUTINE GENERAL MEDICAL EXAMINATION AT A HEALTH CARE FACILITY: Primary | ICD-10-CM

## 2021-08-23 DIAGNOSIS — D72.829 LEUKOCYTOSIS, UNSPECIFIED TYPE: ICD-10-CM

## 2021-08-23 LAB
ERYTHROCYTE [DISTWIDTH] IN BLOOD BY AUTOMATED COUNT: 15.4 % (ref 10–15)
HCT VFR BLD AUTO: 41.3 % (ref 35–47)
HGB BLD-MCNC: 12.8 G/DL (ref 11.7–15.7)
MCH RBC QN AUTO: 26.3 PG (ref 26.5–33)
MCHC RBC AUTO-ENTMCNC: 31 G/DL (ref 31.5–36.5)
MCV RBC AUTO: 85 FL (ref 78–100)
PLATELET # BLD AUTO: 245 10E3/UL (ref 150–450)
RBC # BLD AUTO: 4.87 10E6/UL (ref 3.8–5.2)
WBC # BLD AUTO: 9.5 10E3/UL (ref 4–11)

## 2021-08-23 PROCEDURE — 99213 OFFICE O/P EST LOW 20 MIN: CPT | Mod: 25 | Performed by: NURSE PRACTITIONER

## 2021-08-23 PROCEDURE — 84443 ASSAY THYROID STIM HORMONE: CPT | Performed by: NURSE PRACTITIONER

## 2021-08-23 PROCEDURE — 85027 COMPLETE CBC AUTOMATED: CPT | Performed by: NURSE PRACTITIONER

## 2021-08-23 PROCEDURE — 36415 COLL VENOUS BLD VENIPUNCTURE: CPT | Performed by: NURSE PRACTITIONER

## 2021-08-23 PROCEDURE — 84146 ASSAY OF PROLACTIN: CPT | Performed by: NURSE PRACTITIONER

## 2021-08-23 PROCEDURE — 99396 PREV VISIT EST AGE 40-64: CPT | Performed by: NURSE PRACTITIONER

## 2021-08-23 ASSESSMENT — ENCOUNTER SYMPTOMS
ALLERGIC/IMMUNOLOGIC NEGATIVE: 1
CONSTITUTIONAL NEGATIVE: 1
HEMATOLOGIC/LYMPHATIC NEGATIVE: 1
ABDOMINAL PAIN: 1
NEUROLOGICAL NEGATIVE: 1
EYES NEGATIVE: 1
CARDIOVASCULAR NEGATIVE: 1
RESPIRATORY NEGATIVE: 1
PSYCHIATRIC NEGATIVE: 1
ENDOCRINE NEGATIVE: 1

## 2021-08-23 ASSESSMENT — PAIN SCALES - GENERAL: PAINLEVEL: MODERATE PAIN (5)

## 2021-08-23 ASSESSMENT — MIFFLIN-ST. JEOR: SCORE: 1715.97

## 2021-08-23 NOTE — LETTER
August 23, 2021      Chan Canales   [address] [place] [address]        To Whom It May Concern,     I saw Chan's wife Danyelle Canales today in the office.  Please allow Chan to leave work this week due to Danyelle's medical condition.    Sincerely,      Marsha Fernandez, JOCY, APRN, CNP

## 2021-08-23 NOTE — PATIENT INSTRUCTIONS
Preventive Health Recommendations  Female Ages 40 to 49    Yearly exam:     See your health care provider every year in order to  1. Review health changes.   2. Discuss preventive care.    3. Review your medicines if your doctor prescribed any.      Get a Pap test every three years (unless you have an abnormal result and your provider advises testing more often).      If you get Pap tests with HPV test, you only need to test every 5 years, unless you have an abnormal result. You do not need a Pap test if your uterus was removed (hysterectomy) and you have not had cancer.      You should be tested each year for STDs (sexually transmitted diseases), if you're at risk.     Ask your doctor if you should have a mammogram.      Have a colonoscopy (test for colon cancer) if someone in your family has had colon cancer or polyps before age 50.       Have a cholesterol test every 5 years.       Have a diabetes test (fasting glucose) after age 45. If you are at risk for diabetes, you should have this test every 3 years.    Shots: Get a flu shot each year. Get a tetanus shot every 10 years.     Nutrition:     Eat at least 5 servings of fruits and vegetables each day.    Eat whole-grain bread, whole-wheat pasta and brown rice instead of white grains and rice.    Get adequate Calcium and Vitamin D.      Lifestyle    Exercise at least 150 minutes a week (an average of 30 minutes a day, 5 days a week). This will help you control your weight and prevent disease.    Limit alcohol to one drink per day.    No smoking.     Wear sunscreen to prevent skin cancer.    See your dentist every six months for an exam and cleaning.  Patient Education     Self-Care for Strains and Sprains  Most minor strains and sprains can be treated with self-care. Recovering from a strain or sprain may take 6 to 8 weeks. Your self-care goal is to reduce pain and immobilize the injury to speed healing.  Support the injured area  Wrapping the injured area  provides support for short, necessary activities. Be careful not to wrap the area too tightly. This could cut off the blood supply.  Support a wrist, elbow, or shoulder with a sling.  Wrap an ankle or knee with an elastic bandage.  Tape a finger or toe to the one next to it.  Use cold and heat  Cold reduces swelling. Both cold and heat reduce pain. Heat should not be used in the initial treatment of the injury. When using cold or heat, always place a thin towel between the pack and your skin.  Apply ice or a cold pack 10 to 15 minutes every hour you re awake for the first 2 days.  After the swelling goes down, use cold or heat to control pain. Don t use heat late in the day, since it can cause swelling when you re not active.  Rest and elevate  Rest and elevation help your injury heal faster.  Raise the injured area above your heart level.  Keep the injured area from moving.  Limit the use of the joint or limb.  Use medicine  Aspirin reduces pain and swelling. (Note: Don t give aspirin to a child 18 or younger unless prescribed by the doctor.)  Non-steroidal anti-inflammatory medicines, such as ibuprofen, may reduce pain and swelling, as well. Ask your healthcare provider for advice.    When to call your healthcare provider  Call your healthcare provider if:  The injured joint won t move, or bones make a grating sound when they move  You can t put weight on the injured area, even after 24 hours  The injured body part is cold, blue, tingling, or numb  The joint or limb appears bent or crooked.  Pain increases or doesn t improve in 4 days  When pressing along the injured area, you notice a spot that is especially painful  [employer] last reviewed this educational content on 5/1/2018 2000-2021 [employer]. All rights reserved. This information is not intended as a substitute for professional medical care. Always follow your healthcare professional's instructions.

## 2021-08-23 NOTE — PROGRESS NOTES
SUBJECTIVE:   CC: Danyelle Canales is an 41 year old woman who presents for preventive health visit.     Danyelle is a 41 year old female, with a past medical history of anxiety and depression, presenting with L groin and leg pain that started on Friday (3 days ago). Patient complains of cramping pain that aggravates with ambulation. She was recently seen at [facility] ED for the same chief complaint. She was instructed to take percocet, tylenol, and advil. She denies fever, chills, numbness and tingling.  She is here today with crutches because the pain worsens with weight bearing.    Patient has been advised of split billing requirements and indicates understanding: Yes  Healthy Habits:    Getting at least 3 servings of Calcium per day:  Yes    Bi-annual eye exam:  NO    Dental care twice a year:  Yes    Sleep apnea or symptoms of sleep apnea:  None    Diet:  Other (weight watchers )    Frequency of exercise:  2-3 days/week    Duration of exercise:  Less than 15 minutes    Taking medications regularly:  Yes    Barriers to taking medications:  Not applicable    Medication side effects:  Not applicable    PHQ-2 Total Score:    Additional concerns today:  Yes (form for ins, and ER visit )    Left groin pain:  Quality:  Feels like a pull.  When she stands to walk the pain is severe.  Location:  Left groin radiates down the upper left leg.  Onset Friday night (3 days ago).  No known injuries.  She has a young child she cares for.    ED/UC Followup:    Facility:  [facility]   Date of visit: 8/22/2021  Reason for visit: hip and groin pain   Current Status: medications help with pain,    FINAL IMPRESSION:  1.  Acute pelvic pain.  2.  Small umbilical hernia.     She first presented to Urgent Care and x-ray of pelvis and bilateral hips done that was normal.  She was sent from Urgent Care to the ED.  In the ED she had a negative CHEST X-RAY, Abdominal/Pelvic CT scan negative except for small umbilical hernia containing  fat, and Pelvic U/S negative.  Patient was discharged home with prescriptions for pain medications.    Today's PHQ-2 Score:   PHQ-2 ( 1999 [employer]) 9/18/2020   Q1: Little interest or pleasure in doing things 0   Q2: Feeling down, depressed or hopeless 2   PHQ-2 Score 2   Q1: Little interest or pleasure in doing things Not at all   Q2: Feeling down, depressed or hopeless More than half the days   PHQ-2 Score 2       Abuse: Current or Past (Physical, Sexual or Emotional) - NO  Do you feel safe in your environment? YES    Have you ever done Advance Care Planning? (For example, a Health Directive, POLST, or a discussion with a medical provider or your loved ones about your wishes): No, advance care planning information given to patient to review.  Patient declined advance care planning discussion at this time.    Social History     Tobacco Use     Smoking status: Never Smoker     Smokeless tobacco: Never Used   Substance Use Topics     Alcohol use: Not Currently     Comment: 1-2 a month     If you drink alcohol do you typically have >3 drinks per day or >7 drinks per week? No    No flowsheet data found.    Reviewed orders with patient.  Reviewed health maintenance and updated orders accordingly - Yes  BP Readings from Last 3 Encounters:   08/23/21 136/88   08/22/21 (!) 152/99   07/21/21 122/84    Wt Readings from Last 3 Encounters:   08/23/21 101.3 kg (223 lb 6.4 oz)   08/22/21 101.2 kg (223 lb)   07/19/21 101.6 kg (224 lb)                  Patient Active Problem List   Diagnosis     Persistent insomnia     allergic DERMATITIS NOS     [facility] SPRAIN THORACIC REGION     [facility] SPRAIN OF NECK     Migraine headache     PMDD (premenstrual dysphoric disorder)     Trichotillomania     CARDIOVASCULAR SCREENING; LDL GOAL LESS THAN 160     History of ovarian cyst     Generalized anxiety disorder     Chronic pain syndrome     ASD (atrial septal defect)     Chronic pain     Esophageal reflux     Non morbid obesity, unspecified obesity  type     Benign hypermobility syndrome     Myalgia and myositis, unspecified     Myofascial pain     Recurrent major depression in partial remission (H)     Right inguinal hernia     S/P right inguinal hernia repair, follow-up exam     Morbid obesity (H)     Past Surgical History:   Procedure Laterality Date     BIOPSY       C REPR ASD OSTIUM PREMUM      Dr. Silverio     COLONOSCOPY       HERNIA REPAIR  Feb 2021     IR CERVICAL EPIDURAL STEROID INJECTION  9/7/2012     IR CERVICAL EPIDURAL STEROID INJECTION  10/2/2012       Social History     Tobacco Use     Smoking status: Never Smoker     Smokeless tobacco: Never Used   Substance Use Topics     Alcohol use: Not Currently     Comment: 1-2 a month     Family History   Problem Relation Age of Onset     C.A.D. Mother         heart surgery to close hole in heart     Cardiovascular Mother      Hypertension Mother      Depression Mother      Anxiety Disorder Mother      Heart Failure Mother      Cerebrovascular Disease Father      Hypertension Father          Current Outpatient Medications   Medication Sig Dispense Refill     buPROPion (WELLBUTRIN XL) 300 MG 24 hr tablet Take 1 tablet (300 mg) by mouth every morning 90 tablet 1     desvenlafaxine (PRISTIQ) 100 MG 24 hr tablet Take 1 tablet (100 mg) by mouth daily 90 tablet 1            lamoTRIgine (LAMICTAL) 200 MG tablet Take 1 tablet (200 mg) by mouth daily 90 tablet 1     LORazepam (ATIVAN) 0.5 MG tablet Take 0.5 mg at bedtime and 0.5 mg daily as needed for anxiety. 60 tablet 2     nabumetone (RELAFEN) 500 MG tablet Take 1-2 tablets (500-1,000 mg) by mouth 2 times daily as needed for moderate pain Take with food. 30 tablet 3     norethindrone-ethinyl estradiol (MICROGESTIN 1/20) 1-20 MG-MCG tablet Take 1 tablet by mouth daily 84 tablet 3     oxyCODONE-acetaminophen (PERCOCET) 5-325 MG tablet Take 1 tablet by mouth every 6 hours as needed for moderate to severe pain 12 tablet 0     tiZANidine (ZANAFLEX) 2 MG tablet Take  "1-2 tablets (2-4 mg) by mouth At Bedtime 42 tablet 1     Allergies   Allergen Reactions     Artificial Sweetner (Informational Only) [Artificial Sweetner (Informational Only) ]      Chocolate Flavor Other (See Comments)       Breast Cancer Screening:  Any new diagnosis of family breast, ovarian, or bowel cancer?     FHS-7: No flowsheet data found.    Mammogram Screening - Offered annual screening and updated Health Maintenance for mutual plan based on risk factor consideration    Pertinent mammograms are reviewed under the imaging tab.    History of abnormal Pap smear: NO - age 30-65 PAP every 5 years with negative HPV co-testing recommended  PAP / HPV Latest Ref Rng & Units 8/20/2018 9/16/2015 9/26/2012   PAP (Historical) - NIL NIL NIL   HPV16 NEG:Negative Negative - -   HPV18 NEG:Negative Negative - -   HRHPV NEG:Negative Negative - -     Reviewed and updated as needed this visit by clinical staff   Allergies  Meds   Med Hx  Surg Hx  Fam Hx          Reviewed and updated as needed this visit by Provider    Meds   Med Hx  Surg Hx  Fam Hx           Review of Systems   Constitutional: Negative.    HENT: Negative.    Eyes: Negative.    Respiratory: Negative.    Cardiovascular: Negative.    Gastrointestinal: Positive for abdominal pain.        Tenderness on right upper quadrant   Endocrine: Negative.    Breasts:  Positive for discharge.   Genitourinary: Negative.    Musculoskeletal:        L groin pain   Skin: Negative.    Allergic/Immunologic: Negative.    Neurological: Negative.    Hematological: Negative.    Psychiatric/Behavioral: Negative.         OBJECTIVE:   /88 (BP Location: Right arm)   Pulse 77   Temp 98.4  F (36.9  C) (Oral)   Resp 16   Ht 1.702 m (5' 7\")   Wt 101.3 kg (223 lb 6.4 oz)   SpO2 100%   BMI 34.99 kg/m    Physical Exam  GENERAL: healthy, alert and no distress  EYES: Eyes grossly normal to inspection, PERRL and conjunctivae and sclerae normal  HENT: ear canals and TM's normal, " nose and mouth without ulcers or lesions  NECK: no adenopathy, no asymmetry, masses, or scars and thyroid normal to palpation  RESP: lungs clear to auscultation - no rales, rhonchi or wheezes  BREAST: normal without masses, tenderness or nipple discharge and no palpable axillary masses or adenopathy  CV: regular rate and rhythm, normal S1 S2, no S3 or S4, no murmur, click or rub, no peripheral edema and peripheral pulses strong  ABDOMEN: soft, nontender, no hepatosplenomegaly, no masses and bowel sounds normal  MS: Pain in L groin with flexion of leg.  Patient able to squat MCC down  SKIN: no suspicious lesions or rashes  NEURO: Normal strength and tone, mentation intact and speech normal  PSYCH: mentation appears normal, affect normal/bright    Diagnostic Test Results:  Labs reviewed in Epic  Results for orders placed or performed in visit on 08/23/21 (from the past 24 hour(s))   CBC with platelets   Result Value Ref Range    WBC Count 9.5 4.0 - 11.0 10e3/uL    RBC Count 4.87 3.80 - 5.20 10e6/uL    Hemoglobin 12.8 11.7 - 15.7 g/dL    Hematocrit 41.3 35.0 - 47.0 %    MCV 85 78 - 100 fL    MCH 26.3 (L) 26.5 - 33.0 pg    MCHC 31.0 (L) 31.5 - 36.5 g/dL    RDW 15.4 (H) 10.0 - 15.0 %    Platelet Count 245 150 - 450 10e3/uL       ASSESSMENT/PLAN:   Danyelle was seen today for physical, pain and forms.    Diagnoses and all orders for this visit:    Routine general medical examination at a health care facility    Left groin pain  -     DILCIA PT and Hand Referral; Future    Pain of left lower extremity  -     [facility]ILCIA PT and Hand Referral; Future    Hip pain, left  -     [facility]ILCIA PT and Hand Referral; Future    Galactorrhea in female  -     Prolactin; Future  -     TSH with free T4 reflex; Future  -     Prolactin  -     TSH with free T4 reflex    Visit for screening mammogram  -     MA Screen Bilateral w/Saul; Future    Leukocytosis, unspecified type  -     CBC with platelets; Future  -     CBC with platelets    Other orders  -      "REVIEW OF HEALTH MAINTENANCE PROTOCOL ORDERS        Return in about 1 week (around 8/30/2021) for Recheck/Follow-up of hip/groin/leg pain.       Patient has been advised of split billing requirements and indicates understanding: Yes  COUNSELING:  Reviewed preventive health counseling, as reflected in patient instructions       Regular exercise       Healthy diet/nutrition    Estimated body mass index is 34.99 kg/m  as calculated from the following:    Height as of this encounter: 1.702 m (5' 7\").    Weight as of this encounter: 101.3 kg (223 lb 6.4 oz).    Weight management plan: Discussed healthy diet and exercise guidelines    She reports that she has never smoked. She has never used smokeless tobacco.      Counseling Resources:  ATP IV Guidelines  Pooled Cohorts Equation Calculator  Breast Cancer Risk Calculator  BRCA-Related Cancer Risk Assessment: FHS-7 Tool  FRAX Risk Assessment  ICSI Preventive Guidelines  Dietary Guidelines for Americans, 2010  USDA's MyPlate  ASA Prophylaxis  Lung CA Screening    Bro Sinha, NP Student acting as a scribe for Marsha Fernandez DNP, APRN, CNP    The above patient was seen and evaluated with the NP student who acted as my scribe for the above note.    Marsha Fernandez DNP, APRN, CNP     [facility]  "

## 2021-08-23 NOTE — LETTER
August 23, 2021      Danyelle HIRSCH Parker  [address] [place] [address]        To Whom It May Concern,     Danyelle was seen today 8/23/2021 by myself.  I recommend Danyelle works remotely at home from work this week.  She will follow up in 1 week.      Sincerely,       Marsha Fernandez, DNP, APRN, CNP

## 2021-08-23 NOTE — Clinical Note
Please call patient to schedule follow up visit for left groin/leg pain Monday 9/30.  Okay to use one of the virtual slots.  RODDY

## 2021-08-24 ENCOUNTER — TELEPHONE (OUTPATIENT)
Dept: FAMILY MEDICINE | Facility: CLINIC | Age: 41
End: 2021-08-24

## 2021-08-24 LAB
PROLACTIN SERPL-MCNC: 8 UG/L (ref 3–27)
TSH SERPL DL<=0.005 MIU/L-ACNC: 3.91 MU/L (ref 0.4–4)

## 2021-08-24 NOTE — TELEPHONE ENCOUNTER
Called patient but unable to leave message. Writer will try again later.     Noelle Martines/  [place]

## 2021-08-24 NOTE — TELEPHONE ENCOUNTER
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

## 2021-08-24 NOTE — TELEPHONE ENCOUNTER
Quarshie, Marsha L, NP  [facility] Team [facility]  Please call patient to schedule follow up visit for left groin/leg pain Monday 9/30.  Okay to use one of the virtual slots.  RODDY

## 2021-08-31 ENCOUNTER — MYC MEDICAL ADVICE (OUTPATIENT)
Dept: FAMILY MEDICINE | Facility: CLINIC | Age: 41
End: 2021-08-31

## 2021-08-31 NOTE — TELEPHONE ENCOUNTER
Patient is scheduled today at 1 pm but I will be off site doing virtual visits this afternoon.    Can this patient be rescheduled?  She can be added today at 10:40 or Wednesday/Friday ok to add her in a 40 minute slot.    Marsha Fernandez, DNP, APRN, CNP

## 2021-08-31 NOTE — TELEPHONE ENCOUNTER
Patient states her son is not feeling well, so she will cancel today's appointment.  She has a PT appointment coming up and feels her leg is doing better.  Will call if it gets worse.     Noelle Martines/  [place]

## 2021-09-13 ENCOUNTER — MYC MEDICAL ADVICE (OUTPATIENT)
Dept: FAMILY MEDICINE | Facility: CLINIC | Age: 41
End: 2021-09-13

## 2021-09-14 ENCOUNTER — VIRTUAL VISIT (OUTPATIENT)
Dept: FAMILY MEDICINE | Facility: CLINIC | Age: 41
End: 2021-09-14
Payer: COMMERCIAL

## 2021-09-14 DIAGNOSIS — J18.9 ATYPICAL PNEUMONIA: ICD-10-CM

## 2021-09-14 DIAGNOSIS — R05.9 COUGH: Primary | ICD-10-CM

## 2021-09-14 PROCEDURE — 99213 OFFICE O/P EST LOW 20 MIN: CPT | Mod: GT | Performed by: NURSE PRACTITIONER

## 2021-09-14 RX ORDER — CODEINE PHOSPHATE AND GUAIFENESIN 10; 100 MG/5ML; MG/5ML
1 SOLUTION ORAL
Qty: 118 ML | Refills: 0 | Status: SHIPPED | OUTPATIENT
Start: 2021-09-14 | End: 2021-10-29

## 2021-09-14 RX ORDER — DOXYCYCLINE HYCLATE 100 MG
100 TABLET ORAL 2 TIMES DAILY
COMMUNITY
Start: 2021-09-09 | End: 2021-09-16

## 2021-09-14 NOTE — PROGRESS NOTES
Danyelle is a 41 year old who is being evaluated via a billable video visit.      How would you like to obtain your AVS? MyChart  If the video visit is dropped, the invitation should be resent by: Send to e-mail at: [email or web address]  Will anyone else be joining your video visit? No      Video Start Time: 3:35 pm    Assessment & Plan     Cough    - RSV rapid antigen; Future  - guaiFENesin-codeine (ROBITUSSIN AC) 100-10 MG/5ML solution; Take 5-10 mLs by mouth nightly as needed for cough May repeat once during the night 4 hours later as needed    Atypical pneumonia  Chest x-ray was negative, however she has been treated for atypical pneumonia.  Given ongoing cough, will test for RSV.      Review of external notes as documented elsewhere in note             Return in about 1 week (around 9/21/2021).    Marsha Fernandez NP  [facility]   Danyelle is a 41 year old who presents for the following health issues     HPI     Cough  (she had 3 negative covid tests- on 8/11/21, 8/22/21, and 9/9/21)  Acute illness concerns: cough   Onset/Duration: 8/09/2021- 5 weeks ago  Cough worsening  Symptoms:  Fever: YES- mild fever in August 2021 but none now  Chills/Sweats: no  Headache (location?): no  Sinus Pressure: YES- in early August 2021 but none now  Conjunctivitis:  no  Ear Pain: YES: both  Rhinorrhea: YES  Congestion: YES  Sore Throat: no  Cough: YES-non-productive  Wheeze: no  Decreased Appetite: no  Nausea: no  Vomiting: only from coughing   Diarrhea: no  Dysuria/Freq.: no  Dysuria or Hematuria: no  Fatigue/Achiness: YES  Sick/Strep Exposure: YES- son   Therapies tried and outcome:  3antibiotics, tessalon cough capsule, Albuterol inhaler, and steroids     She first was treated for a sinus infection 8/11/21 in the ED- was given Augmentin.  She went to [facility] 8/[facility]/21 for cough and was suspected to have atypical pneumonia although a chest x-ray was not done at that time.  Was  treated with Azithromycin, Albuterol, and Benzonatate.    I had seen her in the office on 8/23/21 for a physical and she was not complaining of a cough at that visit.  She went to [facility] again on 9/9/21 for persistent cough and CHEST X-RAY at that time was negative, however Dr. Newman thought it was appropriate to treat as a pneumonia at that time- treated with Doxycycline x7 days, Prednisone x5 days, and Robitussin AC.    Today patient states that none of the above treatments have given her any lasting improvement.    Review of Systems   Constitutional, HEENT, cardiovascular, pulmonary, gi and gu systems are negative, except as otherwise noted.      Objective           Vitals:  No vitals were obtained today due to virtual visit.    Physical Exam   GENERAL: Healthy, alert and no distress  EYES: Eyes grossly normal to inspection.  No discharge or erythema, or obvious scleral/conjunctival abnormalities.  RESP: No audible wheeze, or visible cyanosis.  No visible retractions or increased work of breathing.  Intermittent dry cough.  SKIN: Visible skin clear. No significant rash, abnormal pigmentation or lesions.  NEURO: Cranial nerves grossly intact.  Mentation and speech appropriate for age.  PSYCH: Mentation appears normal, affect normal/bright, judgement and insight intact, normal speech and appearance well-groomed.            Video-Visit Details    Type of service:  Video Visit    Video End Time:  3:44 PM    Originating Location (pt. Location): Home    Distant Location (provider location):  [facility]     Platform used for Video Visit: [employer]

## 2021-09-15 ENCOUNTER — MYC MEDICAL ADVICE (OUTPATIENT)
Dept: FAMILY MEDICINE | Facility: CLINIC | Age: 41
End: 2021-09-15

## 2021-09-15 NOTE — TELEPHONE ENCOUNTER
Routing My Chart message to Marsha Fernandez    Patient questioning dose of Robitussin AC    ER dosage was for 10 ml every 4 hours PRN.    Provider sent 5 ml before bed and repeat once during the night    Tony Hallman RN, BSN, PHN  [facility]

## 2021-09-15 NOTE — TELEPHONE ENCOUNTER
Please let patient know that is fine to take the 10 mL (if the 5 mL dose is not effective).  If she needs to repeat the dose during the night, it should be at least 4 hours after her prior dosage.    I had also recommended she gets tested for RSV since her cough has not improved with treatments.  I believe she would need to get on the ancillary schedule to get this done- can you help arrange?    Marsha Fernandez, DNP, APRN, CNP

## 2021-09-16 ENCOUNTER — LAB (OUTPATIENT)
Dept: FAMILY MEDICINE | Facility: CLINIC | Age: 41
End: 2021-09-16
Payer: COMMERCIAL

## 2021-09-16 DIAGNOSIS — J18.9 ATYPICAL PNEUMONIA: ICD-10-CM

## 2021-09-16 LAB — RSV AG SPEC QL: NEGATIVE

## 2021-09-16 PROCEDURE — 87807 RSV ASSAY W/OPTIC: CPT

## 2021-09-16 NOTE — TELEPHONE ENCOUNTER
Patient called clinic to scheduled f/u visit with PCP and curbside testing.     RN scheduled f/u appt. Warm transfer to central scheduling for curbside.     Tram Prescott RN, BSN, PHN  [facility]: [place]

## 2021-10-06 NOTE — PROGRESS NOTES
Outpatient Physical Therapy Discharge Note     Patient: Danyelle Canales  : 1980    Beginning/End Dates of Reporting Period:  21 to 21    Referring Provider: Cathy Osorio CNP    Therapy Diagnosis: Cervical pain     Client Self Report: Yesterday arm was a little worse, otherwise during the week about the same. Sleeping position does impact the pain. Didn't think traction made a whole lot of change last time. still a lot of pain into the arm.   Goals:  Goal Identifier arm pain   Goal Description decreased arm pain so she can play with her son on the floor with decreaed painb 0-2/10    Target Date 21   Date Met      Progress (detail required for progress note):       Goal Identifier lifting   Goal Description Able to lift son, >25#, as strength improves and pain levels decrease to 0-2/10   Target Date 21   Date Met      Progress (detail required for progress note):       Goal Identifier neck motion   Goal Description Able to look down such as to read a phone for 15+ minutes wtih pain 0-2/10    Target Date 21   Date Met      Progress (detail required for progress note):       Goal Identifier     Goal Description     Target Date     Date Met      Progress (detail required for progress note):       Goal Identifier     Goal Description     Target Date     Date Met      Progress (detail required for progress note):       Goal Identifier     Goal Description     Target Date     Date Met      Progress (detail required for progress note):       Goal Identifier     Goal Description     Target Date     Date Met      Progress (detail required for progress note):       Goal Identifier     Goal Description     Target Date     Date Met      Progress (detail required for progress note):             Plan:  Discharge from therapy.    Discharge:    Reason for Discharge: Pt has not been seen in the clinic in over 30 days.  She has been dealing with other medical issues.      Equipment Issued:      Discharge Plan: Patient to continue home program.

## 2021-10-06 NOTE — ADDENDUM NOTE
Encounter addended by: Renate Walton, PT on: 10/6/2021 12:27 PM   Actions taken: Episode resolved, Clinical Note Signed

## 2021-10-23 ENCOUNTER — HEALTH MAINTENANCE LETTER (OUTPATIENT)
Age: 41
End: 2021-10-23

## 2021-10-29 ENCOUNTER — OFFICE VISIT (OUTPATIENT)
Dept: FAMILY MEDICINE | Facility: CLINIC | Age: 41
End: 2021-10-29
Payer: COMMERCIAL

## 2021-10-29 VITALS
DIASTOLIC BLOOD PRESSURE: 88 MMHG | RESPIRATION RATE: 15 BRPM | WEIGHT: 221 LBS | OXYGEN SATURATION: 99 % | HEART RATE: 75 BPM | HEIGHT: 67 IN | SYSTOLIC BLOOD PRESSURE: 136 MMHG | BODY MASS INDEX: 34.69 KG/M2 | TEMPERATURE: 99.2 F

## 2021-10-29 DIAGNOSIS — Z23 NEED FOR INFLUENZA VACCINATION: ICD-10-CM

## 2021-10-29 DIAGNOSIS — B37.31 YEAST INFECTION OF THE VAGINA: ICD-10-CM

## 2021-10-29 DIAGNOSIS — N89.8 VAGINAL ITCHING: Primary | ICD-10-CM

## 2021-10-29 LAB
CLUE CELLS: ABNORMAL
TRICHOMONAS, WET PREP: ABNORMAL
WBC'S/HIGH POWER FIELD, WET PREP: ABNORMAL
YEAST, WET PREP: PRESENT

## 2021-10-29 PROCEDURE — 99213 OFFICE O/P EST LOW 20 MIN: CPT | Mod: 25 | Performed by: NURSE PRACTITIONER

## 2021-10-29 PROCEDURE — 90471 IMMUNIZATION ADMIN: CPT | Performed by: NURSE PRACTITIONER

## 2021-10-29 PROCEDURE — 90686 IIV4 VACC NO PRSV 0.5 ML IM: CPT | Performed by: NURSE PRACTITIONER

## 2021-10-29 PROCEDURE — 87210 SMEAR WET MOUNT SALINE/INK: CPT | Performed by: NURSE PRACTITIONER

## 2021-10-29 RX ORDER — FLUCONAZOLE 150 MG/1
150 TABLET ORAL ONCE
Qty: 1 TABLET | Refills: 0 | Status: SHIPPED | OUTPATIENT
Start: 2021-10-29 | End: 2021-10-29

## 2021-10-29 ASSESSMENT — ANXIETY QUESTIONNAIRES
1. FEELING NERVOUS, ANXIOUS, OR ON EDGE: SEVERAL DAYS
GAD7 TOTAL SCORE: 2
3. WORRYING TOO MUCH ABOUT DIFFERENT THINGS: NOT AT ALL
6. BECOMING EASILY ANNOYED OR IRRITABLE: NOT AT ALL
5. BEING SO RESTLESS THAT IT IS HARD TO SIT STILL: NOT AT ALL
2. NOT BEING ABLE TO STOP OR CONTROL WORRYING: SEVERAL DAYS
7. FEELING AFRAID AS IF SOMETHING AWFUL MIGHT HAPPEN: NOT AT ALL
IF YOU CHECKED OFF ANY PROBLEMS ON THIS QUESTIONNAIRE, HOW DIFFICULT HAVE THESE PROBLEMS MADE IT FOR YOU TO DO YOUR WORK, TAKE CARE OF THINGS AT HOME, OR GET ALONG WITH OTHER PEOPLE: NOT DIFFICULT AT ALL

## 2021-10-29 ASSESSMENT — PAIN SCALES - GENERAL: PAINLEVEL: NO PAIN (0)

## 2021-10-29 ASSESSMENT — PATIENT HEALTH QUESTIONNAIRE - PHQ9
5. POOR APPETITE OR OVEREATING: NOT AT ALL
SUM OF ALL RESPONSES TO PHQ QUESTIONS 1-9: 5

## 2021-10-29 ASSESSMENT — MIFFLIN-ST. JEOR: SCORE: 1705.08

## 2021-10-29 NOTE — PROGRESS NOTES
"  Assessment & Plan     Vaginal itching    - Wet prep - Clinic Collect    Yeast infection of the vagina    - Wet prep - Clinic Collect  - fluconazole (DIFLUCAN) 150 MG tablet; Take 1 tablet (150 mg) by mouth once for 1 dose    Need for influenza vaccination    - FLU VACCINE, 3 YRS +, IM (FLUZONE)                 Return in about 1 week (around 11/5/2021) for if symptoms worsen or fail to improve.    Marsha Fernandez NP  [facility] is a 41 year old who presents for the following health issues     HPI     Vaginal Symptoms  Onset/Duration: of and off since August 2021  Description:  Vaginal Discharge: none   Itching (Pruritis): YES  Burning sensation:  no  Odor: no  Accompanying Signs & Symptoms:  Urinary symptoms: no  Abdominal pain: no  Fever: no  History:   Sexually active: no  New Partner: no  Possibility of Pregnancy:  no  Recent antibiotic use: not since aug and sept. 2021  Previous vaginitis issues: YES- yeast infections with antibitocs   Precipitating or alleviating factors: None  Therapies tried and outcome: Monistat, in sept.       Review of Systems   Constitutional, HEENT, cardiovascular, pulmonary, gi and gu systems are negative, except as otherwise noted.      Objective    /88 (BP Location: Right arm)   Pulse 75   Temp 99.2  F (37.3  C) (Oral)   Resp 15   Ht 1.702 m (5' 7\")   Wt 100.2 kg (221 lb)   SpO2 99%   BMI 34.61 kg/m    Body mass index is 34.61 kg/m .  Physical Exam   GENERAL: healthy, alert, no distress and obese  ABDOMEN: soft, nontender   (female): normal female external genitalia, normal urethral meatus  and vaginal mucosa pink, moist, well rugated    Results for orders placed or performed in visit on 10/29/21   Wet prep - Clinic Collect     Status: Abnormal    Specimen: Vagina; Swab   Result Value Ref Range    Trichomonas Absent Absent    Yeast Present (A) Absent    Clue Cells Absent Absent    WBCs/high power field 1+ (A) None "

## 2021-10-30 ASSESSMENT — ANXIETY QUESTIONNAIRES: GAD7 TOTAL SCORE: 2

## 2021-11-10 DIAGNOSIS — Z30.41 ORAL CONTRACEPTIVE PILL SURVEILLANCE: Primary | ICD-10-CM

## 2021-11-10 RX ORDER — NORETHINDRONE ACETATE AND ETHINYL ESTRADIOL 1MG-20(21)
1 KIT ORAL DAILY
Qty: 84 TABLET | Refills: 0 | Status: SHIPPED | OUTPATIENT
Start: 2021-11-10 | End: 2021-11-17 | Stop reason: ALTCHOICE

## 2021-11-10 NOTE — TELEPHONE ENCOUNTER
Pharmacy sent refill request for Junel 1mg-20mcg tablet.  Pt due for annual exam 12/2021. Do you want to send refill? Patient reminded via MyChart to schedule an annual exam this December.  At last visit plan was to take progestin only pill until after BF. Med queued up for review.  Please advise.   Rosalia Worthington RN

## 2021-11-16 ENCOUNTER — VIRTUAL VISIT (OUTPATIENT)
Dept: PSYCHIATRY | Facility: CLINIC | Age: 41
End: 2021-11-16
Payer: COMMERCIAL

## 2021-11-16 ENCOUNTER — MYC MEDICAL ADVICE (OUTPATIENT)
Dept: OBGYN | Facility: CLINIC | Age: 41
End: 2021-11-16
Payer: COMMERCIAL

## 2021-11-16 VITALS — BODY MASS INDEX: 34.3 KG/M2 | WEIGHT: 219 LBS

## 2021-11-16 DIAGNOSIS — F33.40 RECURRENT MAJOR DEPRESSION IN REMISSION (H): Primary | ICD-10-CM

## 2021-11-16 DIAGNOSIS — Z30.41 ORAL CONTRACEPTIVE PILL SURVEILLANCE: Primary | ICD-10-CM

## 2021-11-16 DIAGNOSIS — F41.1 GENERALIZED ANXIETY DISORDER: ICD-10-CM

## 2021-11-16 PROCEDURE — 99215 OFFICE O/P EST HI 40 MIN: CPT | Mod: TEL | Performed by: PSYCHIATRY & NEUROLOGY

## 2021-11-16 RX ORDER — DESVENLAFAXINE 100 MG/1
100 TABLET, EXTENDED RELEASE ORAL DAILY
Qty: 90 TABLET | Refills: 1 | Status: SHIPPED | OUTPATIENT
Start: 2021-11-16 | End: 2022-10-07

## 2021-11-16 RX ORDER — BUPROPION HYDROCHLORIDE 300 MG/1
300 TABLET ORAL EVERY MORNING
Qty: 90 TABLET | Refills: 1 | Status: SHIPPED | OUTPATIENT
Start: 2021-11-16 | End: 2022-06-17

## 2021-11-16 RX ORDER — LORAZEPAM 0.5 MG/1
TABLET ORAL
Qty: 60 TABLET | Refills: 2 | Status: SHIPPED | OUTPATIENT
Start: 2021-11-16 | End: 2022-06-17

## 2021-11-16 RX ORDER — BUSPIRONE HYDROCHLORIDE 15 MG/1
TABLET ORAL
Qty: 60 TABLET | Refills: 1 | Status: SHIPPED | OUTPATIENT
Start: 2021-11-16 | End: 2021-12-28

## 2021-11-16 ASSESSMENT — ANXIETY QUESTIONNAIRES
GAD7 TOTAL SCORE: 10
2. NOT BEING ABLE TO STOP OR CONTROL WORRYING: MORE THAN HALF THE DAYS
7. FEELING AFRAID AS IF SOMETHING AWFUL MIGHT HAPPEN: SEVERAL DAYS
IF YOU CHECKED OFF ANY PROBLEMS ON THIS QUESTIONNAIRE, HOW DIFFICULT HAVE THESE PROBLEMS MADE IT FOR YOU TO DO YOUR WORK, TAKE CARE OF THINGS AT HOME, OR GET ALONG WITH OTHER PEOPLE: SOMEWHAT DIFFICULT
5. BEING SO RESTLESS THAT IT IS HARD TO SIT STILL: NOT AT ALL
1. FEELING NERVOUS, ANXIOUS, OR ON EDGE: MORE THAN HALF THE DAYS
6. BECOMING EASILY ANNOYED OR IRRITABLE: SEVERAL DAYS
3. WORRYING TOO MUCH ABOUT DIFFERENT THINGS: MORE THAN HALF THE DAYS

## 2021-11-16 ASSESSMENT — PATIENT HEALTH QUESTIONNAIRE - PHQ9: 5. POOR APPETITE OR OVEREATING: MORE THAN HALF THE DAYS

## 2021-11-16 NOTE — PROGRESS NOTES
Telemedicine Visit: The patient's condition can be safely assessed and treated via synchronous audio and visual telemedicine encounter.      Reason for Telemedicine Visit: Services only offered telehealth    Originating Site (Patient Location): Patient's home    Consent:  The patient/guardian has verbally consented to: the potential risks and benefits of telemedicine (video visit) versus in person care; bill my insurance or make self-payment for services provided; and responsibility for payment of non-covered services.     Mode of Communication:  Video Conference via [employer] [place]    As the provider I attest to compliance with applicable laws and regulations related to telemedicine.      Danyelle is a 41 year old who is being evaluated via a billable telephone visit.      What phone number would you like to be contacted at? [phone]   How would you like to obtain your AVS? MyChart           Outpatient Psychiatric Progress Note    Name: Danyelle Canales   : 1980                    Primary Care Provider: Katlyn Orozco DO   [place]ist[place]aji[place] [facility]    PHQ-9 scores:  PHQ-9 SCORE 10/29/2021 11/15/2021 2021   PHQ-9 Total Score - - -   PHQ-9 Total Score MyChart - 8 (Mild depression) -   PHQ-9 Total Score 5 - 6   Some encounter information is confidential and restricted. Go to Review Flowsheets activity to see all data.       NAJMA-7 scores:  NAJMA-7 SCORE 10/29/2021 11/15/2021 2021   Total Score - - -   Total Score - 5 (mild anxiety) -   Total Score 2 - 10   Some encounter information is confidential and restricted. Go to Review Flowsheets activity to see all data.       Patient Identification:  Danyelle is a 41 year old year old female  who presents for return visit with me.  Patient attended the session alone.     Interim History:  Danyelle is a continuous care patient.  She has not been seen since May 2021.  She reports that over the last couple of weeks, her mood has  "been more down, and rates it as 5 out of 10 with 10 being the best.  She denies any major stressors.    She reports that her anxiety is about \"normal.\"  She is sleeping pretty well.  Her mood is low, she reports decreased energy and some difficulty concentrating.    We reviewed several options for treatment.  She continues to connect with her therapist about once a month.  She has been having some problems with her hips, so is not able to do a lot of exercise.  We considered increasing bupropion or lamotrigine.  In the end, we agreed to a trial of buspirone as below.  Risks and benefits were reviewed.    Danyelle reports that she has been having pain in her neck and also hip pain that is so intense that she is unable to walk.  She has been seen in the emergency room several times and was referred to an orthopedist who believes that her hips got out of alignment during her pregnancy.  She has had one appointment with physical therapy to date.    Vital Signs:   Wt 99.3 kg (219 lb)   LMP  (LMP Unknown)   Breastfeeding No   BMI 34.30 kg/m      Labs:  TSH   Date Value Ref Range Status   08/23/2021 3.91 0.40 - 4.00 mU/L Final   07/16/2020 3.58 0.40 - 4.00 mU/L Final     Lab Results   Component Value Date    WBC 9.5 08/23/2021    WBC 8.1 07/16/2020     Lab Results   Component Value Date    RBC 4.87 08/23/2021    RBC 4.64 07/16/2020     Lab Results   Component Value Date    HGB 12.8 08/23/2021    HGB 11.8 07/16/2020     Lab Results   Component Value Date    HCT 41.3 08/23/2021    HCT 39.1 07/16/2020     No components found for: MCT  Lab Results   Component Value Date    MCV 85 08/23/2021    MCV 84 07/16/2020     Lab Results   Component Value Date    MCH 26.3 08/23/2021    MCH 25.4 07/16/2020     Lab Results   Component Value Date    MCHC 31.0 08/23/2021    MCHC 30.2 07/16/2020     Lab Results   Component Value Date    RDW 15.4 08/23/2021    RDW 15.1 07/16/2020     Lab Results   Component Value Date     08/23/2021 "     07/16/2020     Last Comprehensive Metabolic Panel:  Sodium   Date Value Ref Range Status   08/22/2021 140 136 - 145 mmol/L Final   07/16/2020 139 133 - 144 mmol/L Final     Potassium   Date Value Ref Range Status   08/22/2021 3.6 3.5 - 5.0 mmol/L Final   07/16/2020 4.1 3.4 - 5.3 mmol/L Final     Chloride   Date Value Ref Range Status   08/22/2021 106 98 - 107 mmol/L Final   07/16/2020 104 94 - 109 mmol/L Final     Carbon Dioxide   Date Value Ref Range Status   07/16/2020 24 20 - 32 mmol/L Final     Carbon Dioxide (CO2)   Date Value Ref Range Status   08/22/2021 21 (L) 22 - 31 mmol/L Final     Anion Gap   Date Value Ref Range Status   08/22/2021 13 5 - 18 mmol/L Final   07/16/2020 11 3 - 14 mmol/L Final     Glucose   Date Value Ref Range Status   08/22/2021 81 70 - 125 mg/dL Final   07/16/2020 73 70 - 99 mg/dL Final     Comment:     Non Fasting     Urea Nitrogen   Date Value Ref Range Status   08/22/2021 7 (L) 8 - 22 mg/dL Final   07/16/2020 9 7 - 30 mg/dL Final     Creatinine   Date Value Ref Range Status   08/22/2021 0.70 0.60 - 1.10 mg/dL Final   07/16/2020 0.90 0.52 - 1.04 mg/dL Final     GFR Estimate   Date Value Ref Range Status   08/22/2021 >90 >60 mL/min/1.73m2 Final     Comment:     As of July 11, 2021, eGFR is calculated by the CKD-EPI creatinine equation, without race adjustment. eGFR can be influenced by muscle mass, exercise, and diet. The reported eGFR is an estimation only and is only applicable if the renal function is stable.   07/16/2020 80 >60 mL/min/[1.73_m2] Final     Comment:     Non  GFR Calc  Starting 12/18/2018, serum creatinine based estimated GFR (eGFR) will be   calculated using the Chronic Kidney Disease Epidemiology Collaboration   (CKD-EPI) equation.       Calcium   Date Value Ref Range Status   08/22/2021 8.5 8.5 - 10.5 mg/dL Final   07/16/2020 8.6 8.5 - 10.1 mg/dL Final         Current Medications:  Current Outpatient Medications   Medication     buPROPion  (WELLBUTRIN XL) 300 MG 24 hr tablet     busPIRone (BUSPAR) 15 MG tablet     desvenlafaxine (PRISTIQ) 100 MG 24 hr tablet     lamoTRIgine (LAMICTAL) 200 MG tablet     LORazepam (ATIVAN) 0.5 MG tablet     norethindrone-ethinyl estradiol (JUNEL FE 1/20) 1-20 MG-MCG tablet     tiZANidine (ZANAFLEX) 2 MG tablet     norethindrone-ethinyl estradiol (MICROGESTIN 1/20) 1-20 MG-MCG tablet     No current facility-administered medications for this visit.        The [place] Prescription Monitoring Program has been reviewed and there are no concerns about diversionary activity for controlled substances at this time.      Mental Status Examination:  Danyelle is a 41-year-old woman in no acute distress.  Speech is clear and normal in rate and tone.  Mood is somewhat depressed.  Thoughts are logical and spontaneous with no loose associations or flight of ideas.  Thought content shows no psychosis.  No suicidal thoughts.  She is alert and oriented x3.    Assessment and Plan:    ICD-10-CM    1. Recurrent major depression in remission (H)  F33.40 busPIRone (BUSPAR) 15 MG tablet   2. Generalized anxiety disorder  F41.1 LORazepam (ATIVAN) 0.5 MG tablet     buPROPion (WELLBUTRIN XL) 300 MG 24 hr tablet     busPIRone (BUSPAR) 15 MG tablet     desvenlafaxine (PRISTIQ) 100 MG 24 hr tablet       Medical comorbidities include:   Patient Active Problem List   Diagnosis     Persistent insomnia     allergic DERMATITIS NOS     [facility]     [facility]     Migraine headache     PMDD (premenstrual dysphoric disorder)     Trichotillomania     CARDIOVASCULAR SCREENING; LDL GOAL LESS THAN 160     History of ovarian cyst     Generalized anxiety disorder     Chronic pain syndrome     ASD (atrial septal defect)     Chronic pain     Esophageal reflux     Non morbid obesity, unspecified obesity type     Benign hypermobility syndrome     Myalgia and myositis, unspecified     Myofascial pain     Recurrent major depression in  partial remission (H)     Right inguinal hernia     S/P right inguinal hernia repair, follow-up exam     Morbid obesity (H)       Treatment Plan:  Patient Instructions   Start buspirone 7.5 mg daily for one week, then increase to 7.5 mg twice daily for one week, then increase to 15 mg twice daily.    Continue lorazepam 0.5 mg at bedtime.  You may continue to take 0.5 mg daily as needed for anxiety.     Continue Wellbutrin  mg daily.     Continue desvenlafaxine 100 mg daily.     Continue lamotrigine 200 mg daily.      Continue all other medications per your primary care provider.    Schedule an appointment with me in 6 weeks or sooner as needed.  You may call [facility] at [phone] to schedule.    [place] Resources:      Go to the Emergency Department as needed or call after hours crisis line at [phone].      To schedule individual or family therapy, call [facility] at [phone].     Follow up with primary care provider as planned or sooner for acute medical concerns.    Call the psychiatric nurse line with medication questions or concerns at [phone].    [employer] may be used to communicate with your provider, but this is not intended to be used for emergencies.    Community Resources:      National Suicide Prevention Lifeline: 642.963.1825 (TTY: 954.414.9798). Call anytime for help.  (www.suicidepreventionlifeline.org)    National [place] on Mental Illness (www.telma.org): 123.800.2402 or 937-433-4321.     Mental Health Association (www.mentalhealth.org): 728.566.8449 or 652-494-7762.    [place] Crisis Text [place]: Text [place] to 702982    Suicide LifeLine Chat: [email or web address].org/chat         Administrative Billing:   Telephone call duration: 36 minutes  Total time spent, including chart review and documentation: 46 minutes    Patient Status:  This is a continuous care patient and medications will be prescribed by the psychiatric provider until  further indicated.

## 2021-11-16 NOTE — PATIENT INSTRUCTIONS
Start buspirone 7.5 mg daily for one week, then increase to 7.5 mg twice daily for one week, then increase to 15 mg twice daily.    Continue lorazepam 0.5 mg at bedtime.  You may continue to take 0.5 mg daily as needed for anxiety.     Continue Wellbutrin  mg daily.     Continue desvenlafaxine 100 mg daily.     Continue lamotrigine 200 mg daily.      Continue all other medications per your primary care provider.    Schedule an appointment with me in 6 weeks or sooner as needed.  You may call [facility] at [phone] to schedule.    [place] Resources:      Go to the Emergency Department as needed or call after hours crisis line at [phone].      To schedule individual or family therapy, call [facility] at [phone].     Follow up with primary care provider as planned or sooner for acute medical concerns.    Call the psychiatric nurse line with medication questions or concerns at [phone].    [employer] may be used to communicate with your provider, but this is not intended to be used for emergencies.    Community Resources:      National Suicide Prevention Lifeline: 634.254.1669 (TTY: 859.402.1604). Call anytime for help.  (www.suicidepreventionlifeline.org)    National [place] on Mental Illness (www.telma.org): 917.628.3165 or 411-201-0184.     Mental Health Association (www.mentalhealth.org): 512.683.5527 or 243-722-3016.    [place] Crisis Text [place]: Text [place] to 175554    Suicide LifeLine Chat: suicidepreventionlifeline.org/chat

## 2021-11-17 RX ORDER — NORETHINDRONE ACETATE AND ETHINYL ESTRADIOL .02; 1 MG/1; MG/1
1 TABLET ORAL DAILY
Qty: 63 TABLET | Refills: 3 | Status: SHIPPED | OUTPATIENT
Start: 2021-11-17 | End: 2022-08-22

## 2021-11-17 ASSESSMENT — ANXIETY QUESTIONNAIRES: GAD7 TOTAL SCORE: 10

## 2021-11-17 ASSESSMENT — PATIENT HEALTH QUESTIONNAIRE - PHQ9: SUM OF ALL RESPONSES TO PHQ QUESTIONS 1-9: 6

## 2021-11-17 NOTE — TELEPHONE ENCOUNTER
Incorrectly ordered a different version of OCP (Junel Fe) for patient who is requesting her original version of the medication (Microgestin). Microgestin order placed and sent to pharmacy of choice.   Rosalia Worthington RN  
yes

## 2021-11-18 NOTE — NURSING NOTE
________________________________  Medications Phoned  to Pharmacy [] yes []no  Name of Pharmacist:  List Medications, including dose, quantity and instructions     Medications ordered this visit were e-scribed.  Verified by order class [x] yes  [] no   Bupropion 300mg, buspar 15mg, pristiq 100mg      Medication changes or discontinuations were communicated to patient's pharmacy: [] yes  [x] no     Dictation completed at time of chart check: [x] yes  [] no     I have checked the documentation for today s encounters and the above information has been reviewed and completed.      Con Queen MA on November 18, 2021 at 2:46 PM

## 2021-11-23 ENCOUNTER — MYC MEDICAL ADVICE (OUTPATIENT)
Dept: PSYCHIATRY | Facility: CLINIC | Age: 41
End: 2021-11-23
Payer: COMMERCIAL

## 2021-11-23 DIAGNOSIS — F41.1 GENERALIZED ANXIETY DISORDER: ICD-10-CM

## 2021-11-23 RX ORDER — LAMOTRIGINE 200 MG/1
200 TABLET ORAL DAILY
Qty: 90 TABLET | Refills: 1 | Status: SHIPPED | OUTPATIENT
Start: 2021-11-23

## 2021-11-23 NOTE — TELEPHONE ENCOUNTER
Date of Last Office Visit: 11/16/2021  Date of Next Office Visit: 12/28/2021  No shows since last visit: 0  Cancellations since last visit: 0    Medication requested:   lamoTRIgine (LAMICTAL) 200 MG tablet 90 tablet      Date last ordered: 6/8/2021 Qty: 90 Refills: 1        Lapse in medication adherence greater than 5 days?: No  If yes, call patient and gather details: N/A  Medication refill request verified as identical to current order?: Yes  Result of Last DAM, VPA, Li+ Level, CBC, or Carbamazepine Level (at or since last visit): N/A    Last visit treatment plan:   Start buspirone 7.5 mg daily for one week, then increase to 7.5 mg twice daily for one week, then increase to 15 mg twice daily.     Continue lorazepam 0.5 mg at bedtime.  You may continue to take 0.5 mg daily as needed for anxiety.     Continue Wellbutrin  mg daily.     Continue desvenlafaxine 100 mg daily.     Continue lamotrigine 200 mg daily.      Continue all other medications per your primary care provider.     Schedule an appointment with me in 6 weeks or sooner as needed.  You may call [facility] at [phone] to schedule.    []Medication refilled per  Medication Refill in Ambulatory Care  policy.  [x]Medication unable to be refilled by RN due to criteria not met as indicated below:    []Eligibility - not seen in the last year   []Supervision - no future appointment   []Compliance - no shows, cancellations or lapse in therapy   []Verification - order discrepancy   []Controlled medication   [x]Medication not included in policy   [x]90-day supply request   []Other

## 2021-12-07 ENCOUNTER — ANCILLARY PROCEDURE (OUTPATIENT)
Dept: MAMMOGRAPHY | Facility: CLINIC | Age: 41
End: 2021-12-07
Attending: NURSE PRACTITIONER
Payer: COMMERCIAL

## 2021-12-07 DIAGNOSIS — Z12.31 VISIT FOR SCREENING MAMMOGRAM: ICD-10-CM

## 2021-12-07 PROCEDURE — 77063 BREAST TOMOSYNTHESIS BI: CPT

## 2021-12-16 ENCOUNTER — MYC MEDICAL ADVICE (OUTPATIENT)
Dept: FAMILY MEDICINE | Facility: CLINIC | Age: 41
End: 2021-12-16
Payer: COMMERCIAL

## 2021-12-27 NOTE — TELEPHONE ENCOUNTER
Routing My Chart bradfe to [facility] provider group.    Patient wanting Mammogram results.    Mammogram completed 12/7.  Not yet reviewed.    Results    Findings: The breasts have scattered areas of fibroglandular density.  There is no radiographic evidence of malignancy.      IMPRESSION: ACR BI-RADS Category 1: Negative     RECOMMENDED FOLLOW-UP: Annual routine screening mammogram     The results and recommendations of this examination will be communicated to the patient.       Tony Hallman RN, BSN, PHN  [facility]

## 2021-12-28 ENCOUNTER — VIRTUAL VISIT (OUTPATIENT)
Dept: PSYCHIATRY | Facility: CLINIC | Age: 41
End: 2021-12-28
Payer: COMMERCIAL

## 2021-12-28 VITALS — WEIGHT: 220 LBS | BODY MASS INDEX: 34.46 KG/M2

## 2021-12-28 DIAGNOSIS — F33.0 MILD RECURRENT MAJOR DEPRESSION (H): Primary | ICD-10-CM

## 2021-12-28 DIAGNOSIS — F41.1 GENERALIZED ANXIETY DISORDER: ICD-10-CM

## 2021-12-28 PROCEDURE — 99214 OFFICE O/P EST MOD 30 MIN: CPT | Mod: TEL | Performed by: PSYCHIATRY & NEUROLOGY

## 2021-12-28 RX ORDER — BUPROPION HYDROCHLORIDE 150 MG/1
150 TABLET ORAL EVERY MORNING
Qty: 90 TABLET | Refills: 1 | Status: SHIPPED | OUTPATIENT
Start: 2021-12-28 | End: 2022-05-12

## 2021-12-28 RX ORDER — BUSPIRONE HYDROCHLORIDE 15 MG/1
TABLET ORAL
Qty: 60 TABLET | Refills: 0
Start: 2021-12-28 | End: 2022-06-17

## 2021-12-28 ASSESSMENT — ANXIETY QUESTIONNAIRES
GAD7 TOTAL SCORE: 7
6. BECOMING EASILY ANNOYED OR IRRITABLE: SEVERAL DAYS
7. FEELING AFRAID AS IF SOMETHING AWFUL MIGHT HAPPEN: NOT AT ALL
5. BEING SO RESTLESS THAT IT IS HARD TO SIT STILL: NOT AT ALL
GAD7 TOTAL SCORE: 7
3. WORRYING TOO MUCH ABOUT DIFFERENT THINGS: SEVERAL DAYS
2. NOT BEING ABLE TO STOP OR CONTROL WORRYING: MORE THAN HALF THE DAYS
1. FEELING NERVOUS, ANXIOUS, OR ON EDGE: NEARLY EVERY DAY
GAD7 TOTAL SCORE: 7
4. TROUBLE RELAXING: NOT AT ALL
7. FEELING AFRAID AS IF SOMETHING AWFUL MIGHT HAPPEN: NOT AT ALL

## 2021-12-28 ASSESSMENT — PATIENT HEALTH QUESTIONNAIRE - PHQ9: SUM OF ALL RESPONSES TO PHQ QUESTIONS 1-9: 3

## 2021-12-28 NOTE — PATIENT INSTRUCTIONS
Decrease buspirone to 15 mg daily for one week, then 7.5 mg twice daily for one week, then discontinue.    Continue lorazepam 0.5 mg at bedtime.  You may continue to take 0.5 mg daily as needed for anxiety.     Increase Wellbutrin XL to 150 mg daily.     Continue desvenlafaxine 100 mg daily.     Continue lamotrigine 200 mg daily.      Continue all other medications per your primary care provider.    Schedule an appointment with me in 6 weeks or sooner as needed.  You may call [facility] at [phone] to schedule.    [place] Resources:      Go to the Emergency Department as needed or call after hours crisis line at [phone].      To schedule individual or family therapy, call [facility] at [phone].     Follow up with primary care provider as planned or sooner for acute medical concerns.    Call the psychiatric nurse line with medication questions or concerns at [phone].    [employer] may be used to communicate with your provider, but this is not intended to be used for emergencies.    Community Resources:      National Suicide Prevention Lifeline: 449.472.3199 (TTY: 245.570.2004). Call anytime for help.  (www.suicidepreventionlifeline.org)    National [place] on Mental Illness (www.telma.org): 995.365.4808 or 787-659-5469.     Mental Health Association (www.mentalhealth.org): 732.266.8531 or 059-896-4033.    [place] Crisis Text [place]: Text [place] to 776343    Suicide LifeLine Chat: suicideTaskdoerline.org/chat

## 2021-12-28 NOTE — PROGRESS NOTES
Danyelle is a 41 year old who is being evaluated via a billable telephone visit.      What phone number would you like to be contacted at? [phone]  How would you like to obtain your AVS? MyChart           Outpatient Psychiatric Progress Note    Name: Danyelle Canales   : 1980                    Primary Care Provider: Katlyn Orozco DO   Therapist: Denice Haji, [facility]     PHQ-9 scores:  PHQ-9 SCORE 11/15/2021 2021 2021   PHQ-9 Total Score - - -   PHQ-9 Total Score MyChart 8 (Mild depression) - -   PHQ-9 Total Score - 6 3   Some encounter information is confidential and restricted. Go to Review Flowsheets activity to see all data.       NAJMA-7 scores:  NAJMA-7 SCORE 11/15/2021 2021 2021   Total Score - - -   Total Score 5 (mild anxiety) - 7 (mild anxiety)   Total Score - 10 7   Some encounter information is confidential and restricted. Go to Review Flowsheets activity to see all data.       Patient Identification:  Danyelle is a 41 year old year old female  who presents for return visit with me.  Patient attended the session alone.     Interim History:  Danyelle reports that she has not really seen any improvement since adding buspirone.  She would prefer not to try increasing it, as she has not really had any effect to date.  We agreed to discontinue it.  She will taper off, decreasing to 15 mg daily for 1 week, then 7.5 mg daily for 1 week, then discontinuing it.    She does feel that bupropion was helpful when she initially started it, and is open to increasing the dose.  We agreed to increase to 450 mg daily.    She denies any acute stressors other than the fact that her son, Vinod, is in childcare now and has been bringing home a lot of illnesses, so the full whole family has been sick.  She and her  have had to miss several days of work.    Vital Signs:   Wt 99.8 kg (220 lb)   Breastfeeding No   BMI 34.46 kg/m        Current  Medications:  Current Outpatient Medications   Medication     buPROPion (WELLBUTRIN XL) 150 MG 24 hr tablet     buPROPion (WELLBUTRIN XL) 300 MG 24 hr tablet     busPIRone (BUSPAR) 15 MG tablet     desvenlafaxine (PRISTIQ) 100 MG 24 hr tablet     lamoTRIgine (LAMICTAL) 200 MG tablet     LORazepam (ATIVAN) 0.5 MG tablet     norethindrone-ethinyl estradiol (MICROGESTIN 1/20) 1-20 MG-MCG tablet     tiZANidine (ZANAFLEX) 2 MG tablet     No current facility-administered medications for this visit.        Mental Status Examination:  Danyelle is a 41-year-old woman in no acute distress.  Speech is clear and normal in rate and tone.  Mood is dysphoric.  Thoughts are logical and spontaneous with no loose associations or flight of ideas.  Thought content shows no psychosis.  No suicidal thoughts.  She is alert and oriented x3.    Assessment and Plan:    ICD-10-CM    1. Mild recurrent major depression (H)  F33.0 busPIRone (BUSPAR) 15 MG tablet     buPROPion (WELLBUTRIN XL) 150 MG 24 hr tablet   2. Generalized anxiety disorder  F41.1 busPIRone (BUSPAR) 15 MG tablet       Medical comorbidities include:   Patient Active Problem List   Diagnosis     Persistent insomnia     allergic DERMATITIS NOS     [facility]     [facility]     Migraine headache     PMDD (premenstrual dysphoric disorder)     Trichotillomania     CARDIOVASCULAR SCREENING; LDL GOAL LESS THAN 160     History of ovarian cyst     Generalized anxiety disorder     Chronic pain syndrome     ASD (atrial septal defect)     Chronic pain     Esophageal reflux     Non morbid obesity, unspecified obesity type     Benign hypermobility syndrome     Myalgia and myositis, unspecified     Myofascial pain     Recurrent major depression in partial remission (H)     Right inguinal hernia     S/P right inguinal hernia repair, follow-up exam     Morbid obesity (H)     Mild recurrent major depression (H)       Treatment Plan:  Patient Instructions   Decrease  buspirone to 15 mg daily for one week, then 7.5 mg twice daily for one week, then discontinue.    Continue lorazepam 0.5 mg at bedtime.  You may continue to take 0.5 mg daily as needed for anxiety.     Increase Wellbutrin XL to 150 mg daily.     Continue desvenlafaxine 100 mg daily.     Continue lamotrigine 200 mg daily.      Continue all other medications per your primary care provider.    Schedule an appointment with me in 6 weeks or sooner as needed.  You may call [facility] at [phone] to schedule.    [place] Resources:      Go to the Emergency Department as needed or call after hours crisis line at [phone].      To schedule individual or family therapy, call [facility] at [phone].     Follow up with primary care provider as planned or sooner for acute medical concerns.    Call the psychiatric nurse line with medication questions or concerns at [phone].    [employer] may be used to communicate with your provider, but this is not intended to be used for emergencies.    Community Resources:      National Suicide Prevention Lifeline: 823.139.3692 (TTY: 593.216.2568). Call anytime for help.  (www.suicidepreventionlifeline.org)    National [place] on Mental Illness (www.telma.org): 760.497.6223 or 163-059-9715.     Mental Health Association (www.mentalhealth.org): 408.367.7745 or 978-397-0297.    [place] Crisis Text Line: Text [place] to 376443    Suicide LifeLine Chat: [email or web address]/chat         Administrative Billing:   Telephone call duration: 22 minutes  Total time spent, including chart review and documentation: 34 minutes    Patient Status:  This is a continuous care patient and medications will be prescribed by the psychiatric provider until further indicated.

## 2021-12-29 ASSESSMENT — ANXIETY QUESTIONNAIRES: GAD7 TOTAL SCORE: 7

## 2021-12-31 ENCOUNTER — IMMUNIZATION (OUTPATIENT)
Dept: NURSING | Facility: CLINIC | Age: 41
End: 2021-12-31
Payer: COMMERCIAL

## 2021-12-31 PROCEDURE — 0004A PR COVID VAC PFIZER DIL RECON 30 MCG/0.3 ML IM: CPT

## 2021-12-31 PROCEDURE — 91300 PR COVID VAC PFIZER DIL RECON 30 MCG/0.3 ML IM: CPT

## 2022-01-27 DIAGNOSIS — F33.0 MILD RECURRENT MAJOR DEPRESSION (H): ICD-10-CM

## 2022-01-27 DIAGNOSIS — F41.1 GENERALIZED ANXIETY DISORDER: ICD-10-CM

## 2022-01-27 RX ORDER — BUSPIRONE HYDROCHLORIDE 15 MG/1
TABLET ORAL
Qty: 60 TABLET | Refills: 0 | OUTPATIENT
Start: 2022-01-27

## 2022-01-27 NOTE — TELEPHONE ENCOUNTER
Medication requested: busPIRone (BUSPAR) 15 MG tablet Date last ordered: 12/28/21 Qty: 60 Refills: 0    Deny-Medication has been discontinued

## 2022-03-09 NOTE — TELEPHONE ENCOUNTER
-Stable, continue with Prinzide 10--12.5 mg and Cardura 2 mg twice daily. Sent patient MyChart message addressing her question.    Marsha Fernandez, DNP, APRN, CNP

## 2022-05-12 DIAGNOSIS — F33.0 MILD RECURRENT MAJOR DEPRESSION (H): ICD-10-CM

## 2022-05-12 RX ORDER — BUPROPION HYDROCHLORIDE 150 MG/1
150 TABLET ORAL EVERY MORNING
Qty: 90 TABLET | Refills: 1 | Status: SHIPPED | OUTPATIENT
Start: 2022-05-12 | End: 2022-06-17

## 2022-05-12 NOTE — TELEPHONE ENCOUNTER
Date of Last Office Visit: 12/28/2021  Date of Next Office Visit: None-will await provider consult prior to routing to scheduling   No shows since last visit: 0  Cancellations since last visit: 1 on 02/08/2022      Lapse in medication adherence greater than 5 days?: no - pharmacy indicates last fill of #90 on 2/11/22  If yes, call patient and gather details: na  Medication refill request verified as identical to current order?: yes  Result of Last DAM, VPA, Li+ Level, CBC, or Carbamazepine Level (at or since last visit): N/A    Last visit treatment plan:   Decrease buspirone to 15 mg daily for one week, then 7.5 mg twice daily for one week, then discontinue.     Continue lorazepam 0.5 mg at bedtime.  You may continue to take 0.5 mg daily as needed for anxiety.     Increase Wellbutrin XL to 150 mg daily.     Continue desvenlafaxine 100 mg daily.     Continue lamotrigine 200 mg daily.      Continue all other medications per your primary care provider.     Schedule an appointment with me in 6 weeks or sooner as needed.   []Medication refilled per  Medication Refill in Ambulatory Care  policy.  [x]Medication unable to be refilled by RN due to criteria not met as indicated below:    []Eligibility - not seen in the last year   [x]Supervision - no future appointment   []Compliance - no shows, cancellations or lapse in therapy   []Verification - order discrepancy   []Controlled medication   [x]Medication not included in policy   []90-day supply request   [x]Other - close to 6 mo limit for not being seen

## 2022-06-17 ENCOUNTER — OFFICE VISIT (OUTPATIENT)
Dept: FAMILY MEDICINE | Facility: CLINIC | Age: 42
End: 2022-06-17
Payer: COMMERCIAL

## 2022-06-17 VITALS
HEART RATE: 85 BPM | BODY MASS INDEX: 35.87 KG/M2 | TEMPERATURE: 97.9 F | WEIGHT: 229 LBS | DIASTOLIC BLOOD PRESSURE: 86 MMHG | SYSTOLIC BLOOD PRESSURE: 130 MMHG | RESPIRATION RATE: 18 BRPM

## 2022-06-17 DIAGNOSIS — R05.3 PERSISTENT COUGH: Primary | ICD-10-CM

## 2022-06-17 PROCEDURE — 99214 OFFICE O/P EST MOD 30 MIN: CPT | Performed by: FAMILY MEDICINE

## 2022-06-17 RX ORDER — FLUCONAZOLE 150 MG/1
TABLET ORAL
COMMUNITY
Start: 2022-06-07 | End: 2022-06-17

## 2022-06-17 RX ORDER — BUPROPION HYDROCHLORIDE 300 MG/1
300 TABLET ORAL
COMMUNITY
End: 2022-06-17

## 2022-06-17 RX ORDER — LORAZEPAM 1 MG/1
TABLET ORAL
COMMUNITY
Start: 2022-06-02

## 2022-06-17 RX ORDER — METHYLPREDNISOLONE 4 MG
TABLET, DOSE PACK ORAL
COMMUNITY
Start: 2022-05-28 | End: 2022-06-17

## 2022-06-17 RX ORDER — CODEINE PHOSPHATE AND GUAIFENESIN 10; 100 MG/5ML; MG/5ML
SOLUTION ORAL
Qty: 180 ML | Refills: 0 | Status: SHIPPED | OUTPATIENT
Start: 2022-06-17 | End: 2022-08-22

## 2022-06-17 RX ORDER — LEVALBUTEROL TARTRATE 45 UG/1
AEROSOL, METERED ORAL
COMMUNITY
Start: 2022-05-28 | End: 2022-10-07

## 2022-06-17 RX ORDER — DOXYCYCLINE 100 MG/1
CAPSULE ORAL
COMMUNITY
Start: 2022-06-07 | End: 2022-06-17

## 2022-06-17 RX ORDER — ALBUTEROL SULFATE 90 UG/1
AEROSOL, METERED RESPIRATORY (INHALATION)
COMMUNITY
Start: 2021-08-17 | End: 2022-06-17

## 2022-06-17 RX ORDER — LAMOTRIGINE 100 MG/1
TABLET ORAL
COMMUNITY
Start: 2022-04-04

## 2022-06-17 RX ORDER — PREDNISONE 20 MG/1
40 TABLET ORAL DAILY
Qty: 10 TABLET | Refills: 0 | Status: SHIPPED | OUTPATIENT
Start: 2022-06-17 | End: 2022-06-22

## 2022-06-17 RX ORDER — CODEINE PHOSPHATE AND GUAIFENESIN 10; 100 MG/5ML; MG/5ML
SOLUTION ORAL
COMMUNITY
Start: 2022-06-07 | End: 2022-06-17

## 2022-06-17 RX ORDER — AZITHROMYCIN 250 MG/1
TABLET, FILM COATED ORAL
Qty: 6 TABLET | Refills: 0 | Status: SHIPPED | OUTPATIENT
Start: 2022-06-17 | End: 2022-06-22

## 2022-06-17 ASSESSMENT — ENCOUNTER SYMPTOMS: COUGH: 1

## 2022-06-17 ASSESSMENT — PATIENT HEALTH QUESTIONNAIRE - PHQ9
10. IF YOU CHECKED OFF ANY PROBLEMS, HOW DIFFICULT HAVE THESE PROBLEMS MADE IT FOR YOU TO DO YOUR WORK, TAKE CARE OF THINGS AT HOME, OR GET ALONG WITH OTHER PEOPLE: SOMEWHAT DIFFICULT
SUM OF ALL RESPONSES TO PHQ QUESTIONS 1-9: 8
SUM OF ALL RESPONSES TO PHQ QUESTIONS 1-9: 8

## 2022-06-17 NOTE — PROGRESS NOTES
Danyelle was seen today for cough.    Diagnoses and all orders for this visit:    Persistent cough: Initially diagnosed with bronchitis and started on inhaler and steroid taper.  Then was seen again and diagnosed with walking  pneumonia.  Treated with course of doxycycline.  X-ray was not done.  Given persistence of cough for 3 weeks and rhonchi noted on right lower lobe felt chest x-ray warranted today to ensure no lobar pneumonia although reassured that she is afebrile.  Personally reviewed and noted chest x-ray to be clear.  Cough on exam does sound bronchospastic and she reports continued post-tussive emesis.  She is just maybe barely coming out of the 3 weeks since cough started but I am going to extend her coverage for pertussis at this time as she really is not feeling any better.  We will also treat with another course of steroids this time a burst.  Continue with albuterol 2 puffs every 4-6 hours.  She is found the cough medicine helpful for sleep and will continue that for her as well.  Follow-up in 1 to 2 weeks if not improving or worsening.  She states understanding agreeable plan.  -     XR Chest 2 Views; Future   -     azithromycin (ZITHROMAX) 250 MG tablet; Take 2 tablets (500 mg) by mouth daily for 1 day, THEN 1 tablet (250 mg) daily for 4 days.  -     predniSONE (DELTASONE) 20 MG tablet; Take 2 tablets (40 mg) by mouth daily for 5 days  -     guaiFENesin-codeine (ROBITUSSIN AC) 100-10 MG/5ML solution; TAKE 5-10 ML BY MOUTH EVERY 6 HOURS IF NEEDED FOR COUGH. MAX DOSE 60 ML PER 24HRS      Subjective   Danyelle is a 42 year old, presenting for the following health issues:  Cough (Walking pneumonia, bronchitis been to urgent care 3x)      Cough    History of Present Illness       Reason for visit:  Follow up bronchitis and walking pneumonia    She eats 2-3 servings of fruits and vegetables daily.She consumes 0 sweetened beverage(s) daily.She exercises with enough effort to increase her heart rate 10  to 19 minutes per day.  She exercises with enough effort to increase her heart rate 3 or less days per week.   She is taking medications regularly.    Today's PHQ-9         PHQ-9 Total Score: 8    PHQ-9 Q9 Thoughts of better off dead/self-harm past 2 weeks :   Not at all    How difficult have these problems made it for you to do your work, take care of things at home, or get along with other people: Somewhat difficult     Cough so hard I throw up.   Still pretty intense for how long its been.   Still doing my inhaler every 4 hours.   finished antibiotics yesterday - 10 day course.   Cough syrup tamed it down a bit  The steroids helped a little bit in the beginning  2nd appt   Two weeks ago had finished the steroids  No fevers  Hard to catch my breath- cough until I throw up.   No sinus tenderness anymore, no nasal congestion.  No sore throat.  Achy from coughing so much.  Child in  for the first time this year and they have been sick a lot.  No history of asthma or other chronic lung disease  Does not feel like she is getting better.    Review of Systems   Respiratory: Positive for cough.       Constitutional, HEENT, cardiovascular, pulmonary, gi and gu systems are negative, except as otherwise noted.      Objective    /86 (BP Location: Right arm, Patient Position: Sitting, Cuff Size: Adult Large)   Pulse 85   Temp 97.9  F (36.6  C) (Temporal)   Resp 18   Wt 103.9 kg (229 lb)   BMI 35.87 kg/m    Body mass index is 35.87 kg/m .  Physical Exam   GENERAL: alert, no distress and appears sick but nontoxic.  Speech is raspy.  EYES: Eyes grossly normal to inspection, PERRL and conjunctivae and sclerae normal  HENT: ear canals and TM's normal and no sinus tenderness palpation.  NECK: no adenopathy, no asymmetry, masses, or scars and thyroid normal to palpation  RESP: rhonchi R lower posterior, otherwise clear.  When she coughs it does sound a bit bronchospastic with a little bit of a high-pitched wheeze as  she is coughing.  But if she is not coughing just breathing normally there is no wheezing or crackles noted.  CV: regular rate and rhythm, normal S1 S2, no S3 or S4, no murmur, click or rub,   MS: no gross musculoskeletal defects noted, no edema    CXR - Reviewed and interpreted by me Normal- no infiltrates, effusions, pneumothoraces, cardiomegaly or masses  Results for orders placed or performed in visit on 06/17/22   XR Chest 2 Views     Status: None    Narrative    EXAM: XR CHEST 2 VW  LOCATION: [facility]  DATE/TIME: 6/17/2022 9:50 AM    INDICATION: persistent cough x 3 weeks. s p doxycycline and prednisone. hasn't had cxr yet. rhonichi? in [place]  COMPARISON: 09/09/2021      Impression    IMPRESSION: Sternotomy. Chest otherwise negative. No acute new findings.                   .  ..

## 2022-08-18 ENCOUNTER — TELEPHONE (OUTPATIENT)
Dept: OBGYN | Facility: CLINIC | Age: 42
End: 2022-08-18

## 2022-08-22 ENCOUNTER — MYC MEDICAL ADVICE (OUTPATIENT)
Dept: FAMILY MEDICINE | Facility: CLINIC | Age: 42
End: 2022-08-22

## 2022-08-22 ENCOUNTER — OFFICE VISIT (OUTPATIENT)
Dept: FAMILY MEDICINE | Facility: CLINIC | Age: 42
End: 2022-08-22
Payer: COMMERCIAL

## 2022-08-22 VITALS
HEIGHT: 67 IN | BODY MASS INDEX: 35.82 KG/M2 | DIASTOLIC BLOOD PRESSURE: 90 MMHG | TEMPERATURE: 98.6 F | WEIGHT: 228.2 LBS | OXYGEN SATURATION: 98 % | SYSTOLIC BLOOD PRESSURE: 146 MMHG | HEART RATE: 73 BPM

## 2022-08-22 DIAGNOSIS — R20.2 PARESTHESIA: ICD-10-CM

## 2022-08-22 DIAGNOSIS — F33.0 MILD RECURRENT MAJOR DEPRESSION (H): ICD-10-CM

## 2022-08-22 DIAGNOSIS — Z00.00 ROUTINE GENERAL MEDICAL EXAMINATION AT A HEALTH CARE FACILITY: Primary | ICD-10-CM

## 2022-08-22 DIAGNOSIS — Z79.899 ENCOUNTER FOR LONG-TERM (CURRENT) USE OF MEDICATIONS: ICD-10-CM

## 2022-08-22 DIAGNOSIS — Z30.41 ORAL CONTRACEPTIVE PILL SURVEILLANCE: ICD-10-CM

## 2022-08-22 DIAGNOSIS — Z12.31 ENCOUNTER FOR SCREENING MAMMOGRAM FOR BREAST CANCER: ICD-10-CM

## 2022-08-22 DIAGNOSIS — F41.1 GENERALIZED ANXIETY DISORDER: ICD-10-CM

## 2022-08-22 DIAGNOSIS — Z86.2 HISTORY OF IRON DEFICIENCY ANEMIA: ICD-10-CM

## 2022-08-22 DIAGNOSIS — R03.0 ELEVATED BLOOD PRESSURE READING WITHOUT DIAGNOSIS OF HYPERTENSION: ICD-10-CM

## 2022-08-22 DIAGNOSIS — E66.01 MORBID OBESITY (H): ICD-10-CM

## 2022-08-22 LAB
BASOPHILS # BLD AUTO: 0 10E3/UL (ref 0–0.2)
BASOPHILS NFR BLD AUTO: 1 %
CREAT UR-MCNC: 120 MG/DL
EOSINOPHIL # BLD AUTO: 0.1 10E3/UL (ref 0–0.7)
EOSINOPHIL NFR BLD AUTO: 2 %
ERYTHROCYTE [DISTWIDTH] IN BLOOD BY AUTOMATED COUNT: 14.9 % (ref 10–15)
HBA1C MFR BLD: 5.6 % (ref 0–5.6)
HCT VFR BLD AUTO: 41.3 % (ref 35–47)
HGB BLD-MCNC: 12.9 G/DL (ref 11.7–15.7)
IRON BINDING CAPACITY (ROCHE): 323 UG/DL (ref 240–430)
IRON SATN MFR SERPL: 27 % (ref 15–46)
IRON SERPL-MCNC: 86 UG/DL (ref 37–145)
LYMPHOCYTES # BLD AUTO: 2.5 10E3/UL (ref 0.8–5.3)
LYMPHOCYTES NFR BLD AUTO: 41 %
MCH RBC QN AUTO: 26 PG (ref 26.5–33)
MCHC RBC AUTO-ENTMCNC: 31.2 G/DL (ref 31.5–36.5)
MCV RBC AUTO: 83 FL (ref 78–100)
MONOCYTES # BLD AUTO: 0.5 10E3/UL (ref 0–1.3)
MONOCYTES NFR BLD AUTO: 7 %
NEUTROPHILS # BLD AUTO: 3 10E3/UL (ref 1.6–8.3)
NEUTROPHILS NFR BLD AUTO: 49 %
PLATELET # BLD AUTO: 252 10E3/UL (ref 150–450)
RBC # BLD AUTO: 4.97 10E6/UL (ref 3.8–5.2)
VIT B12 SERPL-MCNC: 412 PG/ML (ref 232–1245)
WBC # BLD AUTO: 6.1 10E3/UL (ref 4–11)

## 2022-08-22 PROCEDURE — 83540 ASSAY OF IRON: CPT

## 2022-08-22 PROCEDURE — 99213 OFFICE O/P EST LOW 20 MIN: CPT | Mod: 25

## 2022-08-22 PROCEDURE — 82607 VITAMIN B-12: CPT

## 2022-08-22 PROCEDURE — 80307 DRUG TEST PRSMV CHEM ANLYZR: CPT

## 2022-08-22 PROCEDURE — 80050 GENERAL HEALTH PANEL: CPT

## 2022-08-22 PROCEDURE — 36415 COLL VENOUS BLD VENIPUNCTURE: CPT

## 2022-08-22 PROCEDURE — 83036 HEMOGLOBIN GLYCOSYLATED A1C: CPT

## 2022-08-22 PROCEDURE — 80061 LIPID PANEL: CPT

## 2022-08-22 PROCEDURE — 83550 IRON BINDING TEST: CPT

## 2022-08-22 PROCEDURE — 99396 PREV VISIT EST AGE 40-64: CPT

## 2022-08-22 RX ORDER — LAMOTRIGINE 200 MG/1
200 TABLET ORAL DAILY
Qty: 90 TABLET | Refills: 1 | Status: CANCELLED | OUTPATIENT
Start: 2022-08-22

## 2022-08-22 RX ORDER — NORETHINDRONE ACETATE AND ETHINYL ESTRADIOL .02; 1 MG/1; MG/1
1 TABLET ORAL DAILY
Qty: 63 TABLET | Refills: 3 | Status: SHIPPED | OUTPATIENT
Start: 2022-08-22 | End: 2023-05-15

## 2022-08-22 RX ORDER — DESVENLAFAXINE 100 MG/1
100 TABLET, EXTENDED RELEASE ORAL DAILY
Qty: 90 TABLET | Refills: 1 | Status: CANCELLED | OUTPATIENT
Start: 2022-08-22

## 2022-08-22 RX ORDER — VORTIOXETINE 5 MG/1
5 TABLET, FILM COATED ORAL DAILY
COMMUNITY
Start: 2022-08-16 | End: 2023-04-21

## 2022-08-22 ASSESSMENT — ENCOUNTER SYMPTOMS
SHORTNESS OF BREATH: 0
HEARTBURN: 0
WEAKNESS: 0
FREQUENCY: 0
CHILLS: 0
HEADACHES: 0
ABDOMINAL PAIN: 0
MYALGIAS: 1
EYE PAIN: 0
PALPITATIONS: 0
SORE THROAT: 0
COUGH: 1
NERVOUS/ANXIOUS: 1
BREAST MASS: 0
ARTHRALGIAS: 0
HEMATURIA: 0
JOINT SWELLING: 0
FEVER: 0
DIARRHEA: 0
DYSURIA: 0
HEMATOCHEZIA: 0
CONSTIPATION: 0
NAUSEA: 0
PARESTHESIAS: 0
DIZZINESS: 0

## 2022-08-22 ASSESSMENT — PATIENT HEALTH QUESTIONNAIRE - PHQ9
SUM OF ALL RESPONSES TO PHQ QUESTIONS 1-9: 7
10. IF YOU CHECKED OFF ANY PROBLEMS, HOW DIFFICULT HAVE THESE PROBLEMS MADE IT FOR YOU TO DO YOUR WORK, TAKE CARE OF THINGS AT HOME, OR GET ALONG WITH OTHER PEOPLE: SOMEWHAT DIFFICULT
SUM OF ALL RESPONSES TO PHQ QUESTIONS 1-9: 7

## 2022-08-22 ASSESSMENT — PAIN SCALES - GENERAL: PAINLEVEL: MILD PAIN (2)

## 2022-08-22 NOTE — PATIENT INSTRUCTIONS
21 Day Fix  Weight Watchers  Comprehensive Weight Management Referral        Preventive Health Recommendations  Female Ages 40 to 49    Yearly exam:   See your health care provider every year in order to  Review health changes.   Discuss preventive care.    Review your medicines if your doctor prescribed any.    Get a Pap test every three years (unless you have an abnormal result and your provider advises testing more often).    If you get Pap tests with HPV test, you only need to test every 5 years, unless you have an abnormal result. You do not need a Pap test if your uterus was removed (hysterectomy) and you have not had cancer.    You should be tested each year for STDs (sexually transmitted diseases), if you're at risk.   Ask your doctor if you should have a mammogram.    Have a colonoscopy (test for colon cancer) if someone in your family has had colon cancer or polyps before age 50.     Have a cholesterol test every 5 years.     Have a diabetes test (fasting glucose) after age 45. If you are at risk for diabetes, you should have this test every 3 years.    Shots: Get a flu shot each year. Get a tetanus shot every 10 years.     Nutrition:   Eat at least 5 servings of fruits and vegetables each day.  Eat whole-grain bread, whole-wheat pasta and brown rice instead of white grains and rice.  Get adequate Calcium and Vitamin D.      Lifestyle  Exercise at least 150 minutes a week (an average of 30 minutes a day, 5 days a week). This will help you control your weight and prevent disease.  Limit alcohol to one drink per day.  No smoking.   Wear sunscreen to prevent skin cancer.  See your dentist every six months for an exam and cleaning.

## 2022-08-22 NOTE — PROGRESS NOTES
SUBJECTIVE:   CC: Danyelle Canales is an 42 year old woman who presents for preventive health visit.       Patient has been advised of split billing requirements and indicates understanding: Yes  Healthy Habits:     Getting at least 3 servings of Calcium per day:  Yes    Bi-annual eye exam:  Yes    Dental care twice a year:  Yes    Sleep apnea or symptoms of sleep apnea:  None    Diet:  Carbohydrate counting    Frequency of exercise:  None    Taking medications regularly:  Yes    Medication side effects:  Not applicable    PHQ-2 Total Score: 3    Additional concerns today:  Yes      Today's PHQ-2 Score:   PHQ-2 ( 1999 Pf[employer]) 8/22/2022   Q1: Little interest or pleasure in doing things 2   Q2: Feeling down, depressed or hopeless 1   PHQ-2 Score 3   PHQ-2 Total Score (12-17 Years)- Positive if 3 or more points; Administer PHQ-A if positive -   Q1: Little interest or pleasure in doing things More than half the days   Q2: Feeling down, depressed or hopeless Several days   PHQ-2 Score 3       Abuse: Current or Past (Physical, Sexual or Emotional) - No  Do you feel safe in your environment? Yes    Social History     Tobacco Use     Smoking status: Never Smoker     Smokeless tobacco: Never Used   Substance Use Topics     Alcohol use: Not Currently     Comment: 1-2 a month     If you drink alcohol do you typically have >3 drinks per day or >7 drinks per week? No    Alcohol Use 8/22/2022   Prescreen: >3 drinks/day or >7 drinks/week? No   Prescreen: >3 drinks/day or >7 drinks/week? -     Reviewed orders with patient.  Reviewed health maintenance and updated orders accordingly - Yes  Patient Active Problem List   Diagnosis     Persistent insomnia     allergic DERMATITIS NOS     [facility]     [facility]     Migraine headache     PMDD (premenstrual dysphoric disorder)     Trichotillomania     CARDIOVASCULAR SCREENING; LDL GOAL LESS THAN 160     History of ovarian cyst     Generalized anxiety  disorder     Chronic pain syndrome     ASD (atrial septal defect)     Chronic pain     Esophageal reflux     Non morbid obesity, unspecified obesity type     Benign hypermobility syndrome     Myalgia and myositis, unspecified     Myofascial pain     Recurrent major depression in partial remission (H)     Right inguinal hernia     S/P right inguinal hernia repair, follow-up exam     Morbid obesity (H)     Mild recurrent major depression (H)     Past Surgical History:   Procedure Laterality Date     BIOPSY       COLONOSCOPY       HERNIA REPAIR  Feb 2021     IR CERVICAL EPIDURAL STEROID INJECTION  9/7/2012     IR CERVICAL EPIDURAL STEROID INJECTION  10/2/2012     ZZC REPR ASD OSTIUM PREMUM      Dr. Silverio       Social History     Tobacco Use     Smoking status: Never Smoker     Smokeless tobacco: Never Used   Substance Use Topics     Alcohol use: Not Currently     Comment: 1-2 a month     Family History   Problem Relation Age of Onset     C.A.D. Mother         heart surgery to close hole in heart     Cardiovascular Mother      Hypertension Mother      Depression Mother      Anxiety Disorder Mother      Heart Failure Mother      Cerebrovascular Disease Father      Hypertension Father      Myocardial Infarction Maternal Grandfather          Current Outpatient Medications   Medication Sig Dispense Refill     desvenlafaxine (PRISTIQ) 100 MG 24 hr tablet Take 1 tablet (100 mg) by mouth daily 90 tablet 1     lamoTRIgine (LAMICTAL) 100 MG tablet TAKE 1 TABLET BY MOUTH WITH  MG TABLET ONCE DAILY       lamoTRIgine (LAMICTAL) 200 MG tablet Take 1 tablet (200 mg) by mouth daily 90 tablet 1     levalbuterol (XOPENEX HFA) 45 MCG/ACT inhaler INHALE 1-2 PUFFS BY MOUTH EVERY 4 HOURS IF NEEDED FOR SHORTNESS OF BREATH OR WHEEZING (COUGH).       LORazepam (ATIVAN) 1 MG tablet TAKE 1 TABLET BY MOUTH THREE TIMES A DAY AS DIRECTED       norethindrone-ethinyl estradiol (MICROGESTIN 1/20) 1-20 MG-MCG tablet Take 1 tablet by mouth daily  63 tablet 3     TRINTELLIX 5 MG tablet Take 5 mg by mouth daily       Allergies   Allergen Reactions     Artificial Sweetner (Informational Only) [Artificial Sweetner (Informational Only) ]      Chocolate Flavor Other (See Comments)     Recent Labs   Lab Test 08/22/22  1003 08/23/21  1634 08/22/21  1235 07/16/20  1422 06/05/20  0658 06/04/20  0927 06/03/20  1115 04/20/20  1146 09/09/19  0852 09/20/17  0834 09/16/15  0912   0000   A1C 5.6  --   --   --   --   --   --   --   --   --   --   --    LDL  --   --   --   --   --   --   --   --  132* 111* 121  --    HDL  --   --   --   --   --   --   --   --  72 87 81  --    TRIG  --   --   --   --   --   --   --   --  82 81 171*  --    ALT  --   --   --   --  13 15 18   < > 18  --   --   --    CR  --   --  0.70 0.90 0.68 0.74 0.68   < > 0.62  --   --   --    GFRESTIMATED  --   --  >90 80 >90 >90 >90   < > >90  --   --   --    GFRESTBLACK  --   --   --  >90 >90 >90 >90   < > >90  --   --   --    POTASSIUM  --   --  3.6 4.1  --   --   --   --  4.1  --   --    < >   TSH  --  3.91  --  3.58  --   --   --   --  2.93  --   --    < >    < > = values in this interval not displayed.        Breast Cancer Screening:  Breast CA Risk Assessment (FHS-7) 8/22/2022   Do you have a family history of breast, colon, or ovarian cancer? No / Unknown     Mammogram Screening - Offered annual screening and updated Health Maintenance for mutual plan based on risk factor consideration. Pertinent mammograms are reviewed under the imaging tab.    History of abnormal Pap smear: NO - age 30-65 PAP every 5 years with negative HPV co-testing recommended  PAP / HPV Latest Ref Rng & Units 8/20/2018 9/16/2015 9/26/2012   PAP (Historical) - NIL NIL NIL   HPV16 NEG:Negative Negative - -   HPV18 NEG:Negative Negative - -   HRHPV NEG:Negative Negative - -   Next PAP due in 2023.    Reviewed and updated as needed this visit by clinical staff   Tobacco  Allergies  Meds   Med Hx  Surg Hx  Fam Hx  Soc Hx         Reviewed and updated as needed this visit by Provider     Meds     Fam Hx           OB History    Para Term  AB Living   1 1 1 0 0 1   SAB IAB Ectopic Multiple Live Births   0 0 0 0 1      # Outcome Date GA Lbr Antoine/2nd Weight Sex Delivery Anes PTL Lv   1 Term 20 38w4d 07:30 / 01:50 3.98 kg (8 lb 12.4 oz) M Vag-Spont EPI N CHRISTY      Name: AMOS AN-YIFAN      Apgar1: 8  Apgar5: 9      Obstetric Comments   Brock       Review of Systems   Constitutional: Negative for chills and fever.   HENT: Negative for congestion, ear pain, hearing loss and sore throat.    Eyes: Negative for pain and visual disturbance.   Respiratory: Positive for cough. Negative for shortness of breath.    Cardiovascular: Negative for chest pain, palpitations and peripheral edema.   Gastrointestinal: Negative for abdominal pain, constipation, diarrhea, heartburn, hematochezia and nausea.   Breasts:  Negative for tenderness, breast mass and discharge.   Genitourinary: Positive for pelvic pain. Negative for dysuria, frequency, genital sores, hematuria, urgency, vaginal bleeding and vaginal discharge.   Musculoskeletal: Positive for myalgias. Negative for arthralgias and joint swelling.   Skin: Negative for rash.   Neurological: Negative for dizziness, weakness, headaches and paresthesias.   Psychiatric/Behavioral: Negative for mood changes. The patient is nervous/anxious.      CONSTITUTIONAL: POSITIVE for weight gain. NEGATIVE for fever, chills, change in appetite.  INTEGUMENTARU/SKIN: POSITIVE for large number of skin lesions/moles, normally sees dermatology annually - hasn't since covid. NEGATIVE for other worrisome rashes, moles or lesions  EYES: NEGATIVE for vision changes or irritation  ENT: NEGATIVE for ear, mouth and throat problems  RESP: NEGATIVE for significant cough or SOB  BREAST: NEGATIVE for masses, tenderness or discharge  CV: NEGATIVE for chest pain, palpitations or peripheral edema  GI: NEGATIVE for  "nausea, abdominal pain, heartburn, or change in bowel habits. POSITIVE for occasional feeling \"popping\" sensation in left lower pelvic/groin area.   : NEGATIVE for unusual urinary or vaginal symptoms. Periods are regular.  MUSCULOSKELETAL: POSITIVE for numbness and tingling in left lower extremity (see below). NEGATIVE for other significant arthralgias or myalgia  NEURO: NEGATIVE for weakness, dizziness or paresthesias  PSYCHIATRIC: POSITIVE for chronic depression/anxiety. NEGATIVE for changes in mood or affect     Weight gain - gaining weight since last pregnancy - up 30 lbs. Reports she has \"tried everything\", tried weight watchers in the past, had some success with keto diet. Difficulty with increasing phys activity due to chronic pain issues right now. Difficulty meal prepping and eating healthy with her 2 year old son at home.    Left lower extremity numb/tingling - ongoing since delivery of her son 2 years ago. Evaluated in the past sees PT currently, then plans to see pelvic floor therapist.     ?Hernia - had inguinal hernia in the past, had surgery on Rt inguinal hernia with no further symptoms since. She is worried she is getting one on the left groin now.       OBJECTIVE:   BP (!) 146/90 (BP Location: Right arm, Patient Position: Chair, Cuff Size: Adult Large)   Pulse 73   Temp 98.6  F (37  C) (Oral)   Ht 1.691 m (5' 6.58\")   Wt 103.5 kg (228 lb 3.2 oz)   LMP  (LMP Unknown)   SpO2 98%   Breastfeeding No   BMI 36.20 kg/m    Physical Exam  GENERAL: healthy, alert and no distress  EYES: Eyes grossly normal to inspection, PERRL and conjunctivae and sclerae normal  HENT: ear canals and TM's normal, nose and mouth without ulcers or lesions  NECK: no adenopathy, no asymmetry, masses, or scars and thyroid normal to palpation  RESP: lungs clear to auscultation - no rales, rhonchi or wheezes  BREAST: normal without masses, tenderness or nipple discharge and no palpable axillary masses or adenopathy  CV: " regular rate and rhythm, normal S1 S2, no S3 or S4, no murmur, click or rub, no peripheral edema and peripheral pulses strong  ABDOMEN: soft, nontender, no hepatosplenomegaly, no masses and bowel sounds normal  MS: no gross musculoskeletal defects noted, no edema  SKIN: no suspicious lesions or rashes  NEURO: Normal strength and tone, mentation intact and speech normal  PSYCH: mentation appears normal, affect normal/bright    Diagnostic Test Results:  Labs reviewed in Epic  Results for orders placed or performed in visit on 08/22/22   Hemoglobin A1c     Status: Normal   Result Value Ref Range    Hemoglobin A1C 5.6 0.0 - 5.6 %   CBC with platelets and differential     Status: Abnormal   Result Value Ref Range    WBC Count 6.1 4.0 - 11.0 10e3/uL    RBC Count 4.97 3.80 - 5.20 10e6/uL    Hemoglobin 12.9 11.7 - 15.7 g/dL    Hematocrit 41.3 35.0 - 47.0 %    MCV 83 78 - 100 fL    MCH 26.0 (L) 26.5 - 33.0 pg    MCHC 31.2 (L) 31.5 - 36.5 g/dL    RDW 14.9 10.0 - 15.0 %    Platelet Count 252 150 - 450 10e3/uL    % Neutrophils 49 %    % Lymphocytes 41 %    % Monocytes 7 %    % Eosinophils 2 %    % Basophils 1 %    Absolute Neutrophils 3.0 1.6 - 8.3 10e3/uL    Absolute Lymphocytes 2.5 0.8 - 5.3 10e3/uL    Absolute Monocytes 0.5 0.0 - 1.3 10e3/uL    Absolute Eosinophils 0.1 0.0 - 0.7 10e3/uL    Absolute Basophils 0.0 0.0 - 0.2 10e3/uL   [id number] - Urine Drug Confirmation Panel (Comprehensive)     Status: None (In process)    Narrative    The following orders were created for panel order [id number] - Urine Drug Confirmation Panel (Comprehensive).  Procedure                               Abnormality         Status                     ---------                               -----------         ------                     Urine Drug Confirmation ...[[id number]]                      In process                 Urine Creatinine for Davis...[[id number]]                      In process                   Please view results for these tests on the  individual orders.   CBC with platelets and differential     Status: Abnormal    Narrative    The following orders were created for panel order CBC with platelets and differential.  Procedure                               Abnormality         Status                     ---------                               -----------         ------                     CBC with platelets and d...[[id number]]  Abnormal            Final result                 Please view results for these tests on the individual orders.       BP Readings from Last 6 Encounters:   08/22/22 (!) 146/90   06/17/22 130/86   10/29/21 136/88   08/23/21 136/88   08/22/21 (!) 152/99   07/21/21 122/84       ASSESSMENT/PLAN:   Danyelle was seen today for physical.    Diagnoses and all orders for this visit:      Routine general medical examination at a health care facility  -     *MA Screening Digital Bilateral; Future      Morbid obesity (H)  Chronic difficulty with weight management, some success with diet changes in the past but having more difficulty with stressors at home. Weight is impacting her mental health, and likely, HTN, DM and other chronic conditions.    -     Comprehensive Weight Management; Future   -     Hemoglobin A1c; Future   -     TSH with free T4 reflex; Future   -     Lipid panel reflex to direct LDL Fasting; Future    Paresthesia  L leg symptoms could be related to pelvic floor dysfunction/instability being treated by PT currently, labs today to rule out deficiency or DM given her weight gain.   -     Vitamin B12; Future   -     Hemoglobin A1c; Future    Generalized anxiety disorder / Mild recurrent major depression (H)  Chronic/worsening, changes at home with son behavioral issues, managed by outside  provider. Weight gain has significantly impacted mental health.    -     Continue working with outside mental health provider.    -     TSH with free T4 reflex; Future   -     Comprehensive Weight Management; Future    Oral contraceptive  "pill surveillance  No concerns, med refilled for 1 yr.   -     norethindrone-ethinyl estradiol (MICROGESTIN 1/20) 1-20 MG-MCG tablet; Take 1 tablet by mouth daily    Elevated blood pressure reading without diagnosis of hypertension  Hx of readings in chart persistently above 130/90, reports high BP during pregnancies, never on medications but had home BP monitor. ? White coat HTN, pt to check readings at home and follow up in 1 month, working on weight loss.    -     Comprehensive metabolic panel (BMP + Alb, Alk Phos, ALT, AST, Total. Bili, TP); Future   -     Comprehensive Weight Management; Future   -     Hemoglobin A1c; Future   -     Lipid panel reflex to direct LDL Fasting; Future    Encounter for long-term (current) use of medications  -     REVIEW OF HEALTH MAINTENANCE PROTOCOL ORDERS   -     [id number] - Urine Drug Confirmation Panel (Comprehensive); Future    History of iron deficiency anemia  Pt reports hx of anemia and needed to take iron. Requesting lab check today.  -     CBC with platelets and differential; Future  -     Iron and iron binding capacity; Future    Patient has been advised of split billing requirements and indicates understanding: Yes    COUNSELING:  Reviewed preventive health counseling, as reflected in patient instructions  Special attention given to:        Regular exercise       Healthy diet/nutrition    Estimated body mass index is 36.2 kg/m  as calculated from the following:    Height as of this encounter: 1.691 m (5' 6.58\").    Weight as of this encounter: 103.5 kg (228 lb 3.2 oz).    Weight management plan: Patient referred to endocrine and/or weight management specialty    She reports that she has never smoked. She has never used smokeless tobacco.      Wt Readings from Last 5 Encounters:   08/22/22 103.5 kg (228 lb 3.2 oz)   06/17/22 103.9 kg (229 lb)   12/28/21 99.8 kg (220 lb)   11/16/21 99.3 kg (219 lb)   10/29/21 100.2 kg (221 lb)     Counseling Resources:  ATP IV " Guidelines  Pooled Cohorts Equation Calculator  Breast Cancer Risk Calculator  BRCA-Related Cancer Risk Assessment: FHS-7 Tool  FRAX Risk Assessment  ICSI Preventive Guidelines  Dietary Guidelines for Americans, 2010  USDA's MyPlate  ASA Prophylaxis  Lung CA Screening    ASHLEY Liz [facility]

## 2022-08-23 LAB
ALBUMIN SERPL-MCNC: 2.8 G/DL (ref 3.4–5)
ALP SERPL-CCNC: 74 U/L (ref 40–150)
ALT SERPL W P-5'-P-CCNC: 29 U/L (ref 0–50)
ANION GAP SERPL CALCULATED.3IONS-SCNC: 10 MMOL/L (ref 3–14)
AST SERPL W P-5'-P-CCNC: 17 U/L (ref 0–45)
BILIRUB SERPL-MCNC: 0.2 MG/DL (ref 0.2–1.3)
BUN SERPL-MCNC: 12 MG/DL (ref 7–30)
CALCIUM SERPL-MCNC: 8.4 MG/DL (ref 8.5–10.1)
CHLORIDE BLD-SCNC: 107 MMOL/L (ref 94–109)
CHOLEST SERPL-MCNC: 230 MG/DL
CO2 SERPL-SCNC: 21 MMOL/L (ref 20–32)
CREAT SERPL-MCNC: 0.63 MG/DL (ref 0.52–1.04)
FASTING STATUS PATIENT QL REPORTED: YES
GFR SERPL CREATININE-BSD FRML MDRD: >90 ML/MIN/1.73M2
GLUCOSE BLD-MCNC: 92 MG/DL (ref 70–99)
HDLC SERPL-MCNC: 70 MG/DL
LDLC SERPL CALC-MCNC: 132 MG/DL
NONHDLC SERPL-MCNC: 160 MG/DL
POTASSIUM BLD-SCNC: 3.8 MMOL/L (ref 3.4–5.3)
PROT SERPL-MCNC: 7.2 G/DL (ref 6.8–8.8)
SODIUM SERPL-SCNC: 138 MMOL/L (ref 133–144)
TRIGL SERPL-MCNC: 141 MG/DL
TSH SERPL DL<=0.005 MIU/L-ACNC: 2.92 MU/L (ref 0.4–4)

## 2022-08-24 NOTE — TELEPHONE ENCOUNTER
Emailed completed form to patients email.    Placed from in stat scanning.    JUAN Arrieta  [facility]

## 2022-08-26 NOTE — TELEPHONE ENCOUNTER
Healing well. Routing My Chart message to Marsha Fernandez    Patient sent after visit summaries from Urgent care visits.    Patient has appointment today at 3 pm.    RN replied to patient via [employer]. See message for details.     Tony Hallman RN, BSN, PHN  [facility]: [place]

## 2022-08-30 ENCOUNTER — MYC MEDICAL ADVICE (OUTPATIENT)
Dept: FAMILY MEDICINE | Facility: CLINIC | Age: 42
End: 2022-08-30

## 2022-08-30 DIAGNOSIS — E88.09 HYPOALBUMINEMIA: ICD-10-CM

## 2022-08-30 DIAGNOSIS — E83.51 HYPOCALCEMIA: Primary | ICD-10-CM

## 2022-08-30 RX ORDER — CHOLECALCIFEROL (VITAMIN D3) 50 MCG
1 TABLET ORAL DAILY
Qty: 90 TABLET | Refills: 3 | Status: SHIPPED | OUTPATIENT
Start: 2022-08-30 | End: 2023-08-25

## 2022-08-30 NOTE — TELEPHONE ENCOUNTER
Called patient and discussed lab results over the phone 8/30 and sent [employer] message with next steps.     Started Ca and Vit D supplement and rechecking labs prior to her follow up appointment in October.  Rechecking CBC, CMP  Checking Vitamin D, Parathyroid and UA, albumin/Cr.    ASHLEY Liz CNP

## 2022-09-26 ENCOUNTER — HOSPITAL ENCOUNTER (OUTPATIENT)
Dept: ULTRASOUND IMAGING | Facility: HOSPITAL | Age: 42
Discharge: HOME | End: 2022-09-26
Attending: SURGERY | Admitting: SURGERY
Payer: COMMERCIAL

## 2022-09-26 DIAGNOSIS — R10.32 GROIN DISCOMFORT, LEFT: ICD-10-CM

## 2022-09-26 PROCEDURE — 76705 ECHO EXAM OF ABDOMEN: CPT

## 2022-09-29 ENCOUNTER — LAB (OUTPATIENT)
Dept: LAB | Facility: CLINIC | Age: 42
End: 2022-09-29
Payer: COMMERCIAL

## 2022-09-29 DIAGNOSIS — E83.51 HYPOCALCEMIA: ICD-10-CM

## 2022-09-29 DIAGNOSIS — E88.09 HYPOALBUMINEMIA: ICD-10-CM

## 2022-09-29 LAB
ALBUMIN SERPL-MCNC: 3 G/DL (ref 3.4–5)
ALBUMIN UR-MCNC: NEGATIVE MG/DL
ALP SERPL-CCNC: 72 U/L (ref 40–150)
ALT SERPL W P-5'-P-CCNC: 23 U/L (ref 0–50)
ANION GAP SERPL CALCULATED.3IONS-SCNC: 6 MMOL/L (ref 3–14)
APPEARANCE UR: CLEAR
AST SERPL W P-5'-P-CCNC: 13 U/L (ref 0–45)
BACTERIA #/AREA URNS HPF: ABNORMAL /HPF
BASOPHILS # BLD AUTO: 0 10E3/UL (ref 0–0.2)
BASOPHILS NFR BLD AUTO: 1 %
BILIRUB SERPL-MCNC: 0.3 MG/DL (ref 0.2–1.3)
BILIRUB UR QL STRIP: NEGATIVE
BUN SERPL-MCNC: 12 MG/DL (ref 7–30)
CALCIUM SERPL-MCNC: 8.8 MG/DL (ref 8.5–10.1)
CHLORIDE BLD-SCNC: 107 MMOL/L (ref 94–109)
CO2 SERPL-SCNC: 26 MMOL/L (ref 20–32)
COLOR UR AUTO: YELLOW
CREAT SERPL-MCNC: 0.71 MG/DL (ref 0.52–1.04)
CREAT UR-MCNC: 93 MG/DL
EOSINOPHIL # BLD AUTO: 0.1 10E3/UL (ref 0–0.7)
EOSINOPHIL NFR BLD AUTO: 1 %
ERYTHROCYTE [DISTWIDTH] IN BLOOD BY AUTOMATED COUNT: 14.6 % (ref 10–15)
GFR SERPL CREATININE-BSD FRML MDRD: >90 ML/MIN/1.73M2
GLUCOSE BLD-MCNC: 86 MG/DL (ref 70–99)
GLUCOSE UR STRIP-MCNC: NEGATIVE MG/DL
HCT VFR BLD AUTO: 39.3 % (ref 35–47)
HGB BLD-MCNC: 12.3 G/DL (ref 11.7–15.7)
HGB UR QL STRIP: NEGATIVE
KETONES UR STRIP-MCNC: NEGATIVE MG/DL
LEUKOCYTE ESTERASE UR QL STRIP: NEGATIVE
LYMPHOCYTES # BLD AUTO: 2.3 10E3/UL (ref 0.8–5.3)
LYMPHOCYTES NFR BLD AUTO: 27 %
MCH RBC QN AUTO: 26.1 PG (ref 26.5–33)
MCHC RBC AUTO-ENTMCNC: 31.3 G/DL (ref 31.5–36.5)
MCV RBC AUTO: 83 FL (ref 78–100)
MICROALBUMIN UR-MCNC: 7 MG/L
MICROALBUMIN/CREAT UR: 7.53 MG/G CR (ref 0–25)
MONOCYTES # BLD AUTO: 0.4 10E3/UL (ref 0–1.3)
MONOCYTES NFR BLD AUTO: 5 %
NEUTROPHILS # BLD AUTO: 5.7 10E3/UL (ref 1.6–8.3)
NEUTROPHILS NFR BLD AUTO: 66 %
NITRATE UR QL: NEGATIVE
PH UR STRIP: 7 [PH] (ref 5–7)
PLATELET # BLD AUTO: 237 10E3/UL (ref 150–450)
POTASSIUM BLD-SCNC: 3.6 MMOL/L (ref 3.4–5.3)
PROT SERPL-MCNC: 7.4 G/DL (ref 6.8–8.8)
PTH-INTACT SERPL-MCNC: 47 PG/ML (ref 15–65)
RBC # BLD AUTO: 4.72 10E6/UL (ref 3.8–5.2)
RBC #/AREA URNS AUTO: ABNORMAL /HPF
SODIUM SERPL-SCNC: 139 MMOL/L (ref 133–144)
SP GR UR STRIP: 1.02 (ref 1–1.03)
SQUAMOUS #/AREA URNS AUTO: ABNORMAL /LPF
UROBILINOGEN UR STRIP-ACNC: 0.2 E.U./DL
WBC # BLD AUTO: 8.6 10E3/UL (ref 4–11)
WBC #/AREA URNS AUTO: ABNORMAL /HPF

## 2022-09-29 PROCEDURE — 82306 VITAMIN D 25 HYDROXY: CPT

## 2022-09-29 PROCEDURE — 85025 COMPLETE CBC W/AUTO DIFF WBC: CPT

## 2022-09-29 PROCEDURE — 36415 COLL VENOUS BLD VENIPUNCTURE: CPT

## 2022-09-29 PROCEDURE — 83970 ASSAY OF PARATHORMONE: CPT

## 2022-09-29 PROCEDURE — 81001 URINALYSIS AUTO W/SCOPE: CPT

## 2022-09-29 PROCEDURE — 80053 COMPREHEN METABOLIC PANEL: CPT

## 2022-09-29 PROCEDURE — 82043 UR ALBUMIN QUANTITATIVE: CPT

## 2022-09-30 LAB — DEPRECATED CALCIDIOL+CALCIFEROL SERPL-MC: 43 UG/L (ref 20–75)

## 2022-10-07 ENCOUNTER — OFFICE VISIT (OUTPATIENT)
Dept: FAMILY MEDICINE | Facility: CLINIC | Age: 42
End: 2022-10-07
Payer: COMMERCIAL

## 2022-10-07 VITALS
OXYGEN SATURATION: 99 % | WEIGHT: 228.8 LBS | TEMPERATURE: 98.8 F | HEIGHT: 67 IN | BODY MASS INDEX: 35.91 KG/M2 | DIASTOLIC BLOOD PRESSURE: 96 MMHG | SYSTOLIC BLOOD PRESSURE: 142 MMHG | HEART RATE: 68 BPM | RESPIRATION RATE: 16 BRPM

## 2022-10-07 DIAGNOSIS — E66.01 MORBID OBESITY (H): Primary | ICD-10-CM

## 2022-10-07 DIAGNOSIS — I10 BENIGN ESSENTIAL HYPERTENSION: ICD-10-CM

## 2022-10-07 DIAGNOSIS — F33.0 MILD RECURRENT MAJOR DEPRESSION (H): ICD-10-CM

## 2022-10-07 DIAGNOSIS — R05.3 PERSISTENT COUGH: ICD-10-CM

## 2022-10-07 DIAGNOSIS — F41.1 GENERALIZED ANXIETY DISORDER: ICD-10-CM

## 2022-10-07 DIAGNOSIS — Q21.10 ASD (ATRIAL SEPTAL DEFECT): ICD-10-CM

## 2022-10-07 DIAGNOSIS — R01.1 HEART MURMUR: ICD-10-CM

## 2022-10-07 PROCEDURE — 90471 IMMUNIZATION ADMIN: CPT

## 2022-10-07 PROCEDURE — 90686 IIV4 VACC NO PRSV 0.5 ML IM: CPT

## 2022-10-07 PROCEDURE — 93000 ELECTROCARDIOGRAM COMPLETE: CPT

## 2022-10-07 PROCEDURE — 99214 OFFICE O/P EST MOD 30 MIN: CPT | Mod: 25

## 2022-10-07 RX ORDER — LEVALBUTEROL TARTRATE 45 UG/1
AEROSOL, METERED ORAL
Qty: 15 G | Refills: 3 | Status: SHIPPED | OUTPATIENT
Start: 2022-10-07 | End: 2023-04-21

## 2022-10-07 ASSESSMENT — PATIENT HEALTH QUESTIONNAIRE - PHQ9
10. IF YOU CHECKED OFF ANY PROBLEMS, HOW DIFFICULT HAVE THESE PROBLEMS MADE IT FOR YOU TO DO YOUR WORK, TAKE CARE OF THINGS AT HOME, OR GET ALONG WITH OTHER PEOPLE: NOT DIFFICULT AT ALL
SUM OF ALL RESPONSES TO PHQ QUESTIONS 1-9: 5
SUM OF ALL RESPONSES TO PHQ QUESTIONS 1-9: 5

## 2022-10-07 ASSESSMENT — PAIN SCALES - GENERAL: PAINLEVEL: MILD PAIN (2)

## 2022-10-07 NOTE — PATIENT INSTRUCTIONS
Start Multivitamin WITH iron, try to increase iron in diet.    EKG today - normal and no changes from EKG in 2010.     Next time we get labs - will check Ferritin     Mucinex (guaifeneson) - 1200 mg twice/day for 3-7 days.   Increase your hydration/water intake (at least 4 x 8 ounce glasses/day)

## 2022-10-07 NOTE — PROGRESS NOTES
Assessment & Plan     Morbid obesity (H)  Chronic. Stable/improving. BMI 36.29 today, no changes from visit in August but patient has completed steps to make significant lifestyle changes - joined weight watchers, scheduled this follow up visit as recommended.     Benign essential hypertension  Chronic. Unchanged, based on home report of 130's/80's patient is stage 1. Starting to work on lifestyle modifications, will follow up in 6 months for reassessment.    Mild recurrent major depression (H)  Generalized anxiety disorder  Chronic. Unchanged. Works with mental health provider.     Persistent cough  Acute/subacute. Not improving. Clinical picture consistent with lingering cough/bronchitis after viral URI. Given patients history of ASD repair, HTN and obesity, hx pre-eclampsia, checking EKG to eval for arrhythmia, RVH, or LVH.   - levalbuterol (XOPENEX HFA) 45 MCG/ACT inhaler  Dispense: 15 g; Refill: 3  - EKG 12-lead complete w/read - Clinics    Heart murmur  ? Unclear chronicity. History of ASD repair and per patient it has sometimes been present on physical exam but not consistently. EKG to rule out RVH.  - EKG 12-lead complete w/read - Clinics    ASD (atrial septal defect)  History of ASD repair in childhood - no longer present, last echo showed intact atrial septum.     Discussed results from recent lab work that show mildly low albumin, MCH, MCHC. Pt has history of iron deficiency anemia - on iron supplement in the past. Recent iron studies were WDL.   - Advised Patient should switch to a MVI with iron, follow up if fatigue, weakness, bleeding.   - will check ferritin next time we do lab draw but no need to additional stick today.   - albumin low but patient kidney and liver function WDL. Albumin trend is stable.       Review of external notes as documented elsewhere in note  Review of the result(s) of each unique test - EKG         See Patient Instructions  Start Multivitamin WITH iron, try to increase iron  in diet.    EKG today - normal and no changes from EKG in 2010.     Next time we get labs - will check Ferritin     Mucinex (guaifeneson) - 1200 mg twice/day for 3-7 days.   Increase your hydration/water intake (at least 4 x 8 ounce glasses/day)    No follow-ups on file.    ASHLEY Liz CNP [facility]    Alex Bassett is a 42 year old, presenting for the following health issues:  Results and Blood Pressure Check      History of Present Illness       Reason for visit:  Follow up on labs    She eats 2-3 servings of fruits and vegetables daily.She consumes 0 sweetened beverage(s) daily.She exercises with enough effort to increase her heart rate 9 or less minutes per day.  She exercises with enough effort to increase her heart rate 3 or less days per week.   She is taking medications regularly.    Today's PHQ-9         PHQ-9 Total Score: 5    PHQ-9 Q9 Thoughts of better off dead/self-harm past 2 weeks :   Not at all    How difficult have these problems made it for you to do your work, take care of things at home, or get along with other people: Not difficult at all    Obesity + HTN  checking BP at home, 130-140 / 80-90.  Signed up for weight watchers. Had lost 4 lbs, then had significant stress at work, her 2 year old son needs eye surgery this week so she has been going off diet.    Her lab results from August 2022 showed elevated total cholesterol and LDL - consistent with prior lipid panels over past 6 years. Patient reports she was unaware of high lipid levels.   She is working on diet (WW) and open to increasing exercise slowly but endorses difficulty finding time with 2 year old at home and working full time.     +hx of pre-eclampsia, tx with labetolol post partum, caused dizziness (lowest /60 noted 7/16/2020 in Dr. Prescott note), stopped med.     + hx of anxiety, tx with propanerol low dose, wasn't effective, stopped.     Wt Readings from Last 5 Encounters:  "  10/07/22 103.8 kg (228 lb 12.8 oz)   08/22/22 103.5 kg (228 lb 3.2 oz)   06/17/22 103.9 kg (229 lb)   12/28/21 99.8 kg (220 lb)   11/16/21 99.3 kg (219 lb)     BP Readings from Last 6 Encounters:   10/07/22 (!) 142/96   08/22/22 (!) 146/90   06/17/22 130/86   10/29/21 136/88   08/23/21 136/88   08/22/21 (!) 152/99       Review of Systems   Constitutional, HEENT, cardiovascular, pulmonary, gi and gu systems are negative, except as otherwise noted.    + lingering COUGH for 1-2 months, feels wet/rattling in her chest and shortness of breath. Denies chest pain, denies SOB if not coughing. Denies lower extremity edema. No dizzines, lightheadedness. + occasional fluttering/palpiations in chest - attributes to anxiety.     Improving but still persistent. No Asthma hx. Went to urgency room, given inhaler, it was helpful when she was having coughing fits.     + hx of TAMEKA repair in childhood at 4 y/o. Last EKG 10/14/2020 was NSR. Last echo 2021 showed atrial septum intact.  ER visit 2011 with palpitations and mild trop elevation:  1. Normal left ventricular size with low normal global systolic   function (LVEF=60%). There are no regional wall motion abnormalities.   2. Normal size right ventricle with normal overall systolic function   (RVEF = 55%).  3. Normal resting perfusion study.   4. Mild left atrial enlargement. There is no evidence of recurrent   atrial shunting. The calculated QP/QS is 1.1:1.0.   5. There is no late gadolinium enhancement of the LV or RV myocardium   to suggest prior infarct, inflammation, or infiltration.           Objective    BP (!) 142/96 (BP Location: Right arm, Patient Position: Chair, Cuff Size: Adult Large)   Pulse 68   Temp 98.8  F (37.1  C) (Oral)   Resp 16   Ht 1.691 m (5' 6.58\")   Wt 103.8 kg (228 lb 12.8 oz)   LMP  (LMP Unknown)   SpO2 99%   Breastfeeding No   BMI 36.29 kg/m    Body mass index is 36.29 kg/m .  Physical Exam   GENERAL: healthy, alert and no distress  EYES: Eyes " grossly normal to inspection, PERRL and conjunctivae and sclerae normal  NECK: no adenopathy, no asymmetry, masses, or scars and thyroid normal to palpation  RESP: lungs clear to auscultation - no rales, rhonchi or wheezes  CV: POSITIVE grade 2 murmur. No rub, click, thrill. Regular rate and rhythm, , no peripheral edema and peripheral pulses strong, normal cap refill.   ABDOMEN: soft, nontender, no hepatosplenomegaly, no masses and bowel sounds normal  MS: no gross musculoskeletal defects noted, no edema  NEURO: Normal strength and tone, mentation intact and speech normal  PSYCH: mentation appears normal, affect normal/bright other than a few tearful periods while discussing her toddler sons health and stress at work.    No results found for this or any previous visit (from the past 24 hour(s)).

## 2022-10-21 ENCOUNTER — OFFICE VISIT (OUTPATIENT)
Dept: SURGERY | Facility: CLINIC | Age: 42
End: 2022-10-21
Payer: COMMERCIAL

## 2022-10-21 ENCOUNTER — HOSPITAL ENCOUNTER (OUTPATIENT)
Facility: AMBULATORY SURGERY CENTER | Age: 42
End: 2022-10-21
Attending: SURGERY
Payer: COMMERCIAL

## 2022-10-21 ENCOUNTER — MYC MEDICAL ADVICE (OUTPATIENT)
Dept: SURGERY | Facility: CLINIC | Age: 42
End: 2022-10-21

## 2022-10-21 ENCOUNTER — TELEPHONE (OUTPATIENT)
Dept: SURGERY | Facility: CLINIC | Age: 42
End: 2022-10-21

## 2022-10-21 VITALS
DIASTOLIC BLOOD PRESSURE: 91 MMHG | SYSTOLIC BLOOD PRESSURE: 152 MMHG | HEART RATE: 111 BPM | BODY MASS INDEX: 36.48 KG/M2 | WEIGHT: 230 LBS

## 2022-10-21 DIAGNOSIS — K40.90 LEFT INGUINAL HERNIA: Primary | ICD-10-CM

## 2022-10-21 PROCEDURE — 99214 OFFICE O/P EST MOD 30 MIN: CPT | Performed by: SURGERY

## 2022-10-21 RX ORDER — HEPARIN SODIUM 5000 [USP'U]/.5ML
5000 INJECTION, SOLUTION INTRAVENOUS; SUBCUTANEOUS
Status: CANCELLED | OUTPATIENT
Start: 2022-10-21

## 2022-10-21 NOTE — LETTER
10/21/2022         RE: Danyelle Canales  [address] [place] [address]        Dear Colleague,    Thank you for referring your patient, Danyelle Canales, to the [facility]. Please see a copy of my visit note below.    Dear Katlyn Samano  I was asked to see this patient by Katlyn Orozco for please see below.  I have seen Danyelle Canales and as you know his chief complaint is a popping feeling in the left groin and ultrasound show a small hernia.    Other than the popping feelin no other pain.     HPI:  Patient is a 42 year old female  with complaints see above      Review Of Systems  Respiratory: No shortness of breath, dyspnea on exertion, cough, or hemoptysis  Cardiovascular: had ASD repair as a child.   Gastrointestinal: constipation  Endocrine: negative  :  negative    10 Point review of systems all others are negative.   LMP  (LMP Unknown)     Past Medical History:   Diagnosis Date     ASD (atrial septal defect)     repaired surgically as a child     Depressive disorder      Headache      History of blood transfusion 1985 1985 during open heart surgery     Hypertension     higher results at pain clinic, want to confirm ok     Insomnia, unspecified      Other forms of migraine, without mention of intractable migraine without mention of status migrainosus        Past Surgical History:   Procedure Laterality Date     BIOPSY       COLONOSCOPY       HERNIA REPAIR  Feb 2021     IR CERVICAL EPIDURAL STEROID INJECTION  9/7/2012     IR CERVICAL EPIDURAL STEROID INJECTION  10/2/2012     ZZC REPR ASD OSTIUM PREMUM      Dr. Silverio       Social History     Socioeconomic History     Marital status:      Spouse name: Chan Canales     Number of children: 0     Years of education: 12     Highest education level: Not on file   Occupational History     Occupation: customer service     Employer: [employer]   Tobacco Use     Smoking  status: Never     Smokeless tobacco: Never   Substance and Sexual Activity     Alcohol use: Not Currently     Comment: 1-2 a month     Drug use: No     Sexual activity: Yes     Partners: Male     Birth control/protection: Pill   Other Topics Concern     Parent/sibling w/ CABG, MI or angioplasty before 65F 55M? Yes     Comment: stroke   Social History Narrative     Not on file     Social Determinants of Health     Financial Resource Strain: Not on file   Food Insecurity: Not on file   Transportation Needs: Not on file   Physical Activity: Not on file   Stress: Not on file   Social Connections: Not on file   Intimate Partner Violence: Not on file   Housing Stability: Not on file       Current Outpatient Medications   Medication Sig Dispense Refill     calcium carbonate (OS-NIDIA) 1500 (600 Ca) MG tablet Take 1 tablet (600 mg) by mouth 2 times daily (with meals) for 360 days 180 tablet 3     lamoTRIgine (LAMICTAL) 100 MG tablet TAKE 1 TABLET BY MOUTH WITH  MG TABLET ONCE DAILY       lamoTRIgine (LAMICTAL) 200 MG tablet Take 1 tablet (200 mg) by mouth daily 90 tablet 1     levalbuterol (XOPENEX HFA) 45 MCG/ACT inhaler INHALE 1-2 PUFFS BY MOUTH EVERY 4 HOURS IF NEEDED FOR SHORTNESS OF BREATH OR WHEEZING (COUGH). 15 g 3     LORazepam (ATIVAN) 1 MG tablet TAKE 1 TABLET BY MOUTH THREE TIMES A DAY AS DIRECTED       MULTIPLE VITAMIN PO        norethindrone-ethinyl estradiol (MICROGESTIN 1/20) 1-20 MG-MCG tablet Take 1 tablet by mouth daily 63 tablet 3     TRINTELLIX 5 MG tablet Take 5 mg by mouth daily       vitamin D3 (CHOLECALCIFEROL) 50 mcg (2000 units) tablet Take 1 tablet (50 mcg) by mouth daily for 360 days 90 tablet 3       Above was reviewed  Physical exam: LMP  (LMP Unknown)    Patient able to get up on table without difficulty.   Patient is alert and orientated.   Head eyes, nose and mouth within normal limits.  Abdomen is abdomen is soft without significant tenderness, masses, organomegaly or guarding  bowel  sounds are positive and no caput medusa noted.  Can feel a small left inguinal hernia and a small umbilical hernias noted.    Skin was warm and pink  Normal Affect    Lower extremity edema is not present.      Study Result    Narrative & Impression   EXAM: US HERNIA EVALUATION  LOCATION: [facility]  DATE/TIME: 9/26/2022 8:42 AM     INDICATION: Groin discomfort, left. History of right inguinal renal artery.  COMPARISON: CT 08/22/2021     TECHNIQUE: Transabdominal ultrasound of the groin during rest and Valsalva.     FINDINGS: Left inguinal 1.2 x 1 cm fascial defect with fat herniation. This increases with Valsalva.                                                                      IMPRESSION:  1.  Left inguinal hernia.     Your colons job is to absorb the liquid in your stool.  As we get older the colon or other medications can slow down the colon and allow the stool to get to firm.  Fiber mixes with your stool and does not allow all the water to get absorbed by the colon.  This will not give you diarrhea in fact I use it for people with diarrhea since it causes the stool to bulk up more and is easier to control.    Below are several fiber options.    For your constipation try using Metamucil or it's generic equivalent 1-2 tablespoons with a large glass of water or juice every day.      You can also use flax seed that is easily obtained at your grocery store. Make sure it is ground up and sprinkle 2 tablespoons on your cereal each morning and drink a large glass of water with it.  You can also mix in yogurt, and even bake in bread or muffins.    Flax seed seems to give less gas and does not have any taste.   If these are not working for you I would be glad to see you back in my clinic to discuss other options.  Call [phone]: to set up an appointment.    Assessment: has a left inguinal hernia    Has an umbilical hernia with fat in it.  Not causing any problems.  So if not bothering  will not fix this but if is bothering then would do this open.  Plan to do patient is wondering how urgent it is and this has fat in it no bowel.   This will get bigger with time. So recommend sometime in the next year.     Risks of surgery include damage to nerves, bleeding, infection, damage to  Vessels, recurrence.  Although mesh is a better long term repair if it gets infected it must be removed.   If the patient has any bacterial infection the week before and is seen by their doctor and started on antibiotics, I can probably still do the surgery if they are vastly improved by the time of surgery, but if the infection starts closer to the surgery date it will be better to cancel and reschedule to a later date.  A cough will also be hard on the repair and uncomfortable post operative.  If the patient is a smoker I did discuss increase risk of recurrence and more pain with the cough.  If the patient is willing to quit smoking would encourage to do so and start at least a week before surgery.  However, if patient is not going to quit then must understand that his repair is more likely to fail.    Risks of surgery discussed including, but not limited to bleeding, infection, recurrence, damage to nerves and what is in the hernia sac.  Risks of anesthesia also discussed.    Discussed massaging hernia back in and using ice if becomes more painful.  If not able to reduce then go to emergency room.  Also discussed hernia belt to use until able to get in for surgery.    Things you will need to do before surgery are getting a preoperative appointment with your primary care doctor to be cleared for surgery.    You will also need a COVID test before surgery and an appointment to see me usually about 2 weeks after the procedure to make sure you are doing well.   Fortunately, when the schedulers call you they will try to get all this set up for you at that one call.     If you have had a positive COVID test in a 90 day window  that would include the date of the procedure, let us know that and will need to see proof of this.    Then you do not need a COVID test.  But if over 90 days you do.      Time spent with the patient with greater that 50% of the time in discussion was 30 minutes.  In discussing the plan.      Weston Mcnamara MD      Again, thank you for allowing me to participate in the care of your patient.        Sincerely,        Weston Mcnamara MD

## 2022-10-21 NOTE — PATIENT INSTRUCTIONS
Study Result    Narrative & Impression   EXAM: US HERNIA EVALUATION  LOCATION: [facility]  DATE/TIME: 9/26/2022 8:42 AM     INDICATION: Groin discomfort, left. History of right inguinal renal artery.  COMPARISON: CT 08/22/2021     TECHNIQUE: Transabdominal ultrasound of the groin during rest and Valsalva.     FINDINGS: Left inguinal 1.2 x 1 cm fascial defect with fat herniation. This increases with Valsalva.                                                                      IMPRESSION:  1.  Left inguinal hernia.     Your colons job is to absorb the liquid in your stool.  As we get older the colon or other medications can slow down the colon and allow the stool to get to firm.  Fiber mixes with your stool and does not allow all the water to get absorbed by the colon.  This will not give you diarrhea in fact I use it for people with diarrhea since it causes the stool to bulk up more and is easier to control.    Below are several fiber options.    For your constipation try using Metamucil or it's generic equivalent 1-2 tablespoons with a large glass of water or juice every day.      You can also use flax seed that is easily obtained at your grocery store. Make sure it is ground up and sprinkle 2 tablespoons on your cereal each morning and drink a large glass of water with it.  You can also mix in yogurt, and even bake in bread or muffins.    Flax seed seems to give less gas and does not have any taste.   If these are not working for you I would be glad to see you back in my clinic to discuss other options.  Call [phone]: to set up an appointment.    Assessment: has a left inguinal hernia    Has an umbilical hernia with fat in it.  Not causing any problems.  So if not bothering will not fix this but if is bothering then would do this open.  Plan to do patient is wondering how urgent it is and this has fat in it no bowel.   This will get bigger with time. So recommend sometime in the  next year.     Risks of surgery include damage to nerves, bleeding, infection, damage to  Vessels, recurrence.  Although mesh is a better long term repair if it gets infected it must be removed.   If the patient has any bacterial infection the week before and is seen by their doctor and started on antibiotics, I can probably still do the surgery if they are vastly improved by the time of surgery, but if the infection starts closer to the surgery date it will be better to cancel and reschedule to a later date.  A cough will also be hard on the repair and uncomfortable post operative.  If the patient is a smoker I did discuss increase risk of recurrence and more pain with the cough.  If the patient is willing to quit smoking would encourage to do so and start at least a week before surgery.  However, if patient is not going to quit then must understand that his repair is more likely to fail.    Risks of surgery discussed including, but not limited to bleeding, infection, recurrence, damage to nerves and what is in the hernia sac.  Risks of anesthesia also discussed.    Discussed massaging hernia back in and using ice if becomes more painful.  If not able to reduce then go to emergency room.  Also discussed hernia belt to use until able to get in for surgery.    Things you will need to do before surgery are getting a preoperative appointment with your primary care doctor to be cleared for surgery.    You will also need a COVID test before surgery and an appointment to see me usually about 2 weeks after the procedure to make sure you are doing well.   Fortunately, when the schedulers call you they will try to get all this set up for you at that one call.     If you have had a positive COVID test in a 90 day window that would include the date of the procedure, let us know that and will need to see proof of this.    Then you do not need a COVID test.  But if over 90 days you do.      HERNIORRHAPHY DISCHARGE  INSTRUCTIONS  DR. DIVINA JACK    Please call [phone], for  [facility] or  for [facility], to schedule a follow up appointment in 2 weeks.       1. You may resume your regular diet when you feel you are ready to. DO NOT drink alcoholic beverages for 24 hours or while you are taking prescription medication.    2. Limit your activities for the first 48 hours. Gradually, increase them as tolerated. You may use stairs. I encourage you to walk as tolerated. No lifting greater that 20 pounds for 3 weeks.    3. You will have some discomfort at the incision sites. This is expected. This should improve over the next 2-3 days. Ice and pain medication will help with this pain. Use prescribed pain medication as instructed.    4. Bruising and mild swelling is normal after surgery. For males it is common to have bruising going into the penis and scrotum. The area below and around the incision(s) will be hard and elevated. This is normal. I call it the healing ridge. This will resolve slowly over the next several months. If you feel the pain is increasing and cannot explain it by increasing activity please call us at [phone].    5. The dressing will often have some blood on it. You may shower 24 hours after surgery. No baths for 2 weeks after surgery. Clean gently over incision site. If clear plastic covering or steri-strip comes off and there is still some bleeding or drainage then cover with gauze or band-aid. If no bleeding, there is no reason to cover site. The abdominal binder may be removed after 24 hours after surgery. You may continue to wear it however for comfort. I suggest  you wear an old t-shirt under the abdominal binder for a more comfortable wear.    6. Avoid Aspirin for the first 72 hours after the procedure. This medication may increase the tendency to bleed.    7. Use the following medications (in addition to your normal meds) as shown:  a. Percocet 5 mg 1-2  every 6 hours as needed for severe pain. This contains 325 mg of Tylenol (acetaminophen) per tablet.  Please do not take more than 4 grams of Tylenol (acetaminophen) per day. For example, you may take 1 Percocet and 1 Tylenol, or 2 Percocet and no Tylenol, or 2 Tylenol and no Percocet every 6 hours.  b. Tylenol (acetaminophen) 500 mg every 6 hours as needed for mild pain. Do not take more than 1000 mg every 6 hours. (see above).  c. Motrin (ibuprofen) 200-800 mg every 6 hours as needed for mild to moderate pain. Take with food.     8. Notify Dr. Mcnamara's clinic at [phone], for  [facility] or  for [facility], to schedule a follow up appointment in 2 weeks.   if:  Your discomfort is not relieved by your pain medication.  You have signs of infection such as temperature above 100.5 degrees orally, chills, or increasing daily discomfort.  Incision site is becoming more red and/or there is purulent drainage.  You have questions or concerns.    9. Please call [phone], for  [facility] or  for [facility] clinic, to schedule a follow up appointment in 2 weeks.   to schedule a follow up appointment in about 2 weeks.    10. When taking narcotics (pain medication more than Tylenol [acetaminophen] and Motrin [ibuprofen]) it is important to keep your stools soft to avoid constipation and pain with straining. This is best done by drinking fluids (non-alcoholic and non-caffeinated) and taking a stool softener (i.e. Metamucil or milk of magnesia). You may be able to use non-narcotics for pain relief especially by the 3rd post- operative day. Tylenol (acetaminophen) 500 mg every 6 hours and/or Motrin (ibuprofen) 200-800 mg every 6 hours. Please do not take more than 4 grams of Tylenol (acetaminophen) per day. Remember your Percocet does have Tylenol (acetaminophen) already in it. Please take Motrin (ibuprofen) with food to help protect the stomach. If  you have a history of stomach ulcers or stomach problems, do not take Motrin (ibuprofen).     11. Do not drive or operate heavy machinery for 24 hours after surgery or when taking narcotics. You may resume driving when feel that you can safely avoid an accident and are not taking narcotics. This is usually 5 to 7 days after surgery. You should not be alone for 24 hours after surgery.    12. Have milk of magnesia available at home so that when you take the pain medications you take 1-2 tablepoons a day, to help reduce problems with constipation.      13. If you have questions after your procedure/surgery please contact Dr Mcnamara's primary clinic in [place]. You can call [phone], your other option is to send us a [facility] message through your [facility] portal to Dr Mcnamara's team. For urgent and non urgent matters these options are best. If your symptoms are emergent or cannot wait, please proceed to [facility] and let them know who you had surgery with.      Patient can receive pain medications that I have ordered and give the first dose in the recovery room as needed.

## 2022-10-21 NOTE — TELEPHONE ENCOUNTER
Type of surgery: HERNIORRHAPHY, INGUINAL, ROBOT-ASSISTED, LAPAROSCOPIC, USING DA ANTONY XI (Left)     Location of surgery: [facility]  Date and time of surgery: 03/15/2023  Surgeon: MARCIE  Pre-Op Appt Date: 03/08/2023  Post-Op Appt Date: 03/31/2023   Packet sent out: Yes  Pre-cert/Authorization completed:  No  Date:

## 2022-10-21 NOTE — TELEPHONE ENCOUNTER
Patient has canceled for 03/15/2023 and rescheduled for 04/19/2023 at the [facility]

## 2022-10-21 NOTE — PROGRESS NOTES
Dear Katlyn Samano  I was asked to see this patient by Katlyn Orozco for please see below.  I have seen Danyelle Canales and as you know his chief complaint is a popping feeling in the left groin and ultrasound show a small hernia.    Other than the popping feelin no other pain.     HPI:  Patient is a 42 year old female  with complaints see above      Review Of Systems  Respiratory: No shortness of breath, dyspnea on exertion, cough, or hemoptysis  Cardiovascular: had ASD repair as a child.   Gastrointestinal: constipation  Endocrine: negative  :  negative    10 Point review of systems all others are negative.   LMP  (LMP Unknown)     Past Medical History:   Diagnosis Date     ASD (atrial septal defect)     repaired surgically as a child     Depressive disorder      Headache      History of blood transfusion 1985 1985 during open heart surgery     Hypertension     higher results at pain clinic, want to confirm ok     Insomnia, unspecified      Other forms of migraine, without mention of intractable migraine without mention of status migrainosus        Past Surgical History:   Procedure Laterality Date     BIOPSY       COLONOSCOPY       HERNIA REPAIR  Feb 2021     IR CERVICAL EPIDURAL STEROID INJECTION  9/7/2012     IR CERVICAL EPIDURAL STEROID INJECTION  10/2/2012     ZZC REPR ASD OSTIUM PREMUM      Dr. Silverio       Social History     Socioeconomic History     Marital status:      Spouse name: Chan Canales     Number of children: 0     Years of education: 12     Highest education level: Not on file   Occupational History     Occupation: customer service     Employer: [employer]   Tobacco Use     Smoking status: Never     Smokeless tobacco: Never   Substance and Sexual Activity     Alcohol use: Not Currently     Comment: 1-2 a month     Drug use: No     Sexual activity: Yes     Partners: Male     Birth control/protection: Pill   Other Topics Concern     Parent/sibling w/  CABG, MI or angioplasty before 65F 55M? Yes     Comment: stroke   Social History Narrative     Not on file     Social Determinants of Health     Financial Resource Strain: Not on file   Food Insecurity: Not on file   Transportation Needs: Not on file   Physical Activity: Not on file   Stress: Not on file   Social Connections: Not on file   Intimate Partner Violence: Not on file   Housing Stability: Not on file       Current Outpatient Medications   Medication Sig Dispense Refill     calcium carbonate (OS-NIDIA) 1500 (600 Ca) MG tablet Take 1 tablet (600 mg) by mouth 2 times daily (with meals) for 360 days 180 tablet 3     lamoTRIgine (LAMICTAL) 100 MG tablet TAKE 1 TABLET BY MOUTH WITH  MG TABLET ONCE DAILY       lamoTRIgine (LAMICTAL) 200 MG tablet Take 1 tablet (200 mg) by mouth daily 90 tablet 1     levalbuterol (XOPENEX HFA) 45 MCG/ACT inhaler INHALE 1-2 PUFFS BY MOUTH EVERY 4 HOURS IF NEEDED FOR SHORTNESS OF BREATH OR WHEEZING (COUGH). 15 g 3     LORazepam (ATIVAN) 1 MG tablet TAKE 1 TABLET BY MOUTH THREE TIMES A DAY AS DIRECTED       MULTIPLE VITAMIN PO        norethindrone-ethinyl estradiol (MICROGESTIN 1/20) 1-20 MG-MCG tablet Take 1 tablet by mouth daily 63 tablet 3     TRINTELLIX 5 MG tablet Take 5 mg by mouth daily       vitamin D3 (CHOLECALCIFEROL) 50 mcg (2000 units) tablet Take 1 tablet (50 mcg) by mouth daily for 360 days 90 tablet 3       Above was reviewed  Physical exam: LMP  (LMP Unknown)    Patient able to get up on table without difficulty.   Patient is alert and orientated.   Head eyes, nose and mouth within normal limits.  Abdomen is abdomen is soft without significant tenderness, masses, organomegaly or guarding  bowel sounds are positive and no caput medusa noted.  Can feel a small left inguinal hernia and a small umbilical hernias noted.    Skin was warm and pink  Normal Affect    Lower extremity edema is not present.      Study Result    Narrative & Impression   EXAM: US HERNIA  EVALUATION  LOCATION: [facility]  DATE/TIME: 9/26/2022 8:42 AM     INDICATION: Groin discomfort, left. History of right inguinal renal artery.  COMPARISON: CT 08/22/2021     TECHNIQUE: Transabdominal ultrasound of the groin during rest and Valsalva.     FINDINGS: Left inguinal 1.2 x 1 cm fascial defect with fat herniation. This increases with Valsalva.                                                                      IMPRESSION:  1.  Left inguinal hernia.     Your colons job is to absorb the liquid in your stool.  As we get older the colon or other medications can slow down the colon and allow the stool to get to firm.  Fiber mixes with your stool and does not allow all the water to get absorbed by the colon.  This will not give you diarrhea in fact I use it for people with diarrhea since it causes the stool to bulk up more and is easier to control.    Below are several fiber options.    For your constipation try using Metamucil or it's generic equivalent 1-2 tablespoons with a large glass of water or juice every day.      You can also use flax seed that is easily obtained at your grocery store. Make sure it is ground up and sprinkle 2 tablespoons on your cereal each morning and drink a large glass of water with it.  You can also mix in yogurt, and even bake in bread or muffins.    Flax seed seems to give less gas and does not have any taste.   If these are not working for you I would be glad to see you back in my clinic to discuss other options.  Call [phone]: to set up an appointment.    Assessment: has a left inguinal hernia    Has an umbilical hernia with fat in it.  Not causing any problems.  So if not bothering will not fix this but if is bothering then would do this open.  Plan to do patient is wondering how urgent it is and this has fat in it no bowel.   This will get bigger with time. So recommend sometime in the next year.     Risks of surgery include damage to nerves,  bleeding, infection, damage to  Vessels, recurrence.  Although mesh is a better long term repair if it gets infected it must be removed.   If the patient has any bacterial infection the week before and is seen by their doctor and started on antibiotics, I can probably still do the surgery if they are vastly improved by the time of surgery, but if the infection starts closer to the surgery date it will be better to cancel and reschedule to a later date.  A cough will also be hard on the repair and uncomfortable post operative.  If the patient is a smoker I did discuss increase risk of recurrence and more pain with the cough.  If the patient is willing to quit smoking would encourage to do so and start at least a week before surgery.  However, if patient is not going to quit then must understand that his repair is more likely to fail.    Risks of surgery discussed including, but not limited to bleeding, infection, recurrence, damage to nerves and what is in the hernia sac.  Risks of anesthesia also discussed.    Discussed massaging hernia back in and using ice if becomes more painful.  If not able to reduce then go to emergency room.  Also discussed hernia belt to use until able to get in for surgery.    Things you will need to do before surgery are getting a preoperative appointment with your primary care doctor to be cleared for surgery.    You will also need a COVID test before surgery and an appointment to see me usually about 2 weeks after the procedure to make sure you are doing well.   Fortunately, when the schedulers call you they will try to get all this set up for you at that one call.     If you have had a positive COVID test in a 90 day window that would include the date of the procedure, let us know that and will need to see proof of this.    Then you do not need a COVID test.  But if over 90 days you do.      Time spent with the patient with greater that 50% of the time in discussion was 30 minutes.  In  discussing the plan.      Weston Mcnamara MD

## 2023-02-18 ENCOUNTER — HEALTH MAINTENANCE LETTER (OUTPATIENT)
Age: 43
End: 2023-02-18

## 2023-03-13 NOTE — TELEPHONE ENCOUNTER
Patient left a voice message wanting to reschedule surgery I have left her a voice message to call [phone] to reschedule surgery

## 2023-03-13 NOTE — TELEPHONE ENCOUNTER
Spoke with patient she is not wanting to reschedule until next year she will call back in October to reschedule surgery   Surgery has been canceled.    Respiratory

## 2023-04-03 ENCOUNTER — ANCILLARY PROCEDURE (OUTPATIENT)
Dept: MAMMOGRAPHY | Facility: CLINIC | Age: 43
End: 2023-04-03
Payer: COMMERCIAL

## 2023-04-03 DIAGNOSIS — Z12.31 ENCOUNTER FOR SCREENING MAMMOGRAM FOR BREAST CANCER: ICD-10-CM

## 2023-04-03 PROCEDURE — 77067 SCR MAMMO BI INCL CAD: CPT

## 2023-04-20 ENCOUNTER — OFFICE VISIT (OUTPATIENT)
Dept: FAMILY MEDICINE | Facility: CLINIC | Age: 43
End: 2023-04-20
Payer: COMMERCIAL

## 2023-04-20 ENCOUNTER — HOSPITAL ENCOUNTER (OUTPATIENT)
Dept: MRI IMAGING | Facility: HOSPITAL | Age: 43
Discharge: HOME | End: 2023-04-20
Attending: FAMILY MEDICINE | Admitting: FAMILY MEDICINE
Payer: COMMERCIAL

## 2023-04-20 VITALS
DIASTOLIC BLOOD PRESSURE: 88 MMHG | RESPIRATION RATE: 16 BRPM | WEIGHT: 222.4 LBS | BODY MASS INDEX: 34.91 KG/M2 | OXYGEN SATURATION: 98 % | SYSTOLIC BLOOD PRESSURE: 136 MMHG | HEART RATE: 66 BPM | TEMPERATURE: 98.1 F | HEIGHT: 67 IN

## 2023-04-20 DIAGNOSIS — M54.16 LEFT LUMBAR RADICULOPATHY: Primary | ICD-10-CM

## 2023-04-20 DIAGNOSIS — M54.16 LEFT LUMBAR RADICULOPATHY: ICD-10-CM

## 2023-04-20 PROCEDURE — 99214 OFFICE O/P EST MOD 30 MIN: CPT | Performed by: FAMILY MEDICINE

## 2023-04-20 PROCEDURE — 72148 MRI LUMBAR SPINE W/O DYE: CPT

## 2023-04-20 RX ORDER — FLUOXETINE 40 MG/1
40 CAPSULE ORAL DAILY
COMMUNITY
Start: 2023-04-17

## 2023-04-20 ASSESSMENT — ANXIETY QUESTIONNAIRES
7. FEELING AFRAID AS IF SOMETHING AWFUL MIGHT HAPPEN: NOT AT ALL
3. WORRYING TOO MUCH ABOUT DIFFERENT THINGS: SEVERAL DAYS
GAD7 TOTAL SCORE: 4
6. BECOMING EASILY ANNOYED OR IRRITABLE: SEVERAL DAYS
1. FEELING NERVOUS, ANXIOUS, OR ON EDGE: SEVERAL DAYS
8. IF YOU CHECKED OFF ANY PROBLEMS, HOW DIFFICULT HAVE THESE MADE IT FOR YOU TO DO YOUR WORK, TAKE CARE OF THINGS AT HOME, OR GET ALONG WITH OTHER PEOPLE?: NOT DIFFICULT AT ALL
GAD7 TOTAL SCORE: 4
5. BEING SO RESTLESS THAT IT IS HARD TO SIT STILL: NOT AT ALL
4. TROUBLE RELAXING: NOT AT ALL
7. FEELING AFRAID AS IF SOMETHING AWFUL MIGHT HAPPEN: NOT AT ALL
IF YOU CHECKED OFF ANY PROBLEMS ON THIS QUESTIONNAIRE, HOW DIFFICULT HAVE THESE PROBLEMS MADE IT FOR YOU TO DO YOUR WORK, TAKE CARE OF THINGS AT HOME, OR GET ALONG WITH OTHER PEOPLE: NOT DIFFICULT AT ALL
2. NOT BEING ABLE TO STOP OR CONTROL WORRYING: SEVERAL DAYS
GAD7 TOTAL SCORE: 4

## 2023-04-20 ASSESSMENT — PAIN SCALES - GENERAL: PAINLEVEL: MODERATE PAIN (4)

## 2023-04-20 ASSESSMENT — PATIENT HEALTH QUESTIONNAIRE - PHQ9
SUM OF ALL RESPONSES TO PHQ QUESTIONS 1-9: 4
10. IF YOU CHECKED OFF ANY PROBLEMS, HOW DIFFICULT HAVE THESE PROBLEMS MADE IT FOR YOU TO DO YOUR WORK, TAKE CARE OF THINGS AT HOME, OR GET ALONG WITH OTHER PEOPLE: SOMEWHAT DIFFICULT
SUM OF ALL RESPONSES TO PHQ QUESTIONS 1-9: 4

## 2023-04-21 ENCOUNTER — MYC MEDICAL ADVICE (OUTPATIENT)
Dept: FAMILY MEDICINE | Facility: CLINIC | Age: 43
End: 2023-04-21
Payer: COMMERCIAL

## 2023-04-21 DIAGNOSIS — M54.16 LEFT LUMBAR RADICULOPATHY: ICD-10-CM

## 2023-04-21 RX ORDER — METHYLPREDNISOLONE 4 MG
TABLET, DOSE PACK ORAL
Qty: 21 TABLET | Refills: 0 | Status: SHIPPED | OUTPATIENT
Start: 2023-04-21 | End: 2023-06-21

## 2023-04-21 RX ORDER — GABAPENTIN 300 MG/1
CAPSULE ORAL
Qty: 60 CAPSULE | Refills: 1 | Status: SHIPPED | OUTPATIENT
Start: 2023-04-21 | End: 2023-04-21

## 2023-04-21 RX ORDER — GABAPENTIN 300 MG/1
CAPSULE ORAL
Qty: 60 CAPSULE | Refills: 1 | Status: SHIPPED | OUTPATIENT
Start: 2023-04-21 | End: 2023-07-19

## 2023-04-21 ASSESSMENT — ANXIETY QUESTIONNAIRES: GAD7 TOTAL SCORE: 4

## 2023-04-21 ASSESSMENT — PATIENT HEALTH QUESTIONNAIRE - PHQ9
SUM OF ALL RESPONSES TO PHQ QUESTIONS 1-9: 4
10. IF YOU CHECKED OFF ANY PROBLEMS, HOW DIFFICULT HAVE THESE PROBLEMS MADE IT FOR YOU TO DO YOUR WORK, TAKE CARE OF THINGS AT HOME, OR GET ALONG WITH OTHER PEOPLE: SOMEWHAT DIFFICULT

## 2023-04-21 ASSESSMENT — ENCOUNTER SYMPTOMS: LEG PAIN: 1

## 2023-04-21 NOTE — RESULT ENCOUNTER NOTE
Your MRI shows some mild narrowing where the nerve roots exit your spine, which may be the cause of your symptoms.  There are no changes that would cause the cauda equina syndrome we were ruling out with stat imaging, which is fantastic.  I have sent in a prescription for gabapentin, the nerve pain medication.  Start by taking at bedtime, you can increase it up to 1 cap 3 times daily as needed and as tolerated.  I have also sent in a prescription for a Medrol Dosepak which is a strong anti-inflammatory.  Do not take ibuprofen or naproxen while you are taking this.  If your symptoms fail to improve or if they worsen, be sure to let me know.  I have placed a referral to be seen at the spine care center where you were seen previously for your neck.

## 2023-04-21 NOTE — PROGRESS NOTES
"  Assessment & Plan     Left lumbar radiculopathy  I am concerned about progressive symptoms and now with reported saddle anesthesia.  Concerned about cauda equina syndrome.  For that reason, will obtain stat MRI to assess for cauda equina.    MRI without indication of cauda equina, mild foraminal stenosis likely contributing to her symptoms, I suspect L5 radiculopathy.  Will initiate gabapentin for symptom management, Medrol Dosepak to see if we can resolve inflammation.  Referrals placed back to spine care center where she has been seen previously.  - MR Lumbar Spine w/o Contrast; Future  - Spine  Referral; Future  - gabapentin (NEURONTIN) 300 MG capsule; Take 1 capsule by mouth at bedtime.  May increase as needed and as tolerated up to 1 capsule 3 times daily.  - methylPREDNISolone (MEDROL DOSEPAK) 4 MG tablet therapy pack; Follow Package Directions             BMI:   Estimated body mass index is 35.36 kg/m  as calculated from the following:    Height as of this encounter: 1.689 m (5' 6.5\").    Weight as of this encounter: 100.9 kg (222 lb 6.4 oz).         Tierney Zamarripa MD  [facility]    Alex Bassett is a 43 year old, presenting for the following health issues:  Leg Pain (Left leg from knee down has pain and numbness. Pain started a month ago)        6/17/2022     9:24 AM   Additional Questions   Roomed by Rosmery   Accompanied by self     Here for evaluation of pain for the past month.  Begins in her left calf and then radiates up to the back of her thigh.  Describes loss of sensation in her left calf radiating all the way up to the groin.  Describes it as feeling cold, very deep, like it is asleep though not completely with loss of sensation.  Seems to be progressive and increasingly uncomfortable.  Yesterday felt all month in her calf, today it feels as though it is twitching in places.  She has a remote history of sciatica, states this is very different.  She also has a " "known left inguinal hernia that occasionally causes a popping sensation but is stable.  She has not noted any weakness.  Denies trauma.  Denies bowel or bladder symptoms.  Describes paresthesias and loss of sensation throughout the left sacral region including the inner buttock and labia which is new.  She is a history of cervical spine disease, followed by spine care center in the past.  Did not find gabapentin helpful at that time.  Feels like those symptoms have stabilized.  She performs a seated work.  She has a 2-1/2-year-old child at home, lifting frequently.    Leg Pain    History of Present Illness       Reason for visit:  Leg numbness  Symptom onset:  3-4 weeks ago    She eats 2-3 servings of fruits and vegetables daily.She consumes 0 sweetened beverage(s) daily.She exercises with enough effort to increase her heart rate 9 or less minutes per day.  She exercises with enough effort to increase her heart rate 3 or less days per week.   She is taking medications regularly.    Today's PHQ-9         PHQ-9 Total Score: 4    PHQ-9 Q9 Thoughts of better off dead/self-harm past 2 weeks :   Not at all    How difficult have these problems made it for you to do your work, take care of things at home, or get along with other people: Somewhat difficult  Today's NAJMA-7 Score: 4               Review of Systems         Objective    /88   Pulse 66   Temp 98.1  F (36.7  C) (Oral)   Resp 16   Ht 1.689 m (5' 6.5\")   Wt 100.9 kg (222 lb 6.4 oz)   LMP  (LMP Unknown)   SpO2 98%   BMI 35.36 kg/m    Body mass index is 35.36 kg/m .  Physical Exam   Alert and very pleasant.  Spine is without midline spinal tenderness.  No SI joint tenderness.  She has 5 or 5 strength throughout her lower extremities with the exception of left great toe dorsiflexion which is 4 out of 5 on the left-hand side.  Deep tendon reflexes present and symmetric bilaterally.  I do not appreciate any fasciculations.  1 beat clonus is symmetric " bilaterally.  Normal tone.  Straight leg raise negative.

## 2023-04-25 DIAGNOSIS — M54.16 LEFT LUMBAR RADICULOPATHY: Primary | ICD-10-CM

## 2023-05-08 ENCOUNTER — HOSPITAL ENCOUNTER (OUTPATIENT)
Dept: PHYSICAL THERAPY | Facility: REHABILITATION | Age: 43
Discharge: HOME | End: 2023-05-08
Attending: FAMILY MEDICINE
Payer: COMMERCIAL

## 2023-05-08 DIAGNOSIS — M54.16 LEFT LUMBAR RADICULOPATHY: ICD-10-CM

## 2023-05-08 PROCEDURE — 97161 PT EVAL LOW COMPLEX 20 MIN: CPT | Mod: GP | Performed by: PHYSICAL THERAPIST

## 2023-05-08 PROCEDURE — 97110 THERAPEUTIC EXERCISES: CPT | Mod: GP | Performed by: PHYSICAL THERAPIST

## 2023-05-10 NOTE — PROGRESS NOTES
"   05/08/23 1600   General Information   Type of Visit Initial OP Ortho PT Evaluation   Start of Care Date 05/08/23   Referring Physician Dr. Tierney Zamarripa   Orders Evaluate and Treat   Date of Order 04/25/23   Certification Required? No   Medical Diagnosis Left lumbar radiculopathy   Surgical/Medical history reviewed Yes   General Information Comments Pt notes, \"I've been told that I have hypermobility syndrome\" by someone she was seeing for her neck \"years ago\"   Body Part(s)   Body Part(s) Lumbar Spine/SI   Presentation and Etiology   Pertinent history of current problem (include personal factors and/or comorbidities that impact the POC) Here for evaluation of pain for the past month.  Begins in her left calf and then radiates up to the back of her thigh.  Describes loss of sensation in her left calf radiating all the way up to the groin.  Describes it as feeling cold, very deep, like it is asleep though not completely with loss of sensation.  Seems to be progressive and increasingly uncomfortable.  Yesterday felt all month in her calf, today it feels as though it is twitching in places.  She has a remote history of sciatica, states this is very different.  She also has a known left inguinal hernia that occasionally causes a popping sensation but is stable.  She has not noted any weakness.  Denies trauma.  Denies bowel or bladder symptoms.  Describes paresthesias and loss of sensation throughout the left sacral region including the inner buttock and labia which is new.   Symptom Location Back side of L LE from calf into groin.  Cauda equina has been ruled out.  Pt denies back pain but notes some sensitivity to the touch in mid lumbar back area.   How/Where did it occur From insidious onset   Onset date of current episode/exacerbation 03/15/23   Chronicity New   Pain rating (0-10 point scale) Best (/10);Worst (/10);Other   Best (/10) 0/10   Worst (/10) 5/10   Pain rating comment 2/10   Pain quality   (Numbness) "   Frequency of pain/symptoms B. Intermittent   Pain/symptoms exacerbated by A. Sitting   Pain/symptoms eased by D. Nothing  (Gabapentin)   Progression of symptoms since onset: Improved  (Improved with gabapentin)   Current / Previous Interventions   Diagnostic Tests: MRI   MRI Results Results   Current Level of Function   Current Community Support Family/friend caregiver   Patient role/employment history A. Employed   Fall Risk Screen   Fall screen completed by PT   Have you fallen 2 or more times in the past year? Yes   Have you fallen and had an injury in the past year? No   Is patient a fall risk? No   Fall screen comments Pt fell down 1-2 stairs and then a fall with rolling ankles.   Abuse Screen (yes response referral indicated)   Feels Unsafe at Home or Work/School no   Feels Threatened by Someone no   Does Anyone Try to Keep You From Having Contact with Others or Doing Things Outside Your Home? no   Physical Signs of Abuse Present no   Patient needs abuse support services and resources No   System Outcome Measures   Outcome Measures Low Back Pain (see Oswestry and Fred)  (ARAM: 8%)   Lumbar Spine/SI Objective Findings   Gait/Locomotion Decreased wt bearing noted through L LE   Hamstring Flexibility Limited on L   Flexion ROM WNL   Extension ROM WNL; pt fulcrums   Right Side Bending ROM WNL   Left Side Bending ROM WNL   Lumbar ROM Comment No c/o pain with lumbar ROM   Pelvic Screen WNL   Hip Screen WNL   Transversus Abdominus Strength (Prashanth Leg Lowering-deg) Good strength and coordination   Lumbar/Hip/Knee/Foot Strength Comments B LE MMT WFL   SLR + on L   Prone Instability Test +   Crossover SLR -   Segmental Mobility WNL   Palpation Sensitive to the left L5/s1 area   Slump Test -   Planned Therapy Interventions   Planned Therapy Interventions joint mobilization;manual therapy;ROM;strengthening;stretching;neuromuscular re-education   Clinical Impression   Criteria for Skilled Therapeutic Interventions Met  yes, treatment indicated   PT Diagnosis Left lumbar radiculopathy   Influenced by the following impairments Numbness, core weakness/poor endurance   Functional limitations due to impairments Numbness with sitting   Clinical Presentation Stable/Uncomplicated   Clinical Decision Making (Complexity) Low complexity   Therapy Frequency 1 time/week  (1x/wk to every other week)   Predicted Duration of Therapy Intervention (days/wks) 8 visits over 12 weeks   Risk & Benefits of therapy have been explained Yes   Patient, Family & other staff in agreement with plan of care Yes   Clinical Impression Comments Pt is a 42 yo female with L LE numbness x 2 months that extends into her groin/saddle region.  Imaging has ruled out cauda equina symptoms.  Pt denies any back pain but does have some sensitivity to touch in the left lumbar area.  Pt has numbness consistent with L2, L3 dermatomes and S2, S3 dermatomes.  PT is unable to change or impact intensity of numbness at eval.  Pt is approrpriate for skilled 1:1 PT for a back stabilization program to lessen symptoms.   Education Assessment   Barriers to Learning No barriers   ORTHO GOALS   PT Ortho Eval Goals 1;2;3   Ortho Goal 1   Goal Description Pt will be independent with her HEP for ongoing symptom management in 12 weeks.   Ortho Goal 2   Goal Description Pt will report 50% reduction of numbness when sitting or walking in 12 weeks.   Ortho Goal 3   Goal Description Pt will report no change in gait related to numbness in 12 weeks.   Total Evaluation Time   PT Eval, Low Complexity Minutes (49517) 20

## 2023-05-15 DIAGNOSIS — Z30.41 ORAL CONTRACEPTIVE PILL SURVEILLANCE: ICD-10-CM

## 2023-05-15 RX ORDER — NORETHINDRONE ACETATE AND ETHINYL ESTRADIOL .02; 1 MG/1; MG/1
1 TABLET ORAL DAILY
Qty: 63 TABLET | Refills: 3 | Status: SHIPPED | OUTPATIENT
Start: 2023-05-15 | End: 2024-02-06

## 2023-05-20 NOTE — PATIENT INSTRUCTIONS
Continue bupropion  mg daily.  Continue Pristiq 100 mg daily.  Continue lamotrigine 150 mg daily.    Continue all other medications per your primary care provider.    Schedule an appointment with me in 8 weeks or sooner as needed.  You may call [facility] at [phone] to schedule, or schedule at the .    [place] Resources:      Go to the Emergency Department as needed or call after hours crisis line at [phone].      To schedule individual or family therapy, call [facility] at [phone].     Follow up with primary care provider as planned or sooner for acute medical concerns.    Call the psychiatric nurse line with medication questions or concerns at [phone].    [facility] may be used to communicate with your provider, but this is not intended to be used for emergencies.    Community Resources:      National Suicide Prevention Lifeline: 204.323.9769 (TTY: 338.714.5305). Call anytime for help.  (www.suicidepreventionlifeline.org)    [employer] [place] [employer]ness (www.telma.org): 370.385.8860 or 486-110-7663.     Mental Health Association (www.mentalhealth.org): 594.826.7200 or 380-774-6227.    [place] Crisis Text Line: Text [place] to 316226    Suicide LifeLine Chat: suicidepreOsperlifeline.org/chat     Hyperlipidemia

## 2023-05-23 ENCOUNTER — THERAPY VISIT (OUTPATIENT)
Dept: PHYSICAL THERAPY | Facility: REHABILITATION | Age: 43
End: 2023-05-23
Attending: FAMILY MEDICINE
Payer: COMMERCIAL

## 2023-05-23 DIAGNOSIS — M54.16 LEFT LUMBAR RADICULOPATHY: Primary | ICD-10-CM

## 2023-05-23 DIAGNOSIS — M50.30 DDD (DEGENERATIVE DISC DISEASE), CERVICAL: ICD-10-CM

## 2023-05-23 DIAGNOSIS — M54.2 CERVICALGIA: ICD-10-CM

## 2023-05-23 PROCEDURE — 97110 THERAPEUTIC EXERCISES: CPT | Mod: GP

## 2023-05-23 PROCEDURE — 97140 MANUAL THERAPY 1/> REGIONS: CPT | Mod: GP

## 2023-05-23 PROCEDURE — 97112 NEUROMUSCULAR REEDUCATION: CPT | Mod: GP

## 2023-06-01 ENCOUNTER — OFFICE VISIT (OUTPATIENT)
Dept: PHYSICAL MEDICINE AND REHAB | Facility: CLINIC | Age: 43
End: 2023-06-01
Attending: FAMILY MEDICINE
Payer: COMMERCIAL

## 2023-06-01 VITALS
SYSTOLIC BLOOD PRESSURE: 141 MMHG | DIASTOLIC BLOOD PRESSURE: 82 MMHG | BODY MASS INDEX: 35.63 KG/M2 | HEIGHT: 67 IN | WEIGHT: 227 LBS | HEART RATE: 64 BPM

## 2023-06-01 DIAGNOSIS — R20.2 LEFT LEG PARESTHESIAS: Primary | ICD-10-CM

## 2023-06-01 DIAGNOSIS — R29.898 LEFT LEG WEAKNESS: ICD-10-CM

## 2023-06-01 DIAGNOSIS — M51.369 ANNULAR TEAR OF LUMBAR DISC: ICD-10-CM

## 2023-06-01 DIAGNOSIS — M54.16 LEFT LUMBAR RADICULOPATHY: ICD-10-CM

## 2023-06-01 PROCEDURE — 99215 OFFICE O/P EST HI 40 MIN: CPT | Performed by: PHYSICAL MEDICINE & REHABILITATION

## 2023-06-01 RX ORDER — GABAPENTIN 100 MG/1
CAPSULE ORAL
Qty: 30 CAPSULE | Refills: 1 | Status: SHIPPED | OUTPATIENT
Start: 2023-06-01 | End: 2023-11-16

## 2023-06-01 RX ORDER — MELOXICAM 15 MG/1
7.5-15 TABLET ORAL DAILY PRN
Qty: 30 TABLET | Refills: 1 | Status: SHIPPED | OUTPATIENT
Start: 2023-06-01 | End: 2023-09-22

## 2023-06-01 ASSESSMENT — PAIN SCALES - GENERAL: PAINLEVEL: NO PAIN (1)

## 2023-06-01 NOTE — PATIENT INSTRUCTIONS
Schedule an EMG (nerve test) with Dr Roberts.   Meloxicam (which is an anti-inflammatory) medication is prescribed today. Take 1/2-1 tablet   day as needed for pain.This medication should be taken with food and water to prevent any stomach upset. Do not take ibuprofen/Advil/Motrin/Aleve  while you take Meloxicam. Please call if you have any side effects.   I will provide gabapentin 100mg.  Take 200mg at bedtime x 3 days then 100mg at bedtime for 3 days.  You may stop or continue at 100mg at bedtime depending on how you feel.

## 2023-06-01 NOTE — PROGRESS NOTES
Assessment/Plan:      Danyelle was seen today for back pain.    Diagnoses and all orders for this visit:    Left leg paresthesias  -     meloxicam (MOBIC) 15 MG tablet; Take 0.5-1 tablets (7.5-15 mg) by mouth daily as needed for pain  -     EMG; Future  -     gabapentin (NEURONTIN) 100 MG capsule; 200mg at bedtime x 3 days, then 100mg at bedtime.    Left lumbar radiculopathy  -     Spine  Referral    Left leg weakness  -     EMG; Future    Annular tear of lumbar disc  -     meloxicam (MOBIC) 15 MG tablet; Take 0.5-1 tablets (7.5-15 mg) by mouth daily as needed for pain         Assessment: Pleasant 43 year old female With a history of atrial septal defect, hypertension, depression with:    1. *2 to 3-month history of left lower extremity paresthesias/numbness in the posterior leg to the calf and S1 distribution with a sense of weakness of the S1 muscles with single-leg toe raises the left lower extremity.  She has L4-5 central disc bulge with annular tear and no significant lumbar spine pain today has had some mild pain after physical therapy.  She has excess reticulation of the L5 vertebral body with the sacrum on the left.      Discussion:    1.  I discussed the diagnosis and treatment options.  Discussed that the annular tear typically because lumbar spine pain I am not sure what is causing the lower extremity paresthesias.  She does sit on her right leg under her buttock and may be causing a mild neuropraxia of the sciatic nerve.  We discussed further diagnostics.    2.  EMG will be ordered with Dr. Roberts to evaluate.    3.  Trial meloxicam 7.5-15 mg daily for low back pain and paresthesias.  Caution regarding GI side effects    4.  She is on gabapentin 300 mg experiencing weight gain as a potential side effect.  She would like to decrease this dose will provide 100 mg capsules to decrease this to 200 mg at bedtime for 3 days then 100 mg.  She can then discontinue but if she finds it helpful can  continue at 100 mg at bedtime.    5.    continue with home exercises from PT.    6.  Follow-up after EMG.      It was our pleasure caring for your patient today, if there any questions or concerns please do not hesitate to contact us.    Over 40 minutes were spent on the date of the encounter performing chart review, patient visit and documentation in addition to any procedure.    Subjective:   Patient ID: Danyelle Canales is a 43 year old female.    History of Present Illness: Patient presents at the request Dr. Duke Almendarez for evaluation of left lower extremity numbness.  This started approximately 2 to 3 months ago without any injury.  Has numbness from the left buttock down the posterior left leg to the calf and occasionally down the left lateral foot.  Has worsened over the past few months she recently started having low back pain as well.  Her numbness is worse with sitting.  She sits on her right foot under the left buttock which seems to help temporarily and then after sitting for too long makes the numbness worse.  Walking also seems to make it worse  occasionally and occasionally it is helpful.  Her symptoms are typically worse at the end of the day as well.  Taking gabapentin 300 mg at bedtime which seems to help.  She feels this may be causing some weight gain however and if she takes more than 300 mg she gets quite drowsy.  Feels there might be some weakness in the left leg per her last visit with her primary care provider but on a daily basis she does not notice any weakness.  Has tried physical therapy and has had a couple of visits.  Her initial therapist was quite good for her but after the second physical therapy visit with a different therapist she is having a low back pain with some of the exercises.  Her pain is at the lumbosacral junction.  Her symptoms are rated 7/10 at worst 1/10 today and at best.  Has had an MRI of the lumbar spine.      Imaging: MRI report and images were personally  reviewed and discussed with the patient.  A plastic model was utilized during the discussion.  MRI of the lumbar spine personally reviewed.  Normal discs T12-L1 through L2-3.  Mild disc height loss L3-4 mild left foraminal disc protrusion with no nerve compression.  L4-5 mild broad-based disc protrusion approximately 2 mm of my measurement with annular tear and mild bilateral foraminal stenosis.  L5-S1 normal disc height.  Excess reticulation on the left with the sacrum.     Review of Systems: Complains of weight gain, muscle pain, anxiety and depression.  Eyes fever, weight loss, ringing in the ears, headaches, change in vision, chest pain, shortness of breath, abdominal pain, nausea, vomiting, bowel or bladder incontinence, balance issues, sleep issues.  Remainder of 12 point review systems negative unless listed above.    Past Medical History:   Diagnosis Date     ASD (atrial septal defect)     repaired surgically as a child     Depressive disorder      Headache      History of blood transfusion 1985 1985 during open heart surgery     Hypertension     higher results at pain clinic, want to confirm ok     Insomnia, unspecified      Other forms of migraine, without mention of intractable migraine without mention of status migrainosus        Family History   Problem Relation Age of Onset     C.A.D. Mother         heart surgery to close hole in heart     Cardiovascular Mother      Hypertension Mother      Depression Mother      Anxiety Disorder Mother      Heart Failure Mother      Cerebrovascular Disease Father      Hypertension Father      Myocardial Infarction Maternal Grandfather          Social History     Socioeconomic History     Marital status:      Spouse name: Chan Canales     Number of children: 0     Years of education: 12     Highest education level: None   Occupational History     Occupation: customer service     Employer: [employer]   Tobacco Use     Smoking status: Never      "Smokeless tobacco: Never   Vaping Use     Vaping status: Never Used   Substance and Sexual Activity     Alcohol use: Not Currently     Comment: 1-2 a month     Drug use: No     Sexual activity: Yes     Partners: Male     Birth control/protection: Pill   Other Topics Concern     Parent/sibling w/ CABG, MI or angioplasty before 65F 55M? Yes     Comment: stroke     Social Determinants of Health     Stress: No Stress Concern Present (10/14/2019)    [place] [employer] - Occupational Stress Questionnaire      Feeling of Stress : Not at all   Intimate Partner Violence: Not At Risk (10/14/2019)    Humiliation, Afraid, Rape, and Kick questionnaire      Fear of Current or Ex-Partner: No      Emotionally Abused: No      Physically Abused: No      Sexually Abused: No     Social history: .  4-year-old child.  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  Does drink alcohol.      The following portions of the patient's history were reviewed and updated as appropriate: allergies, current medications, past family history, past medical history, past social history, past surgical history and problem list.    Oswestry (ARAM) Questionnaire        6/1/2023     2:16 PM   OSWESTRY DISABILITY INDEX   Count 10   Sum 5   Oswestry Score (%) 10 %       Neck Disability Index:       View : No data to display.                   PHQ-2 Score:         8/22/2022     8:41 AM 9/18/2020     3:52 PM   PHQ-2 ( 1999 [employer])   Q1: Little interest or pleasure in doing things 2 0   Q2: Feeling down, depressed or hopeless 1 2   PHQ-2 Score 3 2   PHQ-2 Total Score (12-17 Years)- Positive if 3 or more points; Administer PHQ-A if positive  2   Q1: Little interest or pleasure in doing things More than half the days Not at all   Q2: Feeling down, depressed or hopeless Several days More than half the days   PHQ-2 Score 3 2                  Objective:   Physical Exam:    BP (!) 141/82   Pulse 64   Ht 5' 6.5\" (1.689 m)   Wt 227 lb (103 kg)   LMP  " (LMP Unknown)   BMI 36.09 kg/m    Body mass index is 36.09 kg/m .      General:  Well-appearing female in no acute distress.  Pleasant, cooperative, and interactive throughout the examination and interview.  CV: No lower extremity edema on inspection or paltation.  Lymphatics: No cervical lymphadenopathy palpated. Eyes: sclera clear. Skin: No rashes or lesions seen over the head/neck, hairline, arms, legs, trunk.  Respirations unlabored.  MSK: Gait is nonantalgic.  Able to heel-toe walk without difficulty.  Negative Romberg.  Spine: normal AP curves of the C, T, and L spine.  Full range of motion in the Lumbar spine in all planes.  Palpation: Tenderness to palpation over L2-3 paraspinals mild.  Extremities: Full range of motion of the elbows, and wrists with no effusions or tenderness to palpation.   Full range of motion of the hips, knees, and ankles with no effusions or tenderness to palpation.   No hypermobility of the upper or lower extremities.  Neurologic exam: Mental status: Patient is alert and oriented with normal affect.  Attention, knowledge, memory, and language are intact.  Normal coordination throughout the examination.  Reflexes are 2+ and symmetric biceps, triceps, brachioradialis, patellar, and Achilles with down-going toes and Negative Zoë's.  Sensation is intact to light touch throughout the upper and lower extremities bilaterally.  Manual muscle testing reveals 5 out of 5 in the hip flexors, knee flexors/extensors, ankle plantar flexors, ankle  dorsiflexors, and EHL from bedside.  Mild subjective 5 -/5 strength in the left ankle plantar flexors on single-leg toe raises.  Upper extremities: Grossly normal strength . Normal muscle bulk and tone in the arms and legs.    Negative seated   straight leg raise bilaterally.

## 2023-06-01 NOTE — LETTER
6/1/2023         RE: Danyelle Canales  [address] [place] [address]        Dear Colleague,    Thank you for referring your patient, Danyelle Canales, to the [facility]. Please see a copy of my visit note below.    Assessment/Plan:      Danyelle was seen today for back pain.    Diagnoses and all orders for this visit:    Left leg paresthesias  -     meloxicam (MOBIC) 15 MG tablet; Take 0.5-1 tablets (7.5-15 mg) by mouth daily as needed for pain  -     EMG; Future  -     gabapentin (NEURONTIN) 100 MG capsule; 200mg at bedtime x 3 days, then 100mg at bedtime.    Left lumbar radiculopathy  -     Spine  Referral    Left leg weakness  -     EMG; Future    Annular tear of lumbar disc  -     meloxicam (MOBIC) 15 MG tablet; Take 0.5-1 tablets (7.5-15 mg) by mouth daily as needed for pain         Assessment: Pleasant 43 year old female With a history of atrial septal defect, hypertension, depression with:    1. *2 to 3-month history of left lower extremity paresthesias/numbness in the posterior leg to the calf and S1 distribution with a sense of weakness of the S1 muscles with single-leg toe raises the left lower extremity.  She has L4-5 central disc bulge with annular tear and no significant lumbar spine pain today has had some mild pain after physical therapy.  She has excess reticulation of the L5 vertebral body with the sacrum on the left.      Discussion:    1.  I discussed the diagnosis and treatment options.  Discussed that the annular tear typically because lumbar spine pain I am not sure what is causing the lower extremity paresthesias.  She does sit on her right leg under her buttock and may be causing a mild neuropraxia of the sciatic nerve.  We discussed further diagnostics.    2.  EMG will be ordered with Dr. Roberts to evaluate.    3.  Trial meloxicam 7.5-15 mg daily for low back pain and paresthesias.  Caution regarding GI side effects    4.   She is on gabapentin 300 mg experiencing weight gain as a potential side effect.  She would like to decrease this dose will provide 100 mg capsules to decrease this to 200 mg at bedtime for 3 days then 100 mg.  She can then discontinue but if she finds it helpful can continue at 100 mg at bedtime.    5.    continue with home exercises from PT.    6.  Follow-up after EMG.      It was our pleasure caring for your patient today, if there any questions or concerns please do not hesitate to contact us.    Over 40 minutes were spent on the date of the encounter performing chart review, patient visit and documentation in addition to any procedure.    Subjective:   Patient ID: Danyelle Canales is a 43 year old female.    History of Present Illness: Patient presents at the request Dr. Duke Almendarez for evaluation of left lower extremity numbness.  This started approximately 2 to 3 months ago without any injury.  Has numbness from the left buttock down the posterior left leg to the calf and occasionally down the left lateral foot.  Has worsened over the past few months she recently started having low back pain as well.  Her numbness is worse with sitting.  She sits on her right foot under the left buttock which seems to help temporarily and then after sitting for too long makes the numbness worse.  Walking also seems to make it worse  occasionally and occasionally it is helpful.  Her symptoms are typically worse at the end of the day as well.  Taking gabapentin 300 mg at bedtime which seems to help.  She feels this may be causing some weight gain however and if she takes more than 300 mg she gets quite drowsy.  Feels there might be some weakness in the left leg per her last visit with her primary care provider but on a daily basis she does not notice any weakness.  Has tried physical therapy and has had a couple of visits.  Her initial therapist was quite good for her but after the second physical therapy visit with a  different therapist she is having a low back pain with some of the exercises.  Her pain is at the lumbosacral junction.  Her symptoms are rated 7/10 at worst 1/10 today and at best.  Has had an MRI of the lumbar spine.      Imaging: MRI report and images were personally reviewed and discussed with the patient.  A plastic model was utilized during the discussion.  MRI of the lumbar spine personally reviewed.  Normal discs T12-L1 through L2-3.  Mild disc height loss L3-4 mild left foraminal disc protrusion with no nerve compression.  L4-5 mild broad-based disc protrusion approximately 2 mm of my measurement with annular tear and mild bilateral foraminal stenosis.  L5-S1 normal disc height.  Excess reticulation on the left with the sacrum.     Review of Systems: Complains of weight gain, muscle pain, anxiety and depression.  Eyes fever, weight loss, ringing in the ears, headaches, change in vision, chest pain, shortness of breath, abdominal pain, nausea, vomiting, bowel or bladder incontinence, balance issues, sleep issues.  Remainder of 12 point review systems negative unless listed above.    Past Medical History:   Diagnosis Date     ASD (atrial septal defect)     repaired surgically as a child     Depressive disorder      Headache      History of blood transfusion 1985 1985 during open heart surgery     Hypertension     higher results at pain clinic, want to confirm ok     Insomnia, unspecified      Other forms of migraine, without mention of intractable migraine without mention of status migrainosus        Family History   Problem Relation Age of Onset     C.A.D. Mother         heart surgery to close hole in heart     Cardiovascular Mother      Hypertension Mother      Depression Mother      Anxiety Disorder Mother      Heart Failure Mother      Cerebrovascular Disease Father      Hypertension Father      Myocardial Infarction Maternal Grandfather          Social History     Socioeconomic History     Marital  status:      Spouse name: Chan Canales     Number of children: 0     Years of education: 12     Highest education level: None   Occupational History     Occupation: customer service     Employer: [employer]   Tobacco Use     Smoking status: Never     Smokeless tobacco: Never   Vaping Use     Vaping status: Never Used   Substance and Sexual Activity     Alcohol use: Not Currently     Comment: 1-2 a month     Drug use: No     Sexual activity: Yes     Partners: Male     Birth control/protection: Pill   Other Topics Concern     Parent/sibling w/ CABG, MI or angioplasty before 65F 55M? Yes     Comment: stroke     Social Determinants of Health     Stress: No Stress Concern Present (10/14/2019)    [place] of [employer]ational [employer] - Occupational Stress Questionnaire      Feeling of Stress : Not at all   Intimate Partner Violence: Not At Risk (10/14/2019)    Humiliation, Afraid, Rape, and Kick questionnaire      Fear of Current or Ex-Partner: No      Emotionally Abused: No      Physically Abused: No      Sexually Abused: No     Social history: .  4-year-old child.  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  Does drink alcohol.      The following portions of the patient's history were reviewed and updated as appropriate: allergies, current medications, past family history, past medical history, past social history, past surgical history and problem list.    Oswestry (ARAM) Questionnaire        6/1/2023     2:16 PM   OSWESTRY DISABILITY INDEX   Count 10   Sum 5   Oswestry Score (%) 10 %       Neck Disability Index:       View : No data to display.                   PHQ-2 Score:         8/22/2022     8:41 AM 9/18/2020     3:52 PM   PHQ-2 ( 1999 [employer])   Q1: Little interest or pleasure in doing things 2 0   Q2: Feeling down, depressed or hopeless 1 2   PHQ-2 Score 3 2   PHQ-2 Total Score (12-17 Years)- Positive if 3 or more points; Administer PHQ-A if positive  2   Q1: Little interest or pleasure in  "doing things More than half the days Not at all   Q2: Feeling down, depressed or hopeless Several days More than half the days   PHQ-2 Score 3 2                  Objective:   Physical Exam:    BP (!) 141/82   Pulse 64   Ht 5' 6.5\" (1.689 m)   Wt 227 lb (103 kg)   LMP  (LMP Unknown)   BMI 36.09 kg/m    Body mass index is 36.09 kg/m .      General:  Well-appearing female in no acute distress.  Pleasant, cooperative, and interactive throughout the examination and interview.  CV: No lower extremity edema on inspection or paltation.  Lymphatics: No cervical lymphadenopathy palpated. Eyes: sclera clear. Skin: No rashes or lesions seen over the head/neck, hairline, arms, legs, trunk.  Respirations unlabored.  MSK: Gait is nonantalgic.  Able to heel-toe walk without difficulty.  Negative Romberg.  Spine: normal AP curves of the C, T, and L spine.  Full range of motion in the Lumbar spine in all planes.  Palpation: Tenderness to palpation over L2-3 paraspinals mild.  Extremities: Full range of motion of the elbows, and wrists with no effusions or tenderness to palpation.   Full range of motion of the hips, knees, and ankles with no effusions or tenderness to palpation.   No hypermobility of the upper or lower extremities.  Neurologic exam: Mental status: Patient is alert and oriented with normal affect.  Attention, knowledge, memory, and language are intact.  Normal coordination throughout the examination.  Reflexes are 2+ and symmetric biceps, triceps, brachioradialis, patellar, and Achilles with down-going toes and Negative Zoë's.  Sensation is intact to light touch throughout the upper and lower extremities bilaterally.  Manual muscle testing reveals 5 out of 5 in the hip flexors, knee flexors/extensors, ankle plantar flexors, ankle  dorsiflexors, and EHL from bedside.  Mild subjective 5 -/5 strength in the left ankle plantar flexors on single-leg toe raises.  Upper extremities: Grossly normal strength . Normal " muscle bulk and tone in the arms and legs.    Negative seated   straight leg raise bilaterally.            Again, thank you for allowing me to participate in the care of your patient.        Sincerely,        Sanya Roberts, DO

## 2023-06-19 ENCOUNTER — OFFICE VISIT (OUTPATIENT)
Dept: PHYSICAL MEDICINE AND REHAB | Facility: CLINIC | Age: 43
End: 2023-06-19
Attending: PHYSICAL MEDICINE & REHABILITATION
Payer: COMMERCIAL

## 2023-06-19 VITALS — DIASTOLIC BLOOD PRESSURE: 100 MMHG | HEART RATE: 65 BPM | SYSTOLIC BLOOD PRESSURE: 150 MMHG

## 2023-06-19 DIAGNOSIS — R20.2 ARM PARESTHESIA, LEFT: ICD-10-CM

## 2023-06-19 DIAGNOSIS — R20.2 LEFT LEG PARESTHESIAS: Primary | ICD-10-CM

## 2023-06-19 DIAGNOSIS — M54.2 CHRONIC NECK PAIN: ICD-10-CM

## 2023-06-19 DIAGNOSIS — R29.898 LEFT LEG WEAKNESS: ICD-10-CM

## 2023-06-19 DIAGNOSIS — M51.369 ANNULAR TEAR OF LUMBAR DISC: ICD-10-CM

## 2023-06-19 DIAGNOSIS — G89.29 CHRONIC NECK PAIN: ICD-10-CM

## 2023-06-19 PROCEDURE — 95909 NRV CNDJ TST 5-6 STUDIES: CPT | Performed by: PHYSICAL MEDICINE & REHABILITATION

## 2023-06-19 PROCEDURE — 95886 MUSC TEST DONE W/N TEST COMP: CPT | Mod: LT | Performed by: PHYSICAL MEDICINE & REHABILITATION

## 2023-06-19 PROCEDURE — 95885 MUSC TST DONE W/NERV TST LIM: CPT | Mod: RT | Performed by: PHYSICAL MEDICINE & REHABILITATION

## 2023-06-19 PROCEDURE — 99214 OFFICE O/P EST MOD 30 MIN: CPT | Mod: 25 | Performed by: PHYSICAL MEDICINE & REHABILITATION

## 2023-06-19 RX ORDER — PREGABALIN 25 MG/1
CAPSULE ORAL
Qty: 60 CAPSULE | Refills: 1 | Status: SHIPPED | OUTPATIENT
Start: 2023-06-19 | End: 2023-09-22

## 2023-06-19 ASSESSMENT — PAIN SCALES - GENERAL: PAINLEVEL: MILD PAIN (2)

## 2023-06-19 NOTE — PATIENT INSTRUCTIONS
An MRI was ordered for you today.  You will be contacted by scheduling within 3 days.    If you are not contacted, please call Radiology at [phone].    Stop gabapentin and try lyrica 25 at bedtime for 3-5 days and then you can increase to 25 mg twice daily  See Neurology for a second opinion regarding the numbness

## 2023-06-19 NOTE — PROGRESS NOTES
Assessment/Plan:      Danyelle was seen today for emg.    Diagnoses and all orders for this visit:    Left leg paresthesias  -     EMG  -     AZ NEEDLE EMG EA EXTREMTY W/PARASPINAL AREA COMPLETE  -     AZ NEEDLE EMG EA EXTREMITY W/PARASPINAL AREA LIMITED  -     AZ NCS MOTOR W OR W/O F-WAVE, 5 OR 6  -     MR Brain w/o Contrast; Future  -     MR Cervical Spine w/o Contrast; Future  -     pregabalin (LYRICA) 25 MG capsule; 25 mg at bedtime x3 days, then increase to 25 mg twice daily  -     Adult Neurology  Referral; Future    Left leg weakness  -     EMG  -     AZ NEEDLE EMG EA EXTREMTY W/PARASPINAL AREA COMPLETE  -     AZ NEEDLE EMG EA EXTREMITY W/PARASPINAL AREA LIMITED  -     AZ NCS MOTOR W OR W/O F-WAVE, 5 OR 6    Annular tear of lumbar disc    Chronic neck pain  -     MR Brain w/o Contrast; Future  -     MR Cervical Spine w/o Contrast; Future    Arm paresthesia, left  -     MR Brain w/o Contrast; Future  -     MR Cervical Spine w/o Contrast; Future  -     Adult Neurology  Referral; Future         Assessment: Pleasant 43 year old female With a history of atrial septal defect, hypertension, depression with:     1.  Approximate 3-4 -month history of left lower extremity paresthesias/numbness in the posterior leg to the calf and S1 distribution with a sense of weakness of the S1 muscles with single-leg toe raises the left lower extremity.  She has worsening symptoms with numbness in the right foot digits 2 and 3 as well.  She has L4-5 central disc bulge with annular tear and no significant lumbar spine pain but has had some mild pain after physical therapy.  Also has an accessory articulation of the L5 vertebral body with the sacrum on the left, but again no pain.  No active radiculopathy on EMG.  She has an absent left extensor digitorum brevis on EMG of unknown significance.  No benefit wWith meloxicam and gabapentin.    2.  Chronic cervical spine pain intermittently history of cervical injection  with a C5-6 central disc herniation on old MRI.  She does have intermittent left arm paresthesias.  The paresthesias may be linked to the left leg paresthesias and right leg paresthesias.      Discussion:    1.  I discussed the diagnosis and treatment options.  The biggest complaint are paresthesias/numbness in the left leg this is in the left medial thigh along with posterior left leg lateral left leg to the dorsal left foot also right foot and some in the left arm as well.  These do not completely correlate with the L4-5 and small central disc herniation with annular tear on MRI of the lumbar spine.  She does have C5-6 central disc herniation on old MRI with moderate central stenosis.  She has no myelopathic findings on exam but question either demyelinating process versus cervical spinal cord compression resulting in paresthesias in the left arm and bilateral legs.  We discussed options of lumbar epidural versus medication changes and further  diagnostics to confirm diagnosis before any interventions.  We also discussed second opinion.  She has an absent left EDB on exam today and needle EMG along with absent peroneal CMAP on EMG.  Clinically this is of unknown significance.  Can be congenital absence of the EDB.  Cannot exclude significant prior axon loss to the EDB resulting in atrophy without affecting any other L5 muscles but this is less likely.    2.  MRI of the cervical spine and head to evaluate for any central process resulting in numbness/paresthesias.    3.  Stop gabapentin and try Lyrica 25 mg at bedtime increasing to twice daily after 3 to 5 days.    4.  I will refer to neurology for second opinion.  Based on findings on MRI of the head and neck we can [facility] further recommendations [facility] as well.    5.  We will follow-up via MyChart with further recommendations after imaging.  Can consider bilateral L5-S1 TFESI.       It was our pleasure caring for your patient today, if there any questions or  concerns please do not hesitate to contact us.    30 minutes were spent on the date of the encounter performing chart review, patient visit and documentation in addition to any procedure.    Addendum: After her appointment it was noted that her blood pressure remained elevated on recheck.  I have sent a message through my chart recommending that she discontinue meloxicam and see if her blood pressure improves.  If remains elevated she should see her PCP.    Subjective:   Patient ID: Danyelle Canales is a 43 year old female.    History of Present Illness: Patient presents for follow-up of left leg numbness.  Symptoms have worsened since last visit.  She continues to have left medial thigh numbness left posterior thigh lateral calf and dorsal left foot numbness.  Is constant.  No real back pain although does get some with doing her physical therapy has been to 2 visits.  She now started having numbness over the right digits 2 and 3 of the right foot and has chronic numbness of the left arm from the elbow distally into the forearm and into the hand.  Has a history of cervical spine pain.  She still has chronic cervical spine pain at times worse with activities and tightness through the upper trapezius.  Pain is a 6/10 at worst 2/10 today 0/10 at best.  Gabapentin causes some drowsiness is taking up to 400 mg at a time at bedtime cannot take it during the day may help some mild amount but causes cognitive side effects also taking meloxicam and Tylenol.  Had an EMG done today.    EMG: This was an abnormal study.  She has an absent left peroneal CMAP to the EDB.  She has an absent EDB on exam and absent on needle EMG.  Borderline sural SNAP amplitudes.  No lumbar radiculopathy.      Imaging: I reviewed the MRI images of the lumbar spine for medical decision-making purposes.  Mild L3-4 and L4-5 disc height loss.  Mild facet arthropathy at L4-5 mild facet arthropathy L3-4 with a left foraminal disc protrusion mild  foraminal stenosis L4-5 bilaterally.  Accessory articulation on the left at L5-S1.  Slight left lumbar scoliotic curve.  Right lateral recess stenosis at L4-5    I also reviewed CT abdomen and pelvis from 2021 To evaluate the lumbar spine.  Coronal imaging reveals normal hips.  Left accessory articulation the transverse process of L5 with the sacrum.    Review of Systems: Pertinent positives: She gets headaches and has numbness..  Pertinent negatives: No  weakness.  No bowel or bladder incontinence.  No urinary retention.  No fevers, unintentional weight loss, balance changes, frequent falling, difficulty swallowing, or coordination difficulties.  All others reviewed are negative.         Past Medical History:   Diagnosis Date     ASD (atrial septal defect)     repaired surgically as a child     Depressive disorder      Headache      History of blood transfusion 1985 1985 during open heart surgery     Hypertension     higher results at pain clinic, want to confirm ok     Insomnia, unspecified      Other forms of migraine, without mention of intractable migraine without mention of status migrainosus        The following portions of the patient's history were reviewed and updated as appropriate: allergies, current medications, past family history, past medical history, past social history, past surgical history and problem list.           Objective:   Physical Exam:    BP (!) 150/100   Pulse 65   LMP  (LMP Unknown)   There is no height or weight on file to calculate BMI.      General: Alert and oriented with normal affect. Attention, knowledge, memory, and language are intact. No acute distress.   Eyes: Sclerae are clear.  Respirations: Unlabored. CV: No lower extremity edema.  Skin: No rashes seen.    Gait:  Nonantalgic    Sensation is intact to light touch throughout the upper and lower extremities.  Reflexes are   negative Hoffmans. 2+ patellar and Achilles with downgoing toes.    Manual muscle testing  reveals:  Right /Left out of 5     5/5 hip flexors  5/5 knee flexors  5/5 knee extensors  5/5 ankle plantar flexors  5/5 ankle dorsiflexors  5/5  L

## 2023-06-19 NOTE — PROGRESS NOTES
[facility]  [address] [place] [address] [address]  [place] [address]  Office: [phone] Fax: [phone]    Electromyography and Nerve Conduction Study Report        Indication: Patient presents at the request Dr. Sanya Roberts for a left lower extremity EMG.  She has left leg numbness posterior thigh lateral calf dorsal left foot.  Also left medial thigh.  On exam she has normal sensation to light touch of the lower extremities, 2+ patellar and Achilles reflexes bilaterally, downgoing toes, normal muscle strength throughout the major muscle groups of the bilateral lower extremities.  Absent left EDB on exam.        Pt Exam Discussion (Communication Barriers):  Electromyography and nerve conduction testing, including associated discomfort, risks, benefits, and alternatives was discussed with the patient prior to the procedure.  No learning/ communication barriers; patient verbalized understanding of procedure.  Informed consent was obtained.           Pt Assessment:  Testing was successfully completed; patient tolerated testing well.       Blood Thinners: None Skin Temperature: Warmed 30.7                   EMG/NCS  results:     Nerve Conduction Studies  Motor Sites      Segment Distal Latency Neg. Amp CV F-Latency F-Estimate Comment   Site  (ms) mV m/s ms ms    Left Fibular (EDB) Motor   Ankle Ankle-EDB *NR *NR       Knee Knee-Ankle *NR *NR *NR      Right Fibular (EDB) Motor   Ankle Ankle-EDB 4.5 4.5       Knee Knee-Ankle 13.3 3.9 45      Left Fibular (TA) Motor   Fib Head Fib Head-Tib anterior 2.2 4.3       Knee Knee-Fib Head 4.2 *4.7 50      Left Tibial (AH) Motor   Ankle Ankle-AH 3.4 8.7       Knee Knee-Ankle 12.7 5.4 46        Sensory Sites      Onset Lat Peak Lat Amp CV Comment   Site (ms) (ms)  V m/s    Left Sural Sensory   B-Ankle 2.5 3.3 7 56    Right Sural Sensory   B-Ankle 3.5 3.9 *5 40        NCS Waveforms:    Motor                Sensory           Electromyography     Side  Muscle Nerve Root Ins Act Fibs Psw Fasc Recrt Dur Amp Poly Comment   Left AntTibialis Dp Br Fibular L4-5 Nml Nml Nml Nml Nml Nml Nml 0    Left Gastroc Tibial S1-2 Nml Nml Nml Nml Nml Nml Nml 0    Left Fibularis Long Sup Br Fibular L5-S1 Nml Nml Nml Nml Nml Nml Nml 0    Left VastusLat Femoral L2-4 Nml Nml Nml Nml Nml Nml Nml 0    Left RectFemoris Femoral L2-4 Nml Nml Nml Nml Nml Nml Nml 0    Left Ext Dig Brev Dp Br Fibular L5, S1 Dcr Nml Nml Nml       No MUP activation   Right Ext Dig Brev Dp Br Fibular L5, S1 Nml Nml Nml Nml Nml Nml Nml 0          Comment NCS: Abnormal study  1.  Borderline bilateral sural SNAP amplitudes.  2.  Absent left peroneal CMAP to the EDB.  3.  Normal left peroneal CMAP to the tibialis anterior.  4.  Normal left tibial CMAP  5.  Normal right peroneal CMAP to the EDB.    Comment EMG: Abnormal study  1.  Decreased insertional activity with no motor unit potential activation of the left EDB.  Remainder left lower extremity needle EMG normal.   2.  Normal right needle EMG to the EDB.     Interpretation: Abnormal study: There is electrodiagnostic evidence of:    1.  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  This is of unknown clinical significance in the setting of normal peroneal CMAP to the tibialis anterior and normal needle EMG throughout the remainder of the left lower extremity.  This can be observed in a congenital absence of the EDB.      2.  There is no electrodiagnostic evidence of lumbosacral radiculopathy or tibial neuropathy in the left lower extremity.    The testing was completed in its entirety by the physician.      It was our pleasure caring for your patient today, if there any questions or concerns please do not hesitate to contact us.

## 2023-06-19 NOTE — LETTER
6/19/2023         RE: Danyelle Canales  [address] [place] [address]        Dear Colleague,    Thank you for referring your patient, Danyelle Canales, to the [facility]. Please see a copy of my visit note below.    Assessment/Plan:      Danyelle was seen today for emg.    Diagnoses and all orders for this visit:    Left leg paresthesias  -     EMG  -     MT NEEDLE EMG EA EXTREMTY W/PARASPINAL AREA COMPLETE  -     MT NEEDLE EMG EA EXTREMITY W/PARASPINAL AREA LIMITED  -     MT NCS MOTOR W OR W/O F-WAVE, 5 OR 6  -     MR Brain w/o Contrast; Future  -     MR Cervical Spine w/o Contrast; Future  -     pregabalin (LYRICA) 25 MG capsule; 25 mg at bedtime x3 days, then increase to 25 mg twice daily  -     Adult Neurology  Referral; Future    Left leg weakness  -     EMG  -     MT NEEDLE EMG EA EXTREMTY W/PARASPINAL AREA COMPLETE  -     MT NEEDLE EMG EA EXTREMITY W/PARASPINAL AREA LIMITED  -     MT NCS MOTOR W OR W/O F-WAVE, 5 OR 6    Annular tear of lumbar disc    Chronic neck pain  -     MR Brain w/o Contrast; Future  -     MR Cervical Spine w/o Contrast; Future    Arm paresthesia, left  -     MR Brain w/o Contrast; Future  -     MR Cervical Spine w/o Contrast; Future  -     Adult Neurology  Referral; Future         Assessment: Pleasant 43 year old female With a history of atrial septal defect, hypertension, depression with:     1.  Approximate 3-4 -month history of left lower extremity paresthesias/numbness in the posterior leg to the calf and S1 distribution with a sense of weakness of the S1 muscles with single-leg toe raises the left lower extremity.  She has worsening symptoms with numbness in the right foot digits 2 and 3 as well.  She has L4-5 central disc bulge with annular tear and no significant lumbar spine pain but has had some mild pain after physical therapy.  Also has an accessory articulation of the L5 vertebral body with the  sacrum on the left, but again no pain.  No active radiculopathy on EMG.  She has an absent left extensor digitorum brevis on EMG of unknown significance.  No benefit wWith meloxicam and gabapentin.    2.  Chronic cervical spine pain intermittently history of cervical injection with a C5-6 central disc herniation on old MRI.  She does have intermittent left arm paresthesias.  The paresthesias may be linked to the left leg paresthesias and right leg paresthesias.      Discussion:    1.  I discussed the diagnosis and treatment options.  The biggest complaint are paresthesias/numbness in the left leg this is in the left medial thigh along with posterior left leg lateral left leg to the dorsal left foot also right foot and some in the left arm as well.  These do not completely correlate with the L4-5 and small central disc herniation with annular tear on MRI of the lumbar spine.  She does have C5-6 central disc herniation on old MRI with moderate central stenosis.  She has no myelopathic findings on exam but question either demyelinating process versus cervical spinal cord compression resulting in paresthesias in the left arm and bilateral legs.  We discussed options of lumbar epidural versus medication changes and further  diagnostics to confirm diagnosis before any interventions.  We also discussed second opinion.  She has an absent left EDB on exam today and needle EMG along with absent peroneal CMAP on EMG.  Clinically this is of unknown significance.  Can be congenital absence of the EDB.  Cannot exclude significant prior axon loss to the EDB resulting in atrophy without affecting any other L5 muscles but this is less likely.    2.  MRI of the cervical spine and head to evaluate for any central process resulting in numbness/paresthesias.    3.  Stop gabapentin and try Lyrica 25 mg at bedtime increasing to twice daily after 3 to 5 days.    4.  I will refer to neurology for second opinion.  Based on findings on MRI of  the head and neck we can make further recommendations via [facility] as well.    5.  We will follow-up via [facility] with further recommendations after imaging.  Can consider bilateral L5-S1 TFESI.       It was our pleasure caring for your patient today, if there any questions or concerns please do not hesitate to contact us.    30 minutes were spent on the date of the encounter performing chart review, patient visit and documentation in addition to any procedure.    Addendum: After her appointment it was noted that her blood pressure remained elevated on recheck.  I have sent a message through my chart recommending that she discontinue meloxicam and see if her blood pressure improves.  If remains elevated she should see her PCP.    Subjective:   Patient ID: Danyelle Canales is a 43 year old female.    History of Present Illness: Patient presents for follow-up of left leg numbness.  Symptoms have worsened since last visit.  She continues to have left medial thigh numbness left posterior thigh lateral calf and dorsal left foot numbness.  Is constant.  No real back pain although does get some with doing her physical therapy has been to 2 visits.  She now started having numbness over the right digits 2 and 3 of the right foot and has chronic numbness of the left arm from the elbow distally into the forearm and into the hand.  Has a history of cervical spine pain.  She still has chronic cervical spine pain at times worse with activities and tightness through the upper trapezius.  Pain is a 6/10 at worst 2/10 today 0/10 at best.  Gabapentin causes some drowsiness is taking up to 400 mg at a time at bedtime cannot take it during the day may help some mild amount but causes cognitive side effects also taking meloxicam and Tylenol.  Had an EMG done today.    EMG: This was an abnormal study.  She has an absent left peroneal CMAP to the EDB.  She has an absent EDB on exam and absent on needle EMG.  Borderline sural SNAP  amplitudes.  No lumbar radiculopathy.      Imaging: I reviewed the MRI images of the lumbar spine for medical decision-making purposes.  Mild L3-4 and L4-5 disc height loss.  Mild facet arthropathy at L4-5 mild facet arthropathy L3-4 with a left foraminal disc protrusion mild foraminal stenosis L4-5 bilaterally.  Accessory articulation on the left at L5-S1.  Slight left lumbar scoliotic curve.  Right lateral recess stenosis at L4-5    I also reviewed CT abdomen and pelvis from 2021 To evaluate the lumbar spine.  Coronal imaging reveals normal hips.  Left accessory articulation the transverse process of L5 with the sacrum.    Review of Systems: Pertinent positives: She gets headaches and has numbness..  Pertinent negatives: No  weakness.  No bowel or bladder incontinence.  No urinary retention.  No fevers, unintentional weight loss, balance changes, frequent falling, difficulty swallowing, or coordination difficulties.  All others reviewed are negative.         Past Medical History:   Diagnosis Date     ASD (atrial septal defect)     repaired surgically as a child     Depressive disorder      Headache      History of blood transfusion 1985 1985 during open heart surgery     Hypertension     higher results at pain clinic, want to confirm ok     Insomnia, unspecified      Other forms of migraine, without mention of intractable migraine without mention of status migrainosus        The following portions of the patient's history were reviewed and updated as appropriate: allergies, current medications, past family history, past medical history, past social history, past surgical history and problem list.           Objective:   Physical Exam:    BP (!) 150/100   Pulse 65   LMP  (LMP Unknown)   There is no height or weight on file to calculate BMI.      General: Alert and oriented with normal affect. Attention, knowledge, memory, and language are intact. No acute distress.   Eyes: Sclerae are clear.  Respirations:  Unlabored. CV: No lower extremity edema.  Skin: No rashes seen.    Gait:  Nonantalgic    Sensation is intact to light touch throughout the upper and lower extremities.  Reflexes are   negative Hoffmans. 2+ patellar and Achilles with downgoing toes.    Manual muscle testing reveals:  Right /Left out of 5     5/5 hip flexors  5/5 knee flexors  5/5 knee extensors  5/5 ankle plantar flexors  5/5 ankle dorsiflexors  5/5  [address] [address]  Office: [phone] Fax: [phone]    Electromyography and Nerve Conduction Study Report        Indication: Patient presents at the request Dr. Sanya Roberts for a left lower extremity EMG.  She has left leg numbness posterior thigh lateral calf dorsal left foot.  Also left medial thigh.  On exam she has normal sensation to light touch of the lower extremities, 2+ patellar and Achilles reflexes bilaterally, downgoing toes, normal muscle strength throughout the major muscle groups of the bilateral lower extremities.  Absent left EDB on exam.        Pt Exam Discussion (Communication Barriers):  Electromyography and nerve conduction testing, including associated discomfort, risks, benefits, and alternatives was discussed with the patient prior to the procedure.  No learning/ communication barriers; patient verbalized understanding of procedure.  Informed consent was obtained.           Pt Assessment:  Testing was successfully completed; patient tolerated testing well.       Blood Thinners: None Skin Temperature: Warmed 30.7                   EMG/NCS  results:     Nerve Conduction Studies  Motor Sites      Segment Distal Latency Neg. Amp CV F-Latency F-Estimate Comment   Site  (ms) mV m/s ms ms    Left Fibular (EDB) Motor   Ankle Ankle-EDB *NR *NR       Knee Knee-Ankle *NR *NR *NR      Right Fibular (EDB) Motor   Ankle Ankle-EDB 4.5 4.5       Knee Knee-Ankle 13.3 3.9 45      Left Fibular (TA) Motor    Fib Head Fib Head-Tib anterior 2.2 4.3       Knee Knee-Fib Head 4.2 *4.7 50      Left Tibial (AH) Motor   Ankle Ankle-AH 3.4 8.7       Knee Knee-Ankle 12.7 5.4 46        Sensory Sites      Onset Lat Peak Lat Amp CV Comment   Site (ms) (ms)  V m/s    Left Sural Sensory   B-Ankle 2.5 3.3 7 56    Right Sural Sensory   B-Ankle 3.5 3.9 *5 40        NCS Waveforms:    Motor                Sensory           Electromyography     Side Muscle Nerve Root Ins Act Fibs Psw Fasc Recrt Dur Amp Poly Comment   Left AntTibialis Dp Br Fibular L4-5 Nml Nml Nml Nml Nml Nml Nml 0    Left Gastroc Tibial S1-2 Nml Nml Nml Nml Nml Nml Nml 0    Left Fibularis Long Sup Br Fibular L5-S1 Nml Nml Nml Nml Nml Nml Nml 0    Left VastusLat Femoral L2-4 Nml Nml Nml Nml Nml Nml Nml 0    Left RectFemoris Femoral L2-4 Nml Nml Nml Nml Nml Nml Nml 0    Left Ext Dig Brev Dp Br Fibular L5, S1 Dcr Nml Nml Nml       No MUP activation   Right Ext Dig Brev Dp Br Fibular L5, S1 Nml Nml Nml Nml Nml Nml Nml 0          Comment NCS: Abnormal study  1.  Borderline bilateral sural SNAP amplitudes.  2.  Absent left peroneal CMAP to the EDB.  3.  Normal left peroneal CMAP to the tibialis anterior.  4.  Normal left tibial CMAP  5.  Normal right peroneal CMAP to the EDB.    Comment EMG: Abnormal study  1.  Decreased insertional activity with no motor unit potential activation of the left EDB.  Remainder left lower extremity needle EMG normal.   2.  Normal right needle EMG to the EDB.     Interpretation: Abnormal study: There is electrodiagnostic evidence of:    1.  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  This is of unknown clinical significance in the setting of normal peroneal CMAP to the tibialis anterior and normal needle EMG throughout the remainder of the left lower extremity.  This can be observed in a congenital absence of the EDB.      2.  There is no electrodiagnostic evidence of lumbosacral  radiculopathy or tibial neuropathy in the left lower extremity.    The testing was completed in its entirety by the physician.      It was our pleasure caring for your patient today, if there any questions or concerns please do not hesitate to contact us.            Again, thank you for allowing me to participate in the care of your patient.        Sincerely,        Sanya Roberts, DO

## 2023-06-21 ENCOUNTER — OFFICE VISIT (OUTPATIENT)
Dept: FAMILY MEDICINE | Facility: CLINIC | Age: 43
End: 2023-06-21
Payer: COMMERCIAL

## 2023-06-21 VITALS
WEIGHT: 217.6 LBS | DIASTOLIC BLOOD PRESSURE: 80 MMHG | RESPIRATION RATE: 16 BRPM | HEIGHT: 67 IN | TEMPERATURE: 98.7 F | SYSTOLIC BLOOD PRESSURE: 130 MMHG | BODY MASS INDEX: 34.15 KG/M2 | OXYGEN SATURATION: 99 % | HEART RATE: 76 BPM

## 2023-06-21 DIAGNOSIS — J02.9 SORE THROAT: ICD-10-CM

## 2023-06-21 DIAGNOSIS — F33.0 MILD RECURRENT MAJOR DEPRESSION (H): ICD-10-CM

## 2023-06-21 DIAGNOSIS — E66.01 MORBID OBESITY (H): ICD-10-CM

## 2023-06-21 DIAGNOSIS — I10 BENIGN ESSENTIAL HYPERTENSION: Primary | ICD-10-CM

## 2023-06-21 LAB
DEPRECATED S PYO AG THROAT QL EIA: NEGATIVE
GROUP A STREP BY PCR: NOT DETECTED

## 2023-06-21 PROCEDURE — 99214 OFFICE O/P EST MOD 30 MIN: CPT | Performed by: NURSE PRACTITIONER

## 2023-06-21 PROCEDURE — 87651 STREP A DNA AMP PROBE: CPT | Performed by: NURSE PRACTITIONER

## 2023-06-21 RX ORDER — LISINOPRIL 10 MG/1
10 TABLET ORAL DAILY
Qty: 30 TABLET | Refills: 1 | Status: SHIPPED | OUTPATIENT
Start: 2023-06-21 | End: 2023-07-19

## 2023-06-21 ASSESSMENT — PAIN SCALES - GENERAL: PAINLEVEL: MILD PAIN (3)

## 2023-06-21 NOTE — PROGRESS NOTES
"  Assessment & Plan     Benign essential hypertension  Uncontrolled  Discussed with patient the indication and use of medication(s), risks/benefits, and potential adverse side effects.  Patient/guardian verbalized understanding and agreement with the plan.   - lisinopril (ZESTRIL) 10 MG tablet; Take 1 tablet (10 mg) by mouth daily  - PRIMARY CARE FOLLOW-UP SCHEDULING; Future    Sore throat    - Streptococcus A Rapid Screen w/Reflex to PCR - Clinic Collect  - Group A Streptococcus PCR Throat Swab    Mild recurrent major depression (H)  Known issue that I take into account for their medical decisions, no current exacerbations or new concerns.     Morbid obesity (H)  Counseled on dietary/exercise efforts.    Follow up in 1 month for BP recheck        Provider  Link to [facility] Help Grid :[id number]}     BMI:   Estimated body mass index is 34.6 kg/m  as calculated from the following:    Height as of this encounter: 1.689 m (5' 6.5\").    Weight as of this encounter: 98.7 kg (217 lb 9.6 oz).   Weight management plan: Discussed healthy diet and exercise guidelines        Marsha Fernandez NP  [facility]    Alex Bassett is a 43 year old, presenting for the following health issues:  Hypertension and Pharyngitis (Son has strep /)        6/21/2023     9:55 AM   Additional Questions   Roomed by Noelle OREILLY     History of Present Illness       Reason for visit:  Blood pressure& strep test  Symptom onset:  1-3 days ago  Symptom intensity:  Moderate  Symptom progression:  Staying the same  Had these symptoms before:  Yes  Has tried/received treatment for these symptoms:  No  What makes it worse:  N/a  What makes it better:  N\a    She eats 2-3 servings of fruits and vegetables daily.She consumes 0 sweetened beverage(s) daily.She exercises with enough effort to increase her heart rate 9 or less minutes per day.  She exercises with enough effort to increase her heart rate 3 or less days per week.   She is " "taking medications regularly.     160/107,  140/92. - 6/19/2023    127/86 this AM    Acute Illness  Acute illness concerns: sore throat and sinus pain   Onset/Duration: 2 days  Symptoms:  Fever: No  Chills/Sweats: No  Headache (location?): No  Sinus Pressure: YES, facial   Conjunctivitis:  No  Ear Pain: YES: both  Rhinorrhea: YES  Congestion: YES  Sore Throat: YES  Cough: YES-non-productive  Wheeze: No  Decreased Appetite: YES  Nausea: YES  Vomiting: No  Diarrhea: No  Dysuria/Freq.: No  Dysuria or Hematuria: No  Fatigue/Achiness: YES  Sick/Strep Exposure: YES, son has strep Monday (2 days ago)  Therapies tried and outcome: tylenol did not help    Just started Ket diet a week ago.  Pre pregnancy did keto diet for 1.5 years and lost 60 pounds.  higher sodium in some processed foods.    Review of Systems   Constitutional, HEENT, cardiovascular, pulmonary, gi and gu systems are negative, except as otherwise noted.      Objective    /80 (BP Location: Right arm, Patient Position: Sitting, Cuff Size: Adult Large)   Pulse 76   Temp 98.7  F (37.1  C) (Oral)   Resp 16   Ht 1.689 m (5' 6.5\")   Wt 98.7 kg (217 lb 9.6 oz)   SpO2 99%   BMI 34.60 kg/m    Body mass index is 34.6 kg/m .  Physical Exam   GENERAL: healthy, alert, no distress and obese  EYES: Eyes grossly normal to inspection, PERRL and conjunctivae and sclerae normal  HENT: ear canals and TM's normal, nose and mouth without ulcers or lesions  NECK: left tonsillar adenopathy  RESP: lungs clear to auscultation - no rales, rhonchi or wheezes  CV: regular rates and rhythm, normal S1 S2, no S3 or S4 and no murmur, click or rub  PSYCH: mentation appears normal, affect normal/bright    Results for orders placed or performed in visit on 06/21/23   Streptococcus A Rapid Screen w/Reflex to PCR - Clinic Collect     Status: Normal    Specimen: Throat; Swab   Result Value Ref Range    Group A Strep antigen Negative Negative   Group A Streptococcus PCR Throat Swab     " Status: Normal    Specimen: Throat; Swab   Result Value Ref Range    Group A strep by PCR Not Detected Not Detected    Narrative    The Xpert Xpress Strep A test, performed on the [employer]  Instrument Systems, is a rapid, qualitative in vitro diagnostic test for the detection of Streptococcus pyogenes (Group A ß-hemolytic Streptococcus, Strep A) in throat swab specimens from patients with signs and symptoms of pharyngitis. The Xpert Xpress Strep A test can be used as an aid in the diagnosis of Group A Streptococcal pharyngitis. The assay is not intended to monitor treatment for Group A Streptococcus infections. The Xpert Xpress Strep A test utilizes an automated real-time polymerase chain reaction (PCR) to detect Streptococcus pyogenes DNA.

## 2023-06-21 NOTE — PATIENT INSTRUCTIONS
BP goal <129/89    Did you know you can lower your blood pressure with your daily habits?     *Losing 20 pounds of weight lowers blood pressure 5 to 20 points.  *Eating a diet rich in fruits, vegetables and low-fat dairy lowers blood pressure 8 to 14 points (DASH diet).  *Eating a low-salt diet lowers blood pressure 2 to 8 points.  *Exercising regularly lowers blood pressure 4 to 9 points.  *Reducing alcohol use lowers blood pressure 2 to 4 points.     Mediterranean diet

## 2023-06-23 ENCOUNTER — HOSPITAL ENCOUNTER (OUTPATIENT)
Dept: MRI IMAGING | Facility: HOSPITAL | Age: 43
Discharge: HOME | End: 2023-06-23
Attending: PHYSICAL MEDICINE & REHABILITATION
Payer: COMMERCIAL

## 2023-06-23 DIAGNOSIS — R20.2 LEFT LEG PARESTHESIAS: ICD-10-CM

## 2023-06-23 DIAGNOSIS — R20.2 ARM PARESTHESIA, LEFT: ICD-10-CM

## 2023-06-23 DIAGNOSIS — M54.2 CHRONIC NECK PAIN: ICD-10-CM

## 2023-06-23 DIAGNOSIS — G89.29 CHRONIC NECK PAIN: ICD-10-CM

## 2023-06-23 PROCEDURE — 70551 MRI BRAIN STEM W/O DYE: CPT

## 2023-06-23 PROCEDURE — 72141 MRI NECK SPINE W/O DYE: CPT

## 2023-07-14 DIAGNOSIS — I10 BENIGN ESSENTIAL HYPERTENSION: ICD-10-CM

## 2023-07-14 RX ORDER — LISINOPRIL 10 MG/1
10 TABLET ORAL DAILY
Qty: 30 TABLET | Refills: 1 | OUTPATIENT
Start: 2023-07-14

## 2023-07-19 ENCOUNTER — OFFICE VISIT (OUTPATIENT)
Dept: FAMILY MEDICINE | Facility: CLINIC | Age: 43
End: 2023-07-19
Payer: COMMERCIAL

## 2023-07-19 VITALS
HEART RATE: 60 BPM | OXYGEN SATURATION: 97 % | RESPIRATION RATE: 16 BRPM | TEMPERATURE: 99.3 F | WEIGHT: 217 LBS | SYSTOLIC BLOOD PRESSURE: 120 MMHG | HEIGHT: 67 IN | BODY MASS INDEX: 34.06 KG/M2 | DIASTOLIC BLOOD PRESSURE: 78 MMHG

## 2023-07-19 DIAGNOSIS — I10 BENIGN ESSENTIAL HYPERTENSION: Primary | ICD-10-CM

## 2023-07-19 DIAGNOSIS — M54.16 LEFT LUMBAR RADICULOPATHY: ICD-10-CM

## 2023-07-19 DIAGNOSIS — R05.8 POST-VIRAL COUGH SYNDROME: ICD-10-CM

## 2023-07-19 PROCEDURE — 36415 COLL VENOUS BLD VENIPUNCTURE: CPT | Performed by: NURSE PRACTITIONER

## 2023-07-19 PROCEDURE — 80048 BASIC METABOLIC PNL TOTAL CA: CPT | Performed by: NURSE PRACTITIONER

## 2023-07-19 PROCEDURE — 99214 OFFICE O/P EST MOD 30 MIN: CPT | Performed by: NURSE PRACTITIONER

## 2023-07-19 RX ORDER — LEVALBUTEROL TARTRATE 45 UG/1
2 AEROSOL, METERED ORAL EVERY 4 HOURS PRN
COMMUNITY
Start: 2023-07-19 | End: 2023-11-16

## 2023-07-19 RX ORDER — GABAPENTIN 300 MG/1
CAPSULE ORAL
Qty: 90 CAPSULE | Refills: 1 | Status: SHIPPED | OUTPATIENT
Start: 2023-07-19 | End: 2023-09-22

## 2023-07-19 RX ORDER — LISINOPRIL 10 MG/1
10 TABLET ORAL DAILY
Qty: 90 TABLET | Refills: 3 | Status: SHIPPED | OUTPATIENT
Start: 2023-07-19 | End: 2024-07-10

## 2023-07-19 RX ORDER — PREDNISONE 20 MG/1
40 TABLET ORAL DAILY
Qty: 10 TABLET | Refills: 0 | Status: SHIPPED | OUTPATIENT
Start: 2023-07-19 | End: 2023-07-24

## 2023-07-19 ASSESSMENT — PAIN SCALES - GENERAL: PAINLEVEL: MILD PAIN (3)

## 2023-07-19 NOTE — PATIENT INSTRUCTIONS
Snack Ideas   1 hardboiled egg with   cup berries  1 small apple with 1 hardboiled egg  10 almonds with   cup berries  2 clementines with 1 light string cheese  1 light string cheese with   sliced apple  1 light string cheese wrapped in 2 slices of turkey  4 100% whole wheat crackers (e.g. Triscuit) with 1 light string cheese    c. cottage cheese with   cup fruit and 1 Tbsp sunflower seeds     cup cottage cheese with   of an avocado     can tuna fish with 1 cup sliced cucumbers     cup roasted garbanzo beans with paprika and cayenne pepper    baked sweet potato with   cup chili beans or   cup cottage cheese  2 oz. nitrate free turkey slices with 1 cup carrots  1 container (6 oz) of low sugar (less than 10 grams of sugar) greek yogurt   3 Tablespoons of hummus with 1 cup sliced bell peppers   2 Tablespoons of hummus with 15 baby carrots  4 Tablespoons ranch dip made with plain Greek Yogurt and 3 mini cucumbers  1/4 cup nuts (any kind)  1 Tablespoon peanut butter with 1 stalk celery   1 dill pickle wrapped in 1-2 slices of deli ham with 1 tsp of light cream cheese

## 2023-07-19 NOTE — PROGRESS NOTES
"  Assessment & Plan     Benign essential hypertension  Chronic, stable, continue current treatment.   - lisinopril (ZESTRIL) 10 MG tablet; Take 1 tablet (10 mg) by mouth daily  - Basic metabolic panel  (Ca, Cl, CO2, Creat, Gluc, K, Na, BUN); Future  - Basic metabolic panel  (Ca, Cl, CO2, Creat, Gluc, K, Na, BUN)     Left lumbar radiculopathy  Chronic, stable, continue current treatment.   - gabapentin (NEURONTIN) 300 MG capsule; Take 1 capsule by mouth at bedtime.  May increase as needed and as tolerated up to 1 capsule 3 times daily.    Post-viral cough syndrome    - predniSONE (DELTASONE) 20 MG tablet; Take 2 tablets (40 mg) by mouth daily for 5 days    Follow up in 1-2 months for physical/Pap                 Marsha Fernandez NP  [facility]    Alex Bassett is a 43 year old, presenting for the following health issues:  Hypertension        7/19/2023    12:05 PM   Additional Questions   Roomed by Noelle OREILLY     History of Present Illness       Hypertension: She presents for follow up of hypertension.  She does check blood pressure  regularly outside of the clinic. Outpatient blood pressures have not been over 140/90. She follows a low salt diet.     She eats 2-3 servings of fruits and vegetables daily.She consumes 0 sweetened beverage(s) daily.She exercises with enough effort to increase her heart rate 9 or less minutes per day.  She exercises with enough effort to increase her heart rate 3 or less days per week.   She is taking medications regularly.     Additional provider notes:    Has adjusted her diet-low sodium lunch meat and rinses.  More fish.    Still coughing deep in the chest.  Wet cough.  No wheezing.      Review of Systems   Constitutional, HEENT, cardiovascular, pulmonary, gi and gu systems are negative, except as otherwise noted.      Objective    /78   Pulse 60   Temp 99.3  F (37.4  C) (Oral)   Resp 16   Ht 1.689 m (5' 6.5\")   Wt 98.4 kg (217 lb)   SpO2 " 97%   BMI 34.50 kg/m    Body mass index is 34.5 kg/m .  Physical Exam   GENERAL: healthy, alert, no distress and obese  EYES: Eyes grossly normal to inspection, PERRL and conjunctivae and sclerae normal  HENT: ear canals and TM's normal, nose and mouth without ulcers or lesions  NECK: no adenopathy  RESP: lungs clear to auscultation - no rales, rhonchi or wheezes, intermittent dry cough  CV: regular rates and rhythm, normal S1 S2, no S3 or S4 and no murmur, click or rub  PSYCH: mentation appears normal, affect normal/bright    Results for orders placed or performed in visit on 07/19/23   Basic metabolic panel  (Ca, Cl, CO2, Creat, Gluc, K, Na, BUN)     Status: Abnormal   Result Value Ref Range    Sodium 141 136 - 145 mmol/L    Potassium 4.1 3.4 - 5.3 mmol/L    Chloride 110 (H) 98 - 107 mmol/L    Carbon Dioxide (CO2) 26 22 - 29 mmol/L    Anion Gap 5 (L) 7 - 15 mmol/L    Urea Nitrogen 9.8 6.0 - 20.0 mg/dL    Creatinine 0.71 0.51 - 0.95 mg/dL    Calcium 9.0 8.6 - 10.0 mg/dL    Glucose 93 70 - 99 mg/dL    GFR Estimate >90 >60 mL/min/1.73m2

## 2023-07-20 LAB
ANION GAP SERPL CALCULATED.3IONS-SCNC: 5 MMOL/L (ref 7–15)
BUN SERPL-MCNC: 9.8 MG/DL (ref 6–20)
CALCIUM SERPL-MCNC: 9 MG/DL (ref 8.6–10)
CHLORIDE SERPL-SCNC: 110 MMOL/L (ref 98–107)
CREAT SERPL-MCNC: 0.71 MG/DL (ref 0.51–0.95)
DEPRECATED HCO3 PLAS-SCNC: 26 MMOL/L (ref 22–29)
GFR SERPL CREATININE-BSD FRML MDRD: >90 ML/MIN/1.73M2
GLUCOSE SERPL-MCNC: 93 MG/DL (ref 70–99)
POTASSIUM SERPL-SCNC: 4.1 MMOL/L (ref 3.4–5.3)
SODIUM SERPL-SCNC: 141 MMOL/L (ref 136–145)

## 2023-07-24 ENCOUNTER — PATIENT OUTREACH (OUTPATIENT)
Dept: CARE COORDINATION | Facility: CLINIC | Age: 43
End: 2023-07-24
Payer: COMMERCIAL

## 2023-07-24 ENCOUNTER — TELEPHONE (OUTPATIENT)
Dept: FAMILY MEDICINE | Facility: CLINIC | Age: 43
End: 2023-07-24
Payer: COMMERCIAL

## 2023-07-24 NOTE — TELEPHONE ENCOUNTER
Reason for Call:  Appointment Request    Patient requesting this type of appt:  Preventive     Requested provider: Marsha Fernandez    Reason patient unable to be scheduled: Not within requested timeframe    When does patient want to be seen/preferred time:  before 8/31 for insurance reasons    Comments: need physical before 8/31 please for ins reasons    Could we send this information to you in [employer] or would you prefer to receive a phone call?:   Patient would like to be contacted via [employer]    Call taken on 7/24/2023 at 11:29 AM by Mary Jo Alarcon

## 2023-09-22 ENCOUNTER — OFFICE VISIT (OUTPATIENT)
Dept: FAMILY MEDICINE | Facility: CLINIC | Age: 43
End: 2023-09-22
Attending: NURSE PRACTITIONER
Payer: COMMERCIAL

## 2023-09-22 VITALS
WEIGHT: 216.4 LBS | DIASTOLIC BLOOD PRESSURE: 80 MMHG | HEART RATE: 71 BPM | BODY MASS INDEX: 33.97 KG/M2 | OXYGEN SATURATION: 96 % | TEMPERATURE: 98.5 F | HEIGHT: 67 IN | RESPIRATION RATE: 16 BRPM | SYSTOLIC BLOOD PRESSURE: 124 MMHG

## 2023-09-22 DIAGNOSIS — M54.16 LEFT LUMBAR RADICULOPATHY: ICD-10-CM

## 2023-09-22 DIAGNOSIS — Z00.00 ROUTINE HISTORY AND PHYSICAL EXAMINATION OF ADULT: Primary | ICD-10-CM

## 2023-09-22 DIAGNOSIS — L81.9 HYPERPIGMENTED SKIN LESION: ICD-10-CM

## 2023-09-22 DIAGNOSIS — I10 BENIGN ESSENTIAL HYPERTENSION: ICD-10-CM

## 2023-09-22 DIAGNOSIS — S16.1XXA STRAIN OF NECK MUSCLE, INITIAL ENCOUNTER: ICD-10-CM

## 2023-09-22 DIAGNOSIS — Z12.4 CERVICAL CANCER SCREENING: ICD-10-CM

## 2023-09-22 LAB
CHOLEST SERPL-MCNC: 212 MG/DL
HDLC SERPL-MCNC: 57 MG/DL
LDLC SERPL CALC-MCNC: 129 MG/DL
NONHDLC SERPL-MCNC: 155 MG/DL
TRIGL SERPL-MCNC: 129 MG/DL

## 2023-09-22 PROCEDURE — 87624 HPV HI-RISK TYP POOLED RSLT: CPT | Performed by: NURSE PRACTITIONER

## 2023-09-22 PROCEDURE — 90471 IMMUNIZATION ADMIN: CPT | Performed by: NURSE PRACTITIONER

## 2023-09-22 PROCEDURE — 99396 PREV VISIT EST AGE 40-64: CPT | Mod: 25 | Performed by: NURSE PRACTITIONER

## 2023-09-22 PROCEDURE — 90686 IIV4 VACC NO PRSV 0.5 ML IM: CPT | Performed by: NURSE PRACTITIONER

## 2023-09-22 PROCEDURE — 99213 OFFICE O/P EST LOW 20 MIN: CPT | Mod: 25 | Performed by: NURSE PRACTITIONER

## 2023-09-22 PROCEDURE — 80061 LIPID PANEL: CPT | Performed by: NURSE PRACTITIONER

## 2023-09-22 PROCEDURE — G0145 SCR C/V CYTO,THINLAYER,RESCR: HCPCS | Performed by: NURSE PRACTITIONER

## 2023-09-22 PROCEDURE — 36415 COLL VENOUS BLD VENIPUNCTURE: CPT | Performed by: NURSE PRACTITIONER

## 2023-09-22 RX ORDER — PREDNISONE 20 MG/1
40 TABLET ORAL DAILY
Qty: 10 TABLET | Refills: 0 | Status: SHIPPED | OUTPATIENT
Start: 2023-09-22 | End: 2023-09-27

## 2023-09-22 RX ORDER — GABAPENTIN 300 MG/1
CAPSULE ORAL
Qty: 90 CAPSULE | Refills: 1 | Status: SHIPPED | OUTPATIENT
Start: 2023-09-22 | End: 2024-02-21

## 2023-09-22 RX ORDER — HYDROCODONE BITARTRATE AND ACETAMINOPHEN 5; 325 MG/1; MG/1
1 TABLET ORAL EVERY 6 HOURS PRN
Qty: 10 TABLET | Refills: 0 | Status: SHIPPED | OUTPATIENT
Start: 2023-09-22 | End: 2023-09-25

## 2023-09-22 ASSESSMENT — ENCOUNTER SYMPTOMS
HEARTBURN: 1
HEADACHES: 0
MYALGIAS: 1
JOINT SWELLING: 0
DIARRHEA: 0
FEVER: 0
NAUSEA: 0
WEAKNESS: 0
NERVOUS/ANXIOUS: 1
CHILLS: 0
PARESTHESIAS: 0
SORE THROAT: 0
ARTHRALGIAS: 0
HEMATOCHEZIA: 0
DIZZINESS: 0
COUGH: 0
BREAST MASS: 0
FREQUENCY: 0
ABDOMINAL PAIN: 0
EYE PAIN: 0
CONSTIPATION: 0
PALPITATIONS: 0
DYSURIA: 0
HEMATURIA: 0
SHORTNESS OF BREATH: 0

## 2023-09-22 ASSESSMENT — PAIN SCALES - GENERAL: PAINLEVEL: MODERATE PAIN (5)

## 2023-09-22 NOTE — NURSING NOTE
"Injectable Influenza Immunization Documentation    1.  Has the patient received the information for the injectable influenza vaccine? YES     2. Is the patient 6 months of age or older? YES     3. Does the patient have any of the following contraindications?         Severe allergy to eggs? No     Severe allergic reaction to previous influenza vaccines? No   Severe allergy to latex? No       History of Guillain-Proctor syndrome? No     Currently have a temperature greater than 100.4F? No        4.  Severely egg allergic patients should have flu vaccine eligibility assessed by an MD, RN, or pharmacist, and those who received flu vaccine should be observed for 15 min by an MD, RN, Pharmacist, Medical Technician, or member of clinic staff.\": YES    5. Latex-allergic patients should be given latex-free influenza vaccine Yes. Please reference the Vaccine latex table to determine if your clinic s product is latex-containing.       Vaccination given by Arlen Sherwood CMA        "

## 2023-09-22 NOTE — PROGRESS NOTES
SUBJECTIVE:   CC: Danyelle is an 43 year old who presents for preventive health visit.       9/22/2023     2:35 PM   Additional Questions   Roomed by Andrea CLEMENTS.       Healthy Habits:     Getting at least 3 servings of Calcium per day:  NO    Bi-annual eye exam:  Yes    Dental care twice a year:  Yes    Sleep apnea or symptoms of sleep apnea:  None    Diet:  Regular (no restrictions)    Frequency of exercise:  1 day/week    Duration of exercise:  15-30 minutes    Taking medications regularly:  Yes    Medication side effects:  Not applicable    Additional concerns today:  Yes    Heartburn - for about a month.  Unsure if this is related to lisinopril use.    Left-sided neck she feels like she strained the muscle.  This is a pain she has had before.  Not sure how she did this but she is having continued pain.  She has been doing Tylenol, NSAIDs, massage.  Had prior negative experience with chiropractor so is not going to be going there for this.  Muscle relaxants in the past did not tolerate, either caused drowsiness or mental changes.        Social History     Tobacco Use    Smoking status: Never    Smokeless tobacco: Never   Substance Use Topics    Alcohol use: Not Currently     Comment: 1-2 a month             9/22/2023     2:28 PM   Alcohol Use   Prescreen: >3 drinks/day or >7 drinks/week? No          No data to display              Reviewed orders with patient.  Reviewed health maintenance and updated orders accordingly - Yes  BP Readings from Last 3 Encounters:   09/22/23 124/80   07/19/23 120/78   06/21/23 130/80    Wt Readings from Last 3 Encounters:   09/22/23 98.2 kg (216 lb 6.4 oz)   07/19/23 98.4 kg (217 lb)   06/21/23 98.7 kg (217 lb 9.6 oz)                  Patient Active Problem List   Diagnosis    Persistent insomnia    allergic DERMATITIS NOS    [facility]    [facility]    Migraine headache    PMDD (premenstrual dysphoric disorder)    Trichotillomania    CARDIOVASCULAR SCREENING;  LDL GOAL LESS THAN 160    History of ovarian cyst    Generalized anxiety disorder    Chronic pain syndrome    ASD (atrial septal defect)    Chronic pain    Esophageal reflux    Benign hypermobility syndrome    Myalgia and myositis, unspecified    Myofascial pain    Right inguinal hernia    S/P right inguinal hernia repair, follow-up exam    Morbid obesity (H)    Mild recurrent major depression (H)    Left lumbar radiculopathy    Benign essential hypertension     Past Surgical History:   Procedure Laterality Date    BIOPSY      COLONOSCOPY      HERNIA REPAIR  Feb 2021    IR CERVICAL EPIDURAL STEROID INJECTION  9/7/2012    IR CERVICAL EPIDURAL STEROID INJECTION  10/2/2012    ZZC REPR ASD OSTIUM PREMUM      Dr. Silverio       Social History     Tobacco Use    Smoking status: Never    Smokeless tobacco: Never   Substance Use Topics    Alcohol use: Not Currently     Comment: 1-2 a month     Family History   Problem Relation Age of Onset    C.A.D. Mother         heart surgery to close hole in heart    Cardiovascular Mother     Hypertension Mother     Depression Mother     Anxiety Disorder Mother     Heart Failure Mother     Cerebrovascular Disease Father     Hypertension Father     Myocardial Infarction Maternal Grandfather          Current Outpatient Medications   Medication Sig Dispense Refill    FLUoxetine (PROZAC) 20 MG capsule Take 20 mg by mouth daily      FLUoxetine (PROZAC) 40 MG capsule Take 40 mg by mouth daily      gabapentin (NEURONTIN) 100 MG capsule 200mg at bedtime x 3 days, then 100mg at bedtime. 30 capsule 1    gabapentin (NEURONTIN) 300 MG capsule Take 1 capsule by mouth at bedtime.  May increase as needed and as tolerated up to 1 capsule 3 times daily. 90 capsule 1           lamoTRIgine (LAMICTAL) 100 MG tablet TAKE 1 TABLET BY MOUTH WITH  MG TABLET ONCE DAILY      lamoTRIgine (LAMICTAL) 200 MG tablet Take 1 tablet (200 mg) by mouth daily 90 tablet 1    levalbuterol (XOPENEX HFA) 45 MCG/ACT inhaler  Inhale 2 puffs into the lungs every 4 hours as needed for shortness of breath or wheezing (cough)      lisinopril (ZESTRIL) 10 MG tablet Take 1 tablet (10 mg) by mouth daily 90 tablet 3    LORazepam (ATIVAN) 1 MG tablet TAKE 1 TABLET BY MOUTH THREE TIMES A DAY AS DIRECTED      MULTIPLE VITAMIN PO       norethindrone-ethinyl estradiol (MICROGESTIN ) 1-20 MG-MCG tablet Take 1 tablet by mouth daily 63 tablet 3            Allergies   Allergen Reactions    Artificial Sweetner (Informational Only) [Artificial Sweetner (Informational Only) ]     Chocolate Flavor Other (See Comments)       Breast Cancer Screenin/22/2022     8:41 AM   Breast CA Risk Assessment (FHS-7)   Do you have a family history of breast, colon, or ovarian cancer? No / Unknown       Mammogram Screening - Offered annual screening and updated Health Maintenance for mutual plan based on risk factor consideration  Pertinent mammograms are reviewed under the imaging tab.    History of abnormal Pap smear: NO - age 30-65 PAP every 5 years with negative HPV co-testing recommended      Latest Ref Rng & Units 2018     3:35 PM 2018     3:33 PM 2015    12:00 AM   PAP / HPV   PAP (Historical)   NIL  NIL    HPV 16 DNA NEG^Negative Negative      HPV 18 DNA NEG^Negative Negative      Other HR HPV NEG^Negative Negative        Reviewed and updated as needed this visit by clinical staff   Tobacco   Meds   Med Hx  Surg Hx  Fam Hx          Reviewed and updated as needed this visit by Provider     Meds   Med Hx  Surg Hx  Fam Hx             Review of Systems   Constitutional:  Negative for chills and fever.   HENT:  Negative for congestion, ear pain, hearing loss and sore throat.    Eyes:  Negative for pain and visual disturbance.   Respiratory:  Negative for cough and shortness of breath.    Cardiovascular:  Negative for chest pain, palpitations and peripheral edema.   Gastrointestinal:  Positive for heartburn. Negative for abdominal  "pain, constipation, diarrhea, hematochezia and nausea.   Breasts:  Negative for tenderness, breast mass and discharge.   Genitourinary:  Negative for dysuria, frequency, genital sores, hematuria, pelvic pain, urgency, vaginal bleeding and vaginal discharge.   Musculoskeletal:  Positive for myalgias. Negative for arthralgias and joint swelling.   Skin:  Negative for rash.   Neurological:  Negative for dizziness, weakness, headaches and paresthesias.   Psychiatric/Behavioral:  Positive for mood changes. The patient is nervous/anxious.         OBJECTIVE:   /80 (BP Location: Right arm, Patient Position: Sitting, Cuff Size: Adult Large)   Pulse 71   Temp 98.5  F (36.9  C) (Oral)   Resp 16   Ht 1.689 m (5' 6.5\")   Wt 98.2 kg (216 lb 6.4 oz)   SpO2 96%   BMI 34.40 kg/m    Physical Exam  GENERAL: healthy, alert and no distress  EYES: Eyes grossly normal to inspection, PERRL and conjunctivae and sclerae normal  HENT: ear canals and TM's normal, nose and mouth without ulcers or lesions  NECK: no adenopathy, no asymmetry, masses, or scars and thyroid normal to palpation  RESP: lungs clear to auscultation - no rales, rhonchi or wheezes  BREAST: normal without masses, tenderness or nipple discharge and no palpable axillary masses or adenopathy  CV: regular rate and rhythm, normal S1 S2, no S3 or S4, no murmur, click or rub, no peripheral edema and peripheral pulses strong  ABDOMEN: soft, nontender, no hepatosplenomegaly, no masses and bowel sounds normal   (female): normal female external genitalia, normal urethral meatus, vaginal mucosa pink, moist, well rugated, and normal cervix/adnexa/uterus without masses or discharge  MS: no tenderness to neck  SKIN: no suspicious lesions or rashes.  Multiple hyperpigmented macules and papules on torso, arms.  Multiple cherry angiomas on torso.  NEURO: Normal strength and tone, mentation intact and speech normal  PSYCH: mentation appears normal, affect " normal/bright    Diagnostic Test Results:  Labs reviewed in Epic  No results found for this or any previous visit (from the past 24 hour(s)).    ASSESSMENT/PLAN:   Danyelle was seen today for physical and forms.    Diagnoses and all orders for this visit:    Routine history and physical examination of adult  -     INFLUENZA VACCINE IM > 6 MONTHS VALENT IIV4 (AFLURIA/FLUZONE)  -     Lipid panel reflex to direct LDL Non-fasting; Future  -     Lipid panel reflex to direct LDL Non-fasting    Benign essential hypertension  -     PRIMARY CARE FOLLOW-UP SCHEDULING  Chronic, stable, continue current treatment.     Cervical cancer screening  -     Pap Screen with HPV - recommended age 30 - 65 years    Left lumbar radiculopathy  -     gabapentin (NEURONTIN) 300 MG capsule; Take 1 capsule by mouth at bedtime.  May increase as needed and as tolerated up to 1 capsule 3 times daily.  Chronic, stable, continue current treatment.   Will be seeing Neurology.    Strain of neck muscle, initial encounter  -     HYDROcodone-acetaminophen (NORCO) 5-325 MG tablet; Take 1 tablet by mouth every 6 hours as needed for severe pain  -     predniSONE (DELTASONE) 20 MG tablet; Take 2 tablets (40 mg) by mouth daily for 5 days  Over-the-counter medications with minimal effect.  She has not tolerated muscle relaxants in the past.  Okay for Norco for the next 3 days as needed then discontinue.  If pain persist she will use the prednisone next week.  Would not recommend ongoing use of Norco.  If pain persists, then would recommend Physical Therapy, she declined at this time but if pain persists would recommend follow up with Physical Therapy for treatment.    Hyperpigmented skin lesion  -     Adult Dermatology Referral; Future          COUNSELING:  Reviewed preventive health counseling, as reflected in patient instructions       Regular exercise       Healthy diet/nutrition        She reports that she has never smoked. She has never used smokeless  tobacco.          Marsha Fernandez NP  [facility]

## 2023-09-26 LAB
BKR LAB AP GYN ADEQUACY: NORMAL
BKR LAB AP GYN INTERPRETATION: NORMAL
BKR LAB AP HPV REFLEX: NORMAL
BKR LAB AP PREVIOUS ABNORMAL: NORMAL
PATH REPORT.COMMENTS IMP SPEC: NORMAL
PATH REPORT.COMMENTS IMP SPEC: NORMAL
PATH REPORT.RELEVANT HX SPEC: NORMAL

## 2023-09-28 LAB
HUMAN PAPILLOMA VIRUS 16 DNA: NEGATIVE
HUMAN PAPILLOMA VIRUS 18 DNA: NEGATIVE
HUMAN PAPILLOMA VIRUS FINAL DIAGNOSIS: NORMAL
HUMAN PAPILLOMA VIRUS OTHER HR: NEGATIVE

## 2023-10-01 ENCOUNTER — TRANSFERRED RECORDS (OUTPATIENT)
Dept: MULTI SPECIALTY CLINIC | Facility: CLINIC | Age: 43
End: 2023-10-01

## 2023-10-06 ENCOUNTER — OFFICE VISIT (OUTPATIENT)
Dept: SURGERY | Facility: CLINIC | Age: 43
End: 2023-10-06
Payer: COMMERCIAL

## 2023-10-06 ENCOUNTER — TELEPHONE (OUTPATIENT)
Dept: SURGERY | Facility: CLINIC | Age: 43
End: 2023-10-06

## 2023-10-06 VITALS
WEIGHT: 218 LBS | HEART RATE: 79 BPM | SYSTOLIC BLOOD PRESSURE: 138 MMHG | DIASTOLIC BLOOD PRESSURE: 79 MMHG | BODY MASS INDEX: 34.66 KG/M2

## 2023-10-06 DIAGNOSIS — K40.90 LEFT INGUINAL HERNIA: Primary | ICD-10-CM

## 2023-10-06 PROCEDURE — 99215 OFFICE O/P EST HI 40 MIN: CPT | Performed by: SURGERY

## 2023-10-06 RX ORDER — HEPARIN SODIUM 5000 [USP'U]/.5ML
5000 INJECTION, SOLUTION INTRAVENOUS; SUBCUTANEOUS
Status: CANCELLED | OUTPATIENT
Start: 2023-10-06

## 2023-10-06 NOTE — PATIENT INSTRUCTIONS
Assessment: Has a small umbilical and a left inguinal hernias noted.  Patient had robotic right inguinal hernia done in the past.    Plan to do will do the left inguinal hernia robotic and use the umbilical site for camera and fix the umbilical site with mesh.    Patient wants this done at the  same day surgery center.     Risks of surgery include damage to nerves, bleeding, infection, damage to  Vessels, recurrence.  Although mesh is a better long term repair if it gets infected it must be removed.   If the patient has any bacterial infection the week before and is seen by their doctor and started on antibiotics, I can probably still do the surgery if they are vastly improved by the time of surgery, but if the infection starts closer to the surgery date it will be better to cancel and reschedule to a later date.  A cough will also be hard on the repair and uncomfortable post operative.  If the patient is a smoker I did discuss increase risk of recurrence and more pain with the cough.  If the patient is willing to quit smoking would encourage to do so and start at least a week before surgery.  However, if patient is not going to quit then must understand that his repair is more likely to fail.    Risks of surgery discussed including, but not limited to bleeding, infection, recurrence, damage to nerves and what is in the hernia sac.  Risks of anesthesia also discussed.    Discussed massaging hernia back in and using ice if becomes more painful.  If not able to reduce then go to emergency room.  Also discussed hernia belt to use until able to get in for surgery.    Things you will need to do before surgery are getting a preoperative appointment with your primary care doctor to be cleared for surgery.      HERNIORRHAPHY DISCHARGE INSTRUCTIONS  DR. DIVINA JACK    Please call [phone], for  [facility] or  for [facility], to schedule a follow up appointment in 2 weeks.        1. You may resume your regular diet when you feel you are ready to. DO NOT drink alcoholic beverages for 24 hours or while you are taking prescription medication.    2. Limit your activities for the first 48 hours. Gradually, increase them as tolerated. You may use stairs. I encourage you to walk as tolerated. No lifting greater that 20 pounds for 3-6 weeks.    3. You will have some discomfort at the incision sites. This is expected. This should improve over the next 2-3 days. Ice and pain medication will help with this pain. Use prescribed pain medication as instructed.    4. Bruising and mild swelling is normal after surgery. For males it is common to have bruising going into the penis and scrotum. The area below and around the incision(s) will be hard and elevated. This is normal. I call it the healing ridge. This will resolve slowly over the next several months. If you feel the pain is increasing and cannot explain it by increasing activity please call us at [phone].    5. The dressing will often have some blood on it. You may shower 24 hours after surgery. No baths for 2 weeks after surgery. Clean gently over incision site. If clear plastic covering or steri-strip comes off and there is still some bleeding or drainage then cover with gauze or band-aid. If no bleeding, there is no reason to cover site. The abdominal binder may be removed after 24 hours after surgery. You may continue to wear it however for comfort. I suggest  you wear an old t-shirt under the abdominal binder for a more comfortable wear.    6. Avoid Aspirin for the first 72 hours after the procedure. This medication may increase the tendency to bleed.    7. Use the following medications (in addition to your normal meds) as shown:  a. Percocet 5 mg 1-2 every 6 hours as needed for severe pain. This contains 325 mg of Tylenol (acetaminophen) per tablet.  Please do not take more than 4 grams of Tylenol (acetaminophen) per day. For  example, you may take 1 Percocet and 1 Tylenol, or 2 Percocet and no Tylenol, or 2 Tylenol and no Percocet every 6 hours.  b. Tylenol (acetaminophen) 500 mg every 6 hours as needed for mild pain. Do not take more than 1000 mg every 6 hours. (see above).  c. Motrin (ibuprofen) 200-800 mg every 6 hours as needed for mild to moderate pain. Take with food.     8. Notify Dr. Mcnamara's clinic at [phone], for  [facility] or  for [facility], to schedule a follow up appointment in 2 weeks.   if:  Your discomfort is not relieved by your pain medication.  You have signs of infection such as temperature above 100.5 degrees orally, chills, or increasing daily discomfort.  Incision site is becoming more red and/or there is purulent drainage.  You have questions or concerns.    9. Please call [phone], for  [facility] or  for [facility] clinic, to schedule a follow up appointment in 2 weeks.   to schedule a follow up appointment in about 2 weeks.    10. When taking narcotics (pain medication more than Tylenol [acetaminophen] and Motrin [ibuprofen]) it is important to keep your stools soft to avoid constipation and pain with straining. This is best done by drinking fluids (non-alcoholic and non-caffeinated) and taking a stool softener (i.e. Metamucil or milk of magnesia). You may be able to use non-narcotics for pain relief especially by the 3rd post- operative day. Tylenol (acetaminophen) 500 mg every 6 hours and/or Motrin (ibuprofen) 200-800 mg every 6 hours. Please do not take more than 4 grams of Tylenol (acetaminophen) per day. Remember your Percocet does have Tylenol (acetaminophen) already in it. Please take Motrin (ibuprofen) with food to help protect the stomach. If you have a history of stomach ulcers or stomach problems, do not take Motrin (ibuprofen).     11. Do not drive or operate heavy machinery for 24 hours after surgery or when taking  narcotics. You may resume driving when feel that you can safely avoid an accident and are not taking narcotics. This is usually 5 to 7 days after surgery. You should not be alone for 24 hours after surgery.    12. Have milk of magnesia available at home so that when you take the pain medications you take 1-2 tablepoons a day, to help reduce problems with constipation.      13. If you have questions after your procedure/surgery please contact Dr Mcnamara's primary clinic in [place]. You can call [phone], your other option is to send us a [employer] message through your [employer] portal to Dr Mcnamara's team. For urgent and non urgent matters these options are best. If your symptoms are emergent or cannot wait, please proceed to [facility] and let them know who you had surgery with.      Patient can receive pain medications that I have ordered and give the first dose in the recovery room as needed.

## 2023-10-06 NOTE — LETTER
10/6/2023         RE: Danyelle Canales  [address] [place] [address]        Dear Colleague,    Thank you for referring your patient, Danyelle Canales, to the [facility]. Please see a copy of my visit note below.    Dear Marsha Vaca  I was asked to see this patient by Marsha Fernandez for please see below.  I have seen Danyelle Canales and as you know his chief complaint is umbilical and the left inguinal hernia   2012 repaired a right inguinal hernia   Has a left one now.    HPI:  Patient is a 43 year old female  with complaints see above        10 Point review of systems all others are negative.   There were no vitals taken for this visit.    Past Medical History:   Diagnosis Date     ASD (atrial septal defect)     repaired surgically as a child     Benign essential hypertension 7/19/2023     Depressive disorder      Headache      History of blood transfusion 1985 1985 during open heart surgery     Hypertension     higher results at pain clinic, want to confirm ok     Insomnia, unspecified      Other forms of migraine, without mention of intractable migraine without mention of status migrainosus        Past Surgical History:   Procedure Laterality Date     BIOPSY       COLONOSCOPY       HERNIA REPAIR  Feb 2021     IR CERVICAL EPIDURAL STEROID INJECTION  9/7/2012     IR CERVICAL EPIDURAL STEROID INJECTION  10/2/2012     ZZC REPR ASD OSTIUM PREMUM      Dr. Silverio       Social History     Socioeconomic History     Marital status:      Spouse name: Chan Canales     Number of children: 0     Years of education: 12     Highest education level: Not on file   Occupational History     Occupation: customer service     Employer: [employer]   Tobacco Use     Smoking status: Never     Smokeless tobacco: Never   Vaping Use     Vaping Use: Never used   Substance and Sexual Activity     Alcohol use: Not Currently     Comment: 1-2 a month      Drug use: No     Sexual activity: Yes     Partners: Male     Birth control/protection: Pill   Other Topics Concern     Parent/sibling w/ CABG, MI or angioplasty before 65F 55M? Yes     Comment: stroke   Social History Narrative     Not on file     Social Determinants of Health     Financial Resource Strain: Low Risk  (9/22/2023)    Financial Resource Strain      Within the past 12 months, have you or your family members you live with been unable to get utilities (heat, electricity) when it was really needed?: No   Food Insecurity: Low Risk  (9/22/2023)    Food Insecurity      Within the past 12 months, did you worry that your food would run out before you got money to buy more?: No      Within the past 12 months, did the food you bought just not last and you didn t have money to get more?: No   Transportation Needs: Low Risk  (9/22/2023)    Transportation Needs      Within the past 12 months, has lack of transportation kept you from medical appointments, getting your medicines, non-medical meetings or appointments, work, or from getting things that you need?: No   Physical Activity: Not on file   Stress: No Stress Concern Present (10/14/2019)    [employer] - Occupational Stress Questionnaire      Feeling of Stress : Not at all   Social Connections: Not on file   Interpersonal Safety: Low Risk  (9/22/2023)    Interpersonal Safety      Do you feel physically and emotionally safe where you currently live?: Yes      Within the past 12 months, have you been hit, slapped, kicked or otherwise physically hurt by someone?: No      Within the past 12 months, have you been humiliated or emotionally abused in other ways by your partner or ex-partner?: No   Housing Stability: Low Risk  (9/22/2023)    Housing Stability      Do you have housing? : Yes      Are you worried about losing your housing?: No       Current Outpatient Medications   Medication Sig Dispense Refill     FLUoxetine (PROZAC) 20  MG capsule Take 20 mg by mouth daily       FLUoxetine (PROZAC) 40 MG capsule Take 40 mg by mouth daily       gabapentin (NEURONTIN) 100 MG capsule 200mg at bedtime x 3 days, then 100mg at bedtime. 30 capsule 1     gabapentin (NEURONTIN) 300 MG capsule Take 1 capsule by mouth at bedtime.  May increase as needed and as tolerated up to 1 capsule 3 times daily. 90 capsule 1     lamoTRIgine (LAMICTAL) 100 MG tablet TAKE 1 TABLET BY MOUTH WITH  MG TABLET ONCE DAILY       lamoTRIgine (LAMICTAL) 200 MG tablet Take 1 tablet (200 mg) by mouth daily 90 tablet 1     levalbuterol (XOPENEX HFA) 45 MCG/ACT inhaler Inhale 2 puffs into the lungs every 4 hours as needed for shortness of breath or wheezing (cough)       lisinopril (ZESTRIL) 10 MG tablet Take 1 tablet (10 mg) by mouth daily 90 tablet 3     LORazepam (ATIVAN) 1 MG tablet TAKE 1 TABLET BY MOUTH THREE TIMES A DAY AS DIRECTED       MULTIPLE VITAMIN PO        norethindrone-ethinyl estradiol (MICROGESTIN 1/20) 1-20 MG-MCG tablet Take 1 tablet by mouth daily 63 tablet 3       Above was reviewed  Physical exam: There were no vitals taken for this visit.   Patient able to get up on table without difficulty.   Patient is alert and orientated.   Head eyes, nose and mouth within normal limits.    Abdomen is abdomen is soft without significant tenderness, masses, organomegaly or guarding  bowel sounds are positive and no caput medusa noted.  Has a small umbilical and a left inguinal hernias noted.    Skin was warm and pink  Normal Affect  Lower extremity edema is not present.  She has numbness on the left medial and posterior of the left leg.        Assessment: Has a small umbilical and a left inguinal hernias noted.  Patient had robotic right inguinal hernia done in the past.    Plan to do will do the left inguinal hernia robotic and use the umbilical site for camera and fix the umbilical site with mesh.    Patient wants this done at the [facility] surgery [place].     Risks  of surgery include damage to nerves, bleeding, infection, damage to  Vessels, recurrence.  Although mesh is a better long term repair if it gets infected it must be removed.   If the patient has any bacterial infection the week before and is seen by their doctor and started on antibiotics, I can probably still do the surgery if they are vastly improved by the time of surgery, but if the infection starts closer to the surgery date it will be better to cancel and reschedule to a later date.  A cough will also be hard on the repair and uncomfortable post operative.  If the patient is a smoker I did discuss increase risk of recurrence and more pain with the cough.  If the patient is willing to quit smoking would encourage to do so and start at least a week before surgery.  However, if patient is not going to quit then must understand that his repair is more likely to fail.    Risks of surgery discussed including, but not limited to bleeding, infection, recurrence, damage to nerves and what is in the hernia sac.  Risks of anesthesia also discussed.    Discussed massaging hernia back in and using ice if becomes more painful.  If not able to reduce then go to emergency room.  Also discussed hernia belt to use until able to get in for surgery.    Things you will need to do before surgery are getting a preoperative appointment with your primary care doctor to be cleared for surgery.        Time spent with the patient with greater that 50% of the time in discussion was 45 minutes.  In discussing the plan.      Weston Mcnamara MD       [facility] Notes  Weston Mcnamara MD (Physician)   Surgery  Expand All Collapse All[]Expand All by Default  Dear Katlyn Samano  I was asked to see this patient by Katlyn Orozco for please see below.  I have seen Danyelle TORREY Canales and as you know his chief complaint is a popping feeling in the left groin and ultrasound show a small hernia.    Other than the popping feelin no  other pain.      HPI:  Patient is a 42 year old female  with complaints see above        Review Of Systems  Respiratory: No shortness of breath, dyspnea on exertion, cough, or hemoptysis  Cardiovascular: had ASD repair as a child.   Gastrointestinal: constipation  Endocrine: negative  :  negative     10 Point review of systems all others are negative.   LMP  (LMP Unknown)      Past Medical History        Past Medical History:   Diagnosis Date     ASD (atrial septal defect)       repaired surgically as a child     Depressive disorder       Headache       History of blood transfusion 1985 1985 during open heart surgery     Hypertension       higher results at pain clinic, want to confirm ok     Insomnia, unspecified       Other forms of migraine, without mention of intractable migraine without mention of status migrainosus              Past Surgical History         Past Surgical History:   Procedure Laterality Date     BIOPSY         COLONOSCOPY         HERNIA REPAIR   Feb 2021     IR CERVICAL EPIDURAL STEROID INJECTION   9/7/2012     IR CERVICAL EPIDURAL STEROID INJECTION   10/2/2012     ZZC REPR ASD OSTIUM PREMUM         Dr. Silverio            Social History   Social History            Socioeconomic History     Marital status:        Spouse name: Chan Canales     Number of children: 0     Years of education: 12     Highest education level: Not on file   Occupational History     Occupation: customer service       Employer: [employer]   Tobacco Use     Smoking status: Never     Smokeless tobacco: Never   Substance and Sexual Activity     Alcohol use: Not Currently       Comment: 1-2 a month     Drug use: No     Sexual activity: Yes       Partners: Male       Birth control/protection: Pill   Other Topics Concern     Parent/sibling w/ CABG, MI or angioplasty before 65F 55M? Yes       Comment: stroke   Social History Narrative     Not on file      Social Determinants of Health      Financial  Resource Strain: Not on file   Food Insecurity: Not on file   Transportation Needs: Not on file   Physical Activity: Not on file   Stress: Not on file   Social Connections: Not on file   Intimate Partner Violence: Not on file   Housing Stability: Not on file            Current Outpatient Prescriptions          Current Outpatient Medications   Medication Sig Dispense Refill     calcium carbonate (OS-NIDIA) 1500 (600 Ca) MG tablet Take 1 tablet (600 mg) by mouth 2 times daily (with meals) for 360 days 180 tablet 3     lamoTRIgine (LAMICTAL) 100 MG tablet TAKE 1 TABLET BY MOUTH WITH  MG TABLET ONCE DAILY         lamoTRIgine (LAMICTAL) 200 MG tablet Take 1 tablet (200 mg) by mouth daily 90 tablet 1     levalbuterol (XOPENEX HFA) 45 MCG/ACT inhaler INHALE 1-2 PUFFS BY MOUTH EVERY 4 HOURS IF NEEDED FOR SHORTNESS OF BREATH OR WHEEZING (COUGH). 15 g 3     LORazepam (ATIVAN) 1 MG tablet TAKE 1 TABLET BY MOUTH THREE TIMES A DAY AS DIRECTED         MULTIPLE VITAMIN PO           norethindrone-ethinyl estradiol (MICROGESTIN 1/20) 1-20 MG-MCG tablet Take 1 tablet by mouth daily 63 tablet 3     TRINTELLIX 5 MG tablet Take 5 mg by mouth daily         vitamin D3 (CHOLECALCIFEROL) 50 mcg (2000 units) tablet Take 1 tablet (50 mcg) by mouth daily for 360 days 90 tablet 3            Above was reviewed  Physical exam: LMP  (LMP Unknown)    Patient able to get up on table without difficulty.   Patient is alert and orientated.   Head eyes, nose and mouth within normal limits.  Abdomen is abdomen is soft without significant tenderness, masses, organomegaly or guarding  bowel sounds are positive and no caput medusa noted.  Can feel a small left inguinal hernia and a small umbilical hernias noted.     Skin was warm and pink  Normal Affect     Lower extremity edema is not present.        Study Result     Narrative & Impression   EXAM: US HERNIA EVALUATION  LOCATION: [facility]  DATE/TIME: 9/26/2022 8:42 AM      INDICATION: Groin discomfort, left. History of right inguinal renal artery.  COMPARISON: CT 08/22/2021     TECHNIQUE: Transabdominal ultrasound of the groin during rest and Valsalva.     FINDINGS: Left inguinal 1.2 x 1 cm fascial defect with fat herniation. This increases with Valsalva.                                                                      IMPRESSION:  1.  Left inguinal hernia.      Your colons job is to absorb the liquid in your stool.  As we get older the colon or other medications can slow down the colon and allow the stool to get to firm.  Fiber mixes with your stool and does not allow all the water to get absorbed by the colon.  This will not give you diarrhea in fact I use it for people with diarrhea since it causes the stool to bulk up more and is easier to control.     Below are several fiber options.     For your constipation try using Metamucil or it's generic equivalent 1-2 tablespoons with a large glass of water or juice every day.       You can also use flax seed that is easily obtained at your grocery store. Make sure it is ground up and sprinkle 2 tablespoons on your cereal each morning and drink a large glass of water with it.  You can also mix in yogurt, and even bake in bread or muffins.    Flax seed seems to give less gas and does not have any taste.   If these are not working for you I would be glad to see you back in my clinic to discuss other options.  Call [phone]: to set up an appointment.     Assessment: has a left inguinal hernia    Has an umbilical hernia with fat in it.  Not causing any problems.  So if not bothering will not fix this but if is bothering then would do this open.  Plan to do patient is wondering how urgent it is and this has fat in it no bowel.   This will get bigger with time. So recommend sometime in the next year.      Risks of surgery include damage to nerves, bleeding, infection, damage to  Vessels, recurrence.  Although mesh is a better long  term repair if it gets infected it must be removed.   If the patient has any bacterial infection the week before and is seen by their doctor and started on antibiotics, I can probably still do the surgery if they are vastly improved by the time of surgery, but if the infection starts closer to the surgery date it will be better to cancel and reschedule to a later date.  A cough will also be hard on the repair and uncomfortable post operative.  If the patient is a smoker I did discuss increase risk of recurrence and more pain with the cough.  If the patient is willing to quit smoking would encourage to do so and start at least a week before surgery.  However, if patient is not going to quit then must understand that his repair is more likely to fail.     Risks of surgery discussed including, but not limited to bleeding, infection, recurrence, damage to nerves and what is in the hernia sac.  Risks of anesthesia also discussed.     Discussed massaging hernia back in and using ice if becomes more painful.  If not able to reduce then go to emergency room.  Also discussed hernia belt to use until able to get in for surgery.     Things you will need to do before surgery are getting a preoperative appointment with your primary care doctor to be cleared for surgery.    You will also need a COVID test before surgery and an appointment to see me usually about 2 weeks after the procedure to make sure you are doing well.   Fortunately, when the schedulers call you they will try to get all this set up for you at that one call.     If you have had a positive COVID test in a 90 day window that would include the date of the procedure, let us know that and will need to see proof of this.    Then you do not need a COVID test.  But if over 90 days you do.       Time spent with the patient with greater that 50% of the time in discussion was 30 minutes.  In discussing the plan.        Weston Mcnamara MD              Again, thank you for  allowing me to participate in the care of your patient.        Sincerely,        Weston Mcnamara MD

## 2023-10-06 NOTE — PROGRESS NOTES
Dear Marsha Vaca  I was asked to see this patient by Marsha Fernandez for please see below.  I have seen Danyelle Canales and as you know his chief complaint is umbilical and the left inguinal hernia   2012 repaired a right inguinal hernia   Has a left one now.    HPI:  Patient is a 43 year old female  with complaints see above        10 Point review of systems all others are negative.   There were no vitals taken for this visit.    Past Medical History:   Diagnosis Date    ASD (atrial septal defect)     repaired surgically as a child    Benign essential hypertension 7/19/2023    Depressive disorder     Headache     History of blood transfusion 1985 1985 during open heart surgery    Hypertension     higher results at pain clinic, want to confirm ok    Insomnia, unspecified     Other forms of migraine, without mention of intractable migraine without mention of status migrainosus        Past Surgical History:   Procedure Laterality Date    BIOPSY      COLONOSCOPY      HERNIA REPAIR  Feb 2021    IR CERVICAL EPIDURAL STEROID INJECTION  9/7/2012    IR CERVICAL EPIDURAL STEROID INJECTION  10/2/2012    ZZC REPR ASD OSTIUM PREMUM      Dr. Silverio       Social History     Socioeconomic History    Marital status:      Spouse name: Chan Cnaales    Number of children: 0    Years of education: 12    Highest education level: Not on file   Occupational History    Occupation: customer service     Employer: [employer]   Tobacco Use    Smoking status: Never    Smokeless tobacco: Never   Vaping Use    Vaping Use: Never used   Substance and Sexual Activity    Alcohol use: Not Currently     Comment: 1-2 a month    Drug use: No    Sexual activity: Yes     Partners: Male     Birth control/protection: Pill   Other Topics Concern    Parent/sibling w/ CABG, MI or angioplasty before 65F 55M? Yes     Comment: stroke   Social History Narrative    Not on file     Social Determinants of Health     Financial  Resource Strain: Low Risk  (9/22/2023)    Financial Resource Strain     Within the past 12 months, have you or your family members you live with been unable to get utilities (heat, electricity) when it was really needed?: No   Food Insecurity: Low Risk  (9/22/2023)    Food Insecurity     Within the past 12 months, did you worry that your food would run out before you got money to buy more?: No     Within the past 12 months, did the food you bought just not last and you didn t have money to get more?: No   Transportation Needs: Low Risk  (9/22/2023)    Transportation Needs     Within the past 12 months, has lack of transportation kept you from medical appointments, getting your medicines, non-medical meetings or appointments, work, or from getting things that you need?: No   Physical Activity: Not on file   Stress: No Stress Concern Present (10/14/2019)    [employer] - Occupational Stress Questionnaire     Feeling of Stress : Not at all   Social Connections: Not on file   Interpersonal Safety: Low Risk  (9/22/2023)    Interpersonal Safety     Do you feel physically and emotionally safe where you currently live?: Yes     Within the past 12 months, have you been hit, slapped, kicked or otherwise physically hurt by someone?: No     Within the past 12 months, have you been humiliated or emotionally abused in other ways by your partner or ex-partner?: No   Housing Stability: Low Risk  (9/22/2023)    Housing Stability     Do you have housing? : Yes     Are you worried about losing your housing?: No       Current Outpatient Medications   Medication Sig Dispense Refill    FLUoxetine (PROZAC) 20 MG capsule Take 20 mg by mouth daily      FLUoxetine (PROZAC) 40 MG capsule Take 40 mg by mouth daily      gabapentin (NEURONTIN) 100 MG capsule 200mg at bedtime x 3 days, then 100mg at bedtime. 30 capsule 1    gabapentin (NEURONTIN) 300 MG capsule Take 1 capsule by mouth at bedtime.  May increase as  needed and as tolerated up to 1 capsule 3 times daily. 90 capsule 1    lamoTRIgine (LAMICTAL) 100 MG tablet TAKE 1 TABLET BY MOUTH WITH  MG TABLET ONCE DAILY      lamoTRIgine (LAMICTAL) 200 MG tablet Take 1 tablet (200 mg) by mouth daily 90 tablet 1    levalbuterol (XOPENEX HFA) 45 MCG/ACT inhaler Inhale 2 puffs into the lungs every 4 hours as needed for shortness of breath or wheezing (cough)      lisinopril (ZESTRIL) 10 MG tablet Take 1 tablet (10 mg) by mouth daily 90 tablet 3    LORazepam (ATIVAN) 1 MG tablet TAKE 1 TABLET BY MOUTH THREE TIMES A DAY AS DIRECTED      MULTIPLE VITAMIN PO       norethindrone-ethinyl estradiol (MICROGESTIN 1/20) 1-20 MG-MCG tablet Take 1 tablet by mouth daily 63 tablet 3       Above was reviewed  Physical exam: There were no vitals taken for this visit.   Patient able to get up on table without difficulty.   Patient is alert and orientated.   Head eyes, nose and mouth within normal limits.    Abdomen is abdomen is soft without significant tenderness, masses, organomegaly or guarding  bowel sounds are positive and no caput medusa noted.  Has a small umbilical and a left inguinal hernias noted.    Skin was warm and pink  Normal Affect  Lower extremity edema is not present.  She has numbness on the left medial and posterior of the left leg.        Assessment: Has a small umbilical and a left inguinal hernias noted.  Patient had robotic right inguinal hernia done in the past.    Plan to do will do the left inguinal hernia robotic and use the umbilical site for camera and fix the umbilical site with mesh.    Patient wants this done at the [facility] day surgery center.     Risks of surgery include damage to nerves, bleeding, infection, damage to  Vessels, recurrence.  Although mesh is a better long term repair if it gets infected it must be removed.   If the patient has any bacterial infection the week before and is seen by their doctor and started on antibiotics, I can probably still  do the surgery if they are vastly improved by the time of surgery, but if the infection starts closer to the surgery date it will be better to cancel and reschedule to a later date.  A cough will also be hard on the repair and uncomfortable post operative.  If the patient is a smoker I did discuss increase risk of recurrence and more pain with the cough.  If the patient is willing to quit smoking would encourage to do so and start at least a week before surgery.  However, if patient is not going to quit then must understand that his repair is more likely to fail.    Risks of surgery discussed including, but not limited to bleeding, infection, recurrence, damage to nerves and what is in the hernia sac.  Risks of anesthesia also discussed.    Discussed massaging hernia back in and using ice if becomes more painful.  If not able to reduce then go to emergency room.  Also discussed hernia belt to use until able to get in for surgery.    Things you will need to do before surgery are getting a preoperative appointment with your primary care doctor to be cleared for surgery.        Time spent with the patient with greater that 50% of the time in discussion was 45 minutes.  In discussing the plan.      Weston Mcnamara MD       [facility] Notes  Weston Mcnamara MD (Physician)  Surgery  Expand All Collapse All[]Expand All by Default  Dear Katlyn Samano  I was asked to see this patient by Katlyn Orozco for please see below.  I have seen Danyelle HIRSCH Lukaszlobito and as you know his chief complaint is a popping feeling in the left groin and ultrasound show a small hernia.    Other than the popping feelin no other pain.      HPI:  Patient is a 42 year old female  with complaints see above        Review Of Systems  Respiratory: No shortness of breath, dyspnea on exertion, cough, or hemoptysis  Cardiovascular: had ASD repair as a child.   Gastrointestinal: constipation  Endocrine: negative  :  negative     10 Point  review of systems all others are negative.   LMP  (LMP Unknown)      Past Medical History        Past Medical History:   Diagnosis Date    ASD (atrial septal defect)       repaired surgically as a child    Depressive disorder      Headache      History of blood transfusion 1985 1985 during open heart surgery    Hypertension       higher results at pain clinic, want to confirm ok    Insomnia, unspecified      Other forms of migraine, without mention of intractable migraine without mention of status migrainosus              Past Surgical History         Past Surgical History:   Procedure Laterality Date    BIOPSY        COLONOSCOPY        HERNIA REPAIR   Feb 2021    IR CERVICAL EPIDURAL STEROID INJECTION   9/7/2012    IR CERVICAL EPIDURAL STEROID INJECTION   10/2/2012    ZZC REPR ASD OSTIUM PREMUM         Dr. Silverio            Social History   Social History            Socioeconomic History    Marital status:        Spouse name: Chan Canales    Number of children: 0    Years of education: 12    Highest education level: Not on file   Occupational History    Occupation: customer service       Employer: [email or web address]   Tobacco Use    Smoking status: Never    Smokeless tobacco: Never   Substance and Sexual Activity    Alcohol use: Not Currently       Comment: 1-2 a month    Drug use: No    Sexual activity: Yes       Partners: Male       Birth control/protection: Pill   Other Topics Concern    Parent/sibling w/ CABG, MI or angioplasty before 65F 55M? Yes       Comment: stroke   Social History Narrative    Not on file      Social Determinants of Health      Financial Resource Strain: Not on file   Food Insecurity: Not on file   Transportation Needs: Not on file   Physical Activity: Not on file   Stress: Not on file   Social Connections: Not on file   Intimate Partner Violence: Not on file   Housing Stability: Not on file            Current Outpatient Prescriptions          Current Outpatient  Medications   Medication Sig Dispense Refill    calcium carbonate (OS-NIDIA) 1500 (600 Ca) MG tablet Take 1 tablet (600 mg) by mouth 2 times daily (with meals) for 360 days 180 tablet 3    lamoTRIgine (LAMICTAL) 100 MG tablet TAKE 1 TABLET BY MOUTH WITH  MG TABLET ONCE DAILY        lamoTRIgine (LAMICTAL) 200 MG tablet Take 1 tablet (200 mg) by mouth daily 90 tablet 1    levalbuterol (XOPENEX HFA) 45 MCG/ACT inhaler INHALE 1-2 PUFFS BY MOUTH EVERY 4 HOURS IF NEEDED FOR SHORTNESS OF BREATH OR WHEEZING (COUGH). 15 g 3    LORazepam (ATIVAN) 1 MG tablet TAKE 1 TABLET BY MOUTH THREE TIMES A DAY AS DIRECTED        MULTIPLE VITAMIN PO          norethindrone-ethinyl estradiol (MICROGESTIN 1/20) 1-20 MG-MCG tablet Take 1 tablet by mouth daily 63 tablet 3    TRINTELLIX 5 MG tablet Take 5 mg by mouth daily        vitamin D3 (CHOLECALCIFEROL) 50 mcg (2000 units) tablet Take 1 tablet (50 mcg) by mouth daily for 360 days 90 tablet 3            Above was reviewed  Physical exam: LMP  (LMP Unknown)    Patient able to get up on table without difficulty.   Patient is alert and orientated.   Head eyes, nose and mouth within normal limits.  Abdomen is abdomen is soft without significant tenderness, masses, organomegaly or guarding  bowel sounds are positive and no caput medusa noted.  Can feel a small left inguinal hernia and a small umbilical hernias noted.     Skin was warm and pink  Normal Affect     Lower extremity edema is not present.        Study Result     Narrative & Impression   EXAM: US HERNIA EVALUATION  LOCATION: [facility]  DATE/TIME: 9/26/2022 8:42 AM     INDICATION: Groin discomfort, left. History of right inguinal renal artery.  COMPARISON: CT 08/22/2021     TECHNIQUE: Transabdominal ultrasound of the groin during rest and Valsalva.     FINDINGS: Left inguinal 1.2 x 1 cm fascial defect with fat herniation. This increases with Valsalva.                                                                       IMPRESSION:  1.  Left inguinal hernia.      Your colons job is to absorb the liquid in your stool.  As we get older the colon or other medications can slow down the colon and allow the stool to get to firm.  Fiber mixes with your stool and does not allow all the water to get absorbed by the colon.  This will not give you diarrhea in fact I use it for people with diarrhea since it causes the stool to bulk up more and is easier to control.     Below are several fiber options.     For your constipation try using Metamucil or it's generic equivalent 1-2 tablespoons with a large glass of water or juice every day.       You can also use flax seed that is easily obtained at your grocery store. Make sure it is ground up and sprinkle 2 tablespoons on your cereal each morning and drink a large glass of water with it.  You can also mix in yogurt, and even bake in bread or muffins.    Flax seed seems to give less gas and does not have any taste.   If these are not working for you I would be glad to see you back in my clinic to discuss other options.  Call [phone]: to set up an appointment.     Assessment: has a left inguinal hernia    Has an umbilical hernia with fat in it.  Not causing any problems.  So if not bothering will not fix this but if is bothering then would do this open.  Plan to do patient is wondering how urgent it is and this has fat in it no bowel.   This will get bigger with time. So recommend sometime in the next year.      Risks of surgery include damage to nerves, bleeding, infection, damage to  Vessels, recurrence.  Although mesh is a better long term repair if it gets infected it must be removed.   If the patient has any bacterial infection the week before and is seen by their doctor and started on antibiotics, I can probably still do the surgery if they are vastly improved by the time of surgery, but if the infection starts closer to the surgery date it will be better to cancel and  reschedule to a later date.  A cough will also be hard on the repair and uncomfortable post operative.  If the patient is a smoker I did discuss increase risk of recurrence and more pain with the cough.  If the patient is willing to quit smoking would encourage to do so and start at least a week before surgery.  However, if patient is not going to quit then must understand that his repair is more likely to fail.     Risks of surgery discussed including, but not limited to bleeding, infection, recurrence, damage to nerves and what is in the hernia sac.  Risks of anesthesia also discussed.     Discussed massaging hernia back in and using ice if becomes more painful.  If not able to reduce then go to emergency room.  Also discussed hernia belt to use until able to get in for surgery.     Things you will need to do before surgery are getting a preoperative appointment with your primary care doctor to be cleared for surgery.    You will also need a COVID test before surgery and an appointment to see me usually about 2 weeks after the procedure to make sure you are doing well.   Fortunately, when the schedulers call you they will try to get all this set up for you at that one call.     If you have had a positive COVID test in a 90 day window that would include the date of the procedure, let us know that and will need to see proof of this.    Then you do not need a COVID test.  But if over 90 days you do.       Time spent with the patient with greater that 50% of the time in discussion was 30 minutes.  In discussing the plan.        Weston Mcnamara MD    January 12, 2024  Talked with the patient today and will give her 3 more diflucan to make sure it is healed up well before surgery.  As we don not want to get an infection in the mesh.   She is to let me know if it is not getting better by Monday.

## 2023-10-06 NOTE — TELEPHONE ENCOUNTER
Type of surgery: HERNIORRHAPHY, INGUINAL, ROBOT-ASSISTED, LAPAROSCOPIC, USING DA ANTONY XI with mesh and repair of umbilical hernia open with mesh. (Left)   Location of surgery: [facility]  Date and time of surgery: 1/17  Surgeon: Anders   Pre-Op Appt Date: 1/5  Post-Op Appt Date: 2/2   Packet sent out: Yes  Pre-cert/Authorization completed:  Not Applicable  Date:

## 2023-11-15 PROBLEM — K40.90 LEFT INGUINAL HERNIA: Status: RESOLVED | Noted: 2023-10-06 | Resolved: 2023-11-15

## 2023-11-15 NOTE — PROGRESS NOTES
NEUROLOGY CONSULTATION NOTE       [facility] NEUROLOGY [place]  [address] [place] [address]  Tel: [phone]  Fax: [phone]  [email or web address]     Danyelle Canales,  1980, MRN [id number]  PCP: Marsha Fernandez  Date: 2023     ASSESSMENT & PLAN     Visit Diagnosis  Left leg numbness     Left leg paresthesias  46-year-old morbidly obese female with chronic pain syndrome, NAJMA, ASD, HTN with 6 months history of numbness involving the medial left thigh and lateral left leg.  Exam today is unremarkable except for generalized hyperreflexia.  Previously she had MRI of the brain, cervical and lumbar spine that were unremarkable.  EMG done at spine center was read as abnormal with absence of left peroneal CMAP but clinically she has no weakness in the peroneal innervated muscles.  I suspect it was technical.  I have recommended:    1.  MRI thoracic spine  2.  EMG left lower extremity  3.  Lab work to include antinuclear antibody, hemoglobin A1c, MMA, B1, B6 and B12  4.  Follow-up after 6 months    Thank you again for this referral, please feel free to contact me if you have any questions.    Zi Alan MD  [facility]  (Former[facility], [facility]     REASON FOR CONSULTATION Numbness        HISTORY OF PRESENT ILLNESS     We have been requested by Marsha Fernandez to evaluate Danyelle Canales who is a 43 year old  female for paresthesia in left leg    Patient is a 43-year-old obese female with chronic pain, NAJMA, ASD, HTN was referred for evaluation of left leg numbness involving the left medial thigh and the lateral leg.  This started more than 6 months ago and was evaluated at the spine clinic and in the notes they mention weakness in the S1 innervated muscles.  She had MRI of the brain, cervical and lumbar spine that was unremarkable.  There was L4-L5 central disc herniation with annular tear but no high-grade  stenosis or neuroforaminal stenosis.  EMG was done and it was read as abnormal due to absence of left peroneal CMAP but no other abnormality was noted.  She was started on gabapentin and reports improvement in her symptoms.  She denies any weakness any bowel or bladder incontinence or similar symptoms in the upper extremity.     PROBLEM LIST   Patient Active Problem List   Diagnosis Code    Persistent insomnia G47.00    allergic DERMATITIS NOS L25.9    Migraine headache G43.909    PMDD (premenstrual dysphoric disorder) F32.81    Trichotillomania F63.3    CARDIOVASCULAR SCREENING; LDL GOAL LESS THAN 160 Z13.6    Generalized anxiety disorder F41.1    Chronic pain syndrome G89.4    ASD (atrial septal defect) Q21.10    Esophageal reflux K21.9    Benign hypermobility syndrome M35.7    Right inguinal hernia K40.90    S/P right inguinal hernia repair, follow-up exam Z09    Morbid obesity (H) E66.01    Mild recurrent major depression (H24) F33.0    Left lumbar radiculopathy M54.16    Benign essential hypertension I10         PAST MEDICAL & SURGICAL HISTORY     Past Medical History:   Patient  has a past medical history of ASD (atrial septal defect), Benign essential hypertension (07/19/2023), Depressive disorder, Headache, History of blood transfusion (1985), History of ovarian cyst, Hypertension, Insomnia, unspecified, and Other forms of migraine, without mention of intractable migraine without mention of status migrainosus.    Surgical History:  She  has a past surgical history that includes REPR ASD OSTIUM PREMUM; biopsy; colonoscopy; IR Cervical Epidural Steroid Injection (9/7/2012); IR Cervical Epidural Steroid Injection (10/2/2012); and hernia repair (Feb 2021).     SOCIAL HISTORY     Reviewed, and she  reports that she has never smoked. She has never used smokeless tobacco. She reports that she does not currently use alcohol. She reports that she does not use drugs.     FAMILY HISTORY     Reviewed, and family history  "includes Anxiety Disorder in her mother; C.A.D. in her mother; Cardiovascular in her mother; Cerebrovascular Disease in her father; Depression in her mother; Heart Failure in her mother; Hypertension in her father and mother; Myocardial Infarction in her maternal grandfather.     ALLERGIES     Allergies   Allergen Reactions    Artificial Sweetner (Informational Only) [Artificial Sweetner (Informational Only) ]     Chocolate Flavor Other (See Comments)         REVIEW OF SYSTEMS     A 12 point review of system was performed and was negative except as outlined in the history of present illness.     HOME MEDICATIONS     Current Outpatient Rx   Medication Sig Dispense Refill    FLUoxetine (PROZAC) 20 MG capsule Take 20 mg by mouth daily      FLUoxetine (PROZAC) 40 MG capsule Take 40 mg by mouth daily      gabapentin (NEURONTIN) 300 MG capsule Take 1 capsule by mouth at bedtime.  May increase as needed and as tolerated up to 1 capsule 3 times daily. 90 capsule 1    lamoTRIgine (LAMICTAL) 100 MG tablet TAKE 1 TABLET BY MOUTH WITH  MG TABLET ONCE DAILY      lamoTRIgine (LAMICTAL) 200 MG tablet Take 1 tablet (200 mg) by mouth daily 90 tablet 1    lisinopril (ZESTRIL) 10 MG tablet Take 1 tablet (10 mg) by mouth daily 90 tablet 3    LORazepam (ATIVAN) 1 MG tablet TAKE 1 TABLET BY MOUTH THREE TIMES A DAY AS DIRECTED      MULTIPLE VITAMIN PO       norethindrone-ethinyl estradiol (MICROGESTIN 1/20) 1-20 MG-MCG tablet Take 1 tablet by mouth daily 63 tablet 3         PHYSICAL EXAM     Vital signs  /77 (BP Location: Right arm, Patient Position: Sitting)   Pulse 74   Ht 1.689 m (5' 6.5\")   Wt 101.6 kg (224 lb)   BMI 35.61 kg/m      Weight:   224 lbs 0 oz    Patient is alert and oriented x4 in no acute distress. Vital signs were reviewed and are documented in electronic medical record. Neck was supple, no carotid bruits, thyromegaly, JVD, or lymphadenopathy was noted.   NEUROLOGY EXAM:   Patient s speech was normal " with no aphasia or dysarthria. Mentation, and affect were also normal.    Funduscopic exam was normal, with normal cup to disc ratio. Cranial nerves II -XII were intact.    Patient had normal mass, tone and motor strength was 5/5 in all extremities without pronator drift.    Sensation was intact to light touch, pinprick, and vibratory sensation.    Reflexes were 3+ symmetrical with downgoing toes.    No dysmetria noted on FNF or HKS. Romberg was negative.   Gait testing was normal. Able to walk on toes/heels. Tandem walk normal.     PERTINENT DIAGNOSTIC STUDIES     Following studies were reviewed:     MRI BRAIN 6/23/2023  No evidence of acute intracranial hemorrhage mass effect or infarction    MRI CERVICAL SPINE 6/23/2023  Mild degenerative cervical spondylosis with level by level analysis as described above without evidence of high-grade spinal canal or neural foraminal narrowing at any level    MRI LUMBAR SPINE 4/20/2023  1.  Lower lumbar spondylopathy.  2.  No high-grade spinal canal or neural foraminal stenosis    EMG 6/19/2023  Abnormal study: There is electrodiagnostic evidence of:     1.  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  This is of unknown clinical significance in the setting of normal peroneal CMAP to the tibialis anterior and normal needle EMG throughout the remainder of the left lower extremity.  This can be observed in a congenital absence of the EDB.       2.  There is no electrodiagnostic evidence of lumbosacral radiculopathy or tibial neuropathy in the left lower extremity.     PERTINENT LABS  Following labs were reviewed:  Office Visit on 09/22/2023   Component Date Value Ref Range Status    Interpretation 09/22/2023 Negative for Intraepithelial Lesion or Malignancy (NILM)    Final    Comment 09/22/2023    Final                    Value:This result contains rich text formatting which cannot be displayed here.    Specimen  Adequacy 09/22/2023 Satisfactory for evaluation, endocervical/transformation zone component present   Final    Clinical Information 09/22/2023    Final                    Value:This result contains rich text formatting which cannot be displayed here.    Reflex Testing 09/22/2023 Yes regardless of result   Final    Previous Abnormal? 09/22/2023    Final                    Value:This result contains rich text formatting which cannot be displayed here.    Performing Labs 09/22/2023    Final                    Value:This result contains rich text formatting which cannot be displayed here.    Cholesterol 09/22/2023 212 (H)  <200 mg/dL Final    Triglycerides 09/22/2023 129  <150 mg/dL Final    Direct Measure HDL 09/22/2023 57  >=50 mg/dL Final    LDL Cholesterol Calculated 09/22/2023 129 (H)  <=100 mg/dL Final    Non HDL Cholesterol 09/22/2023 155 (H)  <130 mg/dL Final    Other HR HPV 09/22/2023 Negative  Negative Final    HPV16 DNA 09/22/2023 Negative  Negative Final    HPV18 DNA 09/22/2023 Negative  Negative Final    FINAL DIAGNOSIS 09/22/2023    Final                    Value:This result contains rich text formatting which cannot be displayed here.        Total time spent for face to face visit, reviewing labs/imaging studies, counseling and coordination of care was: 1 Hour spent on the date of the encounter doing chart review, review of outside records, review of test results, interpretation of tests, patient visit, and documentation     This note was dictated using voice recognition software.  Any grammatical or context distortions are unintentional and inherent to the software.    Orders Placed This Encounter   Procedures    MR Thoracic Spine w/o Contrast    Anti Nuclear Davina IgG by IFA with Reflex    Hemoglobin A1c    Methylmalonic Acid    Vitamin B1 whole blood    Vitamin B6    Vitamin B12    EMG      New Prescriptions    No medications on file      Modified Medications    No medications on file

## 2023-11-16 ENCOUNTER — OFFICE VISIT (OUTPATIENT)
Dept: NEUROLOGY | Facility: CLINIC | Age: 43
End: 2023-11-16
Attending: PHYSICAL MEDICINE & REHABILITATION
Payer: COMMERCIAL

## 2023-11-16 ENCOUNTER — LAB (OUTPATIENT)
Dept: LAB | Facility: HOSPITAL | Age: 43
End: 2023-11-16
Payer: COMMERCIAL

## 2023-11-16 VITALS
DIASTOLIC BLOOD PRESSURE: 77 MMHG | BODY MASS INDEX: 35.16 KG/M2 | HEART RATE: 74 BPM | HEIGHT: 67 IN | SYSTOLIC BLOOD PRESSURE: 126 MMHG | WEIGHT: 224 LBS

## 2023-11-16 DIAGNOSIS — M48.04 THORACIC SPINAL STENOSIS: ICD-10-CM

## 2023-11-16 DIAGNOSIS — R76.8 POSITIVE ANA (ANTINUCLEAR ANTIBODY): ICD-10-CM

## 2023-11-16 DIAGNOSIS — R20.2 LEFT LEG PARESTHESIAS: ICD-10-CM

## 2023-11-16 DIAGNOSIS — R20.0 LEFT LEG NUMBNESS: Primary | ICD-10-CM

## 2023-11-16 DIAGNOSIS — R20.0 LEFT LEG NUMBNESS: ICD-10-CM

## 2023-11-16 DIAGNOSIS — R20.2 ARM PARESTHESIA, LEFT: ICD-10-CM

## 2023-11-16 LAB
HBA1C MFR BLD: 5.3 %
VIT B12 SERPL-MCNC: 320 PG/ML (ref 232–1245)

## 2023-11-16 PROCEDURE — 83921 ORGANIC ACID SINGLE QUANT: CPT

## 2023-11-16 PROCEDURE — 82607 VITAMIN B-12: CPT

## 2023-11-16 PROCEDURE — 86038 ANTINUCLEAR ANTIBODIES: CPT

## 2023-11-16 PROCEDURE — 86235 NUCLEAR ANTIGEN ANTIBODY: CPT

## 2023-11-16 PROCEDURE — 86225 DNA ANTIBODY NATIVE: CPT

## 2023-11-16 PROCEDURE — 36415 COLL VENOUS BLD VENIPUNCTURE: CPT

## 2023-11-16 PROCEDURE — 83036 HEMOGLOBIN GLYCOSYLATED A1C: CPT

## 2023-11-16 PROCEDURE — 84207 ASSAY OF VITAMIN B-6: CPT

## 2023-11-16 PROCEDURE — 99205 OFFICE O/P NEW HI 60 MIN: CPT | Performed by: PSYCHIATRY & NEUROLOGY

## 2023-11-16 PROCEDURE — 84425 ASSAY OF VITAMIN B-1: CPT

## 2023-11-16 NOTE — LETTER
2023         RE: Danyelle Canales  [address] [place] [address]        Dear Colleague,    Thank you for referring your patient, Danyelle Canales, to the [facility] NEUROLOGY CLINIC [place]. Please see a copy of my visit note below.    NEUROLOGY CONSULTATION NOTE       [facility] NEUROLOGY [place]  [address] [place] [address]  Tel: [phone]  Fax: [phone]  [email or web address]     Danyelle Canales,  1980, MRN [id number]  PCP: Marsha Fernandez  Date: 2023     ASSESSMENT & PLAN     Visit Diagnosis  Left leg numbness     Left leg paresthesias  46-year-old morbidly obese female with chronic pain syndrome, NAJMA, ASD, HTN with 6 months history of numbness involving the medial left thigh and lateral left leg.  Exam today is unremarkable except for generalized hyperreflexia.  Previously she had MRI of the brain, cervical and lumbar spine that were unremarkable.  EMG done at spine center was read as abnormal with absence of left peroneal CMAP but clinically she has no weakness in the peroneal innervated muscles.  I suspect it was technical.  I have recommended:    1.  MRI thoracic spine  2.  EMG left lower extremity  3.  Lab work to include antinuclear antibody, hemoglobin A1c, MMA, B1, B6 and B12  4.  Follow-up after 6 months    Thank you again for this referral, please feel free to contact me if you have any questions.    Zi Alan MD  [facility]  ([facility], [facility]     REASON FOR CONSULTATION Numbness        HISTORY OF PRESENT ILLNESS     We have been requested by Marsha Fernandez to evaluate Danyelle Canales who is a 43 year old  female for paresthesia in left leg    Patient is a 43-year-old obese female with chronic pain, NAJMA, ASD, HTN was referred for evaluation of left leg numbness involving the left medial thigh and the lateral leg.  This started more  than 6 months ago and was evaluated at the spine clinic and in the notes they mention weakness in the S1 innervated muscles.  She had MRI of the brain, cervical and lumbar spine that was unremarkable.  There was L4-L5 central disc herniation with annular tear but no high-grade stenosis or neuroforaminal stenosis.  EMG was done and it was read as abnormal due to absence of left peroneal CMAP but no other abnormality was noted.  She was started on gabapentin and reports improvement in her symptoms.  She denies any weakness any bowel or bladder incontinence or similar symptoms in the upper extremity.     PROBLEM LIST   Patient Active Problem List   Diagnosis Code     Persistent insomnia G47.00     allergic DERMATITIS NOS L25.9     Migraine headache G43.909     PMDD (premenstrual dysphoric disorder) F32.81     Trichotillomania F63.3     CARDIOVASCULAR SCREENING; LDL GOAL LESS THAN 160 Z13.6     Generalized anxiety disorder F41.1     Chronic pain syndrome G89.4     ASD (atrial septal defect) Q21.10     Esophageal reflux K21.9     Benign hypermobility syndrome M35.7     Right inguinal hernia K40.90     S/P right inguinal hernia repair, follow-up exam Z09     Morbid obesity (H) E66.01     Mild recurrent major depression (H24) F33.0     Left lumbar radiculopathy M54.16     Benign essential hypertension I10         PAST MEDICAL & SURGICAL HISTORY     Past Medical History:   Patient  has a past medical history of ASD (atrial septal defect), Benign essential hypertension (07/19/2023), Depressive disorder, Headache, History of blood transfusion (1985), History of ovarian cyst, Hypertension, Insomnia, unspecified, and Other forms of migraine, without mention of intractable migraine without mention of status migrainosus.    Surgical History:  She  has a past surgical history that includes REPR ASD OSTIUM PREMUM; biopsy; colonoscopy; IR Cervical Epidural Steroid Injection (9/7/2012); IR Cervical Epidural Steroid Injection  "(10/2/2012); and hernia repair (Feb 2021).     SOCIAL HISTORY     Reviewed, and she  reports that she has never smoked. She has never used smokeless tobacco. She reports that she does not currently use alcohol. She reports that she does not use drugs.     FAMILY HISTORY     Reviewed, and family history includes Anxiety Disorder in her mother; C.A.D. in her mother; Cardiovascular in her mother; Cerebrovascular Disease in her father; Depression in her mother; Heart Failure in her mother; Hypertension in her father and mother; Myocardial Infarction in her maternal grandfather.     ALLERGIES     Allergies   Allergen Reactions     Artificial Sweetner (Informational Only) [Artificial Sweetner (Informational Only) ]      Chocolate Flavor Other (See Comments)         REVIEW OF SYSTEMS     A 12 point review of system was performed and was negative except as outlined in the history of present illness.     HOME MEDICATIONS     Current Outpatient Rx   Medication Sig Dispense Refill     FLUoxetine (PROZAC) 20 MG capsule Take 20 mg by mouth daily       FLUoxetine (PROZAC) 40 MG capsule Take 40 mg by mouth daily       gabapentin (NEURONTIN) 300 MG capsule Take 1 capsule by mouth at bedtime.  May increase as needed and as tolerated up to 1 capsule 3 times daily. 90 capsule 1     lamoTRIgine (LAMICTAL) 100 MG tablet TAKE 1 TABLET BY MOUTH WITH  MG TABLET ONCE DAILY       lamoTRIgine (LAMICTAL) 200 MG tablet Take 1 tablet (200 mg) by mouth daily 90 tablet 1     lisinopril (ZESTRIL) 10 MG tablet Take 1 tablet (10 mg) by mouth daily 90 tablet 3     LORazepam (ATIVAN) 1 MG tablet TAKE 1 TABLET BY MOUTH THREE TIMES A DAY AS DIRECTED       MULTIPLE VITAMIN PO        norethindrone-ethinyl estradiol (MICROGESTIN 1/20) 1-20 MG-MCG tablet Take 1 tablet by mouth daily 63 tablet 3         PHYSICAL EXAM     Vital signs  /77 (BP Location: Right arm, Patient Position: Sitting)   Pulse 74   Ht 1.689 m (5' 6.5\")   Wt 101.6 kg (224 " lb)   BMI 35.61 kg/m      Weight:   224 lbs 0 oz    Patient is alert and oriented x4 in no acute distress. Vital signs were reviewed and are documented in electronic medical record. Neck was supple, no carotid bruits, thyromegaly, JVD, or lymphadenopathy was noted.   NEUROLOGY EXAM:    Patient s speech was normal with no aphasia or dysarthria. Mentation, and affect were also normal.     Funduscopic exam was normal, with normal cup to disc ratio. Cranial nerves II -XII were intact.     Patient had normal mass, tone and motor strength was 5/5 in all extremities without pronator drift.     Sensation was intact to light touch, pinprick, and vibratory sensation.     Reflexes were 3+ symmetrical with downgoing toes.     No dysmetria noted on FNF or HKS. Romberg was negative.    Gait testing was normal. Able to walk on toes/heels. Tandem walk normal.     PERTINENT DIAGNOSTIC STUDIES     Following studies were reviewed:     MRI BRAIN 6/23/2023  No evidence of acute intracranial hemorrhage mass effect or infarction    MRI CERVICAL SPINE 6/23/2023  Mild degenerative cervical spondylosis with level by level analysis as described above without evidence of high-grade spinal canal or neural foraminal narrowing at any level    MRI LUMBAR SPINE 4/20/2023  1.  Lower lumbar spondylopathy.  2.  No high-grade spinal canal or neural foraminal stenosis    EMG 6/19/2023  Abnormal study: There is electrodiagnostic evidence of:     1.  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  This is of unknown clinical significance in the setting of normal peroneal CMAP to the tibialis anterior and normal needle EMG throughout the remainder of the left lower extremity.  This can be observed in a congenital absence of the EDB.       2.  There is no electrodiagnostic evidence of lumbosacral radiculopathy or tibial neuropathy in the left lower extremity.     PERTINENT LABS  Following labs  were reviewed:  Office Visit on 09/22/2023   Component Date Value Ref Range Status     Interpretation 09/22/2023 Negative for Intraepithelial Lesion or Malignancy (NILM)    Final     Comment 09/22/2023    Final                    Value:This result contains rich text formatting which cannot be displayed here.     Specimen Adequacy 09/22/2023 Satisfactory for evaluation, endocervical/transformation zone component present   Final     Clinical Information 09/22/2023    Final                    Value:This result contains rich text formatting which cannot be displayed here.     Reflex Testing 09/22/2023 Yes regardless of result   Final     Previous Abnormal? 09/22/2023    Final                    Value:This result contains rich text formatting which cannot be displayed here.     Performing Labs 09/22/2023    Final                    Value:This result contains rich text formatting which cannot be displayed here.     Cholesterol 09/22/2023 212 (H)  <200 mg/dL Final     Triglycerides 09/22/2023 129  <150 mg/dL Final     Direct Measure HDL 09/22/2023 57  >=50 mg/dL Final     LDL Cholesterol Calculated 09/22/2023 129 (H)  <=100 mg/dL Final     Non HDL Cholesterol 09/22/2023 155 (H)  <130 mg/dL Final     Other HR HPV 09/22/2023 Negative  Negative Final     HPV16 DNA 09/22/2023 Negative  Negative Final     HPV18 DNA 09/22/2023 Negative  Negative Final     FINAL DIAGNOSIS 09/22/2023    Final                    Value:This result contains rich text formatting which cannot be displayed here.        Total time spent for face to face visit, reviewing labs/imaging studies, counseling and coordination of care was: 1 Hour spent on the date of the encounter doing chart review, review of outside records, review of test results, interpretation of tests, patient visit, and documentation     This note was dictated using voice recognition software.  Any grammatical or context distortions are unintentional and inherent to the  software.    Orders Placed This Encounter   Procedures     MR Thoracic Spine w/o Contrast     Anti Nuclear Davina IgG by IFA with Reflex     Hemoglobin A1c     Methylmalonic Acid     Vitamin B1 whole blood     Vitamin B6     Vitamin B12     EMG      New Prescriptions    No medications on file      Modified Medications    No medications on file                Again, thank you for allowing me to participate in the care of your patient.        Sincerely,        Zi Alan MD

## 2023-11-16 NOTE — NURSING NOTE
"Chief Complaint   Patient presents with    Numbness     LLE \"cold numbness\", LUE numbness EMG done 6/19/23     Pamela Simpson CMA on 11/16/2023 at 9:53 AM  [facility]       "

## 2023-11-17 DIAGNOSIS — R76.8 POSITIVE ANA (ANTINUCLEAR ANTIBODY): Primary | ICD-10-CM

## 2023-11-17 LAB
ANA PAT SER IF-IMP: ABNORMAL
ANA SER QL IF: POSITIVE
ANA TITR SER IF: ABNORMAL {TITER}

## 2023-11-17 NOTE — RESULT ENCOUNTER NOTE
Antinuclear antibody is positive at 1: 160, check JERICA panel and double-stranded DNA.  Lab has specimen from 11/16/2023 blood draw and should be able to run these tests

## 2023-11-19 LAB — PYRIDOXAL PHOS SERPL-SCNC: 10.5 NMOL/L

## 2023-11-20 DIAGNOSIS — E53.1 PYRIDOXINE DEFICIENCY: Primary | ICD-10-CM

## 2023-11-20 LAB
DSDNA AB SER-ACNC: 1.5 IU/ML
ENA SM IGG SER IA-ACNC: <0.7 U/ML
ENA SM IGG SER IA-ACNC: NEGATIVE
ENA SS-A AB SER IA-ACNC: <0.5 U/ML
ENA SS-A AB SER IA-ACNC: NEGATIVE
ENA SS-B IGG SER IA-ACNC: <0.6 U/ML
ENA SS-B IGG SER IA-ACNC: NEGATIVE
U1 SNRNP IGG SER IA-ACNC: <1.1 U/ML
U1 SNRNP IGG SER IA-ACNC: NEGATIVE
VIT B1 PYROPHOSHATE BLD-SCNC: 109 NMOL/L

## 2023-11-20 RX ORDER — MULTIVITAMIN WITH IRON
100 TABLET ORAL DAILY
Qty: 30 TABLET | Refills: 3 | Status: SHIPPED | OUTPATIENT
Start: 2023-11-20 | End: 2024-03-18

## 2023-11-20 NOTE — RESULT ENCOUNTER NOTE
Dear Danyelle    Vitamin B6 level is low.  Start vitamin B6 supplements.  I have sent a prescription to your pharmacy.  We will need to repeat vitamin B6 level after 3 months    Regards,  Zi Alan MD

## 2023-11-20 NOTE — RESULT ENCOUNTER NOTE
Willi Bassett    Also antinuclear antibody was positive, subsequent testing of JERICA panel and double-stranded DNA antibody was negative.  I would not recommend any intervention at present but would recommend repeating antinuclear antibody in 6 months    Regards,  Zi Alan MD

## 2023-11-21 ENCOUNTER — VIRTUAL VISIT (OUTPATIENT)
Dept: FAMILY MEDICINE | Facility: CLINIC | Age: 43
End: 2023-11-21
Payer: COMMERCIAL

## 2023-11-21 DIAGNOSIS — I10 BENIGN ESSENTIAL HYPERTENSION: ICD-10-CM

## 2023-11-21 DIAGNOSIS — E66.01 MORBID OBESITY (H): Primary | ICD-10-CM

## 2023-11-21 LAB — METHYLMALONATE SERPL-SCNC: 0.21 UMOL/L (ref 0–0.4)

## 2023-11-21 PROCEDURE — 99214 OFFICE O/P EST MOD 30 MIN: CPT | Mod: VID | Performed by: NURSE PRACTITIONER

## 2023-11-21 RX ORDER — PHENTERMINE HYDROCHLORIDE 15 MG/1
15 CAPSULE ORAL EVERY MORNING
Qty: 30 CAPSULE | Refills: 1 | Status: SHIPPED | OUTPATIENT
Start: 2023-11-21 | End: 2024-03-14 | Stop reason: DRUGHIGH

## 2023-11-21 NOTE — PROGRESS NOTES
Danyelle is a 43 year old who is being evaluated via a billable video visit.      How would you like to obtain your AVS? MyChart  If the video visit is dropped, the invitation should be resent by: Send to e-mail at: [email or web address]  Will anyone else be joining your video visit? No        Assessment & Plan     Morbid obesity (H)  Discussed weight loss medications today including topamax and phentermine.  Topamax can interact with her other medications at category D level, so do not recommend.  Phentermine seems to be an option, it can potentially enhance effects of Fluoxetine and decrease effects of Lisinopril; and these drug-drug interactions were reviewed today.  Recommend patient to get her BP rechecked at the pharmacy within the next 2 weeks to ensure BP is stable with the Phentermine.  She will follow up early January 2024 for in person visit as she will need pre-op exam at that time.  Discussed with patient she would need to stop Phentermine 7 days prior to any surgery.    Discussed with patient the indication and use of medication(s), risks/benefits, and potential adverse side effects.  Patient/guardian verbalized understanding and agreement with the plan.   - phentermine (ADIPEX-P) 15 MG capsule; Take 1 capsule (15 mg) by mouth every morning    Benign essential hypertension  As above.                 Marsha Fernandez NP  [facility]   Danyelle is a 43 year old, presenting for the following health issues:  Weight Problem        11/21/2023     2:19 PM   Additional Questions   Roomed by Noelle RODRIGES     Discuss weight loss medication Qsymia.  Weight fluctuates.  Has been working on dietary improvements.  Has hypertension as related comorbidity.    Her mother-in-law recently passed away.    Review of Systems   Constitutional, HEENT, cardiovascular, pulmonary, gi and gu systems are negative, except as otherwise noted.      Objective           Vitals:  No vitals were  obtained today due to virtual visit.    Physical Exam   GENERAL: Healthy, alert and no distress  EYES: Eyes grossly normal to inspection.  No discharge or erythema, or obvious scleral/conjunctival abnormalities.  RESP: No audible wheeze, cough, or visible cyanosis.  No visible retractions or increased work of breathing.    SKIN: Visible skin clear. No significant rash, abnormal pigmentation or lesions.  NEURO: Cranial nerves grossly intact.  Mentation and speech appropriate for age.  PSYCH: Mentation appears normal, affect normal/bright, judgement and insight intact, normal speech and appearance well-groomed.              Video-Visit Details    Type of service:  Video Visit   Joined the call at 11/21/2023, 2:37:06 pm.  Left the call at 11/21/2023, 2:59:55 pm.  You were on the call for 22 minutes 49 seconds .  Originating Location (pt. Location): Home    Distant Location (provider location):  Off-site  Platform used for Video Visit: Tres

## 2024-01-05 ENCOUNTER — OFFICE VISIT (OUTPATIENT)
Dept: FAMILY MEDICINE | Facility: CLINIC | Age: 44
End: 2024-01-05
Payer: COMMERCIAL

## 2024-01-05 VITALS
DIASTOLIC BLOOD PRESSURE: 82 MMHG | WEIGHT: 218.4 LBS | TEMPERATURE: 99.1 F | BODY MASS INDEX: 34.28 KG/M2 | RESPIRATION RATE: 16 BRPM | HEART RATE: 72 BPM | SYSTOLIC BLOOD PRESSURE: 128 MMHG | HEIGHT: 67 IN | OXYGEN SATURATION: 98 %

## 2024-01-05 DIAGNOSIS — K40.90 LEFT INGUINAL HERNIA: ICD-10-CM

## 2024-01-05 DIAGNOSIS — K42.9 UMBILICAL HERNIA WITHOUT OBSTRUCTION AND WITHOUT GANGRENE: ICD-10-CM

## 2024-01-05 DIAGNOSIS — F33.0 MILD RECURRENT MAJOR DEPRESSION (H): ICD-10-CM

## 2024-01-05 DIAGNOSIS — Z01.818 PRE-OP EXAM: Primary | ICD-10-CM

## 2024-01-05 DIAGNOSIS — M54.16 LEFT LUMBAR RADICULOPATHY: ICD-10-CM

## 2024-01-05 DIAGNOSIS — E66.01 MORBID OBESITY (H): ICD-10-CM

## 2024-01-05 DIAGNOSIS — I10 BENIGN ESSENTIAL HYPERTENSION: ICD-10-CM

## 2024-01-05 LAB — CREAT UR-MCNC: 166 MG/DL

## 2024-01-05 PROCEDURE — G0481 DRUG TEST DEF 8-14 CLASSES: HCPCS | Performed by: NURSE PRACTITIONER

## 2024-01-05 PROCEDURE — 99214 OFFICE O/P EST MOD 30 MIN: CPT | Performed by: NURSE PRACTITIONER

## 2024-01-05 RX ORDER — HYDROCODONE BITARTRATE AND ACETAMINOPHEN 5; 325 MG/1; MG/1
1 TABLET ORAL EVERY 6 HOURS PRN
Qty: 10 TABLET | Refills: 0 | Status: SHIPPED | OUTPATIENT
Start: 2024-01-05 | End: 2024-01-08

## 2024-01-05 ASSESSMENT — PAIN SCALES - GENERAL: PAINLEVEL: NO PAIN (0)

## 2024-01-05 ASSESSMENT — PATIENT HEALTH QUESTIONNAIRE - PHQ9
SUM OF ALL RESPONSES TO PHQ QUESTIONS 1-9: 3
SUM OF ALL RESPONSES TO PHQ QUESTIONS 1-9: 3

## 2024-01-05 NOTE — COMMUNITY RESOURCES LIST (ENGLISH)
01/05/2024   [facility]  N/A  For questions about this resource list or additional care needs, please contact your primary care clinic or care manager.  Phone: [phone]   Email: N/A   Address: [address] [place] [address]   Hours: N/A        Hotlines and Helplines       Hotline - Housing crisis  1  [facility] Distance: 13.69 miles      Phone/Virtual   [address] [place] [address]  Language: English  Hours: Mon - Sun Open 24 Hours   Phone: (538) 780-6052 Email: communications@[facility].org Website: https://[facility]-[place].org/oursaviourshousing/     2  [facility] Distance: 15.27 miles      Phone/Virtual   [address] [place] [address]  Language: English  Hours: Mon - Sun Open 24 Hours   Phone: (512) 247-6089 Email: info@[facility].[employer] Website: http://www.[facility].org          Housing       Coordinated Entry access point  3  [facility] Distance: 7.01 miles      In-Person, Phone/Virtual   [address] [place] [address]  Language: English  Hours: Mon - Fri 8:30 AM - 4:30 PM  Fees: Free   Phone: (344) 544-8485 Email: info@[facility].org Website: https://www.[facility].org/locations/[facility]/     4  [facility] - Coordinated Access to Housing and Shelter (CAHS) - Coordinated Access - Coordinated Entry access point Distance: 8.84 miles      In-Person, Phone/Virtual   [address] [place] [address]  Language: English  Hours: Mon - Fri 8:00 AM - 4:30 PM  Fees: Free   Phone: (961) 117-3177 Website: https://www.[facility]./residents/assistance-support/assistance/housing-services-support     Drop-in center or day shelter  5  [facility] Distance: 5.46 miles      In-Person   [address] [place] [address]  Language: English  Hours: Mon - Fri 9:00 AM - 4:00 PM  Fees: Free   Phone: (396) 161-3678 Email: abilio@[facility].org Website:  http://[facility].org     6  [facility] Distance: 13.62 miles      In-Person   [address] [place] [address]  Language: English, Greenlandic, Austrian  Hours: Mon - Sat 7:00 AM - 3:00 PM  Fees: Free, Self Pay   Phone: (841) 587-4820 Email: info@[employer] Website: https://www.[employer]/locations/opportunity-center/     Housing search assistance  7  [facility] - Housing Search Assistance Distance: 6.99 miles      Phone/Virtual   [address] [place] [address]  Language: Maltese, English, Hmong, Julissa, Greenlandic, Austrian  Hours: Mon - Fri Appt. Only  Fees: Free   Phone: (522) 932-8544 Website: https://[employer]/     8  [facility] - Mental Health Crisis Housing Search Assistance Distance: 9.97 miles      In-Person, Phone/Virtual   [address] [place] [address]  Language: English  Hours: Mon - Tue 8:00 AM - 4:30 PM , Wed 8:00 AM - 6:00 PM , Thu - Fri 8:00 AM - 4:30 PM  Fees: Free   Phone: (744) 382-6701 Email: [facility]. Website: https://www.[facility]./[facility]/health-medical/clinics-services/mental-health/adult-mental-health     Shelter for families  9  [facility] Distance: 10.04 miles      In-Person   [address] [place] [address]  Language: English  Hours: Mon - Fri 3:00 PM - 9:00 AM , Sat - Sun Open 24 Hours  Fees: Free   Phone: (445) 871-4198 Ext.1 Website: https://www.[facility].org/2020/07/03/emergency-family-shelter/     10  [facility] Distance: 22.97 miles      In-Person   [address] [place] [address]  Language: English  Hours: Mon - Sun Open 24 Hours  Fees: Free, Self Pay   Phone: (248) 462-4139 Email: Marko@[facility].org Website: http://www.[facility].org/     Shelter for individuals  [address] [place] [address] and [place] -  [facility] [place] [facility] [place] [facility] Distance: 7.04 miles      In-Person   [address] [place] [address]  Language: English  Hours: Mon - Sun 5:00 PM - 10:00 AM  Fees: Free, Self Pay   Phone: (220) 750-1284 Email: info@[employer] Website: https://www.[employer]/locations/[facility]/     12  [facility] - Coordinated Access to Housing and Shelter (CAHS) - Coordinated Access - Emergency housing Distance: 8.84 miles      In-Person, Phone/Virtual   [address] [place] [address]  Language: English  Hours: Mon - Fri 8:00 AM - 4:30 PM  Fees: Free   Phone: (395) 603-9234 Website: https://www.[facility]./residents/assistance-support/assistance/housing-services-support          Important Numbers & Websites       Emergency Services   911  [facility]   311  Poison Control   (877) 128-2941  Suicide Prevention Lifeline   (779) 104-2434 (TALK)  Child Abuse Hotline   (354) 679-8824 (4-A-Child)  Sexual Assault Hotline   (329) 827-3604 (HOPE)  National Runaway Safeline   (276) 117-6590 (RUNAWAY)  All-Options Talkline   (532) 460-6956  Substance Abuse Referral   (626) 384-6111 (HELP)

## 2024-01-05 NOTE — PATIENT INSTRUCTIONS
Pre-operation Instructions:    - Stop Aspirin and NSAIDS (Ibuprofen, Motrin, Advil, Aleve, Naproxen, Naprosyn) 7 days prior to the surgery/procedure or follow surgeon's direction.    - Stop all Vitamins and Herbal Supplements (including Vitamin E, Fish Oil, Omega 3 Fatty Acids, Ginseng, Gingko) 7 days prior to the surgery/procedure or follow surgeon's direction.    - Medications:  Hold Phentermine 7 days prior to surgery.  Hold Lisinopril day of procedure.    Continue your medications the morning of your surgery/procedure.    - If you become sick before your surgery/procedure (such as a fever, cough, congestion, or sore throat) you should call your primary care provider and surgeon.    - Unless given permission by your surgeon, do not eat or drink after midnight in the evening prior to your surgery/procedure.

## 2024-01-05 NOTE — PROGRESS NOTES
[address] [address]  Phone: [phone]  Primary Provider: Marsha Farnsworth  Pre-op Performing Provider: MARSHA FARNSWORTH      PREOPERATIVE EVALUATION:  Today's date: 1/5/2024    Danyelle is a 43 year old, presenting for the following:  Pre-Op Exam        1/5/2024     2:50 PM   Additional Questions   Roomed by Noelle OREILLY       Surgical Information:  Surgery/Procedure:   HERNIORRHAPHY, LEFT INGUINAL, ROBOT-ASSISTED, LAPAROSCOPIC, USING DA ANTONY XI with mesh and repair of umbilical hernia open with mesh.  General with Block     Surgery Location: [facility]   Surgeon:   Provider    Weston Mcnamara MD    Surgery Date: 1/17/2024  Time of Surgery: 7:15am   Where patient plans to recover: At home with family  Fax number for surgical facility: Note does not need to be faxed, will be available electronically in Epic.    Assessment & Plan     The proposed surgical procedure is considered INTERMEDIATE risk.    Pre-op exam    Left inguinal hernia  Reason for surgery    Umbilical hernia without obstruction and without gangrene  Reason for surgery    Benign essential hypertension  Known issue that I take into account for their medical decisions, no current exacerbations or new concerns.     Morbid obesity (H)  Known issue that I take into account for their medical decisions, no current exacerbations or new concerns.     Left lumbar radiculopathy    - [id number] - Urine Drug Confirmation Panel (Comprehensive); Future  - HYDROcodone-acetaminophen (NORCO) 5-325 MG tablet; Take 1 tablet by mouth every 6 hours as needed for severe pain  To use sparingly    Mild recurrent major depression (H24)  Known issue that I take into account for their medical decisions, no current exacerbations or new concerns.             - No identified additional risk factors other than previously addressed    Antiplatelet or Anticoagulation Medication Instructions:   - Patient is on no  antiplatelet or anticoagulation medications.    Additional Medication Instructions:   - ACE/ARB: HOLD on day of surgery (minimum 11 hours for general anesthesia).   - ibuprofen (Advil, Motrin): HOLD 1 day before surgery.    - Benzodiazepines: Continue without modification.   - SSRIs, SNRIs, TCAs, Antipsychotics: Continue without modification.    - phentermine: HOLD 7 days prior to surgery.    RECOMMENDATION:  APPROVAL GIVEN to proceed with proposed procedure, without further diagnostic evaluation.            Subjective       HPI related to upcoming procedure:         1/5/2024     2:41 PM   Preop Questions   1. Have you ever had a heart attack or stroke? No   2. Have you ever had surgery on your heart or blood vessels, such as a stent placement, a coronary artery bypass, or surgery on an artery in your head, neck, heart, or legs? No   3. Do you have chest pain with activity? No   4. Do you have a history of  heart failure? No   5. Do you currently have a cold, bronchitis or symptoms of other infection? No   6. Do you have a cough, shortness of breath, or wheezing? No   7. Do you or anyone in your family have previous history of blood clots? YES - pt's Dad had stroke   8. Do you or does anyone in your family have a serious bleeding problem such as prolonged bleeding following surgeries or cuts? No   9. Have you ever had problems with anemia or been told to take iron pills? YES - in past, not currently   10. Have you had any abnormal blood loss such as black, tarry or bloody stools, or abnormal vaginal bleeding? No   11. Have you ever had a blood transfusion? YES -1985    11a. Have you ever had a transfusion reaction? No   12. Are you willing to have a blood transfusion if it is medically needed before, during, or after your surgery? Yes   13. Have you or any of your relatives ever had problems with anesthesia? No   14. Do you have sleep apnea, excessive snoring or daytime drowsiness? No   15. Do you have any artifical  heart valves or other implanted medical devices like a pacemaker, defibrillator, or continuous glucose monitor? No   16. Do you have artificial joints? No   17. Are you allergic to latex? No   18. Is there any chance that you may be pregnant? No       Health Care Directive:  Patient does not have a Health Care Directive or Living Will: Discussed advance care planning with patient; information given to patient to review.    Preoperative Review of :   reviewed - no record of controlled substances prescribed.      Status of Chronic Conditions:  See problem list for active medical problems.  Problems all longstanding and stable, except as noted/documented.  See ROS for pertinent symptoms related to these conditions.    Review of Systems  Constitutional, neuro, ENT, endocrine, pulmonary, cardiac, gastrointestinal, genitourinary, musculoskeletal, integument and psychiatric systems are negative, except as otherwise noted.    Patient Active Problem List    Diagnosis Date Noted    Chronic pain syndrome 11/30/2011     Priority: High     Patient is followed by RODDY SOTO for ongoing prescription of narcotic pain medicine.  Med: Vicodin.   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     Priority: Medium    Benign essential hypertension 07/19/2023     Priority: Medium    Left lumbar radiculopathy 05/23/2023     Priority: Medium    Mild recurrent major depression (H24) 12/28/2021     Priority: Medium    Morbid obesity (H) 07/06/2021     Priority: Medium    S/P right inguinal hernia repair, follow-up exam 02/16/2021     Priority: Medium    Right inguinal hernia 01/26/2021     Priority: Medium    Benign hypermobility syndrome 10/02/2017     Priority: Medium    Esophageal reflux 11/23/2014     Priority: Medium    ASD (atrial septal defect) 01/16/2012      Priority: Medium    Generalized anxiety disorder 10/18/2011     Priority: Medium     Diagnosis updated by automated process. Provider to review and confirm.      CARDIOVASCULAR SCREENING; LDL GOAL LESS THAN 160 05/09/2010     Priority: Medium    Trichotillomania 08/03/2009     Priority: Medium    PMDD (premenstrual dysphoric disorder) 06/03/2009     Priority: Medium    Migraine headache 07/23/2008     Priority: Medium    allergic DERMATITIS NOS 11/12/2004     Priority: Medium    Persistent insomnia 07/16/2004     Priority: Medium     Problem list name updated by automated process. Provider to review        Past Medical History:   Diagnosis Date    ASD (atrial septal defect)     repaired surgically as a child    Benign essential hypertension 07/19/2023    Depressive disorder     Headache     History of blood transfusion 1985 1985 during open heart surgery    History of ovarian cyst     Hypertension     higher results at pain clinic, want to confirm ok    Insomnia, unspecified     Other forms of migraine, without mention of intractable migraine without mention of status migrainosus      Past Surgical History:   Procedure Laterality Date    BIOPSY      COLONOSCOPY      HERNIA REPAIR  Feb 2021    IR CERVICAL EPIDURAL STEROID INJECTION  9/7/2012    IR CERVICAL EPIDURAL STEROID INJECTION  10/2/2012    ZZC REPR ASD OSTIUM PREMUM      Dr. Silverio     Current Outpatient Medications   Medication Sig Dispense Refill    FLUoxetine (PROZAC) 20 MG capsule Take 20 mg by mouth daily      FLUoxetine (PROZAC) 40 MG capsule Take 40 mg by mouth daily      gabapentin (NEURONTIN) 300 MG capsule Take 1 capsule by mouth at bedtime.  May increase as needed and as tolerated up to 1 capsule 3 times daily. 90 capsule 1    lamoTRIgine (LAMICTAL) 100 MG tablet TAKE 1 TABLET BY MOUTH WITH  MG TABLET ONCE DAILY      lamoTRIgine (LAMICTAL) 200 MG tablet Take 1 tablet (200 mg) by mouth daily 90 tablet 1    lisinopril (ZESTRIL) 10 MG tablet  "Take 1 tablet (10 mg) by mouth daily 90 tablet 3    LORazepam (ATIVAN) 1 MG tablet TAKE 1 TABLET BY MOUTH THREE TIMES A DAY AS DIRECTED      MULTIPLE VITAMIN PO       norethindrone-ethinyl estradiol (MICROGESTIN 1/20) 1-20 MG-MCG tablet Take 1 tablet by mouth daily 63 tablet 3    vitamin B6 (PYRIDOXINE) 100 MG tablet Take 1 tablet (100 mg) by mouth daily 30 tablet 3    phentermine (ADIPEX-P) 15 MG capsule Take 1 capsule (15 mg) by mouth every morning (Patient not taking: Reported on 1/5/2024) 30 capsule 1       Allergies   Allergen Reactions    Artificial Sweetner (Informational Only) [Artificial Sweetner (Informational Only) ]     Chocolate Flavor Other (See Comments)        Social History     Tobacco Use    Smoking status: Never    Smokeless tobacco: Never   Substance Use Topics    Alcohol use: Not Currently     Comment: 1-2 a month     Family History   Problem Relation Age of Onset    C.A.D. Mother         heart surgery to close hole in heart    Cardiovascular Mother     Hypertension Mother     Depression Mother     Anxiety Disorder Mother     Heart Failure Mother     Cerebrovascular Disease Father     Hypertension Father     Myocardial Infarction Maternal Grandfather      History   Drug Use No         Objective     /82 (BP Location: Right arm, Patient Position: Sitting, Cuff Size: Adult Large)   Pulse 72   Temp 99.1  F (37.3  C) (Oral)   Resp 16   Ht 1.689 m (5' 6.5\")   Wt 99.1 kg (218 lb 6.4 oz)   SpO2 98%   BMI 34.72 kg/m      Physical Exam    GENERAL APPEARANCE: healthy, alert and no distress     EYES: EOMI, PERRL     HENT: ear canals and TM's normal and nose and mouth without ulcers or lesions     NECK: no adenopathy, no asymmetry, masses, or scars and thyroid normal to palpation     RESP: lungs clear to auscultation - no rales, rhonchi or wheezes     CV: regular rates and rhythm, normal S1 S2, no S3 or S4 and no murmur, click or rub     ABDOMEN:  soft, nontender, no HSM or masses and bowel " sounds normal     SKIN: no suspicious lesions or rashes     NEURO: Normal strength and tone, sensory exam grossly normal, mentation intact and speech normal     PSYCH: mentation appears normal. and affect normal/bright     LYMPHATICS: No cervical adenopathy    Recent Labs   Lab Test 11/16/23  1040 07/19/23  1314 09/29/22  0831 08/22/22  1003   HGB  --   --  12.3 12.9   PLT  --   --  237 252   NA  --  141 139 138   POTASSIUM  --  4.1 3.6 3.8   CR  --  0.71 0.71 0.63   A1C 5.3  --   --  5.6        Diagnostics:  No labs were ordered during this visit.   10/7/2022 EKG: sinus rhythm, inverted T in lead III and V1 but not in any other contiguous leads unchanged from previous tracings    Revised Cardiac Risk Index (RCRI):  The patient has the following serious cardiovascular risks for perioperative complications:   - No serious cardiac risks = 0 points     RCRI Interpretation: 0 points: Class I (very low risk - 0.4% complication rate)         Signed Electronically by: Marsha Fernandez NP  Copy of this evaluation report is provided to requesting physician.      Answers submitted by the patient for this visit:  Patient Health Questionnaire (Submitted on 1/5/2024)  PHQ9 TOTAL SCORE: 3

## 2024-01-06 PROBLEM — K40.90 RIGHT INGUINAL HERNIA: Status: RESOLVED | Noted: 2021-01-26 | Resolved: 2024-01-06

## 2024-01-09 LAB
GABAPENTIN UR QL CFM: PRESENT
LORAZEPAM UR QL CFM: PRESENT

## 2024-01-10 PROBLEM — K40.90 LEFT INGUINAL HERNIA: Status: ACTIVE | Noted: 2023-10-06

## 2024-01-11 ENCOUNTER — TELEPHONE (OUTPATIENT)
Dept: SURGERY | Facility: CLINIC | Age: 44
End: 2024-01-11
Payer: COMMERCIAL

## 2024-01-11 NOTE — TELEPHONE ENCOUNTER
Called and spoke to patient regarding yeast infection. She is currently being treated with oral medication. I let her know that per Dr. Clifton she should be ok for surgery next week. She thanked me for calling.   Yane López Certified Medical Assistant

## 2024-01-12 RX ORDER — FLUCONAZOLE 150 MG/1
150 TABLET ORAL
Qty: 3 TABLET | Refills: 0 | Status: SHIPPED | OUTPATIENT
Start: 2024-01-12 | End: 2024-01-19

## 2024-01-16 ENCOUNTER — ANESTHESIA EVENT (OUTPATIENT)
Dept: SURGERY | Facility: AMBULATORY SURGERY CENTER | Age: 44
End: 2024-01-16
Payer: COMMERCIAL

## 2024-01-16 NOTE — ANESTHESIA PREPROCEDURE EVALUATION
Anesthesia Pre-Procedure Evaluation    Patient: Danyelle Canales   MRN: [id number] : 1980        Procedure : Procedure(s):  HERNIORRHAPHY, LEFT INGUINAL, ROBOT-ASSISTED, LAPAROSCOPIC, USING DA ANTONY XI with mesh  and repair of umbilical hernia open with mesh.          Past Medical History:   Diagnosis Date    ASD (atrial septal defect)     repaired surgically as a child    Benign essential hypertension 2023    Depressive disorder     Headache     History of blood transfusion 1985 during open heart surgery    History of ovarian cyst     Insomnia, unspecified     Other forms of migraine, without mention of intractable migraine without mention of status migrainosus     Right inguinal hernia       Past Surgical History:   Procedure Laterality Date    BIOPSY      COLONOSCOPY      HERNIA REPAIR  2021    IR CERVICAL EPIDURAL STEROID INJECTION  2012    IR CERVICAL EPIDURAL STEROID INJECTION  10/2/2012    ZZC REPR ASD OSTIUM PREMUM      Dr. Silverio      Allergies   Allergen Reactions    Artificial Sweetner (Informational Only) [Artificial Sweetner (Informational Only) ]     Chocolate Flavor Other (See Comments)      Social History     Tobacco Use    Smoking status: Never    Smokeless tobacco: Never   Substance Use Topics    Alcohol use: Not Currently     Comment: 1-2 a month      Wt Readings from Last 1 Encounters:   24 99.1 kg (218 lb 6.4 oz)        Anesthesia Evaluation   Pt has had prior anesthetic.     No history of anesthetic complications       ROS/MED HX  ENT/Pulmonary:     (+)     EMA risk factors,  hypertension, obese,                                Neurologic:     (+)      migraines,                          Cardiovascular: Comment: ASD repaired surgically as child. Atrial septum intact as an adult per 2012 ECHO.     (+)  hypertension- -   -  - -                                 Previous cardiac testing   Echo: Date: 2012 Results:  Left Ventricle   Normal LV size, wall  motion, and systolic function with estimated ejection   fraction 65%.      Right Ventricle   The right ventricle is normal size.   Global right ventricular function is normal.      Atria   Mild left atrial dilatation  Normal right atrial size.   Atrial septum intact as assessed by intravenous agitated saline contrast   study.      Mitral Valve   Structurally normal valve without stenosis or regurgitation.      Aortic Valve   Structurally normal valve without stenosis or regurgitation.      Tricuspid Valve   Structurally normal valve without stenosis or regurgitation.  Unable to   calculate RVSP signal due to lack of TR signal.      Pulmonic Valve   Grossly normal valve without stenosis or regurgitation.      Vessels   The aorta root is normal.   The inferior vena cava  is normal.      Pericardium   No pericardial effusion is present.     Stress Test:  Date: Results:    ECG Reviewed:  Date: Results:    Cath:  Date: Results:      METS/Exercise Tolerance:     Hematologic:  - neg hematologic  ROS     Musculoskeletal: Comment: Left lumbar radiculopathy  (+)  arthritis,             GI/Hepatic:     (+) GERD,                   Renal/Genitourinary:  - neg Renal ROS     Endo:  - neg endo ROS   (+)               Obesity,       Psychiatric/Substance Use:     (+) psychiatric history anxiety and depression       Infectious Disease:  - neg infectious disease ROS     Malignancy:  - neg malignancy ROS     Other:            Physical Exam    Airway  airway exam normal      Mallampati: II   TM distance: > 3 FB   Neck ROM: full   Mouth opening: > 3 cm    Respiratory Devices and Support         Dental       (+) Minor Abnormalities - some fillings, tiny chips      Cardiovascular   cardiovascular exam normal          Pulmonary   pulmonary exam normal                OUTSIDE LABS:  CBC:   Lab Results   Component Value Date    WBC 8.6 09/29/2022    WBC 6.1 08/22/2022    HGB 12.3 09/29/2022    HGB 12.9 08/22/2022    HCT 39.3 09/29/2022    HCT  "41.3 08/22/2022     09/29/2022     08/22/2022     BMP:   Lab Results   Component Value Date     07/19/2023     09/29/2022    POTASSIUM 4.1 07/19/2023    POTASSIUM 3.6 09/29/2022    CHLORIDE 110 (H) 07/19/2023    CHLORIDE 107 09/29/2022    CO2 26 07/19/2023    CO2 26 09/29/2022    BUN 9.8 07/19/2023    BUN 12 09/29/2022    CR 0.71 07/19/2023    CR 0.71 09/29/2022    GLC 93 07/19/2023    GLC 86 09/29/2022     COAGS: No results found for: \"PTT\", \"INR\", \"FIBR\"  POC:   Lab Results   Component Value Date    HCG Negative 09/10/2020    HCGS Negative 08/22/2021     HEPATIC:   Lab Results   Component Value Date    ALBUMIN 3.0 (L) 09/29/2022    PROTTOTAL 7.4 09/29/2022    ALT 23 09/29/2022    AST 13 09/29/2022    ALKPHOS 72 09/29/2022    BILITOTAL 0.3 09/29/2022     OTHER:   Lab Results   Component Value Date    A1C 5.3 11/16/2023    NIDIA 9.0 07/19/2023    MAG 2.0 10/05/2011    TSH 2.92 08/22/2022    T4 1.12 08/09/2004    SED 29 (H) 03/17/2010       Anesthesia Plan    ASA Status:  2    NPO Status:  NPO Appropriate    Anesthesia Type: General.     - Airway: ETT   Induction: Intravenous, Propofol.   Maintenance: Balanced.        Consents    Anesthesia Plan(s) and associated risks, benefits, and realistic alternatives discussed. Questions answered and patient/representative(s) expressed understanding.     - Discussed: Risks, Benefits and Alternatives for BOTH SEDATION and the PROCEDURE were discussed     - Discussed with:  Patient      - Extended Intubation/Ventilatory Support Discussed: No.      - Patient is DNR/DNI Status: No     Use of blood products discussed: No .     Postoperative Care    Pain management: IV analgesics, Oral pain medications, Multi-modal analgesia, Peripheral nerve block (Single Shot).   PONV prophylaxis: Ondansetron (or other 5HT-3), Dexamethasone or Solumedrol, Background Propofol Infusion     Comments:               Chidi Silvestre MD    I have reviewed the pertinent notes and labs " "in the chart from the past 30 days and (re)examined the patient.  Any updates or changes from those notes are reflected in this note.              # Obesity: Estimated body mass index is 34.72 kg/m  as calculated from the following:    Height as of 1/5/24: 1.689 m (5' 6.5\").    Weight as of 1/5/24: 99.1 kg (218 lb 6.4 oz).      "

## 2024-01-17 ENCOUNTER — HOSPITAL ENCOUNTER (OUTPATIENT)
Facility: AMBULATORY SURGERY CENTER | Age: 44
Discharge: HOME | End: 2024-01-17
Attending: SURGERY
Payer: COMMERCIAL

## 2024-01-17 ENCOUNTER — ANESTHESIA (OUTPATIENT)
Dept: SURGERY | Facility: AMBULATORY SURGERY CENTER | Age: 44
End: 2024-01-17
Payer: COMMERCIAL

## 2024-01-17 VITALS
HEART RATE: 68 BPM | WEIGHT: 218 LBS | DIASTOLIC BLOOD PRESSURE: 73 MMHG | SYSTOLIC BLOOD PRESSURE: 138 MMHG | HEIGHT: 66 IN | RESPIRATION RATE: 16 BRPM | TEMPERATURE: 96.9 F | BODY MASS INDEX: 35.03 KG/M2 | OXYGEN SATURATION: 95 %

## 2024-01-17 DIAGNOSIS — K40.90 LEFT INGUINAL HERNIA: Primary | ICD-10-CM

## 2024-01-17 LAB
HCG UR QL: NEGATIVE
INTERNAL QC OK POCT: NORMAL
POCT KIT EXPIRATION DATE: NORMAL
POCT KIT LOT NUMBER: NORMAL

## 2024-01-17 PROCEDURE — 81025 URINE PREGNANCY TEST: CPT | Performed by: PATHOLOGY

## 2024-01-17 PROCEDURE — 49650 LAP ING HERNIA REPAIR INIT: CPT | Mod: LT | Performed by: SURGERY

## 2024-01-17 PROCEDURE — S2900 ROBOTIC SURGICAL SYSTEM: HCPCS | Performed by: SURGERY

## 2024-01-17 PROCEDURE — 49591 RPR AA HRN 1ST < 3 CM RDC: CPT

## 2024-01-17 PROCEDURE — 49650 LAP ING HERNIA REPAIR INIT: CPT | Mod: LT

## 2024-01-17 DEVICE — MESH PROGRIP LAPAROSCOPIC 5.9X3.9" PARIETEX SELF-FIX LPG1510: Type: IMPLANTABLE DEVICE | Site: ABDOMEN | Status: FUNCTIONAL

## 2024-01-17 RX ORDER — ONDANSETRON 2 MG/ML
4 INJECTION INTRAMUSCULAR; INTRAVENOUS EVERY 30 MIN PRN
Status: DISCONTINUED | OUTPATIENT
Start: 2024-01-17 | End: 2024-01-17 | Stop reason: HOSPADM

## 2024-01-17 RX ORDER — DIAZEPAM 5 MG
5 TABLET ORAL ONCE
Status: COMPLETED | OUTPATIENT
Start: 2024-01-17 | End: 2024-01-17

## 2024-01-17 RX ORDER — NALOXONE HYDROCHLORIDE 0.4 MG/ML
0.2 INJECTION, SOLUTION INTRAMUSCULAR; INTRAVENOUS; SUBCUTANEOUS
Status: DISCONTINUED | OUTPATIENT
Start: 2024-01-17 | End: 2024-01-17 | Stop reason: HOSPADM

## 2024-01-17 RX ORDER — KETOROLAC TROMETHAMINE 30 MG/ML
INJECTION, SOLUTION INTRAMUSCULAR; INTRAVENOUS PRN
Status: DISCONTINUED | OUTPATIENT
Start: 2024-01-17 | End: 2024-01-17

## 2024-01-17 RX ORDER — FLUMAZENIL 0.1 MG/ML
0.2 INJECTION, SOLUTION INTRAVENOUS
Status: DISCONTINUED | OUTPATIENT
Start: 2024-01-17 | End: 2024-01-17 | Stop reason: HOSPADM

## 2024-01-17 RX ORDER — ONDANSETRON 2 MG/ML
INJECTION INTRAMUSCULAR; INTRAVENOUS PRN
Status: DISCONTINUED | OUTPATIENT
Start: 2024-01-17 | End: 2024-01-17

## 2024-01-17 RX ORDER — PROPOFOL 10 MG/ML
INJECTION, EMULSION INTRAVENOUS CONTINUOUS PRN
Status: DISCONTINUED | OUTPATIENT
Start: 2024-01-17 | End: 2024-01-17

## 2024-01-17 RX ORDER — ACETAMINOPHEN 325 MG/1
975 TABLET ORAL ONCE
Status: COMPLETED | OUTPATIENT
Start: 2024-01-17 | End: 2024-01-17

## 2024-01-17 RX ORDER — BUPIVACAINE HYDROCHLORIDE AND EPINEPHRINE 2.5; 5 MG/ML; UG/ML
INJECTION, SOLUTION INFILTRATION; PERINEURAL PRN
Status: DISCONTINUED | OUTPATIENT
Start: 2024-01-17 | End: 2024-01-17 | Stop reason: HOSPADM

## 2024-01-17 RX ORDER — HEPARIN SODIUM 10000 [USP'U]/ML
5000 INJECTION, SOLUTION INTRAVENOUS; SUBCUTANEOUS
Status: DISCONTINUED | OUTPATIENT
Start: 2024-01-17 | End: 2024-01-17 | Stop reason: HOSPADM

## 2024-01-17 RX ORDER — CEFAZOLIN SODIUM 2 G/50ML
2 SOLUTION INTRAVENOUS
Status: COMPLETED | OUTPATIENT
Start: 2024-01-17 | End: 2024-01-17

## 2024-01-17 RX ORDER — DEXAMETHASONE SODIUM PHOSPHATE 4 MG/ML
INJECTION, SOLUTION INTRA-ARTICULAR; INTRALESIONAL; INTRAMUSCULAR; INTRAVENOUS; SOFT TISSUE PRN
Status: DISCONTINUED | OUTPATIENT
Start: 2024-01-17 | End: 2024-01-17

## 2024-01-17 RX ORDER — FENTANYL CITRATE 50 UG/ML
25 INJECTION, SOLUTION INTRAMUSCULAR; INTRAVENOUS EVERY 5 MIN PRN
Status: DISCONTINUED | OUTPATIENT
Start: 2024-01-17 | End: 2024-01-17 | Stop reason: HOSPADM

## 2024-01-17 RX ORDER — HYDROMORPHONE HYDROCHLORIDE 1 MG/ML
0.4 INJECTION, SOLUTION INTRAMUSCULAR; INTRAVENOUS; SUBCUTANEOUS EVERY 5 MIN PRN
Status: DISCONTINUED | OUTPATIENT
Start: 2024-01-17 | End: 2024-01-17 | Stop reason: HOSPADM

## 2024-01-17 RX ORDER — LIDOCAINE HYDROCHLORIDE 20 MG/ML
INJECTION, SOLUTION INFILTRATION; PERINEURAL PRN
Status: DISCONTINUED | OUTPATIENT
Start: 2024-01-17 | End: 2024-01-17

## 2024-01-17 RX ORDER — FENTANYL CITRATE 50 UG/ML
50 INJECTION, SOLUTION INTRAMUSCULAR; INTRAVENOUS EVERY 5 MIN PRN
Status: DISCONTINUED | OUTPATIENT
Start: 2024-01-17 | End: 2024-01-17 | Stop reason: HOSPADM

## 2024-01-17 RX ORDER — OXYCODONE HYDROCHLORIDE 5 MG/1
5 TABLET ORAL
Status: COMPLETED | OUTPATIENT
Start: 2024-01-17 | End: 2024-01-17

## 2024-01-17 RX ORDER — NALOXONE HYDROCHLORIDE 0.4 MG/ML
0.4 INJECTION, SOLUTION INTRAMUSCULAR; INTRAVENOUS; SUBCUTANEOUS
Status: DISCONTINUED | OUTPATIENT
Start: 2024-01-17 | End: 2024-01-17 | Stop reason: HOSPADM

## 2024-01-17 RX ORDER — ONDANSETRON 4 MG/1
4 TABLET, ORALLY DISINTEGRATING ORAL EVERY 30 MIN PRN
Status: DISCONTINUED | OUTPATIENT
Start: 2024-01-17 | End: 2024-01-18 | Stop reason: HOSPADM

## 2024-01-17 RX ORDER — SODIUM CHLORIDE, SODIUM LACTATE, POTASSIUM CHLORIDE, CALCIUM CHLORIDE 600; 310; 30; 20 MG/100ML; MG/100ML; MG/100ML; MG/100ML
INJECTION, SOLUTION INTRAVENOUS CONTINUOUS
Status: DISCONTINUED | OUTPATIENT
Start: 2024-01-17 | End: 2024-01-17 | Stop reason: HOSPADM

## 2024-01-17 RX ORDER — KETAMINE HYDROCHLORIDE 10 MG/ML
INJECTION INTRAMUSCULAR; INTRAVENOUS PRN
Status: DISCONTINUED | OUTPATIENT
Start: 2024-01-17 | End: 2024-01-17

## 2024-01-17 RX ORDER — ONDANSETRON 4 MG/1
4 TABLET, ORALLY DISINTEGRATING ORAL EVERY 30 MIN PRN
Status: DISCONTINUED | OUTPATIENT
Start: 2024-01-17 | End: 2024-01-17 | Stop reason: HOSPADM

## 2024-01-17 RX ORDER — GLYCOPYRROLATE 0.2 MG/ML
INJECTION, SOLUTION INTRAMUSCULAR; INTRAVENOUS PRN
Status: DISCONTINUED | OUTPATIENT
Start: 2024-01-17 | End: 2024-01-17

## 2024-01-17 RX ORDER — OXYCODONE HYDROCHLORIDE 5 MG/1
5 TABLET ORAL EVERY 4 HOURS PRN
Qty: 18 TABLET | Refills: 0 | Status: SHIPPED | OUTPATIENT
Start: 2024-01-17 | End: 2024-01-22

## 2024-01-17 RX ORDER — PROPOFOL 10 MG/ML
INJECTION, EMULSION INTRAVENOUS PRN
Status: DISCONTINUED | OUTPATIENT
Start: 2024-01-17 | End: 2024-01-17

## 2024-01-17 RX ORDER — LABETALOL HYDROCHLORIDE 5 MG/ML
10 INJECTION, SOLUTION INTRAVENOUS
Status: DISCONTINUED | OUTPATIENT
Start: 2024-01-17 | End: 2024-01-17 | Stop reason: HOSPADM

## 2024-01-17 RX ORDER — ONDANSETRON 2 MG/ML
4 INJECTION INTRAMUSCULAR; INTRAVENOUS EVERY 30 MIN PRN
Status: DISCONTINUED | OUTPATIENT
Start: 2024-01-17 | End: 2024-01-18 | Stop reason: HOSPADM

## 2024-01-17 RX ORDER — ACETAMINOPHEN 325 MG/1
325-650 TABLET ORAL EVERY 6 HOURS PRN
COMMUNITY
End: 2024-05-01

## 2024-01-17 RX ORDER — CEFAZOLIN SODIUM 2 G/50ML
2 SOLUTION INTRAVENOUS SEE ADMIN INSTRUCTIONS
Status: DISCONTINUED | OUTPATIENT
Start: 2024-01-17 | End: 2024-01-17 | Stop reason: HOSPADM

## 2024-01-17 RX ORDER — FENTANYL CITRATE 50 UG/ML
25-50 INJECTION, SOLUTION INTRAMUSCULAR; INTRAVENOUS
Status: DISCONTINUED | OUTPATIENT
Start: 2024-01-17 | End: 2024-01-17 | Stop reason: HOSPADM

## 2024-01-17 RX ORDER — HYDROMORPHONE HYDROCHLORIDE 1 MG/ML
0.2 INJECTION, SOLUTION INTRAMUSCULAR; INTRAVENOUS; SUBCUTANEOUS EVERY 5 MIN PRN
Status: DISCONTINUED | OUTPATIENT
Start: 2024-01-17 | End: 2024-01-17 | Stop reason: HOSPADM

## 2024-01-17 RX ORDER — OXYCODONE HYDROCHLORIDE 5 MG/1
10 TABLET ORAL
Status: DISCONTINUED | OUTPATIENT
Start: 2024-01-17 | End: 2024-01-18 | Stop reason: HOSPADM

## 2024-01-17 RX ORDER — BUPIVACAINE HYDROCHLORIDE 2.5 MG/ML
INJECTION, SOLUTION EPIDURAL; INFILTRATION; INTRACAUDAL
Status: COMPLETED | OUTPATIENT
Start: 2024-01-17 | End: 2024-01-17

## 2024-01-17 RX ORDER — LIDOCAINE 40 MG/G
CREAM TOPICAL
Status: DISCONTINUED | OUTPATIENT
Start: 2024-01-17 | End: 2024-01-17 | Stop reason: HOSPADM

## 2024-01-17 RX ADMIN — OXYCODONE HYDROCHLORIDE 5 MG: 5 TABLET ORAL at 09:52

## 2024-01-17 RX ADMIN — BUPIVACAINE HYDROCHLORIDE 20 ML: 2.5 INJECTION, SOLUTION EPIDURAL; INFILTRATION; INTRACAUDAL at 07:10

## 2024-01-17 RX ADMIN — SODIUM CHLORIDE, SODIUM LACTATE, POTASSIUM CHLORIDE, CALCIUM CHLORIDE: 600; 310; 30; 20 INJECTION, SOLUTION INTRAVENOUS at 09:15

## 2024-01-17 RX ADMIN — ONDANSETRON 4 MG: 2 INJECTION INTRAMUSCULAR; INTRAVENOUS at 09:21

## 2024-01-17 RX ADMIN — SODIUM CHLORIDE, SODIUM LACTATE, POTASSIUM CHLORIDE, CALCIUM CHLORIDE: 600; 310; 30; 20 INJECTION, SOLUTION INTRAVENOUS at 07:16

## 2024-01-17 RX ADMIN — PROPOFOL 150 MCG/KG/MIN: 10 INJECTION, EMULSION INTRAVENOUS at 09:04

## 2024-01-17 RX ADMIN — GLYCOPYRROLATE 0.2 MG: 0.2 INJECTION, SOLUTION INTRAMUSCULAR; INTRAVENOUS at 07:43

## 2024-01-17 RX ADMIN — DEXAMETHASONE SODIUM PHOSPHATE 4 MG: 4 INJECTION, SOLUTION INTRA-ARTICULAR; INTRALESIONAL; INTRAMUSCULAR; INTRAVENOUS; SOFT TISSUE at 07:25

## 2024-01-17 RX ADMIN — HYDROMORPHONE HYDROCHLORIDE 0.1 MG: 1 INJECTION, SOLUTION INTRAMUSCULAR; INTRAVENOUS; SUBCUTANEOUS at 10:16

## 2024-01-17 RX ADMIN — CEFAZOLIN SODIUM 2 G: 2 SOLUTION INTRAVENOUS at 07:18

## 2024-01-17 RX ADMIN — FENTANYL CITRATE 25 MCG: 50 INJECTION, SOLUTION INTRAMUSCULAR; INTRAVENOUS at 09:58

## 2024-01-17 RX ADMIN — KETAMINE HYDROCHLORIDE 25 MG: 10 INJECTION INTRAMUSCULAR; INTRAVENOUS at 07:37

## 2024-01-17 RX ADMIN — HYDROMORPHONE HYDROCHLORIDE 0.2 MG: 1 INJECTION, SOLUTION INTRAMUSCULAR; INTRAVENOUS; SUBCUTANEOUS at 10:26

## 2024-01-17 RX ADMIN — FENTANYL CITRATE 25 MCG: 50 INJECTION, SOLUTION INTRAMUSCULAR; INTRAVENOUS at 09:53

## 2024-01-17 RX ADMIN — LIDOCAINE HYDROCHLORIDE 60 MG: 20 INJECTION, SOLUTION INFILTRATION; PERINEURAL at 07:25

## 2024-01-17 RX ADMIN — PROPOFOL 150 MCG/KG/MIN: 10 INJECTION, EMULSION INTRAVENOUS at 07:59

## 2024-01-17 RX ADMIN — HYDROMORPHONE HYDROCHLORIDE 0.2 MG: 1 INJECTION, SOLUTION INTRAMUSCULAR; INTRAVENOUS; SUBCUTANEOUS at 10:09

## 2024-01-17 RX ADMIN — HYDROMORPHONE HYDROCHLORIDE 0.1 MG: 1 INJECTION, SOLUTION INTRAMUSCULAR; INTRAVENOUS; SUBCUTANEOUS at 10:18

## 2024-01-17 RX ADMIN — FENTANYL CITRATE 50 MCG: 50 INJECTION, SOLUTION INTRAMUSCULAR; INTRAVENOUS at 07:05

## 2024-01-17 RX ADMIN — PROPOFOL 200 MCG/KG/MIN: 10 INJECTION, EMULSION INTRAVENOUS at 07:25

## 2024-01-17 RX ADMIN — Medication 0.5 MG: at 08:55

## 2024-01-17 RX ADMIN — HYDROMORPHONE HYDROCHLORIDE 0.2 MG: 1 INJECTION, SOLUTION INTRAMUSCULAR; INTRAVENOUS; SUBCUTANEOUS at 10:02

## 2024-01-17 RX ADMIN — ACETAMINOPHEN 975 MG: 325 TABLET ORAL at 06:23

## 2024-01-17 RX ADMIN — KETOROLAC TROMETHAMINE 15 MG: 30 INJECTION, SOLUTION INTRAMUSCULAR; INTRAVENOUS at 09:21

## 2024-01-17 RX ADMIN — FENTANYL CITRATE 25 MCG: 50 INJECTION, SOLUTION INTRAMUSCULAR; INTRAVENOUS at 09:48

## 2024-01-17 RX ADMIN — DIAZEPAM 5 MG: 5 TABLET ORAL at 10:53

## 2024-01-17 RX ADMIN — PROPOFOL 280 MG: 10 INJECTION, EMULSION INTRAVENOUS at 07:25

## 2024-01-17 NOTE — OR NURSING
Patient received bilateral Transverse Abdominis Plane nerve block  with Exparel.  Fentanyl 50mcg and Versed 2mg given. Tolerated procedure well.

## 2024-01-17 NOTE — DISCHARGE INSTRUCTIONS
HERNIORRHAPHY DISCHARGE INSTRUCTIONS  DR. DIIVNA JACK    Please call [phone], for  [facility] or  for [facility], to schedule a follow up appointment in 2 weeks.       1. You may resume your regular diet when you feel you are ready to. DO NOT drink alcoholic beverages for 24 hours or while you are taking prescription medication.    2. Limit your activities for the first 48 hours. Gradually, increase them as tolerated. You may use stairs. I encourage you to walk as tolerated. No lifting greater that 20 pounds for 3-5 weeks.    3. You will have some discomfort at the incision sites. This is expected. This should improve over the next 2-3 days. Ice and pain medication will help with this pain. Use prescribed pain medication as instructed.    4. Bruising and mild swelling is normal after surgery. For males it is common to have bruising going into the penis and scrotum. The area below and around the incision(s) will be hard and elevated. This is normal. I call it the healing ridge. This will resolve slowly over the next several months. If you feel the pain is increasing and cannot explain it by increasing activity please call us at [phone].    5. The dressing will often have some blood on it. You may shower 24 hours after surgery. No baths for 2 weeks after surgery. Clean gently over incision site. If clear plastic covering or steri-strip comes off and there is still some bleeding or drainage then cover with gauze or band-aid. If no bleeding, there is no reason to cover site. The abdominal binder may be removed after 24 hours after surgery. You may continue to wear it however for comfort. I suggest  you wear an old t-shirt under the abdominal binder for a more comfortable wear.    6. Avoid Aspirin for the first 72 hours after the procedure. This medication may increase the tendency to bleed.    7. Use the following medications (in addition to your normal meds) as  shown:  a. Percocet 5 mg 1-2 every 6 hours as needed for severe pain. This contains 325 mg of Tylenol (acetaminophen) per tablet.  Please do not take more than 4 grams of Tylenol (acetaminophen) per day. For example, you may take 1 Percocet and 1 Tylenol, or 2 Percocet and no Tylenol, or 2 Tylenol and no Percocet every 6 hours.  b. Tylenol (acetaminophen) 500 mg every 6 hours as needed for mild pain. Do not take more than 1000 mg every 6 hours. (see above).  c. Motrin (ibuprofen) 200-800 mg every 6 hours as needed for mild to moderate pain. Take with food.     8. Notify Dr. Mcnamara's clinic at [phone], for  [facility] or  for [facility], to schedule a follow up appointment in 2 weeks.   if:  Your discomfort is not relieved by your pain medication.  You have signs of infection such as temperature above 100.5 degrees orally, chills, or increasing daily discomfort.  Incision site is becoming more red and/or there is purulent drainage.  You have questions or concerns.    9. Please call [phone], for  [facility] or  for [facility] clinic, to schedule a follow up appointment in 2 weeks.   to schedule a follow up appointment in about 2 weeks.    10. When taking narcotics (pain medication more than Tylenol [acetaminophen] and Motrin [ibuprofen]) it is important to keep your stools soft to avoid constipation and pain with straining. This is best done by drinking fluids (non-alcoholic and non-caffeinated) and taking a stool softener (i.e. Metamucil or milk of magnesia). You may be able to use non-narcotics for pain relief especially by the 3rd post- operative day. Tylenol (acetaminophen) 500 mg every 6 hours and/or Motrin (ibuprofen) 200-800 mg every 6 hours. Please do not take more than 4 grams of Tylenol (acetaminophen) per day. Remember your Percocet does have Tylenol (acetaminophen) already in it. Please take Motrin (ibuprofen) with food to  help protect the stomach. If you have a history of stomach ulcers or stomach problems, do not take Motrin (ibuprofen).     11. Do not drive or operate heavy machinery for 24 hours after surgery or when taking narcotics. You may resume driving when feel that you can safely avoid an accident and are not taking narcotics. This is usually 5 to 7 days after surgery. You should not be alone for 24 hours after surgery.    12. Have milk of magnesia available at home so that when you take the pain medications you take 1-2 tablepoons a day, to help reduce problems with constipation.      13. If you have questions after your procedure/surgery please contact Dr Mcnamara's primary clinic in [place]. You can call [phone], your other option is to send us a [email or web address] message through your [employer] portal to Dr Mcnamara's team. For urgent and non urgent matters these options are best. If your symptoms are emergent or cannot wait, please proceed to [facility] and let them know who you had surgery with.      Patient can receive pain medications that I have ordered and give the first dose in the recovery room as needed.      Post Operative Instructions: Regional Anesthetic for Chest and Abdominal Surgery    General Information:   Regional anesthesia is when local anesthetic or  numbing  medication is injected around the nerves to anesthetize or  numb  the area supplied by that set of nerves.     Types of Regional Blocks:  Transversus Abdominis Plane (TAP): A block injected beneath the covering of a muscle layer of the abdomen for abdominal surgery  Pectoral: A block injected near the breast for surgery on the breast and armpit  Paravertebral: A block injected in the back for surgery on the chest, ribs, and breast    Procedure:  The type of anesthesia your doctor used to numb your chest or abdomen will usually not wear off for 6-18 hours, but may last as long as 24 hours.     Diet:  There are no restrictions on your  diet. You should drink plenty of fluids.     Discomfort:  You will have a tingling and prickly sensation in your chest or abdomen as the feeling begins to return. You can also expect some discomfort. The amount of discomfort is unpredictable, but if you have more pain than can be controlled with pain medication you should notify your physician.     Pain Medicine:   Begin taking your oral pain pills (if you have not already done so) before bedtime and during the night to avoid a sudden onset of pain as the block wears off.  Do not engage in drinking, driving, or hazardous occupations while taking pain medication.     Stitches:   You may have stitches or special skin closures. You doctor will inform you when to return to the office to have them removed.          [facility]  Home Care Following Anesthesia  For 24 hours after surgery:  Get plenty of rest.  A responsible adult must stay with you for at least 24 hours after you leave the surgery center.  Do not drive or use heavy equipment.  If you have weakness or tingling, don't drive or use heavy equipment until this feeling goes away.   Do not drink alcohol.   Avoid strenuous or risky activities.  Ask for help when climbing stairs.  You may feel lightheaded.  IF so, sit for a few minutes before standing.  Have someone help you get up.   If you have nausea (feel sick to your stomach): Drink only clear liquids such as apple juice, ginger ale, broth or 7-Up.  Rest may also help.  Be sure to drink enough fluids.  Move to a regular diet as you feel able.   You may have a slight fever.  Call the doctor if your fever is over 100 F (37.7 C) (taken under the tongue) or lasts longer than 24 hours.  You may have a dry mouth, a sore throat, muscle aches or trouble sleeping. These should go away after 24 hours.  Do not make important or legal decisions.   It is recommended to avoid smoking.   If you use hormonal birth control (such as the  "pill, patch, ring or implants):  You will need a second form of birth control for 7 days (condoms, a diaphragm or contraceptive foam).  While in the surgery center, you received a medicine called Sugammadex.  Hormonal birth control (such as the pill, patch, ring or implants) will not work as well for a week after taking this medicine.  Today you received an Exparel block to numb the nerves near your surgery site.  This is a block using local anesthetic or \"numbing\" medication injected around the nerves to anesthetize or \"numb\" the area supplied by those nerves.  This block is injected into the muscle layer near your surgical site.  This medication may numb the location where you had surgery up to 72 hours.  If your surgical site is an arm or leg you should be careful with your affected limb, since it is possible to injure your limb without being aware of it due to the numbing.  Until full feeling returns, you should guard against bumping or hitting your limb, and avoid extreme hot or cold temperatures on the skin.  As the block wears off, the feeling will return as a tingling or prickly sensation near your surgical site.  You will experince more discomfort from your incision as the feeling returns.  You may want to take a pain pill (a narcotic or Tylenol if this was prescribed by your surgeon) when you start to experience mild pain before the pain beomes more severe.  If your pain medications do not control your pain, you should notify your surgeon.    Tips for taking pain medications  To get the best pain relief possible, remember these points:  Take pain medications as directed, before pain becomes severe.  Pain medication can upset your stomach: taking it with food may help.  Constipation is a common side effect of pain medication. Drink plenty of  fluids.  Eat foods high in fiber. Take a stool softener if recommended by your doctor or pharmacist.  Do not drink alcohol, drive or operate machinery while taking " pain medications.  Ask about other ways to control pain, such as with heat, ice or relaxation.    Tylenol/Acetaminophen Consumption    If you feel your pain relief is insufficient, you may take Tylenol/Acetaminophen in addition to your narcotic pain medication.   Be careful not to exceed 4,000 mg of Tylenol/Acetaminophen in a 24 hour period from all sources.  If you are taking extra strength Tylenol/acetaminophen (500 mg), the maximum dose is 8 tablets in 24 hours.  If you are taking regular strength acetaminophen (325 mg), the maximum dose is 12 tablets in 24 hours.    Call a doctor for any of the following:  Signs of infection (fever, growing tenderness at the surgery site, a large amount of drainage or bleeding, severe pain, foul-smelling drainage, redness, swelling).  It has been over 8 to 10 hours since surgery and you are still not able to urinate (pass water).  Headache for over 24 hours.  Numbness, tingling or weakness the day after surgery (if you had spinal anesthesia).  Signs of Covid-19 infection (temperature over 100 degrees, shortness of breath, cough, loss of taste/smell, generalized body aches, persistent headache, chills, sore throat, nausea/vomiting/diarrhea)  Your doctor is:  Dr. Weston Mcnamara, General Surgery: [phone] or [phone]                    Or dial [phone] and ask for the resident on call for:  General Surgery  For emergency care, call the:  [facility]:  [phone] (TTY for hearing impaired: [phone])

## 2024-01-17 NOTE — ANESTHESIA POSTPROCEDURE EVALUATION
Patient: Danyelle Canales    Procedure: Procedure(s):  HERNIORRHAPHY, LEFT INGUINAL, ROBOT-ASSISTED, LAPAROSCOPIC, USING DA ANTONY XI with mesh  and repair of umbilical hernia open       Anesthesia Type:  General    Note:  Disposition: Outpatient   Postop Pain Control: Challenging   PONV: No   Neuro/Psych: Uneventful            Sign Out: Acceptable/Baseline neuro status   Airway/Respiratory: Uneventful            Sign Out: Acceptable/Baseline resp. status   CV/Hemodynamics: Uneventful            Sign Out: Acceptable CV status; No obvious hypovolemia; No obvious fluid overload   Other NRE:    DID A NON-ROUTINE EVENT OCCUR?            Last vitals:  Vitals Value Taken Time   /81 01/17/24 1015   Temp 36.1  C (96.9  F) 01/17/24 1000   Pulse 64 01/17/24 1026   Resp 9 01/17/24 1026   SpO2 96 % 01/17/24 1026   Vitals shown include unfiled device data.    Electronically Signed By: Chidi Silvestre MD  January 17, 2024  10:27 AM

## 2024-01-17 NOTE — ANESTHESIA CARE TRANSFER NOTE
Patient: Danyelle Canales    Procedure: Procedure(s):  HERNIORRHAPHY, LEFT INGUINAL, ROBOT-ASSISTED, LAPAROSCOPIC, USING DA ANTONY XI with mesh  and repair of umbilical hernia open       Diagnosis: Left inguinal hernia [K40.90]  Diagnosis Additional Information: No value filed.    Anesthesia Type:   General     Note:    Oropharynx: oropharynx clear of all foreign objects and spontaneously breathing  Level of Consciousness: drowsy  Oxygen Supplementation: room air    Independent Airway: airway patency satisfactory and stable  Dentition: dentition unchanged  Vital Signs Stable: post-procedure vital signs reviewed and stable  Report to RN Given: handoff report given  Patient transferred to: PACU    Handoff Report: Identifed the Patient, Identified the Reponsible Provider, Reviewed the pertinent medical history, Discussed the surgical course, Reviewed Intra-OP anesthesia mangement and issues during anesthesia, Set expectations for post-procedure period and Allowed opportunity for questions and acknowledgement of understanding      Vitals:  Vitals Value Taken Time   /82 01/17/24 0944   Temp     Pulse 68 01/17/24 0945   Resp 12 01/17/24 0945   SpO2 95 % 01/17/24 0945   Vitals shown include unfiled device data.    Electronically Signed By: ASHLEY Ludwig CRNA  January 17, 2024  9:46 AM

## 2024-01-17 NOTE — ANESTHESIA PROCEDURE NOTES
Airway       Patient location during procedure: OR       Procedure Start/Stop Times: 1/17/2024 7:28 AM  Staff -        Anesthesiologist:  Chidi Silvestre MD       CRNA: Daina Harris APRN CRNA       Performed By: CRNA  Consent for Airway        Urgency: elective  Indications and Patient Condition       Indications for airway management: félix-procedural       Induction type:intravenous       Mask difficulty assessment: 1 - vent by mask    Final Airway Details       Final airway type: endotracheal airway       Successful airway: ETT - single  Endotracheal Airway Details        ETT size (mm): 7.0       Cuffed: yes       Successful intubation technique: direct laryngoscopy       DL Blade Type: Jeffrey 2       Grade View of Cords: 1       Adjucts: stylet       Position: Right       Measured from: lips       Secured at (cm): 22       Bite block used: Soft    Post intubation assessment        Placement verified by: capnometry, equal breath sounds and chest rise        Number of attempts at approach: 1       Number of other approaches attempted: 0       Secured with: tape       Ease of procedure: easy       Dentition: Intact and Unchanged    Medication(s) Administered   Medication Administration Time: 1/17/2024 7:28 AM

## 2024-01-17 NOTE — OP NOTE
Date of Service: 01/17/24      SURGEON: Weston Mcnamara MD        PREOPERATIVE DIAGNOSIS:  Left inguinal hernia .  Patient points to just above her umbilicus but I feel it in the umbilicus. Will palpate in the OR.     POSTOPERATIVE DIAGNOSIS: Left inguinal hernia direct and small femoral hernia along with small umbilical hernia.     PROCEDURES PERFORMED: Robotic (xi) assisted multi port LIH  (transabdominal preperitoneal) repair with mesh (10 by 15 cm progrip mesh times 1).  And open umbilical hernia repair of 1 cm hernia with 0 Ethibond    ESTIMATED BLOOD LOSS: 7 mL    ANESTHESIA: General endotracheal anesthesia. And JAYCOB block  With 0.25 %  marcaine with epinephrine placed in the wounds prior to and at the end of surgery.     FINDINGS: left inguinal hernia.  No hernia identified on asymptomatic side.    GRAFTS/IMPLANTS: 10 by 15 cm progrip mesh  .    COMPLICATIONS: None.    SPECIMENS: None.     OPERATIVE INDICATIONS: Please see my office visit note.     OPERATIVE DETAILS: The patient was brought to the operating room and prepared in a routine fashion. Anesthesia was induced without complications. The patient was placed in a supine position.  A timeout was performed prior to surgery and documented by the nursing team.     Under the benefits of general anesthesia, a 5 mm trocar was placed in the left upper quadrant under direct visualization with a camera running thru it.   The abdomen was entered while watching with the camera and entered safely. A pneumoperitoneum was established using carbon dioxide gas to a maximum pressure of 15 mmHg. The  laparoscope was used to visualize the abdomen which showed no injuries. An 8 mm robotic port was placed in the right flank and the 8  mm port for the camera was placed at the umbilicus (where the hernia is)and then the right 5 mm port was replaced with another 8 mm port in the left flank, both placed just above the level of the suprumcilical trocar. The robotic laparoscope  was utilized from the umbilical port site. The robotic arms were docked. The robotic instruments were then inserted under direct vision.   I did look at the  side and palpated the area after I was ready to go to the console and did not feel or see a hernia at this site.   Upon investigation, a left inguinal hernia was identified. The dissection was started by taking down the peritoneum starting at the medial umbilical ligament going transversely towards the left anterior superior iliac spine. The peritoneum was then systematically taken down with blunt and sharp dissection using the robotic hot adrien.        Luis Alfredo's ligament and the pubic symphysis were identified and cleared for easy visualization. The round ligament was idnetified and freed up from its attachments and then divided after using bipolar cautery away from the peritoneum but not to close to the inguinal canal.   The hernia sac was identified and dissected away from the round ligament  Attention was turned to placement of the mesh. A  10 by 15 cm  progrip mesh was placed in the preperitoneal space and was stuck to the surrounding tissue and covered the defect and the other potential hernia sites. The peritoneum was then closed using a running 3-0 absorbable Stratafix suture.          Hemostasis was confirmed. The robotic instruments were then removed under direct visualization. The robotic arms were undocked. The robotic ports were removed under direct visualization and pneumoperitoneum was released. The  port site fascial  Area was looked at with the camera and there was no increase in size of the defect form the original insertaion. So no need to close the fascia with suture.     Attention was then turned to the umbilical hernia.  I was able to palpate an obvious umbilical hernia but did do a supraumbilical incision to make sure there was no hernia above the umbilicus.                    OPERATIVE REPORT    01/17/24  Preoperative diagnosis:   Umbilical hernia.  Postoperative diagnosis:  same only 1 cm or less in size.   Procedure:   Umbilical hernia repair with interrupted 0 Ethibond sutures  Anesthesia:  General anesthesia  Blood loss: Minimal  Surgeon : Weston Mcnamara M.D.  Indications:  Danyelle Canales is a 43 year old year old female with an  umbilical hernia that is causing  discomfort.  It was felt that it should be repaired.  Risks and complications were explained to the patient including bleeding, risk of anesthesia, infection, recurrence of hernia, damage to bowel, and bladder.    Questions were answered and postoperative instructions were given to patient and any one with the patient.      Procedure:  DESCRIPTION OF PROCEDURE:  The patient was just done with the robotic case and the surgery continues with a supraumbilical  incision was made with a knife and then cautery and blunt and sharp dissection down to the hernia sac.  The hernia sac was freed up from surrounding attachments and to the skin at the umbilicus, taking great care not to damage the skin.  It was then after opening up the sac and making sure that there was nothing in the sac, the sac was removed with cautery along the fascia edge and a finger was placed inside the abdomen and the fat was pushed back into the abdomen.   I did place some gauze just inside the abdomen but not all the way in several times to make sure there was no more bleeding then and we also irrigated the area with some irrigation.  There was no evidence of anymore bleeding when we were done with that part.  I felt that with this small of a hernia I would just repair with several interrupted 0 Ethibond sutures.   Unfortunately the mesh that was available was very large and would have to be tacked to the abdomen wall so this would increase her post op pain and since it is a small hernia and the risk of recurrence is low.      I then  closed the defect transversely  with interrupted 0 Ethibond sutures.   "    Then the surrounding fat was brought together in a pursestring suture with 0 Vicryl suture to cover over my knots.  Then the skin was brought together with several interrupted 3-0 Vicryl sutures then closed with continuous running 4-0 Monocryl, covered with tincture of Benzoin, Steri-Strips, and then some gauze to create an \"innie\" and Tegaderm.  All areas were irrigated with saline before going to the next layer.  Good hemostasis was achieved throughout the whole procedure.  All layers were infiltrated with Marcaine before going to the next layer.  The patient tolerated the procedure very well.   I then looked with the laparoscopic to make sure there was no damage or bleeding from the procedure and none was noted.   I then closed the trocar sites after removing as much C02 from the abdomen. With 3-0 vicryl for the fat and the skin was brought together with 4-0 Monocryl and then tincture of benzoin and steri strips and Tegaderm.     The count was correct.       Plan is to discharge her home today.  No lifting more than 20 pounds for 4 weeks.  We will see the patient back in approximately 2 weeks. The patient did receive antibiotics preoperatively.     Weston Mcnamara MD       The wounds were then closed with interrupted 4-0 Monocryl subcuticular suture. The trocar sites were reinforced with tincture of benzoin and steri strips, and then a Tegaderm.       Instrument counts, needle counts and sponge counts were all correct x 2. The patient tolerated the procedure well. I was present for all critical components of the operation.     The patient did receive IV antibiotics prior to surgical incision.         Weston Mcnamara MD     "

## 2024-01-17 NOTE — ANESTHESIA PROCEDURE NOTES
"TAP Procedure Note    Pre-Procedure   Staff -        Anesthesiologist:  Chidi Silvestre MD       Resident/Fellow: Brendan Huber       Performed By: resident       Location: OR       Procedure Start/Stop Times: 1/17/2024 7:05 AM and 1/17/2024 7:10 AM       Pre-Anesthestic Checklist: patient identified, IV checked, site marked, risks and benefits discussed, informed consent, monitors and equipment checked, pre-op evaluation, at physician/surgeon's request and post-op pain management  Timeout:       Correct Patient: Yes        Correct Procedure: Yes        Correct Site: Yes        Correct Position: Yes        Correct Laterality: Yes        Site Marked: Yes  Procedure Documentation  Procedure: TAP       Laterality: bilateral       Patient Position: supine       Skin prep: Chloraprep       Needle Type: short bevel       Needle Gauge: 21.        Needle Length (millimeters): 100        Ultrasound guided       1. Ultrasound was used to identify targeted nerve, plexus, vascular marker, or fascial plane and place a needle adjacent to it in real-time.       2. Ultrasound was used to visualize the spread of anesthetic in close proximity to the above referenced structure.       3. A permanent image is entered into the patient's record.    Assessment/Narrative         The placement was negative for: blood aspirated, painful injection and site bleeding       Paresthesias: No.       Bolus given via needle..        Secured via.        Insertion/Infusion Method: Single Shot       Complications: none    Medication(s) Administered   Bupivacaine 0.25% PF (Infiltration) - Infiltration   20 mL - 1/17/2024 7:10:00 AM  Bupivacaine liposome (Exparel) 1.3% LA inj susp (Infiltration) - Infiltration   20 mL - 1/17/2024 7:10:00 AM  Medication Administration Time: 1/17/2024 7:05 AM      FOR [facility] ([facility]) ONLY:   Pain Team Contact information: please page the Pain Team Via V[employer]. Search \"Pain\". During daytime hours, please page " the attending first. At night please page the resident first.

## 2024-01-17 NOTE — PROGRESS NOTES
No changes from previous exam  Procedure explained.  Questions answered  Plan robotic left inguinal hernia and supraumbilical incisional hernia repair with mesh open   Weston Mcnamara MD

## 2024-01-17 NOTE — LETTER
[address] [address]  Phone: [phone]  Fax: [phone]      REPORT OF WORK ABILITY    NOTE TO EMPLOYEE: You must promptly provide a copy of this report to your  employer or worker's compensation insurer, and Qualified Rehabilitation Consultant.    Date: 1/17/2024                     Employee Name: Danyelle Canales         YOB: 1980    Danyelle will not be able to lift more than 20 pounds for 4 weeks and will not be able to go back to work for about a week after surgery.  She may feel tired and have to rest or leave for home if too tired.     Weston Mcnamara MD

## 2024-01-18 ENCOUNTER — TELEPHONE (OUTPATIENT)
Dept: SURGERY | Facility: CLINIC | Age: 44
End: 2024-01-18
Payer: COMMERCIAL

## 2024-01-18 ENCOUNTER — TELEPHONE (OUTPATIENT)
Dept: FAMILY MEDICINE | Facility: CLINIC | Age: 44
End: 2024-01-18
Payer: COMMERCIAL

## 2024-01-18 RX ORDER — HYDROMORPHONE HYDROCHLORIDE 2 MG/1
2 TABLET ORAL EVERY 4 HOURS PRN
Qty: 14 TABLET | Refills: 0 | Status: SHIPPED | OUTPATIENT
Start: 2024-01-18 | End: 2024-01-21

## 2024-01-18 RX ORDER — HYDROMORPHONE HYDROCHLORIDE 2 MG/1
2 TABLET ORAL EVERY 6 HOURS PRN
Qty: 12 TABLET | Refills: 0 | Status: SHIPPED | OUTPATIENT
Start: 2024-01-18 | End: 2024-01-21

## 2024-01-18 NOTE — TELEPHONE ENCOUNTER
Patient calling stating she had surgery yesterday for Left inguinal hernia repair with Weston Mcnamara. Patient states she had been alternating ibuprofen and tylenol as well as taking her prescribed oxycodone. She also has been icing area Patient reports she is getting minimal pain relief with home treatments and states pain is 8-9/10.     On AVS from surgery it states to call surgical team if pain is uncontrolled. Patient states their is not a good number and keeps getting transferred to primary care department.       RN was unable to find good contact number. RN stated to patient she would send high priority message to care team and in the mean time if pain becomes unbearable to go to ER if needed for pain management.     Please advise and reach out to patient.     Ena Eubanks RN on 1/18/2024 at 12:20 PM

## 2024-01-18 NOTE — PROGRESS NOTES
January 18, 2024      Patient is having abdomen pain and feels like it is more than it should be.   She had told me yesterday that she did not HAVE enough pain medications for her last surgery.    She is not having any chills or fevers.   If the pain is bad enough to go to emergency room to be seen.    But we can try dilaudid instead and see if that helps.  She is using ice and we discussed that she can take 500 mg tylenol and 400 mg of ibuprophen every 3 hours and can use the dilaudid every 3-4 hours.   Weston Mcnamara MD

## 2024-02-02 ENCOUNTER — OFFICE VISIT (OUTPATIENT)
Dept: SURGERY | Facility: CLINIC | Age: 44
End: 2024-02-02
Payer: COMMERCIAL

## 2024-02-02 VITALS — HEART RATE: 79 BPM | DIASTOLIC BLOOD PRESSURE: 85 MMHG | SYSTOLIC BLOOD PRESSURE: 133 MMHG

## 2024-02-02 DIAGNOSIS — Z09 S/P UMBILICAL HERNIA REPAIR, FOLLOW-UP EXAM: Primary | ICD-10-CM

## 2024-02-02 DIAGNOSIS — Z87.19 S/P LEFT INGUINAL HERNIA REPAIR: ICD-10-CM

## 2024-02-02 DIAGNOSIS — Z98.890 S/P LEFT INGUINAL HERNIA REPAIR: ICD-10-CM

## 2024-02-02 PROCEDURE — 99024 POSTOP FOLLOW-UP VISIT: CPT | Performed by: SURGERY

## 2024-02-02 NOTE — LETTER
2/2/2024         RE: Danyelle Canales  [address] [place] [address]        Dear Colleague,    Thank you for referring your patient, Danyelle Canales, to the [facility]. Please see a copy of my visit note below.    Danyelle is a 43 year old female who is status post Robotic (xi) assisted multi port LIH  (transabdominal preperitoneal) repair with mesh (10 by 15 cm progrip mesh times 1).  And open umbilical hernia repair of 1 cm hernia with 0 Ethibond with no chills and no fever.      Patient's  Pain is  improving.  Appetite is  improving.  Patient did much better on Dilaudid then oxycodone.    Patient is having some numbness in the left inner thigh and has had a few electrical like shocks consistant with the nerve waking up.    And feels like she feels something where her hernia was at and feels like it is poking her. In multiple spots. This gets worse as the day progress   She was on gabapentin  for a numb like feeling that is cold but not numb to the touch and is deeper  running down the leg medial posterior.     Wound(s)  Is/are   clean dry and intact with no evidence of infection.    Questions were answered and discussion of no lifting more than 20 pounds for 4 weeks after surgery.    Dilaudid worked the best for Danyelle.  She did not seem to get the same relief from the oxycodone.    Plan: follow up as needed.  If the numbness resolves then the nerve is recovering. If the poking feeling where the hernia use to be is not improving then will have her see pain clinic.   Will have her restart her gabapentin to see if that helps.  She takes it at night only.  Will try motrin  Ibuprofen (motrin) 200 mg tablets (4 tablets) every 6 hours with food for 48 hours then drop down to (3 tablets) every 6 hours for 24 hours and then each day drop down until taking only 1-2 just a few times a day as needed.  Make sure you take with some food and if you notice that you area  having upset stomach or burning sensation in the epigastric (just below the sternum, where the sternum or breast bone joins the abdomen) then stop the ibuprofen.  As it can be hard on the stomach.    Use antibiotic ointment on wound at night.     Time spent with the patient with greater that 50% of the time in discussion was 15 minutes.  Weston Mcnamara MD        Date of Service: 01/17/24       SURGEON: Weston Mcnamara MD           PREOPERATIVE DIAGNOSIS:  Left inguinal hernia .  Patient points to just above her umbilicus but I feel it in the umbilicus. Will palpate in the OR.      POSTOPERATIVE DIAGNOSIS: Left inguinal hernia direct and small femoral hernia along with small umbilical hernia.      PROCEDURES PERFORMED: Robotic (xi) assisted multi port LIH  (transabdominal preperitoneal) repair with mesh (10 by 15 cm progrip mesh times 1).  And open umbilical hernia repair of 1 cm hernia with 0 Ethibond     ESTIMATED BLOOD LOSS: 7 mL    ANESTHESIA: General endotracheal anesthesia. And JAYCOB block  With 0.25 %  marcaine with epinephrine placed in the wounds prior to and at the end of surgery.      FINDINGS: left inguinal hernia.  No hernia identified on asymptomatic side.     GRAFTS/IMPLANTS: 10 by 15 cm progrip mesh  .     COMPLICATIONS: None.     SPECIMENS: None.     OPERATIVE INDICATIONS: Please see my office visit note.      OPERATIVE DETAILS: The patient was brought to the operating room and prepared in a routine fashion. Anesthesia was induced without complications. The patient was placed in a supine position.  A timeout was performed prior to surgery and documented by the nursing team.      Under the benefits of general anesthesia, a 5 mm trocar was placed in the left upper quadrant under direct visualization with a camera running thru it.   The abdomen was entered while watching with the camera and entered safely. A pneumoperitoneum was established using carbon dioxide gas to a maximum pressure of 15 mmHg. The   laparoscope was used to visualize the abdomen which showed no injuries. An 8 mm robotic port was placed in the right flank and the 8  mm port for the camera was placed at the umbilicus (where the hernia is)and then the right 5 mm port was replaced with another 8 mm port in the left flank, both placed just above the level of the suprumcilical trocar. The robotic laparoscope was utilized from the umbilical port site. The robotic arms were docked. The robotic instruments were then inserted under direct vision.   I did look at the  side and palpated the area after I was ready to go to the console and did not feel or see a hernia at this site.   Upon investigation, a left inguinal hernia was identified. The dissection was started by taking down the peritoneum starting at the medial umbilical ligament going transversely towards the left anterior superior iliac spine. The peritoneum was then systematically taken down with blunt and sharp dissection using the robotic hot adrien.          Luis Alfredo's ligament and the pubic symphysis were identified and cleared for easy visualization. The round ligament was idnetified and freed up from its attachments and then divided after using bipolar cautery away from the peritoneum but not to close to the inguinal canal.   The hernia sac was identified and dissected away from the round ligament  Attention was turned to placement of the mesh. A  10 by 15 cm  progrip mesh was placed in the preperitoneal space and was stuck to the surrounding tissue and covered the defect and the other potential hernia sites. The peritoneum was then closed using a running 3-0 absorbable Stratafix suture.             Hemostasis was confirmed. The robotic instruments were then removed under direct visualization. The robotic arms were undocked. The robotic ports were removed under direct visualization and pneumoperitoneum was released. The  port site fascial  Area was looked at with the camera and there was no  increase in size of the defect form the original insertaion. So no need to close the fascia with suture.      Attention was then turned to the umbilical hernia.  I was able to palpate an obvious umbilical hernia but did do a supraumbilical incision to make sure there was no hernia above the umbilicus.                     OPERATIVE REPORT     01/17/24  Preoperative diagnosis:  Umbilical hernia.  Postoperative diagnosis:  same only 1 cm or less in size.   Procedure:   Umbilical hernia repair with interrupted 0 Ethibond sutures  Anesthesia:  General anesthesia  Blood loss: Minimal  Surgeon : Weston Mcnamara M.D.  Indications:  Danyelle Canales is a 43 year old year old female with an  umbilical hernia that is causing  discomfort.  It was felt that it should be repaired.  Risks and complications were explained to the patient including bleeding, risk of anesthesia, infection, recurrence of hernia, damage to bowel, and bladder.    Questions were answered and postoperative instructions were given to patient and any one with the patient.       Procedure:  DESCRIPTION OF PROCEDURE:  The patient was just done with the robotic case and the surgery continues with a supraumbilical  incision was made with a knife and then cautery and blunt and sharp dissection down to the hernia sac.  The hernia sac was freed up from surrounding attachments and to the skin at the umbilicus, taking great care not to damage the skin.  It was then after opening up the sac and making sure that there was nothing in the sac, the sac was removed with cautery along the fascia edge and a finger was placed inside the abdomen and the fat was pushed back into the abdomen.   I did place some gauze just inside the abdomen but not all the way in several times to make sure there was no more bleeding then and we also irrigated the area with some irrigation.  There was no evidence of anymore bleeding when we were done with that part.  I felt that with this  "small of a hernia I would just repair with several interrupted 0 Ethibond sutures.   Unfortunately the mesh that was available was very large and would have to be tacked to the abdomen wall so this would increase her post op pain and since it is a small hernia and the risk of recurrence is low.       I then  closed the defect transversely  with interrupted 0 Ethibond sutures.       Then the surrounding fat was brought together in a pursestring suture with 0 Vicryl suture to cover over my knots.  Then the skin was brought together with several interrupted 3-0 Vicryl sutures then closed with continuous running 4-0 Monocryl, covered with tincture of Benzoin, Steri-Strips, and then some gauze to create an \"innie\" and Tegaderm.  All areas were irrigated with saline before going to the next layer.  Good hemostasis was achieved throughout the whole procedure.  All layers were infiltrated with Marcaine before going to the next layer.  The patient tolerated the procedure very well.   I then looked with the laparoscopic to make sure there was no damage or bleeding from the procedure and none was noted.   I then closed the trocar sites after removing as much C02 from the abdomen. With 3-0 vicryl for the fat and the skin was brought together with 4-0 Monocryl and then tincture of benzoin and steri strips and Tegaderm.      The count was correct.       Plan is to discharge her home today.  No lifting more than 20 pounds for 4 weeks.  We will see the patient back in approximately 2 weeks. The patient did receive antibiotics preoperatively.      Weston Mcnamara MD           Again, thank you for allowing me to participate in the care of your patient.        Sincerely,        Weston Mcnamara MD  "

## 2024-02-02 NOTE — PROGRESS NOTES
Danyelle is a 43 year old female who is status post Robotic (xi) assisted multi port LIH  (transabdominal preperitoneal) repair with mesh (10 by 15 cm progrip mesh times 1).  And open umbilical hernia repair of 1 cm hernia with 0 Ethibond with no chills and no fever.      Patient's  Pain is  improving.  Appetite is  improving.  Patient did much better on Dilaudid then oxycodone.    Patient is having some numbness in the left inner thigh and has had a few electrical like shocks consistant with the nerve waking up.    And feels like she feels something where her hernia was at and feels like it is poking her. In multiple spots. This gets worse as the day progress   She was on gabapentin  for a numb like feeling that is cold but not numb to the touch and is deeper  running down the leg medial posterior.     Wound(s)  Is/are   clean dry and intact with no evidence of infection.    Questions were answered and discussion of no lifting more than 20 pounds for 4 weeks after surgery.    Dilaudid worked the best for Danyelle.  She did not seem to get the same relief from the oxycodone.    Plan: follow up as needed.  If the numbness resolves then the nerve is recovering. If the poking feeling where the hernia use to be is not improving then will have her see pain clinic.   Will have her restart her gabapentin to see if that helps.  She takes it at night only.  Will try motrin  Ibuprofen (motrin) 200 mg tablets (4 tablets) every 6 hours with food for 48 hours then drop down to (3 tablets) every 6 hours for 24 hours and then each day drop down until taking only 1-2 just a few times a day as needed.  Make sure you take with some food and if you notice that you area having upset stomach or burning sensation in the epigastric (just below the sternum, where the sternum or breast bone joins the abdomen) then stop the ibuprofen.  As it can be hard on the stomach.    Use antibiotic ointment on wound at night.     Time spent with the  patient with greater that 50% of the time in discussion was 15 minutes.  Weston Mcnamara MD        Date of Service: 01/17/24       SURGEON: Weston Mcnamara MD           PREOPERATIVE DIAGNOSIS:  Left inguinal hernia .  Patient points to just above her umbilicus but I feel it in the umbilicus. Will palpate in the OR.      POSTOPERATIVE DIAGNOSIS: Left inguinal hernia direct and small femoral hernia along with small umbilical hernia.      PROCEDURES PERFORMED: Robotic (xi) assisted multi port LIH  (transabdominal preperitoneal) repair with mesh (10 by 15 cm progrip mesh times 1).  And open umbilical hernia repair of 1 cm hernia with 0 Ethibond     ESTIMATED BLOOD LOSS: 7 mL    ANESTHESIA: General endotracheal anesthesia. And JAYCOB block  With 0.25 %  marcaine with epinephrine placed in the wounds prior to and at the end of surgery.      FINDINGS: left inguinal hernia.  No hernia identified on asymptomatic side.     GRAFTS/IMPLANTS: 10 by 15 cm progrip mesh  .     COMPLICATIONS: None.     SPECIMENS: None.     OPERATIVE INDICATIONS: Please see my office visit note.      OPERATIVE DETAILS: The patient was brought to the operating room and prepared in a routine fashion. Anesthesia was induced without complications. The patient was placed in a supine position.  A timeout was performed prior to surgery and documented by the nursing team.      Under the benefits of general anesthesia, a 5 mm trocar was placed in the left upper quadrant under direct visualization with a camera running thru it.   The abdomen was entered while watching with the camera and entered safely. A pneumoperitoneum was established using carbon dioxide gas to a maximum pressure of 15 mmHg. The  laparoscope was used to visualize the abdomen which showed no injuries. An 8 mm robotic port was placed in the right flank and the 8  mm port for the camera was placed at the umbilicus (where the hernia is)and then the right 5 mm port was replaced with another 8 mm  port in the left flank, both placed just above the level of the suprumcilical trocar. The robotic laparoscope was utilized from the umbilical port site. The robotic arms were docked. The robotic instruments were then inserted under direct vision.   I did look at the  side and palpated the area after I was ready to go to the console and did not feel or see a hernia at this site.   Upon investigation, a left inguinal hernia was identified. The dissection was started by taking down the peritoneum starting at the medial umbilical ligament going transversely towards the left anterior superior iliac spine. The peritoneum was then systematically taken down with blunt and sharp dissection using the robotic hot adrien.          Luis Alfredo's ligament and the pubic symphysis were identified and cleared for easy visualization. The round ligament was idnetified and freed up from its attachments and then divided after using bipolar cautery away from the peritoneum but not to close to the inguinal canal.   The hernia sac was identified and dissected away from the round ligament  Attention was turned to placement of the mesh. A  10 by 15 cm  progrip mesh was placed in the preperitoneal space and was stuck to the surrounding tissue and covered the defect and the other potential hernia sites. The peritoneum was then closed using a running 3-0 absorbable Stratafix suture.             Hemostasis was confirmed. The robotic instruments were then removed under direct visualization. The robotic arms were undocked. The robotic ports were removed under direct visualization and pneumoperitoneum was released. The  port site fascial  Area was looked at with the camera and there was no increase in size of the defect form the original insertaion. So no need to close the fascia with suture.      Attention was then turned to the umbilical hernia.  I was able to palpate an obvious umbilical hernia but did do a supraumbilical incision to make sure there  was no hernia above the umbilicus.                     OPERATIVE REPORT     01/17/24  Preoperative diagnosis:  Umbilical hernia.  Postoperative diagnosis:  same only 1 cm or less in size.   Procedure:   Umbilical hernia repair with interrupted 0 Ethibond sutures  Anesthesia:  General anesthesia  Blood loss: Minimal  Surgeon : Weston Mcnamara M.D.  Indications:  Danyelle Canales is a 43 year old year old female with an  umbilical hernia that is causing  discomfort.  It was felt that it should be repaired.  Risks and complications were explained to the patient including bleeding, risk of anesthesia, infection, recurrence of hernia, damage to bowel, and bladder.    Questions were answered and postoperative instructions were given to patient and any one with the patient.       Procedure:  DESCRIPTION OF PROCEDURE:  The patient was just done with the robotic case and the surgery continues with a supraumbilical  incision was made with a knife and then cautery and blunt and sharp dissection down to the hernia sac.  The hernia sac was freed up from surrounding attachments and to the skin at the umbilicus, taking great care not to damage the skin.  It was then after opening up the sac and making sure that there was nothing in the sac, the sac was removed with cautery along the fascia edge and a finger was placed inside the abdomen and the fat was pushed back into the abdomen.   I did place some gauze just inside the abdomen but not all the way in several times to make sure there was no more bleeding then and we also irrigated the area with some irrigation.  There was no evidence of anymore bleeding when we were done with that part.  I felt that with this small of a hernia I would just repair with several interrupted 0 Ethibond sutures.   Unfortunately the mesh that was available was very large and would have to be tacked to the abdomen wall so this would increase her post op pain and since it is a small hernia and the  "risk of recurrence is low.       I then  closed the defect transversely  with interrupted 0 Ethibond sutures.       Then the surrounding fat was brought together in a pursestring suture with 0 Vicryl suture to cover over my knots.  Then the skin was brought together with several interrupted 3-0 Vicryl sutures then closed with continuous running 4-0 Monocryl, covered with tincture of Benzoin, Steri-Strips, and then some gauze to create an \"innie\" and Tegaderm.  All areas were irrigated with saline before going to the next layer.  Good hemostasis was achieved throughout the whole procedure.  All layers were infiltrated with Marcaine before going to the next layer.  The patient tolerated the procedure very well.   I then looked with the laparoscopic to make sure there was no damage or bleeding from the procedure and none was noted.   I then closed the trocar sites after removing as much C02 from the abdomen. With 3-0 vicryl for the fat and the skin was brought together with 4-0 Monocryl and then tincture of benzoin and steri strips and Tegaderm.      The count was correct.       Plan is to discharge her home today.  No lifting more than 20 pounds for 4 weeks.  We will see the patient back in approximately 2 weeks. The patient did receive antibiotics preoperatively.      Weston Mcnamara MD           "

## 2024-02-02 NOTE — PATIENT INSTRUCTIONS
Danyelle is a 43 year old female who is status post Robotic (xi) assisted multi port LIH  (transabdominal preperitoneal) repair with mesh (10 by 15 cm progrip mesh times 1).  And open umbilical hernia repair of 1 cm hernia with 0 Ethibond with no chills and no fever.      Patient's  Pain is  improving.  Appetite is  improving.  Patient did much better on Dilaudid then oxycodone.    Patient is having some numbness in the left inner thigh and has had a few electrical like shocks consistant with the nerve waking up.    And feels like she feels something where her hernia was at and feels like it is poking her. In multiple spots. This gets worse as the day progress   She was on gabapentin  for a numb like feeling that is cold but not numb to the touch and is deeper  running down the leg medial posterior.     Wound(s)  Is/are   clean dry and intact with no evidence of infection.    Questions were answered and discussion of no lifting more than 20 pounds for 4 weeks after surgery.    Dilaudid worked the best for Danyelle.  She did not seem to get the same relief from the oxycodone.    Plan: follow up as needed.  If the numbness resolves then the nerve is recovering. If the poking feeling where the hernia use to be is not improving then will have her see pain clinic.   Will have her restart her gabapentin to see if that helps.  She takes it at night only.  Will try motrin  Ibuprofen (motrin) 200 mg tablets (4 tablets) every 6 hours with food for 48 hours then drop down to (3 tablets) every 6 hours for 24 hours and then each day drop down until taking only 1-2 just a few times a day as needed.  Make sure you take with some food and if you notice that you area having upset stomach or burning sensation in the epigastric (just below the sternum, where the sternum or breast bone joins the abdomen) then stop the ibuprofen.  As it can be hard on the stomach.    Use antibiotic ointment on wound at night.

## 2024-02-06 DIAGNOSIS — Z30.41 ORAL CONTRACEPTIVE PILL SURVEILLANCE: ICD-10-CM

## 2024-02-06 RX ORDER — NORETHINDRONE ACETATE AND ETHINYL ESTRADIOL 1; 20 MG/1; UG/1
1 TABLET ORAL DAILY
Qty: 63 TABLET | Refills: 3 | Status: SHIPPED | OUTPATIENT
Start: 2024-02-06

## 2024-02-07 ENCOUNTER — MYC MEDICAL ADVICE (OUTPATIENT)
Dept: SURGERY | Facility: CLINIC | Age: 44
End: 2024-02-07
Payer: COMMERCIAL

## 2024-02-19 DIAGNOSIS — M54.16 LEFT LUMBAR RADICULOPATHY: ICD-10-CM

## 2024-02-21 RX ORDER — GABAPENTIN 300 MG/1
CAPSULE ORAL
Qty: 90 CAPSULE | Refills: 1 | Status: SHIPPED | OUTPATIENT
Start: 2024-02-21 | End: 2024-09-24

## 2024-03-04 ENCOUNTER — PATIENT OUTREACH (OUTPATIENT)
Dept: CARE COORDINATION | Facility: CLINIC | Age: 44
End: 2024-03-04
Payer: COMMERCIAL

## 2024-03-14 ENCOUNTER — OFFICE VISIT (OUTPATIENT)
Dept: FAMILY MEDICINE | Facility: CLINIC | Age: 44
End: 2024-03-14
Payer: COMMERCIAL

## 2024-03-14 VITALS
HEART RATE: 71 BPM | OXYGEN SATURATION: 99 % | DIASTOLIC BLOOD PRESSURE: 80 MMHG | BODY MASS INDEX: 34.09 KG/M2 | HEIGHT: 67 IN | SYSTOLIC BLOOD PRESSURE: 120 MMHG | WEIGHT: 217.2 LBS | TEMPERATURE: 98.4 F | RESPIRATION RATE: 16 BRPM

## 2024-03-14 DIAGNOSIS — M54.16 LEFT LUMBAR RADICULOPATHY: ICD-10-CM

## 2024-03-14 DIAGNOSIS — E53.1 PYRIDOXINE DEFICIENCY: ICD-10-CM

## 2024-03-14 DIAGNOSIS — G89.4 CHRONIC PAIN SYNDROME: ICD-10-CM

## 2024-03-14 DIAGNOSIS — Z12.31 VISIT FOR SCREENING MAMMOGRAM: ICD-10-CM

## 2024-03-14 DIAGNOSIS — M54.6 RIGHT-SIDED THORACIC BACK PAIN, UNSPECIFIED CHRONICITY: ICD-10-CM

## 2024-03-14 DIAGNOSIS — E66.09 CLASS 1 OBESITY DUE TO EXCESS CALORIES WITH SERIOUS COMORBIDITY AND BODY MASS INDEX (BMI) OF 34.0 TO 34.9 IN ADULT: ICD-10-CM

## 2024-03-14 DIAGNOSIS — N89.8 VAGINAL ITCHING: Primary | ICD-10-CM

## 2024-03-14 DIAGNOSIS — E66.811 CLASS 1 OBESITY DUE TO EXCESS CALORIES WITH SERIOUS COMORBIDITY AND BODY MASS INDEX (BMI) OF 34.0 TO 34.9 IN ADULT: ICD-10-CM

## 2024-03-14 LAB
CLUE CELLS: ABNORMAL
TRICHOMONAS, WET PREP: ABNORMAL
WBC'S/HIGH POWER FIELD, WET PREP: ABNORMAL
YEAST, WET PREP: ABNORMAL

## 2024-03-14 PROCEDURE — 99000 SPECIMEN HANDLING OFFICE-LAB: CPT | Performed by: NURSE PRACTITIONER

## 2024-03-14 PROCEDURE — 36415 COLL VENOUS BLD VENIPUNCTURE: CPT | Performed by: NURSE PRACTITIONER

## 2024-03-14 PROCEDURE — 99214 OFFICE O/P EST MOD 30 MIN: CPT | Performed by: NURSE PRACTITIONER

## 2024-03-14 PROCEDURE — 87210 SMEAR WET MOUNT SALINE/INK: CPT | Performed by: NURSE PRACTITIONER

## 2024-03-14 PROCEDURE — 84207 ASSAY OF VITAMIN B-6: CPT | Mod: 90 | Performed by: NURSE PRACTITIONER

## 2024-03-14 RX ORDER — HYDROCODONE BITARTRATE AND ACETAMINOPHEN 5; 325 MG/1; MG/1
1 TABLET ORAL EVERY 8 HOURS PRN
Qty: 10 TABLET | Refills: 0 | Status: SHIPPED | OUTPATIENT
Start: 2024-03-14 | End: 2024-03-17

## 2024-03-14 RX ORDER — PHENTERMINE HYDROCHLORIDE 37.5 MG/1
37.5 TABLET ORAL
Qty: 30 TABLET | Refills: 1 | Status: SHIPPED | OUTPATIENT
Start: 2024-03-14 | End: 2024-03-19 | Stop reason: DRUGHIGH

## 2024-03-14 ASSESSMENT — PAIN SCALES - GENERAL: PAINLEVEL: MILD PAIN (2)

## 2024-03-14 NOTE — PROGRESS NOTES
"  Assessment & Plan     Vaginal itching  No yeast on wet prep today.  - Wet prep - Clinic Collect    Class 1 obesity due to excess calories with serious comorbidity and body mass index (BMI) of 34.0 to 34.9 in adult    - Discussed increasing dose up to therapeutic effect.  Discussed with patient the indication and use of medication(s), risks/benefits, and potential adverse side effects.  Patient/guardian verbalized understanding and agreement with the plan and she will monitor her BP.  phentermine (ADIPEX-P) 37.5 MG tablet; Take 1 tablet (37.5 mg) by mouth every morning (before breakfast)    Right-sided thoracic back pain, unspecified chronicity and Left lumbar radiculopathy  Patient uses Norco sparingly, approximate 10 tablets every 3-6 months and this was refilled today.  - HYDROcodone-acetaminophen (NORCO) 5-325 MG tablet; Take 1 tablet by mouth every 8 hours as needed for severe pain  - She will get the MR thoracic spine 4/16/24 as planned and will follow up with Dr. Alan, Neurology.    Visit for screening mammogram    - MA Screen Bilateral w/Saul; Future    Pyridoxine deficiency    - Vitamin B6    Chronic pain syndrome  As above.            BMI  Estimated body mass index is 34.53 kg/m  as calculated from the following:    Height as of this encounter: 1.689 m (5' 6.5\").    Weight as of this encounter: 98.5 kg (217 lb 3.2 oz).   Weight management plan: discussed increasing Phentermine dosage and monitoring BP          Alex Bassett is a 43 year old, presenting for the following health issues:  Rib Pain (A few months now ), Skin Check (Surg. Scar , from 1/2024 /), and Vaginal Problem (Possible yeast infection )        3/14/2024     8:45 AM   Additional Questions   Roomed by Noelle OREILLY     Via the Health Maintenance questionnaire, the patient has reported the following services have been completed -Mammogram, this information has been sent to the abstraction team.  History of Present Illness       Reason for " visit:  Reoccuring yeast infections rib pain scar check  Symptom onset:  More than a month  Symptoms include:  Itchy  Symptom intensity:  Mild  Symptom progression:  Staying the same  Had these symptoms before:  Yes  Has tried/received treatment for these symptoms:  Yes  Previous treatment was successful:  Yes  Prior treatment description:  Bruce    She eats 2-3 servings of fruits and vegetables daily.She consumes 0 sweetened beverage(s) daily.She exercises with enough effort to increase her heart rate 9 or less minutes per day.  She exercises with enough effort to increase her heart rate 3 or less days per week.   She is taking medications regularly.     Weight management- The Phentermine 15 mg was helping at first but not anymore.  Discuss adjusting dosage.        Vaginal Symptoms  Onset/Duration: 1+ month  Description:  Vaginal Discharge: none   Itching (Pruritis): YES  Burning sensation:  No  Odor: No  Accompanying Signs & Symptoms:  Urinary symptoms: No  Abdominal pain: No  Fever: No  History:   Sexually active: No  New Partner: No  Possibility of Pregnancy:  No  Recent antibiotic use: YES- 2-3 weeks ago  Previous vaginitis issues: YES  Precipitating or alleviating factors: using Diflucan helps, monistat    Therapies tried and outcome: none  A little itching today.    Had yeast infection before left inguinal hernia surgery 1/17/24 when she wasn't using antibiotics.  But then she had two more yeast infections after that which were associated with antibiotic use.   Currently she is having some vaginal itching more on the outside.  Her last antibiotic use was Azithromycin prescribed 2/22/24 for five day course.    Pain History:  When did you first notice your pain? Months  Have you seen this provider for your pain in the past? No   Where in your body do your have pain? Right rib, and down from shoulder blade  Are you seeing anyone else for your pain? No  What makes your pain better? Exercise, and stretching and  "medication  What makes your pain worse? no  How has pain affected your ability to work? Not applicable  Who lives in your household?   son and dog   Rolls on tennis balls, does heat.  Norco has helped.        10/7/2022     3:29 PM 4/20/2023     2:21 PM 1/5/2024     2:40 PM   PHQ-9 SCORE   PHQ-9 Total Score MyChart 5 (Mild depression) 4 (Minimal depression) 3 (Minimal depression)   PHQ-9 Total Score 5 4 3           11/16/2021    12:53 PM 12/28/2021     9:01 AM 4/20/2023     2:21 PM   NAJMA-7 SCORE   Total Score  7 (mild anxiety) 4 (minimal anxiety)   Total Score 10 7 4                 Objective    /80 (BP Location: Right arm, Patient Position: Sitting, Cuff Size: Adult Large)   Pulse 71   Temp 98.4  F (36.9  C) (Oral)   Resp 16   Ht 1.689 m (5' 6.5\")   Wt 98.5 kg (217 lb 3.2 oz)   SpO2 99%   BMI 34.53 kg/m    Body mass index is 34.53 kg/m .  Physical Exam   GENERAL: healthy, alert, no distress, and obese  ABDOMEN: healed surgical scar above umbilicus.  Soft non tender abdomen.   (female): normal female external genitalia, normal urethral meatus , normal vaginal mucosa, vaginal mucosa pink, moist, well rugated, and vaginal discharge - scant and white  MS: tenderness to right posteriro  PSYCH: mentation appears normal, affect normal/bright    Results for orders placed or performed in visit on 03/14/24   Wet prep - Clinic Collect     Status: Abnormal    Specimen: Vagina; Swab   Result Value Ref Range    Trichomonas Absent Absent    Yeast Absent Absent    Clue Cells Absent Absent    WBCs/high power field 1+ (A) None       MRI CERVICAL SPINE 6/23/2023  Mild degenerative cervical spondylosis with level by level analysis as described above without evidence of high-grade spinal canal or neural foraminal narrowing at any level     MRI LUMBAR SPINE 4/20/2023  1.  Lower lumbar spondylopathy.  2.  No high-grade spinal canal or neural foraminal stenosis     EMG 6/19/2023  Abnormal study: There is " electrodiagnostic evidence of:     1.  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  This is of unknown clinical significance in the setting of normal peroneal CMAP to the tibialis anterior and normal needle EMG throughout the remainder of the left lower extremity.  This can be observed in a congenital absence of the EDB.       2.  There is no electrodiagnostic evidence of lumbosacral radiculopathy or tibial neuropathy in the left lower extremity.    11/16/23:  Consult with Dr. Alan, Neurology reviewed      Signed Electronically by: Marsha Fernandez, ABIGAIL

## 2024-03-16 LAB — PYRIDOXAL PHOS SERPL-SCNC: 119.9 NMOL/L

## 2024-03-18 DIAGNOSIS — E53.1 PYRIDOXINE DEFICIENCY: ICD-10-CM

## 2024-03-18 PROBLEM — E66.09 CLASS 1 OBESITY DUE TO EXCESS CALORIES WITH SERIOUS COMORBIDITY AND BODY MASS INDEX (BMI) OF 34.0 TO 34.9 IN ADULT: Status: ACTIVE | Noted: 2021-07-06

## 2024-03-18 PROBLEM — E66.811 CLASS 1 OBESITY DUE TO EXCESS CALORIES WITH SERIOUS COMORBIDITY AND BODY MASS INDEX (BMI) OF 34.0 TO 34.9 IN ADULT: Status: ACTIVE | Noted: 2021-07-06

## 2024-03-18 PROBLEM — M54.6 RIGHT-SIDED THORACIC BACK PAIN, UNSPECIFIED CHRONICITY: Status: ACTIVE | Noted: 2024-03-18

## 2024-03-18 PROBLEM — Z87.19 STATUS POST LEFT INGUINAL HERNIA REPAIR: Status: ACTIVE | Noted: 2023-10-06

## 2024-03-18 PROBLEM — M54.16 LEFT LUMBAR RADICULOPATHY: Status: ACTIVE | Noted: 2023-05-23

## 2024-03-18 PROBLEM — Z98.890 STATUS POST RIGHT INGUINAL HERNIA REPAIR: Status: ACTIVE | Noted: 2021-02-16

## 2024-03-18 PROBLEM — Z98.890 STATUS POST LEFT INGUINAL HERNIA REPAIR: Status: ACTIVE | Noted: 2023-10-06

## 2024-03-18 PROBLEM — Z87.19 STATUS POST RIGHT INGUINAL HERNIA REPAIR: Status: ACTIVE | Noted: 2021-02-16

## 2024-03-18 RX ORDER — MULTIVITAMIN WITH IRON
100 TABLET ORAL DAILY
Qty: 90 TABLET | Refills: 0 | Status: SHIPPED | OUTPATIENT
Start: 2024-03-18 | End: 2024-09-24

## 2024-03-18 NOTE — TELEPHONE ENCOUNTER
Refill request for: vitamin B6 100mg  Directions: Take 1 tablet (100 mg) by mouth daily     LOV: 11/16/23  NOV: 05/01/24    Pt of Dr. Alan's. Dr. Alan out of  office until 3/25/24. Pt was starting on Vitamin B6 supplement for 3 months, then repeat lab. Vitamin B6 level checked on 3/14/24:          Will send to covering provider to review if pt needs to continue Vitamin B6.    Deisy Lomax LPN on 3/18/2024 at 10:04 AM'

## 2024-03-19 ENCOUNTER — VIRTUAL VISIT (OUTPATIENT)
Dept: FAMILY MEDICINE | Facility: CLINIC | Age: 44
End: 2024-03-19
Payer: COMMERCIAL

## 2024-03-19 DIAGNOSIS — E66.811 CLASS 1 OBESITY DUE TO EXCESS CALORIES WITH SERIOUS COMORBIDITY AND BODY MASS INDEX (BMI) OF 34.0 TO 34.9 IN ADULT: Primary | ICD-10-CM

## 2024-03-19 DIAGNOSIS — I10 BENIGN ESSENTIAL HYPERTENSION: ICD-10-CM

## 2024-03-19 DIAGNOSIS — E66.09 CLASS 1 OBESITY DUE TO EXCESS CALORIES WITH SERIOUS COMORBIDITY AND BODY MASS INDEX (BMI) OF 34.0 TO 34.9 IN ADULT: Primary | ICD-10-CM

## 2024-03-19 PROCEDURE — 99214 OFFICE O/P EST MOD 30 MIN: CPT | Mod: 95 | Performed by: NURSE PRACTITIONER

## 2024-03-19 RX ORDER — PHENTERMINE HYDROCHLORIDE 15 MG/1
15 CAPSULE ORAL EVERY MORNING
Qty: 30 CAPSULE | Refills: 0 | Status: SHIPPED | OUTPATIENT
Start: 2024-03-19 | End: 2024-07-18

## 2024-03-19 NOTE — PROGRESS NOTES
Danyelle is a 43 year old who is being evaluated via a billable video visit.    How would you like to obtain your AVS? MyChart  If the video visit is dropped, the invitation should be resent by: Text to cell phone: [phone]  Will anyone else be joining your video visit? No      Assessment & Plan     Class 1 obesity due to excess calories with serious comorbidity and body mass index (BMI) of 34.0 to 34.9 in adult  She did not tolerate the 37.5 mg dose of Phentermine so recommend decreasing back to phentermine 15 MG capsule; Take 1 capsule (15 mg) by mouth every morning.  We discussed alternative weight loss medications; topamax not recommended due to current oral birth control use.  Her insurance does not cover weight loss medications including GLP-1 injections.  Could consider Wellbutrin in the future, but patient does not believe this helped in the past.    Long term consistent BP goal is 139/89 or less.    When you restart the Phentermine 15 mg, we can tolerate if your BP goes up to 149/95 for 1 week or less.  But if your BP remains high after 1 week, then that would mean your BP is not tolerating the Phentermine 15 mg well and Phentermine should be discontinued.    Benign essential hypertension  Chronic, stable, continue current treatment.                   Subjective   Danyelle is a 43 year old, presenting for the following health issues:  Weight Check        3/19/2024     1:51 PM   Additional Questions   Roomed by Ary MAYO   Accompanied by self         3/19/2024     1:49 PM   Patient Reported Additional Medications   Patient reports taking the following new medications none     HPI     Medication Followup of Phentermine 37.5 mg tablet  Taking Medication as prescribed: NO- had to stop- BP was running high  Side Effects:   elevated /105, 147/93, 150/104-- rechecked 126/86  Medication Helping Symptoms:  not applicable- patient would lilke to talk about other alternatives    Insurance doesn't  cover    Being hungry all the time.  Phentermine helped with not wanting the poorer choices.    Low dose naltrexone she was on for back pain.                Objective           Vitals:  No vitals were obtained today due to virtual visit.    Physical Exam   GENERAL: alert and no distress  EYES: Eyes grossly normal to inspection.  No discharge or erythema, or obvious scleral/conjunctival abnormalities.  RESP: No audible wheeze, cough, or visible cyanosis.    SKIN: Visible skin clear. No significant rash, abnormal pigmentation or lesions.  NEURO: Cranial nerves grossly intact.  Mentation and speech appropriate for age.  PSYCH: Appropriate affect, tone, and pace of words        Video-Visit Details    Type of service:  Video Visit   Joined the call at 3/19/2024, 3:00:51 pm.  Left the call at 3/19/2024, 3:26:58 pm.  You were on the call for 26 minutes 7 seconds .  Originating Location (pt. Location): Home    Distant Location (provider location):  Off-site  Platform used for Video Visit: Tres  Signed Electronically by: Marsha Fernandez NP

## 2024-03-19 NOTE — PATIENT INSTRUCTIONS
Long term consistent BP goal is 139/89 or less.    When you restart the Phentermine 15 mg, we can tolerate if your BP goes up to 149/95 for 1 week or less.  But if your BP remains high after 1 week, then that would mean your BP is not tolerating the Phentermine 15 mg well and Phentermine should be discontinued.

## 2024-04-03 ENCOUNTER — MYC MEDICAL ADVICE (OUTPATIENT)
Dept: FAMILY MEDICINE | Facility: CLINIC | Age: 44
End: 2024-04-03
Payer: COMMERCIAL

## 2024-04-03 DIAGNOSIS — M54.6 RIGHT-SIDED THORACIC BACK PAIN, UNSPECIFIED CHRONICITY: Primary | ICD-10-CM

## 2024-04-03 DIAGNOSIS — G89.4 CHRONIC PAIN SYNDROME: ICD-10-CM

## 2024-04-03 NOTE — TELEPHONE ENCOUNTER
Routing [facility] [employer] message to PCP      She was last seen on 3/19 virtually.  Do you want her to be seen virtually or in person?      Brionna, RN    Triage Nurse  [facility]

## 2024-04-04 RX ORDER — HYDROCODONE BITARTRATE AND ACETAMINOPHEN 5; 325 MG/1; MG/1
1 TABLET ORAL EVERY 8 HOURS PRN
Qty: 10 TABLET | Refills: 0 | Status: SHIPPED | OUTPATIENT
Start: 2024-04-04 | End: 2024-04-07

## 2024-04-04 RX ORDER — PREDNISONE 20 MG/1
40 TABLET ORAL DAILY
Qty: 10 TABLET | Refills: 0 | Status: SHIPPED | OUTPATIENT
Start: 2024-04-04 | End: 2024-04-09

## 2024-04-04 NOTE — TELEPHONE ENCOUNTER
Pt calling to check on status of Elevaate message. RN noted that there are no openings virtual or in person with PCP today.     Routing to PCP to advise.  Please Elevaate pt with Provider's response.     Myriam Newman RN  [facility]

## 2024-04-04 NOTE — TELEPHONE ENCOUNTER
Pt called wants to make sure that provider see MYCHART message before end of day     Routed as high priority to provider

## 2024-04-16 ENCOUNTER — HOSPITAL ENCOUNTER (OUTPATIENT)
Dept: MRI IMAGING | Facility: HOSPITAL | Age: 44
Discharge: HOME | End: 2024-04-16
Attending: PSYCHIATRY & NEUROLOGY | Admitting: PSYCHIATRY & NEUROLOGY
Payer: COMMERCIAL

## 2024-04-16 DIAGNOSIS — M48.04 THORACIC SPINAL STENOSIS: ICD-10-CM

## 2024-04-16 PROCEDURE — 72146 MRI CHEST SPINE W/O DYE: CPT

## 2024-04-27 NOTE — PROGRESS NOTES
NEUROLOGY FOLLOW UP VISIT  NOTE       [facility] NEUROLOGY [place]  [address] [place] [address]  Tel: [phone]  Fax: [phone]  [email or web address]     Danyelle Canales,  1980, MRN [id number]  PCP: Marsha Fernandez  Date: 2024      ASSESSMENT & PLAN     Visit Diagnosis  Left leg numbness  Pyridoxine deficiency  Positive MACRINA (antinuclear antibody)     Left peroneal neuropathy  44-year-old female with chronic pain syndrome, NAJMA, ASD, HTN who was evaluated for left leg numbness and tingling involving the medial left thigh and lateral aspect of the leg.  Previous workup included normal MRI, cervical, lumbar spine and thoracic spine.  EMG previously had shown left peroneal neuropathy that was confirmed on a repeat EMG today that additionally shows superficial peroneal neuropathy and chronic changes in peroneal innervated muscle.  Workup included a low B6 level and she was started on supplement and repeat level was normal.  She is on gabapentin that she finds helpful.  I have asked her to continue with gabapentin and after 6 months tried to wean herself off.  She also will need a repeat vitamin B6 level checked in 6 months.  Follow-up will be in as needed basis.    Thank you again for this referral, please feel free to contact me if you have any questions.    Zi Alan MD  [facility]     HISTORY OF PRESENT ILLNESS     Patient is a 44-year-old female with chronic pain syndrome, NAJMA, ASD, HTN who was initially evaluated on 2023 with 6-month history of numbness involving the medial left thigh and lateral aspect of the left leg.  Exam was unremarkable except for generalized hyperreflexia.  Previous workup included MRI brain, cervical and lumbar spine that were unremarkable.  She had an EMG done at the spine center that was read as abnormal with absence of the left peroneal CMAP but clinically she had no findings to suggest peroneal  neuropathy.  Due to her weight I suspected this was technical and recommended a repeat EMG was done on 5/1/2024 that confirmed left peroneal neuropathy with absent peroneal CMAP, superficial peroneal SNAP and chronic changes in peroneal innervated muscle..  Also, MRI thoracic spine was done that showed no abnormal intramedullary signal and no high-grade stenosis.  She also had lab work that showed a positive antinuclear antibody 1: 160 but JERICA panel and double-stranded DNA was normal.  Vitamin B6 level was low and she was started on supplement.  Subsequent level was normal and she tapered herself off B6 supplement rest of the labs included normal hemoglobin A1c, MMA, B1, B12     PROBLEM LIST   Patient Active Problem List   Diagnosis    Persistent insomnia    allergic DERMATITIS NOS    Migraine headache    PMDD (premenstrual dysphoric disorder)    Trichotillomania    CARDIOVASCULAR SCREENING; LDL GOAL LESS THAN 160    Generalized anxiety disorder    Chronic pain syndrome    ASD (atrial septal defect)    Esophageal reflux    Benign hypermobility syndrome    Status post right inguinal hernia repair    Class 1 obesity due to excess calories with serious comorbidity and body mass index (BMI) of 34.0 to 34.9 in adult    Mild recurrent major depression (H24)    Left lumbar radiculopathy    Benign essential hypertension    Pyridoxine deficiency    Status post left inguinal hernia repair    Right-sided thoracic back pain, unspecified chronicity         PAST MEDICAL & SURGICAL HISTORY     Past Medical History:   Patient  has a past medical history of ASD (atrial septal defect), Benign essential hypertension (07/19/2023), Depressive disorder, Headache, History of blood transfusion (1985), History of ovarian cyst, Insomnia, unspecified, Other forms of migraine, without mention of intractable migraine without mention of status migrainosus, and Right inguinal hernia.    Surgical History:  She  has a past surgical history that  includes REPR ASD OSTIUM PREMUM; biopsy; colonoscopy; IR Cervical Epidural Steroid Injection (9/7/2012); IR Cervical Epidural Steroid Injection (10/2/2012); hernia repair (Feb 2021); Davinci Herniorrhaphy Inguinal (Left, 1/17/2024); and Herniorrhaphy umbilical (N/A, 1/17/2024).     SOCIAL HISTORY     Reviewed, and she  reports that she has never smoked. She has never been exposed to tobacco smoke. She has never used smokeless tobacco. She reports that she does not currently use alcohol. She reports that she does not use drugs.     FAMILY HISTORY     Reviewed, and family history includes Anxiety Disorder in her mother; C.A.D. in her mother; Cardiovascular in her mother; Cerebrovascular Disease in her father; Depression in her mother; Heart Failure in her mother; Hypertension in her father and mother; Myocardial Infarction in her maternal grandfather.     ALLERGIES     Allergies   Allergen Reactions    Artificial Sweetner (Informational Only) [Artificial Sweetner (Informational Only) ]     Chocolate Flavor Other (See Comments)         REVIEW OF SYSTEMS     A 12 point review of system was performed and was negative except as outlined in the history of present illness.     HOME MEDICATIONS     Current Outpatient Rx   Medication Sig Dispense Refill    FLUoxetine (PROZAC) 20 MG capsule Take 20 mg by mouth daily      FLUoxetine (PROZAC) 40 MG capsule Take 40 mg by mouth daily      gabapentin (NEURONTIN) 300 MG capsule TAKE 1 CAP BY MOUTH ONCE DAILY AT BEDTIME. MAY INCREASE AS TOLERATED UP TO 1 CAP 3 TIMES DAILY 90 capsule 1    JUNEL 1/20 1-20 MG-MCG tablet TAKE 1 TABLET BY MOUTH EVERY DAY 63 tablet 3    lamoTRIgine (LAMICTAL) 100 MG tablet TAKE 1 TABLET BY MOUTH WITH  MG TABLET ONCE DAILY      lamoTRIgine (LAMICTAL) 200 MG tablet Take 1 tablet (200 mg) by mouth daily 90 tablet 1    lisinopril (ZESTRIL) 10 MG tablet Take 1 tablet (10 mg) by mouth daily 90 tablet 3    LORazepam (ATIVAN) 1 MG tablet TAKE 1 TABLET BY  "MOUTH THREE TIMES A DAY AS DIRECTED      MULTIPLE VITAMIN PO       nortriptyline (PAMELOR) 10 MG capsule Take 10 mg by mouth at bedtime      phentermine 15 MG capsule Take 1 capsule (15 mg) by mouth every morning (Patient not taking: Reported on 5/1/2024) 30 capsule 0    vitamin B6 (PYRIDOXINE) 100 MG tablet TAKE 1 TABLET BY MOUTH EVERY DAY (Patient not taking: Reported on 5/1/2024) 90 tablet 0         PHYSICAL EXAM     Vital signs  /88 (BP Location: Right arm, Patient Position: Sitting)   Pulse 82   Ht 1.689 m (5' 6.5\")   Wt 97.5 kg (215 lb)   BMI 34.18 kg/m      Weight:   215 lbs 0 oz    Patient is alert and oriented x4 in no acute distress. Vital signs were reviewed and are documented in electronic medical record. Neck was supple, no carotid bruits, thyromegaly, JVD, or lymphadenopathy was noted.   NEUROLOGY EXAM:   Patient s speech was normal with no aphasia or dysarthria. Mentation, and affect were also normal.    Funduscopic exam was normal, with normal cup to disc ratio. Cranial nerves II -XII were intact.    Patient had normal mass, tone and motor strength was 5/5 in all extremities without pronator drift.    Sensation was intact to light touch, pinprick, and vibratory sensation.    Reflexes were 3+ symmetrical with downgoing toes.    No dysmetria noted on FNF or HKS. Romberg was negative.   Gait testing was normal. Able to walk on toes/heels. Tandem walk normal.     PERTINENT DIAGNOSTIC STUDIES     Following studies were reviewed:     MRI THORACIC SPINE 4/16/2024  1.  No abnormal intramedullary signal. No spinal stenosis or foraminal narrowing at any thoracic level. No disc herniations.     2.  Satisfactory thoracic vertebral body height/alignment. Mild disc desiccation at a few mid and lower thoracic levels. Very small chronic Schmorl's node endplate deformities lower thoracic and upper lumbar levels.     3.  Please see above for additional nonacute details and full description.    MRI BRAIN " 6/23/2023  No evidence of acute intracranial hemorrhage mass effect or infarction     MRI CERVICAL SPINE 6/23/2023  Mild degenerative cervical spondylosis with level by level analysis as described above without evidence of high-grade spinal canal or neural foraminal narrowing at any level     MRI LUMBAR SPINE 4/20/2023  1.  Lower lumbar spondylopathy.  2.  No high-grade spinal canal or neural foraminal stenosis     EMG 5/1/2024  This is a abnormal nerve conduction and EMG study of left lower extremity that suggests left peroneal neuropathy with chronic changes in the left peroneal innervated muscles.  No active denervation was noted.    EMG 6/19/2023  Abnormal study: There is electrodiagnostic evidence of:     1.  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  This is of unknown clinical significance in the setting of normal peroneal CMAP to the tibialis anterior and normal needle EMG throughout the remainder of the left lower extremity.  This can be observed in a congenital absence of the EDB.       2.  There is no electrodiagnostic evidence of lumbosacral radiculopathy or tibial neuropathy in the left lower extremity.     PERTINENT LABS  Following labs were reviewed:  Office Visit on 03/14/2024   Component Date Value Ref Range Status    Trichomonas 03/14/2024 Absent  Absent Final    Yeast 03/14/2024 Absent  Absent Final    Clue Cells 03/14/2024 Absent  Absent Final    WBCs/high power field 03/14/2024 1+ (A)  None Final    Vitamin B6 03/14/2024 119.9  20.0 - 125.0 nmol/L Final         Total time spent for face to face visit, reviewing labs/imaging studies, counseling and coordination of care was: 30 Minutes spent on the date of the encounter doing chart review, review of outside records, review of test results, interpretation of tests, patient visit, and documentation     The longitudinal plan of care for the diagnosis(es)/condition(s) as documented were  addressed during this visit. Due to the added complexity in care, I will continue to support Danyelle in the subsequent management and with ongoing continuity of care.    This note was dictated using voice recognition software.  Any grammatical or context distortions are unintentional and inherent to the software.    No orders of the defined types were placed in this encounter.     New Prescriptions    No medications on file     Modified Medications    No medications on file

## 2024-05-01 ENCOUNTER — OFFICE VISIT (OUTPATIENT)
Dept: NEUROLOGY | Facility: CLINIC | Age: 44
End: 2024-05-01
Attending: PSYCHIATRY & NEUROLOGY
Payer: COMMERCIAL

## 2024-05-01 VITALS
DIASTOLIC BLOOD PRESSURE: 88 MMHG | BODY MASS INDEX: 33.74 KG/M2 | WEIGHT: 215 LBS | HEART RATE: 82 BPM | SYSTOLIC BLOOD PRESSURE: 138 MMHG | HEIGHT: 67 IN

## 2024-05-01 DIAGNOSIS — E53.1 PYRIDOXINE DEFICIENCY: ICD-10-CM

## 2024-05-01 DIAGNOSIS — R20.2 LEFT LEG PARESTHESIAS: ICD-10-CM

## 2024-05-01 DIAGNOSIS — G57.32 NEUROPATHY OF LEFT PERONEAL NERVE: Primary | ICD-10-CM

## 2024-05-01 DIAGNOSIS — R20.0 LEFT LEG NUMBNESS: Primary | ICD-10-CM

## 2024-05-01 DIAGNOSIS — R76.8 POSITIVE ANA (ANTINUCLEAR ANTIBODY): ICD-10-CM

## 2024-05-01 PROCEDURE — 99214 OFFICE O/P EST MOD 30 MIN: CPT | Performed by: PSYCHIATRY & NEUROLOGY

## 2024-05-01 PROCEDURE — 95886 MUSC TEST DONE W/N TEST COMP: CPT | Mod: LT | Performed by: PSYCHIATRY & NEUROLOGY

## 2024-05-01 PROCEDURE — 95909 NRV CNDJ TST 5-6 STUDIES: CPT | Performed by: PSYCHIATRY & NEUROLOGY

## 2024-05-01 PROCEDURE — G2211 COMPLEX E/M VISIT ADD ON: HCPCS | Performed by: PSYCHIATRY & NEUROLOGY

## 2024-05-01 RX ORDER — NORTRIPTYLINE HCL 10 MG
10 CAPSULE ORAL AT BEDTIME
COMMUNITY
Start: 2024-04-16 | End: 2024-09-24

## 2024-05-01 NOTE — LETTER
2024         RE: Danyelle Canales  [address] [place] [address]        Dear Colleague,    Thank you for referring your patient, Danyelle Canales, to the [facility] [place]. Please see a copy of my visit note below.    NEUROLOGY FOLLOW UP VISIT  NOTE       [facility] [place]  [address] [place] [address]  Tel: [phone]  Fax: [phone]  [email or web address]     Danyelle Canales,  1980, MRN [id number]  PCP: Marsha Fernandez  Date: 2024      ASSESSMENT & PLAN     Visit Diagnosis  Left leg numbness  Pyridoxine deficiency  Positive MACRINA (antinuclear antibody)     Left peroneal neuropathy  44-year-old female with chronic pain syndrome, NAJMA, ASD, HTN who was evaluated for left leg numbness and tingling involving the medial left thigh and lateral aspect of the leg.  Previous workup included normal MRI, cervical, lumbar spine and thoracic spine.  EMG previously had shown left peroneal neuropathy that was confirmed on a repeat EMG today that additionally shows superficial peroneal neuropathy and chronic changes in peroneal innervated muscle.  Workup included a low B6 level and she was started on supplement and repeat level was normal.  She is on gabapentin that she finds helpful.  I have asked her to continue with gabapentin and after 6 months tried to wean herself off.  She also will need a repeat vitamin B6 level checked in 6 months.  Follow-up will be in as needed basis.    Thank you again for this referral, please feel free to contact me if you have any questions.    Zi Alan MD  [facility]     HISTORY OF PRESENT ILLNESS     Patient is a 44-year-old female with chronic pain syndrome, NAJMA, ASD, HTN who was initially evaluated on 2023 with 6-month history of numbness involving the medial left thigh and lateral aspect of the left leg.  Exam was unremarkable except  for generalized hyperreflexia.  Previous workup included MRI brain, cervical and lumbar spine that were unremarkable.  She had an EMG done at the spine center that was read as abnormal with absence of the left peroneal CMAP but clinically she had no findings to suggest peroneal neuropathy.  Due to her weight I suspected this was technical and recommended a repeat EMG was done on 5/1/2024 that confirmed left peroneal neuropathy with absent peroneal CMAP, superficial peroneal SNAP and chronic changes in peroneal innervated muscle..  Also, MRI thoracic spine was done that showed no abnormal intramedullary signal and no high-grade stenosis.  She also had lab work that showed a positive antinuclear antibody 1: 160 but JERICA panel and double-stranded DNA was normal.  Vitamin B6 level was low and she was started on supplement.  Subsequent level was normal and she tapered herself off B6 supplement rest of the labs included normal hemoglobin A1c, MMA, B1, B12     PROBLEM LIST   Patient Active Problem List   Diagnosis     Persistent insomnia     allergic DERMATITIS NOS     Migraine headache     PMDD (premenstrual dysphoric disorder)     Trichotillomania     CARDIOVASCULAR SCREENING; LDL GOAL LESS THAN 160     Generalized anxiety disorder     Chronic pain syndrome     ASD (atrial septal defect)     Esophageal reflux     Benign hypermobility syndrome     Status post right inguinal hernia repair     Class 1 obesity due to excess calories with serious comorbidity and body mass index (BMI) of 34.0 to 34.9 in adult     Mild recurrent major depression (H24)     Left lumbar radiculopathy     Benign essential hypertension     Pyridoxine deficiency     Status post left inguinal hernia repair     Right-sided thoracic back pain, unspecified chronicity         PAST MEDICAL & SURGICAL HISTORY     Past Medical History:   Patient  has a past medical history of ASD (atrial septal defect), Benign essential hypertension (07/19/2023), Depressive  disorder, Headache, History of blood transfusion (1985), History of ovarian cyst, Insomnia, unspecified, Other forms of migraine, without mention of intractable migraine without mention of status migrainosus, and Right inguinal hernia.    Surgical History:  She  has a past surgical history that includes REPR ASD OSTIUM PREMUM; biopsy; colonoscopy; IR Cervical Epidural Steroid Injection (9/7/2012); IR Cervical Epidural Steroid Injection (10/2/2012); hernia repair (Feb 2021); Davinci Herniorrhaphy Inguinal (Left, 1/17/2024); and Herniorrhaphy umbilical (N/A, 1/17/2024).     SOCIAL HISTORY     Reviewed, and she  reports that she has never smoked. She has never been exposed to tobacco smoke. She has never used smokeless tobacco. She reports that she does not currently use alcohol. She reports that she does not use drugs.     FAMILY HISTORY     Reviewed, and family history includes Anxiety Disorder in her mother; C.A.D. in her mother; Cardiovascular in her mother; Cerebrovascular Disease in her father; Depression in her mother; Heart Failure in her mother; Hypertension in her father and mother; Myocardial Infarction in her maternal grandfather.     ALLERGIES     Allergies   Allergen Reactions     Artificial Sweetner (Informational Only) [Artificial Sweetner (Informational Only) ]      Chocolate Flavor Other (See Comments)         REVIEW OF SYSTEMS     A 12 point review of system was performed and was negative except as outlined in the history of present illness.     HOME MEDICATIONS     Current Outpatient Rx   Medication Sig Dispense Refill     FLUoxetine (PROZAC) 20 MG capsule Take 20 mg by mouth daily       FLUoxetine (PROZAC) 40 MG capsule Take 40 mg by mouth daily       gabapentin (NEURONTIN) 300 MG capsule TAKE 1 CAP BY MOUTH ONCE DAILY AT BEDTIME. MAY INCREASE AS TOLERATED UP TO 1 CAP 3 TIMES DAILY 90 capsule 1     JUNEL 1/20 1-20 MG-MCG tablet TAKE 1 TABLET BY MOUTH EVERY DAY 63 tablet 3     lamoTRIgine (LAMICTAL)  "100 MG tablet TAKE 1 TABLET BY MOUTH WITH  MG TABLET ONCE DAILY       lamoTRIgine (LAMICTAL) 200 MG tablet Take 1 tablet (200 mg) by mouth daily 90 tablet 1     lisinopril (ZESTRIL) 10 MG tablet Take 1 tablet (10 mg) by mouth daily 90 tablet 3     LORazepam (ATIVAN) 1 MG tablet TAKE 1 TABLET BY MOUTH THREE TIMES A DAY AS DIRECTED       MULTIPLE VITAMIN PO        nortriptyline (PAMELOR) 10 MG capsule Take 10 mg by mouth at bedtime       phentermine 15 MG capsule Take 1 capsule (15 mg) by mouth every morning (Patient not taking: Reported on 5/1/2024) 30 capsule 0     vitamin B6 (PYRIDOXINE) 100 MG tablet TAKE 1 TABLET BY MOUTH EVERY DAY (Patient not taking: Reported on 5/1/2024) 90 tablet 0         PHYSICAL EXAM     Vital signs  /88 (BP Location: Right arm, Patient Position: Sitting)   Pulse 82   Ht 1.689 m (5' 6.5\")   Wt 97.5 kg (215 lb)   BMI 34.18 kg/m      Weight:   215 lbs 0 oz    Patient is alert and oriented x4 in no acute distress. Vital signs were reviewed and are documented in electronic medical record. Neck was supple, no carotid bruits, thyromegaly, JVD, or lymphadenopathy was noted.   NEUROLOGY EXAM:    Patient s speech was normal with no aphasia or dysarthria. Mentation, and affect were also normal.     Funduscopic exam was normal, with normal cup to disc ratio. Cranial nerves II -XII were intact.     Patient had normal mass, tone and motor strength was 5/5 in all extremities without pronator drift.     Sensation was intact to light touch, pinprick, and vibratory sensation.     Reflexes were 3+ symmetrical with downgoing toes.     No dysmetria noted on FNF or HKS. Romberg was negative.    Gait testing was normal. Able to walk on toes/heels. Tandem walk normal.     PERTINENT DIAGNOSTIC STUDIES     Following studies were reviewed:     MRI THORACIC SPINE 4/16/2024  1.  No abnormal intramedullary signal. No spinal stenosis or foraminal narrowing at any thoracic level. No disc herniations.   "   2.  Satisfactory thoracic vertebral body height/alignment. Mild disc desiccation at a few mid and lower thoracic levels. Very small chronic Schmorl's node endplate deformities lower thoracic and upper lumbar levels.     3.  Please see above for additional nonacute details and full description.    MRI BRAIN 6/23/2023  No evidence of acute intracranial hemorrhage mass effect or infarction     MRI CERVICAL SPINE 6/23/2023  Mild degenerative cervical spondylosis with level by level analysis as described above without evidence of high-grade spinal canal or neural foraminal narrowing at any level     MRI LUMBAR SPINE 4/20/2023  1.  Lower lumbar spondylopathy.  2.  No high-grade spinal canal or neural foraminal stenosis     EMG 5/1/2024  This is a abnormal nerve conduction and EMG study of left lower extremity that suggests left peroneal neuropathy with chronic changes in the left peroneal innervated muscles.  No active denervation was noted.    EMG 6/19/2023  Abnormal study: There is electrodiagnostic evidence of:     1.  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  This is of unknown clinical significance in the setting of normal peroneal CMAP to the tibialis anterior and normal needle EMG throughout the remainder of the left lower extremity.  This can be observed in a congenital absence of the EDB.       2.  There is no electrodiagnostic evidence of lumbosacral radiculopathy or tibial neuropathy in the left lower extremity.     PERTINENT LABS  Following labs were reviewed:  Office Visit on 03/14/2024   Component Date Value Ref Range Status     Trichomonas 03/14/2024 Absent  Absent Final     Yeast 03/14/2024 Absent  Absent Final     Clue Cells 03/14/2024 Absent  Absent Final     WBCs/high power field 03/14/2024 1+ (A)  None Final     Vitamin B6 03/14/2024 119.9  20.0 - 125.0 nmol/L Final         Total time spent for face to face visit, reviewing  labs/imaging studies, counseling and coordination of care was: 30 Minutes spent on the date of the encounter doing chart review, review of outside records, review of test results, interpretation of tests, patient visit, and documentation     The longitudinal plan of care for the diagnosis(es)/condition(s) as documented were addressed during this visit. Due to the added complexity in care, I will continue to support Danyelle in the subsequent management and with ongoing continuity of care.    This note was dictated using voice recognition software.  Any grammatical or context distortions are unintentional and inherent to the software.    No orders of the defined types were placed in this encounter.     New Prescriptions    No medications on file     Modified Medications    No medications on file                 Again, thank you for allowing me to participate in the care of your patient.        Sincerely,        Zi Alan MD

## 2024-05-01 NOTE — PROCEDURES
ELECTROMYOGRAPHY (EMG) REPORT       [facility] [place]  [address] [place] [address]  Tel: [phone]  Fax: [phone]  [email or web address]     Danyelle Canales,  1980, MRN [id number]  PCP: Marsha Fernandez  Date: 2024     Principal Diagnosis: Neuropathy of left peroneal nerve     Height: 5 feet 7 inch  Reason for referral: Evaluate left lower. c/o numbness in left glutes/groin down left leg > 6 months. h/o hernia surgery.       Motor NCS      Nerve / Sites Lat Amp Dist Jeff    ms mV cm m/s   L Peroneal - EDB      Ankle NR NR 8       Fib head NR NR 34 NR      Pop fossa NR NR 12 NR   L Tibial - AH      Ankle 4.11 11.8 8       Pop fossa 13.54 11.3 43 46   L Peroneal - Tib Ant      Fib Head NR NR        Pop fossa NR NR 12 NR       F  Wave      Nerve Fmin    ms   L Tibial - AH 52.50       Sensory NCS      Nerve / Sites Onset Lat Pk Lat Amp.2-3 Dist Jeff    ms ms  V cm m/s   L Sural - Ankle (Calf)      Calf 2.60 3.28 16.5 14 54   L Superficial peroneal - Ankle      Lat leg NR NR NR 12 NR       EMG Summary Table     Spontaneous MUAP Rcmt Note   Muscle Fib PSW Fasc IA # Amp Dur PPP Rate Type   L. Gluteus medius None None None N N N N N N N   L. Gluteus nikia None None None N N N N N N N   L. L3 paraspinal None None None N N N N N N N   L. L4 paraspinal None None None N N N N N N N   L. L5 paraspinal None None None N N N N N N N   L. S1 paraspinal None None None N N N N N N N   L. Iliopsoas None None None N N N N N N N   L. Adductor rosaura None None None N N N N N N N   L. Quadriceps None None None N N N N N N N   L. Peroneus longus x4 None None None Incr Sl Red Incr Incr N N N   L. Tibialis anterior x4 None None None Incr Sl Red Incr Incr N N N   L. Gastrocnemius (Medial head) None None None N N N N N N N   L. Gastrocnemius (Lateral head) None None None N N N N N N N   L. Tibialis posterior None None None N N N N N N N        Summary: Nerve conduction and EMG study  of left lower extremity shows:  Absent left peroneal CMAP recording at EDB and tibialis anterior  Normal left tibial distal motor latency, amplitude and conduction velocity  Normal left tibial F latency  Normal left sural SNAP  Absent left superficial peroneal SNAP  Needle exam showed chronic changes as noted above    Impression:   This is a abnormal nerve conduction and EMG study of left lower extremity that suggests left peroneal neuropathy with chronic changes in the left peroneal innervated muscles.  No active denervation was noted.      Zi Alan MD  [facility]      This note was dictated using voice recognition software.  Any grammatical or context distortions are unintentional and inherent to the software.

## 2024-05-01 NOTE — LETTER
5/1/2024         RE: Danyelle Canales  [address] [place] [address]        Dear Colleague,    Thank you for referring your patient, Danyelle Canales, to the [facility] [place]. Please see a copy of my visit note below.    See procedure note for EMG report      Again, thank you for allowing me to participate in the care of your patient.        Sincerely,        Zi Alan MD

## 2024-05-01 NOTE — NURSING NOTE
Chief Complaint   Patient presents with    Left leg paresthesias     EMG, lab and MRI results      Pamela Simpson CMA on 5/1/2024 at 11:11 AM  [facility]

## 2024-07-01 ENCOUNTER — E-VISIT (OUTPATIENT)
Dept: FAMILY MEDICINE | Facility: CLINIC | Age: 44
End: 2024-07-01
Payer: COMMERCIAL

## 2024-07-01 DIAGNOSIS — M54.6 RIGHT-SIDED THORACIC BACK PAIN, UNSPECIFIED CHRONICITY: Primary | ICD-10-CM

## 2024-07-01 PROCEDURE — 99421 OL DIG E/M SVC 5-10 MIN: CPT | Performed by: NURSE PRACTITIONER

## 2024-07-01 RX ORDER — HYDROCODONE BITARTRATE AND ACETAMINOPHEN 5; 325 MG/1; MG/1
1 TABLET ORAL EVERY 6 HOURS PRN
Qty: 10 TABLET | Refills: 0 | Status: SHIPPED | OUTPATIENT
Start: 2024-07-01 | End: 2024-07-04

## 2024-07-01 NOTE — PATIENT INSTRUCTIONS
Thank you for choosing us for your care. I have placed an order for a prescription so that you can start treatment. View your full visit summary for details by clicking on the link below. Your pharmacist will able to address any questions you may have about the medication.      If you're not feeling better within 2-3 weeks please schedule an appointment.  You can schedule an appointment right here in [facility], or call [phone]  If the visit is for the same symptoms as your eVisit, we'll refund the cost of your eVisit if seen within seven days.

## 2024-07-10 DIAGNOSIS — I10 BENIGN ESSENTIAL HYPERTENSION: ICD-10-CM

## 2024-07-10 RX ORDER — LISINOPRIL 10 MG/1
10 TABLET ORAL DAILY
Qty: 90 TABLET | Refills: 0 | Status: SHIPPED | OUTPATIENT
Start: 2024-07-10 | End: 2024-09-24

## 2024-07-18 ENCOUNTER — OFFICE VISIT (OUTPATIENT)
Dept: OBGYN | Facility: CLINIC | Age: 44
End: 2024-07-18
Payer: COMMERCIAL

## 2024-07-18 ENCOUNTER — MYC MEDICAL ADVICE (OUTPATIENT)
Dept: OBGYN | Facility: CLINIC | Age: 44
End: 2024-07-18

## 2024-07-18 VITALS
WEIGHT: 218 LBS | HEART RATE: 75 BPM | BODY MASS INDEX: 34.66 KG/M2 | OXYGEN SATURATION: 98 % | DIASTOLIC BLOOD PRESSURE: 78 MMHG | SYSTOLIC BLOOD PRESSURE: 126 MMHG

## 2024-07-18 DIAGNOSIS — B37.31 CANDIDAL VULVOVAGINITIS: Primary | ICD-10-CM

## 2024-07-18 DIAGNOSIS — L29.2 VULVAR ITCHING: ICD-10-CM

## 2024-07-18 PROCEDURE — 87210 SMEAR WET MOUNT SALINE/INK: CPT | Performed by: OBSTETRICS & GYNECOLOGY

## 2024-07-18 PROCEDURE — 99204 OFFICE O/P NEW MOD 45 MIN: CPT | Performed by: OBSTETRICS & GYNECOLOGY

## 2024-07-18 RX ORDER — TERCONAZOLE 0.4 %
1 CREAM WITH APPLICATOR VAGINAL AT BEDTIME
Qty: 45 G | Refills: 1 | Status: SHIPPED | OUTPATIENT
Start: 2024-07-18 | End: 2024-09-24

## 2024-07-18 RX ORDER — KETOCONAZOLE 20 MG/ML
SHAMPOO TOPICAL
COMMUNITY
Start: 2024-07-11 | End: 2024-09-24

## 2024-07-18 RX ORDER — CLOBETASOL PROPIONATE 0.5 MG/ML
SOLUTION TOPICAL
COMMUNITY
Start: 2024-07-11 | End: 2024-09-24

## 2024-07-18 RX ORDER — HYDROCODONE BITARTRATE AND ACETAMINOPHEN 5; 325 MG/1; MG/1
1 TABLET ORAL EVERY 8 HOURS PRN
COMMUNITY
Start: 2024-04-04 | End: 2024-09-24

## 2024-07-18 ASSESSMENT — ANXIETY QUESTIONNAIRES
8. IF YOU CHECKED OFF ANY PROBLEMS, HOW DIFFICULT HAVE THESE MADE IT FOR YOU TO DO YOUR WORK, TAKE CARE OF THINGS AT HOME, OR GET ALONG WITH OTHER PEOPLE?: NOT DIFFICULT AT ALL
7. FEELING AFRAID AS IF SOMETHING AWFUL MIGHT HAPPEN: NOT AT ALL
GAD7 TOTAL SCORE: 6
GAD7 TOTAL SCORE: 6

## 2024-07-18 NOTE — PROGRESS NOTES
CC: Danyelle Canales is here secondary to recurrent vulvar/vaginal itching.    HPI: The pt is a 44 year old MWF  who presents with symptoms since January off and on.  They started just before her hernia surgery.  She was diagnosed with a yeast infection by wet prep on 2/5/24 and was treated.  Symptoms recurred; another wet prep was done 3/14/24 and only had wbcs.  The itching symptoms come every few weeks, lasting a few days to a week.  There is redness present at the same time.  The itching is mostly by her clitoris and bilateral labia, but she does get some internal vaginal itching as well.  She uses Tide detergent, no fabric softener, a natural bar soap, and wears cotton underwear.  She had a normal A1C on 11/16/23.      Past Medical History:   Diagnosis Date    ASD (atrial septal defect)     repaired surgically as a child    Benign essential hypertension 07/19/2023    Depressive disorder     Headache     History of blood transfusion 1985 1985 during open heart surgery    History of ovarian cyst     Insomnia, unspecified     Other forms of migraine, without mention of intractable migraine without mention of status migrainosus     Right inguinal hernia        Past Surgical History:   Procedure Laterality Date    BIOPSY      COLONOSCOPY      DAVINCI HERNIORRHAPHY INGUINAL Left 1/17/2024    Procedure: HERNIORRHAPHY, LEFT INGUINAL, ROBOT-ASSISTED, LAPAROSCOPIC, USING DA ANTONY XI with mesh;  Surgeon: Weston Mcnamara MD;  Location: [facility] OR    HERNIA REPAIR  Feb 2021    HERNIORRHAPHY UMBILICAL N/A 1/17/2024    Procedure: and repair of umbilical hernia open;  Surgeon: Weston Mcnamara MD;  Location: [facility] OR    IR CERVICAL EPIDURAL STEROID INJECTION  9/7/2012    IR CERVICAL EPIDURAL STEROID INJECTION  10/2/2012    ZZC REPR ASD OSTIUM PREMUM      Dr. Silverio       Patient's   Family History   Problem Relation Age of Onset    C.A.D. Mother         heart surgery to close hole in heart     Cardiovascular Mother     Hypertension Mother     Depression Mother     Anxiety Disorder Mother     Heart Failure Mother     Cerebrovascular Disease Father     Hypertension Father     Myocardial Infarction Maternal Grandfather        Patient   Social History     Socioeconomic History    Marital status:      Spouse name: Chan Canales    Number of children: 0    Years of education: 12    Highest education level: None   Occupational History    Occupation: customer service     Employer: [employer]   Tobacco Use    Smoking status: Never     Passive exposure: Never    Smokeless tobacco: Never   Vaping Use    Vaping status: Never Used   Substance and Sexual Activity    Alcohol use: Not Currently     Comment: 1-2 a month    Drug use: No    Sexual activity: Yes     Partners: Male     Birth control/protection: Pill   Other Topics Concern    Parent/sibling w/ CABG, MI or angioplasty before 65F 55M? Yes     Comment: stroke     Social Determinants of Health     Financial Resource Strain: Low Risk  (1/5/2024)    Financial Resource Strain     Within the past 12 months, have you or your family members you live with been unable to get utilities (heat, electricity) when it was really needed?: No   Food Insecurity: Low Risk  (1/5/2024)    Food Insecurity     Within the past 12 months, did you worry that your food would run out before you got money to buy more?: No     Within the past 12 months, did the food you bought just not last and you didn t have money to get more?: No   Transportation Needs: Low Risk  (1/5/2024)    Transportation Needs     Within the past 12 months, has lack of transportation kept you from medical appointments, getting your medicines, non-medical meetings or appointments, work, or from getting things that you need?: No   Stress: No Stress Concern Present (10/14/2019)    [employer] [place] - Occupational Stress Questionnaire     Feeling of Stress : Not at all    Interpersonal Safety: Low Risk  (3/14/2024)    Interpersonal Safety     Do you feel physically and emotionally safe where you currently live?: Yes     Within the past 12 months, have you been hit, slapped, kicked or otherwise physically hurt by someone?: No     Within the past 12 months, have you been humiliated or emotionally abused in other ways by your partner or ex-partner?: No   Housing Stability: High Risk (1/5/2024)    Housing Stability     Do you have housing? : No     Are you worried about losing your housing?: No       Outpatient Medications Prior to Visit   Medication Sig Dispense Refill    clobetasol (TEMOVATE) 0.05 % external solution       FLUoxetine (PROZAC) 20 MG capsule Take 20 mg by mouth daily      FLUoxetine (PROZAC) 40 MG capsule Take 40 mg by mouth daily      HYDROcodone-acetaminophen (NORCO) 5-325 MG tablet Take 1 tablet by mouth every 8 hours as needed      JUNEL 1/20 1-20 MG-MCG tablet TAKE 1 TABLET BY MOUTH EVERY DAY 63 tablet 3    ketoconazole (NIZORAL) 2 % external shampoo       lamoTRIgine (LAMICTAL) 100 MG tablet TAKE 1 TABLET BY MOUTH WITH  MG TABLET ONCE DAILY      lamoTRIgine (LAMICTAL) 200 MG tablet Take 1 tablet (200 mg) by mouth daily 90 tablet 1    lisinopril (ZESTRIL) 10 MG tablet TAKE 1 TABLET BY MOUTH DAILY 90 tablet 0    LORazepam (ATIVAN) 1 MG tablet TAKE 1 TABLET BY MOUTH THREE TIMES A DAY AS DIRECTED      MULTIPLE VITAMIN PO       nortriptyline (PAMELOR) 10 MG capsule Take 10 mg by mouth at bedtime      gabapentin (NEURONTIN) 300 MG capsule TAKE 1 CAP BY MOUTH ONCE DAILY AT BEDTIME. MAY INCREASE AS TOLERATED UP TO 1 CAP 3 TIMES DAILY (Patient not taking: Reported on 7/18/2024) 90 capsule 1    vitamin B6 (PYRIDOXINE) 100 MG tablet TAKE 1 TABLET BY MOUTH EVERY DAY (Patient not taking: Reported on 5/1/2024) 90 tablet 0    phentermine 15 MG capsule Take 1 capsule (15 mg) by mouth every morning (Patient not taking: Reported on 5/1/2024) 30 capsule 0     No  facility-administered medications prior to visit.       Patient is allergic to artificial sweetner (informational only) [artificial sweetner (informational only) ], chocolate flavor, and saccharin.    ROS:  12 part ROS is negative aside from those symptoms in the HPI    PE:  /78 (BP Location: Right arm, Patient Position: Sitting, Cuff Size: Adult Regular)   Pulse 75   Wt 98.9 kg (218 lb)           Body mass index is 34.66 kg/m .    General: obese WF, NAD  Pelvic: EG/BUS no lesions, erythema bilaterally around the introitus, no discharge   Vagina no lesions, thin white discharge   Cervix no lesions, no cervical motion tenderness   Perineum no lesions   Rectal deferred  Psych: normal mood  Neuro: CN I-XII grossly intact  MS: normal gait    Recent Results (from the past 24 hour(s))   Wet prep - Clinic Collect    Collection Time: 07/18/24 10:20 AM    Specimen: Vagina; Swab   Result Value Ref Range    Trichomonas Absent Absent    Yeast Present (A) Absent    Clue Cells Absent Absent    WBCs/high power field 3+ (A) None     Assessment: 44 year old MWF  with a yeast infection.    Plan: Natural history of causes for itching besides yeast were discussed with the patient.  I recommended detergent without perfumes and dyes.  I recommended Dove soap.  We discussed that depending on what happens from here, repeat diabetes testing may be warranted.  Prescription for Terazol 7 was sent in for her.  Questions were answered to the best of my ability.    Answers submitted by the patient for this visit:  NAJMA-7 (Submitted on 7/18/2024)  NAJMA 7 TOTAL SCORE: 6

## 2024-08-23 ENCOUNTER — MYC REFILL (OUTPATIENT)
Dept: FAMILY MEDICINE | Facility: CLINIC | Age: 44
End: 2024-08-23

## 2024-08-23 RX ORDER — HYDROCODONE BITARTRATE AND ACETAMINOPHEN 5; 325 MG/1; MG/1
1 TABLET ORAL EVERY 8 HOURS PRN
OUTPATIENT
Start: 2024-08-23

## 2024-09-03 ENCOUNTER — PATIENT OUTREACH (OUTPATIENT)
Dept: CARE COORDINATION | Facility: CLINIC | Age: 44
End: 2024-09-03
Payer: COMMERCIAL

## 2024-09-24 ENCOUNTER — OFFICE VISIT (OUTPATIENT)
Dept: FAMILY MEDICINE | Facility: CLINIC | Age: 44
End: 2024-09-24
Payer: COMMERCIAL

## 2024-09-24 VITALS
TEMPERATURE: 99.8 F | DIASTOLIC BLOOD PRESSURE: 78 MMHG | BODY MASS INDEX: 34.06 KG/M2 | WEIGHT: 217 LBS | RESPIRATION RATE: 16 BRPM | SYSTOLIC BLOOD PRESSURE: 128 MMHG | HEIGHT: 67 IN | HEART RATE: 76 BPM | OXYGEN SATURATION: 98 %

## 2024-09-24 DIAGNOSIS — E53.1 PYRIDOXINE DEFICIENCY: ICD-10-CM

## 2024-09-24 DIAGNOSIS — G89.4 CHRONIC PAIN SYNDROME: ICD-10-CM

## 2024-09-24 DIAGNOSIS — G89.29 CHRONIC THORACIC BACK PAIN, UNSPECIFIED BACK PAIN LATERALITY: ICD-10-CM

## 2024-09-24 DIAGNOSIS — Z00.00 ROUTINE GENERAL MEDICAL EXAMINATION AT A HEALTH CARE FACILITY: Primary | ICD-10-CM

## 2024-09-24 DIAGNOSIS — I10 BENIGN ESSENTIAL HYPERTENSION: ICD-10-CM

## 2024-09-24 DIAGNOSIS — G89.29 NECK PAIN, CHRONIC: ICD-10-CM

## 2024-09-24 DIAGNOSIS — M54.6 CHRONIC THORACIC BACK PAIN, UNSPECIFIED BACK PAIN LATERALITY: ICD-10-CM

## 2024-09-24 DIAGNOSIS — M54.2 NECK PAIN, CHRONIC: ICD-10-CM

## 2024-09-24 PROBLEM — M47.812 SPONDYLOSIS OF CERVICAL REGION WITHOUT MYELOPATHY OR RADICULOPATHY: Status: ACTIVE | Noted: 2024-08-23

## 2024-09-24 PROCEDURE — 99000 SPECIMEN HANDLING OFFICE-LAB: CPT | Performed by: NURSE PRACTITIONER

## 2024-09-24 PROCEDURE — 80061 LIPID PANEL: CPT | Performed by: NURSE PRACTITIONER

## 2024-09-24 PROCEDURE — 99396 PREV VISIT EST AGE 40-64: CPT | Mod: 25 | Performed by: NURSE PRACTITIONER

## 2024-09-24 PROCEDURE — 90656 IIV3 VACC NO PRSV 0.5 ML IM: CPT | Performed by: NURSE PRACTITIONER

## 2024-09-24 PROCEDURE — 99213 OFFICE O/P EST LOW 20 MIN: CPT | Mod: 25 | Performed by: NURSE PRACTITIONER

## 2024-09-24 PROCEDURE — 80053 COMPREHEN METABOLIC PANEL: CPT | Performed by: NURSE PRACTITIONER

## 2024-09-24 PROCEDURE — 36415 COLL VENOUS BLD VENIPUNCTURE: CPT | Performed by: NURSE PRACTITIONER

## 2024-09-24 PROCEDURE — 84207 ASSAY OF VITAMIN B-6: CPT | Mod: 90 | Performed by: NURSE PRACTITIONER

## 2024-09-24 PROCEDURE — 90471 IMMUNIZATION ADMIN: CPT | Performed by: NURSE PRACTITIONER

## 2024-09-24 RX ORDER — HYDROCODONE BITARTRATE AND ACETAMINOPHEN 5; 325 MG/1; MG/1
1 TABLET ORAL EVERY 8 HOURS PRN
Qty: 10 TABLET | Refills: 0 | Status: SHIPPED | OUTPATIENT
Start: 2024-09-24

## 2024-09-24 RX ORDER — LISINOPRIL 10 MG/1
10 TABLET ORAL DAILY
Qty: 90 TABLET | Refills: 3 | Status: SHIPPED | OUTPATIENT
Start: 2024-09-24

## 2024-09-24 ASSESSMENT — ANXIETY QUESTIONNAIRES
GAD7 TOTAL SCORE: 3
8. IF YOU CHECKED OFF ANY PROBLEMS, HOW DIFFICULT HAVE THESE MADE IT FOR YOU TO DO YOUR WORK, TAKE CARE OF THINGS AT HOME, OR GET ALONG WITH OTHER PEOPLE?: NOT DIFFICULT AT ALL
7. FEELING AFRAID AS IF SOMETHING AWFUL MIGHT HAPPEN: NOT AT ALL
GAD7 TOTAL SCORE: 3
GAD7 TOTAL SCORE: 3

## 2024-09-24 ASSESSMENT — PATIENT HEALTH QUESTIONNAIRE - PHQ9
SUM OF ALL RESPONSES TO PHQ QUESTIONS 1-9: 5
SUM OF ALL RESPONSES TO PHQ QUESTIONS 1-9: 5
10. IF YOU CHECKED OFF ANY PROBLEMS, HOW DIFFICULT HAVE THESE PROBLEMS MADE IT FOR YOU TO DO YOUR WORK, TAKE CARE OF THINGS AT HOME, OR GET ALONG WITH OTHER PEOPLE: NOT DIFFICULT AT ALL

## 2024-09-24 ASSESSMENT — PAIN SCALES - GENERAL: PAINLEVEL: NO PAIN (0)

## 2024-09-24 NOTE — NURSING NOTE
Prior to immunization administration, verified patients identity using patient s name and date of birth. Please see Immunization Activity for additional information.     Screening Questionnaire for Adult Immunization    Are you sick today?   No   Do you have allergies to medications, food, a vaccine component or latex?   No   Have you ever had a serious reaction after receiving a vaccination?   No   Do you have a long-term health problem with heart, lung, kidney, or metabolic disease (e.g., diabetes), asthma, a blood disorder, no spleen, complement component deficiency, a cochlear implant, or a spinal fluid leak?  Are you on long-term aspirin therapy?   No   Do you have cancer, leukemia, HIV/AIDS, or any other immune system problem?   No   Do you have a parent, brother, or sister with an immune system problem?   No   In the past 3 months, have you taken medications that affect  your immune system, such as prednisone, other steroids, or anticancer drugs; drugs for the treatment of rheumatoid arthritis, Crohn s disease, or psoriasis; or have you had radiation treatments?   No   Have you had a seizure, or a brain or other nervous system problem?   No   During the past year, have you received a transfusion of blood or blood    products, or been given immune (gamma) globulin or antiviral drug?   No   For women: Are you pregnant or is there a chance you could become       pregnant during the next month?   No   Have you received any vaccinations in the past 4 weeks?   No     Immunization questionnaire answers were all negative.      Patient instructed to remain in clinic for 15 minutes afterwards, and to report any adverse reactions.     Screening performed by Arlen Sherwood MA on 9/24/2024 at 11:03 AM.

## 2024-09-24 NOTE — PROGRESS NOTES
"Preventive Care Visit  [facility]  Marsha Fernandez NP, Nurse Practitioner - Family  Sep 24, 2024      Assessment & Plan     Routine general medical examination at a health care facility    - Comprehensive metabolic panel (BMP + Alb, Alk Phos, ALT, AST, Total. Bili, TP); Future  - Lipid panel reflex to direct LDL Fasting; Future  - Comprehensive metabolic panel (BMP + Alb, Alk Phos, ALT, AST, Total. Bili, TP)  - Lipid panel reflex to direct LDL Fasting    Benign essential hypertension  Chronic, stable, continue current treatment.   - lisinopril (ZESTRIL) 10 MG tablet; Take 1 tablet (10 mg) by mouth daily.  - Comprehensive metabolic panel (BMP + Alb, Alk Phos, ALT, AST, Total. Bili, TP); Future  - Comprehensive metabolic panel (BMP + Alb, Alk Phos, ALT, AST, Total. Bili, TP)    Pyridoxine deficiency  monigor  - Vitamin B6; Future  - Vitamin B6    Chronic pain syndrome  She will continue to work with pain clinic.  Has been using Norco as needed, 10 tablets usually last 3-6 moths though has used more this last couple months.  - HYDROcodone-acetaminophen (NORCO) 5-325 MG tablet; Take 1 tablet by mouth every 8 hours as needed for severe pain.    Neck pain, chronic    - HYDROcodone-acetaminophen (NORCO) 5-325 MG tablet; Take 1 tablet by mouth every 8 hours as needed for severe pain.    Chronic thoracic back pain, unspecified back pain laterality    - HYDROcodone-acetaminophen (NORCO) 5-325 MG tablet; Take 1 tablet by mouth every 8 hours as needed for severe pain.          BMI  Estimated body mass index is 34.5 kg/m  as calculated from the following:    Height as of this encounter: 1.689 m (5' 6.5\").    Weight as of this encounter: 98.4 kg (217 lb).   Weight management plan: Discussed healthy diet and exercise guidelines    Counseling  Appropriate preventive services were addressed with this patient via screening, questionnaire, or discussion as appropriate for fall prevention, nutrition, " physical activity, Tobacco-use cessation, social engagement, weight loss and cognition.  Checklist reviewing preventive services available has been given to the patient.  Reviewed patient's diet, addressing concerns and/or questions.   She is at risk for lack of exercise and has been provided with information to increase physical activity for the benefit of her well-being.   The patient's PHQ-9 score is consistent with mild depression. She was provided with information regarding depression.           Alex Bassett is a 44 year old, presenting for the following:  Physical (Fasting )        9/24/2024    10:47 AM   Additional Questions   Roomed by Sulaiman OREILLY        Health Care Directive  Patient does not have a Health Care Directive or Living Will: Discussed advance care planning with patient; information given to patient to review.    [facility]    Norco #10 every 3-6 months (last 8/23/24)  Working with pain clinic.  Will get Cervical medial branch block test x2.    Neck pain, thoracic back pain.    Multivitamin     Low carb diet didn't work    Home BP has been okay.        9/24/2024   General Health   How would you rate your overall physical health? (!) FAIR   Feel stress (tense, anxious, or unable to sleep) Rather much      (!) STRESS CONCERN      9/24/2024   Nutrition   Three or more servings of calcium each day? Yes   Diet: Other   If other, please elaborate: Switched from low carb to calorie counting   How many servings of fruit and vegetables per day? 4 or more   How many sweetened beverages each day? 0-1            9/24/2024   Exercise   Days per week of moderate/strenous exercise 3 days   Average minutes spent exercising at this level 20 min            9/24/2024   Social Factors   Frequency of gathering with friends or relatives Once a week   Worry food won't last until get money to buy more No   Food not last or not have enough money for food? No   Do you have housing? (Housing is defined as stable permanent  housing and does not include staying ouside in a car, in a tent, in an abandoned building, in an overnight shelter, or couch-surfing.) Yes   Are you worried about losing your housing? No   Lack of transportation? No   Unable to get utilities (heat,electricity)? No            9/24/2024   Dental   Dentist two times every year? Yes            9/24/2024   TB Screening   Were you born outside of the [place]? No          Today's PHQ-9 Score:       9/24/2024     8:42 AM   PHQ-9 SCORE   PHQ-9 Total Score MyChart 5 (Mild depression)   PHQ-9 Total Score 5         9/24/2024   Substance Use   Alcohol more than 3/day or more than 7/wk No   Do you use any other substances recreationally? No        Social History     Tobacco Use    Smoking status: Never     Passive exposure: Never    Smokeless tobacco: Never   Vaping Use    Vaping status: Never Used   Substance Use Topics    Alcohol use: Not Currently     Comment: 1-2 a month    Drug use: No           4/3/2023   LAST FHS-7 RESULTS   1st degree relative breast or ovarian cancer No   Any relative bilateral breast cancer No   Any male have breast cancer No   Any ONE woman have BOTH breast AND ovarian cancer No   Any woman with breast cancer before 50yrs No   2 or more relatives with breast AND/OR ovarian cancer No   2 or more relatives with breast AND/OR bowel cancer No                   9/24/2024   STI Screening   New sexual partner(s) since last STI/HIV test? No        History of abnormal Pap smear: No - age 30- 64 PAP with HPV every 5 years recommended        Latest Ref Rng & Units 9/22/2023     3:40 PM 8/20/2018     3:35 PM 8/20/2018     3:33 PM   PAP / HPV   PAP  Negative for Intraepithelial Lesion or Malignancy (NILM)      PAP (Historical)    NIL    HPV 16 DNA Negative Negative  Negative     HPV 18 DNA Negative Negative  Negative     Other HR HPV Negative Negative  Negative       ASCVD Risk   The 10-year ASCVD risk score (Mathew YIP, et al., 2019) is: 1%    Values used to  calculate the score:      Age: 44 years      Sex: Female      Is Non- : No      Diabetic: No      Tobacco smoker: No      Systolic Blood Pressure: 128 mmHg      Is BP treated: Yes      HDL Cholesterol: 57 mg/dL      Total Cholesterol: 212 mg/dL        9/24/2024   Contraception/Family Planning   Questions about contraception or family planning No           Reviewed and updated as needed this visit by Provider     Meds   Med Hx  Surg Hx  Fam Hx            BP Readings from Last 3 Encounters:   09/24/24 128/78   07/18/24 126/78   05/01/24 138/88    Wt Readings from Last 3 Encounters:   09/24/24 98.4 kg (217 lb)   07/18/24 98.9 kg (218 lb)   05/01/24 97.5 kg (215 lb)                  Patient Active Problem List   Diagnosis    Persistent insomnia    allergic DERMATITIS NOS    Migraine headache    PMDD (premenstrual dysphoric disorder)    Trichotillomania    CARDIOVASCULAR SCREENING; LDL GOAL LESS THAN 160    Generalized anxiety disorder    Chronic pain syndrome    ASD (atrial septal defect)    Esophageal reflux    Benign hypermobility syndrome    Status post right inguinal hernia repair    Class 1 obesity due to excess calories with serious comorbidity and body mass index (BMI) of 34.0 to 34.9 in adult    Mild recurrent major depression (H24)    Left lumbar radiculopathy    Benign essential hypertension    Pyridoxine deficiency    Status post left inguinal hernia repair    Right-sided thoracic back pain, unspecified chronicity    Spondylosis of cervical region without myelopathy or radiculopathy     Past Surgical History:   Procedure Laterality Date    BIOPSY      COLONOSCOPY      DAVINCI HERNIORRHAPHY INGUINAL Left 1/17/2024    Procedure: HERNIORRHAPHY, LEFT INGUINAL, ROBOT-ASSISTED, LAPAROSCOPIC, USING DA ANTONY XI with mesh;  Surgeon: Weston Mcnamara MD;  Location: [facility] OR    HERNIA REPAIR  Feb 2021    HERNIORRHAPHY UMBILICAL N/A 1/17/2024    Procedure: and repair of umbilical hernia  open;  Surgeon: Weston Mcnamara MD;  Location: [facility] OR    IR CERVICAL EPIDURAL STEROID INJECTION  9/7/2012    IR CERVICAL EPIDURAL STEROID INJECTION  10/2/2012    ZZC REPR ASD OSTIUM PREMUM      Dr. Silverio       Social History     Tobacco Use    Smoking status: Never     Passive exposure: Never    Smokeless tobacco: Never   Substance Use Topics    Alcohol use: Not Currently     Comment: 1-2 a month     Family History   Problem Relation Age of Onset    C.A.D. Mother         heart surgery to close hole in heart    Cardiovascular Mother     Hypertension Mother     Depression Mother     Anxiety Disorder Mother     Heart Failure Mother     Cerebrovascular Disease Father     Hypertension Father     Myocardial Infarction Maternal Grandfather          Current Outpatient Medications   Medication Sig Dispense Refill    FLUoxetine (PROZAC) 20 MG capsule Take 20 mg by mouth daily      FLUoxetine (PROZAC) 40 MG capsule Take 40 mg by mouth daily      HYDROcodone-acetaminophen (NORCO) 5-325 MG tablet Take 1 tablet by mouth every 8 hours as needed for severe pain. 10 tablet 0    JUNEL 1/20 1-20 MG-MCG tablet TAKE 1 TABLET BY MOUTH EVERY DAY 63 tablet 3    lamoTRIgine (LAMICTAL) 100 MG tablet TAKE 1 TABLET BY MOUTH WITH  MG TABLET ONCE DAILY      lamoTRIgine (LAMICTAL) 200 MG tablet Take 1 tablet (200 mg) by mouth daily 90 tablet 1    lisinopril (ZESTRIL) 10 MG tablet Take 1 tablet (10 mg) by mouth daily. 90 tablet 3    LORazepam (ATIVAN) 1 MG tablet TAKE 1 TABLET BY MOUTH THREE TIMES A DAY AS DIRECTED      MULTIPLE VITAMIN PO        Allergies   Allergen Reactions    Artificial Sweetner (Informational Only) [Artificial Sweetner (Informational Only) ]     Chocolate Flavor Other (See Comments)    Saccharin Other (See Comments)     Dissolvable medications          Objective    Exam  /78 (BP Location: Right arm, Patient Position: Sitting, Cuff Size: Adult Large)   Pulse 76   Temp 99.8  F (37.7  C) (Oral)   Resp 16  "  Ht 1.689 m (5' 6.5\")   Wt 98.4 kg (217 lb)   SpO2 98%   BMI 34.50 kg/m     Estimated body mass index is 34.5 kg/m  as calculated from the following:    Height as of this encounter: 1.689 m (5' 6.5\").    Weight as of this encounter: 98.4 kg (217 lb).    Physical Exam  GENERAL: alert and no distress  EYES: Eyes grossly normal to inspection, PERRL and conjunctivae and sclerae normal  HENT: ear canals and TM's normal, nose and mouth without ulcers or lesions  NECK: no adenopathy, no asymmetry, masses, or scars  RESP: lungs clear to auscultation - no rales, rhonchi or wheezes  BREAST: normal without masses, tenderness or nipple discharge and no palpable axillary masses or adenopathy  CV: regular rate and rhythm, normal S1 S2, no S3 or S4, no murmur, click or rub, no peripheral edema  MS: no gross musculoskeletal defects noted, no edema  SKIN: no suspicious lesions or rashes  NEURO: Normal strength and tone, mentation intact and speech normal  PSYCH: mentation appears normal, affect normal/bright      EXAM: MR THORACIC SPINE W/O CONTRAST  LOCATION: [facility]  DATE: 4/16/2024     INDICATION: Thoracic spinal stenosis.  COMPARISON: None.  TECHNIQUE: Routine Thoracic Spine MRI without IV contrast.     FINDINGS:   12 thoracic segments. Sternal wire artifact. Satisfactory thoracic vertebral body height/alignment. Chronic Schmorl's node endplate deformities at a few lower thoracic and upper lumbar levels without significant loss of height or retropulsion. No marrow   or ligamentous edema. Nothing for a marrow-infiltrative process. Tiny benign vertebral body hemangiomas are present and scattered mid and lower thoracic levels. Mild loss of disc height at a few mid and lower thoracic levels. No disc herniations.   Satisfactory thoracic facet joint appearance. No spinal canal or neural foraminal stenosis. No abnormal cord signal. Proximal nerve roots of the cauda equina appears satisfactory. Normal " caliber aorta. Upper retroperitoneal structures are satisfactory.                                                                      IMPRESSION:  1.  No abnormal intramedullary signal. No spinal stenosis or foraminal narrowing at any thoracic level. No disc herniations.     2.  Satisfactory thoracic vertebral body height/alignment. Mild disc desiccation at a few mid and lower thoracic levels. Very small chronic Schmorl's node endplate deformities lower thoracic and upper lumbar levels.     3.  Please see above for additional nonacute details and full description.    Signed Electronically by: Marsha Fernandez NP    Answers submitted by the patient for this visit:  Patient Health Questionnaire (Submitted on 9/24/2024)  If you checked off any problems, how difficult have these problems made it for you to do your work, take care of things at home, or get along with other people?: Not difficult at all  PHQ9 TOTAL SCORE: 5  Patient Health Questionnaire (G7) (Submitted on 9/24/2024)  NAJMA 7 TOTAL SCORE: 3

## 2024-09-24 NOTE — PATIENT INSTRUCTIONS
Patient Education   Preventive Care Advice   This is general advice given by our system to help you stay healthy. However, your care team may have specific advice just for you. Please talk to your care team about your preventive care needs.  Nutrition  Eat 5 or more servings of fruits and vegetables each day.  Try wheat bread, brown rice and whole grain pasta (instead of white bread, rice, and pasta).  Get enough calcium and vitamin D. Check the label on foods and aim for 100% of the RDA (recommended daily allowance).  Lifestyle  Exercise at least 150 minutes each week  (30 minutes a day, 5 days a week).  Do muscle strengthening activities 2 days a week. These help control your weight and prevent disease.  No smoking.  Wear sunscreen to prevent skin cancer.  Have a dental exam and cleaning every 6 months.  Yearly exams  See your health care team every year to talk about:  Any changes in your health.  Any medicines your care team has prescribed.  Preventive care, family planning, and ways to prevent chronic diseases.  Shots (vaccines)   HPV shots (up to age 26), if you've never had them before.  Hepatitis B shots (up to age 59), if you've never had them before.  COVID-19 shot: Get this shot when it's due.  Flu shot: Get a flu shot every year.  Tetanus shot: Get a tetanus shot every 10 years.  Pneumococcal, hepatitis A, and RSV shots: Ask your care team if you need these based on your risk.  Shingles shot (for age 50 and up)  General health tests  Diabetes screening:  Starting at age 35, Get screened for diabetes at least every 3 years.  If you are younger than age 35, ask your care team if you should be screened for diabetes.  Cholesterol test: At age 39, start having a cholesterol test every 5 years, or more often if advised.  Bone density scan (DEXA): At age 50, ask your care team if you should have this scan for osteoporosis (brittle bones).  Hepatitis C: Get tested at least once in your life.  STIs (sexually  transmitted infections)  Before age 24: Ask your care team if you should be screened for STIs.  After age 24: Get screened for STIs if you're at risk. You are at risk for STIs (including HIV) if:  You are sexually active with more than one person.  You don't use condoms every time.  You or a partner was diagnosed with a sexually transmitted infection.  If you are at risk for HIV, ask about PrEP medicine to prevent HIV.  Get tested for HIV at least once in your life, whether you are at risk for HIV or not.  Cancer screening tests  Cervical cancer screening: If you have a cervix, begin getting regular cervical cancer screening tests starting at age 21.  Breast cancer scan (mammogram): If you've ever had breasts, begin having regular mammograms starting at age 40. This is a scan to check for breast cancer.  Colon cancer screening: It is important to start screening for colon cancer at age 45.  Have a colonoscopy test every 10 years (or more often if you're at risk) Or, ask your provider about stool tests like a FIT test every year or Cologuard test every 3 years.  To learn more about your testing options, visit:   .  For help making a decision, visit:   https://bit.ly/em74111.  Prostate cancer screening test: If you have a prostate, ask your care team if a prostate cancer screening test (PSA) at age 55 is right for you.  Lung cancer screening: If you are a current or former smoker ages 50 to 80, ask your care team if ongoing lung cancer screenings are right for you.  For informational purposes only. Not to replace the advice of your health care provider. Copyright   2023 [facility]. All rights reserved. Clinically reviewed by the [facility]. [employer] 581152 - REV 01/24.  Learning About Stress  What is stress?     Stress is your body's response to a hard situation. Your body can have a physical, emotional, or mental response. Stress is a fact of life for most people, and it  affects everyone differently. What causes stress for you may not be stressful for someone else.  A lot of things can cause stress. You may feel stress when you go on a job interview, take a test, or run a race. This kind of short-term stress is normal and even useful. It can help you if you need to work hard or react quickly. For example, stress can help you finish an important job on time.  Long-term stress is caused by ongoing stressful situations or events. Examples of long-term stress include long-term health problems, ongoing problems at work, or conflicts in your family. Long-term stress can harm your health.  How does stress affect your health?  When you are stressed, your body responds as though you are in danger. It makes hormones that speed up your heart, make you breathe faster, and give you a burst of energy. This is called the fight-or-flight stress response. If the stress is over quickly, your body goes back to normal and no harm is done.  But if stress happens too often or lasts too long, it can have bad effects. Long-term stress can make you more likely to get sick, and it can make symptoms of some diseases worse. If you tense up when you are stressed, you may develop neck, shoulder, or low back pain. Stress is linked to high blood pressure and heart disease.  Stress also harms your emotional health. It can make you bowens, tense, or depressed. Your relationships may suffer, and you may not do well at work or school.  What can you do to manage stress?  You can try these things to help manage stress:   Do something active. Exercise or activity can help reduce stress. Walking is a great way to get started. Even everyday activities such as housecleaning or yard work can help.  Try yoga or rona chi. These techniques combine exercise and meditation. You may need some training at first to learn them.  Do something you enjoy. For example, listen to music or go to a movie. Practice your hobby or do volunteer  "work.  Meditate. This can help you relax, because you are not worrying about what happened before or what may happen in the future.  Do guided imagery. Imagine yourself in any setting that helps you feel calm. You can use online videos, books, or a teacher to guide you.  Do breathing exercises. For example:  From a standing position, bend forward from the waist with your knees slightly bent. Let your arms dangle close to the floor.  Breathe in slowly and deeply as you return to a standing position. Roll up slowly and lift your head last.  Hold your breath for just a few seconds in the standing position.  Breathe out slowly and bend forward from the waist.  Let your feelings out. Talk, laugh, cry, and express anger when you need to. Talking with supportive friends or family, a counselor, or a kelli leader about your feelings is a healthy way to relieve stress. Avoid discussing your feelings with people who make you feel worse.  Write. It may help to write about things that are bothering you. This helps you find out how much stress you feel and what is causing it. When you know this, you can find better ways to cope.  What can you do to prevent stress?  You might try some of these things to help prevent stress:  Manage your time. This helps you find time to do the things you want and need to do.  Get enough sleep. Your body recovers from the stresses of the day while you are sleeping.  Get support. Your family, friends, and community can make a difference in how you experience stress.  Limit your news feed. Avoid or limit time on social media or news that may make you feel stressed.  Do something active. Exercise or activity can help reduce stress. Walking is a great way to get started.  Where can you learn more?  Go to https://www.healthwise.net/patiented  Enter N032 in the search box to learn more about \"Learning About Stress.\"  Current as of: October 24, 2023               Content Version: 14.0    6401-9122 " [employer].   Care instructions adapted under license by your healthcare professional. If you have questions about a medical condition or this instruction, always ask your healthcare professional. [employer] disclaims any warranty or liability for your use of this information.      Learning About Depression Screening  What is depression screening?  Depression screening is a way to see if you have depression symptoms. It may be done by a doctor or counselor. It's often part of a routine checkup. That's because your mental health is just as important as your physical health.  Depression is a mental health condition that affects how you feel, think, and act. You may:  Have less energy.  Lose interest in your daily activities.  Feel sad and grouchy for a long time.  Depression is very common. It affects people of all ages.  Many things can lead to depression. Some people become depressed after they have a stroke or find out they have a major illness like cancer or heart disease. The death of a loved one or a breakup may lead to depression. It can run in families. Most experts believe that a combination of inherited genes and stressful life events can cause it.  What happens during screening?  You may be asked to fill out a form about your depression symptoms. You and the doctor will discuss your answers. The doctor may ask you more questions to learn more about how you think, act, and feel.  What happens after screening?  If you have symptoms of depression, your doctor will talk to you about your options.  Doctors usually treat depression with medicines or counseling. Often, combining the two works best. Many people don't get help because they think that they'll get over the depression on their own. But people with depression may not get better unless they get treatment.  The cause of depression is not well understood. There may be many factors involved. But if you have depression, it's not  "your fault.  A serious symptom of depression is thinking about death or suicide. If you or someone you care about talks about this or about feeling hopeless, get help right away.  It's important to know that depression can be treated. Medicine, counseling, and self-care may help.  Where can you learn more?  Go to https://www.[employer].net/patiented  Enter T185 in the search box to learn more about \"Learning About Depression Screening.\"  Current as of: June 24, 2023  Content Version: 14.1 2006-2024 [employer].   Care instructions adapted under license by your healthcare professional. If you have questions about a medical condition or this instruction, always ask your healthcare professional. [employer] disclaims any warranty or liability for your use of this information.       "

## 2024-09-25 ENCOUNTER — MYC MEDICAL ADVICE (OUTPATIENT)
Dept: FAMILY MEDICINE | Facility: CLINIC | Age: 44
End: 2024-09-25
Payer: COMMERCIAL

## 2024-09-25 LAB
ALBUMIN SERPL BCG-MCNC: 3.6 G/DL (ref 3.5–5.2)
ALP SERPL-CCNC: 82 U/L (ref 40–150)
ALT SERPL W P-5'-P-CCNC: 21 U/L (ref 0–50)
ANION GAP SERPL CALCULATED.3IONS-SCNC: 12 MMOL/L (ref 7–15)
AST SERPL W P-5'-P-CCNC: 22 U/L (ref 0–45)
BILIRUB SERPL-MCNC: 0.2 MG/DL
BUN SERPL-MCNC: 8.3 MG/DL (ref 6–20)
CALCIUM SERPL-MCNC: 8.9 MG/DL (ref 8.8–10.4)
CHLORIDE SERPL-SCNC: 99 MMOL/L (ref 98–107)
CHOLEST SERPL-MCNC: 225 MG/DL
CREAT SERPL-MCNC: 0.65 MG/DL (ref 0.51–0.95)
EGFRCR SERPLBLD CKD-EPI 2021: >90 ML/MIN/1.73M2
FASTING STATUS PATIENT QL REPORTED: YES
FASTING STATUS PATIENT QL REPORTED: YES
GLUCOSE SERPL-MCNC: 83 MG/DL (ref 70–99)
HCO3 SERPL-SCNC: 25 MMOL/L (ref 22–29)
HDLC SERPL-MCNC: 61 MG/DL
LDLC SERPL CALC-MCNC: 142 MG/DL
NONHDLC SERPL-MCNC: 164 MG/DL
POTASSIUM SERPL-SCNC: 4.1 MMOL/L (ref 3.4–5.3)
PROT SERPL-MCNC: 7.4 G/DL (ref 6.4–8.3)
SODIUM SERPL-SCNC: 136 MMOL/L (ref 135–145)
TRIGL SERPL-MCNC: 110 MG/DL

## 2024-09-27 LAB — PYRIDOXAL PHOS SERPL-SCNC: 19.3 NMOL/L

## 2024-09-27 NOTE — TELEPHONE ENCOUNTER
RN replied to patient via [employer]. See message for details.     Tony Hallman RN, BSN, PHN  [facility]: [place]

## 2024-09-29 RX ORDER — VITAMIN B COMPLEX
1 CAPSULE ORAL DAILY
Qty: 90 CAPSULE | Refills: 0 | Status: SHIPPED | OUTPATIENT
Start: 2024-09-29

## 2024-09-30 ENCOUNTER — MYC MEDICAL ADVICE (OUTPATIENT)
Dept: FAMILY MEDICINE | Facility: CLINIC | Age: 44
End: 2024-09-30
Payer: COMMERCIAL

## 2024-09-30 NOTE — TELEPHONE ENCOUNTER
Rebekaht sent.    Roge Cool, RN  Triage Nurse  [facility], [facility]

## 2024-10-23 ASSESSMENT — PATIENT HEALTH QUESTIONNAIRE - PHQ9: SUM OF ALL RESPONSES TO PHQ QUESTIONS 1-9: 9

## 2024-11-06 ENCOUNTER — E-VISIT (OUTPATIENT)
Dept: URGENT CARE | Facility: CLINIC | Age: 44
End: 2024-11-06
Payer: COMMERCIAL

## 2024-11-06 DIAGNOSIS — R05.1 ACUTE COUGH: Primary | ICD-10-CM

## 2024-11-06 PROCEDURE — 99207 PR NON-BILLABLE SERV PER CHARTING: CPT | Performed by: NURSE PRACTITIONER

## 2024-11-06 NOTE — PATIENT INSTRUCTIONS
Dear Danyelle Canales,    We are sorry you are not feeling well. Based on the responses you provided, it is recommended that you be seen in-person in urgent care so we can better evaluate your symptoms. Please click here to find the nearest urgent care location to you.   You will not be charged for this Visit. Thank you for trusting us with your care.    Danyelle King, CNP  We can not give out narcotics on this platform. If you feel your symptoms have gotten worse, it is best to be checked in person.

## 2024-11-13 DIAGNOSIS — Z30.41 ORAL CONTRACEPTIVE PILL SURVEILLANCE: ICD-10-CM

## 2024-11-14 ENCOUNTER — MYC REFILL (OUTPATIENT)
Dept: FAMILY MEDICINE | Facility: CLINIC | Age: 44
End: 2024-11-14
Payer: COMMERCIAL

## 2024-11-14 DIAGNOSIS — Z30.41 ORAL CONTRACEPTIVE PILL SURVEILLANCE: ICD-10-CM

## 2024-11-15 RX ORDER — NORETHINDRONE ACETATE AND ETHINYL ESTRADIOL .02; 1 MG/1; MG/1
1 TABLET ORAL DAILY
Qty: 90 TABLET | Refills: 2 | Status: SHIPPED | OUTPATIENT
Start: 2024-11-15

## 2024-11-15 RX ORDER — NORETHINDRONE ACETATE AND ETHINYL ESTRADIOL .02; 1 MG/1; MG/1
1 TABLET ORAL DAILY
Qty: 63 TABLET | Refills: 3 | OUTPATIENT
Start: 2024-11-15

## 2024-11-20 ENCOUNTER — HOSPITAL ENCOUNTER (OUTPATIENT)
Dept: GENERAL RADIOLOGY | Facility: HOSPITAL | Age: 44
Discharge: HOME | End: 2024-11-20
Attending: PHYSICIAN ASSISTANT
Payer: COMMERCIAL

## 2024-11-20 ENCOUNTER — OFFICE VISIT (OUTPATIENT)
Dept: URGENT CARE | Facility: URGENT CARE | Age: 44
End: 2024-11-20
Payer: COMMERCIAL

## 2024-11-20 VITALS
RESPIRATION RATE: 18 BRPM | WEIGHT: 222 LBS | HEART RATE: 69 BPM | BODY MASS INDEX: 35.29 KG/M2 | SYSTOLIC BLOOD PRESSURE: 144 MMHG | OXYGEN SATURATION: 98 % | DIASTOLIC BLOOD PRESSURE: 86 MMHG | TEMPERATURE: 99.6 F

## 2024-11-20 DIAGNOSIS — R05.1 ACUTE COUGH: Primary | ICD-10-CM

## 2024-11-20 LAB
ANION GAP SERPL CALCULATED.3IONS-SCNC: 9 MMOL/L (ref 7–15)
BASOPHILS # BLD AUTO: 0.1 10E3/UL (ref 0–0.2)
BASOPHILS NFR BLD AUTO: 1 %
BUN SERPL-MCNC: 10.1 MG/DL (ref 6–20)
CALCIUM SERPL-MCNC: 8.6 MG/DL (ref 8.8–10.4)
CHLORIDE SERPL-SCNC: 103 MMOL/L (ref 98–107)
CREAT SERPL-MCNC: 0.76 MG/DL (ref 0.51–0.95)
EGFRCR SERPLBLD CKD-EPI 2021: >90 ML/MIN/1.73M2
EOSINOPHIL # BLD AUTO: 0.2 10E3/UL (ref 0–0.7)
EOSINOPHIL NFR BLD AUTO: 2 %
ERYTHROCYTE [DISTWIDTH] IN BLOOD BY AUTOMATED COUNT: 13.3 % (ref 10–15)
GLUCOSE SERPL-MCNC: 88 MG/DL (ref 70–99)
HCO3 SERPL-SCNC: 28 MMOL/L (ref 22–29)
HCT VFR BLD AUTO: 37.9 % (ref 35–47)
HGB BLD-MCNC: 11.8 G/DL (ref 11.7–15.7)
IMM GRANULOCYTES # BLD: 0 10E3/UL
IMM GRANULOCYTES NFR BLD: 0 %
LYMPHOCYTES # BLD AUTO: 3.4 10E3/UL (ref 0.8–5.3)
LYMPHOCYTES NFR BLD AUTO: 33 %
MCH RBC QN AUTO: 26 PG (ref 26.5–33)
MCHC RBC AUTO-ENTMCNC: 31.1 G/DL (ref 31.5–36.5)
MCV RBC AUTO: 84 FL (ref 78–100)
MONOCYTES # BLD AUTO: 0.5 10E3/UL (ref 0–1.3)
MONOCYTES NFR BLD AUTO: 5 %
NEUTROPHILS # BLD AUTO: 6.2 10E3/UL (ref 1.6–8.3)
NEUTROPHILS NFR BLD AUTO: 60 %
PLATELET # BLD AUTO: 316 10E3/UL (ref 150–450)
POTASSIUM SERPL-SCNC: 4.1 MMOL/L (ref 3.4–5.3)
RBC # BLD AUTO: 4.53 10E6/UL (ref 3.8–5.2)
SODIUM SERPL-SCNC: 140 MMOL/L (ref 135–145)
WBC # BLD AUTO: 10.4 10E3/UL (ref 4–11)

## 2024-11-20 PROCEDURE — 71046 X-RAY EXAM CHEST 2 VIEWS: CPT

## 2024-11-20 RX ORDER — AZITHROMYCIN 500 MG/1
500 TABLET, FILM COATED ORAL DAILY
Qty: 5 TABLET | Refills: 0 | Status: SHIPPED | OUTPATIENT
Start: 2024-11-20 | End: 2024-11-25

## 2024-11-20 RX ORDER — BENZONATATE 100 MG/1
100 CAPSULE ORAL 3 TIMES DAILY PRN
Qty: 30 CAPSULE | Refills: 0 | Status: SHIPPED | OUTPATIENT
Start: 2024-11-20 | End: 2024-11-30

## 2024-11-20 RX ORDER — ALBUTEROL SULFATE 90 UG/1
2 INHALANT RESPIRATORY (INHALATION) EVERY 6 HOURS
Qty: 18 G | Refills: 0 | Status: SHIPPED | OUTPATIENT
Start: 2024-11-20 | End: 2024-12-20

## 2024-11-21 LAB
B PARAPERT DNA SPEC QL NAA+PROBE: NOT DETECTED
B PERT DNA SPEC QL NAA+PROBE: NOT DETECTED

## 2024-11-21 NOTE — PROGRESS NOTES
Patient presents with:  Cough: 5 weeks cough and chest congestion.       Clinical Decision Making:  CBC was within normal limits, chest x-ray did not show infiltrate or consolidation and pertussis screening test is pending.  Patient is treated with respiratory antibiotic with refill of albuterol inhaler and Tessalon Perles for cough. Expected course of resolution and indication for return was gone over and questions were answered to patient/parent's satisfaction before discharge.          ICD-10-CM    1. Acute cough  R05.1 CBC with Platelets & Differential     Basic metabolic panel     XR Chest 2 Views     B. pertussis/parapertussis PCR-NP     albuterol (PROAIR HFA/PROVENTIL HFA/VENTOLIN HFA) 108 (90 Base) MCG/ACT inhaler     azithromycin (ZITHROMAX) 500 MG tablet     benzonatate (TESSALON) 100 MG capsule          Patient Instructions   You were seen today for acute bronchitis. This is likely due to a viral illness.    Symptom management:  - Get plenty of rest  - Avoid smoking and second hand smoke  - May take tylenol or ibuprofen for fever/discomfort  - Drink plenty of non-caffeinated fluids  - Use nasal steroid spray for sinus congestion  - Albuterol inhaler may be used every 6 hours as needed for chest tightness      Reasons to be seen in the emergency room:  - Develop a fever of 100.4 or higher  - Cough changes, coughing up blood, or become short of breath  - Neck stiffness  - Chest pain  - Severe headache  - Unable to tolerate eating or drinking fluids    Otherwise, if no symptom improvement after 5 days, follow-up with your primary care provider.        HPI:  Danyelle Canales is a 44 year old female who presents today for evaluation and 2-day history of sore throat and ear pain and pressure.  However she has had a 5-week persisting history of worsening cough and congestion.  Cough has had a few bouts of tussive emesis.  She has not had shortness of breath fever chills night sweats vomiting or diarrhea.  She  has had exposure to her son who has had mycoplasma pneumonia.  She has been using albuterol but has ran out and needs a refill of her albuterol inhaler.  No reported vomiting diarrhea or anorexia.    History obtained from chart review and the patient.    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  2023-10: Left inguinal hernia  2023-10: Status post left inguinal hernia repair  2023: Benign essential hypertension  2023: Left lumbar radiculopathy  2021: Mild recurrent major depression (H)  2021: Class 1 obesity due to excess calories with serious   comorbidity and body mass index (BMI) of 34.0 to 34.9 in adult  2021: Status post right inguinal hernia repair  2021: Right inguinal hernia  2020:  (spontaneous vaginal delivery)  2020: Term pregnancy  2019-10: Myalgia and myositis, unspecified  2019-10: Need for Tdap vaccination  2018: Morbid obesity (H)  2017: Major depressive disorder with single episode, in partial   remission (H)  2017-10: Benign hypermobility syndrome  2016: Myofascial pain  2015: Obesity  2014: Esophageal reflux  2012: Chronic pain  2012: ASD (atrial septal defect)  2011: Chronic pain syndrome  2011-10: Generalized anxiety disorder  2011: Moderate major depression (H)  2011: History of ovarian cyst  2010: CARDIOVASCULAR SCREENING; LDL GOAL LESS THAN 160  2009: Trichotillomania  2009: Anxiety  2009: PMDD (premenstrual dysphoric disorder)  2008: Migraine headache  -: Cervicalgia  -: Pain in thoracic spine  2007-10: [facility]  2007-10: [facility] SPRAIN OF NECK  -: Cervicalgia  -: Lumbago  -: Encounter for other general counseling or advice on   contraception  2007: Cervicalgia  -: iaERVICALGIA  2006-04: Headache  -08:  iamCERVICALGIA  2004-11: allergic DERMATITIS NOS  2004-07: Persistent insomnia      Past Medical History:   Diagnosis Date    ASD (atrial septal defect)     repaired surgically as a child    Benign essential hypertension 07/19/2023    Depressive disorder     Headache     History of blood transfusion 1985 1985 during open heart surgery    History of ovarian cyst     Insomnia, unspecified     Other forms of migraine, without mention of intractable migraine without mention of status migrainosus     Right inguinal hernia        Social History     Tobacco Use    Smoking status: Never     Passive exposure: Never    Smokeless tobacco: Never   Substance Use Topics    Alcohol use: Not Currently     Comment: 1-2 a month       Review of Systems  As above in HPI otherwise negative.    Vitals:    11/20/24 1801   BP: (!) 144/86   Pulse: 69   Resp: 18   Temp: 99.6  F (37.6  C)   SpO2: 98%   Weight: 100.7 kg (222 lb)       General: Patient is resting comfortably no acute distress is afebrile  HEENT: Head is normocephalic atraumatic   eyes are PERRL EOMI sclera anicteric   TMs are clear bilaterally  Throat is with mild pharyngeal wall erythema and no exudate  No cervical lymphadenopathy present  LUNGS: Right mid to lower lung field is with decreased breath sounds and slight prolonged expiratory wheezes in the right lower lung field normal respiratory effort and excursion  HEART: Regular rate and rhythm  Skin: Without rash non-diaphoretic    Physical Exam      Labs:  Results for orders placed or performed in visit on 11/20/24   XR Chest 2 Views     Status: None    Narrative    EXAM: XR CHEST 2 VIEWS  LOCATION: [facility]  DATE: 11/20/2024    INDICATION: cough x 5 weeks  COMPARISON: Chest radiograph 10/31/2024      Impression    IMPRESSION: No focal airspace opacity. No pleural effusion or pneumothorax. Stable heart size and mediastinal contours. Sternotomy.   CBC with platelets and differential     Status:  Abnormal   Result Value Ref Range    WBC Count 10.4 4.0 - 11.0 10e3/uL    RBC Count 4.53 3.80 - 5.20 10e6/uL    Hemoglobin 11.8 11.7 - 15.7 g/dL    Hematocrit 37.9 35.0 - 47.0 %    MCV 84 78 - 100 fL    MCH 26.0 (L) 26.5 - 33.0 pg    MCHC 31.1 (L) 31.5 - 36.5 g/dL    RDW 13.3 10.0 - 15.0 %    Platelet Count 316 150 - 450 10e3/uL    % Neutrophils 60 %    % Lymphocytes 33 %    % Monocytes 5 %    % Eosinophils 2 %    % Basophils 1 %    % Immature Granulocytes 0 %    Absolute Neutrophils 6.2 1.6 - 8.3 10e3/uL    Absolute Lymphocytes 3.4 0.8 - 5.3 10e3/uL    Absolute Monocytes 0.5 0.0 - 1.3 10e3/uL    Absolute Eosinophils 0.2 0.0 - 0.7 10e3/uL    Absolute Basophils 0.1 0.0 - 0.2 10e3/uL    Absolute Immature Granulocytes 0.0 <=0.4 10e3/uL   CBC with Platelets & Differential     Status: Abnormal    Narrative    The following orders were created for panel order CBC with Platelets & Differential.  Procedure                               Abnormality         Status                     ---------                               -----------         ------                     CBC with platelets and d...[[id number]]  Abnormal            Final result                 Please view results for these tests on the individual orders.       Radiology:  I have personally ordered and preliminarily reviewed the following xray, I have noted no acute infiltrate or consolidation    At the end of the encounter, I discussed results, diagnosis, medications. Discussed red flags for immediate return to clinic/ER, as well as indications for follow up if no improvement. Patient understood and agreed to plan. Patient was stable for discharge.

## 2024-11-26 ENCOUNTER — MYC MEDICAL ADVICE (OUTPATIENT)
Dept: FAMILY MEDICINE | Facility: CLINIC | Age: 44
End: 2024-11-26
Payer: COMMERCIAL

## 2024-11-26 DIAGNOSIS — G89.29 NECK PAIN, CHRONIC: ICD-10-CM

## 2024-11-26 DIAGNOSIS — M54.6 CHRONIC THORACIC BACK PAIN, UNSPECIFIED BACK PAIN LATERALITY: ICD-10-CM

## 2024-11-26 DIAGNOSIS — M54.2 NECK PAIN, CHRONIC: ICD-10-CM

## 2024-11-26 DIAGNOSIS — G89.29 CHRONIC THORACIC BACK PAIN, UNSPECIFIED BACK PAIN LATERALITY: ICD-10-CM

## 2024-11-26 DIAGNOSIS — G89.4 CHRONIC PAIN SYNDROME: ICD-10-CM

## 2024-11-26 RX ORDER — HYDROCODONE BITARTRATE AND ACETAMINOPHEN 5; 325 MG/1; MG/1
1 TABLET ORAL EVERY 8 HOURS PRN
Qty: 10 TABLET | Refills: 0 | Status: SHIPPED | OUTPATIENT
Start: 2024-11-26

## 2024-11-26 NOTE — TELEPHONE ENCOUNTER
Routing message to provider    Pt needing refill of Hydrocodone-acetaminophen due to rescheduling of cervical branch block.     On 9/24 looks like pt received 10 tablets.     Pharmacy pended.    Thank you,  Kerri Stahl RN

## 2024-12-02 DIAGNOSIS — R05.9 COUGH: Primary | ICD-10-CM

## 2024-12-10 ENCOUNTER — OFFICE VISIT (OUTPATIENT)
Dept: FAMILY MEDICINE | Facility: CLINIC | Age: 44
End: 2024-12-10
Payer: COMMERCIAL

## 2024-12-10 VITALS
DIASTOLIC BLOOD PRESSURE: 84 MMHG | TEMPERATURE: 98.4 F | SYSTOLIC BLOOD PRESSURE: 128 MMHG | HEART RATE: 68 BPM | OXYGEN SATURATION: 97 %

## 2024-12-10 DIAGNOSIS — R05.3 CHRONIC COUGH: Primary | ICD-10-CM

## 2024-12-10 LAB
C PNEUM DNA SPEC QL NAA+PROBE: NOT DETECTED
FLUAV H1 2009 PAND RNA SPEC QL NAA+PROBE: NOT DETECTED
FLUAV H1 RNA SPEC QL NAA+PROBE: NOT DETECTED
FLUAV H3 RNA SPEC QL NAA+PROBE: NOT DETECTED
FLUAV RNA SPEC QL NAA+PROBE: NOT DETECTED
FLUBV RNA SPEC QL NAA+PROBE: NOT DETECTED
HADV DNA SPEC QL NAA+PROBE: NOT DETECTED
HCOV PNL SPEC NAA+PROBE: NOT DETECTED
HMPV RNA SPEC QL NAA+PROBE: NOT DETECTED
HPIV1 RNA SPEC QL NAA+PROBE: NOT DETECTED
HPIV2 RNA SPEC QL NAA+PROBE: NOT DETECTED
HPIV3 RNA SPEC QL NAA+PROBE: NOT DETECTED
HPIV4 RNA SPEC QL NAA+PROBE: NOT DETECTED
M PNEUMO DNA SPEC QL NAA+PROBE: NOT DETECTED
RSV RNA SPEC QL NAA+PROBE: NOT DETECTED
RSV RNA SPEC QL NAA+PROBE: NOT DETECTED
RV+EV RNA SPEC QL NAA+PROBE: NOT DETECTED

## 2024-12-10 PROCEDURE — 87633 RESP VIRUS 12-25 TARGETS: CPT

## 2024-12-10 PROCEDURE — 87486 CHLMYD PNEUM DNA AMP PROBE: CPT

## 2024-12-10 PROCEDURE — 99213 OFFICE O/P EST LOW 20 MIN: CPT

## 2024-12-10 PROCEDURE — 87581 M.PNEUMON DNA AMP PROBE: CPT

## 2024-12-10 RX ORDER — INHALER, ASSIST DEVICES
SPACER (EA) MISCELLANEOUS
Qty: 1 EACH | Refills: 0 | Status: SHIPPED | OUTPATIENT
Start: 2024-12-10

## 2024-12-10 RX ORDER — FLUTICASONE PROPIONATE 44 UG/1
1 AEROSOL, METERED RESPIRATORY (INHALATION) 2 TIMES DAILY
Qty: 10.6 G | Refills: 1 | Status: SHIPPED | OUTPATIENT
Start: 2024-12-10

## 2024-12-10 RX ORDER — PREDNISONE 20 MG/1
TABLET ORAL
Qty: 9 TABLET | Refills: 0 | Status: SHIPPED | OUTPATIENT
Start: 2024-12-10 | End: 2024-12-17

## 2024-12-10 RX ORDER — CODEINE PHOSPHATE AND GUAIFENESIN 10; 100 MG/5ML; MG/5ML
1-2 SOLUTION ORAL EVERY 4 HOURS PRN
Qty: 118 ML | Refills: 0 | Status: SHIPPED | OUTPATIENT
Start: 2024-12-10

## 2024-12-10 ASSESSMENT — PAIN SCALES - GENERAL: PAINLEVEL_OUTOF10: NO PAIN (0)

## 2024-12-10 ASSESSMENT — ENCOUNTER SYMPTOMS: COUGH: 1

## 2024-12-10 NOTE — PROGRESS NOTES
Assessment & Plan     Chronic cough  Ongoing chronic cough that has persisted for the past 8 weeks without an identifiable cause.  Reviewing her past urgent care visit notes and blood work that has been done.  Bordetella was negative on 11/20/2024.  Overall exam is fairly normal today.  She is hemodynamically stable.  She is afebrile.  She does have a frequent wet sounding cough, though her lung sounds are clear without rhonchi, rales, wheezing.  Will obtain a full respiratory panel today which includes atypical pneumonia and other viral illnesses.  Differentials today include a reactive airway disease, viral cough, acid reflux, versus lisinopril.  Her cough is not typical of acid reflux for the side effect with lisinopril, though we will keep it on the differential list in case nothing is found.  I do suspect she has some reactive airway disease and appreciate pulmonology input.  Will start her on it oral and inhaled steroid today.  Provided her small dose of cough syrup today to give her some relief.  Will follow-up with patient on respiratory swab results if there is an change in any management.  Plan to have her follow-up with pulmonology as scheduled in a month.  - fluticasone (FLOVENT HFA) 44 MCG/ACT inhaler; Inhale 1 puff into the lungs 2 times daily.  - spacer (OPTICHAMBER BROOK) holding chamber; Dispense spacer with inhaler  - predniSONE (DELTASONE) 20 MG tablet; Take 2 tablets (40 mg) by mouth daily for 2 days, THEN 1 tablet (20 mg) daily for 5 days.  - guaiFENesin-codeine (ROBITUSSIN AC) 100-10 MG/5ML solution; Take 5-10 mLs by mouth every 4 hours as needed for cough.  - Respiratory Panel PCR        Follow-up if symptoms worsen.    Subjective   Danyelle is a 44 year old, presenting for the following health issues:  Cough (Cough that has not gone away. Would like to find out what is going on. Been on couple antibiotic. )        9/24/2024    10:47 AM   Additional Questions   Roomed by Sulaiman OREILLY       Via  the Health Maintenance questionnaire, the patient has reported the following services have been completed -Mammogram: [place] 2023-06-18, this information has not been sent to the abstraction team.  History of Present Illness       Reason for visit:  Cough lasting 8 weeks now    She eats 2-3 servings of fruits and vegetables daily.She consumes 0 sweetened beverage(s) daily.She exercises with enough effort to increase her heart rate 9 or less minutes per day.  She exercises with enough effort to increase her heart rate 3 or less days per week.   She is taking medications regularly.     Ongoing cough for the past 8+ weeks.   Watery cough- nothing productive.   Frequent cough  Negative pertussis   Tessalon pearls- not effective  Albuterol was helping when cough was more frequent   Will vomit when she is coughing so much  She has had 2 urgent care visits for this cough in the past 8 weeks.  She was given azithromycin at the last visit.  Her chest x-ray at that time was negative for any pneumonia.  No fevers or night sweats.   No abdominal pain  She has no ear pain, no sinus pain, no headaches  She did get a referral for pulmonology consult, she is not able to get in until January 14.      No history of asthma or allergies. Occasionally takes Claritin for itchy throat.           Review of Systems  Detailed as above      Objective    /84   Pulse 68   Temp 98.4  F (36.9  C) (Oral)   SpO2 97%   There is no height or weight on file to calculate BMI.  Physical Exam  Constitutional:       Appearance: Normal appearance.   HENT:      Head: Normocephalic.   Eyes:      Pupils: Pupils are equal, round, and reactive to light.   Cardiovascular:      Rate and Rhythm: Normal rate and regular rhythm.   Pulmonary:      Effort: Pulmonary effort is normal.      Breath sounds: Normal breath sounds.      Comments: Frequent wet sounding cough  Abdominal:      General: Abdomen is flat. Bowel sounds are normal.      Palpations:  Abdomen is soft.   Musculoskeletal:         General: Normal range of motion.   Neurological:      General: No focal deficit present.      Mental Status: She is alert.                    Signed Electronically by: ASHLEY Salvador CNP

## 2024-12-10 NOTE — PROGRESS NOTES
{PROVIDER CHARTING PREFERENCE:896863}    Alex Bassett is a 44 year old, presenting for the following health issues:  Cough (Cough that has not gone away. Would like to find out what is going on. Been on couple antibiotic. )  {(!) Visit Details have not yet been documented.  Please enter Visit Details and then use this list to pull in documentation. (Optional):663780}    Via the Health Maintenance questionnaire, the patient has reported the following services have been completed -Mammogram: [place] 2023-06-18, this information has not been sent to the abstraction team.  Cough    History of Present Illness       Reason for visit:  Cough lasting 8 weeks now    She eats 2-3 servings of fruits and vegetables daily.She consumes 0 sweetened beverage(s) daily.She exercises with enough effort to increase her heart rate 9 or less minutes per day.  She exercises with enough effort to increase her heart rate 3 or less days per week.   She is taking medications regularly.       {MA/LPN/RN Pre-Provider Visit Orders- hCG/UA/Strep (Optional):841204}  {SUPERLIST (Optional):137466}  {additonal problems for provider to add (Optional):181227}    {ROS Picklists (Optional):065213}      Objective    /84   Pulse 68   Temp 98.4  F (36.9  C) (Oral)   SpO2 97%   There is no height or weight on file to calculate BMI.  Physical Exam   {Exam List (Optional):235042}    {Diagnostic Test Results (Optional):365441}        Signed Electronically by: Cheri Calderon, ASHLEY CNP  {Email feedback regarding this note to [email or web address]   :[id number]}

## 2024-12-16 ENCOUNTER — LAB (OUTPATIENT)
Dept: LAB | Facility: CLINIC | Age: 44
End: 2024-12-16
Payer: COMMERCIAL

## 2024-12-16 DIAGNOSIS — E53.1 PYRIDOXINE DEFICIENCY: ICD-10-CM

## 2024-12-16 PROCEDURE — 36415 COLL VENOUS BLD VENIPUNCTURE: CPT

## 2024-12-16 PROCEDURE — 84207 ASSAY OF VITAMIN B-6: CPT | Mod: 90

## 2024-12-16 PROCEDURE — 99000 SPECIMEN HANDLING OFFICE-LAB: CPT

## 2024-12-18 ENCOUNTER — HOSPITAL ENCOUNTER (EMERGENCY)
Facility: CLINIC | Age: 44
Discharge: HOME | End: 2024-12-18
Attending: EMERGENCY MEDICINE | Admitting: EMERGENCY MEDICINE
Payer: COMMERCIAL

## 2024-12-18 VITALS
HEART RATE: 66 BPM | TEMPERATURE: 98.5 F | WEIGHT: 220 LBS | BODY MASS INDEX: 34.53 KG/M2 | OXYGEN SATURATION: 97 % | SYSTOLIC BLOOD PRESSURE: 157 MMHG | DIASTOLIC BLOOD PRESSURE: 83 MMHG | RESPIRATION RATE: 18 BRPM | HEIGHT: 67 IN

## 2024-12-18 DIAGNOSIS — M54.6 ACUTE MIDLINE THORACIC BACK PAIN: Primary | ICD-10-CM

## 2024-12-18 DIAGNOSIS — M54.42 ACUTE BILATERAL LOW BACK PAIN WITH BILATERAL SCIATICA: ICD-10-CM

## 2024-12-18 DIAGNOSIS — M54.41 ACUTE BILATERAL LOW BACK PAIN WITH BILATERAL SCIATICA: ICD-10-CM

## 2024-12-18 LAB
ALBUMIN UR-MCNC: NEGATIVE MG/DL
ANION GAP SERPL CALCULATED.3IONS-SCNC: 8 MMOL/L (ref 7–15)
APPEARANCE UR: CLEAR
BASOPHILS # BLD AUTO: 0.1 10E3/UL (ref 0–0.2)
BASOPHILS NFR BLD AUTO: 1 %
BILIRUB UR QL STRIP: NEGATIVE
BUN SERPL-MCNC: 7.7 MG/DL (ref 6–20)
CALCIUM SERPL-MCNC: 9.3 MG/DL (ref 8.8–10.4)
CHLORIDE SERPL-SCNC: 103 MMOL/L (ref 98–107)
COLOR UR AUTO: YELLOW
CREAT SERPL-MCNC: 0.75 MG/DL (ref 0.51–0.95)
CRP SERPL-MCNC: 18.4 MG/L
EGFRCR SERPLBLD CKD-EPI 2021: >90 ML/MIN/1.73M2
EOSINOPHIL # BLD AUTO: 0.2 10E3/UL (ref 0–0.7)
EOSINOPHIL NFR BLD AUTO: 3 %
ERYTHROCYTE [DISTWIDTH] IN BLOOD BY AUTOMATED COUNT: 14 % (ref 10–15)
ERYTHROCYTE [SEDIMENTATION RATE] IN BLOOD BY WESTERGREN METHOD: 19 MM/HR (ref 0–20)
GLUCOSE SERPL-MCNC: 92 MG/DL (ref 70–99)
GLUCOSE UR STRIP-MCNC: NEGATIVE MG/DL
HCO3 SERPL-SCNC: 29 MMOL/L (ref 22–29)
HCT VFR BLD AUTO: 40.2 % (ref 35–47)
HGB BLD-MCNC: 12.7 G/DL (ref 11.7–15.7)
HGB UR QL STRIP: NEGATIVE
IMM GRANULOCYTES # BLD: 0 10E3/UL
IMM GRANULOCYTES NFR BLD: 1 %
KETONES UR STRIP-MCNC: NEGATIVE MG/DL
LEUKOCYTE ESTERASE UR QL STRIP: NEGATIVE
LYMPHOCYTES # BLD AUTO: 2.8 10E3/UL (ref 0.8–5.3)
LYMPHOCYTES NFR BLD AUTO: 32 %
MCH RBC QN AUTO: 26.1 PG (ref 26.5–33)
MCHC RBC AUTO-ENTMCNC: 31.6 G/DL (ref 31.5–36.5)
MCV RBC AUTO: 83 FL (ref 78–100)
MONOCYTES # BLD AUTO: 0.4 10E3/UL (ref 0–1.3)
MONOCYTES NFR BLD AUTO: 5 %
MUCOUS THREADS #/AREA URNS LPF: PRESENT /LPF
NEUTROPHILS # BLD AUTO: 5.2 10E3/UL (ref 1.6–8.3)
NEUTROPHILS NFR BLD AUTO: 60 %
NITRATE UR QL: NEGATIVE
NRBC # BLD AUTO: 0 10E3/UL
NRBC BLD AUTO-RTO: 0 /100
PH UR STRIP: 6 [PH] (ref 5–7)
PLATELET # BLD AUTO: 342 10E3/UL (ref 150–450)
POTASSIUM SERPL-SCNC: 4.4 MMOL/L (ref 3.4–5.3)
PROCALCITONIN SERPL IA-MCNC: <0.02 NG/ML
RBC # BLD AUTO: 4.86 10E6/UL (ref 3.8–5.2)
RBC URINE: 1 /HPF
SODIUM SERPL-SCNC: 140 MMOL/L (ref 135–145)
SP GR UR STRIP: 1.02 (ref 1–1.03)
SQUAMOUS EPITHELIAL: 2 /HPF
UROBILINOGEN UR STRIP-MCNC: <2 MG/DL
WBC # BLD AUTO: 8.7 10E3/UL (ref 4–11)
WBC URINE: <1 /HPF

## 2024-12-18 PROCEDURE — 96376 TX/PRO/DX INJ SAME DRUG ADON: CPT

## 2024-12-18 PROCEDURE — 250N000011 HC RX IP 250 OP 636: Performed by: EMERGENCY MEDICINE

## 2024-12-18 PROCEDURE — A9585 GADOBUTROL INJECTION: HCPCS | Performed by: EMERGENCY MEDICINE

## 2024-12-18 PROCEDURE — 85004 AUTOMATED DIFF WBC COUNT: CPT | Performed by: EMERGENCY MEDICINE

## 2024-12-18 PROCEDURE — 250N000013 HC RX MED GY IP 250 OP 250 PS 637: Performed by: EMERGENCY MEDICINE

## 2024-12-18 PROCEDURE — 85041 AUTOMATED RBC COUNT: CPT | Performed by: EMERGENCY MEDICINE

## 2024-12-18 PROCEDURE — 96374 THER/PROPH/DIAG INJ IV PUSH: CPT | Mod: 59

## 2024-12-18 PROCEDURE — 255N000002 HC RX 255 OP 636: Performed by: EMERGENCY MEDICINE

## 2024-12-18 PROCEDURE — 84145 PROCALCITONIN (PCT): CPT | Performed by: EMERGENCY MEDICINE

## 2024-12-18 PROCEDURE — 86140 C-REACTIVE PROTEIN: CPT | Performed by: EMERGENCY MEDICINE

## 2024-12-18 PROCEDURE — 85652 RBC SED RATE AUTOMATED: CPT | Performed by: EMERGENCY MEDICINE

## 2024-12-18 PROCEDURE — 81001 URINALYSIS AUTO W/SCOPE: CPT | Performed by: EMERGENCY MEDICINE

## 2024-12-18 PROCEDURE — 99285 EMERGENCY DEPT VISIT HI MDM: CPT | Mod: 25

## 2024-12-18 PROCEDURE — 36415 COLL VENOUS BLD VENIPUNCTURE: CPT | Performed by: EMERGENCY MEDICINE

## 2024-12-18 PROCEDURE — 96375 TX/PRO/DX INJ NEW DRUG ADDON: CPT

## 2024-12-18 PROCEDURE — 80048 BASIC METABOLIC PNL TOTAL CA: CPT | Performed by: EMERGENCY MEDICINE

## 2024-12-18 RX ORDER — GADOBUTROL 604.72 MG/ML
10 INJECTION INTRAVENOUS ONCE
Status: COMPLETED | OUTPATIENT
Start: 2024-12-18 | End: 2024-12-18

## 2024-12-18 RX ORDER — DEXAMETHASONE SODIUM PHOSPHATE 10 MG/ML
10 INJECTION, SOLUTION INTRAMUSCULAR; INTRAVENOUS ONCE
Status: COMPLETED | OUTPATIENT
Start: 2024-12-18 | End: 2024-12-18

## 2024-12-18 RX ORDER — ONDANSETRON 4 MG/1
4 TABLET, ORALLY DISINTEGRATING ORAL EVERY 8 HOURS PRN
Qty: 20 TABLET | Refills: 0 | Status: SHIPPED | OUTPATIENT
Start: 2024-12-18

## 2024-12-18 RX ORDER — ACETAMINOPHEN 325 MG/1
975 TABLET ORAL ONCE
Status: COMPLETED | OUTPATIENT
Start: 2024-12-18 | End: 2024-12-18

## 2024-12-18 RX ORDER — LIDOCAINE 4 G/G
1 PATCH TOPICAL ONCE
Status: DISCONTINUED | OUTPATIENT
Start: 2024-12-18 | End: 2024-12-18 | Stop reason: HOSPADM

## 2024-12-18 RX ORDER — KETOROLAC TROMETHAMINE 15 MG/ML
15 INJECTION, SOLUTION INTRAMUSCULAR; INTRAVENOUS ONCE
Status: COMPLETED | OUTPATIENT
Start: 2024-12-18 | End: 2024-12-18

## 2024-12-18 RX ORDER — OXYCODONE HYDROCHLORIDE 5 MG/1
5 TABLET ORAL EVERY 6 HOURS PRN
Qty: 10 TABLET | Refills: 0 | Status: SHIPPED | OUTPATIENT
Start: 2024-12-18 | End: 2024-12-21

## 2024-12-18 RX ORDER — METHYLPREDNISOLONE 4 MG/1
TABLET ORAL
Qty: 21 TABLET | Refills: 0 | Status: SHIPPED | OUTPATIENT
Start: 2024-12-18

## 2024-12-18 RX ADMIN — DEXAMETHASONE SODIUM PHOSPHATE 10 MG: 10 INJECTION INTRAMUSCULAR; INTRAVENOUS at 18:29

## 2024-12-18 RX ADMIN — HYDROMORPHONE HYDROCHLORIDE 1 MG: 1 INJECTION, SOLUTION INTRAMUSCULAR; INTRAVENOUS; SUBCUTANEOUS at 16:28

## 2024-12-18 RX ADMIN — HYDROMORPHONE HYDROCHLORIDE 1 MG: 1 INJECTION, SOLUTION INTRAMUSCULAR; INTRAVENOUS; SUBCUTANEOUS at 08:48

## 2024-12-18 RX ADMIN — KETOROLAC TROMETHAMINE 15 MG: 15 INJECTION, SOLUTION INTRAMUSCULAR; INTRAVENOUS at 07:43

## 2024-12-18 RX ADMIN — LIDOCAINE 1 PATCH: 4 PATCH TOPICAL at 16:27

## 2024-12-18 RX ADMIN — GADOBUTROL 10 ML: 604.72 INJECTION INTRAVENOUS at 15:55

## 2024-12-18 RX ADMIN — HYDROMORPHONE HYDROCHLORIDE 1 MG: 1 INJECTION, SOLUTION INTRAMUSCULAR; INTRAVENOUS; SUBCUTANEOUS at 11:26

## 2024-12-18 RX ADMIN — ACETAMINOPHEN 975 MG: 325 TABLET ORAL at 16:27

## 2024-12-18 ASSESSMENT — ACTIVITIES OF DAILY LIVING (ADL)
ADLS_ACUITY_SCORE: 42

## 2024-12-18 ASSESSMENT — COLUMBIA-SUICIDE SEVERITY RATING SCALE - C-SSRS
6. HAVE YOU EVER DONE ANYTHING, STARTED TO DO ANYTHING, OR PREPARED TO DO ANYTHING TO END YOUR LIFE?: NO
2. HAVE YOU ACTUALLY HAD ANY THOUGHTS OF KILLING YOURSELF IN THE PAST MONTH?: NO
1. IN THE PAST MONTH, HAVE YOU WISHED YOU WERE DEAD OR WISHED YOU COULD GO TO SLEEP AND NOT WAKE UP?: NO

## 2024-12-18 NOTE — ED PROVIDER NOTES
2:37 PM - Patient signed out to me by Dr. Barahona at routine shift change. 44 year old female with midline back pain radiating to both legs; subjective chills. Plan is follow up MRI T/L-spine & reassess. Please see Dr. Barahona's note for details.    ED Course as of 12/18/24 1823   Wed Dec 18, 2024   1757 Discussed with Neurosurgery who reviewed MRI & blood results. Suspect inflamed joints from degenerative disease and not infectious in nature. States spots too small for arthrocentesis. Recommends medrol dosepak as outpatient and outpatient spine clinic follow up.   1819 Patient able to tolerate PO and walk without difficulty. Has normal neuro exam at this time. Patient comfortable with discharge at this time. Return precautions and need for PCP & spine clinic follow up discussed and understood. No further questions at the time of discharge.       --------------------------------------------------------------------------------   --------------------------------------------------------------------------------     IMPRESSION  1. Acute midline thoracic back pain    2. Acute bilateral low back pain with bilateral sciatica        PLAN  - NSAIDs, oxycodone, Medrol Dosepak for home  - close PCP & spine clinic follow up  - discharge to home        Hong Danielle MD  12/18/24  Emergency Medicine  [facility] EMERGENCY ROOM  [address] [address] [phone]  Dept: [phone]     Hong Danielle MD  12/18/24 1823

## 2024-12-18 NOTE — ED TRIAGE NOTES
Pt comes in for lower back pain radiating to her abdomen and bilateral thighs that started yesterday.    Took Tylenol or Ibuprofen a couple hrs PTA with no relief.      Triage Assessment (Adult)       Row Name 12/18/24 0613          Triage Assessment    Airway WDL WDL        Respiratory WDL    Respiratory WDL WDL        Skin Circulation/Temperature WDL    Skin Circulation/Temperature WDL WDL        Cardiac WDL    Cardiac WDL WDL        Peripheral/Neurovascular WDL    Peripheral Neurovascular WDL WDL        Cognitive/Neuro/Behavioral WDL    Cognitive/Neuro/Behavioral WDL WDL

## 2024-12-18 NOTE — ED PROVIDER NOTES
EMERGENCY DEPARTMENT ENCOUNTER      NAME: Danyelle Canales  AGE: 44 year old female  YOB: 1980  MRN: [id number]  EVALUATION DATE & TIME: No admission date for patient encounter.    PCP: Marsha Fernandez    ED PROVIDER: Derek Barahona D.O.      Chief Complaint   Patient presents with    Back Pain       FINAL IMPRESSION:  1. Acute bilateral low back pain with bilateral sciatica        ED COURSE & MEDICAL DECISION MAKIN:50 AM I met with the patient to gather history and to perform my initial exam. I discussed the plan for care while in the Emergency Department.  8:21 AM I revaluated the patient and dicussed plans for a MRI of her back.        Pertinent Labs & Imaging studies reviewed. (See chart for details)  44 year old female presents to the Emergency Department for evaluation of acute low back pain.  Patient does have radiation of bilateral legs as well but no urinary incontinence or frequency.  Significant tenderness to palpation midline in the spine as well as bilateral paraspinal, especially at the lower T and upper L-spine.  Lab testing was performed, she does have a somewhat elevated CRP, and therefore I am concerned for potential for inflammatory process.  Therefore, our plan is to perform an MRI to evaluate for potential surgical emergency or indication for antibiotics.  If this is negative, I believe the patient could be discharged home with pain control and muscle relaxers and have follow-up as an outpatient with primary care.    Medical Decision Making  Obtained supplemental history:Supplemental history obtained?: No  Reviewed external records: External records reviewed?: Documented in chart and Outpatient Record: [facility] on 2024 for a routine general medical examination  Care impacted by chronic illness:Chronic Pain and Hypertension  Did you consider but not order tests?: Work up considered but not performed and documented in chart, if applicable  Did you  interpret images independently?: Independent interpretation of ECG and images noted in documentation, when applicable.  Consultation discussion with other provider:Did you involve another provider (consultant, , pharmacy, etc.)?: No  Admission considered. Patient was signed out to the oncoming physician, disposition pending.    MIPS: Not Applicable        At the conclusion of the encounter I discussed the results of all of the tests and the disposition. The questions were answered. The patient or family acknowledged understanding and was agreeable with the care plan.        HPI    Patient information was obtained from: the patient     Use of : N/A        Danyelle Canales is a 44 year old female who presents for evaluation of back pain.    The patient reports a sudden onset of lower back starting 2 days ago in the evening. The pain has been worsening and radiates to her lower abdomen, hips, and thighs. No incontinence. Denies groin numbness. She took Advil prior to arrival. No fall or trauma prior.     The patient notes she has been coughing for 2 weeks now. She just finished her inhaler and oral steroid 2 days ago.     No other complaints at this time.     Per Chart Review: the patient presented to [facility] on 9/24/2024 for a routine general medical examination. Patient has chronic pain syndrome, chronic neck pain, and chronic thoracic pain. Has been using Norco as needed and following up with pain clinic.        PAST MEDICAL HISTORY:  Past Medical History:   Diagnosis Date    ASD (atrial septal defect)     repaired surgically as a child    Benign essential hypertension 07/19/2023    Depressive disorder     Headache     History of blood transfusion 1985 1985 during open heart surgery    History of ovarian cyst     Insomnia, unspecified     Other forms of migraine, without mention of intractable migraine without mention of status migrainosus     Right inguinal hernia        PAST  SURGICAL HISTORY:  Past Surgical History:   Procedure Laterality Date    BIOPSY      COLONOSCOPY      DAVINCI HERNIORRHAPHY INGUINAL Left 1/17/2024    Procedure: HERNIORRHAPHY, LEFT INGUINAL, ROBOT-ASSISTED, LAPAROSCOPIC, USING DA ANTONY XI with mesh;  Surgeon: Westno Mcnamara MD;  Location: [facility] OR    HERNIA REPAIR  Feb 2021    HERNIORRHAPHY UMBILICAL N/A 1/17/2024    Procedure: and repair of umbilical hernia open;  Surgeon: Weston Mcnamara MD;  Location: [facility] OR    IR CERVICAL EPIDURAL STEROID INJECTION  9/7/2012    IR CERVICAL EPIDURAL STEROID INJECTION  10/2/2012    [facility] REPR ASD OSTIUM PREMUM      Dr. Silverio         CURRENT MEDICATIONS:    No current facility-administered medications for this encounter.     Current Outpatient Medications   Medication Sig Dispense Refill    B Complex CAPS Take 1 capsule by mouth daily. 90 capsule 0    FLUoxetine (PROZAC) 20 MG capsule Take 20 mg by mouth daily      FLUoxetine (PROZAC) 40 MG capsule Take 40 mg by mouth daily      fluticasone (FLOVENT HFA) 44 MCG/ACT inhaler Inhale 1 puff into the lungs 2 times daily. 10.6 g 1    guaiFENesin-codeine (ROBITUSSIN AC) 100-10 MG/5ML solution Take 5-10 mLs by mouth every 4 hours as needed for cough. 118 mL 0    HYDROcodone-acetaminophen (NORCO) 5-325 MG tablet Take 1 tablet by mouth every 8 hours as needed for severe pain. 10 tablet 0    lamoTRIgine (LAMICTAL) 100 MG tablet TAKE 1 TABLET BY MOUTH WITH  MG TABLET ONCE DAILY      lamoTRIgine (LAMICTAL) 200 MG tablet Take 1 tablet (200 mg) by mouth daily 90 tablet 1    lisinopril (ZESTRIL) 10 MG tablet Take 1 tablet (10 mg) by mouth daily. 90 tablet 3    LORazepam (ATIVAN) 1 MG tablet TAKE 1 TABLET BY MOUTH THREE TIMES A DAY AS DIRECTED      MULTIPLE VITAMIN PO       norethindrone-ethinyl estradiol (JUNEL 1/20) 1-20 MG-MCG tablet Take 1 tablet by mouth daily. 90 tablet 2    spacer (OPTICHAMBER BROOK) holding chamber Dispense spacer with inhaler 1 each 0    VENTOLIN   (90 Base) MCG/ACT inhaler INHALE 2 PUFFS INTO THE LUNGS EVERY 6 HOURS 18 g 0         ALLERGIES:  Allergies   Allergen Reactions    Artificial Sweetner (Informational Only) [Artificial Sweetner (Informational Only) ]     Chocolate Flavoring Agent (Non-Screening) Other (See Comments)       FAMILY HISTORY:  Family History   Problem Relation Age of Onset    C.A.D. Mother         heart surgery to close hole in heart    Cardiovascular Mother     Hypertension Mother     Depression Mother     Anxiety Disorder Mother     Heart Failure Mother     Cerebrovascular Disease Father     Hypertension Father     Myocardial Infarction Maternal Grandfather        SOCIAL HISTORY:  Social History     Socioeconomic History    Marital status:      Spouse name: Chan Canales    Number of children: 0    Years of education: 12    Highest education level: None   Occupational History    Occupation: customer service     Employer: [employer]   Tobacco Use    Smoking status: Never     Passive exposure: Never    Smokeless tobacco: Never   Vaping Use    Vaping status: Never Used   Substance and Sexual Activity    Alcohol use: Not Currently     Comment: 1-2 a month    Drug use: No    Sexual activity: Yes     Partners: Male     Birth control/protection: Pill   Other Topics Concern    Parent/sibling w/ CABG, MI or angioplasty before 65F 55M? Yes     Comment: stroke     Social Drivers of Health     Financial Resource Strain: Low Risk  (9/24/2024)    Financial Resource Strain     Within the past 12 months, have you or your family members you live with been unable to get utilities (heat, electricity) when it was really needed?: No   Food Insecurity: Low Risk  (9/24/2024)    Food Insecurity     Within the past 12 months, did you worry that your food would run out before you got money to buy more?: No     Within the past 12 months, did the food you bought just not last and you didn t have money to get more?: No  "  Transportation Needs: Low Risk  (9/24/2024)    Transportation Needs     Within the past 12 months, has lack of transportation kept you from medical appointments, getting your medicines, non-medical meetings or appointments, work, or from getting things that you need?: No   Physical Activity: Insufficiently Active (9/24/2024)    Exercise Vital Sign     Days of Exercise per Week: 3 days     Minutes of Exercise per Session: 20 min   Stress: Stress Concern Present (9/24/2024)    [employer] - Occupational Stress Questionnaire     Feeling of Stress : Rather much   Social Connections: Unknown (9/24/2024)    Social Connection and Isolation Panel [NHANES]     Frequency of Social Gatherings with Friends and Family: Once a week   Interpersonal Safety: Low Risk  (9/24/2024)    Interpersonal Safety     Do you feel physically and emotionally safe where you currently live?: Yes     Within the past 12 months, have you been hit, slapped, kicked or otherwise physically hurt by someone?: No     Within the past 12 months, have you been humiliated or emotionally abused in other ways by your partner or ex-partner?: No   Housing Stability: Low Risk  (9/24/2024)    Housing Stability     Do you have housing? : Yes     Are you worried about losing your housing?: No       VITALS:  Patient Vitals for the past 24 hrs:   BP Temp Temp src Pulse Resp SpO2 Height Weight   12/18/24 1342 -- -- -- 62 -- 97 % -- --   12/18/24 1312 -- -- -- 71 -- 96 % -- --   12/18/24 1101 128/68 -- -- 66 -- 96 % -- --   12/18/24 1002 123/70 -- -- 66 -- 95 % -- --   12/18/24 0850 -- -- -- 69 -- 98 % -- --   12/18/24 0732 (!) 149/79 -- -- 64 -- 98 % -- --   12/18/24 0727 (!) 161/82 98.5  F (36.9  C) Oral 63 18 98 % -- --   12/18/24 0715 (!) 159/90 -- -- 69 -- 99 % -- --   12/18/24 0611 (!) 177/105 98.3  F (36.8  C) Oral 75 16 98 % 1.702 m (5' 7\") 99.8 kg (220 lb)       PHYSICAL EXAM    VITAL SIGNS: /68   Pulse 62   Temp 98.5  F " "(36.9  C) (Oral)   Resp 18   Ht 1.702 m (5' 7\")   Wt 99.8 kg (220 lb)   SpO2 97%   BMI 34.46 kg/m      General Appearance: Well-appearing, well-nourished, no acute distress   Head:  Normocephalic, without obvious abnormality, atraumatic  Eyes:  PERRL, conjunctiva/corneas clear, EOM's intact,  ENT:  Lips, mucosa, and tongue normal, membranes are moist without pallor  Neck:  Normal ROM, symmetrical, trachea midline    Back:  ROM normal, no CVA tenderness. Mid-line and bilateral lumbar spinal tenderness.   Abdomen:  Soft, non-tender, no rebound or guarding.  Musculoskeletal: Full ROM, no edema, no cyanosis, good ROM of major joints. 5/5 motor bilateral lower extremities. Bilateral extremities intact.   Integument:  Warm, Dry, No erythema, No rash.    Neurologic:  Alert & oriented.  No focal deficits appreciated.    Psychiatric:  Affect normal, Judgment normal, Mood normal.      LABS  Results for orders placed or performed during the hospital encounter of 12/18/24 (from the past 24 hours)   CBC with platelets + differential    Narrative    The following orders were created for panel order CBC with platelets + differential.  Procedure                               Abnormality         Status                     ---------                               -----------         ------                     CBC with platelets and d...[[id number]]  Abnormal            Final result                 Please view results for these tests on the individual orders.   Basic metabolic panel   Result Value Ref Range    Sodium 140 135 - 145 mmol/L    Potassium 4.4 3.4 - 5.3 mmol/L    Chloride 103 98 - 107 mmol/L    Carbon Dioxide (CO2) 29 22 - 29 mmol/L    Anion Gap 8 7 - 15 mmol/L    Urea Nitrogen 7.7 6.0 - 20.0 mg/dL    Creatinine 0.75 0.51 - 0.95 mg/dL    GFR Estimate >90 >60 mL/min/1.73m2    Calcium 9.3 8.8 - 10.4 mg/dL    Glucose 92 70 - 99 mg/dL   Erythrocyte sedimentation rate auto   Result Value Ref Range    Erythrocyte Sedimentation " Rate 19 0 - 20 mm/hr   CRP inflammation   Result Value Ref Range    CRP Inflammation 18.40 (H) <5.00 mg/L   CBC with platelets and differential   Result Value Ref Range    WBC Count 8.7 4.0 - 11.0 10e3/uL    RBC Count 4.86 3.80 - 5.20 10e6/uL    Hemoglobin 12.7 11.7 - 15.7 g/dL    Hematocrit 40.2 35.0 - 47.0 %    MCV 83 78 - 100 fL    MCH 26.1 (L) 26.5 - 33.0 pg    MCHC 31.6 31.5 - 36.5 g/dL    RDW 14.0 10.0 - 15.0 %    Platelet Count 342 150 - 450 10e3/uL    % Neutrophils 60 %    % Lymphocytes 32 %    % Monocytes 5 %    % Eosinophils 3 %    % Basophils 1 %    % Immature Granulocytes 1 %    NRBCs per 100 WBC 0 <1 /100    Absolute Neutrophils 5.2 1.6 - 8.3 10e3/uL    Absolute Lymphocytes 2.8 0.8 - 5.3 10e3/uL    Absolute Monocytes 0.4 0.0 - 1.3 10e3/uL    Absolute Eosinophils 0.2 0.0 - 0.7 10e3/uL    Absolute Basophils 0.1 0.0 - 0.2 10e3/uL    Absolute Immature Granulocytes 0.0 <=0.4 10e3/uL    Absolute NRBCs 0.0 10e3/uL     *Note: Due to a large number of results and/or encounters for the requested time period, some results have not been displayed. A complete set of results can be found in Results Review.         RADIOLOGY  MR Lumbar Spine w/o & w Contrast    (Results Pending)   MR Thoracic Spine w/o & w Contrast    (Results Pending)            MEDICATIONS GIVEN IN THE EMERGENCY:  Medications   ketorolac (TORADOL) injection 15 mg (15 mg Intravenous $Given 12/18/24 9903)   HYDROmorphone (DILAUDID) injection 1 mg (1 mg Intravenous $Given 12/18/24 0885)   HYDROmorphone (DILAUDID) injection 1 mg (1 mg Intravenous $Given 12/18/24 1126)       NEW PRESCRIPTIONS STARTED AT TODAY'S ER VISIT  New Prescriptions    No medications on file        I, Allen Sanders, am serving as a scribe to document services personally performed by Derek Barahona D.O., based on my observations and the provider's statements to me.  I, Derek Barahona D.O., attest that Allen Sanders is acting in a scribe capacity, has observed my performance of the services  and has documented them in accordance with my direction.     Derek Barahona D.O.  Emergency Medicine  [facility] EMERGENCY ROOM  [address] [place] [address] [phone]  Dept: [phone]       Derek Barahona,   12/18/24 1438

## 2024-12-18 NOTE — ED NOTES
Introduced self to patient. Whiteboard updated. Plan of care and length of time discussed with patient. Pain assessed.

## 2024-12-18 NOTE — Clinical Note
Danyelle Canales was seen and treated in our emergency department on 12/18/2024.  She may return to work on 12/23/2024.       If you have any questions or concerns, please don't hesitate to call.      Hong Danielle MD

## 2024-12-19 ENCOUNTER — PATIENT OUTREACH (OUTPATIENT)
Dept: CARE COORDINATION | Facility: CLINIC | Age: 44
End: 2024-12-19
Payer: COMMERCIAL

## 2024-12-19 NOTE — DISCHARGE INSTRUCTIONS
As needed for pain control at home, take:  - over-the-counter ibuprofen 800mg by mouth every 8 hours (max dose 2400mg in 24 hours)  - over-the-counter acetaminophen 1g by mouth every 6 hours (max dose 4g in 24 hours)  - prescribed steroids (medrol dosepak) to reduce inflammation  - prescribed oxycodone for breakthrough pain  - over-the-counter lidocaine patches to painful area (up to 12 hours on, then 12 hours off)  - ice pack to painful area up to 20 minutes every 1 hour    I made a referral to spine clinic for you; they will be calling you in the next couple days. You can also call them sooner to arrange follow up.    Follow up with your Primary Care provider in 2 days for a recheck.    Return to the Emergency Department for any high fevers, severe worsening, inability to walk, or any other concerns.

## 2024-12-19 NOTE — PROGRESS NOTES
Clinic Care Coordination Contact  Community Health Worker Initial Outreach    CHW Initial Information Gathering:  Referral Source: ED Follow-Up  Current living arrangement:: Not Assessed  CHW Additional Questions  If ED/Hospital discharge, follow-up appointment scheduled as recommended?: Yes  Medication changes made following ED/Hospital discharge?: No  MyChart active?: Yes  Patient sent Social Drivers of Health questionnaire?: No    Spoke to patient. Danyelle shares she is doing good. She had a question if she needed to follow-up with her PCP's office. Advised patient to reach out to PCP office to see if a follow-up with them is needed as was recommended by the ED. Patient stated she will send a MyChart message to care team. Patient placed a Eleni Award recommendation for her nurse yesterday! No additional support is needed at this time.     Patient accepts CC: No, declined. Patient will be sent Care Coordination introduction letter for future reference.     Buffy Casarez [facility]  Care Coordination

## 2024-12-19 NOTE — LETTER
Danyelle Canales  [address] [place] [address]    Dear Danyelle Canales,      I am a team member within the [facility] with [facility]. I recently spoke to you to ensure you were doing well following a recent visit within our health system. I also wanted to take this chance to introduce Clinic Care Coordination.     Below is a description of Clinic Care Coordination and how this team can further assist you:       The Clinic Care Coordination team is made up of a Registered Nurse, , Financial Resource Worker, and a Community Health Worker who understand and can help navigate the health care system. The goal of clinic care coordination is to help you manage your health, improve access to care, and achieve optimal health outcomes. They work alongside your provider to assist you in determining your health and social needs, obtain health care and community resources, and provide you with necessary information and education. Clinic Care Coordination can work with you through any barriers and develop a care plan that helps coordinate and strengthen the relationship between you and your care team.    If you wish to connect with the Clinic Care Coordination Team, please let your [facility] Primary Care Provider or Clinic Care Team know and they can place a referral. The Clinic Care Coordination team will then reach out by phone to further support you.    We are focused on providing you with the highest-quality healthcare experience possible.    Sincerely,   Your care team with [facility]'s [address] [place] [address] ([phone]).    Buffy Casarez, [facility]  Care Coordination

## 2024-12-19 NOTE — PROGRESS NOTES
[facility]  Telephone Note    Contacted by Dr. Danielle at [facility] ED.     Danyelle presented with midthoracic back pain and sciatic pain.  MRI report indicating possible septic arthritis versus degenerative inflammatory findings.  Patient has been afebrile no recent infections.  No leukocytosis    Neuro exam per ED -neurologically intact with some bilateral sciatic pain and thoracic back pain.    Imaging as stated below -  THORACIC SPINE MRI:  1.  Mild bone marrow and surrounding soft tissue edema and enhancement centered at the bilateral T4-T5 and T8-T9 facet joints, not appreciated on the prior exam and likely new. A small right T4-T5 facet joint effusion with synovial enhancement. Findings   are suspicious for septic arthritis in the provided clinical setting. Degenerative change with acute reactive inflammation may have a similar appearance.  2.  No evidence of discitis-osteomyelitis.  3.  No paraspinal or epidural abscess.     LUMBAR SPINE MRI:  1.  No evidence of ongoing infection.  2.  Similar mild spondylotic change since 2023 without significant spinal canal or foraminal stenosis.    Plan:   -No acute findings on lumbar MRI to explain new sciatic pain.  -Would anticipate elevated white blood cell count or fever in the setting of infection.  -No sign of discitis or osteomyelitis  -If patient's symptoms not severe she could be discharged with Medrol Dosepak and follow-up in clinic if her symptoms persist.  -If patient is admitted while I do not think she should be started on antibiotics at this point I would ultimately defer to infectious disease team.  -If patient discharges she should seek medical attention if she shows signs and symptoms of infection.    Macho Juarez DNP  [facility]  [address] [place] [id number]  Tel [phone]

## 2024-12-21 ENCOUNTER — HEALTH MAINTENANCE LETTER (OUTPATIENT)
Age: 44
End: 2024-12-21

## 2024-12-30 ENCOUNTER — ANCILLARY PROCEDURE (OUTPATIENT)
Dept: GENERAL RADIOLOGY | Facility: CLINIC | Age: 44
End: 2024-12-30
Attending: PHYSICIAN ASSISTANT
Payer: COMMERCIAL

## 2024-12-30 ENCOUNTER — OFFICE VISIT (OUTPATIENT)
Dept: FAMILY MEDICINE | Facility: CLINIC | Age: 44
End: 2024-12-30
Payer: COMMERCIAL

## 2024-12-30 ENCOUNTER — TELEPHONE (OUTPATIENT)
Dept: FAMILY MEDICINE | Facility: CLINIC | Age: 44
End: 2024-12-30

## 2024-12-30 VITALS
DIASTOLIC BLOOD PRESSURE: 74 MMHG | RESPIRATION RATE: 18 BRPM | SYSTOLIC BLOOD PRESSURE: 128 MMHG | OXYGEN SATURATION: 97 % | HEIGHT: 67 IN | BODY MASS INDEX: 34.46 KG/M2 | TEMPERATURE: 99.1 F | HEART RATE: 76 BPM

## 2024-12-30 DIAGNOSIS — M54.42 ACUTE BILATERAL LOW BACK PAIN WITH BILATERAL SCIATICA: ICD-10-CM

## 2024-12-30 DIAGNOSIS — M54.6 ACUTE MIDLINE THORACIC BACK PAIN: ICD-10-CM

## 2024-12-30 DIAGNOSIS — S99.911A ANKLE INJURY, RIGHT, INITIAL ENCOUNTER: Primary | ICD-10-CM

## 2024-12-30 DIAGNOSIS — S99.911A ANKLE INJURY, RIGHT, INITIAL ENCOUNTER: ICD-10-CM

## 2024-12-30 DIAGNOSIS — S92.131A CLOSED DISPLACED FRACTURE OF POSTERIOR PROCESS OF RIGHT TALUS, INITIAL ENCOUNTER: Primary | ICD-10-CM

## 2024-12-30 DIAGNOSIS — M54.41 ACUTE BILATERAL LOW BACK PAIN WITH BILATERAL SCIATICA: ICD-10-CM

## 2024-12-30 PROCEDURE — 73610 X-RAY EXAM OF ANKLE: CPT | Mod: TC | Performed by: RADIOLOGY

## 2024-12-30 PROCEDURE — 99214 OFFICE O/P EST MOD 30 MIN: CPT | Performed by: PHYSICIAN ASSISTANT

## 2024-12-30 RX ORDER — OXYCODONE HYDROCHLORIDE 5 MG/1
5 TABLET ORAL EVERY 6 HOURS PRN
Qty: 12 TABLET | Refills: 0 | Status: SHIPPED | OUTPATIENT
Start: 2024-12-30 | End: 2025-01-02

## 2024-12-30 ASSESSMENT — PAIN SCALES - GENERAL: PAINLEVEL_OUTOF10: EXTREME PAIN (8)

## 2024-12-30 NOTE — TELEPHONE ENCOUNTER
Routing message to provider.    Patient calling to ask if she is ok to walk on foot around office until she gets the boot.    She has appointment with [facility] ortho tomorrow.    Please advise.    Christine M Klisch, RN

## 2024-12-30 NOTE — LETTER
December 30, 2024      Danyelle HIRSCH Lukaszlobito  [address] [place] [address]        To Whom It May Concern:    Danyelle Canales  was seen on 12/30/24 in clinic.       Sincerely,        AP Fajardo    Electronically signed

## 2024-12-30 NOTE — PATIENT INSTRUCTIONS
To help with ankle  - rest as much as possible  - elevation of ankle at rest  - ice 3x a day  - otc pain reliever as needed  - ace wrap for the next 1-2 weeks to help with swelling and stability     Keep appointment with Spine for next steps. Keep activity level low impact and only as tolerated.

## 2024-12-30 NOTE — PROGRESS NOTES
Assessment & Plan     Ankle injury, right, initial encounter  Xray showed no sign of fracture, awaiting radiologist official report. Discuss with patient likely a sprain and RICE is recommend course of treatment. Symptoms should improve over the next 1-2 weeks. .Patient agree's with this plan and has no further questions  - XR Ankle Right G/E 3 Views; Future  - Ankle/Foot Bracing Supplies Order Other (comments) (ACE WRAP)    Acute midline thoracic back pain  I reviewed ER notes from 12/18/24 where patient presents for acute back pain. MRI showed inflammation or infectious process of thoracic spine due to swelling. Neurology reviewed and confirmed it was inflammatory and not infectious. CRP was elevated to also lead more toward inflammation while CBC was normal. Patient has completed Medrol dose pack. She continue with topical lidocaine, otc pain relievers and oxycodone PRN for break through pain. Requests refills on this until she can see Specialist next week. I reviewed PDMP and okay to refill. Advised to NOT take with Atrivan. Patient verbalized understanding. Advised to continue care with Spine specialist next week for next steps in treatment and care. Low impact activity recommended. Patient agree's with this plan and has no further questions  - oxyCODONE (ROXICODONE) 5 MG tablet; Take 1 tablet (5 mg) by mouth every 6 hours as needed for pain.    Acute bilateral low back pain with bilateral sciatica  See plan above         MED REC REQUIRED  Post Medication Reconciliation Status: discharge medications reconciled, continue medications without change        Subjective   Danyelle is a 44 year old, presenting for the following health issues:  ER F/U    HPI       ED/UC Followup:    Facility:  [facility]  Date of visit: 12/18/2024  Reason for visit: Acute midline thoracic back pain   Current Status: Patient voices still having mid back pain, but not as bad as when she was in the hospital. Patient is scheduled to  "see spine specialist, but is questioning in what activities to do or avoid. She is seeing next Tuesday the spine specialist.     Fall : She stepped out of the truck and she went down on her right leg and twisted her right knee and ankle. No swelling or bruising. She can bear weight after the fall but very uncomfortable. The pain is radiating up into the calf. Has history sprains ankles but this feels different.             Review of Systems  Constitutional, HEENT, cardiovascular, pulmonary, gi and gu systems are negative, except as otherwise noted.      Objective    BP (!) 144/91   Pulse 76   Temp 99.1  F (37.3  C) (Oral)   Resp 18   Ht 1.702 m (5' 7\")   SpO2 97%   BMI 34.46 kg/m    Body mass index is 34.46 kg/m .  Physical Exam   GENERAL: alert, no distress, and tearful  ORTHO: RIGHT ANKLE : mild swelling to lateral malleolus, no bruising, tender to palpation of posterior lateral and medical malleolus tenderness to tarsal bones, full ROM, pain with internal and external rotation, strength intact, negative drawer test, distal pulses 2+   RIGHT knee : no effusion or bruising, full ROM, nontender to palpation   SKIN: no suspicious lesions or rashes  BACK: no CVA tenderness, thoracic and paralumbar tenderness, tenderness another thoracic spine, full ROM but pain with flexion and extension    Xray - Reviewed and interpreted by me.  RIGHT ankle: no fracture      I spent 35 minutes with patient spent counseling and coordinating patient's care as well as discussing patient's plan of care, documenting in EMR and reviewing PMH.      Signed Electronically by: AP Fajardo    "

## 2024-12-31 NOTE — TELEPHONE ENCOUNTER
I would try to stay off of it as much as possible until you see Ortho. If you see Ortho tomorrow, there is no need to  the boot I ordered. Ortho will get you a better boot instead.     Thank you,  Dinora Collins PA-C

## 2024-12-31 NOTE — TELEPHONE ENCOUNTER
RN called and spoke with patient.    RN reviewed providers message.    Patient saw ortho today and was given boot.    Patient verbalized understanding.    Tony Hallman RN, BSN, PHN  [facility]

## 2025-01-07 ENCOUNTER — MYC REFILL (OUTPATIENT)
Dept: FAMILY MEDICINE | Facility: CLINIC | Age: 45
End: 2025-01-07

## 2025-01-07 DIAGNOSIS — M54.6 ACUTE MIDLINE THORACIC BACK PAIN: ICD-10-CM

## 2025-01-07 RX ORDER — OXYCODONE HYDROCHLORIDE 5 MG/1
5 TABLET ORAL EVERY 6 HOURS PRN
Qty: 12 TABLET | Refills: 0 | Status: CANCELLED | OUTPATIENT
Start: 2025-01-07

## 2025-01-07 RX ORDER — OXYCODONE HYDROCHLORIDE 5 MG/1
5 TABLET ORAL EVERY 6 HOURS PRN
Qty: 12 TABLET | Refills: 0 | Status: SHIPPED | OUTPATIENT
Start: 2025-01-07 | End: 2025-01-21

## 2025-01-14 ENCOUNTER — OFFICE VISIT (OUTPATIENT)
Dept: PULMONOLOGY | Facility: CLINIC | Age: 45
End: 2025-01-14
Payer: COMMERCIAL

## 2025-01-14 ENCOUNTER — MYC MEDICAL ADVICE (OUTPATIENT)
Dept: PULMONOLOGY | Facility: CLINIC | Age: 45
End: 2025-01-14

## 2025-01-14 VITALS
OXYGEN SATURATION: 95 % | HEIGHT: 67 IN | HEART RATE: 67 BPM | BODY MASS INDEX: 33.98 KG/M2 | SYSTOLIC BLOOD PRESSURE: 110 MMHG | WEIGHT: 216.5 LBS | DIASTOLIC BLOOD PRESSURE: 84 MMHG

## 2025-01-14 DIAGNOSIS — R05.9 COUGH: ICD-10-CM

## 2025-01-14 DIAGNOSIS — R05.2 SUBACUTE COUGH: Primary | ICD-10-CM

## 2025-01-14 DIAGNOSIS — R05.3 CHRONIC COUGH: ICD-10-CM

## 2025-01-14 LAB
DLCOCOR-%PRED-PRE: 126 %
DLCOCOR-PRE: 28.45 ML/MIN/MMHG
DLCOUNC-%PRED-PRE: 123 %
DLCOUNC-PRE: 27.82 ML/MIN/MMHG
DLCOUNC-PRED: 22.51 ML/MIN/MMHG
ERV-%PRED-PRE: 52 %
ERV-PRE: 0.68 L
ERV-PRED: 1.31 L
EXPTIME-PRE: 5.56 SEC
FEF2575-%PRED-PRE: 123 %
FEF2575-PRE: 3.72 L/SEC
FEF2575-PRED: 3.02 L/SEC
FEFMAX-%PRED-PRE: 88 %
FEFMAX-PRE: 6.46 L/SEC
FEFMAX-PRED: 7.3 L/SEC
FEV1-%PRED-PRE: 95 %
FEV1-PRE: 2.82 L
FEV1FEV6-PRE: 89 %
FEV1FEV6-PRED: 83 %
FEV1FVC-PRE: 89 %
FEV1FVC-PRED: 82 %
FEV1SVC-PRE: 84 %
FEV1SVC-PRED: 79 %
FIFMAX-PRE: 6.51 L/SEC
FRCPLETH-%PRED-PRE: 72 %
FRCPLETH-PRE: 2.21 L
FRCPLETH-PRED: 3.03 L
FVC-%PRED-PRE: 87 %
FVC-PRE: 3.17 L
FVC-PRED: 3.62 L
IC-%PRED-PRE: 102 %
IC-PRE: 2.67 L
IC-PRED: 2.61 L
RVPLETH-%PRED-PRE: 87 %
RVPLETH-PRE: 1.53 L
RVPLETH-PRED: 1.76 L
TLCPLETH-%PRED-PRE: 91 %
TLCPLETH-PRE: 4.88 L
TLCPLETH-PRED: 5.36 L
VA-%PRED-PRE: 85 %
VA-PRE: 4.51 L
VC-%PRED-PRE: 89 %
VC-PRE: 3.35 L
VC-PRED: 3.74 L

## 2025-01-14 PROCEDURE — 94375 RESPIRATORY FLOW VOLUME LOOP: CPT | Mod: 26 | Performed by: INTERNAL MEDICINE

## 2025-01-14 PROCEDURE — 94726 PLETHYSMOGRAPHY LUNG VOLUMES: CPT | Mod: 26 | Performed by: INTERNAL MEDICINE

## 2025-01-14 PROCEDURE — 99204 OFFICE O/P NEW MOD 45 MIN: CPT | Performed by: INTERNAL MEDICINE

## 2025-01-14 PROCEDURE — 94729 DIFFUSING CAPACITY: CPT | Mod: 26 | Performed by: INTERNAL MEDICINE

## 2025-01-14 RX ORDER — BUDESONIDE AND FORMOTEROL FUMARATE DIHYDRATE 160; 4.5 UG/1; UG/1
2 AEROSOL RESPIRATORY (INHALATION) 2 TIMES DAILY
Qty: 6 G | Refills: 11 | Status: SHIPPED | OUTPATIENT
Start: 2025-01-14

## 2025-01-14 RX ORDER — FLUOCINOLONE ACETONIDE 0.11 MG/ML
OIL TOPICAL
COMMUNITY
Start: 2024-11-26

## 2025-01-14 NOTE — PROGRESS NOTES
PHQ2 completed in clinic today. Score was: 2    Trent DE LA CRUZ CMA M [facility]

## 2025-01-14 NOTE — PROGRESS NOTES
Pulmonary Clinic Consultation    Assessment:  44yoF with post-viral cough. Does not seem consistent with asthma, reflux or upper airway cough syndrome as this occurs after URI and lingers.         Plan:   Since albuterol and flovent seemed to help will trial Symbicort. She will use this BID for 30 days and then if cough improved, can wean off to PRN  I do not think pursuing empiric treatment of PPI or nasal steroid makes sense given clinical presentation.  I discussed option of speech therapy for cough suppression as well should the cough persist.   CT chest to rule out other reason for coughing  Follow up as needed.   Elinor Teran MD  Pulmonary/Critical Care        ---------------------------------    Reason for Consult: cough.     HPI: 44yoF with chronic back pain presents for evaluation of cough.    Started in October with a cold, no fevers and chills, went to urgent care and saw PA where CBC was normal and Xray was clear. She was provided with albuterol, Zpack. December PCP tried the Flovent.  Cough stopped by Maggy was finally improving. She's frustrated because this occurs annually and this time it lasted longer than ever before. Post-tussive emesis occurs.     No issues with dyspnea or wheezing when not ill.   No issues with breathing as a child, no history of asthma.  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        ROS:  A 12-system review was notable for cough, snoring, falls, joint pain, depression and anxiety.  The remainder reviewed and negative.     PMH:  Past Medical History:   Diagnosis Date    ASD (atrial septal defect)     repaired surgically as a child    Benign essential hypertension 07/19/2023    Depressive disorder     History of blood transfusion 1985 1985 during open heart surgery    History of ovarian cyst     no longer active or issues    Insomnia, unspecified     Other forms of migraine, without mention of intractable  migraine without mention of status migrainosus     Right inguinal hernia     s/p repair       PSH:  Social History     Tobacco Use    Smoking status: Never     Passive exposure: Never    Smokeless tobacco: Never   Vaping Use    Vaping status: Never Used   Substance Use Topics    Alcohol use: Not Currently     Comment: 1-2 a month    Drug use: No         Family HX:  Family History   Problem Relation Age of Onset    C.A.D. Mother         heart surgery to close hole in heart    Cardiovascular Mother     Hypertension Mother     Depression Mother     Anxiety Disorder Mother     Heart Failure Mother     Cerebrovascular Disease Father     Hypertension Father     Chronic Obstructive Pulmonary Disease Maternal Grandmother         Could be COPD but was a heavy smoker.    Myocardial Infarction Maternal Grandfather     LUNG DISEASE No family hx of        Allergies:  Allergies   Allergen Reactions    Artificial Sweetner (Informational Only) [Artificial Sweetner (Informational Only) ]     Chocolate Flavoring Agent (Non-Screening) Other (See Comments)       Current Meds:  Current Outpatient Medications   Medication Sig Dispense Refill    B Complex CAPS Take 1 capsule by mouth daily. 90 capsule 0    FLUoxetine (PROZAC) 20 MG capsule Take 20 mg by mouth daily      FLUoxetine (PROZAC) 40 MG capsule Take 40 mg by mouth daily      fluticasone (FLOVENT HFA) 44 MCG/ACT inhaler Inhale 1 puff into the lungs 2 times daily. 10.6 g 1    lamoTRIgine (LAMICTAL) 100 MG tablet TAKE 1 TABLET BY MOUTH WITH  MG TABLET ONCE DAILY      lamoTRIgine (LAMICTAL) 200 MG tablet Take 1 tablet (200 mg) by mouth daily 90 tablet 1    lisinopril (ZESTRIL) 10 MG tablet Take 1 tablet (10 mg) by mouth daily. 90 tablet 3    LORazepam (ATIVAN) 1 MG tablet TAKE 1 TABLET BY MOUTH THREE TIMES A DAY AS DIRECTED      MULTIPLE VITAMIN PO       norethindrone-ethinyl estradiol (JUNEL 1/20) 1-20 MG-MCG tablet Take 1 tablet by mouth daily. 90 tablet 2    oxyCODONE  "(ROXICODONE) 5 MG tablet Take 1 tablet (5 mg) by mouth every 6 hours as needed for severe pain. 12 tablet 0    spacer (OPTICHAMBER RBOOK) holding chamber Dispense spacer with inhaler 1 each 0    VENTOLIN  (90 Base) MCG/ACT inhaler INHALE 2 PUFFS INTO THE LUNGS EVERY 6 HOURS 18 g 0     No current facility-administered medications for this visit.         Physical Exam:  /84 (BP Location: Right arm, Patient Position: Sitting, Cuff Size: Adult Regular)   Pulse 67   Ht 1.689 m (5' 6.5\")   Wt 98.2 kg (216 lb 8 oz)   SpO2 95%   BMI 34.42 kg/m    Gen: alert, oriented, no distress  HEENT: anicteric sclera  Neck: trachea midline, no cervical or supraclavicular lymphadenopathy  CV: Regular rate and rhythm, normal S1 and S2.   Resp: Clear bilaterally with good air entry.   Abd: soft, nontender  Skin: no visible rashes or lesions.   Ext: no cyanosis, clubbing or edema  Neuro: alert, nonfocal, grossly normal.     Labs:  Recent Labs   Lab Test 12/18/24  0745   WBC 8.7   RBC 4.86   HGB 12.7   HCT 40.2   MCV 83   MCH 26.1*   MCHC 31.6   RDW 14.0        Results for orders placed or performed during the hospital encounter of 12/18/24   Basic metabolic panel    Collection Time: 12/18/24  7:45 AM   Result Value Ref Range    Sodium 140 135 - 145 mmol/L    Potassium 4.4 3.4 - 5.3 mmol/L    Chloride 103 98 - 107 mmol/L    Carbon Dioxide (CO2) 29 22 - 29 mmol/L    Anion Gap 8 7 - 15 mmol/L    Urea Nitrogen 7.7 6.0 - 20.0 mg/dL    Creatinine 0.75 0.51 - 0.95 mg/dL    GFR Estimate >90 >60 mL/min/1.73m2    Calcium 9.3 8.8 - 10.4 mg/dL    Glucose 92 70 - 99 mg/dL           Imaging studies:  Personally reviewed image/s and Personally reviewed impression/s  Lungs are clear. No pleural effusion. Heart size and pulmonary vascularity within normal limits. Sternotomy.     Repeat CXR 11/20/24  EXAM: XR CHEST 2 VIEWS  LOCATION: [facility]  DATE: 11/20/2024     INDICATION: cough x 5 weeks  COMPARISON: " Chest radiograph 10/31/2024                                                                      IMPRESSION: No focal airspace opacity. No pleural effusion or pneumothorax. Stable heart size and mediastinal contours. Sternotomy.      PFT's   Personally reviewed with patient.     Impression: normal pulmonary function testing.

## 2025-01-15 ENCOUNTER — E-VISIT (OUTPATIENT)
Dept: URGENT CARE | Facility: CLINIC | Age: 45
End: 2025-01-15
Payer: COMMERCIAL

## 2025-01-15 DIAGNOSIS — H10.33 ACUTE BACTERIAL CONJUNCTIVITIS OF BOTH EYES: Primary | ICD-10-CM

## 2025-01-15 RX ORDER — POLYMYXIN B SULFATE AND TRIMETHOPRIM 1; 10000 MG/ML; [USP'U]/ML
SOLUTION OPHTHALMIC
Qty: 10 ML | Refills: 0 | Status: SHIPPED | OUTPATIENT
Start: 2025-01-15 | End: 2025-01-16 | Stop reason: ALTCHOICE

## 2025-01-15 NOTE — PATIENT INSTRUCTIONS
Thank you for choosing us for your care. I have placed an order for a prescription so that you can start treatment. View your full visit summary for details by clicking on the link below. Your pharmacist will able to address any questions you may have about the medication.     If you re not feeling better within 2-3 days, please schedule an appointment.  You can schedule an appointment right here in [facility], or call [phone]  If the visit is for the same symptoms as your eVisit, we ll refund the cost of your eVisit if seen within seven days.    Pinkeye: Care Instructions  Overview     Pinkeye is redness and swelling of the eye surface and the conjunctiva (the lining of the eyelid and the covering of the white part of the eye). Pinkeye is also called conjunctivitis. Pinkeye is often caused by infection with bacteria or a virus. Dry air, allergies, smoke, and chemicals are other common causes.  Pinkeye often gets better on its own in 7 to 10 days. Antibiotics only help if the pinkeye is caused by bacteria. Pinkeye caused by infection spreads easily. If an allergy or chemical is causing pinkeye, it will not go away unless you can avoid whatever is causing it.  Follow-up care is a key part of your treatment and safety. Be sure to make and go to all appointments, and call your doctor if you are having problems. It's also a good idea to know your test results and keep a list of the medicines you take.  How can you care for yourself at home?  Wash your hands often. Always wash them before and after you treat pinkeye or touch your eyes or face.  Use moist cotton or a clean, wet cloth to remove crust. Wipe from the inside corner of the eye to the outside. Use a clean part of the cloth for each wipe.  Put cold or warm wet cloths on your eye a few times a day if the eye hurts.  Do not wear contact lenses or eye makeup until the pinkeye is gone. Throw away any eye makeup you were using when you got pinkeye. Clean your  "contacts and storage case. If you wear disposable contacts, use a new pair when your eye has cleared and it is safe to wear contacts again.  If the doctor gave you antibiotic ointment or eyedrops, use them as directed. Use the medicine for as long as instructed, even if your eye starts looking better soon. Keep the bottle tip clean, and do not let it touch the eye area.  To put in eyedrops or ointment:  Tilt your head back, and pull your lower eyelid down with one finger.  Drop or squirt the medicine inside the lower lid.  Close your eye for 30 to 60 seconds to let the drops or ointment move around.  Do not touch the ointment or dropper tip to your eyelashes or any other surface.  Do not share towels, pillows, or washcloths while you have pinkeye.  When should you call for help?   Call your doctor now or seek immediate medical care if:    You have pain in your eye, not just irritation on the surface.     You have a change in vision or loss of vision.     You have an increase in discharge from the eye.     Your eye has not started to improve or begins to get worse within 48 hours after you start using antibiotics.     Pinkeye lasts longer than 7 days.   Watch closely for changes in your health, and be sure to contact your doctor if you have any problems.  Where can you learn more?  Go to https://www.[employer].net/patiented  Enter Y392 in the search box to learn more about \"Pinkeye: Care Instructions.\"  Current as of: July 31, 2024  Content Version: 14.3    2024 [employer].   Care instructions adapted under license by your healthcare professional. If you have questions about a medical condition or this instruction, always ask your healthcare professional. [employer] disclaims any warranty or liability for your use of this information.    "

## 2025-01-15 NOTE — NURSING NOTE
PHQ2 completed in clinic today. Score was: 2    Trent DE LA CRUZ CMA M [facility]

## 2025-01-16 ENCOUNTER — OFFICE VISIT (OUTPATIENT)
Dept: FAMILY MEDICINE | Facility: CLINIC | Age: 45
End: 2025-01-16
Payer: COMMERCIAL

## 2025-01-16 VITALS
DIASTOLIC BLOOD PRESSURE: 82 MMHG | HEART RATE: 96 BPM | BODY MASS INDEX: 34.48 KG/M2 | OXYGEN SATURATION: 96 % | SYSTOLIC BLOOD PRESSURE: 142 MMHG | TEMPERATURE: 100.9 F | RESPIRATION RATE: 16 BRPM | HEIGHT: 67 IN | WEIGHT: 219.7 LBS

## 2025-01-16 DIAGNOSIS — R68.89 FLU-LIKE SYMPTOMS: ICD-10-CM

## 2025-01-16 DIAGNOSIS — H10.31 ACUTE BACTERIAL CONJUNCTIVITIS OF RIGHT EYE: Primary | ICD-10-CM

## 2025-01-16 DIAGNOSIS — L03.213 PRESEPTAL CELLULITIS OF RIGHT EYE: ICD-10-CM

## 2025-01-16 LAB
FLUAV AG SPEC QL IA: NEGATIVE
FLUBV AG SPEC QL IA: NEGATIVE

## 2025-01-16 RX ORDER — KETOROLAC TROMETHAMINE 10 MG/1
10 TABLET, FILM COATED ORAL EVERY 6 HOURS PRN
Qty: 12 TABLET | Refills: 0 | Status: SHIPPED | OUTPATIENT
Start: 2025-01-16 | End: 2025-01-19

## 2025-01-16 RX ORDER — OFLOXACIN 3 MG/ML
1-2 SOLUTION/ DROPS OPHTHALMIC 4 TIMES DAILY
Qty: 5 ML | Refills: 0 | Status: SHIPPED | OUTPATIENT
Start: 2025-01-16

## 2025-01-16 ASSESSMENT — ENCOUNTER SYMPTOMS
EYE PAIN: 1
COUGH: 1

## 2025-01-16 NOTE — PROGRESS NOTES
Assessment & Plan     Acute bacterial conjunctivitis of right eye  Patient does wear contacts we will switch her antibiotic eyedrops from polymyxin trimethoprim to ofloxacin.  Advised patient if she does not have improvement in symptoms within 2 days, she should schedule a same-day appointment with her eye doctor.  Advised patient not to wear contacts until her symptoms are fully resolved.  - ofloxacin (OCUFLOX) 0.3 % ophthalmic solution; Place 1-2 drops into the right eye 4 times daily.    Preseptal cellulitis of right eye  Possibly the start of preseptal cellulitis.  Prescription for Augmentin.  Advised to take probiotics with Augmentin.  She is having some generalized pain around right eye and has prescription for oxycodone, which she is running out of.  I declined further refills of oxycodone as she is on lorazepam chronically and being managed by a pain specialist.  I have given her a short-term prescription of Toradol 10 mg tablets advised her not to use for more than 3 days, discussed potential kidney risk.  - amoxicillin-clavulanate (AUGMENTIN) 875-125 MG tablet; Take 1 tablet by mouth 2 times daily for 10 days.  - ketorolac (TORADOL) 10 MG tablet; Take 1 tablet (10 mg) by mouth every 6 hours as needed for moderate pain.    Flu-like symptoms  Negative for influenza today in clinic.  Advise supportive treatment at home.  - Influenza A & B Antigen - Clinic Collect      The longitudinal plan of care for the diagnosis(es)/condition(s) as documented were addressed during this visit. Due to the added complexity in care, I will continue to support Danyelle in the subsequent management and with ongoing continuity of care.          Subjective   Danyelle is a 44 year old, presenting for the following health issues:  Eye Problem (Patient has a swollen eye that started yesterday am and has progressed. Watery. Patient started eyedrops polytrim, in right eye has done 3 rounds.) and Cough ( )        1/16/2025     9:44 AM    Additional Questions   Roomed by dinora trimble cma   Accompanied by self     Eye Problem     Cough    History of Present Illness       Reason for visit:  Eye    She eats 2-3 servings of fruits and vegetables daily.She consumes 0 sweetened beverage(s) daily.She exercises with enough effort to increase her heart rate 9 or less minutes per day.  She exercises with enough effort to increase her heart rate 3 or less days per week.   She is taking medications regularly.     Yesterday patient woke up with erythema of right eye and yellow discharge that caused her to match shot.  She had an e-visit and was prescribed polymyxin-trimethoprim eyedrops, which she has used 3 times in the right eye and she states that this morning her right eye is more swollen, red, more drainage and there is associated periorbital pain.  She states that there is no vision changes.  She does wear contacts, but has not been wearing contacts since symptoms started.    This morning she also woke up with a fever, fatigue, body aches, nasal congestion, rhinorrhea, with an episode of vomiting before she came into our clinic.  Her 4-year-old son goes to  and has been exposed to viral infections.  We are seeing a higher incidence of influenza A in the community currently.      Acute Illness  Acute illness concerns:   Onset/Duration: Yesterday   Symptoms:  Fever: YES  Chills/Sweats: YES  Headache (location?): No  Sinus Pressure: No  Conjunctivitis:  YES  Ear Pain: no  Rhinorrhea: YES  Congestion: YES  Sore Throat: No  Cough: YES  Wheeze: No  Decreased Appetite: YES  Nausea: YES  Vomiting: YES  Diarrhea: No  Dysuria/Freq.: No  Dysuria or Hematuria: No  Fatigue/Achiness: YES  Sick/Strep Exposure: Son   Therapies tried and outcome: Eyedrops.         Review of Systems  See HPI      Objective    BP (!) 142/82 (BP Location: Left arm, Patient Position: Sitting, Cuff Size: Adult Regular)   Pulse 96   Temp (!) 100.9  F (38.3  C) (Temporal)   Resp 16    "Ht 1.702 m (5' 7\")   Wt 99.7 kg (219 lb 11.2 oz)   SpO2 96%   BMI 34.41 kg/m    Body mass index is 34.41 kg/m .  Physical Exam   GENERAL: alert and no distress  EYES: conjunctiva/corneas- conjunctival injection OD and yellow colored discharge present right and right eyelid edema with erythema of upper right eyelid and inferior to right eye.  HENT: normal cephalic/atraumatic, nose with clear drainage bilaterally and mouth without ulcers or lesions, oropharynx clear, and oral mucous membranes moist  NECK: no adenopathy, no asymmetry, masses, or scars  RESP: lungs clear to auscultation - no rales, rhonchi or wheezes  CV: regular rate and rhythm, normal S1 S2, no S3 or S4, no murmur, click or rub, no peripheral edema   PSYCH: mentation appears normal, affect normal/bright            Signed Electronically by: Zeferino De Dios MD    "

## 2025-01-20 NOTE — PROGRESS NOTES
ASSESSMENT: Danyelle Canales is a 44 year old female who presents for consultation at the request of PCP Marsha Fernandez, who presents today for new patient evaluation of:    -acute midline thoracic back pain    Patient is neurologically intact on exam. No myelopathic or red flag symptoms. Her pain is in the lower lumbar spine. She denies any thoracic pain. She also states she never had pain in the thoracic region. We reviewed her thoracic and lumbar MRI scans. She has a small disc bulge with an annular tear at L4-5 which is most consistent with her symptoms. She has perifacetal soft tissue and edema and enhancement of the milind T4-5 and T8-9 facet joints with underlying bone marrow edema and enhancement of the opposing articular facet and a small right T4-5 facet joint effusion (all of which are new compared to MRI thoracic without contrast in April 2024.     Would expect pain mid-thoracic region to worsen if this was infection. She states she never had pain in the mid thoracic region, so these results could be incidental. For completeness, I do recommend repeating labs. For her lower back pain, recommend continued prn use of an rx nsaid and sent naproxen 500mg to her pharmacy today. Did refill one time oxycodone (future refills per PCP). PT referral placed. We will call with lab results.  Briefly discussed possibility of lumbar NICOLE as an option if symptoms did not continue to improve with medication/PT if no concerns regarding infection.          1/3/2025     8:43 AM   OSWESTRY DISABILITY INDEX   Count 9    Sum 9    Oswestry Score (%) 20 %        Patient-reported            Diagnoses and all orders for this visit:  Spondylosis of thoracic region without myelopathy or radiculopathy  -     CRP inflammation; Future  -     Erythrocyte sedimentation rate auto; Future  -     CBC with Platelets & Differential; Future  -     naproxen (NAPROSYN) 500 MG tablet; Take 1 tablet (500 mg) by mouth 2 times daily as needed for  moderate pain.  -     Physical Therapy  Referral; Future  Closed displaced fracture of posterior process of right talus, initial encounter  -     Orthopedic  Referral  Acute midline thoracic back pain  -     oxyCODONE (ROXICODONE) 5 MG tablet; Take 0.5-1 tablets (2.5-5 mg) by mouth every 6 hours as needed for severe pain.  Acute bilateral low back pain without sciatica  -     naproxen (NAPROSYN) 500 MG tablet; Take 1 tablet (500 mg) by mouth 2 times daily as needed for moderate pain.  -     Physical Therapy  Referral; Future      PLAN:  Reviewed spine anatomy and disease process. Discussed diagnosis and treatment options with the patient today. A shared decision making model was used.  The patient's values and choices were respected. The following represents what was discussed and decided upon by the provider and the patient.      -DIAGNOSTIC TESTS:  Images were personally reviewed and interpreted and explained to patient today using a spine model.   --cbc, crp, esr    -PHYSICAL THERAPY:    --Referral to PT placed  Discussed the importance of core strengthening, ROM, stretching exercises with the patient and how each of these entities is important in decreasing pain.  Explained to the patient that the purpose of physical therapy is to teach the patient a home exercise program.  These exercises need to be performed every day in order to decrease pain and prevent future occurrences of pain.        -MEDICATIONS:    --naproxen 500mg BID  -tylenol otc  -refilled oxycodone on a one time basis which we discussed, additional refills per pcp  -lidocaine patches prn otc    Discussed multiple medication options today with patient. Discussed risks, side effects, and proper use of medications. Patient verbalized understanding.    -INTERVENTIONS:    -Very briefly discussed the role of lumbar epidural steroid injections with patient today. Patient would be a good candidate in the future for either  "epidural steroid injections or medial branch blocks if indicated based on symptoms and supported by imaging results.    -PATIENT EDUCATION:  Total time of 40 minutes, on the day of service, spent with the patient, reviewing the chart, placing orders, and documenting.   -Today we also discussed the pros and cons of the current treatment plan.    -FOLLOW-UP:   we will call with lab results    Advised patient to call the [facility] if symptoms worsen, new numbness or weakness develops in the legs, or if they develop new or worsening problems controlling bladder or bowel function.   ______________________________________________________________________    SUBJECTIVE:    HPI:  Danyelle Canales  Is a 44 year old female hx migraines, depression, ASD, HTN, insomnia, R inguinal hernia, anemia who presents today for new patient evaluation of \"acute midline thoracic back pain\"     Gradual onset back pain on 12/16/24 without injury, began radiating into abdomen, hips, thighs. It got worse and worse. No prior episodes of pain like this. [facility] visit 12/18. Pt states did not have thoracic back pain, and points to lower back as origin. thoracic MRI w wo contrast in ED showed inflammation vs infectious process thoracic facets.   Crp elevated cbc normal. Discussed with Neurosurgery who reviewed MRI & lab results. Suspect inflamed joints from degenerative disease and not infectious in nature. States spots too small for arthrocentesis. Recommended medrol dosepak as outpatient and outpatient spine referral.    Pain was controlled w topical lidocaine, otc pain medications, oxycodone prn severe pain. She did take the medrol pack which helped. The oxycodone for severe pain was the only thing that felt like it worked when the pain was severe, 1/2 tab. She asks for a refill of this today     Pain has improved since then. Pain level 3/10 today, 9 at worst, 2 at best. She still has difficulty sleeping in her bed due to pain. Does continue " to be in the lower back, radiating into the hips and groin. She does have chronic numbness in the L medial thigh r/t L inguinal hernia.     8wk resp illness prior to her back pain starting with subsequent referral to pulmonology and CT chest planned upcoming    She was recently placed on preseptal cellulitis of R eye, is currently on augmentin. She was given toradol 10mg Q6hrs by [facility] medicine on 1/16. She was told to take this for 3 days, it did not seem to impact her back pain     She has had cspine ESIs in the past   Would prefer to avoid additional injections unless necessary      -Treatment to Date:     -Medications:    Current Outpatient Medications   Medication Sig Dispense Refill    naproxen (NAPROSYN) 500 MG tablet Take 1 tablet (500 mg) by mouth 2 times daily as needed for moderate pain. 60 tablet 0    oxyCODONE (ROXICODONE) 5 MG tablet Take 0.5-1 tablets (2.5-5 mg) by mouth every 6 hours as needed for severe pain. 10 tablet 0    amoxicillin-clavulanate (AUGMENTIN) 875-125 MG tablet Take 1 tablet by mouth 2 times daily for 10 days. 20 tablet 0    B Complex CAPS Take 1 capsule by mouth daily. 90 capsule 0    budesonide-formoterol (SYMBICORT) 160-4.5 MCG/ACT Inhaler Inhale 2 puffs into the lungs 2 times daily. 6 g 11    Fluocinolone Acetonide Scalp 0.01 % OIL oil APPLY TO SCALP AT NIGHT 3-4 TIMES WEEKLY (Patient not taking: Reported on 1/21/2025)      FLUoxetine (PROZAC) 20 MG capsule Take 20 mg by mouth daily      FLUoxetine (PROZAC) 40 MG capsule Take 40 mg by mouth daily      fluticasone (FLOVENT HFA) 44 MCG/ACT inhaler Inhale 1 puff into the lungs 2 times daily. (Patient not taking: Reported on 1/21/2025) 10.6 g 1    lamoTRIgine (LAMICTAL) 100 MG tablet TAKE 1 TABLET BY MOUTH WITH  MG TABLET ONCE DAILY      lamoTRIgine (LAMICTAL) 200 MG tablet Take 1 tablet (200 mg) by mouth daily 90 tablet 1    lisinopril (ZESTRIL) 10 MG tablet Take 1 tablet (10 mg) by mouth daily. 90 tablet 3    LORazepam  (ATIVAN) 1 MG tablet TAKE 1 TABLET BY MOUTH THREE TIMES A DAY AS DIRECTED      MULTIPLE VITAMIN PO       norethindrone-ethinyl estradiol (JUNEL 1/20) 1-20 MG-MCG tablet Take 1 tablet by mouth daily. 90 tablet 2    ofloxacin (OCUFLOX) 0.3 % ophthalmic solution Place 1-2 drops into the right eye 4 times daily. 5 mL 0    spacer (OPTICHAMBER BROOK) holding chamber Dispense spacer with inhaler 1 each 0    VENTOLIN  (90 Base) MCG/ACT inhaler INHALE 2 PUFFS INTO THE LUNGS EVERY 6 HOURS (Patient not taking: Reported on 1/16/2025) 18 g 0     No current facility-administered medications for this visit.       Allergies   Allergen Reactions    Artificial Sweetner (Informational Only) [Artificial Sweetner (Informational Only) ] Headache     DISSOLVING TABLETS- CAUSES MIGRAINES    Chocolate Flavoring Agent (Non-Screening) Other (See Comments)       Past Medical History:   Diagnosis Date    ASD (atrial septal defect)     repaired surgically as a child    Benign essential hypertension 07/19/2023    Depressive disorder     History of blood transfusion 1985 1985 during open heart surgery    History of ovarian cyst     no longer active or issues    Insomnia, unspecified     Migraine     intermittent    Other forms of migraine, without mention of intractable migraine without mention of status migrainosus     Right inguinal hernia     s/p repair        Patient Active Problem List   Diagnosis    Persistent insomnia    allergic DERMATITIS NOS    Migraine headache    PMDD (premenstrual dysphoric disorder)    Trichotillomania    CARDIOVASCULAR SCREENING; LDL GOAL LESS THAN 160    Generalized anxiety disorder    Chronic pain syndrome    ASD (atrial septal defect)    Esophageal reflux    Benign hypermobility syndrome    Status post right inguinal hernia repair    Class 1 obesity due to excess calories with serious comorbidity and body mass index (BMI) of 34.0 to 34.9 in adult    Mild recurrent major depression    Left lumbar  radiculopathy    Benign essential hypertension    Pyridoxine deficiency    Status post left inguinal hernia repair    Right-sided thoracic back pain, unspecified chronicity    Spondylosis of cervical region without myelopathy or radiculopathy       Past Surgical History:   Procedure Laterality Date    BIOPSY      COLONOSCOPY      DAVINCI HERNIORRHAPHY INGUINAL Left 1/17/2024    Procedure: HERNIORRHAPHY, LEFT INGUINAL, ROBOT-ASSISTED, LAPAROSCOPIC, USING DA ANTONY XI with mesh;  Surgeon: Weston Mcnamara MD;  Location: [facility] OR    HERNIA REPAIR  Feb 2021    HERNIORRHAPHY UMBILICAL N/A 1/17/2024    Procedure: and repair of umbilical hernia open;  Surgeon: Weston Mcnamara MD;  Location: [facility] OR    IR CERVICAL EPIDURAL STEROID INJECTION  9/7/2012    IR CERVICAL EPIDURAL STEROID INJECTION  10/2/2012    ZZC REPR ASD OSTIUM PREMUM      Dr. Silverio       Family History   Problem Relation Age of Onset    C.A.D. Mother         heart surgery to close hole in heart    Cardiovascular Mother     Hypertension Mother     Depression Mother     Anxiety Disorder Mother     Heart Failure Mother     Cerebrovascular Disease Father     Hypertension Father     Chronic Obstructive Pulmonary Disease Maternal Grandmother         Could be COPD but was a heavy smoker.    Myocardial Infarction Maternal Grandfather     LUNG DISEASE No family hx of        Reviewed past medical, surgical, and family history with patient found on new patient intake packet located in EMR Media tab.     SOCIAL HX: neg to all    ROS: positive for fever, headache, hoarseness, shortness of breath, cough, muscle pain, anxiety, depression. Specifically negative for bowel/bladder dysfunction, balance changes, headache, dizziness, foot drop, fevers, chills, appetite changes, nausea/vomiting, unexplained weight loss. Otherwise 13 systems reviewed are negative. Please see the patient's intake questionnaire from today for details.    OBJECTIVE:  /85   Pulse 68  "  Ht 5' 7\" (1.702 m)   Wt 212 lb (96.2 kg)   BMI 33.20 kg/m      PHYSICAL EXAMINATION:    --CONSTITUTIONAL:  Vital signs as above.  No acute distress.  The patient is well nourished and well groomed.  --PSYCHIATRIC:  Appropriate mood and affect. The patient is awake, alert, oriented to person, place, time and answering questions appropriately with clear speech.    --SKIN:  Skin over the face, bilateral lower extremities, and posterior torso is clean, dry, intact without rashes.    --RESPIRATORY: Normal rhythm and effort. No abnormal accessory muscle breathing patterns noted.   --ABDOMINAL:  Non-distended.    --GROSS MOTOR: Gait is non-antalgic. Easily arises from a seated position. Toe walking and heel walking are normal without significant difficulty.      --LOWER EXTREMITY MOTOR TESTING:  Hip flexion: right 5/5, left 5/5  Hip abduction: right 5/5, left 5/5  Hip adduction: right 5/5, left 5/5   Quads: right 5/5, left 5/5  Hamstrings: right 5/5, left 5/5  Dorsiflexion: right 5/5, left 5/5  Plantar flexion: right 5/5, left 5/5    Great toe MTP extension/EHL: right 5/5, left 5/5    --NEUROLOGICAL:  2/4 patellar and achilles reflexes bilaterally. Sensation to light touch is decreased left medial thigh, otherwise intact throughout both lower extremities. Outside of the left lower leg is more sensitive when touched. Babinski is negative. No clonus.    --MUSCULOSKELETAL: Thoracic and lumbar spine inspection reveals no evidence of deformity. Range of motion is limited in lumbar flexion, extension, rotation and sidebending. No point tenderness to palpation thoracic or lumbar spine. Some R mid-lumbar paraspinal musculature tenderness.     Straight leg raising is negative.    --HIPS: Full range of motion bilaterally. Negative CLIF and Negative FADIR bilaterally. Tenderness L hip joint to palp.    --SACROILIAC JOINT:  One finger point test was negative. Negative SI joint tenderness to palpation bilaterally. Negative " gaenslen and thigh thrust milind    --VASCULAR:  Bilateral lower extremities are warm without any discoloration.  There is no pitting edema of the bilateral lower extremities.    RESULTS:   Prior medical records from [facility]where were reviewed today.    Imaging: Spine imaging was personally reviewed and interpreted today. The images were shown to the patient and the findings were explained using a spine model.    CLERICAL ADDENDUM:  The original report had a clerical error stating 5 lumbar type vertebral bodies. However, please note that the L5 segment is partially sacralized on the left with a slightly rudimentary L5-S1 intervertebral disc (as seen on series 7 image 4). This   nomenclature is consistent with that of the comparison lumbar MRI exam.     END ADDENDUM   Addended by Guanakito Green MD on 12/18/2024  5:03 PM     Study Result    Narrative & Impression   EXAM: MR LUMBAR SPINE W/O and W CONTRAST, MR THORACIC SPINE W/O and W CONTRAST  LOCATION: [facility]  DATE: 12/18/2024     INDICATION: Mid to low back pain with radiation to the legs and elevated CRP  COMPARISON: Lumbar spine MRI dated 4/20/2023 and thoracic spine MRI dated 4/16/2024  CONTRAST: 10 mL of Gadavist  TECHNIQUE:   1) Routine Thoracic Spine MRI without and with IV contrast  2) Routine Lumbar Spine MRI without and with IV contrast     FINDINGS:     THORACIC SPINE:  Mild upper to mid thoracic levoconvex curvature with normal thoracic kyphosis without significant spondylolisthesis. Maintained vertebral body heights. Mild multilevel interbody degenerative change evidenced by intervertebral disc height loss and   desiccation as well as degenerative endplate irregularity/osteophyte formation. Small scattered posterior disc protrusions. Mild to moderate multilevel facet arthrosis. These degenerative changes are most notable at the mid thoracic spine. No high-grade   spinal canal or foraminal stenosis.  Mild bilateral T8-T9 and right T9-T10 foraminal narrowing.     Similar mild bone marrow edema underlying the opposing T6-T7 endplate anteriorly, likely degenerative (Modic type I change). Otherwise, no endplate bone marrow or intradiscal edema/enhancement to suggest discitis-osteomyelitis. Mild perifacetal soft   tissue edema and enhancement the bilateral T4-T5 and, more conspicuously, T8-T9 facet joints with underlying bone marrow edema and enhancement of the opposing articulating facets. A small right T4-T5 facet joint effusion. These findings are not   definitively identified on the prior exam and likely new.     Mildly limited assessment of the spinal cord and canal due to motion artifact without abnormal spinal cord signal intensity or pathologic epidural enhancement. No loculated epidural or paraspinal fluid collection.     Within technique limitations, grossly unremarkable visualized extraspinal structures.     LUMBAR SPINE:   Five lumbar-type vertebral bodies. Mild levoconvex curvature centered at L3-L4 and straightening of the normal lumbar lordosis without significant spondylolisthesis. No compression fracture deformity; several Schmorl's nodes most notably involving the   opposing T11-T12 and L1-L3 endplates as well as the inferior L3 and L4 endplates. Similar mild-moderate intervertebral disc height loss and desiccation at L4-L5, mild at L3-L4 and, to a lesser extent, at L1-L3. No suspicious bone marrow signal intensity.   No significant focal bone marrow or intradiscal edema. No pathologic enhancement or facet joint effusion. Unremarkable paraspinal soft tissues. Unremarkable conus medullaris terminating at the inferior L1 endplate. Unremarkable cauda equina nerve roots.     Mild degenerative change of the sacroiliac joints. Within technique limitations, no significant finding within the visualized abdominopelvic compartment.     T12-L1: Mild left facet arthrosis. No significant posterior disc  herniation, right facet arthrosis, spinal canal stenosis, or foraminal narrowing.      L1-L2: Mild disc bulge eccentric to the left and bilateral facet arthrosis (slightly worse on the left) without significant spinal canal or foraminal stenosis. No significant change.     L2-L3: Mild disc bulge eccentric to the left and bilateral facet arthrosis (worse on the left) without significant spinal canal or foraminal stenosis. No significant change.      L3-L4: Similar disc bulge eccentric to the left and mild left facet arthrosis without significant spinal canal or foraminal stenosis.     L4-L5: No significant change in disc bulge with a small superimposed central/left subarticular disc protrusion and underlying annular fissure. Similar mild endplate osteophytic spurring and bilateral facet arthrosis. No significant spinal canal or   foraminal stenosis.     L5-S1: Similar mild bilateral facet arthrosis. No significant disc degenerative change, spinal canal stenosis, or foraminal narrowing.                                                                      IMPRESSION:     THORACIC SPINE MRI:  1.  Mild bone marrow and surrounding soft tissue edema and enhancement centered at the bilateral T4-T5 and T8-T9 facet joints, not appreciated on the prior exam and likely new. A small right T4-T5 facet joint effusion with synovial enhancement. Findings   are suspicious for septic arthritis in the provided clinical setting. Degenerative change with acute reactive inflammation may have a similar appearance.  2.  No evidence of discitis-osteomyelitis.  3.  No paraspinal or epidural abscess.     LUMBAR SPINE MRI:  1.  No evidence of ongoing infection.  2.  Similar mild spondylotic change since 2023 without significant spinal canal or foraminal stenosis.         This note was dictated using voice recognition software. Any grammatical or context distortions are unintentional and inherent to the software.       Yelena HIRSCH  [facility]  O. [phone]

## 2025-01-21 ENCOUNTER — OFFICE VISIT (OUTPATIENT)
Dept: PHYSICAL MEDICINE AND REHAB | Facility: CLINIC | Age: 45
End: 2025-01-21
Attending: PHYSICIAN ASSISTANT
Payer: COMMERCIAL

## 2025-01-21 ENCOUNTER — TELEPHONE (OUTPATIENT)
Dept: FAMILY MEDICINE | Facility: CLINIC | Age: 45
End: 2025-01-21

## 2025-01-21 VITALS
BODY MASS INDEX: 33.27 KG/M2 | SYSTOLIC BLOOD PRESSURE: 139 MMHG | WEIGHT: 212 LBS | HEIGHT: 67 IN | HEART RATE: 68 BPM | DIASTOLIC BLOOD PRESSURE: 85 MMHG

## 2025-01-21 DIAGNOSIS — M54.6 ACUTE MIDLINE THORACIC BACK PAIN: ICD-10-CM

## 2025-01-21 DIAGNOSIS — M54.50 ACUTE BILATERAL LOW BACK PAIN WITHOUT SCIATICA: ICD-10-CM

## 2025-01-21 DIAGNOSIS — M47.814 SPONDYLOSIS OF THORACIC REGION WITHOUT MYELOPATHY OR RADICULOPATHY: Primary | ICD-10-CM

## 2025-01-21 DIAGNOSIS — S92.131A CLOSED DISPLACED FRACTURE OF POSTERIOR PROCESS OF RIGHT TALUS, INITIAL ENCOUNTER: ICD-10-CM

## 2025-01-21 PROCEDURE — 99204 OFFICE O/P NEW MOD 45 MIN: CPT | Performed by: NURSE PRACTITIONER

## 2025-01-21 RX ORDER — OXYCODONE HYDROCHLORIDE 5 MG/1
2.5-5 TABLET ORAL EVERY 6 HOURS PRN
Qty: 10 TABLET | Refills: 0 | Status: SHIPPED | OUTPATIENT
Start: 2025-01-21

## 2025-01-21 RX ORDER — NAPROXEN 500 MG/1
500 TABLET ORAL 2 TIMES DAILY PRN
Qty: 60 TABLET | Refills: 0 | Status: SHIPPED | OUTPATIENT
Start: 2025-01-21

## 2025-01-21 ASSESSMENT — PAIN SCALES - GENERAL: PAINLEVEL_OUTOF10: MILD PAIN (3)

## 2025-01-21 NOTE — PATIENT INSTRUCTIONS
Labs (CBC, CRP, ESR) were ordered today. You can have these done as walk-in at the address below, or you can schedule these by calling your PCP office    [place] [address]        ~[facility]. They will call you to schedule an appointment.      Scheduling phone number is [phone] for [facility], or [place] location.  If you have not heard from the scheduling office within 2 business days, please call [phone] for ALL other locations.    Discussed the importance of core strengthening, ROM, stretching exercises and how each of these entities is important in decreasing pain and improving long term spine health.  The purpose of physical therapy is to teach you an individualized home exercise program.  These exercises need to be performed every day in order to decrease pain and prevent future occurrences of pain.         Ok to use Heat as needed for back pain     You can trial ibuprofen 800mg  (4 over the counter tabs) as needed for pain 3 x per day  You can take tylenol 650mg Q6hrs as needed for pain as well  You can try using over the counter lidocaine patches as needed as directed for back pain as well    Prescribed Naproxen 500mg today. Please take as prescribed (IF NO RELIEF FROM IBUPROFEN), as needed for pain control as well as to aid in decreasing inflammation.   Take medication with a full glass of water and with food.   *Do not take Advil, Ibuprofen, Aleve, or Naproxen while taking this medication as it can cause organ failure if taken together*  This medication does have risks if taken long-term, these risks include: gastrointestinal irritation, kidney dysfunction, and cardiovascular effects.  We will check your kidney function as needed while you're on this medication.  Stop taking this medication if you have intolerable side effects or blood in the stool.      Oxycodone was refilled as a one-time courtesy to take 1/2 - 1 tab every 6-8hrs as needed for SEVERE Pain if unrelieved by above medications    ~Please call our [facility] Nurse Navigation line [phone] with any questions or concerns about your treatment plan, if symptoms worsen and you would like to be seen urgently, or if you have any new or worsening numbness, weakness, or problems controlling bladder and bowel function.  ~You are also welcome to contact Yelena Shipman via Vinel[employer], but please be aware that responses to [employer] message may take 2-3 days due to the high volume of patients seen in clinic.

## 2025-01-21 NOTE — LETTER
1/21/2025      Danyelle Canales  [address] [place] [address]      Dear Colleague,    Thank you for referring your patient, Danyelle Canales, to the [facility]. Please see a copy of my visit note below.    ASSESSMENT: Danyelle Canales is a 44 year old female who presents for consultation at the request of PCP Marhsa Fernandez, who presents today for new patient evaluation of:    -acute midline thoracic back pain    Patient is neurologically intact on exam. No myelopathic or red flag symptoms. Her pain is in the lower lumbar spine. She denies any thoracic pain. She also states she never had pain in the thoracic region. We reviewed her thoracic and lumbar MRI scans. She has a small disc bulge with an annular tear at L4-5 which is most consistent with her symptoms. She has perifacetal soft tissue and edema and enhancement of the milind T4-5 and T8-9 facet joints with underlying bone marrow edema and enhancement of the opposing articular facet and a small right T4-5 facet joint effusion (all of which are new compared to MRI thoracic without contrast in April 2024.     Would expect pain mid-thoracic region to worsen if this was infection. She states she never had pain in the mid thoracic region, so these results could be incidental. For completeness, I do recommend repeating labs. For her lower back pain, recommend continued prn use of an rx nsaid and sent naproxen 500mg to her pharmacy today. Did refill one time oxycodone (future refills per PCP). PT referral placed. We will call with lab results.  Briefly discussed possibility of lumbar NICOLE as an option if symptoms did not continue to improve with medication/PT if no concerns regarding infection.          1/3/2025     8:43 AM   OSWESTRY DISABILITY INDEX   Count 9    Sum 9    Oswestry Score (%) 20 %        Patient-reported            Diagnoses and all orders for this visit:  Spondylosis of thoracic region without  myelopathy or radiculopathy  -     CRP inflammation; Future  -     Erythrocyte sedimentation rate auto; Future  -     CBC with Platelets & Differential; Future  -     naproxen (NAPROSYN) 500 MG tablet; Take 1 tablet (500 mg) by mouth 2 times daily as needed for moderate pain.  -     Physical Therapy  Referral; Future  Closed displaced fracture of posterior process of right talus, initial encounter  -     Orthopedic  Referral  Acute midline thoracic back pain  -     oxyCODONE (ROXICODONE) 5 MG tablet; Take 0.5-1 tablets (2.5-5 mg) by mouth every 6 hours as needed for severe pain.  Acute bilateral low back pain without sciatica  -     naproxen (NAPROSYN) 500 MG tablet; Take 1 tablet (500 mg) by mouth 2 times daily as needed for moderate pain.  -     Physical Therapy  Referral; Future      PLAN:  Reviewed spine anatomy and disease process. Discussed diagnosis and treatment options with the patient today. A shared decision making model was used.  The patient's values and choices were respected. The following represents what was discussed and decided upon by the provider and the patient.      -DIAGNOSTIC TESTS:  Images were personally reviewed and interpreted and explained to patient today using a spine model.   --cbc, crp, esr    -PHYSICAL THERAPY:    --Referral to PT placed  Discussed the importance of core strengthening, ROM, stretching exercises with the patient and how each of these entities is important in decreasing pain.  Explained to the patient that the purpose of physical therapy is to teach the patient a home exercise program.  These exercises need to be performed every day in order to decrease pain and prevent future occurrences of pain.        -MEDICATIONS:    --naproxen 500mg BID  -tylenol otc  -refilled oxycodone on a one time basis which we discussed, additional refills per pcp  -lidocaine patches prn otc    Discussed multiple medication options today with patient. Discussed  "risks, side effects, and proper use of medications. Patient verbalized understanding.    -INTERVENTIONS:    -Very briefly discussed the role of lumbar epidural steroid injections with patient today. Patient would be a good candidate in the future for either epidural steroid injections or medial branch blocks if indicated based on symptoms and supported by imaging results.    -PATIENT EDUCATION:  Total time of 40 minutes, on the day of service, spent with the patient, reviewing the chart, placing orders, and documenting.   -Today we also discussed the pros and cons of the current treatment plan.    -FOLLOW-UP:   we will call with lab results    Advised patient to call the [facility]ine Center if symptoms worsen, new numbness or weakness develops in the legs, or if they develop new or worsening problems controlling bladder or bowel function.   ______________________________________________________________________    SUBJECTIVE:    HPI:  Danyelle Canales  Is a 44 year old female hx migraines, depression, ASD, HTN, insomnia, R inguinal hernia, anemia who presents today for new patient evaluation of \"acute midline thoracic back pain\"     Gradual onset back pain on 12/16/24 without injury, began radiating into abdomen, hips, thighs. It got worse and worse. No prior episodes of pain like this. [facility] visit 12/18. Pt states did not have thoracic back pain, and points to lower back as origin. thoracic MRI w wo contrast in ED showed inflammation vs infectious process thoracic facets.   Crp elevated cbc normal. Discussed with Neurosurgery who reviewed MRI & lab results. Suspect inflamed joints from degenerative disease and not infectious in nature. States spots too small for arthrocentesis. Recommended medrol dosepak as outpatient and outpatient spine referral.    Pain was controlled w topical lidocaine, otc pain medications, oxycodone prn severe pain. She did take the medrol pack which helped. The oxycodone for severe pain was the " only thing that felt like it worked when the pain was severe, 1/2 tab. She asks for a refill of this today     Pain has improved since then. Pain level 3/10 today, 9 at worst, 2 at best. She still has difficulty sleeping in her bed due to pain. Does continue to be in the lower back, radiating into the hips and groin. She does have chronic numbness in the L medial thigh r/t L inguinal hernia.     8wk resp illness prior to her back pain starting with subsequent referral to pulmonology and CT chest planned upcoming    She was recently placed on preseptal cellulitis of R eye, is currently on augmentin. She was given toradol 10mg Q6hrs by [facility] on 1/16. She was told to take this for 3 days, it did not seem to impact her back pain     She has had cspine ESIs in the past   Would prefer to avoid additional injections unless necessary      -Treatment to Date:     -Medications:    Current Outpatient Medications   Medication Sig Dispense Refill     naproxen (NAPROSYN) 500 MG tablet Take 1 tablet (500 mg) by mouth 2 times daily as needed for moderate pain. 60 tablet 0     oxyCODONE (ROXICODONE) 5 MG tablet Take 0.5-1 tablets (2.5-5 mg) by mouth every 6 hours as needed for severe pain. 10 tablet 0     amoxicillin-clavulanate (AUGMENTIN) 875-125 MG tablet Take 1 tablet by mouth 2 times daily for 10 days. 20 tablet 0     B Complex CAPS Take 1 capsule by mouth daily. 90 capsule 0     budesonide-formoterol (SYMBICORT) 160-4.5 MCG/ACT Inhaler Inhale 2 puffs into the lungs 2 times daily. 6 g 11     Fluocinolone Acetonide Scalp 0.01 % OIL oil APPLY TO SCALP AT NIGHT 3-4 TIMES WEEKLY (Patient not taking: Reported on 1/21/2025)       FLUoxetine (PROZAC) 20 MG capsule Take 20 mg by mouth daily       FLUoxetine (PROZAC) 40 MG capsule Take 40 mg by mouth daily       fluticasone (FLOVENT HFA) 44 MCG/ACT inhaler Inhale 1 puff into the lungs 2 times daily. (Patient not taking: Reported on 1/21/2025) 10.6 g 1     lamoTRIgine  (LAMICTAL) 100 MG tablet TAKE 1 TABLET BY MOUTH WITH  MG TABLET ONCE DAILY       lamoTRIgine (LAMICTAL) 200 MG tablet Take 1 tablet (200 mg) by mouth daily 90 tablet 1     lisinopril (ZESTRIL) 10 MG tablet Take 1 tablet (10 mg) by mouth daily. 90 tablet 3     LORazepam (ATIVAN) 1 MG tablet TAKE 1 TABLET BY MOUTH THREE TIMES A DAY AS DIRECTED       MULTIPLE VITAMIN PO        norethindrone-ethinyl estradiol (JUNEL 1/20) 1-20 MG-MCG tablet Take 1 tablet by mouth daily. 90 tablet 2     ofloxacin (OCUFLOX) 0.3 % ophthalmic solution Place 1-2 drops into the right eye 4 times daily. 5 mL 0     spacer (OPTICHAMBER BROOK) holding chamber Dispense spacer with inhaler 1 each 0     VENTOLIN  (90 Base) MCG/ACT inhaler INHALE 2 PUFFS INTO THE LUNGS EVERY 6 HOURS (Patient not taking: Reported on 1/16/2025) 18 g 0     No current facility-administered medications for this visit.       Allergies   Allergen Reactions     Artificial Sweetner (Informational Only) [Artificial Sweetner (Informational Only) ] Headache     DISSOLVING TABLETS- CAUSES MIGRAINES     Chocolate Flavoring Agent (Non-Screening) Other (See Comments)       Past Medical History:   Diagnosis Date     ASD (atrial septal defect)     repaired surgically as a child     Benign essential hypertension 07/19/2023     Depressive disorder      History of blood transfusion 1985 1985 during open heart surgery     History of ovarian cyst     no longer active or issues     Insomnia, unspecified      Migraine     intermittent     Other forms of migraine, without mention of intractable migraine without mention of status migrainosus      Right inguinal hernia     s/p repair        Patient Active Problem List   Diagnosis     Persistent insomnia     allergic DERMATITIS NOS     Migraine headache     PMDD (premenstrual dysphoric disorder)     Trichotillomania     CARDIOVASCULAR SCREENING; LDL GOAL LESS THAN 160     Generalized anxiety disorder     Chronic pain syndrome      ASD (atrial septal defect)     Esophageal reflux     Benign hypermobility syndrome     Status post right inguinal hernia repair     Class 1 obesity due to excess calories with serious comorbidity and body mass index (BMI) of 34.0 to 34.9 in adult     Mild recurrent major depression     Left lumbar radiculopathy     Benign essential hypertension     Pyridoxine deficiency     Status post left inguinal hernia repair     Right-sided thoracic back pain, unspecified chronicity     Spondylosis of cervical region without myelopathy or radiculopathy       Past Surgical History:   Procedure Laterality Date     BIOPSY       COLONOSCOPY       DAVINCI HERNIORRHAPHY INGUINAL Left 1/17/2024    Procedure: HERNIORRHAPHY, LEFT INGUINAL, ROBOT-ASSISTED, LAPAROSCOPIC, USING DA ANTONY XI with mesh;  Surgeon: Weston Mcnamara MD;  Location: [facility] OR     HERNIA REPAIR  Feb 2021     HERNIORRHAPHY UMBILICAL N/A 1/17/2024    Procedure: and repair of umbilical hernia open;  Surgeon: Weston Mcnamara MD;  Location: [facility] OR     IR CERVICAL EPIDURAL STEROID INJECTION  9/7/2012     IR CERVICAL EPIDURAL STEROID INJECTION  10/2/2012     ZZC REPR ASD OSTIUM PREMUM      Dr. Silverio       Family History   Problem Relation Age of Onset     C.A.D. Mother         heart surgery to close hole in heart     Cardiovascular Mother      Hypertension Mother      Depression Mother      Anxiety Disorder Mother      Heart Failure Mother      Cerebrovascular Disease Father      Hypertension Father      Chronic Obstructive Pulmonary Disease Maternal Grandmother         Could be COPD but was a heavy smoker.     Myocardial Infarction Maternal Grandfather      LUNG DISEASE No family hx of        Reviewed past medical, surgical, and family history with patient found on new patient intake packet located in [employer]MR [employer] tab.     SOCIAL HX: neg to all    ROS: positive for fever, headache, hoarseness, shortness of breath, cough, muscle pain, anxiety, depression.  "Specifically negative for bowel/bladder dysfunction, balance changes, headache, dizziness, foot drop, fevers, chills, appetite changes, nausea/vomiting, unexplained weight loss. Otherwise 13 systems reviewed are negative. Please see the patient's intake questionnaire from today for details.    OBJECTIVE:  /85   Pulse 68   Ht 5' 7\" (1.702 m)   Wt 212 lb (96.2 kg)   BMI 33.20 kg/m      PHYSICAL EXAMINATION:    --CONSTITUTIONAL:  Vital signs as above.  No acute distress.  The patient is well nourished and well groomed.  --PSYCHIATRIC:  Appropriate mood and affect. The patient is awake, alert, oriented to person, place, time and answering questions appropriately with clear speech.    --SKIN:  Skin over the face, bilateral lower extremities, and posterior torso is clean, dry, intact without rashes.    --RESPIRATORY: Normal rhythm and effort. No abnormal accessory muscle breathing patterns noted.   --ABDOMINAL:  Non-distended.    --GROSS MOTOR: Gait is non-antalgic. Easily arises from a seated position. Toe walking and heel walking are normal without significant difficulty.      --LOWER EXTREMITY MOTOR TESTING:  Hip flexion: right 5/5, left 5/5  Hip abduction: right 5/5, left 5/5  Hip adduction: right 5/5, left 5/5   Quads: right 5/5, left 5/5  Hamstrings: right 5/5, left 5/5  Dorsiflexion: right 5/5, left 5/5  Plantar flexion: right 5/5, left 5/5    Great toe MTP extension/EHL: right 5/5, left 5/5    --NEUROLOGICAL:  2/4 patellar and achilles reflexes bilaterally. Sensation to light touch is decreased left medial thigh, otherwise intact throughout both lower extremities. Outside of the left lower leg is more sensitive when touched. Babinski is negative. No clonus.    --MUSCULOSKELETAL: Thoracic and lumbar spine inspection reveals no evidence of deformity. Range of motion is limited in lumbar flexion, extension, rotation and sidebending. No point tenderness to palpation thoracic or lumbar spine. Some R " mid-lumbar paraspinal musculature tenderness.     Straight leg raising is negative.    --HIPS: Full range of motion bilaterally. Negative CLIF and Negative FADIR bilaterally. Tenderness L hip joint to palp.    --SACROILIAC JOINT:  One finger point test was negative. Negative SI joint tenderness to palpation bilaterally. Negative gaenslen and thigh thrust milind    --VASCULAR:  Bilateral lower extremities are warm without any discoloration.  There is no pitting edema of the bilateral lower extremities.    RESULTS:   Prior medical records from [facility] were reviewed today.    Imaging: Spine imaging was personally reviewed and interpreted today. The images were shown to the patient and the findings were explained using a spine model.    CLERICAL ADDENDUM:  The original report had a clerical error stating 5 lumbar type vertebral bodies. However, please note that the L5 segment is partially sacralized on the left with a slightly rudimentary L5-S1 intervertebral disc (as seen on series 7 image 4). This   nomenclature is consistent with that of the comparison lumbar MRI exam.     END ADDENDUM   Addended by Guanakito Green MD on 12/18/2024  5:03 PM     Study Result    Narrative & Impression   EXAM: MR LUMBAR SPINE W/O and W CONTRAST, MR THORACIC SPINE W/O and W CONTRAST  LOCATION: [facility]  DATE: 12/18/2024     INDICATION: Mid to low back pain with radiation to the legs and elevated CRP  COMPARISON: Lumbar spine MRI dated 4/20/2023 and thoracic spine MRI dated 4/16/2024  CONTRAST: 10 mL of Gadavist  TECHNIQUE:   1) Routine Thoracic Spine MRI without and with IV contrast  2) Routine Lumbar Spine MRI without and with IV contrast     FINDINGS:     THORACIC SPINE:  Mild upper to mid thoracic levoconvex curvature with normal thoracic kyphosis without significant spondylolisthesis. Maintained vertebral body heights. Mild multilevel interbody degenerative change evidenced  by intervertebral disc height loss and   desiccation as well as degenerative endplate irregularity/osteophyte formation. Small scattered posterior disc protrusions. Mild to moderate multilevel facet arthrosis. These degenerative changes are most notable at the mid thoracic spine. No high-grade   spinal canal or foraminal stenosis. Mild bilateral T8-T9 and right T9-T10 foraminal narrowing.     Similar mild bone marrow edema underlying the opposing T6-T7 endplate anteriorly, likely degenerative (Modic type I change). Otherwise, no endplate bone marrow or intradiscal edema/enhancement to suggest discitis-osteomyelitis. Mild perifacetal soft   tissue edema and enhancement the bilateral T4-T5 and, more conspicuously, T8-T9 facet joints with underlying bone marrow edema and enhancement of the opposing articulating facets. A small right T4-T5 facet joint effusion. These findings are not   definitively identified on the prior exam and likely new.     Mildly limited assessment of the spinal cord and canal due to motion artifact without abnormal spinal cord signal intensity or pathologic epidural enhancement. No loculated epidural or paraspinal fluid collection.     Within technique limitations, grossly unremarkable visualized extraspinal structures.     LUMBAR SPINE:   Five lumbar-type vertebral bodies. Mild levoconvex curvature centered at L3-L4 and straightening of the normal lumbar lordosis without significant spondylolisthesis. No compression fracture deformity; several Schmorl's nodes most notably involving the   opposing T11-T12 and L1-L3 endplates as well as the inferior L3 and L4 endplates. Similar mild-moderate intervertebral disc height loss and desiccation at L4-L5, mild at L3-L4 and, to a lesser extent, at L1-L3. No suspicious bone marrow signal intensity.   No significant focal bone marrow or intradiscal edema. No pathologic enhancement or facet joint effusion. Unremarkable paraspinal soft tissues. Unremarkable  conus medullaris terminating at the inferior L1 endplate. Unremarkable cauda equina nerve roots.     Mild degenerative change of the sacroiliac joints. Within technique limitations, no significant finding within the visualized abdominopelvic compartment.     T12-L1: Mild left facet arthrosis. No significant posterior disc herniation, right facet arthrosis, spinal canal stenosis, or foraminal narrowing.      L1-L2: Mild disc bulge eccentric to the left and bilateral facet arthrosis (slightly worse on the left) without significant spinal canal or foraminal stenosis. No significant change.     L2-L3: Mild disc bulge eccentric to the left and bilateral facet arthrosis (worse on the left) without significant spinal canal or foraminal stenosis. No significant change.      L3-L4: Similar disc bulge eccentric to the left and mild left facet arthrosis without significant spinal canal or foraminal stenosis.     L4-L5: No significant change in disc bulge with a small superimposed central/left subarticular disc protrusion and underlying annular fissure. Similar mild endplate osteophytic spurring and bilateral facet arthrosis. No significant spinal canal or   foraminal stenosis.     L5-S1: Similar mild bilateral facet arthrosis. No significant disc degenerative change, spinal canal stenosis, or foraminal narrowing.                                                                      IMPRESSION:     THORACIC SPINE MRI:  1.  Mild bone marrow and surrounding soft tissue edema and enhancement centered at the bilateral T4-T5 and T8-T9 facet joints, not appreciated on the prior exam and likely new. A small right T4-T5 facet joint effusion with synovial enhancement. Findings   are suspicious for septic arthritis in the provided clinical setting. Degenerative change with acute reactive inflammation may have a similar appearance.  2.  No evidence of discitis-osteomyelitis.  3.  No paraspinal or epidural abscess.     LUMBAR SPINE MRI:  1.   No evidence of ongoing infection.  2.  Similar mild spondylotic change since 2023 without significant spinal canal or foraminal stenosis.         This note was dictated using voice recognition software. Any grammatical or context distortions are unintentional and inherent to the software.       Yelena GRAY-C  [facility]  O. [phone]             Again, thank you for allowing me to participate in the care of your patient.        Sincerely,        ASHLEY Mcnally CNP    Electronically signed

## 2025-01-21 NOTE — TELEPHONE ENCOUNTER
Reason for Call:  Appointment Request    Patient requesting this type of appt:  Lab Appt     Requested provider:  Nurse Only Visit     Reason patient unable to be scheduled:  No available appts at the [place] Location (No appts populating when trying to schedule.     When does patient want to be seen/preferred time: 1-2 days    Comments: Pt needs to schedule labs CRP Inflammation, Erythrocyte Sedimentation rate auto, CBC with Platelets * Differential pt was told my her specialty provider to have the labs completed on 01/21/2025.     Could we send this information to you in [employer] or would you prefer to receive a phone call?:   No preference   Okay to leave a detailed message?: Yes at Cell number on file:    Telephone Information:   Mobile [phone]       Call taken on 1/21/2025 at 3:48 PM by Kamran Vogt

## 2025-01-22 ENCOUNTER — LAB (OUTPATIENT)
Dept: LAB | Facility: CLINIC | Age: 45
End: 2025-01-22
Payer: COMMERCIAL

## 2025-01-22 DIAGNOSIS — M47.814 SPONDYLOSIS OF THORACIC REGION WITHOUT MYELOPATHY OR RADICULOPATHY: ICD-10-CM

## 2025-01-22 LAB
BASOPHILS # BLD AUTO: 0 10E3/UL (ref 0–0.2)
BASOPHILS NFR BLD AUTO: 1 %
CRP SERPL-MCNC: 22.9 MG/L
EOSINOPHIL # BLD AUTO: 0 10E3/UL (ref 0–0.7)
EOSINOPHIL NFR BLD AUTO: 1 %
ERYTHROCYTE [DISTWIDTH] IN BLOOD BY AUTOMATED COUNT: 13.6 % (ref 10–15)
ERYTHROCYTE [SEDIMENTATION RATE] IN BLOOD BY WESTERGREN METHOD: 37 MM/HR (ref 0–20)
HCT VFR BLD AUTO: 39.5 % (ref 35–47)
HGB BLD-MCNC: 12.5 G/DL (ref 11.7–15.7)
IMM GRANULOCYTES # BLD: 0 10E3/UL
IMM GRANULOCYTES NFR BLD: 0 %
LYMPHOCYTES # BLD AUTO: 2 10E3/UL (ref 0.8–5.3)
LYMPHOCYTES NFR BLD AUTO: 34 %
MCH RBC QN AUTO: 25.5 PG (ref 26.5–33)
MCHC RBC AUTO-ENTMCNC: 31.6 G/DL (ref 31.5–36.5)
MCV RBC AUTO: 81 FL (ref 78–100)
MONOCYTES # BLD AUTO: 0.3 10E3/UL (ref 0–1.3)
MONOCYTES NFR BLD AUTO: 4 %
NEUTROPHILS # BLD AUTO: 3.6 10E3/UL (ref 1.6–8.3)
NEUTROPHILS NFR BLD AUTO: 61 %
PLATELET # BLD AUTO: 233 10E3/UL (ref 150–450)
RBC # BLD AUTO: 4.9 10E6/UL (ref 3.8–5.2)
WBC # BLD AUTO: 6 10E3/UL (ref 4–11)

## 2025-01-22 PROCEDURE — 85025 COMPLETE CBC W/AUTO DIFF WBC: CPT

## 2025-01-22 PROCEDURE — 36415 COLL VENOUS BLD VENIPUNCTURE: CPT

## 2025-01-22 PROCEDURE — 86140 C-REACTIVE PROTEIN: CPT

## 2025-01-22 PROCEDURE — 85652 RBC SED RATE AUTOMATED: CPT

## 2025-01-23 ENCOUNTER — MYC MEDICAL ADVICE (OUTPATIENT)
Dept: PHYSICAL MEDICINE AND REHAB | Facility: CLINIC | Age: 45
End: 2025-01-23
Payer: COMMERCIAL

## 2025-01-23 DIAGNOSIS — M47.814 SPONDYLOSIS OF THORACIC REGION WITHOUT MYELOPATHY OR RADICULOPATHY: Primary | ICD-10-CM

## 2025-01-25 ENCOUNTER — APPOINTMENT (OUTPATIENT)
Dept: ULTRASOUND IMAGING | Facility: CLINIC | Age: 45
End: 2025-01-25
Attending: EMERGENCY MEDICINE
Payer: COMMERCIAL

## 2025-01-25 ENCOUNTER — HOSPITAL ENCOUNTER (EMERGENCY)
Facility: CLINIC | Age: 45
Discharge: HOME | End: 2025-01-25
Attending: EMERGENCY MEDICINE | Admitting: EMERGENCY MEDICINE
Payer: COMMERCIAL

## 2025-01-25 VITALS
OXYGEN SATURATION: 97 % | RESPIRATION RATE: 18 BRPM | HEART RATE: 76 BPM | DIASTOLIC BLOOD PRESSURE: 76 MMHG | SYSTOLIC BLOOD PRESSURE: 135 MMHG | WEIGHT: 215 LBS | HEIGHT: 67 IN | BODY MASS INDEX: 33.74 KG/M2 | TEMPERATURE: 97 F

## 2025-01-25 DIAGNOSIS — M79.651 PAIN OF RIGHT THIGH: ICD-10-CM

## 2025-01-25 PROCEDURE — 99284 EMERGENCY DEPT VISIT MOD MDM: CPT

## 2025-01-25 PROCEDURE — 99284 EMERGENCY DEPT VISIT MOD MDM: CPT | Mod: 25

## 2025-01-25 PROCEDURE — 250N000011 HC RX IP 250 OP 636: Performed by: EMERGENCY MEDICINE

## 2025-01-25 PROCEDURE — 93971 EXTREMITY STUDY: CPT | Mod: RT

## 2025-01-25 PROCEDURE — 96372 THER/PROPH/DIAG INJ SC/IM: CPT | Performed by: EMERGENCY MEDICINE

## 2025-01-25 RX ORDER — LIDOCAINE 4 G/G
1 PATCH TOPICAL EVERY 24 HOURS
Qty: 10 PATCH | Refills: 0 | Status: SHIPPED | OUTPATIENT
Start: 2025-01-25

## 2025-01-25 RX ORDER — KETOROLAC TROMETHAMINE 15 MG/ML
15 INJECTION, SOLUTION INTRAMUSCULAR; INTRAVENOUS ONCE
Status: COMPLETED | OUTPATIENT
Start: 2025-01-25 | End: 2025-01-25

## 2025-01-25 RX ORDER — KETOROLAC TROMETHAMINE 15 MG/ML
15 INJECTION, SOLUTION INTRAMUSCULAR; INTRAVENOUS ONCE
Status: DISCONTINUED | OUTPATIENT
Start: 2025-01-25 | End: 2025-01-25

## 2025-01-25 RX ORDER — OXYCODONE HYDROCHLORIDE 5 MG/1
5 TABLET ORAL EVERY 6 HOURS PRN
Qty: 6 TABLET | Refills: 0 | Status: SHIPPED | OUTPATIENT
Start: 2025-01-25 | End: 2025-01-28

## 2025-01-25 RX ADMIN — KETOROLAC TROMETHAMINE 15 MG: 15 INJECTION, SOLUTION INTRAMUSCULAR; INTRAVENOUS at 12:48

## 2025-01-25 RX ADMIN — HYDROMORPHONE HYDROCHLORIDE 1 MG: 1 INJECTION, SOLUTION INTRAMUSCULAR; INTRAVENOUS; SUBCUTANEOUS at 12:47

## 2025-01-25 ASSESSMENT — COLUMBIA-SUICIDE SEVERITY RATING SCALE - C-SSRS
1. IN THE PAST MONTH, HAVE YOU WISHED YOU WERE DEAD OR WISHED YOU COULD GO TO SLEEP AND NOT WAKE UP?: NO
2. HAVE YOU ACTUALLY HAD ANY THOUGHTS OF KILLING YOURSELF IN THE PAST MONTH?: NO
6. HAVE YOU EVER DONE ANYTHING, STARTED TO DO ANYTHING, OR PREPARED TO DO ANYTHING TO END YOUR LIFE?: NO

## 2025-01-25 ASSESSMENT — ACTIVITIES OF DAILY LIVING (ADL)
ADLS_ACUITY_SCORE: 42

## 2025-01-25 NOTE — ED TRIAGE NOTES
Pt c/o right thigh pain and goes up into right groin area, states started Tuesday. Pt states Hx of this in past and had some pelvic issues. Pt broke right ankle in December states that is healing fine. No recent surgery. Denies urinary Sx. Pt states has been having elevated CRP and ESR elevation and states can't figure out why. Pt states on day 9 of antibiotics for a facial cellulitis.

## 2025-01-25 NOTE — DISCHARGE INSTRUCTIONS
You were seen in the emergency department for right thigh pain.  Your evaluation included a DVT ultrasound which was negative.  We reviewed your recent low back imaging and labs from a few days ago.  We are not sure the exact cause of your pain but we are hopeful it is something like a groin strain or spasm and should get better with time.  Please continue anti-inflammatory medications like naproxen twice a day and continue Tylenol every 6 hours.  You can also apply lidocaine patches and use limited oxycodone for breakthrough severe pain but try to save this primarily for nighttime use.  You can follow-up with primary care or your orthopedic specialist of choice to continue your evaluation after this ED visit.

## 2025-01-25 NOTE — ED PROVIDER NOTES
Detail Level: Generalized EMERGENCY DEPARTMENT ENCOUNTER      NAME: Danyelle Canales  AGE: 44 year old female  YOB: 1980  MRN: [id number]  EVALUATION DATE & TIME: No admission date for patient encounter.    PCP: Marsha Fernandez    ED PROVIDER: Jean Pierre Monzon M.D.      Chief Complaint   Patient presents with    Leg Pain    Groin Pain         FINAL IMPRESSION:  1. Pain of right thigh          ED COURSE & MEDICAL DECISION MAKIN year old female presents to the Emergency Department for evaluation of right thigh pain.  She has a history of chronic pain including low back pain, obesity, anxiety.  She was seen last month and diagnosed with a arthritis, probably related to degenerative change/inflammation, not felt to be infection in her thoracic spine.  She presents with a localized area of pain in her right inner thigh.  There is no visible discoloration, cellulitic change, fluctuance, or any sign of infection in this area.  There is no history of preceding trauma.  She reports some mild pain in her low back which is not any worse and is actually much better than last month.  She was also concerned about some moderately elevated inflammatory markers drawn 3 days ago.  She is afebrile here without any other infectious symptoms.  She does not have any asymmetry to her lower extremity neurological exam.  DVT ultrasound was obtained given her previous ankle fracture and birth control use.  This was negative.  As mentioned above there is no signs whatsoever of soft tissue infection or other complication.  I do not think there would be an indication today to reimage her spine as I do not think this is referred pain.  Patient received some intramuscular analgesics as below and was resting more comfortably when reevaluated.  We discussed a plan for continued follow-up in neurosurgery clinic as well as continuing with PT and potentially consulting with orthopedics as an outpatient if pain is persistent.  Patient will continue  NSAIDs, Tylenol, limited oxycodone. She was discharged in stable condition.      12:06 PM I met with the patient, obtained history, performed an initial exam, and discussed options and plan for diagnostics and treatment here in the ED.      At the conclusion of the encounter I discussed the results of all of the tests and the disposition. The questions were answered. The patient or family acknowledged understanding and was agreeable with the care plan.       Medical Decision Making  Obtained supplemental history:Supplemental history obtained?: No  Reviewed external records: External records reviewed?: Documented in chart  Care impacted by chronic illness:Chronic Pain, Hypertension, and Mental Health  Did you consider but not order tests?: Work up considered but not performed and documented in chart, if applicable  Did you interpret images independently?: Independent interpretation of ECG and images noted in documentation, when applicable.  Consultation discussion with other provider:Did you involve another provider (consultant, , pharmacy, etc.)?: No  Discharge. I prescribed additional prescription strength medication(s) as charted. N/A.    MIPS: Not Applicable          MEDICATIONS GIVEN IN THE EMERGENCY:  Medications   HYDROmorphone (DILAUDID) injection 1 mg (1 mg Intramuscular $Given 1/25/25 1247)   ketorolac (TORADOL) injection 15 mg (15 mg Intramuscular $Given 1/25/25 1248)       NEW PRESCRIPTIONS STARTED AT TODAY'S ER VISIT  Discharge Medication List as of 1/25/2025  2:50 PM        START taking these medications    Details   Lidocaine (LIDOCARE) 4 % Patch Place 1 patch over 12 hours onto the skin every 24 hours. To prevent lidocaine toxicity, patient should be patch free for 12 hrs daily.Disp-10 patch, R-0Local Print                =================================================================    HPI    Patient information was obtained from: patient    Use of : N/A         Danyelle Canales is  a 44 year old female with a pertinent history of chronic pain syndrome, migraines, left lumbar radiculopathy, right sided thoracic back pain, atrial septal defect, hypertension, premenstrual dysphoric disorder, NAMJA, depression, and s/p left and right inguinal hernia repair who presents to this ED by private car for evaluation of leg pain and groin pain.    Patient reports to being on day 9 of amoxacillin for facial cellulitis. She states that has improved significantly. Patient also states she has had back pain for 1 month and broke her right ankle on 12/30/24. Patient states her right thigh pain started on Thursday (01/23/25). She states the pain radiates into her right groin area. Lifting her leg makes the pain worse. Patient states that her medications have not helped with the pain. She has been multiple different pain meds with no improvement on the pain.     Patient states she had a pelvic tilt bilaterally a few years ago and is worried that is happening again.     Patient denies abdominal pain, chest pain, shortness of breath, headaches, nausea, vomiting, diarrhea, or any other complaints at this time.       REVIEW OF SYSTEMS   All systems reviewed and negative except as noted in HPI.    PAST MEDICAL HISTORY:  Past Medical History:   Diagnosis Date    ASD (atrial septal defect)     repaired surgically as a child    Benign essential hypertension 07/19/2023    Depressive disorder     History of blood transfusion 1985 1985 during open heart surgery    History of ovarian cyst     no longer active or issues    Insomnia, unspecified     Migraine     intermittent    Other forms of migraine, without mention of intractable migraine without mention of status migrainosus     Right inguinal hernia     s/p repair       PAST SURGICAL HISTORY:  Past Surgical History:   Procedure Laterality Date    BIOPSY      COLONOSCOPY      DAVINCI HERNIORRHAPHY INGUINAL Left 1/17/2024    Procedure: HERNIORRHAPHY, LEFT INGUINAL,  Detail Level: Detailed ROBOT-ASSISTED, LAPAROSCOPIC, USING DA ANTONY XI with mesh;  Surgeon: Weston Mcnamara MD;  Location: [facility] OR    HERNIA REPAIR  Feb 2021    HERNIORRHAPHY UMBILICAL N/A 1/17/2024    Procedure: and repair of umbilical hernia open;  Surgeon: Weston Mcnamara MD;  Location: [facility] OR    IR CERVICAL EPIDURAL STEROID INJECTION  9/7/2012    IR CERVICAL EPIDURAL STEROID INJECTION  10/2/2012    ZZC REPR ASD OSTIUM PREMUM      Dr. Silverio           CURRENT MEDICATIONS:    No current facility-administered medications for this encounter.     Current Outpatient Medications   Medication Sig Dispense Refill    Lidocaine (LIDOCARE) 4 % Patch Place 1 patch over 12 hours onto the skin every 24 hours. To prevent lidocaine toxicity, patient should be patch free for 12 hrs daily. 10 patch 0    oxyCODONE (ROXICODONE) 5 MG tablet Take 1 tablet (5 mg) by mouth every 6 hours as needed. 6 tablet 0    amoxicillin-clavulanate (AUGMENTIN) 875-125 MG tablet Take 1 tablet by mouth 2 times daily for 10 days. 20 tablet 0    B Complex CAPS Take 1 capsule by mouth daily. 90 capsule 0    budesonide-formoterol (SYMBICORT) 160-4.5 MCG/ACT Inhaler Inhale 2 puffs into the lungs 2 times daily. 6 g 11    Fluocinolone Acetonide Scalp 0.01 % OIL oil APPLY TO SCALP AT NIGHT 3-4 TIMES WEEKLY (Patient not taking: Reported on 1/21/2025)      FLUoxetine (PROZAC) 20 MG capsule Take 20 mg by mouth daily      FLUoxetine (PROZAC) 40 MG capsule Take 40 mg by mouth daily      fluticasone (FLOVENT HFA) 44 MCG/ACT inhaler Inhale 1 puff into the lungs 2 times daily. (Patient not taking: Reported on 1/21/2025) 10.6 g 1    lamoTRIgine (LAMICTAL) 100 MG tablet TAKE 1 TABLET BY MOUTH WITH  MG TABLET ONCE DAILY      lamoTRIgine (LAMICTAL) 200 MG tablet Take 1 tablet (200 mg) by mouth daily 90 tablet 1    lisinopril (ZESTRIL) 10 MG tablet Take 1 tablet (10 mg) by mouth daily. 90 tablet 3    LORazepam (ATIVAN) 1 MG tablet TAKE 1 TABLET BY MOUTH THREE TIMES A DAY  AS DIRECTED      MULTIPLE VITAMIN PO       naproxen (NAPROSYN) 500 MG tablet Take 1 tablet (500 mg) by mouth 2 times daily as needed for moderate pain. 60 tablet 0    norethindrone-ethinyl estradiol (JUNEL 1/20) 1-20 MG-MCG tablet Take 1 tablet by mouth daily. 90 tablet 2    ofloxacin (OCUFLOX) 0.3 % ophthalmic solution Place 1-2 drops into the right eye 4 times daily. 5 mL 0    spacer (Exposed Vocals BROOK) holding chamber Dispense spacer with inhaler 1 each 0    VENTOLIN  (90 Base) MCG/ACT inhaler INHALE 2 PUFFS INTO THE LUNGS EVERY 6 HOURS (Patient not taking: Reported on 1/16/2025) 18 g 0         ALLERGIES:  Allergies   Allergen Reactions    Artificial Sweetner (Informational Only) [Artificial Sweetner (Informational Only) ] Headache     DISSOLVING TABLETS- CAUSES MIGRAINES    Chocolate Flavoring Agent (Non-Screening) Other (See Comments)       FAMILY HISTORY:  Family History   Problem Relation Age of Onset    C.A.D. Mother         heart surgery to close hole in heart    Cardiovascular Mother     Hypertension Mother     Depression Mother     Anxiety Disorder Mother     Heart Failure Mother     Cerebrovascular Disease Father     Hypertension Father     Chronic Obstructive Pulmonary Disease Maternal Grandmother         Could be COPD but was a heavy smoker.    Myocardial Infarction Maternal Grandfather     LUNG DISEASE No family hx of        SOCIAL HISTORY:   Social History     Socioeconomic History    Marital status:      Spouse name: Chan Canales    Number of children: 0    Years of education: 12   Occupational History    Occupation: customer service     Employer: [employer]   Tobacco Use    Smoking status: Never     Passive exposure: Never    Smokeless tobacco: Never   Vaping Use    Vaping status: Never Used   Substance and Sexual Activity    Alcohol use: Not Currently     Comment: 1-2 a month    Drug use: No    Sexual activity: Yes     Partners: Male     Birth control/protection:  Pill   Other Topics Concern    Parent/sibling w/ CABG, MI or angioplasty before 65F 55M? Yes     Comment: stroke     Social Drivers of Health     Financial Resource Strain: Low Risk  (9/24/2024)    Financial Resource Strain     Within the past 12 months, have you or your family members you live with been unable to get utilities (heat, electricity) when it was really needed?: No   Food Insecurity: Low Risk  (9/24/2024)    Food Insecurity     Within the past 12 months, did you worry that your food would run out before you got money to buy more?: No     Within the past 12 months, did the food you bought just not last and you didn t have money to get more?: No   Transportation Needs: Low Risk  (9/24/2024)    Transportation Needs     Within the past 12 months, has lack of transportation kept you from medical appointments, getting your medicines, non-medical meetings or appointments, work, or from getting things that you need?: No   Physical Activity: Insufficiently Active (9/24/2024)    Exercise Vital Sign     Days of Exercise per Week: 3 days     Minutes of Exercise per Session: 20 min   Stress: Stress Concern Present (9/24/2024)    [employer] [place] [employer] - Occupational Stress Questionnaire     Feeling of Stress : Rather much   Social Connections: Unknown (9/24/2024)    Social Connection and Isolation Panel [NHANES]     Frequency of Social Gatherings with Friends and Family: Once a week   Interpersonal Safety: Low Risk  (9/24/2024)    Interpersonal Safety     Do you feel physically and emotionally safe where you currently live?: Yes     Within the past 12 months, have you been hit, slapped, kicked or otherwise physically hurt by someone?: No     Within the past 12 months, have you been humiliated or emotionally abused in other ways by your partner or ex-partner?: No   Housing Stability: Low Risk  (9/24/2024)    Housing Stability     Do you have housing? : Yes     Are you worried about losing your  "housing?: No       VITALS:  /76   Pulse 76   Temp 97  F (36.1  C)   Resp 18   Ht 1.702 m (5' 7\")   Wt 97.5 kg (215 lb)   SpO2 97%   BMI 33.67 kg/m      PHYSICAL EXAM    Constitutional: Well developed, Well nourished, NAD.  HENT: Normocephalic, Atraumatic. Neck Supple.  Eyes: EOMI, Conjunctiva normal.  Respiratory: Breathing comfortably on room air. Speaks full sentences easily. Lungs clear to ascultation.  Cardiovascular: Normal heart rate, Regular rhythm. No peripheral edema.  Abdomen: Soft  Musculoskeletal: Good range of motion in all major joints. No major deformities noted.  There is no visible swelling, erythema, focal tenderness, or other abnormality in the area of concern in the right inner thigh.  Integument: Warm, Dry.  Neurologic: Alert & awake, Normal motor function, Normal sensory function, No focal deficits noted.   Psychiatric: Cooperative. Affect appropriate.       RADIOLOGY:  Reviewed all pertinent imaging. Please see official radiology report.  US Lower Extremity Venous Duplex Right   Final Result   IMPRESSION:   1.  No deep venous thrombosis in the right lower extremity.              I, Daniel Majano, am serving as a scribe to document services personally performed by Dr. Jean Pierre Monzon, based on my observation and the provider's statements to me. I, Jean Pierre Monzon MD attest that Daniel Majano is acting in a scribe capacity, has observed my performance of the services and has documented them in accordance with my direction.    Jean Pierre Monzon M.D.  Emergency Medicine  [facility] EMERGENCY ROOM  [address] [id number] [phone]  Dept: [phone]       Jean Pierre Monzon MD  01/26/25 0642    "

## 2025-01-30 ENCOUNTER — HOSPITAL ENCOUNTER (OUTPATIENT)
Dept: CT IMAGING | Facility: HOSPITAL | Age: 45
Discharge: HOME | End: 2025-01-30
Attending: INTERNAL MEDICINE
Payer: COMMERCIAL

## 2025-01-30 DIAGNOSIS — R05.3 CHRONIC COUGH: ICD-10-CM

## 2025-01-30 PROCEDURE — 71250 CT THORAX DX C-: CPT

## 2025-02-05 ENCOUNTER — OFFICE VISIT (OUTPATIENT)
Dept: CARDIOLOGY | Facility: CLINIC | Age: 45
End: 2025-02-05
Payer: COMMERCIAL

## 2025-02-05 VITALS
RESPIRATION RATE: 16 BRPM | DIASTOLIC BLOOD PRESSURE: 88 MMHG | HEART RATE: 76 BPM | BODY MASS INDEX: 33.43 KG/M2 | SYSTOLIC BLOOD PRESSURE: 140 MMHG | HEIGHT: 67 IN | WEIGHT: 213 LBS

## 2025-02-05 DIAGNOSIS — I10 ESSENTIAL HYPERTENSION: ICD-10-CM

## 2025-02-05 DIAGNOSIS — E78.5 HYPERLIPIDEMIA, UNSPECIFIED HYPERLIPIDEMIA TYPE: ICD-10-CM

## 2025-02-05 DIAGNOSIS — Z87.74 H/O CONGENITAL ATRIAL SEPTAL DEFECT (ASD) REPAIR: ICD-10-CM

## 2025-02-05 DIAGNOSIS — I51.7 CARDIOMEGALY: Primary | ICD-10-CM

## 2025-02-05 LAB
ATRIAL RATE - MUSE: 78 BPM
DIASTOLIC BLOOD PRESSURE - MUSE: NORMAL MMHG
INTERPRETATION ECG - MUSE: NORMAL
P AXIS - MUSE: 67 DEGREES
PR INTERVAL - MUSE: 156 MS
QRS DURATION - MUSE: 102 MS
QT - MUSE: 392 MS
QTC - MUSE: 446 MS
R AXIS - MUSE: 26 DEGREES
SYSTOLIC BLOOD PRESSURE - MUSE: NORMAL MMHG
T AXIS - MUSE: 39 DEGREES
VENTRICULAR RATE- MUSE: 78 BPM

## 2025-02-05 PROCEDURE — 99204 OFFICE O/P NEW MOD 45 MIN: CPT | Performed by: GENERAL ACUTE CARE HOSPITAL

## 2025-02-05 PROCEDURE — 93000 ELECTROCARDIOGRAM COMPLETE: CPT | Performed by: INTERNAL MEDICINE

## 2025-02-05 NOTE — LETTER
2/5/2025    Marsha Fernandez, NP  [address] [facility] [id number]    RE: Danyelle Kleinmichelle       Dear Colleague,     I had the pleasure of seeing Danyelle Canales in the [facility].  HEART CARE ENCOUNTER NOTE      Thank you, Dr. Fernandez, Marsha RIVERA, for asking the [facility] team to see Ms. Danyelle Canales to evaluate cardiomegaly.    Assessment/Recommendations   Assessment:    Cardiomegaly suggested on chest CT 1/30/2025. She has no other signs or symptoms of heart failure.  Congenital atrial septal defect (unknown type) status post surgical repair at age 5. Both cardiac MRI 10/5/2011 and transthoracic echocardiogram 1/4/2012 did not show any evidence of residual atrial septal defect.  Family history of heart failure in her mother.   Essential hypertension. Elevated today but she notes good control at home.  Hyperlipidemia. Her 10-year atherosclerotic cardiovascular disease risk is low and chest CT 1/30/2025 noted an absence of coronary artery calcification.  Obesity with body mass index 33.36 kg/m .    Plan:  Transthoracic echocardiogram with bubble study.  Follow-up as needed based on test results.         History of Present Illness   Ms. Danyelle Canales is a 44 year old female with a significant past history of ASD s/p surgical repair at age 5 and HTN presenting for evaluation of cardiomegaly suggested on a recent CT scan.    She has been dealing with a persistent cough and seeing pulmonology for this. She had a CT scan 1/30/2025 which overall was unremarkable but suggested mild cardiomegaly.     Besides her cough and an ankle injury, she has otherwise felt okay. No chest pain/pressure/tightness, shortness of breath at rest or with exertion, light headedness/dizziness, pre-syncope, syncope, lower extremity swelling, palpitations, paroxysmal nocturnal dyspnea (PND), or orthopnea.    Her mother also had an ASD which required repair and then was  diagnosed with CHF at age 60 and required a pacemaker. She does not know further details on this. Her father had strokes.     Her blood pressure at home is typically 120/70s.     Cardiac Problems and Cardiac Diagnostics     Most Recent Cardiac testing:  ECG dated 10/7/2022 (personaly reviewed and interpreted): sinus bradycardia otherwise normal    CT chest 1/30/2025 (report reviewed):   1.  Linear scarring and/or subsegmental atelectasis of the right greater than left lung bases. No acute focal pulmonary consolidation or pleural effusion. No suspicious pulmonary nodule.  2.  No lymphadenopathy or pericardial effusion. Mildly enlarged heart.  3.  No coronary artery calcification.    Echo 1/4/2012 (results reviewed):  1.  Normal LV size and systolic function    2.  Normal RV size and function    3. Atrial septum intact as assessed by intravenous agitated saline contrast study     Cardiac MRI 10/5/2011 (results reviewed):  1. Normal left ventricular size with low normal global systolic function (LVEF=60%). There are no regional wall motion abnormalities.   2. Normal size right ventricle with normal overall systolic function (RVEF = 55%).   3. Normal resting perfusion study.   4. Mild left atrial enlargement. There is no evidence of recurrent atrial shunting. The calculated QP/QS is 1.1:1.0.   5. There is no late gadolinium enhancement of the LV or RV myocardium to suggest prior infarct, inflammation, or infiltration.      Medications  Allergies   Current Outpatient Medications   Medication Sig Dispense Refill     B Complex CAPS Take 1 capsule by mouth daily. 90 capsule 0     budesonide-formoterol (SYMBICORT) 160-4.5 MCG/ACT Inhaler Inhale 2 puffs into the lungs 2 times daily. 6 g 11     Fluocinolone Acetonide Scalp 0.01 % OIL oil        FLUoxetine (PROZAC) 20 MG capsule Take 20 mg by mouth daily       FLUoxetine (PROZAC) 40 MG capsule Take 40 mg by mouth daily       fluticasone (FLOVENT HFA) 44 MCG/ACT inhaler Inhale 1  "puff into the lungs 2 times daily. 10.6 g 1     lamoTRIgine (LAMICTAL) 200 MG tablet Take 1 tablet (200 mg) by mouth daily 90 tablet 1     Lidocaine (LIDOCARE) 4 % Patch Place 1 patch over 12 hours onto the skin every 24 hours. To prevent lidocaine toxicity, patient should be patch free for 12 hrs daily. 10 patch 0     lisinopril (ZESTRIL) 10 MG tablet Take 1 tablet (10 mg) by mouth daily. 90 tablet 3     LORazepam (ATIVAN) 1 MG tablet TAKE 1 TABLET BY MOUTH THREE TIMES A DAY AS DIRECTED       MULTIPLE VITAMIN PO Take 1 tablet by mouth daily.       naproxen (NAPROSYN) 500 MG tablet Take 1 tablet (500 mg) by mouth 2 times daily as needed for moderate pain. 60 tablet 0     ofloxacin (OCUFLOX) 0.3 % ophthalmic solution Place 1-2 drops into the right eye 4 times daily. 5 mL 0     spacer (OPTICHAMBER BROOK) holding chamber Dispense spacer with inhaler 1 each 0     VENTOLIN  (90 Base) MCG/ACT inhaler INHALE 2 PUFFS INTO THE LUNGS EVERY 6 HOURS 18 g 0     lamoTRIgine (LAMICTAL) 100 MG tablet TAKE 1 TABLET BY MOUTH WITH  MG TABLET ONCE DAILY       norethindrone-ethinyl estradiol (JUNEL 1/20) 1-20 MG-MCG tablet Take 1 tablet by mouth daily. 90 tablet 2      Allergies   Allergen Reactions     Artificial Sweetner (Informational Only) [Artificial Sweetner (Informational Only) ] Headache     DISSOLVING TABLETS- CAUSES MIGRAINES     Chocolate Flavoring Agent (Non-Screening) Other (See Comments)        Physical Examination Review of Systems   BP (!) 140/88 (BP Location: Left arm, Patient Position: Sitting, Cuff Size: Adult Large)   Pulse 76   Resp 16   Ht 1.702 m (5' 7\")   Wt 96.6 kg (213 lb)   BMI 33.36 kg/m    Body mass index is 33.36 kg/m .  Wt Readings from Last 3 Encounters:   02/05/25 96.6 kg (213 lb)   01/25/25 97.5 kg (215 lb)   01/21/25 96.2 kg (212 lb)       General Appearance:   Pleasant  female, appears  stated age. no acute distress, obese body habitus   ENT/Mouth: membranes moist, no apparent " gingival bleeding.      EYES:  no scleral icterus, normal conjunctivae   Neck: no carotid bruits. No anterior cervical lymphadenopaty   Respiratory:   lungs are clear to auscultation, no rales or wheezing, equal chest wall expansion    Cardiovascular:   Regular rhythm, normal rate. Normal first and second heart sounds with no murmurs, rubs, or gallops; the carotid, radial and posterior tibial pulses are intact, Jugular venous pressure normal, no edema bilaterally    Abdomen/GI:  no organomegaly, masses, bruits, or tenderness; bowel sounds are present   Extremities: no cyanosis or clubbing   Skin: no xanthelasma, warm.    Heme/lymph/ Immunology No apparent bleeding noted.   Neurologic: Alert and oriented. normal gait, no tremors     Psychiatric: Pleasant, calm, appropriate affect.    A complete 10 system review of systems was performed and is negative except as mentioned in the HPI/subjective.         Past History   Past Medical History:   Past Medical History:   Diagnosis Date     ASD (atrial septal defect)     repaired surgically as a child     Benign essential hypertension 07/19/2023     Depressive disorder      History of blood transfusion 1985 1985 during open heart surgery     History of ovarian cyst     no longer active or issues     Insomnia, unspecified      Migraine     intermittent     Other forms of migraine, without mention of intractable migraine without mention of status migrainosus      Right inguinal hernia     s/p repair       Past Surgical History:   Past Surgical History:   Procedure Laterality Date     BIOPSY       COLONOSCOPY       DAVINCI HERNIORRHAPHY INGUINAL Left 1/17/2024    Procedure: HERNIORRHAPHY, LEFT INGUINAL, ROBOT-ASSISTED, LAPAROSCOPIC, USING DA ANTONY XI with mesh;  Surgeon: Weston Mcnamara MD;  Location: [facility] OR     HERNIA REPAIR  Feb 2021     HERNIORRHAPHY UMBILICAL N/A 1/17/2024    Procedure: and repair of umbilical hernia open;  Surgeon: Weston Mcnamara MD;   Location: [facility] OR     IR CERVICAL EPIDURAL STEROID INJECTION  9/7/2012     IR CERVICAL EPIDURAL STEROID INJECTION  10/2/2012     ZZC REPR ASD OSTIUM PREMUM      Dr. Silverio       Family History:   Family History   Problem Relation Age of Onset     C.A.D. Mother         heart surgery to close hole in heart     Cardiovascular Mother      Hypertension Mother      Depression Mother      Anxiety Disorder Mother      Heart Failure Mother      Cerebrovascular Disease Father      Hypertension Father      Chronic Obstructive Pulmonary Disease Maternal Grandmother         Could be COPD but was a heavy smoker.     Myocardial Infarction Maternal Grandfather      LUNG DISEASE No family hx of         Social History:   Social History     Socioeconomic History     Marital status:      Spouse name: Chan Canales     Number of children: 0     Years of education: 12     Highest education level: Not on file   Occupational History     Occupation: customer service     Employer: [employer]   Tobacco Use     Smoking status: Never     Passive exposure: Never     Smokeless tobacco: Never   Vaping Use     Vaping status: Never Used   Substance and Sexual Activity     Alcohol use: Not Currently     Comment: 1-2 a month     Drug use: No     Sexual activity: Yes     Partners: Male     Birth control/protection: Pill   Other Topics Concern     Parent/sibling w/ CABG, MI or angioplasty before 65F 55M? Yes     Comment: stroke   Social History Narrative     Not on file     Social Drivers of Health     Financial Resource Strain: Low Risk  (9/24/2024)    Financial Resource Strain      Within the past 12 months, have you or your family members you live with been unable to get utilities (heat, electricity) when it was really needed?: No   Food Insecurity: Low Risk  (9/24/2024)    Food Insecurity      Within the past 12 months, did you worry that your food would run out before you got money to buy more?: No      Within the past 12  months, did the food you bought just not last and you didn t have money to get more?: No   Transportation Needs: Low Risk  (9/24/2024)    Transportation Needs      Within the past 12 months, has lack of transportation kept you from medical appointments, getting your medicines, non-medical meetings or appointments, work, or from getting things that you need?: No   Physical Activity: Insufficiently Active (9/24/2024)    Exercise Vital Sign      Days of Exercise per Week: 3 days      Minutes of Exercise per Session: 20 min   Stress: Stress Concern Present (9/24/2024)    [place] [employer] - Occupational Stress Questionnaire      Feeling of Stress : Rather much   Social Connections: Unknown (9/24/2024)    Social Connection and Isolation Panel [NHANES]      Frequency of Communication with Friends and Family: Not on file      Frequency of Social Gatherings with Friends and Family: Once a week      Attends Adventism Services: Not on file      Active Member of Clubs or Organizations: Not on file      Attends Club or Organization Meetings: Not on file      Marital Status: Not on file   Interpersonal Safety: Low Risk  (9/24/2024)    Interpersonal Safety      Do you feel physically and emotionally safe where you currently live?: Yes      Within the past 12 months, have you been hit, slapped, kicked or otherwise physically hurt by someone?: No      Within the past 12 months, have you been humiliated or emotionally abused in other ways by your partner or ex-partner?: No   Housing Stability: Low Risk  (9/24/2024)    Housing Stability      Do you have housing? : Yes      Are you worried about losing your housing?: No              Lab Results    Chemistry/lipid CBC Cardiac Enzymes/BNP/TSH/INR   Lab Results   Component Value Date    CHOL 225 (H) 09/24/2024    HDL 61 09/24/2024     (H) 09/24/2024    TRIG 110 09/24/2024    CR 0.75 12/18/2024    BUN 7.7 12/18/2024    POTASSIUM 4.4 12/18/2024      12/18/2024    CO2 29 12/18/2024      Lab Results   Component Value Date    WBC 6.0 01/22/2025    HGB 12.5 01/22/2025    HCT 39.5 01/22/2025    MCV 81 01/22/2025     01/22/2025    A1C 5.3 11/16/2023     Lab Results   Component Value Date    A1C 5.3 11/16/2023    Lab Results   Component Value Date    TSH 2.92 08/22/2022          Colton Gross MD [facility]  Non-Invasive Cardiologist  [facility]  Pager [phone]      Thank you for allowing me to participate in the care of your patient.      Sincerely,     Colton Gross MD     [facility]  cc:   Referred Self, MD  No address on file

## 2025-02-05 NOTE — PROGRESS NOTES
HEART CARE ENCOUNTER NOTE      Thank you, Marsha Vaca, for asking the [facility] team to see Ms. Danyelle Canales to evaluate cardiomegaly.    Assessment/Recommendations   Assessment:    Cardiomegaly suggested on chest CT 1/30/2025. She has no other signs or symptoms of heart failure.  Congenital atrial septal defect (unknown type) status post surgical repair at age 5. Both cardiac MRI 10/5/2011 and transthoracic echocardiogram 1/4/2012 did not show any evidence of residual atrial septal defect.  Family history of heart failure in her mother.   Essential hypertension. Elevated today but she notes good control at home.  Hyperlipidemia. Her 10-year atherosclerotic cardiovascular disease risk is low and chest CT 1/30/2025 noted an absence of coronary artery calcification.  Obesity with body mass index 33.36 kg/m .    Plan:  Transthoracic echocardiogram with bubble study.  Follow-up as needed based on test results.         History of Present Illness   Ms. Danyelle Canales is a 44 year old female with a significant past history of ASD s/p surgical repair at age 5 and HTN presenting for evaluation of cardiomegaly suggested on a recent CT scan.    She has been dealing with a persistent cough and seeing pulmonology for this. She had a CT scan 1/30/2025 which overall was unremarkable but suggested mild cardiomegaly.     Besides her cough and an ankle injury, she has otherwise felt okay. No chest pain/pressure/tightness, shortness of breath at rest or with exertion, light headedness/dizziness, pre-syncope, syncope, lower extremity swelling, palpitations, paroxysmal nocturnal dyspnea (PND), or orthopnea.    Her mother also had an ASD which required repair and then was diagnosed with CHF at age 60 and required a pacemaker. She does not know further details on this. Her father had strokes.     Her blood pressure at home is typically 120/70s.     Cardiac Problems and Cardiac Diagnostics     Most  Recent Cardiac testing:  ECG dated 10/7/2022 (personaly reviewed and interpreted): sinus bradycardia otherwise normal    CT chest 1/30/2025 (report reviewed):   1.  Linear scarring and/or subsegmental atelectasis of the right greater than left lung bases. No acute focal pulmonary consolidation or pleural effusion. No suspicious pulmonary nodule.  2.  No lymphadenopathy or pericardial effusion. Mildly enlarged heart.  3.  No coronary artery calcification.    Echo 1/4/2012 (results reviewed):  1.  Normal LV size and systolic function    2.  Normal RV size and function    3. Atrial septum intact as assessed by intravenous agitated saline contrast study     Cardiac MRI 10/5/2011 (results reviewed):  1. Normal left ventricular size with low normal global systolic function (LVEF=60%). There are no regional wall motion abnormalities.   2. Normal size right ventricle with normal overall systolic function (RVEF = 55%).   3. Normal resting perfusion study.   4. Mild left atrial enlargement. There is no evidence of recurrent atrial shunting. The calculated QP/QS is 1.1:1.0.   5. There is no late gadolinium enhancement of the LV or RV myocardium to suggest prior infarct, inflammation, or infiltration.      Medications  Allergies   Current Outpatient Medications   Medication Sig Dispense Refill    B Complex CAPS Take 1 capsule by mouth daily. 90 capsule 0    budesonide-formoterol (SYMBICORT) 160-4.5 MCG/ACT Inhaler Inhale 2 puffs into the lungs 2 times daily. 6 g 11    Fluocinolone Acetonide Scalp 0.01 % OIL oil       FLUoxetine (PROZAC) 20 MG capsule Take 20 mg by mouth daily      FLUoxetine (PROZAC) 40 MG capsule Take 40 mg by mouth daily      fluticasone (FLOVENT HFA) 44 MCG/ACT inhaler Inhale 1 puff into the lungs 2 times daily. 10.6 g 1    lamoTRIgine (LAMICTAL) 200 MG tablet Take 1 tablet (200 mg) by mouth daily 90 tablet 1    Lidocaine (LIDOCARE) 4 % Patch Place 1 patch over 12 hours onto the skin every 24 hours. To  "prevent lidocaine toxicity, patient should be patch free for 12 hrs daily. 10 patch 0    lisinopril (ZESTRIL) 10 MG tablet Take 1 tablet (10 mg) by mouth daily. 90 tablet 3    LORazepam (ATIVAN) 1 MG tablet TAKE 1 TABLET BY MOUTH THREE TIMES A DAY AS DIRECTED      MULTIPLE VITAMIN PO Take 1 tablet by mouth daily.      naproxen (NAPROSYN) 500 MG tablet Take 1 tablet (500 mg) by mouth 2 times daily as needed for moderate pain. 60 tablet 0    ofloxacin (OCUFLOX) 0.3 % ophthalmic solution Place 1-2 drops into the right eye 4 times daily. 5 mL 0    spacer (OPTICHAMBER BROOK) holding chamber Dispense spacer with inhaler 1 each 0    VENTOLIN  (90 Base) MCG/ACT inhaler INHALE 2 PUFFS INTO THE LUNGS EVERY 6 HOURS 18 g 0    lamoTRIgine (LAMICTAL) 100 MG tablet TAKE 1 TABLET BY MOUTH WITH  MG TABLET ONCE DAILY      norethindrone-ethinyl estradiol (JUNEL 1/20) 1-20 MG-MCG tablet Take 1 tablet by mouth daily. 90 tablet 2      Allergies   Allergen Reactions    Artificial Sweetner (Informational Only) [Artificial Sweetner (Informational Only) ] Headache     DISSOLVING TABLETS- CAUSES MIGRAINES    Chocolate Flavoring Agent (Non-Screening) Other (See Comments)        Physical Examination Review of Systems   BP (!) 140/88 (BP Location: Left arm, Patient Position: Sitting, Cuff Size: Adult Large)   Pulse 76   Resp 16   Ht 1.702 m (5' 7\")   Wt 96.6 kg (213 lb)   BMI 33.36 kg/m    Body mass index is 33.36 kg/m .  Wt Readings from Last 3 Encounters:   02/05/25 96.6 kg (213 lb)   01/25/25 97.5 kg (215 lb)   01/21/25 96.2 kg (212 lb)       General Appearance:   Pleasant  female, appears  stated age. no acute distress, obese body habitus   ENT/Mouth: membranes moist, no apparent gingival bleeding.      EYES:  no scleral icterus, normal conjunctivae   Neck: no carotid bruits. No anterior cervical lymphadenopaty   Respiratory:   lungs are clear to auscultation, no rales or wheezing, equal chest wall expansion  "   Cardiovascular:   Regular rhythm, normal rate. Normal first and second heart sounds with no murmurs, rubs, or gallops; the carotid, radial and posterior tibial pulses are intact, Jugular venous pressure normal, no edema bilaterally    Abdomen/GI:  no organomegaly, masses, bruits, or tenderness; bowel sounds are present   Extremities: no cyanosis or clubbing   Skin: no xanthelasma, warm.    Heme/lymph/ Immunology No apparent bleeding noted.   Neurologic: Alert and oriented. normal gait, no tremors     Psychiatric: Pleasant, calm, appropriate affect.    A complete 10 system review of systems was performed and is negative except as mentioned in the HPI/subjective.         Past History   Past Medical History:   Past Medical History:   Diagnosis Date    ASD (atrial septal defect)     repaired surgically as a child    Benign essential hypertension 07/19/2023    Depressive disorder     History of blood transfusion 1985 1985 during open heart surgery    History of ovarian cyst     no longer active or issues    Insomnia, unspecified     Migraine     intermittent    Other forms of migraine, without mention of intractable migraine without mention of status migrainosus     Right inguinal hernia     s/p repair       Past Surgical History:   Past Surgical History:   Procedure Laterality Date    BIOPSY      COLONOSCOPY      DAVINCI HERNIORRHAPHY INGUINAL Left 1/17/2024    Procedure: HERNIORRHAPHY, LEFT INGUINAL, ROBOT-ASSISTED, LAPAROSCOPIC, USING DA ANTONY XI with mesh;  Surgeon: Weston Mcnamara MD;  Location: [facility] OR    HERNIA REPAIR  Feb 2021    HERNIORRHAPHY UMBILICAL N/A 1/17/2024    Procedure: and repair of umbilical hernia open;  Surgeon: Weston Mcnamara MD;  [place] [facility] [place] CERVICAL EPIDURAL STEROID INJECTION  9/7/2012    IR CERVICAL EPIDURAL STEROID INJECTION  10/2/2012    ZZC REPR ASD OSTIUM PREMUM      Dr. Silverio       Family History:   Family History   Problem Relation Age of Onset    C.A.D.  Mother         heart surgery to close hole in heart    Cardiovascular Mother     Hypertension Mother     Depression Mother     Anxiety Disorder Mother     Heart Failure Mother     Cerebrovascular Disease Father     Hypertension Father     Chronic Obstructive Pulmonary Disease Maternal Grandmother         Could be COPD but was a heavy smoker.    Myocardial Infarction Maternal Grandfather     LUNG DISEASE No family hx of         Social History:   Social History     Socioeconomic History    Marital status:      Spouse name: Chan Canales    Number of children: 0    Years of education: 12    Highest education level: Not on file   Occupational History    Occupation: customer service     Employer: [employer]   Tobacco Use    Smoking status: Never     Passive exposure: Never    Smokeless tobacco: Never   Vaping Use    Vaping status: Never Used   Substance and Sexual Activity    Alcohol use: Not Currently     Comment: 1-2 a month    Drug use: No    Sexual activity: Yes     Partners: Male     Birth control/protection: Pill   Other Topics Concern    Parent/sibling w/ CABG, MI or angioplasty before 65F 55M? Yes     Comment: stroke   Social History Narrative    Not on file     Social Drivers of Health     Financial Resource Strain: Low Risk  (9/24/2024)    Financial Resource Strain     Within the past 12 months, have you or your family members you live with been unable to get utilities (heat, electricity) when it was really needed?: No   Food Insecurity: Low Risk  (9/24/2024)    Food Insecurity     Within the past 12 months, did you worry that your food would run out before you got money to buy more?: No     Within the past 12 months, did the food you bought just not last and you didn t have money to get more?: No   Transportation Needs: Low Risk  (9/24/2024)    Transportation Needs     Within the past 12 months, has lack of transportation kept you from medical appointments, getting your medicines,  non-medical meetings or appointments, work, or from getting things that you need?: No   Physical Activity: Insufficiently Active (9/24/2024)    Exercise Vital Sign     Days of Exercise per Week: 3 days     Minutes of Exercise per Session: 20 min   Stress: Stress Concern Present (9/24/2024)    [employer] [place] [employer] - Occupational Stress Questionnaire     Feeling of Stress : Rather much   Social Connections: Unknown (9/24/2024)    Social Connection and Isolation Panel [NHANES]     Frequency of Communication with Friends and Family: Not on file     Frequency of Social Gatherings with Friends and Family: Once a week     Attends [employer] Services: Not on file     Active Member of Clubs or Organizations: Not on file     Attends Club or Organization Meetings: Not on file     Marital Status: Not on file   Interpersonal Safety: Low Risk  (9/24/2024)    Interpersonal Safety     Do you feel physically and emotionally safe where you currently live?: Yes     Within the past 12 months, have you been hit, slapped, kicked or otherwise physically hurt by someone?: No     Within the past 12 months, have you been humiliated or emotionally abused in other ways by your partner or ex-partner?: No   Housing Stability: Low Risk  (9/24/2024)    Housing Stability     Do you have housing? : Yes     Are you worried about losing your housing?: No              Lab Results    Chemistry/lipid CBC Cardiac Enzymes/BNP/TSH/INR   Lab Results   Component Value Date    CHOL 225 (H) 09/24/2024    HDL 61 09/24/2024     (H) 09/24/2024    TRIG 110 09/24/2024    CR 0.75 12/18/2024    BUN 7.7 12/18/2024    POTASSIUM 4.4 12/18/2024     12/18/2024    CO2 29 12/18/2024      Lab Results   Component Value Date    WBC 6.0 01/22/2025    HGB 12.5 01/22/2025    HCT 39.5 01/22/2025    MCV 81 01/22/2025     01/22/2025    A1C 5.3 11/16/2023     Lab Results   Component Value Date    A1C 5.3 11/16/2023    Lab Results   Component  Value Date    TSH 2.92 08/22/2022          Colton Gross MD [facility]  Non-Invasive Cardiologist  [facility]  Pager [phone]

## 2025-02-07 ENCOUNTER — MYC MEDICAL ADVICE (OUTPATIENT)
Dept: FAMILY MEDICINE | Facility: CLINIC | Age: 45
End: 2025-02-07
Payer: COMMERCIAL

## 2025-02-07 DIAGNOSIS — G89.4 CHRONIC PAIN SYNDROME: ICD-10-CM

## 2025-02-07 DIAGNOSIS — R76.8 ANA POSITIVE: Primary | ICD-10-CM

## 2025-02-07 DIAGNOSIS — R79.82 CRP ELEVATED: ICD-10-CM

## 2025-02-08 ENCOUNTER — HOSPITAL ENCOUNTER (EMERGENCY)
Facility: CLINIC | Age: 45
Discharge: HOME | End: 2025-02-09
Attending: FAMILY MEDICINE | Admitting: FAMILY MEDICINE
Payer: COMMERCIAL

## 2025-02-08 DIAGNOSIS — Z87.74 H/O ATRIAL SEPTAL DEFECT REPAIR: ICD-10-CM

## 2025-02-08 DIAGNOSIS — I10 ASYMPTOMATIC HYPERTENSION: ICD-10-CM

## 2025-02-08 DIAGNOSIS — R05.9 COUGH, UNSPECIFIED TYPE: ICD-10-CM

## 2025-02-08 PROCEDURE — 99283 EMERGENCY DEPT VISIT LOW MDM: CPT | Mod: 25

## 2025-02-08 ASSESSMENT — COLUMBIA-SUICIDE SEVERITY RATING SCALE - C-SSRS
6. HAVE YOU EVER DONE ANYTHING, STARTED TO DO ANYTHING, OR PREPARED TO DO ANYTHING TO END YOUR LIFE?: NO
1. IN THE PAST MONTH, HAVE YOU WISHED YOU WERE DEAD OR WISHED YOU COULD GO TO SLEEP AND NOT WAKE UP?: NO
2. HAVE YOU ACTUALLY HAD ANY THOUGHTS OF KILLING YOURSELF IN THE PAST MONTH?: NO

## 2025-02-09 VITALS
OXYGEN SATURATION: 98 % | WEIGHT: 211 LBS | RESPIRATION RATE: 16 BRPM | BODY MASS INDEX: 33.12 KG/M2 | HEIGHT: 67 IN | TEMPERATURE: 98.3 F | HEART RATE: 85 BPM | SYSTOLIC BLOOD PRESSURE: 149 MMHG | DIASTOLIC BLOOD PRESSURE: 69 MMHG

## 2025-02-09 LAB
ANION GAP SERPL CALCULATED.3IONS-SCNC: 11 MMOL/L (ref 7–15)
BUN SERPL-MCNC: 7.7 MG/DL (ref 6–20)
CALCIUM SERPL-MCNC: 8.5 MG/DL (ref 8.8–10.4)
CHLORIDE SERPL-SCNC: 104 MMOL/L (ref 98–107)
CREAT SERPL-MCNC: 0.64 MG/DL (ref 0.51–0.95)
EGFRCR SERPLBLD CKD-EPI 2021: >90 ML/MIN/1.73M2
GLUCOSE SERPL-MCNC: 92 MG/DL (ref 70–99)
HCO3 SERPL-SCNC: 26 MMOL/L (ref 22–29)
HOLD SPECIMEN: NORMAL
NT-PROBNP SERPL-MCNC: 195 PG/ML (ref 0–450)
POTASSIUM SERPL-SCNC: 3.6 MMOL/L (ref 3.4–5.3)
SODIUM SERPL-SCNC: 141 MMOL/L (ref 135–145)

## 2025-02-09 PROCEDURE — 36415 COLL VENOUS BLD VENIPUNCTURE: CPT | Performed by: FAMILY MEDICINE

## 2025-02-09 PROCEDURE — 94640 AIRWAY INHALATION TREATMENT: CPT

## 2025-02-09 PROCEDURE — 80048 BASIC METABOLIC PNL TOTAL CA: CPT | Performed by: FAMILY MEDICINE

## 2025-02-09 PROCEDURE — 83880 ASSAY OF NATRIURETIC PEPTIDE: CPT | Performed by: FAMILY MEDICINE

## 2025-02-09 PROCEDURE — 250N000009 HC RX 250: Performed by: FAMILY MEDICINE

## 2025-02-09 PROCEDURE — 999N000157 HC STATISTIC RCP TIME EA 10 MIN

## 2025-02-09 PROCEDURE — 86039 ANTINUCLEAR ANTIBODIES (ANA): CPT | Performed by: NURSE PRACTITIONER

## 2025-02-09 PROCEDURE — 86038 ANTINUCLEAR ANTIBODIES: CPT | Performed by: NURSE PRACTITIONER

## 2025-02-09 PROCEDURE — 86140 C-REACTIVE PROTEIN: CPT | Performed by: NURSE PRACTITIONER

## 2025-02-09 RX ORDER — IPRATROPIUM BROMIDE AND ALBUTEROL SULFATE 2.5; .5 MG/3ML; MG/3ML
3 SOLUTION RESPIRATORY (INHALATION) ONCE
Status: COMPLETED | OUTPATIENT
Start: 2025-02-09 | End: 2025-02-09

## 2025-02-09 RX ADMIN — IPRATROPIUM BROMIDE AND ALBUTEROL SULFATE 3 ML: .5; 3 SOLUTION RESPIRATORY (INHALATION) at 00:32

## 2025-02-09 NOTE — DISCHARGE INSTRUCTIONS
Check your blood pressure once a day.  As long as the top number is below 240 and bottom number is below 120 you do not need to come to the emergency department.    Follow-up with your primary care doctor in 1 week for recheck and to discuss adjusting your blood pressure medications    If you develop chest pain, abdominal pain, breathing difficulty, or weakness on one side of your body, or severe sudden onset headache, return to the emergency department

## 2025-02-09 NOTE — PROGRESS NOTES
Patient states that she has a frequent, non-productive cough. She does do inhalers at home - Symbicort BID and Albuterol prn. Posterior BS are bilaterally decreased throughout pre/post. She remains on RA. Patient reports no change post neb.

## 2025-02-09 NOTE — ED PROVIDER NOTES
EMERGENCY DEPARTMENT ENCOUNTER      NAME: Danyelle Canales  AGE: 44 year old female  YOB: 1980  MRN: [id number]  EVALUATION DATE & TIME: 2/8/2025 11:48 PM    PCP: Marsha Fernandez    ED PROVIDER: Master Alvarez M.D.    Chief Complaint   Patient presents with    Hypertension       FINAL IMPRESSION:  1. Asymptomatic hypertension    2. Cough, unspecified type    3. H/O atrial septal defect repair        ED COURSE & MEDICAL DECISION MAKING:    Pertinent Labs & Imaging studies independently interpreted by me. (See chart for details)  Reviewed cardiology note from February 5, patient was seen for mild cardiomegaly that was seen on CT scan performed January 30 due to chronic cough.  Patient is scheduled for echocardiogram.    ED Course as of 02/09/25 0210   Sun Feb 09, 2025   0002 Patient seen and examined, presents today with elevated blood pressure.  No chest pain, no shortness of breath, no lower extremity swelling, no focal neurologic deficits.  Also note from chart the patient has recently been seen by cardiology for mild cardiomegaly, also follows with pulmonology for chronic cough.  On examination here, no hypoxia, hypertension but patient asymptomatic, lungs are clear, harsh cough.  BMP is ordered for asymptomatic hypertension, will also add BNP due to chronic cough and cardiomegaly.  On exam here, trace expiratory wheeze on the left and patient has a history of recurrent chronic cough in the setting of viral infections.  Of the lungs are otherwise clear, no lower extremity swelling.  Discussed danger signs for symptomatic hypertension and follow-up precautions.  Patient notes she was at a wake today, blood pressure was normal last week.  Would not initiate or increase blood pressure medications at this time given isolated elevated blood pressures one day   0047 Labs independently interpreted by me with normal BNP, normal BNP.  Patient's blood pressure slightly improved in the department,  asymptomatic hypertension and stable for discharge.         At the conclusion of the encounter I discussed the results of all of the tests and the disposition. The questions were answered. The patient or family acknowledged understanding and was agreeable with the care plan.     Medical Decision Making  Obtained supplemental history:Supplemental history obtained?: No  Reviewed external records: External records reviewed?: Documented in chart and Outpatient Record: [facility] on 2/5/25  Care impacted by chronic illness:Chronic Pain and Hypertension  Did you consider but not order tests?: Work up considered but not performed and documented in chart, if applicable  Did you interpret images independently?: Independent interpretation of ECG and images noted in documentation, when applicable.  Consultation discussion with other provider:Did you involve another provider (consultant, , pharmacy, etc.)?: No  Discharge. I recommended the patient continue their current prescription strength medication(s): lisinopril 10mg daily. N/A.    MIPS: Not Applicable    MEDICATIONS GIVEN IN THE EMERGENCY:  Medications   ipratropium - albuterol 0.5 mg/2.5 mg/3 mL (DUONEB) neb solution 3 mL (3 mLs Nebulization $Given 2/9/25 0032)       NEW PRESCRIPTIONS STARTED AT TODAY'S ER VISIT  Discharge Medication List as of 2/9/2025 12:49 AM          =================================================================    HPI    Patient information was obtained from: patient      Danyelle Canales is a 44 year old female with a pertinent history of chronic pain, PMDD, ASD, HTN who presents to this ED for evaluation of HTN.    Per chart review, patient visited [facility] on 2/5/25 regarding consult. Cardiomegaly suggested on chest CT on 1/30/25. Cardiac MRI on 10/5/2011 and transthoracic echocardiogram and 1/4/2012 show no evidence of residual atrial septal defect. Blood pressure elevated today but is controlled at home. Plan for  transthoracic echocardiogram with bubble study.    Patient endorses high blood pressure around dinner today of around ~200 systolic. She denies any associated symptoms with this but reports an ongoing cough that flares up when she gets sick. Patient took a low dose of lisinopril today, which she has been taking for about a year now. Occasional caffeine use and reports drinking a [employer] coffee today, and has been recently drinking tea. Patient reports she used her inhaler once today. A recent stressful event in which she was at a wake today and her friend's relative was there but no one she knew was present. Denies any chest pain, nausea, vomiting, diarrhea, smoking, or alcohol use. History of open heart surgery. No other complaints or concerns at this time.     REVIEW OF SYSTEMS   Review of Systems   All other systems reviewed and negative    PAST MEDICAL HISTORY:  Past Medical History:   Diagnosis Date    ASD (atrial septal defect)     repaired surgically as a child    Benign essential hypertension 07/19/2023    Depressive disorder     History of blood transfusion 1985 1985 during open heart surgery    History of ovarian cyst     no longer active or issues    Insomnia, unspecified     Migraine     intermittent    Other forms of migraine, without mention of intractable migraine without mention of status migrainosus     Right inguinal hernia     s/p repair       PAST SURGICAL HISTORY:  Past Surgical History:   Procedure Laterality Date    BIOPSY      COLONOSCOPY      DAVINCI HERNIORRHAPHY INGUINAL Left 1/17/2024    Procedure: HERNIORRHAPHY, LEFT INGUINAL, ROBOT-ASSISTED, LAPAROSCOPIC, USING DA ANTONY XI with mesh;  Surgeon: Weston Mcnamara MD;  Location: [facility] OR    HERNIA REPAIR  Feb 2021    HERNIORRHAPHY UMBILICAL N/A 1/17/2024    Procedure: and repair of umbilical hernia open;  Surgeon: Weston Mcnamara MD;  [place] [facility] [place]    IR CERVICAL EPIDURAL STEROID INJECTION  9/7/2012    IR CERVICAL  EPIDURAL STEROID INJECTION  10/2/2012    [facility] REPR ASD OSTIUM PREMUM      Dr. Silverio       CURRENT MEDICATIONS:    No current facility-administered medications for this encounter.     Current Outpatient Medications   Medication Sig Dispense Refill    lisinopril (ZESTRIL) 10 MG tablet Take 1 tablet (10 mg) by mouth daily. 90 tablet 3    B Complex CAPS Take 1 capsule by mouth daily. 90 capsule 0    budesonide-formoterol (SYMBICORT) 160-4.5 MCG/ACT Inhaler Inhale 2 puffs into the lungs 2 times daily. 6 g 11    Fluocinolone Acetonide Scalp 0.01 % OIL oil       FLUoxetine (PROZAC) 20 MG capsule Take 20 mg by mouth daily      FLUoxetine (PROZAC) 40 MG capsule Take 40 mg by mouth daily      fluticasone (FLOVENT HFA) 44 MCG/ACT inhaler Inhale 1 puff into the lungs 2 times daily. 10.6 g 1    lamoTRIgine (LAMICTAL) 100 MG tablet TAKE 1 TABLET BY MOUTH WITH  MG TABLET ONCE DAILY      lamoTRIgine (LAMICTAL) 200 MG tablet Take 1 tablet (200 mg) by mouth daily 90 tablet 1    Lidocaine (LIDOCARE) 4 % Patch Place 1 patch over 12 hours onto the skin every 24 hours. To prevent lidocaine toxicity, patient should be patch free for 12 hrs daily. 10 patch 0    LORazepam (ATIVAN) 1 MG tablet TAKE 1 TABLET BY MOUTH THREE TIMES A DAY AS DIRECTED      MULTIPLE VITAMIN PO Take 1 tablet by mouth daily.      naproxen (NAPROSYN) 500 MG tablet Take 1 tablet (500 mg) by mouth 2 times daily as needed for moderate pain. 60 tablet 0    norethindrone-ethinyl estradiol (JUNEL 1/20) 1-20 MG-MCG tablet Take 1 tablet by mouth daily. 90 tablet 2    ofloxacin (OCUFLOX) 0.3 % ophthalmic solution Place 1-2 drops into the right eye 4 times daily. 5 mL 0    spacer (OPTICHAMBER BROOK) holding chamber Dispense spacer with inhaler 1 each 0    VENTOLIN  (90 Base) MCG/ACT inhaler INHALE 2 PUFFS INTO THE LUNGS EVERY 6 HOURS 18 g 0       ALLERGIES:  Allergies   Allergen Reactions    Artificial Sweetner (Informational Only) [Artificial Sweetner  (Informational Only) ] Headache     DISSOLVING TABLETS- CAUSES MIGRAINES    Chocolate Flavoring Agent (Non-Screening) Other (See Comments)       FAMILY HISTORY:  Family History   Problem Relation Age of Onset    C.A.D. Mother         heart surgery to close hole in heart    Cardiovascular Mother     Hypertension Mother     Depression Mother     Anxiety Disorder Mother     Heart Failure Mother     Cerebrovascular Disease Father     Hypertension Father     Chronic Obstructive Pulmonary Disease Maternal Grandmother         Could be COPD but was a heavy smoker.    Myocardial Infarction Maternal Grandfather     LUNG DISEASE No family hx of        SOCIAL HISTORY:   Social History     Socioeconomic History    Marital status:      Spouse name: Chan Canales    Number of children: 0    Years of education: 12   Occupational History    Occupation: customer service     Employer: [employer]   Tobacco Use    Smoking status: Never     Passive exposure: Never    Smokeless tobacco: Never   Vaping Use    Vaping status: Never Used   Substance and Sexual Activity    Alcohol use: Not Currently     Comment: 1-2 a month    Drug use: No    Sexual activity: Yes     Partners: Male     Birth control/protection: Pill   Other Topics Concern    Parent/sibling w/ CABG, MI or angioplasty before 65F 55M? Yes     Comment: stroke     Social Drivers of Health     Financial Resource Strain: Low Risk  (9/24/2024)    Financial Resource Strain     Within the past 12 months, have you or your family members you live with been unable to get utilities (heat, electricity) when it was really needed?: No   Food Insecurity: Low Risk  (9/24/2024)    Food Insecurity     Within the past 12 months, did you worry that your food would run out before you got money to buy more?: No     Within the past 12 months, did the food you bought just not last and you didn t have money to get more?: No   Transportation Needs: Low Risk  (9/24/2024)     "Transportation Needs     Within the past 12 months, has lack of transportation kept you from medical appointments, getting your medicines, non-medical meetings or appointments, work, or from getting things that you need?: No   Physical Activity: Insufficiently Active (9/24/2024)    Exercise Vital Sign     Days of Exercise per Week: 3 days     Minutes of Exercise per Session: 20 min   Stress: Stress Concern Present (9/24/2024)    [employer] [place] [employer] - Occupational Stress Questionnaire     Feeling of Stress : Rather much   Social Connections: Unknown (9/24/2024)    Social Connection and Isolation Panel [NHANES]     Frequency of Social Gatherings with Friends and Family: Once a week   Interpersonal Safety: Low Risk  (9/24/2024)    Interpersonal Safety     Do you feel physically and emotionally safe where you currently live?: Yes     Within the past 12 months, have you been hit, slapped, kicked or otherwise physically hurt by someone?: No     Within the past 12 months, have you been humiliated or emotionally abused in other ways by your partner or ex-partner?: No   Housing Stability: Low Risk  (9/24/2024)    Housing Stability     Do you have housing? : Yes     Are you worried about losing your housing?: No       VITALS:  BP (!) 149/69   Pulse 85   Temp 98.3  F (36.8  C) (Oral)   Resp 16   Ht 1.702 m (5' 7\")   Wt 95.7 kg (211 lb)   SpO2 98%   BMI 33.05 kg/m      PHYSICAL EXAM:  Physical Exam  Vitals and nursing note reviewed.   Constitutional:       Appearance: Normal appearance.   HENT:      Head: Normocephalic and atraumatic.      Right Ear: External ear normal.      Left Ear: External ear normal.      Nose: Nose normal.      Mouth/Throat:      Mouth: Mucous membranes are moist.   Eyes:      Extraocular Movements: Extraocular movements intact.      Conjunctiva/sclera: Conjunctivae normal.      Pupils: Pupils are equal, round, and reactive to light.   Cardiovascular:      Rate and Rhythm: " Normal rate and regular rhythm.   Pulmonary:      Effort: Pulmonary effort is normal.      Breath sounds: Wheezing present. No rales.      Comments: Isolated wheezing in the left lung   Abdominal:      General: Abdomen is flat. There is no distension.      Palpations: Abdomen is soft.      Tenderness: There is no abdominal tenderness. There is no guarding.   Musculoskeletal:         General: Normal range of motion.      Cervical back: Normal range of motion and neck supple.      Right lower leg: No edema.      Left lower leg: No edema.   Lymphadenopathy:      Cervical: No cervical adenopathy.   Skin:     General: Skin is warm and dry.   Neurological:      General: No focal deficit present.      Mental Status: She is alert and oriented to person, place, and time. Mental status is at baseline.      Comments: No gross focal neurologic deficits   Psychiatric:         Mood and Affect: Mood normal.         Behavior: Behavior normal.         Thought Content: Thought content normal.          LAB:  All pertinent labs reviewed and interpreted.  Results for orders placed or performed during the hospital encounter of 02/08/25   Basic metabolic panel   Result Value Ref Range    Sodium 141 135 - 145 mmol/L    Potassium 3.6 3.4 - 5.3 mmol/L    Chloride 104 98 - 107 mmol/L    Carbon Dioxide (CO2) 26 22 - 29 mmol/L    Anion Gap 11 7 - 15 mmol/L    Urea Nitrogen 7.7 6.0 - 20.0 mg/dL    Creatinine 0.64 0.51 - 0.95 mg/dL    GFR Estimate >90 >60 mL/min/1.73m2    Calcium 8.5 (L) 8.8 - 10.4 mg/dL    Glucose 92 70 - 99 mg/dL   Nt probnp inpatient (BNP)   Result Value Ref Range    N terminal Pro BNP Inpatient 195 0 - 450 pg/mL   Extra Blue Top Tube   Result Value Ref Range    Hold Specimen JIC    Extra Red Top Tube   Result Value Ref Range    Hold Specimen JIC    Extra Purple Top Tube   Result Value Ref Range    Hold Specimen JIC        RADIOLOGY:  Reviewed all pertinent imaging. Please see official radiology report.  No orders to display        I, Amy Sanders, am serving as a scribe to document services personally performed by Dr. Alvarez based on my observation and the provider's statements to me. I, Master Alvarez MD attest that Amy Tommy is acting in a scribe capacity, has observed my performance of the services and has documented them in accordance with my direction.    Master Alvarez M.D.  Emergency Medicine  [facility] EMERGENCY ROOM  [address] [address]  [phone]  Dept: [phone]       Master Alvarez MD  02/09/25 0210

## 2025-02-09 NOTE — ED TRIAGE NOTES
Arrives to ER with c/o having high blood pressure readings at home. Pt reports she generally will measure blood pressure once weekly, today noted her blood pressure was high, 200 SBP. Denies vision changes, headache, blurry vision.      Triage Assessment (Adult)       Row Name 02/08/25 6642          Triage Assessment    Airway WDL WDL        Respiratory WDL    Respiratory WDL WDL        Skin Circulation/Temperature WDL    Skin Circulation/Temperature WDL WDL        Cardiac WDL    Cardiac WDL X  htn        Peripheral/Neurovascular WDL    Peripheral Neurovascular WDL WDL        Cognitive/Neuro/Behavioral WDL    Cognitive/Neuro/Behavioral WDL WDL

## 2025-02-10 ENCOUNTER — VIRTUAL VISIT (OUTPATIENT)
Dept: FAMILY MEDICINE | Facility: CLINIC | Age: 45
End: 2025-02-10
Payer: COMMERCIAL

## 2025-02-10 DIAGNOSIS — I10 BENIGN ESSENTIAL HYPERTENSION: Primary | ICD-10-CM

## 2025-02-10 DIAGNOSIS — R70.0 ELEVATED ERYTHROCYTE SEDIMENTATION RATE: ICD-10-CM

## 2025-02-10 DIAGNOSIS — R79.82 CRP ELEVATED: ICD-10-CM

## 2025-02-10 DIAGNOSIS — R76.8 ANA POSITIVE: ICD-10-CM

## 2025-02-10 DIAGNOSIS — Z13.9 SCREENING FOR CONDITION: ICD-10-CM

## 2025-02-10 DIAGNOSIS — E53.1 PYRIDOXINE DEFICIENCY: ICD-10-CM

## 2025-02-10 DIAGNOSIS — R05.3 CHRONIC COUGH: ICD-10-CM

## 2025-02-10 LAB — CRP SERPL-MCNC: 18.3 MG/L

## 2025-02-10 PROCEDURE — 98006 SYNCH AUDIO-VIDEO EST MOD 30: CPT | Performed by: NURSE PRACTITIONER

## 2025-02-10 RX ORDER — LISINOPRIL 20 MG/1
20 TABLET ORAL DAILY
Qty: 90 TABLET | Refills: 0 | Status: SHIPPED | OUTPATIENT
Start: 2025-02-10

## 2025-02-10 NOTE — PROGRESS NOTES
Danyelle is a 44 year old who is being evaluated via a billable video visit.    How would you like to obtain your AVS? MyChart  If the video visit is dropped, the invitation should be resent by: Text to cell phone: [phone]  Will anyone else be joining your video visit? No      Assessment & Plan     Benign essential hypertension  Uncontrolled  Discussed with patient the indication and use of medication(s), risks/benefits, and potential adverse side effects.  Patient/guardian verbalized understanding and agreement with the plan.   - Titrate dose up to help with therapeutic effect from 10 mg to lisinopril (ZESTRIL) 20 MG tablet; Take 1 tablet (20 mg) by mouth daily.  - Follow-up in 4 weeks for recheck (recheck 3/11/25 check-in 10:10 am)  MACRINA positive  Will recheck MACRINA today as previously was elevated when she had consult with Dr. Alan, Neurology.  - Anti Nuclear Davina IgG by IFA with Reflex  - CRP, inflammation  - ESR: Erythrocyte sedimentation rate; Future    Screening for condition  She is not on B complex vitamin so will recheck vitamin B12 to monitor.  - Vitamin B12; Future    Chronic cough  Continues to struggle with cough, she is now on steroid inhaler per Pulmonary.  Recommend following up with Pulmonary.    CRP elevated    - CRP, inflammation    Elevated erythrocyte sedimentation rate    - ESR: Erythrocyte sedimentation rate; Future    If her MACRINA remains elevated, then will plan to refer to Rheumatology for consult.      MED REC REQUIRED  Post Medication Reconciliation Status: discharge medications reconciled and changed, per note/orders        Subjective   Danyelle is a 44 year old, presenting for the following health issues:  ER F/U and Results        2/10/2025     4:57 PM   Additional Questions   Roomed by Anel MAYO MA   Accompanied by n/a     HPI     ED/UC Followup:    Facility:  [facility] ER  Date of visit: 02/08/25-02/09/25  Reason for visit: Hypertension  Current  Status: BP yesterday 129/94, this morning 137/89    Discuss cardiac labs as well.   Patient is feeling sick all the time, if needs to see an immune doctor?   Patient has seen pulmonology as well.     Additional provider notes:    Says she is sick all the time.  Got pink eye twice.  Sick since January 14th.  Cough that is aggressive.  Saw Pulmonologist- PFTs were okay, chest CT was scarring.  Doesn't know if she is supposed to follow up with Pulmonary or not.    Spine clinic said she can hold off on getting MRI.    [facility] following her for recent R ankle fracture.    Hypertension Follow-up    Do you check your blood pressure regularly outside of the clinic? Yes   Are your blood pressures ever more than 140 on the top number (systolic) OR more   than 90 on the bottom number (diastolic), for example 140/90? Yes          Objective    Vitals - Patient Reported  Systolic (Patient Reported): 137  Diastolic (Patient Reported): 89      Vitals:  No vitals were obtained today due to virtual visit.    Physical Exam   GENERAL: alert and no distress  EYES: Eyes grossly normal to inspection.  No discharge or erythema, or obvious scleral/conjunctival abnormalities.  RESP: No audible wheeze or visible cyanosis.  Spontaneous harsh cough.  SKIN: Visible skin clear. No significant rash, abnormal pigmentation or lesions.  NEURO: Cranial nerves grossly intact.  Mentation and speech appropriate for age.  PSYCH: Appropriate affect, tone, and pace of words          Video-Visit Details  Joined the call 2/10/25 at 5:45 pm  Left the call at 2/10/2025, 6:15:58 pm.  You were on the call for .  Type of service:  Video Visit   Originating Location (pt. Location): Home    Distant Location (provider location):  On-site  Platform used for Video Visit: Tres  Signed Electronically by: Marsha Fernandez NP

## 2025-02-12 LAB
ANA PAT SER IF-IMP: ABNORMAL
ANA SER QL IF: POSITIVE
ANA TITR SER IF: ABNORMAL {TITER}

## 2025-02-13 ENCOUNTER — OFFICE VISIT (OUTPATIENT)
Dept: RHEUMATOLOGY | Facility: CLINIC | Age: 45
End: 2025-02-13
Attending: NURSE PRACTITIONER
Payer: COMMERCIAL

## 2025-02-13 ENCOUNTER — HOSPITAL ENCOUNTER (OUTPATIENT)
Dept: GENERAL RADIOLOGY | Facility: HOSPITAL | Age: 45
Discharge: HOME | End: 2025-02-13
Attending: PHYSICIAN ASSISTANT
Payer: COMMERCIAL

## 2025-02-13 ENCOUNTER — LAB (OUTPATIENT)
Dept: LAB | Facility: CLINIC | Age: 45
End: 2025-02-13
Payer: COMMERCIAL

## 2025-02-13 VITALS
WEIGHT: 211.7 LBS | OXYGEN SATURATION: 97 % | SYSTOLIC BLOOD PRESSURE: 123 MMHG | HEART RATE: 74 BPM | BODY MASS INDEX: 33.16 KG/M2 | DIASTOLIC BLOOD PRESSURE: 80 MMHG

## 2025-02-13 DIAGNOSIS — R70.0 ELEVATED ERYTHROCYTE SEDIMENTATION RATE: ICD-10-CM

## 2025-02-13 DIAGNOSIS — G89.29 CHRONIC BILATERAL LOW BACK PAIN WITHOUT SCIATICA: ICD-10-CM

## 2025-02-13 DIAGNOSIS — M54.50 CHRONIC BILATERAL LOW BACK PAIN WITHOUT SCIATICA: ICD-10-CM

## 2025-02-13 DIAGNOSIS — Z13.9 SCREENING FOR CONDITION: ICD-10-CM

## 2025-02-13 DIAGNOSIS — R76.8 ANA POSITIVE: Primary | ICD-10-CM

## 2025-02-13 DIAGNOSIS — R79.82 CRP ELEVATED: ICD-10-CM

## 2025-02-13 DIAGNOSIS — M47.814 SPONDYLOSIS OF THORACIC REGION WITHOUT MYELOPATHY OR RADICULOPATHY: ICD-10-CM

## 2025-02-13 DIAGNOSIS — R76.8 ANA POSITIVE: ICD-10-CM

## 2025-02-13 LAB
ALBUMIN MFR UR ELPH: 10.9 MG/DL
BASOPHILS # BLD AUTO: 0 10E3/UL (ref 0–0.2)
BASOPHILS NFR BLD AUTO: 1 %
CREAT UR-MCNC: 125 MG/DL
CRP SERPL-MCNC: 12.9 MG/L
EOSINOPHIL # BLD AUTO: 0.1 10E3/UL (ref 0–0.7)
EOSINOPHIL NFR BLD AUTO: 1 %
ERYTHROCYTE [DISTWIDTH] IN BLOOD BY AUTOMATED COUNT: 14.4 % (ref 10–15)
ERYTHROCYTE [SEDIMENTATION RATE] IN BLOOD BY WESTERGREN METHOD: 29 MM/HR (ref 0–20)
HCT VFR BLD AUTO: 41.2 % (ref 35–47)
HGB BLD-MCNC: 12.8 G/DL (ref 11.7–15.7)
IMM GRANULOCYTES # BLD: 0 10E3/UL
IMM GRANULOCYTES NFR BLD: 0 %
LYMPHOCYTES # BLD AUTO: 2.4 10E3/UL (ref 0.8–5.3)
LYMPHOCYTES NFR BLD AUTO: 27 %
MCH RBC QN AUTO: 25.4 PG (ref 26.5–33)
MCHC RBC AUTO-ENTMCNC: 31.1 G/DL (ref 31.5–36.5)
MCV RBC AUTO: 82 FL (ref 78–100)
MONOCYTES # BLD AUTO: 0.4 10E3/UL (ref 0–1.3)
MONOCYTES NFR BLD AUTO: 4 %
NEUTROPHILS # BLD AUTO: 5.8 10E3/UL (ref 1.6–8.3)
NEUTROPHILS NFR BLD AUTO: 66 %
PLATELET # BLD AUTO: 297 10E3/UL (ref 150–450)
PROT/CREAT 24H UR: 0.09 MG/MG CR (ref 0–0.2)
RBC # BLD AUTO: 5.03 10E6/UL (ref 3.8–5.2)
RHEUMATOID FACT SERPL-ACNC: <10 IU/ML
VIT B12 SERPL-MCNC: 660 PG/ML (ref 232–1245)
WBC # BLD AUTO: 8.8 10E3/UL (ref 4–11)

## 2025-02-13 PROCEDURE — 72200 X-RAY EXAM SI JOINTS: CPT

## 2025-02-13 NOTE — PROGRESS NOTES
Rheumatology Clinic Visit  [facility]  KatlynIRMA Cabrera     Danyelle Canales MRN# [id number]   YOB: 1980 Age: 44 year old   Date of Visit: 02/13/2025  Primary care provider: Marsha Fernandez          Assessment and Plan:     1.  Positive MACRINA  2.  Elevated CRP  3.  Low back pain    Patient presents today for an initial evaluation.  She states that she was seen for multiple infections and a chronic she also has had some pain.  She had an episode in her pelvis.  Upon further evaluation with her primary care provider she was found to have a positive MACRINA.   some edema in her facet joints was found on a thoracic MRI.  Thought to be secondary to degenerative changes.  She also recently saw dermatology and was diagnosed with psoriasis of the scalp and was given some shampoo and an oil to utilize.  Physical examination today did not show any active synovitis or dactylitis.  She has full fist formation.  Normal joint range of motion.  No knee effusions.  Some tenderness to palpation along the midline lumbar spine as well as some tenderness over the bilateral SI joints.  Previous laboratory evaluations and imaging studies were reviewed, results below.    Reviewed the specialty of rheumatology today.  Discussed with the patient that a positive MACRINA is of unknown significance.  We discussed that the majority of positive ANAs are false positives.  10 to 30% of the healthy population can also have a positive MACRINA that is not associated with any disorder.  Thyroiditis and hepatitis have also been known to be associated with a positive MACRINA.  We also discussed that viral and bacterial infections could also have a positive MACRINA.  Discussed that 99% of people who have systemic lupus erythematous have a positive MACRINA but the majority of people that have a positive MACRINA do not have lupus.  An MACRINA can also be seen with other rheumatological autoimmune disorders such as scleroderma.  Reassured the patient that her  symptoms are not suggestive of a connective tissue disorder such as systemic lupus erythematous.  I do wonder with the MRI findings and the elevated inflammatory markers if this could potentially be AN ankylosing spondylitis.  Will check her HLA-B27 as well as get x-rays of her SI joints.  Given the positive MACRINA and persistent symptoms we will check the MACRINA subsets however my suspicion for a connective tissue disorder is low.  I will contact the patient with the results of the evaluation once it is complete.    Plan:     Schedule follow-up with Katlyn King PA-C depending on results of today's work up  Imaging: xray SI joints  Labs: dsDNA, complement levels, JERICA panel, Urine (looking for protein),  Scl-70, Centromere antibody, CCP antibody, Rheumatoid factor, HLA-B27, IgA, IgG, IgM    Katlyn King, PAC  Rheumatology         History of Present Illness:   Danyelle Canales presents for evaluation of positive MACRINA, elevated CRP, chronic pain.  Past medical history includes hypertension, status post left inguinal hernia repair, left lumbar radiculopathy, depression, obesity, right inguinal hernia repair, esophageal reflux, atrial septal defect, anxiety, PMDD, migraine, insomnia, allergic dermatitis.    She states that she has muscle pain. She had some issues going from her back into her hips. She had ain her pelvis that made it difficult to walk. She has had pain for many years of pain wrapping down around the shoulder blades, that is not particularly bothersome currently. She was originally seen due to getting sick. She states that she keeps getting sick. She gets a cough that lasts for months. She saw pulmonology. Her blood work shows the positive MACRINA and elevated CRP. She is wondering if she has an immune issue. She does currently have a cough, this started in January. No skin rashes or mouth sores. No excessive hair loss. No dry eyes or dry mouth. She recently had pink eye twice. No unexplained fevers. No  history of delirium or psychosis. No history of blood clots seizures or miscarriage. Had open heart surgery as a child for ASD repair. No shortness of breath. No raynaud's. No trouble swallowing food/medications. She has had some fatigue, this has been pretty consistent for many years.     No family history of RA or lupus. No family history of psoriasis, ulcerative colitis or crohn's. She may have psoriasis on her scalp. She has seen dermatology for this.          Review of Systems:     Constitutional: negative  Skin: negative  Eyes: negative  Ears/Nose/Throat: negative  Respiratory: No shortness of breath, dyspnea on exertion, cough, or hemoptysis  Cardiovascular: negative  Gastrointestinal: negative  Genitourinary: negative  Musculoskeletal: as above  Neurologic: negative  Psychiatric: negative  Hematologic/Lymphatic/Immunologic: negative  Endocrine: negative         Active Problem List:     Patient Active Problem List    Diagnosis Date Noted    Chronic pain syndrome 11/30/2011     Priority: High     MRI CERVICAL SPINE 6/23/2023  Mild degenerative cervical spondylosis with level by level analysis as described above without evidence of high-grade spinal canal or neural foraminal narrowing at any level     MRI LUMBAR SPINE 4/20/2023  1.  Lower lumbar spondylopathy.  2.  No high-grade spinal canal or neural foraminal stenosis     EMG 6/19/2023  Abnormal study: There is electrodiagnostic evidence of:     1.  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  This is of unknown clinical significance in the setting of normal peroneal CMAP to the tibialis anterior and normal needle EMG throughout the remainder of the left lower extremity.  This can be observed in a congenital absence of the EDB.       2.  There is no electrodiagnostic evidence of lumbosacral radiculopathy or tibial neuropathy in the left lower extremity.    11/16/23:  Consult with Dr. 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     Priority: Medium     History of Vitamin B6 / Pyridoxine deficiency that led to neurologic symptoms (L lef numbness/paresthesias, L arm paresthesia)    Has had stable levels on B complex vitamin      Status post left inguinal hernia repair 10/06/2023     Priority: Medium    Benign essential hypertension 07/19/2023     Priority: Medium    Left lumbar radiculopathy 05/23/2023     Priority: Medium     MRI CERVICAL SPINE 6/23/2023  Mild degenerative cervical spondylosis with level by level analysis as described above without evidence of high-grade spinal canal or neural foraminal narrowing at any level     MRI LUMBAR SPINE 4/20/2023  1.  Lower lumbar spondylopathy.  2.  No high-grade spinal canal or neural foraminal stenosis     EMG 6/19/2023  Abnormal study: There is electrodiagnostic evidence of:     1.  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  This is of unknown clinical significance in the setting of normal peroneal CMAP to the tibialis anterior and normal needle EMG throughout the remainder of the left lower extremity.  This can be observed in a congenital absence of the EDB.       2.  There is no electrodiagnostic evidence of lumbosacral radiculopathy or tibial neuropathy in the left lower extremity.    11/16/23:  Consult with Dr. Alan, Neurology      Mild recurrent major depression 12/28/2021     Priority: Medium    Class 1 obesity due to excess calories with serious comorbidity and body mass index (BMI) of 34.0 to 34.9 in adult 07/06/2021     Priority: Medium    Status post right inguinal hernia repair  02/16/2021     Priority: Medium    Benign hypermobility syndrome 10/02/2017     Priority: Medium    Esophageal reflux 11/23/2014     Priority: Medium    ASD (atrial septal defect) 01/16/2012     Priority: Medium    Generalized anxiety disorder 10/18/2011     Priority: Medium     Diagnosis updated by automated process. Provider to review and confirm.      CARDIOVASCULAR SCREENING; LDL GOAL LESS THAN 160 05/09/2010     Priority: Medium    Trichotillomania 08/03/2009     Priority: Medium    PMDD (premenstrual dysphoric disorder) 06/03/2009     Priority: Medium    Migraine headache 07/23/2008     Priority: Medium    allergic DERMATITIS NOS 11/12/2004     Priority: Medium    Persistent insomnia 07/16/2004     Priority: Medium            Past Medical History:     Past Medical History:   Diagnosis Date    ASD (atrial septal defect)     repaired surgically as a child    Benign essential hypertension 07/19/2023    Depressive disorder     History of blood transfusion 1985 1985 during open heart surgery    History of ovarian cyst     no longer active or issues    Insomnia, unspecified     Migraine     intermittent    Other forms of migraine, without mention of intractable migraine without mention of status migrainosus     Right inguinal hernia     s/p repair     Past Surgical History:   Procedure Laterality Date    BIOPSY      COLONOSCOPY      DAVINCI HERNIORRHAPHY INGUINAL Left 1/17/2024    Procedure: HERNIORRHAPHY, LEFT INGUINAL, ROBOT-ASSISTED, LAPAROSCOPIC, USING DA ANTONY XI with mesh;  Surgeon: Weston Mcnamara MD;  Location: [facility] OR    HERNIA REPAIR  Feb 2021    HERNIORRHAPHY UMBILICAL N/A 1/17/2024    Procedure: and repair of umbilical hernia open;  Surgeon: Weston Mcnamara MD;  [place] [facility] [place] [place]    IR CERVICAL EPIDURAL STEROID INJECTION  9/7/2012    IR CERVICAL EPIDURAL STEROID INJECTION  10/2/2012    ZZC REPR ASD OSTIUM PREMUM      Dr. Silverio            Social History:     Social History      Socioeconomic History    Marital status:      Spouse name: Chan Canales    Number of children: 0    Years of education: 12    Highest education level: Not on file   Occupational History    Occupation: customer service     Employer: [employer]   Tobacco Use    Smoking status: Never     Passive exposure: Never    Smokeless tobacco: Never   Vaping Use    Vaping status: Never Used   Substance and Sexual Activity    Alcohol use: Not Currently     Comment: 1-2 a month    Drug use: No    Sexual activity: Yes     Partners: Male     Birth control/protection: Pill   Other Topics Concern    Parent/sibling w/ CABG, MI or angioplasty before 65F 55M? Yes     Comment: stroke   Social History Narrative    Not on file     Social Drivers of Health     Financial Resource Strain: Low Risk  (9/24/2024)    Financial Resource Strain     Within the past 12 months, have you or your family members you live with been unable to get utilities (heat, electricity) when it was really needed?: No   Food Insecurity: Low Risk  (9/24/2024)    Food Insecurity     Within the past 12 months, did you worry that your food would run out before you got money to buy more?: No     Within the past 12 months, did the food you bought just not last and you didn t have money to get more?: No   Transportation Needs: Low Risk  (9/24/2024)    Transportation Needs     Within the past 12 months, has lack of transportation kept you from medical appointments, getting your medicines, non-medical meetings or appointments, work, or from getting things that you need?: No   Physical Activity: Insufficiently Active (9/24/2024)    Exercise Vital Sign     Days of Exercise per Week: 3 days     Minutes of Exercise per Session: 20 min   Stress: Stress Concern Present (9/24/2024)    [employer] [place] [employer] - Occupational Stress Questionnaire     Feeling of Stress : Rather much   Social Connections: Unknown (9/24/2024)    Social Connection  and Isolation Panel [NHANES]     Frequency of Communication with Friends and Family: Not on file     Frequency of Social Gatherings with Friends and Family: Once a week     Attends Temple Services: Not on file     Active Member of Clubs or Organizations: Not on file     Attends Club or Organization Meetings: Not on file     Marital Status: Not on file   Interpersonal Safety: Low Risk  (9/24/2024)    Interpersonal Safety     Do you feel physically and emotionally safe where you currently live?: Yes     Within the past 12 months, have you been hit, slapped, kicked or otherwise physically hurt by someone?: No     Within the past 12 months, have you been humiliated or emotionally abused in other ways by your partner or ex-partner?: No   Housing Stability: Low Risk  (9/24/2024)    Housing Stability     Do you have housing? : Yes     Are you worried about losing your housing?: No          Family History:     Family History   Problem Relation Age of Onset    C.A.D. Mother         heart surgery to close hole in heart    Cardiovascular Mother     Hypertension Mother     Depression Mother     Anxiety Disorder Mother     Heart Failure Mother     Cerebrovascular Disease Father     Hypertension Father     Chronic Obstructive Pulmonary Disease Maternal Grandmother         Could be COPD but was a heavy smoker.    Myocardial Infarction Maternal Grandfather     LUNG DISEASE No family hx of             Allergies:     Allergies   Allergen Reactions    Artificial Sweetner (Informational Only) [Artificial Sweetner (Informational Only) ] Headache     DISSOLVING TABLETS- CAUSES MIGRAINES    Chocolate Flavoring Agent (Non-Screening) Other (See Comments)            Medications:     Current Outpatient Medications   Medication Sig Dispense Refill    albuterol (PROAIR HFA/PROVENTIL HFA/VENTOLIN HFA) 108 (90 Base) MCG/ACT inhaler Inhale 2 puffs into the lungs every 6 hours as needed for shortness of breath, wheezing or cough. 18 g 5    B  Complex CAPS Take 1 capsule by mouth daily. 90 capsule 0    budesonide-formoterol (SYMBICORT) 160-4.5 MCG/ACT Inhaler Inhale 2 puffs into the lungs 2 times daily. 6 g 11    Fluocinolone Acetonide Scalp 0.01 % OIL oil       FLUoxetine (PROZAC) 20 MG capsule Take 20 mg by mouth daily      FLUoxetine (PROZAC) 40 MG capsule Take 40 mg by mouth daily      lamoTRIgine (LAMICTAL) 200 MG tablet Take 1 tablet (200 mg) by mouth daily 90 tablet 1    Lidocaine (LIDOCARE) 4 % Patch Place 1 patch over 12 hours onto the skin every 24 hours. To prevent lidocaine toxicity, patient should be patch free for 12 hrs daily. 10 patch 0    lisinopril (ZESTRIL) 20 MG tablet Take 1 tablet (20 mg) by mouth daily. 90 tablet 0    LORazepam (ATIVAN) 1 MG tablet TAKE 1 TABLET BY MOUTH THREE TIMES A DAY AS DIRECTED      MULTIPLE VITAMIN PO Take 1 tablet by mouth daily.      naproxen (NAPROSYN) 500 MG tablet Take 1 tablet (500 mg) by mouth 2 times daily as needed for moderate pain. 60 tablet 0    ofloxacin (OCUFLOX) 0.3 % ophthalmic solution Place 1-2 drops into the right eye 4 times daily. 5 mL 0    spacer (OPTICHAMBER BROOK) holding chamber Dispense spacer with inhaler 1 each 0            Physical Exam:   Blood pressure 123/80, pulse 74, weight 96 kg (211 lb 11.2 oz), SpO2 97%, not currently breastfeeding.  Wt Readings from Last 6 Encounters:   02/13/25 96 kg (211 lb 11.2 oz)   02/08/25 95.7 kg (211 lb)   02/05/25 96.6 kg (213 lb)   01/25/25 97.5 kg (215 lb)   01/21/25 96.2 kg (212 lb)   01/16/25 99.7 kg (219 lb 11.2 oz)     Constitutional: well-developed, appearing stated age; cooperative  Eyes: nl PERRLA, conjunctiva, sclera  ENT: nl external ears, nose, hearing, lips, teeth, gums, throat. No mucositis.   No mucous membrane lesions, normal saliva pool  Neck: no mass or thyroid enlargement  Resp: no shortness of breath with normal conversation  MS: The TMJ, neck, shoulder, elbow, wrist, MCP/PIP/DIP, spine, knee, ankle, and foot MTP/IP joints  were examined and found normal. No active synovitis or altered joint anatomy. Full joint ROM. Normal  strength. No dactylitis,  tenosynovitis, enthesopathy.  Tenderness to palpation over her midline lumbar spine and bilateral SI joints.  Skin: no nail pitting, alopecia, rash, nodules or lesions.   Psych: nl judgement, orientation, memory, affect.           Data:   Imaging:  CT chest 01/30/2025  IMPRESSION:   1.  Linear scarring and/or subsegmental atelectasis of the right greater than left lung bases. No acute focal pulmonary consolidation or pleural effusion. No suspicious pulmonary nodule.  2.  No lymphadenopathy or pericardial effusion. Mildly enlarged heart.    MRI thoracic and lumbar spine 12/18/2024  IMPRESSION:     THORACIC SPINE MRI:  1.  Mild bone marrow and surrounding soft tissue edema and enhancement centered at the bilateral T4-T5 and T8-T9 facet joints, not appreciated on the prior exam and likely new. A small right T4-T5 facet joint effusion with synovial enhancement. Findings   are suspicious for septic arthritis in the provided clinical setting. Degenerative change with acute reactive inflammation may have a similar appearance.  2.  No evidence of discitis-osteomyelitis.  3.  No paraspinal or epidural abscess.     LUMBAR SPINE MRI:  1.  No evidence of ongoing infection.  2.  Similar mild spondylotic change since 2023 without significant spinal canal or foraminal stenosis.    Laboratory:  2023  Positive MACRINA with a speckled pattern and a titer 1:160  Negative MACRINA subsets    01/22/2025  Sed rate 37  WBC 6.0, hgb 12.5, Plt 233  CRP 22.9    2/9/2025  Creatinine 0.64, GFR >90  CRP 18.30  MACRINA positive with a homogenous pattern and a titer 1:320

## 2025-02-13 NOTE — PATIENT INSTRUCTIONS
After Visit Instructions:     Thank you for coming to [facility] for your care. It is my goal to partner with you to help you reach your optimal state of health.       Plan:     Schedule follow-up with Katlyn King PA-C depending on results of today's work up  Imaging: xray SI joints  Labs: dsDNA, complement levels, JERICA panel, Urine (looking for protein),  Scl-70, Centromere antibody, CCP antibody, Rheumatoid factor, HLA-B27, IgA, IgG, IgM    Katlyn King PA-C  [facility]  [facility]    Contact information: [facility]  Clinic Number:  [phone]  Please call or send a what3words message with any questions about your care

## 2025-02-15 LAB
ENA SM IGG SER IA-ACNC: 1 U/ML
ENA SM IGG SER IA-ACNC: NEGATIVE
ENA SS-A AB SER IA-ACNC: <0.5 U/ML
ENA SS-A AB SER IA-ACNC: NEGATIVE
ENA SS-B IGG SER IA-ACNC: <0.6 U/ML
ENA SS-B IGG SER IA-ACNC: NEGATIVE
U1 SNRNP IGG SER IA-ACNC: <1.1 U/ML
U1 SNRNP IGG SER IA-ACNC: NEGATIVE

## 2025-02-19 ENCOUNTER — HOSPITAL ENCOUNTER (OUTPATIENT)
Dept: CARDIOLOGY | Facility: CLINIC | Age: 45
Discharge: HOME | End: 2025-02-19
Attending: GENERAL ACUTE CARE HOSPITAL
Payer: COMMERCIAL

## 2025-02-19 DIAGNOSIS — I51.7 CARDIOMEGALY: ICD-10-CM

## 2025-02-19 DIAGNOSIS — Z87.74 H/O CONGENITAL ATRIAL SEPTAL DEFECT (ASD) REPAIR: ICD-10-CM

## 2025-02-19 LAB
B LOCUS: NORMAL
B27TEST METHOD: NORMAL
LVEF ECHO: NORMAL

## 2025-02-19 PROCEDURE — 93306 TTE W/DOPPLER COMPLETE: CPT | Mod: 26 | Performed by: INTERNAL MEDICINE

## 2025-02-19 PROCEDURE — 93306 TTE W/DOPPLER COMPLETE: CPT

## 2025-02-21 NOTE — PROGRESS NOTES
PHYSICAL THERAPY EVALUATION  Type of Visit: Evaluation       Fall Risk Screen:  Fall screen completed by: PT  Have you fallen 2 or more times in the past year?: No  Have you fallen and had an injury in the past year?: Yes  Is patient a fall risk?: No  Fall screen comments: pt slipped on ice and broke ankle    Subjective   Pt reports onset of LBP with sharp/shooting pain into hips and groin areas bilaterally on 12/16/24 and got worse to the point of not being able to walk, and went into the ER on 12/18. Since this visit, things have gotten much better and she isn't having much back or hip pain throughout her day. She reports some back pain with prolonged sitting otherwise hasn't noticed it much recently. Reports no pain currently. Also reports some chronic neck and shoulder girdle pain.         Presenting condition or subjective complaint: lower back,hips,pelvis  Date of onset: 01/21/25 (date of referral)    Relevant medical history:     Dates & types of surgery: 1985 open heart, ASD repair, 2001 hernia, 2004 hernia    Prior diagnostic imaging/testing results: MRI      She has a small disc bulge with an annular tear at L4-5 which is most consistent with her symptoms. She has perifacetal soft tissue and edema and enhancement of the milind T4-5 and T8-9 facet joints with underlying bone marrow edema and enhancement of the opposing articular facet and a small right T4-5 facet joint effusion (all of which are new compared to MRI thoracic without contrast in April 2024.   Prior therapy history for the same diagnosis, illness or injury: No      Living Environment  Social support: With family members   Type of home: House   Stairs to enter the home: Yes 2 Is there a railing: No     Ramp: No   Stairs inside the home: Yes 12 Is there a railing: Yes     Help at home: None  Equipment owned:       Employment: Yes   Hobbies/Interests:      Patient goals for therapy: dont want more flare ups where i cant walk    Pain  assessment: see subjective     Objective   LUMBAR SPINE EVALUATION  INTEGUMENTARY (edema, incisions): WNL  POSTURE: Standing Posture: Lordosis increased  Sitting Posture: Lordosis increased  GAIT:   Weightbearing Status: WBAT  Assistive Device(s): None  Gait Deviations: mild L hip drop during R stance phase, hip rotation with gait    ROM:   Hip ROM( ) PROM in degrees    Left Right   Hip Flexion (0-120 ) WNL WNL   Hip Abduction (0-45 ) WNL WNL   Hip External Rotation (0-50 ) WNL WNL   Knee flexion (120-150 ) WNL WNL   Knee Extension (0 ) WNL WNL   Lumbar ROM Left Right   Lumbar Side Bending WNL WNL   Lumbar Rotation WNL WNL   Lumbar Flexion WNL   Lumbar Extension WNL     STRENGTH (MYOTOMES):   */5 Left Right   Hip Flexion (L2) 5 5   Hip Extension (L3) 4- 4   Hip Abduction 4-, pain in back 4-, pain in back   Hip Adduction  4+ 4+   Hip Internal Rotation 5 5   Hip External Rotation 5 5, pain in back    Knee Flexion 5 5   Knee Extension 5 5   Dorsiflexion (L4) 5 5   Great Toe Extension (L5) 5 5       FLEXIBILITY: HS WNL  LUMBAR/HIP Special Tests:   Lumbar Special Tests Left Right   Resisted straight leg raise + -   Crossover response + -   Straight leg raise - -   Slump +, mixed results, more tightness sx +, mixed results, more tightness sx   Prone instability test + +   SI Tests Left Right   SI Compression - -   Sacral Thrust - -   FADIR - -   CLIF - -   Resisted Abduction + -     PALPATION: point tenderness over L2, L4, L5 spinal segments. Tenderness to palpation of lumbar paraspinals, tightness of lumbar paraspinals L>R  SPINAL SEGMENTAL MOBILITY: lumbar spinal segments mildly hypomobile    Assessment & Plan   CLINICAL IMPRESSIONS  Medical Diagnosis: Spondylosis of thoracic region without myelopathy or radiculopathy, acute bilateral low back pain without sciatica    Treatment Diagnosis: Force production deficits   Impression/Assessment: Patient is a 44 year old female with hx of low back and hip/pelvis pain.  The  following significant findings have been identified: Pain, Decreased strength, Impaired gait, Impaired muscle performance, and Instability. These impairments interfere with their ability to perform recreational activities and prolonged  as compared to previous level of function.     Clinical Decision Making (Complexity):  Clinical Presentation: Evolving/Changing  Clinical Presentation Rationale: based on medical and personal factors listed in PT evaluation  Clinical Decision Making (Complexity): Moderate complexity    PLAN OF CARE  Treatment Interventions:  Modalities: Biofeedback, Cryotherapy, Dry Needling, E-stim, Hot Pack  Interventions: Gait Training, Manual Therapy, Neuromuscular Re-education, Therapeutic Activity, Therapeutic Exercise, Self-Care/Home Management    Long Term Goals     PT Goal 1  Goal Identifier: HEP  Goal Description: Danyelle will demonstrate independence with HEP for improving her QOL and return to her PLOF.  Goal Progress: initial visit  Target Date: 03/24/25  PT Goal 2  Goal Identifier: Strength  Goal Description: Danyelle will demonstrate 4+/5 hip abduction strength bilaterally for improved tolerance to gait and prolonged sitting.  Goal Progress: initial visit: L: 4-/5, R 4-/5  Target Date: 05/19/25  PT Goal 3  Goal Identifier: Function  Goal Description: Danyelle will demonstrate negative resisted SLR and crossover response bilaterally for improved awareness and activation of core for decreased risk of back flare up in the future.  Goal Progress: initial visit: + resisted SLR on L and + crossover response on L, felt this in the back  Target Date: 05/19/25      Frequency of Treatment: weekly  Duration of Treatment: 12 weeks    Education Assessment:        Risks and benefits of evaluation/treatment have been explained.   Patient/Family/caregiver agrees with Plan of Care.     Evaluation Time:     PT Eval, Moderate Complexity Minutes (98096): 25     Signing Clinician: Brionna Roth, SPT,  JEAN MCNULTY, PT

## 2025-02-24 ENCOUNTER — THERAPY VISIT (OUTPATIENT)
Dept: PHYSICAL THERAPY | Facility: REHABILITATION | Age: 45
End: 2025-02-24
Attending: NURSE PRACTITIONER
Payer: COMMERCIAL

## 2025-02-24 DIAGNOSIS — M54.50 ACUTE BILATERAL LOW BACK PAIN WITHOUT SCIATICA: ICD-10-CM

## 2025-02-24 DIAGNOSIS — M47.814 SPONDYLOSIS OF THORACIC REGION WITHOUT MYELOPATHY OR RADICULOPATHY: ICD-10-CM

## 2025-02-24 PROCEDURE — 97110 THERAPEUTIC EXERCISES: CPT | Mod: GP

## 2025-02-24 PROCEDURE — 97162 PT EVAL MOD COMPLEX 30 MIN: CPT | Mod: GP

## 2025-03-03 ENCOUNTER — THERAPY VISIT (OUTPATIENT)
Dept: PHYSICAL THERAPY | Facility: REHABILITATION | Age: 45
End: 2025-03-03
Attending: NURSE PRACTITIONER
Payer: COMMERCIAL

## 2025-03-03 DIAGNOSIS — M54.50 ACUTE BILATERAL LOW BACK PAIN WITHOUT SCIATICA: ICD-10-CM

## 2025-03-03 DIAGNOSIS — M47.814 SPONDYLOSIS OF THORACIC REGION WITHOUT MYELOPATHY OR RADICULOPATHY: Primary | ICD-10-CM

## 2025-03-03 PROCEDURE — 97110 THERAPEUTIC EXERCISES: CPT | Mod: GP

## 2025-03-03 PROCEDURE — 97140 MANUAL THERAPY 1/> REGIONS: CPT | Mod: GP

## 2025-03-07 ENCOUNTER — HOSPITAL ENCOUNTER (OUTPATIENT)
Dept: MRI IMAGING | Facility: HOSPITAL | Age: 45
Discharge: HOME | End: 2025-03-07
Attending: PHYSICIAN ASSISTANT | Admitting: PHYSICIAN ASSISTANT
Payer: COMMERCIAL

## 2025-03-07 DIAGNOSIS — R79.82 CRP ELEVATED: ICD-10-CM

## 2025-03-07 DIAGNOSIS — G89.29 CHRONIC SI JOINT PAIN: ICD-10-CM

## 2025-03-07 DIAGNOSIS — M53.3 CHRONIC SI JOINT PAIN: ICD-10-CM

## 2025-03-07 PROCEDURE — 255N000002 HC RX 255 OP 636: Performed by: PHYSICIAN ASSISTANT

## 2025-03-07 PROCEDURE — 72197 MRI PELVIS W/O & W/DYE: CPT

## 2025-03-07 PROCEDURE — A9585 GADOBUTROL INJECTION: HCPCS | Performed by: PHYSICIAN ASSISTANT

## 2025-03-07 RX ORDER — GADOBUTROL 604.72 MG/ML
0.1 INJECTION INTRAVENOUS ONCE
Status: COMPLETED | OUTPATIENT
Start: 2025-03-07 | End: 2025-03-07

## 2025-03-07 RX ADMIN — GADOBUTROL 10 ML: 604.72 INJECTION INTRAVENOUS at 17:58

## 2025-03-10 ENCOUNTER — THERAPY VISIT (OUTPATIENT)
Dept: PHYSICAL THERAPY | Facility: REHABILITATION | Age: 45
End: 2025-03-10
Payer: COMMERCIAL

## 2025-03-10 DIAGNOSIS — M54.50 ACUTE BILATERAL LOW BACK PAIN WITHOUT SCIATICA: ICD-10-CM

## 2025-03-10 DIAGNOSIS — M50.30 DDD (DEGENERATIVE DISC DISEASE), CERVICAL: ICD-10-CM

## 2025-03-10 DIAGNOSIS — M54.16 LEFT LUMBAR RADICULOPATHY: ICD-10-CM

## 2025-03-10 DIAGNOSIS — M54.2 CERVICALGIA: ICD-10-CM

## 2025-03-10 DIAGNOSIS — M47.814 SPONDYLOSIS OF THORACIC REGION WITHOUT MYELOPATHY OR RADICULOPATHY: Primary | ICD-10-CM

## 2025-03-10 PROCEDURE — 97110 THERAPEUTIC EXERCISES: CPT | Mod: GP | Performed by: PHYSICAL THERAPIST

## 2025-03-11 ENCOUNTER — OFFICE VISIT (OUTPATIENT)
Dept: FAMILY MEDICINE | Facility: CLINIC | Age: 45
End: 2025-03-11
Payer: COMMERCIAL

## 2025-03-11 ENCOUNTER — MYC MEDICAL ADVICE (OUTPATIENT)
Dept: RHEUMATOLOGY | Facility: CLINIC | Age: 45
End: 2025-03-11

## 2025-03-11 VITALS
WEIGHT: 211 LBS | RESPIRATION RATE: 16 BRPM | DIASTOLIC BLOOD PRESSURE: 80 MMHG | SYSTOLIC BLOOD PRESSURE: 124 MMHG | BODY MASS INDEX: 33.12 KG/M2 | TEMPERATURE: 98.7 F | HEART RATE: 67 BPM | OXYGEN SATURATION: 98 % | HEIGHT: 67 IN

## 2025-03-11 DIAGNOSIS — M54.50 ACUTE BILATERAL LOW BACK PAIN WITHOUT SCIATICA: ICD-10-CM

## 2025-03-11 DIAGNOSIS — Z79.899 MEDICATION MANAGEMENT: ICD-10-CM

## 2025-03-11 DIAGNOSIS — E66.09 CLASS 1 OBESITY DUE TO EXCESS CALORIES WITH SERIOUS COMORBIDITY AND BODY MASS INDEX (BMI) OF 33.0 TO 33.9 IN ADULT: ICD-10-CM

## 2025-03-11 DIAGNOSIS — M54.16 LEFT LUMBAR RADICULOPATHY: ICD-10-CM

## 2025-03-11 DIAGNOSIS — I10 BENIGN ESSENTIAL HYPERTENSION: Primary | ICD-10-CM

## 2025-03-11 DIAGNOSIS — G89.4 CHRONIC PAIN SYNDROME: ICD-10-CM

## 2025-03-11 DIAGNOSIS — E66.811 CLASS 1 OBESITY DUE TO EXCESS CALORIES WITH SERIOUS COMORBIDITY AND BODY MASS INDEX (BMI) OF 33.0 TO 33.9 IN ADULT: ICD-10-CM

## 2025-03-11 LAB
AMPHETAMINES UR QL: NOT DETECTED
BARBITURATES UR QL SCN: NOT DETECTED
BENZODIAZ UR QL SCN: DETECTED
BUPRENORPHINE UR QL: NOT DETECTED
CANNABINOIDS UR QL: NOT DETECTED
COCAINE UR QL SCN: NOT DETECTED
D-METHAMPHET UR QL: NOT DETECTED
METHADONE UR QL SCN: NOT DETECTED
OPIATES UR QL SCN: NOT DETECTED
OXYCODONE UR QL SCN: NOT DETECTED
PCP UR QL SCN: NOT DETECTED
TRICYCLICS UR QL SCN: NOT DETECTED

## 2025-03-11 PROCEDURE — 3079F DIAST BP 80-89 MM HG: CPT | Performed by: NURSE PRACTITIONER

## 2025-03-11 PROCEDURE — 3074F SYST BP LT 130 MM HG: CPT | Performed by: NURSE PRACTITIONER

## 2025-03-11 PROCEDURE — 1126F AMNT PAIN NOTED NONE PRSNT: CPT | Performed by: NURSE PRACTITIONER

## 2025-03-11 PROCEDURE — 99214 OFFICE O/P EST MOD 30 MIN: CPT | Performed by: NURSE PRACTITIONER

## 2025-03-11 PROCEDURE — G2211 COMPLEX E/M VISIT ADD ON: HCPCS | Performed by: NURSE PRACTITIONER

## 2025-03-11 PROCEDURE — 80306 DRUG TEST PRSMV INSTRMNT: CPT | Performed by: NURSE PRACTITIONER

## 2025-03-11 RX ORDER — HYDROCODONE BITARTRATE AND ACETAMINOPHEN 5; 325 MG/1; MG/1
1 TABLET ORAL EVERY 8 HOURS PRN
Qty: 10 TABLET | Refills: 0 | Status: SHIPPED | OUTPATIENT
Start: 2025-03-11 | End: 2025-03-14

## 2025-03-11 ASSESSMENT — PAIN SCALES - GENERAL: PAINLEVEL_OUTOF10: NO PAIN (0)

## 2025-03-11 NOTE — PROGRESS NOTES
Assessment & Plan     Benign essential hypertension  - Chronic, continue current dosage of medication. If experiencing more frequent dizziness and fainting sensation notify PCP for medication adjustment.     Medication management  - see above.     Chronic pain syndrome  - Chronic, unchanged. She started Physical Therapy for back pain 2/24/25 and has had 3 sessions so far.  Medication refills sent and counseled on the use of opioids and benzodiazapine's in conjunction; discussed increased risk of sudden death. She was told not to take these two medications together.  - HYDROcodone-acetaminophen (NORCO) 5-325 MG tablet; Take 1 tablet by mouth every 8 hours as needed for severe pain. Max 10 tablets per 90 days  She uses Norco 10 tablets every 3-6 months and I refilled this for patient today.  I do not recommend escalating dose of narcotic.  If pain is not improving then we would have her establish with pain management.  - naloxone (NARCAN) 4 MG/0.1ML nasal spray; Spray 1 spray (4 mg) into one nostril alternating nostrils as needed for opioid reversal. every 2-3 minutes until assistance arrives  - [facility] Screen [facility]  - Follow-up in 3 months for recheck.            Subjective   Danyelle is a 44 year old, presenting for the following health issues:  Hypertension      3/11/2025    10:31 AM   Additional Questions   Roomed by Noelle OREILLY     Danyelle is a 44 year old female in clinic for hypertension follow-up. She is currently taking 20 Mg of Lisinopril with blood pressures in the mid to high 120s. She states a few days ago she felt light headed and dizzy with a blood pressure of 104/60s, she does report that she may have an acute illness that is going through her home. She does states she may need to increase water intake but feels the medication overall daily is going well. She does complain of acute back pain, she is currently participating in PT and has not had a flare. She states that pain in her lumbar region and  "radiates around hips into pelvis. She states due to the pain she is unable to walk during these episodes and would like to have pain medication on hand when a flare occurs.       History of Present Illness       Hypertension: She presents for follow up of hypertension.  She does check blood pressure  regularly outside of the clinic. Outpatient blood pressures have not been over 140/90. She follows a low salt diet.     She eats 2-3 servings of fruits and vegetables daily.She consumes 0 sweetened beverage(s) daily.She exercises with enough effort to increase her heart rate 10 to 19 minutes per day.  She exercises with enough effort to increase her heart rate 3 or less days per week.   She is taking medications regularly.                      Objective    /80 (BP Location: Right arm, Patient Position: Sitting, Cuff Size: Adult Regular)   Pulse 67   Temp 98.7  F (37.1  C) (Oral)   Resp 16   Ht 1.702 m (5' 7\")   Wt 95.7 kg (211 lb)   LMP 03/04/2025   SpO2 98%   BMI 33.05 kg/m    Body mass index is 33.05 kg/m .  Physical Exam   GENERAL: alert and no distress  NECK: no adenopathy, no asymmetry, masses, or scars  RESP: lungs clear to auscultation - no rales, rhonchi or wheezes  CV: regular rate and rhythm, normal S1 S2, no S3 or S4, no murmur, click or rub, no peripheral edema  MS: no gross musculoskeletal defects noted, no edema      Results for orders placed or performed in visit on 03/11/25 (from the past 24 hours)   Urine Drug Screen [facility]   Result Value Ref Range    Cannabinoids (97-qot-4-carboxy-9-THC) Not Detected Not Detected, Indeterminate    Phencyclidine Not Detected Not Detected, Indeterminate    Cocaine (Benzoylecgonine) Not Detected Not Detected, Indeterminate    Methamphetamine (d-Methamphetamine) Not Detected Not Detected, Indeterminate    Opiates (Morphine) Not Detected Not Detected, Indeterminate    Amphetamine (d-Amphetamine) Not Detected Not Detected, Indeterminate    Benzodiazepines " (Nordiazepam) Detected (A) Not Detected, Indeterminate    Tricyclic Antidepressants (Desipramine) Not Detected Not Detected, Indeterminate    Methadone Not Detected Not Detected, Indeterminate    Barbiturates (Butalbital) Not Detected Not Detected, Indeterminate    Oxycodone Not Detected Not Detected, Indeterminate    Buprenorphine Not Detected Not Detected, Indeterminate     *Note: Due to a large number of results and/or encounters for the requested time period, some results have not been displayed. A complete set of results can be found in Results Review.       TA Eugene      I, Marsha Fernandez, DNP, APRN, CNP, reviewed and verified the nurse practitioner (NP) student's documentation of the patient's history.  I performed the exam and medical decision making activities with the NP student, who was present for learning purposes.     Signed Electronically by: Marsha Fernandez NP

## 2025-03-17 ENCOUNTER — PATIENT OUTREACH (OUTPATIENT)
Dept: CARE COORDINATION | Facility: CLINIC | Age: 45
End: 2025-03-17

## 2025-03-17 ENCOUNTER — THERAPY VISIT (OUTPATIENT)
Dept: PHYSICAL THERAPY | Facility: REHABILITATION | Age: 45
End: 2025-03-17
Payer: COMMERCIAL

## 2025-03-17 DIAGNOSIS — M47.814 SPONDYLOSIS OF THORACIC REGION WITHOUT MYELOPATHY OR RADICULOPATHY: Primary | ICD-10-CM

## 2025-03-17 DIAGNOSIS — M54.50 ACUTE BILATERAL LOW BACK PAIN WITHOUT SCIATICA: ICD-10-CM

## 2025-03-17 PROCEDURE — 97110 THERAPEUTIC EXERCISES: CPT | Mod: GP | Performed by: PHYSICAL THERAPIST

## 2025-03-24 ENCOUNTER — THERAPY VISIT (OUTPATIENT)
Dept: PHYSICAL THERAPY | Facility: REHABILITATION | Age: 45
End: 2025-03-24
Payer: COMMERCIAL

## 2025-03-24 DIAGNOSIS — M54.16 LEFT LUMBAR RADICULOPATHY: ICD-10-CM

## 2025-03-24 DIAGNOSIS — M47.814 SPONDYLOSIS OF THORACIC REGION WITHOUT MYELOPATHY OR RADICULOPATHY: Primary | ICD-10-CM

## 2025-03-24 DIAGNOSIS — M50.30 DDD (DEGENERATIVE DISC DISEASE), CERVICAL: ICD-10-CM

## 2025-03-24 DIAGNOSIS — M54.50 ACUTE BILATERAL LOW BACK PAIN WITHOUT SCIATICA: ICD-10-CM

## 2025-03-24 PROCEDURE — 97530 THERAPEUTIC ACTIVITIES: CPT | Mod: GP | Performed by: PHYSICAL THERAPIST

## 2025-03-24 PROCEDURE — 97110 THERAPEUTIC EXERCISES: CPT | Mod: GP | Performed by: PHYSICAL THERAPIST

## 2025-03-30 ENCOUNTER — PATIENT OUTREACH (OUTPATIENT)
Dept: CARE COORDINATION | Facility: CLINIC | Age: 45
End: 2025-03-30
Payer: COMMERCIAL

## 2025-03-31 ENCOUNTER — THERAPY VISIT (OUTPATIENT)
Dept: PHYSICAL THERAPY | Facility: REHABILITATION | Age: 45
End: 2025-03-31
Payer: COMMERCIAL

## 2025-03-31 DIAGNOSIS — M54.50 ACUTE BILATERAL LOW BACK PAIN WITHOUT SCIATICA: ICD-10-CM

## 2025-03-31 DIAGNOSIS — M50.30 DDD (DEGENERATIVE DISC DISEASE), CERVICAL: ICD-10-CM

## 2025-03-31 DIAGNOSIS — M47.814 SPONDYLOSIS OF THORACIC REGION WITHOUT MYELOPATHY OR RADICULOPATHY: Primary | ICD-10-CM

## 2025-03-31 DIAGNOSIS — M54.16 LEFT LUMBAR RADICULOPATHY: ICD-10-CM

## 2025-03-31 PROCEDURE — 97110 THERAPEUTIC EXERCISES: CPT | Mod: GP | Performed by: PHYSICAL THERAPIST

## 2025-03-31 PROCEDURE — 97530 THERAPEUTIC ACTIVITIES: CPT | Mod: GP | Performed by: PHYSICAL THERAPIST

## 2025-04-07 ENCOUNTER — THERAPY VISIT (OUTPATIENT)
Dept: PHYSICAL THERAPY | Facility: REHABILITATION | Age: 45
End: 2025-04-07
Payer: COMMERCIAL

## 2025-04-07 DIAGNOSIS — M54.16 LEFT LUMBAR RADICULOPATHY: ICD-10-CM

## 2025-04-07 DIAGNOSIS — M47.814 SPONDYLOSIS OF THORACIC REGION WITHOUT MYELOPATHY OR RADICULOPATHY: Primary | ICD-10-CM

## 2025-04-07 DIAGNOSIS — M54.50 ACUTE BILATERAL LOW BACK PAIN WITHOUT SCIATICA: ICD-10-CM

## 2025-04-07 DIAGNOSIS — M50.30 DDD (DEGENERATIVE DISC DISEASE), CERVICAL: ICD-10-CM

## 2025-04-07 DIAGNOSIS — M54.2 CERVICALGIA: ICD-10-CM

## 2025-04-07 PROCEDURE — 97140 MANUAL THERAPY 1/> REGIONS: CPT | Mod: GP | Performed by: PHYSICAL THERAPIST

## 2025-04-07 PROCEDURE — 97530 THERAPEUTIC ACTIVITIES: CPT | Mod: GP | Performed by: PHYSICAL THERAPIST

## 2025-04-07 PROCEDURE — 97110 THERAPEUTIC EXERCISES: CPT | Mod: GP | Performed by: PHYSICAL THERAPIST

## 2025-04-21 ENCOUNTER — THERAPY VISIT (OUTPATIENT)
Dept: PHYSICAL THERAPY | Facility: REHABILITATION | Age: 45
End: 2025-04-21
Payer: COMMERCIAL

## 2025-04-21 DIAGNOSIS — M47.814 SPONDYLOSIS OF THORACIC REGION WITHOUT MYELOPATHY OR RADICULOPATHY: Primary | ICD-10-CM

## 2025-04-21 PROCEDURE — 97110 THERAPEUTIC EXERCISES: CPT | Mod: GP | Performed by: PHYSICAL THERAPIST

## 2025-04-21 PROCEDURE — 97530 THERAPEUTIC ACTIVITIES: CPT | Mod: GP | Performed by: PHYSICAL THERAPIST

## 2025-05-05 ENCOUNTER — OFFICE VISIT (OUTPATIENT)
Dept: FAMILY MEDICINE | Facility: CLINIC | Age: 45
End: 2025-05-05
Payer: COMMERCIAL

## 2025-05-05 VITALS
TEMPERATURE: 99.2 F | HEIGHT: 67 IN | DIASTOLIC BLOOD PRESSURE: 87 MMHG | WEIGHT: 210 LBS | BODY MASS INDEX: 32.96 KG/M2 | HEART RATE: 64 BPM | OXYGEN SATURATION: 96 % | RESPIRATION RATE: 16 BRPM | SYSTOLIC BLOOD PRESSURE: 134 MMHG

## 2025-05-05 DIAGNOSIS — Z79.899 MEDICATION MANAGEMENT: Primary | ICD-10-CM

## 2025-05-05 DIAGNOSIS — L03.213 PRESEPTAL CELLULITIS OF LEFT EYE: ICD-10-CM

## 2025-05-05 PROCEDURE — 3075F SYST BP GE 130 - 139MM HG: CPT | Performed by: NURSE PRACTITIONER

## 2025-05-05 PROCEDURE — 3079F DIAST BP 80-89 MM HG: CPT | Performed by: NURSE PRACTITIONER

## 2025-05-05 PROCEDURE — 99213 OFFICE O/P EST LOW 20 MIN: CPT | Performed by: NURSE PRACTITIONER

## 2025-05-05 ASSESSMENT — PATIENT HEALTH QUESTIONNAIRE - PHQ9
10. IF YOU CHECKED OFF ANY PROBLEMS, HOW DIFFICULT HAVE THESE PROBLEMS MADE IT FOR YOU TO DO YOUR WORK, TAKE CARE OF THINGS AT HOME, OR GET ALONG WITH OTHER PEOPLE: NOT DIFFICULT AT ALL
SUM OF ALL RESPONSES TO PHQ QUESTIONS 1-9: 6
SUM OF ALL RESPONSES TO PHQ QUESTIONS 1-9: 6

## 2025-05-05 ASSESSMENT — ENCOUNTER SYMPTOMS: EYE PAIN: 1

## 2025-05-05 NOTE — PROGRESS NOTES
"  {PROVIDER CHARTING PREFERENCE:752887}    Alex Bassett is a 45 year old, presenting for the following health issues:  Eye Problem (Cellulitis left eye)        5/5/2025     1:42 PM   Additional Questions   Roomed by Margoth CLEMENTS CMA     Eye Problem     History of Present Illness       Reason for visit:  Eye celluitis  Symptom onset:  Today  Symptoms include:  Swollen eye  Symptom intensity:  Mild  Symptom progression:  Improving  Had these symptoms before:  Yes  Has tried/received treatment for these symptoms:  Yes  Previous treatment was successful:  Yes  Prior treatment description:  Antibiotics  What makes it worse:  Na  What makes it better:  Na   She is taking medications regularly.      Pet cat, allergic to cats.  Cried a lot yesterday, did a medium.  Itching eyes        {MA/LPN/RN Pre-Provider Visit Orders- hCG/UA/Strep (Optional):284897}  {SUPERLIST (Optional):814734}  {additonal problems for provider to add (Optional):910807}    {ROS Picklists (Optional):291876}      Objective    /87 (BP Location: Right arm, Patient Position: Chair, Cuff Size: Adult Large)   Pulse 64   Temp 99.2  F (37.3  C) (Oral)   Resp 16   Ht 1.702 m (5' 7\")   Wt 95.3 kg (210 lb)   LMP 04/20/2025 (Exact Date)   SpO2 96%   BMI 32.89 kg/m    Body mass index is 32.89 kg/m .  Physical Exam   {Exam List (Optional):870439}    {Diagnostic Test Results (Optional):074085}        Signed Electronically by: ASHLEY Han CNP  {Email feedback regarding this note to [email or web address]   :313119}  " mucous membranes moist, and erythema and mild edema inferior to left eye  RESP: lungs clear to auscultation - no rales, rhonchi or wheezes  CV: regular rates and rhythm, normal S1 S2, no S3 or S4, and no murmur, click or rub  PSYCH: mentation appears normal, affect normal/bright            Signed Electronically by: ASHLEY Han CNP

## 2025-05-07 ENCOUNTER — ANCILLARY PROCEDURE (OUTPATIENT)
Dept: MAMMOGRAPHY | Facility: CLINIC | Age: 45
End: 2025-05-07
Attending: NURSE PRACTITIONER
Payer: COMMERCIAL

## 2025-05-07 DIAGNOSIS — Z12.31 VISIT FOR SCREENING MAMMOGRAM: ICD-10-CM

## 2025-05-07 PROCEDURE — 77063 BREAST TOMOSYNTHESIS BI: CPT

## 2025-05-12 DIAGNOSIS — I10 BENIGN ESSENTIAL HYPERTENSION: ICD-10-CM

## 2025-05-12 RX ORDER — LISINOPRIL 20 MG/1
20 TABLET ORAL DAILY
Qty: 90 TABLET | Refills: 0 | Status: SHIPPED | OUTPATIENT
Start: 2025-05-12

## 2025-05-22 ENCOUNTER — OFFICE VISIT (OUTPATIENT)
Dept: FAMILY MEDICINE | Facility: CLINIC | Age: 45
End: 2025-05-22
Payer: COMMERCIAL

## 2025-05-22 VITALS
RESPIRATION RATE: 16 BRPM | BODY MASS INDEX: 32.65 KG/M2 | TEMPERATURE: 99.3 F | SYSTOLIC BLOOD PRESSURE: 126 MMHG | HEART RATE: 67 BPM | DIASTOLIC BLOOD PRESSURE: 84 MMHG | WEIGHT: 208 LBS | HEIGHT: 67 IN | OXYGEN SATURATION: 97 %

## 2025-05-22 DIAGNOSIS — R19.8 ALTERED BOWEL FUNCTION: Primary | ICD-10-CM

## 2025-05-22 PROCEDURE — 3074F SYST BP LT 130 MM HG: CPT | Performed by: NURSE PRACTITIONER

## 2025-05-22 PROCEDURE — 99213 OFFICE O/P EST LOW 20 MIN: CPT | Performed by: NURSE PRACTITIONER

## 2025-05-22 PROCEDURE — 1126F AMNT PAIN NOTED NONE PRSNT: CPT | Performed by: NURSE PRACTITIONER

## 2025-05-22 PROCEDURE — 3079F DIAST BP 80-89 MM HG: CPT | Performed by: NURSE PRACTITIONER

## 2025-05-22 ASSESSMENT — PAIN SCALES - GENERAL: PAINLEVEL_OUTOF10: NO PAIN (0)

## 2025-05-22 NOTE — PATIENT INSTRUCTIONS
1) Bulk forming (fiber supplements)     Your colons job is to absorb the liquid in your stool.  Fiber mixes with your stool and does not allow all the water to get absorbed by the colon.  This will not give you diarrhea in fact I use it for people with diarrhea since it causes the stool to bulk up more and is easier to control.     Below are several fiber options.     For your stools try using Metamucil or it's generic equivalent 1-2 tablespoons with a large glass of water every day.   Goal of the fiber - to bulk up stool.    - Psyllium (Metamucil) Take up to 1 tablespoon three times daily  - Methylcellulose (Citrucel) Take up to 1 tablespoon or 4 caplets three times daily  - Polycarbophil (FiberCon) Take 2-4 caplets daily  - Wheat dextrin (Benefiber) Take 1-3 caplets or 2 teaspoons three times daily

## 2025-05-22 NOTE — PROGRESS NOTES
"  Assessment & Plan     Altered bowel function  No abdominal pain, no blood in stool, no systemic symptoms.  She has noticed thinner, softer stools which is change from her baseline.  Discussed trial for a month fiber supplement to bulk stools; if no change or if symptoms persist, would be reasonable to get colonoscopy for further evaluation.    Your colons job is to absorb the liquid in your stool.  Fiber mixes with your stool and does not allow all the water to get absorbed by the colon.  This will not give you diarrhea in fact I use it for people with diarrhea since it causes the stool to bulk up more and is easier to control.     Below are several fiber options.     For your stools try using Metamucil or it's generic equivalent 1-2 tablespoons with a large glass of water every day.   Goal of the fiber - to bulk up stool.    - Psyllium (Metamucil) Take up to 1 tablespoon three times daily  - Methylcellulose (Citrucel) Take up to 1 tablespoon or 4 caplets three times daily  - Polycarbophil (FiberCon) Take 2-4 caplets daily  - Wheat dextrin (Benefiber) Take 1-3 caplets or 2 teaspoons three times daily          BMI  Estimated body mass index is 32.58 kg/m  as calculated from the following:    Height as of this encounter: 1.702 m (5' 7\").    Weight as of this encounter: 94.3 kg (208 lb).             Subjective   Danyelle is a 45 year old, presenting for the following health issues:  Gastrointestinal Problem (Bowel issue - softer  pasty bowel movements )      5/22/2025     8:46 AM   Additional Questions   Roomed by Noelle OREILLY     History of Present Illness       Reason for visit:  Stomach and stool issues  Symptom onset:  More than a month  Symptom intensity:  Moderate  Symptom progression:  Staying the same  Had these symptoms before:  No  What makes it worse:  No  What makes it better:  No   She is taking medications regularly.     Additional provider notes:    Change in stools:  Approximately 6 months she noticed " "change in her stools.  They became thinner and softer.  Takes more time to wipe.  She has been doing Intermittent fasting 6 pm until 10 am which she started recently.   Stooling daily or multiple times daily.  Plenty of fruits/vegetables approximately 6 per day.  No abdominal pain, no blood in stool.    L hip/L SI pain- wants checked.  Had MR sacroiliac joints 3/7/25 that showed mild SI joint arthrosis, no evidence of active sacroiliitis.    Numbness in L groin from hernia surgery                Objective    /84 (BP Location: Right arm, Patient Position: Sitting, Cuff Size: Adult Large)   Pulse 67   Temp 99.3  F (37.4  C) (Oral)   Resp 16   Ht 1.702 m (5' 7\")   Wt 94.3 kg (208 lb)   LMP 05/18/2025 (Exact Date)   SpO2 97%   BMI 32.58 kg/m    Body mass index is 32.58 kg/m .  Physical Exam   GENERAL: alert and no distress  ABDOMEN: soft, nontender, no hepatosplenomegaly, no masses and bowel sounds normal  MS: spine exam shows mildly tender left SI region and left groin non tender, non bulging  PSYCH: mentation appears normal, affect normal/bright            Signed Electronically by: ASHLEY Han CNP    "

## 2025-07-10 ENCOUNTER — E-VISIT (OUTPATIENT)
Dept: FAMILY MEDICINE | Facility: CLINIC | Age: 45
End: 2025-07-10
Payer: COMMERCIAL

## 2025-07-10 DIAGNOSIS — F41.1 GENERALIZED ANXIETY DISORDER: ICD-10-CM

## 2025-07-10 DIAGNOSIS — M54.6 RIGHT-SIDED THORACIC BACK PAIN, UNSPECIFIED CHRONICITY: Primary | ICD-10-CM

## 2025-07-10 DIAGNOSIS — G89.4 CHRONIC PAIN SYNDROME: ICD-10-CM

## 2025-07-10 RX ORDER — HYDROCODONE BITARTRATE AND ACETAMINOPHEN 5; 325 MG/1; MG/1
1 TABLET ORAL EVERY 8 HOURS PRN
Qty: 10 TABLET | Refills: 0 | Status: SHIPPED | OUTPATIENT
Start: 2025-07-10 | End: 2025-07-13

## 2025-07-10 NOTE — PATIENT INSTRUCTIONS
Thank you for checking in. I have adjusted your medication to manage your symptoms. The following medication was sent to your pharmacy:    Orders Placed This Encounter   Medications     HYDROcodone-acetaminophen (NORCO) 5-325 MG tablet     Sig: Take 1 tablet by mouth every 8 hours as needed for severe pain. Take 1 tablet by mouth every 8 hours as needed for severe pain.  Max 10 tablets per 90 days     Dispense:  10 tablet     Refill:  0        View your full visit summary for details by clicking on the link below. Your pharmacist will be able to address any questions you may have about the medication.       Please send an eVisit to follow up on this medication change in 4 weeks if not improving, or sooner if needed.     Thank you for choosing us for your care.

## 2025-08-11 ENCOUNTER — TELEPHONE (OUTPATIENT)
Dept: FAMILY MEDICINE | Facility: CLINIC | Age: 45
End: 2025-08-11
Payer: COMMERCIAL

## (undated) DEVICE — DAVINCI HOT SHEARS TIP COVER  400180

## (undated) DEVICE — DAVINCI XI DRAPE ARM 470015

## (undated) DEVICE — BLADE KNIFE SURG 15 371115

## (undated) DEVICE — SOL WATER IRRIG 500ML BOTTLE 2F7113

## (undated) DEVICE — ESU GROUND PAD ADULT W/CORD E7507

## (undated) DEVICE — SU VICRYL 2-0 CP-2 27" UND J869H

## (undated) DEVICE — GLOVE BIOGEL PI SZ 8.0 40880

## (undated) DEVICE — DAVINCI XI MONOPOLAR SCISSORS HOT SHEARS 8MM 470179

## (undated) DEVICE — DAVINCI XI SEAL UNIVERSAL 5-8MM 470361

## (undated) DEVICE — ENDO TROCAR FIRST ENTRY KII FIOS Z-THRD 05X100MM CTF03

## (undated) DEVICE — ESU PENCIL SMOKE EVAC W/ROCKER SWITCH 0703-047-000

## (undated) DEVICE — GLOVE BIOGEL PI MICRO SZ 7.5 48575

## (undated) DEVICE — SYR 50ML SLIP TIP W/O NDL 309654

## (undated) DEVICE — PACK LAP CHOLE CUSTOM ASC

## (undated) DEVICE — DRAPE IOBAN INCISE 23X17" 6650EZ

## (undated) DEVICE — SU VICRYL 0 CT-1 27" UND J260H

## (undated) DEVICE — SU ETHIBOND 0 MO-7 CR 8X18" CX41D

## (undated) DEVICE — DAVINCI XI NDL DRIVER MEGA SUTURE CUT 8MM 470309

## (undated) DEVICE — DAVINCI XI DRAPE COLUMN 470341

## (undated) DEVICE — PACK MINOR CUSTOM ASC

## (undated) DEVICE — SU MONOCRYL 4-0 PS-2 27" UND Y426H

## (undated) DEVICE — SOL ADH LIQUID BENZOIN SWAB 0.6ML C1544

## (undated) DEVICE — GOWN XLG DISP 9545

## (undated) DEVICE — LINEN TOWEL PACK X5 5464

## (undated) DEVICE — DRAPE LAP PEDS DISP 29492

## (undated) DEVICE — DRAPE MAYO STAND 23X54 8337

## (undated) DEVICE — PREP CHLORAPREP 26ML TINTED ORANGE  260815

## (undated) DEVICE — DRSG GAUZE 4X4" 3033

## (undated) DEVICE — DRSG STERI STRIP 1/2X4" R1547

## (undated) DEVICE — DRSG TEGADERM 2 3/8X2 3/4" 1624W

## (undated) DEVICE — DAVINCI XI FCP BIPOLAR FENESTRATED 470205

## (undated) RX ORDER — OXYCODONE HYDROCHLORIDE 5 MG/1
TABLET ORAL
Status: DISPENSED
Start: 2024-01-17

## (undated) RX ORDER — PROPOFOL 10 MG/ML
INJECTION, EMULSION INTRAVENOUS
Status: DISPENSED
Start: 2024-01-17

## (undated) RX ORDER — BUPIVACAINE HYDROCHLORIDE 2.5 MG/ML
INJECTION, SOLUTION EPIDURAL; INFILTRATION; INTRACAUDAL
Status: DISPENSED
Start: 2024-01-17

## (undated) RX ORDER — DEXAMETHASONE SODIUM PHOSPHATE 4 MG/ML
INJECTION, SOLUTION INTRA-ARTICULAR; INTRALESIONAL; INTRAMUSCULAR; INTRAVENOUS; SOFT TISSUE
Status: DISPENSED
Start: 2024-01-17

## (undated) RX ORDER — ONDANSETRON 2 MG/ML
INJECTION INTRAMUSCULAR; INTRAVENOUS
Status: DISPENSED
Start: 2024-01-17

## (undated) RX ORDER — HYDROMORPHONE HYDROCHLORIDE 1 MG/ML
INJECTION, SOLUTION INTRAMUSCULAR; INTRAVENOUS; SUBCUTANEOUS
Status: DISPENSED
Start: 2024-01-17

## (undated) RX ORDER — EPINEPHRINE 1 MG/ML
INJECTION, SOLUTION INTRAMUSCULAR; SUBCUTANEOUS
Status: DISPENSED
Start: 2024-01-17

## (undated) RX ORDER — GLYCOPYRROLATE 0.2 MG/ML
INJECTION INTRAMUSCULAR; INTRAVENOUS
Status: DISPENSED
Start: 2024-01-17

## (undated) RX ORDER — CEFAZOLIN SODIUM 2 G/50ML
SOLUTION INTRAVENOUS
Status: DISPENSED
Start: 2024-01-17

## (undated) RX ORDER — KETOROLAC TROMETHAMINE 30 MG/ML
INJECTION, SOLUTION INTRAMUSCULAR; INTRAVENOUS
Status: DISPENSED
Start: 2024-01-17

## (undated) RX ORDER — DIAZEPAM 5 MG
TABLET ORAL
Status: DISPENSED
Start: 2024-01-17

## (undated) RX ORDER — ACETAMINOPHEN 325 MG/1
TABLET ORAL
Status: DISPENSED
Start: 2024-01-17

## (undated) RX ORDER — FENTANYL CITRATE 50 UG/ML
INJECTION, SOLUTION INTRAMUSCULAR; INTRAVENOUS
Status: DISPENSED
Start: 2024-01-17